# Patient Record
Sex: FEMALE | Race: WHITE | Employment: OTHER | URBAN - METROPOLITAN AREA
[De-identification: names, ages, dates, MRNs, and addresses within clinical notes are randomized per-mention and may not be internally consistent; named-entity substitution may affect disease eponyms.]

---

## 2017-01-10 ENCOUNTER — HOSPITAL ENCOUNTER (OUTPATIENT)
Dept: PHYSICAL THERAPY | Age: 72
Discharge: HOME | End: 2017-01-10
Payer: MEDICARE

## 2017-01-10 PROCEDURE — G8979 MOBILITY GOAL STATUS: HCPCS

## 2017-01-10 PROCEDURE — G8978 MOBILITY CURRENT STATUS: HCPCS

## 2017-01-10 PROCEDURE — 97140 MANUAL THERAPY 1/> REGIONS: CPT

## 2017-01-10 NOTE — PROGRESS NOTES
[facility] at [address], [address] [facility],[address] [place], [address]  Phone: [phone]   Fax: [phone]    Outpatient PHYSICAL THERAPY: Recertification  Fall Risk Score: 0 (? 5 = High Risk)  ICD-9: Treatment Diagnosis: Other specified disorders of joint - other disorders of cervical region, cervicalgia  ICD-10: Treatment Diagnosis: Cervicalgia (M54.2)     REFERRING PHYSICIAN: Aneta Turner MD MD Orders: Evaluate and Treat  Return Physician Appointment: TBD  MEDICAL/REFERRING DIAGNOSIS:  Cervicalgia [M54.2]  Other physical therapy [[id number]]  DATE OF ONSET: Chronic   PRIOR LEVEL OF FUNCTION: Independentd  PRECAUTIONS/ALLERGIES:   Allergies   Allergen Reactions    Alendronate Sodium Rash    Antihistamine [Triprolidine-Pseudoephedrine] Anxiety    Boniva [Ibandronate] Diarrhea     ASSESSMENT:  ?????? ? ? This section established at most recent assessment?????????? Patient presents with decreased range of motion, decreased strength, and pain in right shoulder and neck secondary to chronic pain. Patient has attended a total of 68 scheduled physical therapy visits including initial evaluation on 1/7/2014. Treatment has consisted of manual therapy and streching to improve pain and performance with activities of daily living. PROBLEM LIST (Impairments causing functional limitations):  1. Decreased independence with ADLs requiring any extensive UE activity (lifting/reaching/vacuuming). 2.  Decreased tolerance of prolonged sitting/standing activities (>30 minutes) due to exacerbation of neck pain. 3.  Unable to participate in social/recreational activities due to fear avoidance of onset of neck/shoulder pain. 4.  Outcome measure score of 10/50 on Neck Disability Index with minimal effect of pain on patient's ability to manage every day life activities.   GOALS: (Goals have been discussed and agreed upon with patient.)  SHORT-TERM FUNCTIONAL GOALS: Time Frame: 3 weeks  1. Patient will be independent with home exercise program without exacerbation of symptoms or cueing needed--goal met. 2. Patient will be independent with correct sleeping positions and awareness/avoidance of aggravating positions without cueing needed--goal met. DISCHARGE GOALS: Time Frame: 8 weeks  1. Patient will be independent with all ADLs with minimal onset of neck/shoulder pain and no deficits with daily tasks--goal ongoing. 2. Patient will report no fear avoidance with social or recreational activities due to neck/shoulder pain--goal ongoing. 3. Patient will score less than or equal to 5/50 on Neck Disability Index with minimal effect of pain on patient's ability to manage every day life activities--goal ongoing. REHABILITATION POTENTIAL FOR STATED GOALS: Fransico Barfield OF CARE:  INTERVENTIONS PLANNED: (Benefits and precautions of physical therapy have been discussed with the patient.)  1. Patient education including pathophysiology of neck/shoulder symptoms, education/training (sleeping positions, posture re-education and body mechanics), and instructions of home exercise program.  2. Therapeutic exercises including mobility through neck/shoulder region and postural stabilization. 3. Manual therapy including soft tissue mobilizations, functional joint mobilizations and neuromuscular re-education to neck and shoulder region. 4. Therapeutic modalities including moist heat, cold pack, electrical stimulation, and/or ultrasound as needed for relief of symptoms with treatment. TREATMENT PLAN EFFECTIVE DATES: 1/7/2017 TO 4/7/2017  FREQUENCY/DURATION: Follow patient 2 times a week for 8 weeks to address above goals. Regarding Paola Beach's therapy, I certify that the treatment plan above will be carried out by a therapist or under their direction.   Thank you for this referral,  Nanette Rodriguez PT     Referring Physician Signature: Rosendo Castaneda MD          Date SUBJECTIVE:  History of Present Injury/Illness (Reason for Referral): Patient is well known to therapist.  Patient returned to therapy today with continued complaints of neck and shoulder pain. Patient reports that her right shoulder/arm feel weak at times. She reports she hasn't ever dropped anything, but her arm will catch and she will have to be careful that she doesn't throw anything. She reports that her neck was hurting during the holidays. She reports that she did move out of her work office over the holidays. She reports she is still using cold packs at night and taking her pain medication as needed. She reports she has also been trying to get a few deep tissue massages to help with her pain. Present Symptoms: 1/10/2017: Patient reports she is hurting in her shoulder this morning. Pain Intensity 1: 4  Pain Location 1: Neck, Shoulder  Pain Orientation 1: Right, Left  Pain Intervention(s) 1: Rest, Medication (see MAR)  Pain Description 1: Aching, Constant  Pain Onset 1: Chronic  Pain Scale 1: Numeric (0 - 10)  Patient Stated Pain Goal: 0  · Patient Stated Pain Goal: 0   Dominant Side: right  Past Medical History: Patient reports no significant past medical history. Current Medications: Cymblata, pain medication (PRN)   Date Last Reviewed: 1/10/2017  Social History/Home Situation:   Home Environment: Private residence  Living Alone: No  Support Systems: Family member(s); Friends \ neighbors  Work/Activity History: Retired  OBJECTIVE:  Outcome Measure: Tool Used: Neck Disability Index (NDI)  Score:  Initial: 10/50  Most Recent: 8/50 (Date: 1/10/2017)   Interpretation of Score: The Neck Disability Index is a revised form of the Oswestry Low Back Pain Index and is designed to measure the activities of daily living in person's with neck pain. Each section is scored on a 0-5 scale, 5 representing the greatest disability. The scores of each section are added together for a total score of 50.    Score 0 1-10 11-20 21-30 31-40 41-49 50   Modifier CH CI CJ CK CL CM CN     ? Carrying, Moving, and Handling Objects:     - CURRENT STATUS: CI - 1%-19% impaired, limited or restricted    - GOAL STATUS: CI - 1%-19% impaired, limited or restricted    - D/C STATUS:  ---------------To be determined---------------    Observation/Orthostatic Postural Assessment:   Posture (WDL): Exceptions to WDL  Posture Assessment: Rounded shoulders; Forward head  Palpation:  Tenderness to palpation noted in cervical spine and shoulder region on right greater than left (upper trapezius, levator scapulae, rhomboids, sub-occipitals). No bruising or swelling noted. ROM:    Cervical Assessment (AROM):   · Flexion 100%  · Extension 90%  · Lateral flexion L 85%, R 100%    LUE Assessment (AROM):   · Within defined limits        Shoulder Assessment (AROM):  · Flexion B WFL  · L ext. · Rot. 90, R 80  · IR: L 70, R 65     RLE Assessment (AROM):   · Within defined limits      Strength:   LUE Strength, Tone, Sensation:   · Within defined limits  · Deep cervical flexion 20s against gravity    RUE Strength, Tone, Sensation:  · R Shoulder Flexion: 3+  · R Shoulder ABduction: 3+  · R Shoulder Internal Rotation: 3+  · R Shoulder External Rotation: 3+  · R Elbow Flexion: 3+  · R Elbow Extension: 3+        Special Tests: Negative  Neurological Screen:    Myotomes:        RUE Myotomes  · C1 (Cervical Rotation): Strong  · C2, C3, C4 (Shoulder Shrug): Strong  · C5 (Shoulder ABduction): Strong  · C6 (Biceps & Wrist Extension): Strong  · C7 (Triceps & Wrist Flexion): Strong  · C8 (Thumb Extension): Strong  · T1 (Finger ADduction): Strong          Dermatomes:     Within normal limits     Within normal limits          Reflexes:   · Left Bicep (C5,C6): 2+  · Left Bracheoradialis (C5,C6): 2+  · Left Tricep (C6,C7,C8): 2+    · Right Bicep (C5,C6): 2+  · Right Bracheoradialis (C5,C6): 2+  · Right Tricep (C6,C7,C8): 2+          Neural Tension Tests: Negative  Functional Mobility:  Gait Description (WDL): Within defined limits     Balance:   Sitting: Intact  Standing: Intact  TREATMENT:    (In addition to Assessment/Re-Assessment sessions the following treatments were rendered)  Therapeutic Exercise: ( 5 minutes):  Exercises per grid below to improve mobility and strength. Required minimal visual, verbal and manual cues to promote proper body alignment, promote proper body posture and promote proper body mechanics. Progressed resistance, range, repetitions and complexity of movement as indicated. Date:  1/10/2017    Activity/Exercise Parameters   Scapular rows HEP - reviewed   Lat pulls HEP - reviewed   Manual Therapy     · Joint Mobilization (10 Minutes): Grade II anterior-posterior joint mobilization to right glenohumeral joint with active and passive gross movement in all planes to improve range of motion and decrease pain with daily activities. Prone t-spine central posterior anterior grade IV T1-4. · Soft Tissue Mobilization (30 Minutes): Soft tissue mobilization to cervical spinal musculature to improve mobility and decrease tightness and pain with daily activities. Therapeutic Modalities: N/A today                                                                                             HEP: As above; handouts given to patient for all exercises. ______________________________________________________________________________________________________    Treatment Assessment:  Patient tolerated treatment well. Patient reported decreased pain after treatment. Improving joint and soft tissue mobility noted with treatment. Pain 1/10. Progression/Medical Necessity:   · Patient is expected to demonstrate progress in strength and range of motion to increase independence with daily activities. · Patient demonstrates good rehab potential due to higher previous functional level.   · Skilled intervention continues to be required due to decrease in functional mobility. Compliance with Program/Exercises: Compliant   Reason for Continuation of Services/Other Comments:  · Patient continues to demonstrate capacity to improve overall mobility which will increase safety. Recommendations/Intent for next treatment session: \"Treatment next visit will focus on advancements to more challenging activities\".     Total Treatment Duration:  PT Patient Time In/Time Out  Time In: 0815  Time Out: 0900    Viviane eBatty PT

## 2017-01-17 ENCOUNTER — HOSPITAL ENCOUNTER (OUTPATIENT)
Dept: LAB | Age: 72
Discharge: HOME | End: 2017-01-17

## 2017-01-17 PROCEDURE — 88305 TISSUE EXAM BY PATHOLOGIST: CPT | Performed by: INTERNAL MEDICINE

## 2017-01-24 ENCOUNTER — HOSPITAL ENCOUNTER (OUTPATIENT)
Dept: PHYSICAL THERAPY | Age: 72
Discharge: HOME | End: 2017-01-24
Payer: MEDICARE

## 2017-01-24 PROCEDURE — 97140 MANUAL THERAPY 1/> REGIONS: CPT

## 2017-01-24 NOTE — PROGRESS NOTES
[facility] at [address], [address] [facility],[address] [place], [address]  Phone: [phone]   Fax: [phone]    Outpatient PHYSICAL THERAPY: Daily Note  Fall Risk Score: 0 (? 5 = High Risk)  ICD-9: Treatment Diagnosis: Other specified disorders of joint - other disorders of cervical region, cervicalgia  ICD-10: Treatment Diagnosis: Cervicalgia (M54.2)     REFERRING PHYSICIAN: Ella Galvin MD MD Orders: Evaluate and Treat  Return Physician Appointment: TBD  MEDICAL/REFERRING DIAGNOSIS:  Cervicalgia [M54.2]  Other physical therapy [[id number]]  DATE OF ONSET: Chronic   PRIOR LEVEL OF FUNCTION: Independentd  PRECAUTIONS/ALLERGIES:   Allergies   Allergen Reactions    Alendronate Sodium Rash    Antihistamine [Triprolidine-Pseudoephedrine] Anxiety    Boniva [Ibandronate] Diarrhea     ASSESSMENT:  ?????? ? ? This section established at most recent assessment?????????? Patient presents with decreased range of motion, decreased strength, and pain in right shoulder and neck secondary to chronic pain. Patient has attended a total of 69 scheduled physical therapy visits including initial evaluation on 1/7/2014. Treatment has consisted of manual therapy and streching to improve pain and performance with activities of daily living. PROBLEM LIST (Impairments causing functional limitations):  1. Decreased independence with ADLs requiring any extensive UE activity (lifting/reaching/vacuuming). 2.  Decreased tolerance of prolonged sitting/standing activities (>30 minutes) due to exacerbation of neck pain. 3.  Unable to participate in social/recreational activities due to fear avoidance of onset of neck/shoulder pain. 4.  Outcome measure score of 10/50 on Neck Disability Index with minimal effect of pain on patient's ability to manage every day life activities.   GOALS: (Goals have been discussed and agreed upon with patient.)  SHORT-TERM FUNCTIONAL GOALS: Time Frame: 3 weeks  1. Patient will be independent with home exercise program without exacerbation of symptoms or cueing needed--goal met. 2. Patient will be independent with correct sleeping positions and awareness/avoidance of aggravating positions without cueing needed--goal met. DISCHARGE GOALS: Time Frame: 8 weeks  1. Patient will be independent with all ADLs with minimal onset of neck/shoulder pain and no deficits with daily tasks--goal ongoing. 2. Patient will report no fear avoidance with social or recreational activities due to neck/shoulder pain--goal ongoing. 3. Patient will score less than or equal to 5/50 on Neck Disability Index with minimal effect of pain on patient's ability to manage every day life activities--goal ongoing. REHABILITATION POTENTIAL FOR STATED GOALS: Mayuri Noriega OF CARE:  INTERVENTIONS PLANNED: (Benefits and precautions of physical therapy have been discussed with the patient.)  1. Patient education including pathophysiology of neck/shoulder symptoms, education/training (sleeping positions, posture re-education and body mechanics), and instructions of home exercise program.  2. Therapeutic exercises including mobility through neck/shoulder region and postural stabilization. 3. Manual therapy including soft tissue mobilizations, functional joint mobilizations and neuromuscular re-education to neck and shoulder region. 4. Therapeutic modalities including moist heat, cold pack, electrical stimulation, and/or ultrasound as needed for relief of symptoms with treatment. TREATMENT PLAN EFFECTIVE DATES: 1/7/2017 TO 4/7/2017  FREQUENCY/DURATION: Follow patient 2 times a week for 8 weeks to address above goals. SUBJECTIVE:  History of Present Injury/Illness (Reason for Referral): Patient is well known to therapist.  Patient returned to therapy today with continued complaints of neck and shoulder pain. Patient reports that her right shoulder/arm feel weak at times.  She reports she hasn't ever dropped anything, but her arm will catch and she will have to be careful that she doesn't throw anything. She reports that her neck was hurting during the holidays. She reports that she did move out of her work office over the holidays. She reports she is still using cold packs at night and taking her pain medication as needed. She reports she has also been trying to get a few deep tissue massages to help with her pain. Present Symptoms: 1/24/2017: Patient reports she has been painting and working on her house and so she is dizzy and sore today. Pain Intensity 1: 4  Pain Location 1: Neck, Shoulder  Pain Orientation 1: Right, Left  Pain Intervention(s) 1: Rest, Medication (see MAR)  Pain Description 1: Aching, Constant  Pain Onset 1: Chronic  Pain Scale 1: Numeric (0 - 10)  Patient Stated Pain Goal: 0  · Patient Stated Pain Goal: 0   Dominant Side: right  Past Medical History: Patient reports no significant past medical history. Current Medications: Cymblata, pain medication (PRN)   Date Last Reviewed: 1/24/2017  Social History/Home Situation:   Home Environment: Private residence  Living Alone: No  Support Systems: Family member(s); Friends \ neighbors  Work/Activity History: Retired  OBJECTIVE:  Outcome Measure: Tool Used: Neck Disability Index (NDI)  Score:  Initial: 10/50  Most Recent: 8/50 (Date: 1/10/2017)   Interpretation of Score: The Neck Disability Index is a revised form of the Oswestry Low Back Pain Index and is designed to measure the activities of daily living in person's with neck pain. Each section is scored on a 0-5 scale, 5 representing the greatest disability. The scores of each section are added together for a total score of 50. Score 0 1-10 11-20 21-30 31-40 41-49 50   Modifier CH CI CJ CK CL CM CN     ?  Carrying, Moving, and Handling Objects:     - CURRENT STATUS: CI - 1%-19% impaired, limited or restricted    - GOAL STATUS: CI - 1%-19% impaired, limited or [facility]    - D/C STATUS:  ---------------To be determined---------------    Observation/Orthostatic Postural Assessment:   Posture (WDL): Exceptions to WDL  Posture Assessment: Rounded shoulders; Forward head  Palpation:  Tenderness to palpation noted in cervical spine and shoulder region on right greater than left (upper trapezius, levator scapulae, rhomboids, sub-occipitals). No bruising or swelling noted. ROM:    Cervical Assessment (AROM):   · Flexion 100%  · Extension 90%  · Lateral flexion L 85%, R 100%    LUE Assessment (AROM):   · Within defined limits        Shoulder Assessment (AROM):  · Flexion B WFL  · L ext. · Rot. 90, R 80  · IR: L 70, R 65     RLE Assessment (AROM):   · Within defined limits      Strength:   LUE Strength, Tone, Sensation:   · Within defined limits  · Deep cervical flexion 20s against gravity    RUE Strength, Tone, Sensation:  · R Shoulder Flexion: 3+  · R Shoulder ABduction: 3+  · R Shoulder Internal Rotation: 3+  · R Shoulder External Rotation: 3+  · R Elbow Flexion: 3+  · R Elbow Extension: 3+        Special Tests: Negative  Neurological Screen:    Myotomes:        RUE Myotomes  · C1 (Cervical Rotation): Strong  · C2, C3, C4 (Shoulder Shrug): Strong  · C5 (Shoulder ABduction): Strong  · C6 (Biceps & Wrist Extension): Strong  · C7 (Triceps & Wrist Flexion): Strong  · C8 (Thumb Extension): Strong  · T1 (Finger ADduction): Strong          Dermatomes:     Within normal limits     Within normal limits          Reflexes:   · Left Bicep (C5,C6): 2+  · Left Bracheoradialis (C5,C6): 2+  · Left Tricep (C6,C7,C8): 2+    · Right Bicep (C5,C6): 2+  · Right Bracheoradialis (C5,C6): 2+  · Right Tricep (C6,C7,C8): 2+          Neural Tension Tests: Negative  Functional Mobility:  Gait Description (WDL): Within defined limits     Balance:   Sitting: Intact  Standing: Intact  TREATMENT:    (In addition to Assessment/Re-Assessment sessions the following treatments were rendered)  Therapeutic Exercise: ( 5 minutes):  Exercises per grid below to improve mobility and strength. Required minimal visual, verbal and manual cues to promote proper body alignment, promote proper body posture and promote proper body mechanics. Progressed resistance, range, repetitions and complexity of movement as indicated. Date:  1/24/2017    Activity/Exercise Parameters   Scapular rows HEP - reviewed   Lat pulls HEP - reviewed   Manual Therapy     · Joint Mobilization (10 Minutes): Grade II anterior-posterior joint mobilization to right glenohumeral joint with active and passive gross movement in all planes to improve range of motion and decrease pain with daily activities. Prone t-spine central posterior anterior grade IV T1-4. · Soft Tissue Mobilization (30 Minutes): Soft tissue mobilization to cervical spinal musculature to improve mobility and decrease tightness and pain with daily activities. Therapeutic Modalities: N/A today                                                                                             HEP: As above; handouts given to patient for all exercises. ______________________________________________________________________________________________________    Treatment Assessment:  Patient tolerated treatment well. Patient reported decreased pain after treatment. Improving joint and soft tissue mobility noted with treatment. Pain 2/10. Progression/Medical Necessity:   · Patient is expected to demonstrate progress in strength and range of motion to increase independence with daily activities. · Patient demonstrates good rehab potential due to higher previous functional level. · Skilled intervention continues to be required due to decrease in functional mobility. Compliance with Program/Exercises: Compliant   Reason for Continuation of Services/Other Comments:  · Patient continues to demonstrate capacity to improve overall mobility which will increase safety.   Recommendations/Intent for next treatment session: \"Treatment next visit will focus on advancements to more challenging activities\".     Total Treatment Duration:  PT Patient Time In/Time Out  Time In: 0900  Time Out: BOLA Mason

## 2017-02-01 ENCOUNTER — HOSPITAL ENCOUNTER (OUTPATIENT)
Dept: PHYSICAL THERAPY | Age: 72
Discharge: HOME | End: 2017-02-01
Payer: MEDICARE

## 2017-02-01 PROCEDURE — 97140 MANUAL THERAPY 1/> REGIONS: CPT

## 2017-02-01 NOTE — PROGRESS NOTES
[facility] at [address], [address] [facility],[address] [place], [address]  Phone: [phone]   Fax: [phone]    Outpatient PHYSICAL THERAPY: Daily Note  Fall Risk Score: 0 (? 5 = High Risk)  ICD-9: Treatment Diagnosis: Other specified disorders of joint - other disorders of cervical region, cervicalgia  ICD-10: Treatment Diagnosis: Cervicalgia (M54.2)     REFERRING PHYSICIAN: Gordon Lentz MD MD Orders: Evaluate and Treat  Return Physician Appointment: TBD  MEDICAL/REFERRING DIAGNOSIS:  Cervicalgia [M54.2]  Other physical therapy [[id number]]  DATE OF ONSET: Chronic   PRIOR LEVEL OF FUNCTION: Independentd  PRECAUTIONS/ALLERGIES:   Allergies   Allergen Reactions    Alendronate Sodium Rash    Antihistamine [Triprolidine-Pseudoephedrine] Anxiety    Boniva [Ibandronate] Diarrhea     ASSESSMENT:  ?????? ? ? This section established at most recent assessment?????????? Patient presents with decreased range of motion, decreased strength, and pain in right shoulder and neck secondary to chronic pain. Patient has attended a total of 70 scheduled physical therapy visits including initial evaluation on 1/7/2014. Treatment has consisted of manual therapy and streching to improve pain and performance with activities of daily living. PROBLEM LIST (Impairments causing functional limitations):  1. Decreased independence with ADLs requiring any extensive UE activity (lifting/reaching/vacuuming). 2.  Decreased tolerance of prolonged sitting/standing activities (>30 minutes) due to exacerbation of neck pain. 3.  Unable to participate in social/recreational activities due to fear avoidance of onset of neck/shoulder pain. 4.  Outcome measure score of 10/50 on Neck Disability Index with minimal effect of pain on patient's ability to manage every day life activities.   GOALS: (Goals have been discussed and agreed upon with patient.)  SHORT-TERM FUNCTIONAL GOALS: Time Frame: 3 weeks  1. Patient will be independent with home exercise program without exacerbation of symptoms or cueing needed--goal met. 2. Patient will be independent with correct sleeping positions and awareness/avoidance of aggravating positions without cueing needed--goal met. DISCHARGE GOALS: Time Frame: 8 weeks  1. Patient will be independent with all ADLs with minimal onset of neck/shoulder pain and no deficits with daily tasks--goal ongoing. 2. Patient will report no fear avoidance with social or recreational activities due to neck/shoulder pain--goal ongoing. 3. Patient will score less than or equal to 5/50 on Neck Disability Index with minimal effect of pain on patient's ability to manage every day life activities--goal ongoing. REHABILITATION POTENTIAL FOR STATED GOALS: [facility] OF CARE:  INTERVENTIONS PLANNED: (Benefits and precautions of physical therapy have been discussed with the patient.)  1. Patient education including pathophysiology of neck/shoulder symptoms, education/training (sleeping positions, posture re-education and body mechanics), and instructions of home exercise program.  2. Therapeutic exercises including mobility through neck/shoulder region and postural stabilization. 3. Manual therapy including soft tissue mobilizations, functional joint mobilizations and neuromuscular re-education to neck and shoulder region. 4. Therapeutic modalities including moist heat, cold pack, electrical stimulation, and/or ultrasound as needed for relief of symptoms with treatment. TREATMENT PLAN EFFECTIVE DATES: 1/7/2017 TO 4/7/2017  FREQUENCY/DURATION: Follow patient 2 times a week for 8 weeks to address above goals. SUBJECTIVE:  History of Present Injury/Illness (Reason for Referral): Patient is well known to therapist.  Patient returned to therapy today with continued complaints of neck and shoulder pain. Patient reports that her right shoulder/arm feel weak at times.  She reports she hasn't ever dropped anything, but her arm will catch and she will have to be careful that she doesn't throw anything. She reports that her neck was hurting during the holidays. She reports that she did move out of her work office over the holidays. She reports she is still using cold packs at night and taking her pain medication as needed. She reports she has also been trying to get a few deep tissue massages to help with her pain. Present Symptoms: 2/1/2017: Patient reports she is hurting in her neck and shoulders today, but she felt really good after last visit. Pain Intensity 1: 4  Pain Location 1: Neck, Shoulder  Pain Orientation 1: Right, Left  Pain Intervention(s) 1: Rest, Medication (see MAR)  Pain Description 1: Aching, Constant  Pain Onset 1: Chronic  Pain Scale 1: Numeric (0 - 10)  Patient Stated Pain Goal: 0  · Patient Stated Pain Goal: 0   Dominant Side: right  Past Medical History: Patient reports no significant past medical history. Current Medications: Cymblata, pain medication (PRN)   Date Last Reviewed: 2/1/2017  Social History/Home Situation:   Home Environment: Private residence  Living Alone: No  Support Systems: Family member(s); Friends \ neighbors  Work/Activity History: Retired  OBJECTIVE:  Outcome Measure: Tool Used: Neck Disability Index (NDI)  Score:  Initial: 10/50  Most Recent: 8/50 (Date: 1/10/2017)   Interpretation of Score: The Neck Disability Index is a revised form of the Oswestry Low Back Pain Index and is designed to measure the activities of daily living in person's with neck pain. Each section is scored on a 0-5 scale, 5 representing the greatest disability. The scores of each section are added together for a total score of 50. Score 0 1-10 11-20 21-30 31-40 41-49 50   Modifier CH CI CJ CK CL CM CN     ?  Carrying, Moving, and Handling Objects:     - CURRENT STATUS: CI - 1%-19% impaired, limited or restricted    - GOAL STATUS: CI - 1%-19% impaired, limited or restricted    - D/C STATUS:  ---------------To be determined---------------    Observation/Orthostatic Postural Assessment:   Posture (WDL): Exceptions to WDL  Posture Assessment: Rounded shoulders; Forward head  Palpation:  Tenderness to palpation noted in cervical spine and shoulder region on right greater than left (upper trapezius, levator scapulae, rhomboids, sub-occipitals). No bruising or swelling noted. ROM:    Cervical Assessment (AROM):   · Flexion 100%  · Extension 90%  · Lateral flexion L 85%, R 100%    LUE Assessment (AROM):   · Within defined limits        Shoulder Assessment (AROM):  · Flexion B WFL  · L ext. · Rot. 90, R 80  · IR: L 70, R 65     RLE Assessment (AROM):   · Within defined limits      Strength:   LUE Strength, Tone, Sensation:   · Within defined limits  · Deep cervical flexion 20s against gravity    RUE Strength, Tone, Sensation:  · R Shoulder Flexion: 3+  · R Shoulder ABduction: 3+  · R Shoulder Internal Rotation: 3+  · R Shoulder External Rotation: 3+  · R Elbow Flexion: 3+  · R Elbow Extension: 3+        Special Tests: Negative  Neurological Screen:    Myotomes:        RUE Myotomes  · C1 (Cervical Rotation): Strong  · C2, C3, C4 (Shoulder Shrug): Strong  · C5 (Shoulder ABduction): Strong  · C6 (Biceps & Wrist Extension): Strong  · C7 (Triceps & Wrist Flexion): Strong  · C8 (Thumb Extension): Strong  · T1 (Finger ADduction): Strong          Dermatomes:     Within normal limits     Within normal limits          Reflexes:   · Left Bicep (C5,C6): 2+  · Left Bracheoradialis (C5,C6): 2+  · Left Tricep (C6,C7,C8): 2+    · Right Bicep (C5,C6): 2+  · Right Bracheoradialis (C5,C6): 2+  · Right Tricep (C6,C7,C8): 2+          Neural Tension Tests: Negative  Functional Mobility:  Gait Description (WDL): Within defined limits     Balance:   Sitting: Intact  Standing: Intact  TREATMENT:    (In addition to Assessment/Re-Assessment sessions the following treatments were rendered)  Therapeutic Exercise: ( 5 minutes):  Exercises per grid below to improve mobility and strength. Required minimal visual, verbal and manual cues to promote proper body alignment, promote proper body posture and promote proper body mechanics. Progressed resistance, range, repetitions and complexity of movement as indicated. Date:  2/1/2017    Activity/Exercise Parameters   Scapular rows HEP - reviewed   Lat pulls HEP - reviewed   Manual Therapy     · Joint Mobilization (10 Minutes): Grade II anterior-posterior joint mobilization to right glenohumeral joint with active and passive gross movement in all planes to improve range of motion and decrease pain with daily activities. Prone t-spine central posterior anterior grade IV T1-4. · Soft Tissue Mobilization (30 Minutes): Soft tissue mobilization to cervical spinal musculature to improve mobility and decrease tightness and pain with daily activities. Therapeutic Modalities: N/A today                                                                                             HEP: As above; handouts given to patient for all exercises. ______________________________________________________________________________________________________    Treatment Assessment:  Patient tolerated treatment well. Patient reported decreased pain after treatment. Improving joint and soft tissue mobility noted with treatment. Pain 2/10. Progression/Medical Necessity:   · Patient is expected to demonstrate progress in strength and range of motion to increase independence with daily activities. · Patient demonstrates good rehab potential due to higher previous functional level. · Skilled intervention continues to be required due to decrease in functional mobility. Compliance with Program/Exercises: Compliant   Reason for Continuation of Services/Other Comments:  · Patient continues to demonstrate capacity to improve overall mobility which will increase safety.   Recommendations/Intent for next treatment session: \"Treatment next visit will focus on advancements to more challenging activities\".     Total Treatment Duration:  PT Patient Time In/Time Out  Time In: 0915  Time Out: [address],

## 2017-02-08 ENCOUNTER — APPOINTMENT (OUTPATIENT)
Dept: PHYSICAL THERAPY | Age: 72
End: 2017-02-08
Payer: MEDICARE

## 2017-02-15 ENCOUNTER — HOSPITAL ENCOUNTER (OUTPATIENT)
Dept: PHYSICAL THERAPY | Age: 72
Discharge: HOME | End: 2017-02-15
Payer: MEDICARE

## 2017-02-15 PROCEDURE — G8985 CARRY GOAL STATUS: HCPCS

## 2017-02-15 PROCEDURE — 97140 MANUAL THERAPY 1/> REGIONS: CPT

## 2017-02-15 PROCEDURE — G8984 CARRY CURRENT STATUS: HCPCS

## 2017-02-15 NOTE — PROGRESS NOTES
[facility] at [address], [address] [place] [address],[address] [place], [address]  Phone: [phone]   Fax: [phone]    Outpatient PHYSICAL THERAPY: Progress Report  Fall Risk Score: 0 (? 5 = High Risk)  ICD-9: Treatment Diagnosis: Other specified disorders of joint - other disorders of cervical region, cervicalgia  ICD-10: Treatment Diagnosis: Cervicalgia (M54.2)     REFERRING PHYSICIAN: Beverly Nava MD MD Orders: Evaluate and Treat  Return Physician Appointment: TBD  MEDICAL/REFERRING DIAGNOSIS:  Cervicalgia [M54.2]  Other physical therapy [[id number]]  DATE OF ONSET: Chronic   PRIOR LEVEL OF FUNCTION: Independentd  PRECAUTIONS/ALLERGIES:   Allergies   Allergen Reactions    Alendronate Sodium Rash    Antihistamine [Triprolidine-Pseudoephedrine] Anxiety    Boniva [Ibandronate] Diarrhea     ASSESSMENT:  ?????? ? ? This section established at most recent assessment?????????? Patient presents with decreased range of motion, decreased strength, and pain in right shoulder and neck secondary to chronic pain. Patient has attended a total of 71 scheduled physical therapy visits including initial evaluation on 1/7/2014. Treatment has consisted of manual therapy and streching to improve pain and performance with activities of daily living. PROBLEM LIST (Impairments causing functional limitations):  1. Decreased independence with ADLs requiring any extensive UE activity (lifting/reaching/vacuuming). 2.  Decreased tolerance of prolonged sitting/standing activities (>30 minutes) due to exacerbation of neck pain. 3.  Unable to participate in social/recreational activities due to fear avoidance of onset of neck/shoulder pain. 4.  Outcome measure score of 10/50 on Neck Disability Index with minimal effect of pain on patient's ability to manage every day life activities.   GOALS: (Goals have been discussed and agreed upon with patient.)  SHORT-TERM FUNCTIONAL GOALS: Time Frame: 3 weeks  1. Patient will be independent with home exercise program without exacerbation of symptoms or cueing needed--goal met. 2. Patient will be independent with correct sleeping positions and awareness/avoidance of aggravating positions without cueing needed--goal met. DISCHARGE GOALS: Time Frame: 8 weeks  1. Patient will be independent with all ADLs with minimal onset of neck/shoulder pain and no deficits with daily tasks--goal ongoing. 2. Patient will report no fear avoidance with social or recreational activities due to neck/shoulder pain--goal ongoing. 3. Patient will score less than or equal to 5/50 on Neck Disability Index with minimal effect of pain on patient's ability to manage every day life activities--goal ongoing. REHABILITATION POTENTIAL FOR STATED GOALS: [facility] OF CARE:  INTERVENTIONS PLANNED: (Benefits and precautions of physical therapy have been discussed with the patient.)  1. Patient education including pathophysiology of neck/shoulder symptoms, education/training (sleeping positions, posture re-education and body mechanics), and instructions of home exercise program.  2. Therapeutic exercises including mobility through neck/shoulder region and postural stabilization. 3. Manual therapy including soft tissue mobilizations, functional joint mobilizations and neuromuscular re-education to neck and shoulder region. 4. Therapeutic modalities including moist heat, cold pack, electrical stimulation, and/or ultrasound as needed for relief of symptoms with treatment. TREATMENT PLAN EFFECTIVE DATES: 1/7/2017 TO 4/7/2017  FREQUENCY/DURATION: Follow patient 2 times a week for 8 weeks to address above goals. SUBJECTIVE:  History of Present Injury/Illness (Reason for Referral): Patient is well known to therapist.  Patient returned to therapy today with continued complaints of neck and shoulder pain. Patient reports that her right shoulder/arm feel weak at times.  She reports she hasn't ever dropped anything, but her arm will catch and she will have to be careful that she doesn't throw anything. She reports that her neck was hurting during the holidays. She reports that she did move out of her work office over the holidays. She reports she is still using cold packs at night and taking her pain medication as needed. She reports she has also been trying to get a few deep tissue massages to help with her pain. Present Symptoms: 2/15/2017: Patient reports she felt really good after her last visit. Pain Intensity 1: 4  Pain Location 1: Neck, Shoulder  Pain Orientation 1: Right, Left  Pain Intervention(s) 1: Rest, Medication (see MAR)  Pain Description 1: Aching, Constant  Pain Onset 1: Chronic  Pain Scale 1: Numeric (0 - 10)  Patient Stated Pain Goal: 0  · Patient Stated Pain Goal: 0   Dominant Side: right  Past Medical History: Patient reports no significant past medical history. Current Medications: Cymblata, pain medication (PRN)   Date Last Reviewed: 2/15/2017  Social History/Home Situation:   Home Environment: Private residence  Living Alone: No  Support Systems: Family member(s); Friends \ neighbors  Work/Activity History: Retired  OBJECTIVE:  Outcome Measure: Tool Used: Neck Disability Index (NDI)  Score:  Initial: 10/50  Most Recent: 8/50 (Date: 2/15/2017)   Interpretation of Score: The Neck Disability Index is a revised form of the Oswestry Low Back Pain Index and is designed to measure the activities of daily living in person's with neck pain. Each section is scored on a 0-5 scale, 5 representing the greatest disability. The scores of each section are added together for a total score of 50. Score 0 1-10 11-20 21-30 31-40 41-49 50   Modifier CH CI CJ CK CL CM CN     ?  Carrying, Moving, and Handling Objects:     - CURRENT STATUS: CI - 1%-19% impaired, limited or restricted    - GOAL STATUS: CI - 1%-19% impaired, limited or restricted    - D/C STATUS:  ---------------To be determined---------------    Observation/Orthostatic Postural Assessment:   Posture (WDL): Exceptions to WDL  Posture Assessment: Rounded shoulders; Forward head  Palpation:  Tenderness to palpation noted in cervical spine and shoulder region on right greater than left (upper trapezius, levator scapulae, rhomboids, sub-occipitals). No bruising or swelling noted. ROM:    Cervical Assessment (AROM):   · Flexion 100%  · Extension 90%  · Lateral flexion L 85%, R 100%    LUE Assessment (AROM):   · Within defined limits        Shoulder Assessment (AROM):  · Flexion B WFL  · L ext. · Rot. 90, R 80  · IR: L 70, R 65     RLE Assessment (AROM):   · Within defined limits      Strength:   LUE Strength, Tone, Sensation:   · Within defined limits  · Deep cervical flexion 20s against gravity    RUE Strength, Tone, Sensation:  · R Shoulder Flexion: 3+  · R Shoulder ABduction: 3+  · R Shoulder Internal Rotation: 3+  · R Shoulder External Rotation: 3+  · R Elbow Flexion: 3+  · R Elbow Extension: 3+        Special Tests: Negative  Neurological Screen:    Myotomes:        RUE Myotomes  · C1 (Cervical Rotation): Strong  · C2, C3, C4 (Shoulder Shrug): Strong  · C5 (Shoulder ABduction): Strong  · C6 (Biceps & Wrist Extension): Strong  · C7 (Triceps & Wrist Flexion): Strong  · C8 (Thumb Extension): Strong  · T1 (Finger ADduction): Strong          Dermatomes:     Within normal limits     Within normal limits          Reflexes:   · Left Bicep (C5,C6): 2+  · Left Bracheoradialis (C5,C6): 2+  · Left Tricep (C6,C7,C8): 2+    · Right Bicep (C5,C6): 2+  · Right Bracheoradialis (C5,C6): 2+  · Right Tricep (C6,C7,C8): 2+          Neural Tension Tests: Negative  Functional Mobility:  Gait Description (WDL): Within defined limits     Balance:   Sitting: Intact  Standing: Intact  TREATMENT:    (In addition to Assessment/Re-Assessment sessions the following treatments were rendered)  Therapeutic Exercise: ( 5 minutes): Exercises per grid below to improve mobility and strength. Required minimal visual, verbal and manual cues to promote proper body alignment, promote proper body posture and promote proper body mechanics. Progressed resistance, range, repetitions and complexity of movement as indicated. Date:  2/15/2017    Activity/Exercise Parameters   Scapular rows HEP - reviewed   Lat pulls HEP - reviewed   Manual Therapy     · Joint Mobilization (10 Minutes): Grade II anterior-posterior joint mobilization to right glenohumeral joint with active and passive gross movement in all planes to improve range of motion and decrease pain with daily activities. Prone t-spine central posterior anterior grade IV T1-4. · Soft Tissue Mobilization (30 Minutes): Soft tissue mobilization to cervical spinal musculature to improve mobility and decrease tightness and pain with daily activities. Therapeutic Modalities: N/A today                                                                                             HEP: As above; handouts given to patient for all exercises. ______________________________________________________________________________________________________    Treatment Assessment:  Patient tolerated treatment well. Patient reported decreased pain after treatment. Improving joint and soft tissue mobility noted with treatment. Pain 2/10. Progression/Medical Necessity:   · Patient is expected to demonstrate progress in strength and range of motion to increase independence with daily activities. · Patient demonstrates good rehab potential due to higher previous functional level. · Skilled intervention continues to be required due to decrease in functional mobility. Compliance with Program/Exercises: Compliant   Reason for Continuation of Services/Other Comments:  · Patient continues to demonstrate capacity to improve overall mobility which will increase safety.   Recommendations/Intent for next treatment session: \"Treatment next visit will focus on advancements to more challenging activities\".     Total Treatment Duration:  PT Patient Time In/Time Out  Time In: 0915  Time Out: [facility],

## 2017-03-01 ENCOUNTER — HOSPITAL ENCOUNTER (OUTPATIENT)
Dept: PHYSICAL THERAPY | Age: 72
Discharge: HOME | End: 2017-03-01
Payer: MEDICARE

## 2017-03-01 PROCEDURE — 97140 MANUAL THERAPY 1/> REGIONS: CPT

## 2017-03-01 NOTE — PROGRESS NOTES
[facility]rapy Center at [address], [address] [facility],[address] [place], [address]  Phone: [phone]   Fax: [phone]    Outpatient PHYSICAL THERAPY: Daily Note  Fall Risk Score: 0 (? 5 = High Risk)  ICD-9: Treatment Diagnosis: Other specified disorders of joint - other disorders of cervical region, cervicalgia  ICD-10: Treatment Diagnosis: Cervicalgia (M54.2)     REFERRING PHYSICIAN: Aneta Turner MD MD Orders: Evaluate and Treat  Return Physician Appointment: TBD  MEDICAL/REFERRING DIAGNOSIS:  Cervicalgia [M54.2]  Other physical therapy [[id number]]  DATE OF ONSET: Chronic   PRIOR LEVEL OF FUNCTION: Independentd  PRECAUTIONS/ALLERGIES:   Allergies   Allergen Reactions    Alendronate Sodium Rash    Antihistamine [Triprolidine-Pseudoephedrine] Anxiety    Boniva [Ibandronate] Diarrhea     ASSESSMENT:  ?????? ? ? This section established at most recent assessment?????????? Patient presents with decreased range of motion, decreased strength, and pain in right shoulder and neck secondary to chronic pain. Patient has attended a total of 72 scheduled physical therapy visits including initial evaluation on 1/7/2014. Treatment has consisted of manual therapy and streching to improve pain and performance with activities of daily living. PROBLEM LIST (Impairments causing functional limitations):  1. Decreased independence with ADLs requiring any extensive UE activity (lifting/reaching/vacuuming). 2.  Decreased tolerance of prolonged sitting/standing activities (>30 minutes) due to exacerbation of neck pain. 3.  Unable to participate in social/recreational activities due to fear avoidance of onset of neck/shoulder pain. 4.  Outcome measure score of 10/50 on Neck Disability Index with minimal effect of pain on patient's ability to manage every day life activities.   GOALS: (Goals have been discussed and agreed upon with patient.)  SHORT-TERM FUNCTIONAL GOALS: Time Frame: 3 weeks  1. Patient will be independent with home exercise program without exacerbation of symptoms or cueing needed--goal met. 2. Patient will be independent with correct sleeping positions and awareness/avoidance of aggravating positions without cueing needed--goal met. DISCHARGE GOALS: Time Frame: 8 weeks  1. Patient will be independent with all ADLs with minimal onset of neck/shoulder pain and no deficits with daily tasks--goal ongoing. 2. Patient will report no fear avoidance with social or recreational activities due to neck/shoulder pain--goal ongoing. 3. Patient will score less than or equal to 5/50 on Neck Disability Index with minimal effect of pain on patient's ability to manage every day life activities--goal ongoing. REHABILITATION POTENTIAL FOR STATED GOALS: [facility] OF CARE:  INTERVENTIONS PLANNED: (Benefits and precautions of physical therapy have been discussed with the patient.)  1. Patient education including pathophysiology of neck/shoulder symptoms, education/training (sleeping positions, posture re-education and body mechanics), and instructions of home exercise program.  2. Therapeutic exercises including mobility through neck/shoulder region and postural stabilization. 3. Manual therapy including soft tissue mobilizations, functional joint mobilizations and neuromuscular re-education to neck and shoulder region. 4. Therapeutic modalities including moist heat, cold pack, electrical stimulation, and/or ultrasound as needed for relief of symptoms with treatment. TREATMENT PLAN EFFECTIVE DATES: 1/7/2017 TO 4/7/2017  FREQUENCY/DURATION: Follow patient 2 times a week for 8 weeks to address above goals. SUBJECTIVE:  History of Present Injury/Illness (Reason for Referral): Patient is well known to therapist.  Patient returned to therapy today with continued complaints of neck and shoulder pain. Patient reports that her right shoulder/arm feel weak at times.  She reports she hasn't ever dropped anything, but her arm will catch and she will have to be careful that she doesn't throw anything. She reports that her neck was hurting during the holidays. She reports that she did move out of her work office over the holidays. She reports she is still using cold packs at night and taking her pain medication as needed. She reports she has also been trying to get a few deep tissue massages to help with her pain. Present Symptoms: 3/1/2017: Patient reports her left side has been hurting more. Pain Intensity 1: 4  Pain Location 1: Neck, Shoulder  Pain Orientation 1: Right, Left  Pain Intervention(s) 1: Rest, Medication (see MAR)  Pain Description 1: Aching, Constant  Pain Onset 1: Chronic  Pain Scale 1: Numeric (0 - 10)  Patient Stated Pain Goal: 0  · Patient Stated Pain Goal: 0   Dominant Side: right  Past Medical History: Patient reports no significant past medical history. Current Medications: Cymblata, pain medication (PRN)   Date Last Reviewed: 3/1/2017  Social History/Home Situation:   Home Environment: Private residence  Living Alone: No  Support Systems: Family member(s); Friends \ neighbors  Work/Activity History: Retired  OBJECTIVE:  Outcome Measure: Tool Used: Neck Disability Index (NDI)  Score:  Initial: 10/50  Most Recent: 8/50 (Date: 2/15/2017)   Interpretation of Score: The Neck Disability Index is a revised form of the Oswestry Low Back Pain Index and is designed to measure the activities of daily living in person's with neck pain. Each section is scored on a 0-5 scale, 5 representing the greatest disability. The scores of each section are added together for a total score of 50. Score 0 1-10 11-20 21-30 31-40 41-49 50   Modifier CH CI CJ CK CL CM CN     ?  Carrying, Moving, and Handling Objects:     - CURRENT STATUS: CI - 1%-19% impaired, limited or restricted    - GOAL STATUS: CI - 1%-19% impaired, limited or restricted    - D/C STATUS:  ---------------To be determined---------------    Observation/Orthostatic Postural Assessment:   Posture (WDL): Exceptions to WDL  Posture Assessment: Rounded shoulders; Forward head  Palpation:  Tenderness to palpation noted in cervical spine and shoulder region on right greater than left (upper trapezius, levator scapulae, rhomboids, sub-occipitals). No bruising or swelling noted. ROM:    Cervical Assessment (AROM):   · Flexion 100%  · Extension 90%  · Lateral flexion L 85%, R 100%    LUE Assessment (AROM):   · Within defined limits        Shoulder Assessment (AROM):  · Flexion B WFL  · L ext. · Rot. 90, R 80  · IR: L 70, R 65     RLE Assessment (AROM):   · Within defined limits      Strength:   LUE Strength, Tone, Sensation:   · Within defined limits  · Deep cervical flexion 20s against gravity    RUE Strength, Tone, Sensation:  · R Shoulder Flexion: 3+  · R Shoulder ABduction: 3+  · R Shoulder Internal Rotation: 3+  · R Shoulder External Rotation: 3+  · R Elbow Flexion: 3+  · R Elbow Extension: 3+        Special Tests: Negative  Neurological Screen:    Myotomes:        RUE Myotomes  · C1 (Cervical Rotation): Strong  · C2, C3, C4 (Shoulder Shrug): Strong  · C5 (Shoulder ABduction): Strong  · C6 (Biceps & Wrist Extension): Strong  · C7 (Triceps & Wrist Flexion): Strong  · C8 (Thumb Extension): Strong  · T1 (Finger ADduction): Strong          Dermatomes:     Within normal limits     Within normal limits          Reflexes:   · Left Bicep (C5,C6): 2+  · Left Bracheoradialis (C5,C6): 2+  · Left Tricep (C6,C7,C8): 2+    · Right Bicep (C5,C6): 2+  · Right Bracheoradialis (C5,C6): 2+  · Right Tricep (C6,C7,C8): 2+          Neural Tension Tests: Negative  Functional Mobility:  Gait Description (WDL): Within defined limits     Balance:   Sitting: Intact  Standing: Intact  TREATMENT:    (In addition to Assessment/Re-Assessment sessions the following treatments were rendered)  Therapeutic Exercise: ( 5 minutes):  Exercises per grid below to improve mobility and strength. Required minimal visual, verbal and manual cues to promote proper body alignment, promote proper body posture and promote proper body mechanics. Progressed resistance, range, repetitions and complexity of movement as indicated. Date:  3/1/2017    Activity/Exercise Parameters   Scapular rows HEP - reviewed   Lat pulls HEP - reviewed   Manual Therapy     · Joint Mobilization (10 Minutes): Grade II anterior-posterior joint mobilization to right glenohumeral joint with active and passive gross movement in all planes to improve range of motion and decrease pain with daily activities. Prone t-spine central posterior anterior grade IV T1-4. · Soft Tissue Mobilization (30 Minutes): Soft tissue mobilization to cervical spinal musculature to improve mobility and decrease tightness and pain with daily activities. Therapeutic Modalities: N/A today                                                                                             HEP: As above; handouts given to patient for all exercises. ______________________________________________________________________________________________________    Treatment Assessment:  Patient tolerated treatment well. Patient reported decreased pain after treatment. Improving joint and soft tissue mobility noted with treatment. Pain 2/10. Progression/Medical Necessity:   · Patient is expected to demonstrate progress in strength and range of motion to increase independence with daily activities. · Patient demonstrates good rehab potential due to higher previous functional level. · Skilled intervention continues to be required due to decrease in functional mobility. Compliance with Program/Exercises: Compliant   Reason for Continuation of Services/Other Comments:  · Patient continues to demonstrate capacity to improve overall mobility which will increase safety.   Recommendations/Intent for next treatment session: \"Treatment next visit will focus on advancements to more challenging activities\".     Total Treatment Duration:  PT Patient Time In/Time Out  Time In: 0915  Time Out: [address], PT

## 2017-03-15 ENCOUNTER — HOSPITAL ENCOUNTER (OUTPATIENT)
Dept: PHYSICAL THERAPY | Age: 72
Discharge: HOME | End: 2017-03-15
Payer: MEDICARE

## 2017-03-15 PROCEDURE — G8984 CARRY CURRENT STATUS: HCPCS

## 2017-03-15 PROCEDURE — 97140 MANUAL THERAPY 1/> REGIONS: CPT

## 2017-03-15 PROCEDURE — G8985 CARRY GOAL STATUS: HCPCS

## 2017-03-15 NOTE — PROGRESS NOTES
Therapy Center at [address], [address] [facility],[address] [place], [address]  Phone: [phone]   Fax: [phone]    Outpatient PHYSICAL THERAPY: Progress Report  Fall Risk Score: 0 (? 5 = High Risk)  ICD-9: Treatment Diagnosis: Other specified disorders of joint - other disorders of cervical region, cervicalgia  ICD-10: Treatment Diagnosis: Cervicalgia (M54.2)     REFERRING PHYSICIAN: Henrique Hatch MD MD Orders: Evaluate and Treat  Return Physician Appointment: TBD  MEDICAL/REFERRING DIAGNOSIS:  Cervicalgia [M54.2]  Other physical therapy [[id number]]  DATE OF ONSET: Chronic   PRIOR LEVEL OF FUNCTION: Independentd  PRECAUTIONS/ALLERGIES:   Allergies   Allergen Reactions    Alendronate Sodium Rash    Antihistamine [Triprolidine-Pseudoephedrine] Anxiety    Boniva [Ibandronate] Diarrhea     ASSESSMENT:  ?????? ? ? This section established at most recent assessment?????????? Patient presents with decreased range of motion, decreased strength, and pain in right shoulder and neck secondary to chronic pain. Patient has attended a total of 73 scheduled physical therapy visits including initial evaluation on 1/7/2014. Treatment has consisted of manual therapy and streching to improve pain and performance with activities of daily living. PROBLEM LIST (Impairments causing functional limitations):  1. Decreased independence with ADLs requiring any extensive UE activity (lifting/reaching/vacuuming). 2.  Decreased tolerance of prolonged sitting/standing activities (>30 minutes) due to exacerbation of neck pain. 3.  Unable to participate in social/recreational activities due to fear avoidance of onset of neck/shoulder pain. 4.  Outcome measure score of 10/50 on Neck Disability Index with minimal effect of pain on patient's ability to manage every day life activities.   GOALS: (Goals have been discussed and agreed upon with patient.)  SHORT-TERM FUNCTIONAL GOALS: Time Frame: 3 weeks  1. Patient will be independent with home exercise program without exacerbation of symptoms or cueing needed--goal met. 2. Patient will be independent with correct sleeping positions and awareness/avoidance of aggravating positions without cueing needed--goal met. DISCHARGE GOALS: Time Frame: 8 weeks  1. Patient will be independent with all ADLs with minimal onset of neck/shoulder pain and no deficits with daily tasks--goal ongoing. 2. Patient will report no fear avoidance with social or recreational activities due to neck/shoulder pain--goal ongoing. 3. Patient will score less than or equal to 5/50 on Neck Disability Index with minimal effect of pain on patient's ability to manage every day life activities--goal ongoing. REHABILITATION POTENTIAL FOR STATED GOALS: [facility] OF CARE:  INTERVENTIONS PLANNED: (Benefits and precautions of physical therapy have been discussed with the patient.)  1. Patient education including pathophysiology of neck/shoulder symptoms, education/training (sleeping positions, posture re-education and body mechanics), and instructions of home exercise program.  2. Therapeutic exercises including mobility through neck/shoulder region and postural stabilization. 3. Manual therapy including soft tissue mobilizations, functional joint mobilizations and neuromuscular re-education to neck and shoulder region. 4. Therapeutic modalities including moist heat, cold pack, electrical stimulation, and/or ultrasound as needed for relief of symptoms with treatment. TREATMENT PLAN EFFECTIVE DATES: 1/7/2017 TO 4/7/2017  FREQUENCY/DURATION: Follow patient 2 times a week for 8 weeks to address above goals. SUBJECTIVE:  History of Present Injury/Illness (Reason for Referral): Patient is well known to therapist.  Patient returned to therapy today with continued complaints of neck and shoulder pain. Patient reports that her right shoulder/arm feel weak at times.  She reports she hasn't ever dropped anything, but her arm will catch and she will have to be careful that she doesn't throw anything. She reports that her neck was hurting during the holidays. She reports that she did move out of her work office over the holidays. She reports she is still using cold packs at night and taking her pain medication as needed. She reports she has also been trying to get a few deep tissue massages to help with her pain. Present Symptoms: 3/15/2017: Patient reports she has been starting to work more so she has been more sore in her shoulders. Pain Intensity 1: 4  Pain Location 1: Neck, Shoulder  Pain Orientation 1: Right, Left  Pain Intervention(s) 1: Rest, Medication (see MAR)  Pain Description 1: Aching, Constant  Pain Onset 1: Chronic  Pain Scale 1: Numeric (0 - 10)  Patient Stated Pain Goal: 0  · Patient Stated Pain Goal: 0   Dominant Side: right  Past Medical History: Patient reports no significant past medical history. Current Medications: Cymblata, pain medication (PRN)   Date Last Reviewed: 3/15/2017  Social History/Home Situation:   Home Environment: Private residence  Living Alone: No  Support Systems: Family member(s); Friends \ neighbors  Work/Activity History: Retired  OBJECTIVE:  Outcome Measure: Tool Used: Neck Disability Index (NDI)  Score:  Initial: 10/50  Most Recent: 8/50 (Date: 3/15/2017)    Interpretation of Score: The Neck Disability Index is a revised form of the Oswestry Low Back Pain Index and is designed to measure the activities of daily living in person's with neck pain. Each section is scored on a 0-5 scale, 5 representing the greatest disability. The scores of each section are added together for a total score of 50. Score 0 1-10 11-20 21-30 31-40 41-49 50   Modifier CH CI CJ CK CL CM CN     ?  Carrying, Moving, and Handling Objects:     - CURRENT STATUS: CI - 1%-19% impaired, limited or restricted    - GOAL STATUS: CI - 1%-19% impaired, limited or restricted    - D/C STATUS:  ---------------To be determined---------------    Observation/Orthostatic Postural Assessment:   Posture (WDL): Exceptions to WDL  Posture Assessment: Rounded shoulders; Forward head  Palpation:  Tenderness to palpation noted in cervical spine and shoulder region on right greater than left (upper trapezius, levator scapulae, rhomboids, sub-occipitals). No bruising or swelling noted. ROM:    Cervical Assessment (AROM):   · Flexion 100%  · Extension 90%  · Lateral flexion L 85%, R 100%    LUE Assessment (AROM):   · Within defined limits        Shoulder Assessment (AROM):  · Flexion B WFL  · L ext. · Rot. 90, R 80  · IR: L 70, R 65     RLE Assessment (AROM):   · Within defined limits      Strength:   LUE Strength, Tone, Sensation:   · Within defined limits  · Deep cervical flexion 20s against gravity    RUE Strength, Tone, Sensation:  · R Shoulder Flexion: 3+  · R Shoulder ABduction: 3+  · R Shoulder Internal Rotation: 3+  · R Shoulder External Rotation: 3+  · R Elbow Flexion: 3+  · R Elbow Extension: 3+        Special Tests: Negative  Neurological Screen:    Myotomes:        RUE Myotomes  · C1 (Cervical Rotation): Strong  · C2, C3, C4 (Shoulder Shrug): Strong  · C5 (Shoulder ABduction): Strong  · C6 (Biceps & Wrist Extension): Strong  · C7 (Triceps & Wrist Flexion): Strong  · C8 (Thumb Extension): Strong  · T1 (Finger ADduction): Strong          Dermatomes:     Within normal limits     Within normal limits          Reflexes:   · Left Bicep (C5,C6): 2+  · Left Bracheoradialis (C5,C6): 2+  · Left Tricep (C6,C7,C8): 2+    · Right Bicep (C5,C6): 2+  · Right Bracheoradialis (C5,C6): 2+  · Right Tricep (C6,C7,C8): 2+          Neural Tension Tests: Negative  Functional Mobility:  Gait Description (WDL): Within defined limits     Balance:   Sitting: Intact  Standing: Intact  TREATMENT:    (In addition to Assessment/Re-Assessment sessions the following treatments were rendered)  Therapeutic Exercise: ( 5 minutes):  Exercises per grid below to improve mobility and strength. Required minimal visual, verbal and manual cues to promote proper body alignment, promote proper body posture and promote proper body mechanics. Progressed resistance, range, repetitions and complexity of movement as indicated. Date:  3/15/2017    Activity/Exercise Parameters   Scapular rows HEP - reviewed   Lat pulls HEP - reviewed   Manual Therapy     · Joint Mobilization (10 Minutes): Grade II anterior-posterior joint mobilization to right glenohumeral joint with active and passive gross movement in all planes to improve range of motion and decrease pain with daily activities. Prone t-spine central posterior anterior grade IV T1-4. · Soft Tissue Mobilization (30 Minutes): Soft tissue mobilization to cervical spinal musculature to improve mobility and decrease tightness and pain with daily activities. Therapeutic Modalities: N/A today                                                                                             HEP: As above; handouts given to patient for all exercises. ______________________________________________________________________________________________________    Treatment Assessment:  Patient tolerated treatment well. Patient reported decreased pain after treatment. Improving joint and soft tissue mobility noted with treatment. Pain 2/10. Progression/Medical Necessity:   · Patient is expected to demonstrate progress in strength and range of motion to increase independence with daily activities. · Patient demonstrates good rehab potential due to higher previous functional level. · Skilled intervention continues to be required due to decrease in functional mobility. Compliance with Program/Exercises: Compliant   Reason for Continuation of Services/Other Comments:  · Patient continues to demonstrate capacity to improve overall mobility which will increase safety.   Recommendations/Intent for next treatment session: \"Treatment next visit will focus on advancements to more challenging activities\".     Total Treatment Duration:  PT Patient Time In/Time Out  Time In: 0915  Time Out: [facility],

## 2017-03-29 ENCOUNTER — HOSPITAL ENCOUNTER (OUTPATIENT)
Dept: PHYSICAL THERAPY | Age: 72
Discharge: HOME | End: 2017-03-29
Payer: MEDICARE

## 2017-03-29 PROCEDURE — 97140 MANUAL THERAPY 1/> REGIONS: CPT
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
## 2017-04-12 ENCOUNTER — HOSPITAL ENCOUNTER (OUTPATIENT)
Dept: PHYSICAL THERAPY | Age: 72
Discharge: HOME | End: 2017-04-12
Payer: MEDICARE

## 2017-04-12 NOTE — PROGRESS NOTES
[facility] [address], [address] [place] [address], [address]  Phone: [phone]   Fax: [phone]    OUTPATIENT DAILY NOTE    NAME/AGE/GENDER: Damien Gaytan is a 71 y.o. female. DATE: 4/12/2017    Patient cancelled appointment for today due to time constraints. Will plan to follow up on next scheduled visit.     Yvonne Dey, PT

## 2017-04-19 ENCOUNTER — HOSPITAL ENCOUNTER (OUTPATIENT)
Dept: PHYSICAL THERAPY | Age: 72
Discharge: HOME | End: 2017-04-19
Payer: MEDICARE

## 2017-04-19 PROCEDURE — 97140 MANUAL THERAPY 1/> REGIONS: CPT

## 2017-04-19 PROCEDURE — G8985 CARRY GOAL STATUS: HCPCS

## 2017-04-19 PROCEDURE — G8984 CARRY CURRENT STATUS: HCPCS

## 2017-04-19 NOTE — PROGRESS NOTES
[facility] [address], [address] [place] [address],[address] [place], [address]  Phone: [phone]   Fax: [phone]    Outpatient PHYSICAL THERAPY: Re-evaluation  Fall Risk Score: 0 (? 5 = High Risk)  ICD-9: Treatment Diagnosis: Other specified disorders of joint - other disorders of cervical region, cervicalgia  ICD-10: Treatment Diagnosis: Cervicalgia (M54.2)     REFERRING PHYSICIAN: Amy Mcfadden MD MD Orders: Evaluate and Treat  Return Physician Appointment: TBD  MEDICAL/REFERRING DIAGNOSIS:  Cervicalgia [M54.2]  Other physical therapy [[id number]]  DATE OF ONSET: Chronic   PRIOR LEVEL OF FUNCTION: Independentd  PRECAUTIONS/ALLERGIES:   Allergies   Allergen Reactions    Alendronate Sodium Rash    Antihistamine [Triprolidine-Pseudoephedrine] Anxiety    Boniva [Ibandronate] Diarrhea     ASSESSMENT:  ?????? ? ? This section established at most recent assessment?????????? Patient presents with decreased range of motion, decreased strength, and pain in right shoulder and neck secondary to chronic pain. Patient has attended a total of 75 scheduled physical therapy visits including initial evaluation on 1/7/2014. Treatment has consisted of manual therapy and streching to improve pain and performance with activities of daily living. After discussing with patient she agreed she would continue to benefit from physical therapy to improve overall mobility and pain. Please sign this re-certification if you concur. Thank you for the opportunity to serve this patient. PROBLEM LIST (Impairments causing functional limitations):  1. Decreased independence with ADLs requiring any extensive UE activity (lifting/reaching/vacuuming). 2.  Decreased tolerance of prolonged sitting/standing activities (>30 minutes) due to exacerbation of neck pain. 3.  Unable to participate in social/recreational activities due to fear avoidance of onset of neck/shoulder pain.   4.  Outcome measure score of 10/50 on Neck Disability Index with minimal effect of pain on patient's ability to manage every day life activities. GOALS: (Goals have been discussed and agreed upon with patient.)  SHORT-TERM FUNCTIONAL GOALS: Time Frame: 3 weeks  1. Patient will be independent with home exercise program without exacerbation of symptoms or cueing needed--goal met. 2. Patient will be independent with correct sleeping positions and awareness/avoidance of aggravating positions without cueing needed--goal met. DISCHARGE GOALS: Time Frame: 8 weeks  1. Patient will be independent with all ADLs with minimal onset of neck/shoulder pain and no deficits with daily tasks--goal ongoing. 2. Patient will report no fear avoidance with social or recreational activities due to neck/shoulder pain--goal ongoing. 3. Patient will score less than or equal to 5/50 on Neck Disability Index with minimal effect of pain on patient's ability to manage every day life activities--goal ongoing. REHABILITATION POTENTIAL FOR STATED GOALS: [place] OF CARE:  INTERVENTIONS PLANNED: (Benefits and precautions of physical therapy have been discussed with the patient.)  1. Patient education including pathophysiology of neck/shoulder symptoms, education/training (sleeping positions, posture re-education and body mechanics), and instructions of home exercise program.  2. Therapeutic exercises including mobility through neck/shoulder region and postural stabilization. 3. Manual therapy including soft tissue mobilizations, functional joint mobilizations and neuromuscular re-education to neck and shoulder region. 4. Therapeutic modalities including moist heat, cold pack, electrical stimulation, and/or ultrasound as needed for relief of symptoms with treatment. TREATMENT PLAN EFFECTIVE DATES: 4/7/2017 TO 7/7/2017  FREQUENCY/DURATION: Follow patient 1 time every 2 weeks for 12 weeks to address above goals.   Regarding Paola Zhao therapy, I certify that the treatment plan above will be carried out by a therapist or under their direction. Thank you for this referral,  Yvonne Dey PT     Referring Physician Signature: Ana Fletcher MD          Date          SUBJECTIVE:  History of Present Injury/Illness (Reason for Referral): Patient is well known to therapist.  Patient returned to therapy today with continued complaints of neck and shoulder pain. Patient reports that her right shoulder/arm feel weak at times. She reports she hasn't ever dropped anything, but her arm will catch and she will have to be careful that she doesn't throw anything. She reports that her neck was hurting during the holidays. She reports that she did move out of her work office over the holidays. She reports she is still using cold packs at night and taking her pain medication as needed. She reports she has also been trying to get a few deep tissue massages to help with her pain. Present Symptoms: 4/19/2017: Patient reports she is doing ok, just tight from work. Pain Intensity 1: 4  Pain Location 1: Neck, Shoulder  Pain Orientation 1: Right, Left  Pain Intervention(s) 1: Rest, Medication (see MAR)  Pain Description 1: Aching, Constant  Pain Onset 1: Chronic  Pain Scale 1: Numeric (0 - 10)  Patient Stated Pain Goal: 0  · Patient Stated Pain Goal: 0   Dominant Side: right  Past Medical History: Patient reports no significant past medical history. Current Medications: Cymblata, pain medication (PRN)   Date Last Reviewed: 4/19/2017  Social History/Home Situation:   Home Environment: Private residence  Living Alone: No  Support Systems: Family member(s); Friends \ neighbors  Work/Activity History: Retired  OBJECTIVE:  Outcome Measure: Tool Used: Neck Disability Index (NDI)  Score:  Initial: 10/50  Most Recent: 8/50 (Date: 4/19/2017)    Interpretation of Score:  The Neck Disability Index is a revised form of the Oswestry Low Back Pain Index and is designed to measure the activities of daily living in person's with neck pain. Each section is scored on a 0-5 scale, 5 representing the greatest disability. The scores of each section are added together for a total score of 50. Score 0 1-10 11-20 21-30 31-40 41-49 50   Modifier CH CI CJ CK CL CM CN     ? Carrying, Moving, and Handling Objects:     - CURRENT STATUS: CI - 1%-19% impaired, limited or restricted    - GOAL STATUS: CI - 1%-19% impaired, limited or restricted    - D/C STATUS:  ---------------To be determined---------------     Observation/Orthostatic Postural Assessment:   Posture (WDL): Exceptions to WDL  Posture Assessment: Rounded shoulders; Forward head  Palpation:  Tenderness to palpation noted in cervical spine and shoulder region on right greater than left (upper trapezius, levator scapulae, rhomboids, sub-occipitals). No bruising or swelling noted. ROM:    Cervical Assessment (AROM):   · Flexion 100%  · Extension 90%  · Lateral flexion L 85%, R 100%    LUE Assessment (AROM):   · Within defined limits        Shoulder Assessment (AROM):  · Flexion B WFL  · L ext. · Rot. 90, R 80  · IR: L 70, R 65     RLE Assessment (AROM):   · Within defined limits      Strength:   LUE Strength, Tone, Sensation:   · Within defined limits  · Deep cervical flexion 20s against gravity    RUE Strength, Tone, Sensation:  · R Shoulder Flexion: 3+  · R Shoulder ABduction: 3+  · R Shoulder Internal Rotation: 3+  · R Shoulder External Rotation: 3+  · R Elbow Flexion: 3+  · R Elbow Extension: 3+        Special Tests: Negative  Neurological Screen:    Myotomes:        RUE Myotomes  · C1 (Cervical Rotation): Strong  · C2, C3, C4 (Shoulder Shrug): Strong  · C5 (Shoulder ABduction): Strong  · C6 (Biceps & Wrist Extension): Strong  · C7 (Triceps & Wrist Flexion): Strong  · C8 (Thumb Extension): Strong  · T1 (Finger ADduction): Strong          Dermatomes:     Within normal limits     Within normal limits          Reflexes: · Left Bicep (C5,C6): 2+  · Left Bracheoradialis (C5,C6): 2+  · Left Tricep (C6,C7,C8): 2+    · Right Bicep (C5,C6): 2+  · Right Bracheoradialis (C5,C6): 2+  · Right Tricep (C6,C7,C8): 2+          Neural Tension Tests: Negative  Functional Mobility:  Gait Description (WDL): Within defined limits     Balance:   Sitting: Intact  Standing: Intact  TREATMENT:    (In addition to Assessment/Re-Assessment sessions the following treatments were rendered)  Therapeutic Exercise: ( 5 minutes):  Exercises per grid below to improve mobility and strength. Required minimal visual, verbal and manual cues to promote proper body alignment, promote proper body posture and promote proper body mechanics. Progressed resistance, range, repetitions and complexity of movement as indicated. Date:  4/19/2017    Activity/Exercise Parameters   Scapular rows HEP - reviewed   Lat pulls HEP - reviewed   Manual Therapy     · Joint Mobilization (10 Minutes): Grade II anterior-posterior joint mobilization to right glenohumeral joint with active and passive gross movement in all planes to improve range of motion and decrease pain with daily activities. Prone t-spine central posterior anterior grade IV T1-4. · Soft Tissue Mobilization (30 Minutes): Soft tissue mobilization to cervical spinal musculature to improve mobility and decrease tightness and pain with daily activities. Therapeutic Modalities: N/A today                                                                                             HEP: As above; handouts given to patient for all exercises. ______________________________________________________________________________________________________    Treatment Assessment:  Patient tolerated treatment well. Patient reported decreased pain after treatment. Improving joint and soft tissue mobility noted with treatment. Pain 2/10.     Progression/Medical Necessity:   · Patient is expected to demonstrate progress in strength and range of motion to increase independence with daily activities. · Patient demonstrates good rehab potential due to higher previous functional level. · Skilled intervention continues to be required due to decrease in functional mobility. Compliance with Program/Exercises: Compliant   Reason for Continuation of Services/Other Comments:  · Patient continues to demonstrate capacity to improve overall mobility which will increase safety. Recommendations/Intent for next treatment session: \"Treatment next visit will focus on advancements to more challenging activities\".     Total Treatment Duration:  PT Patient Time In/Time Out  Time In: 0900  Time Out: BOLA [place]

## 2017-05-03 ENCOUNTER — APPOINTMENT (OUTPATIENT)
Dept: PHYSICAL THERAPY | Age: 72
End: 2017-05-03
Payer: MEDICARE

## 2017-05-08 ENCOUNTER — HOSPITAL ENCOUNTER (OUTPATIENT)
Dept: PHYSICAL THERAPY | Age: 72
Discharge: HOME | End: 2017-05-08
Payer: MEDICARE

## 2017-05-08 PROCEDURE — 97140 MANUAL THERAPY 1/> REGIONS: CPT

## 2017-05-08 NOTE — PROGRESS NOTES
[facility] at [address], [address] [facility],[address] [place], [address]  Phone: [phone]   Fax: [phone]    Outpatient PHYSICAL THERAPY: Daily Note  Fall Risk Score: 0 (? 5 = High Risk)  ICD-9: Treatment Diagnosis: Other specified disorders of joint - other disorders of cervical region, cervicalgia  ICD-10: Treatment Diagnosis: Cervicalgia (M54.2)     REFERRING PHYSICIAN: Sean Royal MD MD Orders: Evaluate and Treat  Return Physician Appointment: TBD  MEDICAL/REFERRING DIAGNOSIS:  Cervicalgia [M54.2]  Other physical therapy [[id number]]  DATE OF ONSET: Chronic   PRIOR LEVEL OF FUNCTION: Independentd  PRECAUTIONS/ALLERGIES:   Allergies   Allergen Reactions    Alendronate Sodium Rash    Antihistamine [Triprolidine-Pseudoephedrine] Anxiety    Boniva [Ibandronate] Diarrhea     ASSESSMENT:  ?????? ? ? This section established at most recent assessment?????????? Patient presents with decreased range of motion, decreased strength, and pain in right shoulder and neck secondary to chronic pain. Patient has attended a total of 75 scheduled physical therapy visits including initial evaluation on 1/7/2014. Treatment has consisted of manual therapy and streching to improve pain and performance with activities of daily living. PROBLEM LIST (Impairments causing functional limitations):  1. Decreased independence with ADLs requiring any extensive UE activity (lifting/reaching/vacuuming). 2.  Decreased tolerance of prolonged sitting/standing activities (>30 minutes) due to exacerbation of neck pain. 3.  Unable to participate in social/recreational activities due to fear avoidance of onset of neck/shoulder pain. 4.  Outcome measure score of 10/50 on Neck Disability Index with minimal effect of pain on patient's ability to manage every day life activities.   GOALS: (Goals have been discussed and agreed upon with patient.)  SHORT-TERM FUNCTIONAL GOALS: Time Frame: 3 weeks  1. Patient will be independent with home exercise program without exacerbation of symptoms or cueing needed--goal met. 2. Patient will be independent with correct sleeping positions and awareness/avoidance of aggravating positions without cueing needed--goal met. DISCHARGE GOALS: Time Frame: 8 weeks  1. Patient will be independent with all ADLs with minimal onset of neck/shoulder pain and no deficits with daily tasks--goal ongoing. 2. Patient will report no fear avoidance with social or recreational activities due to neck/shoulder pain--goal ongoing. 3. Patient will score less than or equal to 5/50 on Neck Disability Index with minimal effect of pain on patient's ability to manage every day life activities--goal ongoing. REHABILITATION POTENTIAL FOR STATED GOALS: Edison David OF CARE:  INTERVENTIONS PLANNED: (Benefits and precautions of physical therapy have been discussed with the patient.)  1. Patient education including pathophysiology of neck/shoulder symptoms, education/training (sleeping positions, posture re-education and body mechanics), and instructions of home exercise program.  2. Therapeutic exercises including mobility through neck/shoulder region and postural stabilization. 3. Manual therapy including soft tissue mobilizations, functional joint mobilizations and neuromuscular re-education to neck and shoulder region. 4. Therapeutic modalities including moist heat, cold pack, electrical stimulation, and/or ultrasound as needed for relief of symptoms with treatment. TREATMENT PLAN EFFECTIVE DATES: 4/7/2017 TO 7/7/2017  FREQUENCY/DURATION: Follow patient 1 time every 2 weeks for 12 weeks to address above goals. SUBJECTIVE:  History of Present Injury/Illness (Reason for Referral): Patient is well known to therapist.  Patient returned to therapy today with continued complaints of neck and shoulder pain. Patient reports that her right shoulder/arm feel weak at times.  She reports she hasn't ever dropped anything, but her arm will catch and she will have to be careful that she doesn't throw anything. She reports that her neck was hurting during the holidays. She reports that she did move out of her work office over the holidays. She reports she is still using cold packs at night and taking her pain medication as needed. She reports she has also been trying to get a few deep tissue massages to help with her pain. Present Symptoms: 5/8/2017: Patient reports she is tight from work. Pain Intensity 1: 4  Pain Location 1: Neck, Shoulder  Pain Orientation 1: Right, Left  Pain Intervention(s) 1: Rest, Medication (see MAR)  Pain Description 1: Aching, Constant  Pain Onset 1: Chronic  Pain Scale 1: Numeric (0 - 10)  Patient Stated Pain Goal: 0  · Patient Stated Pain Goal: 0   Dominant Side: right  Past Medical History: Patient reports no significant past medical history. Current Medications: Cymblata, pain medication (PRN)   Date Last Reviewed: 5/8/2017  Social History/Home Situation:   Home Environment: Private residence  Living Alone: No  Support Systems: Family member(s); Friends \ neighbors  Work/Activity History: Retired  OBJECTIVE:  Outcome Measure: Tool Used: Neck Disability Index (NDI)  Score:  Initial: 10/50  Most Recent: 8/50 (Date: 4/19/2017)    Interpretation of Score: The Neck Disability Index is a revised form of the Oswestry Low Back Pain Index and is designed to measure the activities of daily living in person's with neck pain. Each section is scored on a 0-5 scale, 5 representing the greatest disability. The scores of each section are added together for a total score of 50. Score 0 1-10 11-20 21-30 31-40 41-49 50   Modifier CH CI CJ CK CL CM CN     ?  Carrying, Moving, and Handling Objects:     - CURRENT STATUS: CI - 1%-19% impaired, limited or restricted    - GOAL STATUS: CI - 1%-19% impaired, limited or restricted    - D/C STATUS:  ---------------To be determined---------------     Observation/Orthostatic Postural Assessment:   Posture (WDL): Exceptions to WDL  Posture Assessment: Rounded shoulders; Forward head  Palpation:  Tenderness to palpation noted in cervical spine and shoulder region on right greater than left (upper trapezius, levator scapulae, rhomboids, sub-occipitals). No bruising or swelling noted. ROM:    Cervical Assessment (AROM):   · Flexion 100%  · Extension 90%  · Lateral flexion L 85%, R 100%    LUE Assessment (AROM):   · Within defined limits        Shoulder Assessment (AROM):  · Flexion B WFL  · L ext. · Rot. 90, R 80  · IR: L 70, R 65     RLE Assessment (AROM):   · Within defined limits      Strength:   LUE Strength, Tone, Sensation:   · Within defined limits  · Deep cervical flexion 20s against gravity    RUE Strength, Tone, Sensation:  · R Shoulder Flexion: 3+  · R Shoulder ABduction: 3+  · R Shoulder Internal Rotation: 3+  · R Shoulder External Rotation: 3+  · R Elbow Flexion: 3+  · R Elbow Extension: 3+        Special Tests: Negative  Neurological Screen:    Myotomes:        RUE Myotomes  · C1 (Cervical Rotation): Strong  · C2, C3, C4 (Shoulder Shrug): Strong  · C5 (Shoulder ABduction): Strong  · C6 (Biceps & Wrist Extension): Strong  · C7 (Triceps & Wrist Flexion): Strong  · C8 (Thumb Extension): Strong  · T1 (Finger ADduction): Strong          Dermatomes:     Within normal limits     Within normal limits          Reflexes:   · Left Bicep (C5,C6): 2+  · Left Bracheoradialis (C5,C6): 2+  · Left Tricep (C6,C7,C8): 2+    · Right Bicep (C5,C6): 2+  · Right Bracheoradialis (C5,C6): 2+  · Right Tricep (C6,C7,C8): 2+          Neural Tension Tests: Negative  Functional Mobility:  Gait Description (WDL): Within defined limits     Balance:   Sitting: Intact  Standing: Intact  TREATMENT:    (In addition to Assessment/Re-Assessment sessions the following treatments were rendered)  Therapeutic Exercise: ( 5 minutes):  Exercises per grid below to improve mobility and strength. Required minimal visual, verbal and manual cues to promote proper body alignment, promote proper body posture and promote proper body mechanics. Progressed resistance, range, repetitions and complexity of movement as indicated. Date:  5/8/2017    Activity/Exercise Parameters   Scapular rows HEP - reviewed   Lat pulls HEP - reviewed   Manual Therapy     · Joint Mobilization (10 Minutes): Grade II anterior-posterior joint mobilization to right glenohumeral joint with active and passive gross movement in all planes to improve range of motion and decrease pain with daily activities. Prone t-spine central posterior anterior grade IV T1-4. · Soft Tissue Mobilization (30 Minutes): Soft tissue mobilization to cervical spinal musculature to improve mobility and decrease tightness and pain with daily activities. Therapeutic Modalities: N/A today                                                                                             HEP: As above; handouts given to patient for all exercises. ______________________________________________________________________________________________________    Treatment Assessment:  Patient tolerated treatment well. Patient reported decreased pain after treatment. Improving joint and soft tissue mobility noted with treatment. Pain 2/10. Progression/Medical Necessity:   · Patient is expected to demonstrate progress in strength and range of motion to increase independence with daily activities. · Patient demonstrates good rehab potential due to higher previous functional level. · Skilled intervention continues to be required due to decrease in functional mobility. Compliance with Program/Exercises: Compliant   Reason for Continuation of Services/Other Comments:  · Patient continues to demonstrate capacity to improve overall mobility which will increase safety.   Recommendations/Intent for next treatment session: \"Treatment next visit will focus on advancements to more challenging activities\".     Total Treatment Duration:  PT Patient Time In/Time Out  Time In: 0815  Time Out: 0900    Valdez Means PT

## 2017-06-07 ENCOUNTER — HOSPITAL ENCOUNTER (OUTPATIENT)
Dept: PHYSICAL THERAPY | Age: 72
Discharge: HOME | End: 2017-06-07
Payer: MEDICARE

## 2017-06-07 PROCEDURE — G8985 CARRY GOAL STATUS: HCPCS

## 2017-06-07 PROCEDURE — 97140 MANUAL THERAPY 1/> REGIONS: CPT

## 2017-06-07 PROCEDURE — G8984 CARRY CURRENT STATUS: HCPCS

## 2017-06-07 NOTE — PROGRESS NOTES
[facility]rapy [facility] at [address], [address] [facility],[address] [place], [address]  Phone: [phone]   Fax: [phone]    Outpatient PHYSICAL THERAPY: Progress Report  Fall Risk Score: 0 (? 5 = High Risk)  ICD-9: Treatment Diagnosis: Other specified disorders of joint - other disorders of cervical region, cervicalgia  ICD-10: Treatment Diagnosis: Cervicalgia (M54.2)     REFERRING PHYSICIAN: Amy Mcfadden MD MD Orders: Evaluate and Treat  Return Physician Appointment: TBD  MEDICAL/REFERRING DIAGNOSIS:  Cervicalgia [M54.2]  Other physical therapy [[id number]]  DATE OF ONSET: Chronic   PRIOR LEVEL OF FUNCTION: Independentd  PRECAUTIONS/ALLERGIES:   Allergies   Allergen Reactions    Alendronate Sodium Rash    Antihistamine [Triprolidine-Pseudoephedrine] Anxiety    Boniva [Ibandronate] Diarrhea     ASSESSMENT:  ?????? ? ? This section established at most recent assessment?????????? Patient presents with decreased range of motion, decreased strength, and pain in right shoulder and neck secondary to chronic pain. Patient has attended a total of 76 scheduled physical therapy visits including initial evaluation on 1/7/2014. Treatment has consisted of manual therapy and streching to improve pain and performance with activities of daily living. PROBLEM LIST (Impairments causing functional limitations):  1. Decreased independence with ADLs requiring any extensive UE activity (lifting/reaching/vacuuming). 2.  Decreased tolerance of prolonged sitting/standing activities (>30 minutes) due to exacerbation of neck pain. 3.  Unable to participate in social/recreational activities due to fear avoidance of onset of neck/shoulder pain. 4.  Outcome measure score of 10/50 on Neck Disability Index with minimal effect of pain on patient's ability to manage every day life activities.   GOALS: (Goals have been discussed and agreed upon with patient.)  SHORT-TERM FUNCTIONAL GOALS: Time Frame: 3 weeks  1. Patient will be independent with home exercise program without exacerbation of symptoms or cueing needed--goal met. 2. Patient will be independent with correct sleeping positions and awareness/avoidance of aggravating positions without cueing needed--goal met. DISCHARGE GOALS: Time Frame: 8 weeks  1. Patient will be independent with all ADLs with minimal onset of neck/shoulder pain and no deficits with daily tasks--goal ongoing. 2. Patient will report no fear avoidance with social or recreational activities due to neck/shoulder pain--goal ongoing. 3. Patient will score less than or equal to 5/50 on Neck Disability Index with minimal effect of pain on patient's ability to manage every day life activities--goal ongoing. REHABILITATION POTENTIAL FOR STATED GOALS: Saw Solorio OF CARE:  INTERVENTIONS PLANNED: (Benefits and precautions of physical therapy have been discussed with the patient.)  1. Patient education including pathophysiology of neck/shoulder symptoms, education/training (sleeping positions, posture re-education and body mechanics), and instructions of home exercise program.  2. Therapeutic exercises including mobility through neck/shoulder region and postural stabilization. 3. Manual therapy including soft tissue mobilizations, functional joint mobilizations and neuromuscular re-education to neck and shoulder region. 4. Therapeutic modalities including moist heat, cold pack, electrical stimulation, and/or ultrasound as needed for relief of symptoms with treatment. TREATMENT PLAN EFFECTIVE DATES: 4/7/2017 TO 7/7/2017  FREQUENCY/DURATION: Follow patient 1 time every 2 weeks for 12 weeks to address above goals. SUBJECTIVE:  History of Present Injury/Illness (Reason for Referral): Patient is well known to therapist.  Patient returned to therapy today with continued complaints of neck and shoulder pain. Patient reports that her right shoulder/arm feel weak at times.  She reports she hasn't ever dropped anything, but her arm will catch and she will have to be careful that she doesn't throw anything. She reports that her neck was hurting during the holidays. She reports that she did move out of her work office over the holidays. She reports she is still using cold packs at night and taking her pain medication as needed. She reports she has also been trying to get a few deep tissue massages to help with her pain. Present Symptoms: 6/7/2017: Patient reports she has been surviving, but is having tightness in her shoulders. Pain Intensity 1: 4  Pain Location 1: Neck, Shoulder  Pain Orientation 1: Right, Left  Pain Intervention(s) 1: Rest, Medication (see MAR)  Pain Description 1: Aching, Constant  Pain Onset 1: Chronic  Pain Scale 1: Numeric (0 - 10)  Patient Stated Pain Goal: 0  · Patient Stated Pain Goal: 0   Dominant Side: right  Past Medical History: Patient reports no significant past medical history. Current Medications: Cymblata, pain medication (PRN)   Date Last Reviewed: 6/7/2017  Social History/Home Situation:   Home Environment: Private residence  Living Alone: No  Support Systems: Family member(s); Friends \ neighbors  Work/Activity History: Retired  OBJECTIVE:  Outcome Measure: Tool Used: Neck Disability Index (NDI)  Score:  Initial: 10/50  Most Recent: 10/50 (Date: 6/7/2017)    Interpretation of Score: The Neck Disability Index is a revised form of the Oswestry Low Back Pain Index and is designed to measure the activities of daily living in person's with neck pain. Each section is scored on a 0-5 scale, 5 representing the greatest disability. The scores of each section are added together for a total score of 50. Score 0 1-10 11-20 21-30 31-40 41-49 50   Modifier CH CI CJ CK CL CM CN     ?  Carrying, Moving, and Handling Objects:     - CURRENT STATUS: CI - 1%-19% impaired, limited or restricted    - GOAL STATUS: CI - 1%-19% impaired, limited or restricted    - D/C STATUS:  ---------------To be determined---------------     Observation/Orthostatic Postural Assessment:   Posture (WDL): Exceptions to WDL  Posture Assessment: Rounded shoulders; Forward head  Palpation:  Tenderness to palpation noted in cervical spine and shoulder region on right greater than left (upper trapezius, levator scapulae, rhomboids, sub-occipitals). No bruising or swelling noted. ROM:    Cervical Assessment (AROM):   · Flexion 100%  · Extension 90%  · Lateral flexion L 85%, R 100%    LUE Assessment (AROM):   · Within defined limits        Shoulder Assessment (AROM):  · Flexion B WFL  · L ext. · Rot. 90, R 80  · IR: L 70, R 65     RLE Assessment (AROM):   · Within defined limits      Strength:   LUE Strength, Tone, Sensation:   · Within defined limits  · Deep cervical flexion 20s against gravity    RUE Strength, Tone, Sensation:  · R Shoulder Flexion: 3+  · R Shoulder ABduction: 3+  · R Shoulder Internal Rotation: 3+  · R Shoulder External Rotation: 3+  · R Elbow Flexion: 3+  · R Elbow Extension: 3+        Special Tests: Negative  Neurological Screen:    Myotomes:        RUE Myotomes  · C1 (Cervical Rotation): Strong  · C2, C3, C4 (Shoulder Shrug): Strong  · C5 (Shoulder ABduction): Strong  · C6 (Biceps & Wrist Extension): Strong  · C7 (Triceps & Wrist Flexion): Strong  · C8 (Thumb Extension): Strong  · T1 (Finger ADduction): Strong          Dermatomes:     Within normal limits     Within normal limits          Reflexes:   · Left Bicep (C5,C6): 2+  · Left Bracheoradialis (C5,C6): 2+  · Left Tricep (C6,C7,C8): 2+    · Right Bicep (C5,C6): 2+  · Right Bracheoradialis (C5,C6): 2+  · Right Tricep (C6,C7,C8): 2+          Neural Tension Tests: Negative  Functional Mobility:  Gait Description (WDL): Within defined limits     Balance:   Sitting: Intact  Standing: Intact  TREATMENT:    (In addition to Assessment/Re-Assessment sessions the following treatments were rendered)  Therapeutic Exercise: ( 5 minutes):  Exercises per grid below to improve mobility and strength. Required minimal visual, verbal and manual cues to promote proper body alignment, promote proper body posture and promote proper body mechanics. Progressed resistance, range, repetitions and complexity of movement as indicated. Date:  6/7/2017    Activity/Exercise Parameters   Scapular rows HEP - reviewed   Lat pulls HEP - reviewed   Manual Therapy     · Joint Mobilization (10 Minutes): Grade II anterior-posterior joint mobilization to right glenohumeral joint with active and passive gross movement in all planes to improve range of motion and decrease pain with daily activities. Prone t-spine central posterior anterior grade IV T1-4. · Soft Tissue Mobilization (30 Minutes): Soft tissue mobilization to cervical spinal musculature to improve mobility and decrease tightness and pain with daily activities. Therapeutic Modalities: N/A today                                                                                             HEP: As above; handouts given to patient for all exercises. ______________________________________________________________________________________________________    Treatment Assessment:  Patient tolerated treatment well. Patient reported decreased pain after treatment. Improving joint and soft tissue mobility noted with treatment. Pain 2/10. Progression/Medical Necessity:   · Patient is expected to demonstrate progress in strength and range of motion to increase independence with daily activities. · Patient demonstrates good rehab potential due to higher previous functional level. · Skilled intervention continues to be required due to decrease in functional mobility. Compliance with Program/Exercises: Compliant   Reason for Continuation of Services/Other Comments:  · Patient continues to demonstrate capacity to improve overall mobility which will increase safety.   Recommendations/Intent for next treatment session: \"Treatment next visit will focus on advancements to more challenging activities\".     Total Treatment Duration:  PT Patient Time In/Time Out  Time In: 0900  Time Out: BOLA Mason

## 2017-06-21 ENCOUNTER — HOSPITAL ENCOUNTER (OUTPATIENT)
Dept: PHYSICAL THERAPY | Age: 72
Discharge: HOME | End: 2017-06-21
Payer: MEDICARE

## 2017-06-21 PROCEDURE — 97140 MANUAL THERAPY 1/> REGIONS: CPT

## 2017-06-21 NOTE — PROGRESS NOTES
Therapy Center at [facility] [address] [facility], [address] [facility],[address] [place], [address]  Phone: [phone]   Fax: [phone]    Outpatient PHYSICAL THERAPY: Daily Note  Fall Risk Score: 0 (? 5 = High Risk)  ICD-9: Treatment Diagnosis: Other specified disorders of joint - other disorders of cervical region, cervicalgia  ICD-10: Treatment Diagnosis: Cervicalgia (M54.2)     REFERRING PHYSICIAN: Vincent Horne MD MD Orders: Evaluate and Treat  Return Physician Appointment: TBD  MEDICAL/REFERRING DIAGNOSIS:  Cervicalgia [M54.2]  Other physical therapy [[id number]]  DATE OF ONSET: Chronic   PRIOR LEVEL OF FUNCTION: Independentd  PRECAUTIONS/ALLERGIES:   Allergies   Allergen Reactions    Alendronate Sodium Rash    Antihistamine [Triprolidine-Pseudoephedrine] Anxiety    Boniva [Ibandronate] Diarrhea     ASSESSMENT:  ?????? ? ? This section established at most recent assessment?????????? Patient presents with decreased range of motion, decreased strength, and pain in right shoulder and neck secondary to chronic pain. Patient has attended a total of 77 scheduled physical therapy visits including initial evaluation on 1/7/2014. Treatment has consisted of manual therapy and streching to improve pain and performance with activities of daily living. PROBLEM LIST (Impairments causing functional limitations):  1. Decreased independence with ADLs requiring any extensive UE activity (lifting/reaching/vacuuming). 2.  Decreased tolerance of prolonged sitting/standing activities (>30 minutes) due to exacerbation of neck pain. 3.  Unable to participate in social/recreational activities due to fear avoidance of onset of neck/shoulder pain. 4.  Outcome measure score of 10/50 on Neck Disability Index with minimal effect of pain on patient's ability to manage every day life activities.   GOALS: (Goals have been discussed and agreed upon with patient.)  SHORT-TERM FUNCTIONAL GOALS: Time Frame: 3 weeks  1. Patient will be independent with home exercise program without exacerbation of symptoms or cueing needed--goal met. 2. Patient will be independent with correct sleeping positions and awareness/avoidance of aggravating positions without cueing needed--goal met. DISCHARGE GOALS: Time Frame: 8 weeks  1. Patient will be independent with all ADLs with minimal onset of neck/shoulder pain and no deficits with daily tasks--goal ongoing. 2. Patient will report no fear avoidance with social or recreational activities due to neck/shoulder pain--goal ongoing. 3. Patient will score less than or equal to 5/50 on Neck Disability Index with minimal effect of pain on patient's ability to manage every day life activities--goal ongoing. REHABILITATION POTENTIAL FOR STATED GOALS: Roseanne Albright OF CARE:  INTERVENTIONS PLANNED: (Benefits and precautions of physical therapy have been discussed with the patient.)  1. Patient education including pathophysiology of neck/shoulder symptoms, education/training (sleeping positions, posture re-education and body mechanics), and instructions of home exercise program.  2. Therapeutic exercises including mobility through neck/shoulder region and postural stabilization. 3. Manual therapy including soft tissue mobilizations, functional joint mobilizations and neuromuscular re-education to neck and shoulder region. 4. Therapeutic modalities including moist heat, cold pack, electrical stimulation, and/or ultrasound as needed for relief of symptoms with treatment. TREATMENT PLAN EFFECTIVE DATES: 4/7/2017 TO 7/7/2017  FREQUENCY/DURATION: Follow patient 1 time every 2 weeks for 12 weeks to address above goals. SUBJECTIVE:  History of Present Injury/Illness (Reason for Referral): Patient is well known to therapist.  Patient returned to therapy today with continued complaints of neck and shoulder pain. Patient reports that her right shoulder/arm feel weak at times.  She reports she hasn't ever dropped anything, but her arm will catch and she will have to be careful that she doesn't throw anything. She reports that her neck was hurting during the holidays. She reports that she did move out of her work office over the holidays. She reports she is still using cold packs at night and taking her pain medication as needed. She reports she has also been trying to get a few deep tissue massages to help with her pain. Present Symptoms: 6/21/2017: Patient reports her shoulder is feeling the most tightness and pain today. Pain Intensity 1: 4  Pain Location 1: Neck, Shoulder  Pain Orientation 1: Right, Left  Pain Intervention(s) 1: Rest, Medication (see MAR)  Pain Description 1: Aching, Constant  Pain Onset 1: Chronic  Pain Scale 1: Numeric (0 - 10)  Patient Stated Pain Goal: 0  · Patient Stated Pain Goal: 0   Dominant Side: right  Past Medical History: Patient reports no significant past medical history. Current Medications: Cymblata, pain medication (PRN)   Date Last Reviewed: 6/21/2017  Social History/Home Situation:   Home Environment: Private residence  Living Alone: No  Support Systems: Family member(s); Friends \ neighbors  Work/Activity History: Retired  OBJECTIVE:  Outcome Measure: Tool Used: Neck Disability Index (NDI)  Score:  Initial: 10/50  Most Recent: 10/50 (Date: 6/7/2017)    Interpretation of Score: The Neck Disability Index is a revised form of the Oswestry Low Back Pain Index and is designed to measure the activities of daily living in person's with neck pain. Each section is scored on a 0-5 scale, 5 representing the greatest disability. The scores of each section are added together for a total score of 50. Score 0 1-10 11-20 21-30 31-40 41-49 50   Modifier CH CI CJ CK CL CM CN     ?  Carrying, Moving, and Handling Objects:     - CURRENT STATUS: CI - 1%-19% impaired, limited or restricted    - GOAL STATUS: CI - 1%-19% impaired, limited or restricted    - D/C STATUS:  ---------------To be determined---------------     Observation/Orthostatic Postural Assessment:   Posture (WDL): Exceptions to WDL  Posture Assessment: Rounded shoulders; Forward head  Palpation:  Tenderness to palpation noted in cervical spine and shoulder region on right greater than left (upper trapezius, levator scapulae, rhomboids, sub-occipitals). No bruising or swelling noted. ROM:    Cervical Assessment (AROM):   · Flexion 100%  · Extension 90%  · Lateral flexion L 85%, R 100%    LUE Assessment (AROM):   · Within defined limits        Shoulder Assessment (AROM):  · Flexion B WFL  · L ext. · Rot. 90, R 80  · IR: L 70, R 65     RLE Assessment (AROM):   · Within defined limits      Strength:   LUE Strength, Tone, Sensation:   · Within defined limits  · Deep cervical flexion 20s against gravity    RUE Strength, Tone, Sensation:  · R Shoulder Flexion: 3+  · R Shoulder ABduction: 3+  · R Shoulder Internal Rotation: 3+  · R Shoulder External Rotation: 3+  · R Elbow Flexion: 3+  · R Elbow Extension: 3+        Special Tests: Negative  Neurological Screen:    Myotomes:        RUE Myotomes  · C1 (Cervical Rotation): Strong  · C2, C3, C4 (Shoulder Shrug): Strong  · C5 (Shoulder ABduction): Strong  · C6 (Biceps & Wrist Extension): Strong  · C7 (Triceps & Wrist Flexion): Strong  · C8 (Thumb Extension): Strong  · T1 (Finger ADduction): Strong          Dermatomes:     Within normal limits     Within normal limits          Reflexes:   · Left Bicep (C5,C6): 2+  · Left Bracheoradialis (C5,C6): 2+  · Left Tricep (C6,C7,C8): 2+    · Right Bicep (C5,C6): 2+  · Right Bracheoradialis (C5,C6): 2+  · Right Tricep (C6,C7,C8): 2+          Neural Tension Tests: Negative  Functional Mobility:  Gait Description (WDL): Within defined limits     Balance:   Sitting: Intact  Standing: Intact  TREATMENT:    (In addition to Assessment/Re-Assessment sessions the following treatments were rendered)  Therapeutic Exercise: ( 5 minutes): Exercises per grid below to improve mobility and strength. Required minimal visual, verbal and manual cues to promote proper body alignment, promote proper body posture and promote proper body mechanics. Progressed resistance, range, repetitions and complexity of movement as indicated. Date:  6/21/2017    Activity/Exercise Parameters   Scapular rows HEP - reviewed   Lat pulls HEP - reviewed   Manual Therapy     · Joint Mobilization (10 Minutes): Grade II anterior-posterior joint mobilization to right glenohumeral joint with active and passive gross movement in all planes to improve range of motion and decrease pain with daily activities. Prone t-spine central posterior anterior grade IV T1-4. · Soft Tissue Mobilization (30 Minutes): Soft tissue mobilization to cervical spinal musculature to improve mobility and decrease tightness and pain with daily activities. Therapeutic Modalities: N/A today                                                                                             HEP: As above; handouts given to patient for all exercises. ______________________________________________________________________________________________________    Treatment Assessment:  Patient tolerated treatment well. Patient reported decreased pain after treatment. Improving joint and soft tissue mobility noted with treatment. Pain 2/10. Progression/Medical Necessity:   · Patient is expected to demonstrate progress in strength and range of motion to increase independence with daily activities. · Patient demonstrates good rehab potential due to higher previous functional level. · Skilled intervention continues to be required due to decrease in functional mobility. Compliance with Program/Exercises: Compliant   Reason for Continuation of Services/Other Comments:  · Patient continues to demonstrate capacity to improve overall mobility which will increase safety.   Recommendations/Intent for next treatment session: \"Treatment next visit will focus on advancements to more challenging activities\".     Total Treatment Duration:  PT Patient Time In/Time Out  Time In: 0845  Time Out: [address], PT

## 2017-07-05 ENCOUNTER — HOSPITAL ENCOUNTER (OUTPATIENT)
Dept: PHYSICAL THERAPY | Age: 72
Discharge: HOME | End: 2017-07-05
Payer: MEDICARE

## 2017-07-05 PROCEDURE — G8985 CARRY GOAL STATUS: HCPCS

## 2017-07-05 PROCEDURE — G8984 CARRY CURRENT STATUS: HCPCS

## 2017-07-05 PROCEDURE — 97140 MANUAL THERAPY 1/> REGIONS: CPT

## 2017-07-05 PROCEDURE — 97164 PT RE-EVAL EST PLAN CARE: CPT

## 2017-07-05 NOTE — PROGRESS NOTES
Therapy Center at [address], [address] [place] [address],[address] [place], [address]  Phone: [phone]   Fax: [phone]    Outpatient PHYSICAL THERAPY: Re-evaluation  Fall Risk Score: 0 (? 5 = High Risk)  ICD-9: Treatment Diagnosis: Other specified disorders of joint - other disorders of cervical region, cervicalgia  ICD-10: Treatment Diagnosis: Cervicalgia (M54.2)     REFERRING PHYSICIAN: Barbara English MD MD Orders: Evaluate and Treat  Return Physician Appointment: TBD  MEDICAL/REFERRING DIAGNOSIS:  Cervicalgia [M54.2]  Other physical therapy [[id number]]  DATE OF ONSET: Chronic   PRIOR LEVEL OF FUNCTION: Independentd  PRECAUTIONS/ALLERGIES:   Allergies   Allergen Reactions    Alendronate Sodium Rash    Antihistamine [Triprolidine-Pseudoephedrine] Anxiety    Boniva [Ibandronate] Diarrhea     ASSESSMENT:  ?????? ? ? This section established at most recent assessment?????????? Patient presents with decreased range of motion, decreased strength, and pain in right shoulder and neck secondary to chronic pain. Patient has attended a total of 78 scheduled physical therapy visits including initial evaluation on 1/7/2014. Treatment has consisted of manual therapy and streching to improve pain and performance with activities of daily living. After discussing with patient she agreed she would continue to benefit from physical therapy to improve overall mobility and pain. Please sign this re-certification if you concur. Thank you for the opportunity to serve this patient. PROBLEM LIST (Impairments causing functional limitations):  1. Decreased independence with ADLs requiring any extensive UE activity (lifting/reaching/vacuuming). 2.  Decreased tolerance of prolonged sitting/standing activities (>30 minutes) due to exacerbation of neck pain. 3.  Unable to participate in social/recreational activities due to fear avoidance of onset of neck/shoulder pain.   4.  Outcome measure score of 10/50 on Neck Disability Index with minimal effect of pain on patient's ability to manage every day life activities. GOALS: (Goals have been discussed and agreed upon with patient.)  SHORT-TERM FUNCTIONAL GOALS: Time Frame: 3 weeks  1. Patient will be independent with home exercise program without exacerbation of symptoms or cueing needed--goal met. 2. Patient will be independent with correct sleeping positions and awareness/avoidance of aggravating positions without cueing needed--goal met. DISCHARGE GOALS: Time Frame: 8 weeks  1. Patient will be independent with all ADLs with minimal onset of neck/shoulder pain and no deficits with daily tasks--goal ongoing. 2. Patient will report no fear avoidance with social or recreational activities due to neck/shoulder pain--goal ongoing. 3. Patient will score less than or equal to 5/50 on Neck Disability Index with minimal effect of pain on patient's ability to manage every day life activities--goal ongoing. REHABILITATION POTENTIAL FOR STATED GOALS: Juan Baird OF CARE:  INTERVENTIONS PLANNED: (Benefits and precautions of physical therapy have been discussed with the patient.)  1. Patient education including pathophysiology of neck/shoulder symptoms, education/training (sleeping positions, posture re-education and body mechanics), and instructions of home exercise program.  2. Therapeutic exercises including mobility through neck/shoulder region and postural stabilization. 3. Manual therapy including soft tissue mobilizations, functional joint mobilizations and neuromuscular re-education to neck and shoulder region. 4. Therapeutic modalities including moist heat, cold pack, electrical stimulation, and/or ultrasound as needed for relief of symptoms with treatment. TREATMENT PLAN EFFECTIVE DATES: 7/7/2017 TO 9/7/2017  FREQUENCY/DURATION: Follow patient 1 time every 2 weeks for 12 weeks to address above goals.   Regarding Paola Zhao therapy, I certify that the treatment plan above will be carried out by a therapist or under their direction. Thank you for this referral,  Farida Sommer PT     Referring Physician Signature: Richard Wong MD          Date           SUBJECTIVE:  History of Present Injury/Illness (Reason for Referral): Patient is well known to therapist.  Patient returned to therapy today with continued complaints of neck and shoulder pain. Patient reports that her right shoulder/arm feel weak at times. She reports she hasn't ever dropped anything, but her arm will catch and she will have to be careful that she doesn't throw anything. She reports that her neck was hurting during the holidays. She reports that she did move out of her work office over the holidays. She reports she is still using cold packs at night and taking her pain medication as needed. She reports she has also been trying to get a few deep tissue massages to help with her pain. Present Symptoms: 7/5/2017: Patient reports her shoulder is really hurting today. Pain Intensity 1: 4  Pain Location 1: Neck, Shoulder  Pain Orientation 1: Right, Left  Pain Intervention(s) 1: Rest, Medication (see MAR)  Pain Description 1: Aching, Constant  Pain Onset 1: Chronic  Pain Scale 1: Numeric (0 - 10)  Patient Stated Pain Goal: 0  · Patient Stated Pain Goal: 0   Dominant Side: right  Past Medical History: Patient reports no significant past medical history. Current Medications: Cymblata, pain medication (PRN)   Date Last Reviewed: 7/5/2017  Social History/Home Situation:   Home Environment: Private residence  Living Alone: No  Support Systems: Family member(s); Friends \ neighbors  Work/Activity History: Retired  OBJECTIVE:  Outcome Measure: Tool Used: Neck Disability Index (NDI)  Score:  Initial: 10/50  Most Recent: 10/50 (Date: 7/5/2017)    Interpretation of Score:  The Neck Disability Index is a revised form of the Oswestry Low Back Pain Index and is designed to measure the activities of daily living in person's with neck pain. Each section is scored on a 0-5 scale, 5 representing the greatest disability. The scores of each section are added together for a total score of 50. Score 0 1-10 11-20 21-30 31-40 41-49 50   Modifier CH CI CJ CK CL CM CN     ? Carrying, Moving, and Handling Objects:     - CURRENT STATUS: CI - 1%-19% impaired, limited or restricted    - GOAL STATUS: CI - 1%-19% impaired, limited or restricted    - D/C STATUS:  ---------------To be determined---------------     Observation/Orthostatic Postural Assessment:   Posture (WDL): Exceptions to WDL  Posture Assessment: Rounded shoulders; Forward head  Palpation:  Tenderness to palpation noted in cervical spine and shoulder region on right greater than left (upper trapezius, levator scapulae, rhomboids, sub-occipitals). No bruising or swelling noted. ROM:    Cervical Assessment (AROM):   · Flexion 100%  · Extension 90%  · Lateral flexion L 85%, R 100%    LUE Assessment (AROM):   · Within defined limits        Shoulder Assessment (AROM):  · Flexion B WFL  · L ext. · Rot. 90, R 80  · IR: L 70, R 65     RLE Assessment (AROM):   · Within defined limits      Strength:   LUE Strength, Tone, Sensation:   · Within defined limits  · Deep cervical flexion 20s against gravity    RUE Strength, Tone, Sensation:  · R Shoulder Flexion: 3+  · R Shoulder ABduction: 3+  · R Shoulder Internal Rotation: 3+  · R Shoulder External Rotation: 3+  · R Elbow Flexion: 3+  · R Elbow Extension: 3+        Special Tests: Negative  Neurological Screen:    Myotomes:        RUE Myotomes  · C1 (Cervical Rotation): Strong  · C2, C3, C4 (Shoulder Shrug): Strong  · C5 (Shoulder ABduction): Strong  · C6 (Biceps & Wrist Extension): Strong  · C7 (Triceps & Wrist Flexion): Strong  · C8 (Thumb Extension): Strong  · T1 (Finger ADduction): Strong          Dermatomes:     Within normal limits     Within normal limits          Reflexes: · Left Bicep (C5,C6): 2+  · Left Bracheoradialis (C5,C6): 2+  · Left Tricep (C6,C7,C8): 2+    · Right Bicep (C5,C6): 2+  · Right Bracheoradialis (C5,C6): 2+  · Right Tricep (C6,C7,C8): 2+          Neural Tension Tests: Negative  Functional Mobility:  Gait Description (WDL): Within defined limits     Balance:   Sitting: Intact  Standing: Intact  TREATMENT:    (In addition to Assessment/Re-Assessment sessions the following treatments were rendered)  Therapeutic Exercise: ( 5 minutes):  Exercises per grid below to improve mobility and strength. Required minimal visual, verbal and manual cues to promote proper body alignment, promote proper body posture and promote proper body mechanics. Progressed resistance, range, repetitions and complexity of movement as indicated. Date:  7/5/2017    Activity/Exercise Parameters   Scapular rows HEP - reviewed   Lat pulls HEP - reviewed   Manual Therapy     · Joint Mobilization (10 Minutes): Grade II anterior-posterior joint mobilization to right glenohumeral joint with active and passive gross movement in all planes to improve range of motion and decrease pain with daily activities. Prone t-spine central posterior anterior grade IV T1-4. · Soft Tissue Mobilization (30 Minutes): Soft tissue mobilization to cervical spinal musculature to improve mobility and decrease tightness and pain with daily activities. Therapeutic Modalities: N/A today                                                                                             HEP: As above; handouts given to patient for all exercises. ______________________________________________________________________________________________________    Treatment Assessment:  Patient tolerated treatment well. Patient reported decreased pain after treatment. Improving joint and soft tissue mobility noted with treatment. Pain 2/10.     Progression/Medical Necessity:   · Patient is expected to demonstrate progress in strength and range of motion to increase independence with daily activities. · Patient demonstrates good rehab potential due to higher previous functional level. · Skilled intervention continues to be required due to decrease in functional mobility. Compliance with Program/Exercises: Compliant   Reason for Continuation of Services/Other Comments:  · Patient continues to demonstrate capacity to improve overall mobility which will increase safety. Recommendations/Intent for next treatment session: \"Treatment next visit will focus on advancements to more challenging activities\".     Total Treatment Duration:  PT Patient Time In/Time Out  Time In: 0845  Time Out: [address], PT

## 2017-07-19 ENCOUNTER — HOSPITAL ENCOUNTER (OUTPATIENT)
Dept: PHYSICAL THERAPY | Age: 72
Discharge: HOME | End: 2017-07-19
Payer: MEDICARE

## 2017-07-19 PROCEDURE — 97140 MANUAL THERAPY 1/> REGIONS: CPT

## 2017-07-19 NOTE — PROGRESS NOTES
[facility] at [address], [address] [facility],[address] [place], [address]  Phone: [phone]   Fax: [phone]    Outpatient PHYSICAL THERAPY: Daily Note  Fall Risk Score: 0 (? 5 = High Risk)  ICD-9: Treatment Diagnosis: Other specified disorders of joint - other disorders of cervical region, cervicalgia  ICD-10: Treatment Diagnosis: Cervicalgia (M54.2)     REFERRING PHYSICIAN: Stef Butler MD MD Orders: Evaluate and Treat  Return Physician Appointment: TBD  MEDICAL/REFERRING DIAGNOSIS:  Cervicalgia [M54.2]  Other physical therapy [[id number]]  DATE OF ONSET: Chronic   PRIOR LEVEL OF FUNCTION: Independentd  PRECAUTIONS/ALLERGIES:   Allergies   Allergen Reactions    Alendronate Sodium Rash    Antihistamine [Triprolidine-Pseudoephedrine] Anxiety    Boniva [Ibandronate] Diarrhea     ASSESSMENT:  ?????? ? ? This section established at most recent assessment?????????? Patient presents with decreased range of motion, decreased strength, and pain in right shoulder and neck secondary to chronic pain. Patient has attended a total of 79 scheduled physical therapy visits including initial evaluation on 1/7/2014. Treatment has consisted of manual therapy and streching to improve pain and performance with activities of daily living. PROBLEM LIST (Impairments causing functional limitations):  1. Decreased independence with ADLs requiring any extensive UE activity (lifting/reaching/vacuuming). 2.  Decreased tolerance of prolonged sitting/standing activities (>30 minutes) due to exacerbation of neck pain. 3.  Unable to participate in social/recreational activities due to fear avoidance of onset of neck/shoulder pain. 4.  Outcome measure score of 10/50 on Neck Disability Index with minimal effect of pain on patient's ability to manage every day life activities.   GOALS: (Goals have been discussed and agreed upon with patient.)  SHORT-TERM FUNCTIONAL GOALS: Time Frame: 3 weeks  1. Patient will be independent with home exercise program without exacerbation of symptoms or cueing needed--goal met. 2. Patient will be independent with correct sleeping positions and awareness/avoidance of aggravating positions without cueing needed--goal met. DISCHARGE GOALS: Time Frame: 8 weeks  1. Patient will be independent with all ADLs with minimal onset of neck/shoulder pain and no deficits with daily tasks--goal ongoing. 2. Patient will report no fear avoidance with social or recreational activities due to neck/shoulder pain--goal ongoing. 3. Patient will score less than or equal to 5/50 on Neck Disability Index with minimal effect of pain on patient's ability to manage every day life activities--goal ongoing. REHABILITATION POTENTIAL FOR STATED GOALS: Parul Barnett OF CARE:  INTERVENTIONS PLANNED: (Benefits and precautions of physical therapy have been discussed with the patient.)  1. Patient education including pathophysiology of neck/shoulder symptoms, education/training (sleeping positions, posture re-education and body mechanics), and instructions of home exercise program.  2. Therapeutic exercises including mobility through neck/shoulder region and postural stabilization. 3. Manual therapy including soft tissue mobilizations, functional joint mobilizations and neuromuscular re-education to neck and shoulder region. 4. Therapeutic modalities including moist heat, cold pack, electrical stimulation, and/or ultrasound as needed for relief of symptoms with treatment. TREATMENT PLAN EFFECTIVE DATES: 7/7/2017 TO 9/7/2017  FREQUENCY/DURATION: Follow patient 1 time every 2 weeks for 12 weeks to address above goals. SUBJECTIVE:  History of Present Injury/Illness (Reason for Referral): Patient is well known to therapist.  Patient returned to therapy today with continued complaints of neck and shoulder pain. Patient reports that her right shoulder/arm feel weak at times.  She reports she hasn't ever dropped anything, but her arm will catch and she will have to be careful that she doesn't throw anything. She reports that her neck was hurting during the holidays. She reports that she did move out of her work office over the holidays. She reports she is still using cold packs at night and taking her pain medication as needed. She reports she has also been trying to get a few deep tissue massages to help with her pain. Present Symptoms: 7/19/2017: Patient reports her shoulder is painful today. Pain Intensity 1: 4  Pain Location 1: Neck, Shoulder  Pain Orientation 1: Right, Left  Pain Intervention(s) 1: Rest, Medication (see MAR)  Pain Description 1: Aching, Constant  Pain Onset 1: Chronic  Pain Scale 1: Numeric (0 - 10)  Patient Stated Pain Goal: 0  · Patient Stated Pain Goal: 0   Dominant Side: right  Past Medical History: Patient reports no significant past medical history. Current Medications: Cymblata, pain medication (PRN)   Date Last Reviewed: 7/19/2017  Social History/Home Situation:   Home Environment: Private residence  Living Alone: No  Support Systems: Family member(s); Friends \ neighbors  Work/Activity History: Retired  OBJECTIVE:  Outcome Measure: Tool Used: Neck Disability Index (NDI)  Score:  Initial: 10/50  Most Recent: 10/50 (Date: 7/5/2017)    Interpretation of Score: The Neck Disability Index is a revised form of the Oswestry Low Back Pain Index and is designed to measure the activities of daily living in person's with neck pain. Each section is scored on a 0-5 scale, 5 representing the greatest disability. The scores of each section are added together for a total score of 50. Score 0 1-10 11-20 21-30 31-40 41-49 50   Modifier CH CI CJ CK CL CM CN     ?  Carrying, Moving, and Handling Objects:     - CURRENT STATUS: CI - 1%-19% impaired, limited or restricted    - GOAL STATUS: CI - 1%-19% impaired, limited or restricted    - D/C STATUS:  ---------------To be determined---------------     Observation/Orthostatic Postural Assessment:   Posture (WDL): Exceptions to WDL  Posture Assessment: Rounded shoulders; Forward head  Palpation:  Tenderness to palpation noted in cervical spine and shoulder region on right greater than left (upper trapezius, levator scapulae, rhomboids, sub-occipitals). No bruising or swelling noted. ROM:    Cervical Assessment (AROM):   · Flexion 100%  · Extension 90%  · Lateral flexion L 85%, R 100%    LUE Assessment (AROM):   · Within defined limits        Shoulder Assessment (AROM):  · Flexion B WFL  · L ext. · Rot. 90, R 80  · IR: L 70, R 65     RLE Assessment (AROM):   · Within defined limits      Strength:   LUE Strength, Tone, Sensation:   · Within defined limits  · Deep cervical flexion 20s against gravity    RUE Strength, Tone, Sensation:  · R Shoulder Flexion: 3+  · R Shoulder ABduction: 3+  · R Shoulder Internal Rotation: 3+  · R Shoulder External Rotation: 3+  · R Elbow Flexion: 3+  · R Elbow Extension: 3+        Special Tests: Negative  Neurological Screen:    Myotomes:        RUE Myotomes  · C1 (Cervical Rotation): Strong  · C2, C3, C4 (Shoulder Shrug): Strong  · C5 (Shoulder ABduction): Strong  · C6 (Biceps & Wrist Extension): Strong  · C7 (Triceps & Wrist Flexion): Strong  · C8 (Thumb Extension): Strong  · T1 (Finger ADduction): Strong          Dermatomes:     Within normal limits     Within normal limits          Reflexes:   · Left Bicep (C5,C6): 2+  · Left Bracheoradialis (C5,C6): 2+  · Left Tricep (C6,C7,C8): 2+    · Right Bicep (C5,C6): 2+  · Right Bracheoradialis (C5,C6): 2+  · Right Tricep (C6,C7,C8): 2+          Neural Tension Tests: Negative  Functional Mobility:  Gait Description (WDL): Within defined limits     Balance:   Sitting: Intact  Standing: Intact  TREATMENT:    (In addition to Assessment/Re-Assessment sessions the following treatments were rendered)  Therapeutic Exercise: ( 5 minutes):  Exercises per grid below to improve mobility and strength. Required minimal visual, verbal and manual cues to promote proper body alignment, promote proper body posture and promote proper body mechanics. Progressed resistance, range, repetitions and complexity of movement as indicated. Date:  7/19/2017    Activity/Exercise Parameters   Scapular rows HEP - reviewed   Lat pulls HEP - reviewed   Manual Therapy     · Joint Mobilization (10 Minutes): Grade II anterior-posterior joint mobilization to right glenohumeral joint with active and passive gross movement in all planes to improve range of motion and decrease pain with daily activities. Prone t-spine central posterior anterior grade IV T1-4. · Soft Tissue Mobilization (30 Minutes): Soft tissue mobilization to cervical spinal musculature to improve mobility and decrease tightness and pain with daily activities. Therapeutic Modalities: N/A today                                                                                             HEP: As above; handouts given to patient for all exercises. ______________________________________________________________________________________________________    Treatment Assessment:  Patient tolerated treatment well. Patient reported decreased pain after treatment. Improving joint and soft tissue mobility noted with treatment. Pain 2/10. Progression/Medical Necessity:   · Patient is expected to demonstrate progress in strength and range of motion to increase independence with daily activities. · Patient demonstrates good rehab potential due to higher previous functional level. · Skilled intervention continues to be required due to decrease in functional mobility. Compliance with Program/Exercises: Compliant   Reason for Continuation of Services/Other Comments:  · Patient continues to demonstrate capacity to improve overall mobility which will increase safety.   Recommendations/Intent for next treatment session: \"Treatment next visit will focus on advancements to more challenging activities\".     Total Treatment Duration:  PT Patient Time In/Time Out  Time In: 0900  Time Out: BOLA Mason

## 2017-08-02 ENCOUNTER — HOSPITAL ENCOUNTER (OUTPATIENT)
Dept: PHYSICAL THERAPY | Age: 72
Discharge: HOME | End: 2017-08-02
Payer: MEDICARE

## 2017-08-02 PROCEDURE — G8985 CARRY GOAL STATUS: HCPCS

## 2017-08-02 PROCEDURE — G8984 CARRY CURRENT STATUS: HCPCS

## 2017-08-02 PROCEDURE — 97140 MANUAL THERAPY 1/> REGIONS: CPT

## 2017-08-02 NOTE — PROGRESS NOTES
[facility] at [address], [address] [place] [address],[address] [place], [address]  Phone: [phone]   Fax: [phone]    Outpatient PHYSICAL THERAPY: Progress Report  Fall Risk Score: 0 (? 5 = High Risk)  ICD-9: Treatment Diagnosis: Other specified disorders of joint - other disorders of cervical region, cervicalgia  ICD-10: Treatment Diagnosis: Cervicalgia (M54.2)     REFERRING PHYSICIAN: Luciana Leach MD MD Orders: Evaluate and Treat  Return Physician Appointment: TBD  MEDICAL/REFERRING DIAGNOSIS:  Cervicalgia [M54.2]  Other physical therapy [[id number]]  DATE OF ONSET: Chronic   PRIOR LEVEL OF FUNCTION: Independentd  PRECAUTIONS/ALLERGIES:   Allergies   Allergen Reactions    Alendronate Sodium Rash    Antihistamine [Triprolidine-Pseudoephedrine] Anxiety    Boniva [Ibandronate] Diarrhea     ASSESSMENT:  ?????? ? ? This section established at most recent assessment?????????? Patient presents with decreased range of motion, decreased strength, and pain in right shoulder and neck secondary to chronic pain. Patient has attended a total of 80 scheduled physical therapy visits including initial evaluation on 1/7/2014. Treatment has consisted of manual therapy and streching to improve pain and performance with activities of daily living. PROBLEM LIST (Impairments causing functional limitations):  1. Decreased independence with ADLs requiring any extensive UE activity (lifting/reaching/vacuuming). 2.  Decreased tolerance of prolonged sitting/standing activities (>30 minutes) due to exacerbation of neck pain. 3.  Unable to participate in social/recreational activities due to fear avoidance of onset of neck/shoulder pain. 4.  Outcome measure score of 10/50 on Neck Disability Index with minimal effect of pain on patient's ability to manage every day life activities.   GOALS: (Goals have been discussed and agreed upon with patient.)  SHORT-TERM FUNCTIONAL GOALS: Time Frame: 3 weeks  1. Patient will be independent with home exercise program without exacerbation of symptoms or cueing needed--goal met. 2. Patient will be independent with correct sleeping positions and awareness/avoidance of aggravating positions without cueing needed--goal met. DISCHARGE GOALS: Time Frame: 8 weeks  1. Patient will be independent with all ADLs with minimal onset of neck/shoulder pain and no deficits with daily tasks--goal ongoing. 2. Patient will report no fear avoidance with social or recreational activities due to neck/shoulder pain--goal ongoing. 3. Patient will score less than or equal to 5/50 on Neck Disability Index with minimal effect of pain on patient's ability to manage every day life activities--goal ongoing. REHABILITATION POTENTIAL FOR STATED GOALS: Darshan Robles OF CARE:  INTERVENTIONS PLANNED: (Benefits and precautions of physical therapy have been discussed with the patient.)  1. Patient education including pathophysiology of neck/shoulder symptoms, education/training (sleeping positions, posture re-education and body mechanics), and instructions of home exercise program.  2. Therapeutic exercises including mobility through neck/shoulder region and postural stabilization. 3. Manual therapy including soft tissue mobilizations, functional joint mobilizations and neuromuscular re-education to neck and shoulder region. 4. Therapeutic modalities including moist heat, cold pack, electrical stimulation, and/or ultrasound as needed for relief of symptoms with treatment. TREATMENT PLAN EFFECTIVE DATES: 7/7/2017 TO 9/7/2017  FREQUENCY/DURATION: Follow patient 1 time every 2 weeks for 12 weeks to address above goals. SUBJECTIVE:  History of Present Injury/Illness (Reason for Referral): Patient is well known to therapist.  Patient returned to therapy today with continued complaints of neck and shoulder pain. Patient reports that her right shoulder/arm feel weak at times.  She reports she hasn't ever dropped anything, but her arm will catch and she will have to be careful that she doesn't throw anything. She reports that her neck was hurting during the holidays. She reports that she did move out of her work office over the holidays. She reports she is still using cold packs at night and taking her pain medication as needed. She reports she has also been trying to get a few deep tissue massages to help with her pain. Present Symptoms: 8/2/2017: Patient reports her neck is about the same, but her shoulder is tight today. Pain Intensity 1: 4  Pain Location 1: Neck, Shoulder  Pain Orientation 1: Right, Left  Pain Intervention(s) 1: Rest, Medication (see MAR)  Pain Description 1: Aching, Constant  Pain Onset 1: Chronic  Pain Scale 1: Numeric (0 - 10)  Patient Stated Pain Goal: 0  · Patient Stated Pain Goal: 0   Dominant Side: right  Past Medical History: Patient reports no significant past medical history. Current Medications: Cymblata, pain medication (PRN)   Date Last Reviewed: 8/2/2017  Social History/Home Situation:   Home Environment: Private residence  Living Alone: No  Support Systems: Family member(s); Friends \ neighbors  Work/Activity History: Retired  OBJECTIVE:  Outcome Measure: Tool Used: Neck Disability Index (NDI)  Score:  Initial: 10/50  Most Recent: 10/50 (Date: 8/2/2017)    Interpretation of Score: The Neck Disability Index is a revised form of the Oswestry Low Back Pain Index and is designed to measure the activities of daily living in person's with neck pain. Each section is scored on a 0-5 scale, 5 representing the greatest disability. The scores of each section are added together for a total score of 50. Score 0 1-10 11-20 21-30 31-40 41-49 50   Modifier CH CI CJ CK CL CM CN     ?  Carrying, Moving, and Handling Objects:     - CURRENT STATUS: CI - 1%-19% impaired, limited or restricted    - GOAL STATUS: CI - 1%-19% impaired, limited or restricted    - D/C STATUS:  ---------------To be determined---------------     Observation/Orthostatic Postural Assessment:   Posture (WDL): Exceptions to WDL  Posture Assessment: Rounded shoulders; Forward head  Palpation:  Tenderness to palpation noted in cervical spine and shoulder region on right greater than left (upper trapezius, levator scapulae, rhomboids, sub-occipitals). No bruising or swelling noted. ROM:    Cervical Assessment (AROM):   · Flexion 100%  · Extension 90%  · Lateral flexion L 85%, R 100%    LUE Assessment (AROM):   · Within defined limits        Shoulder Assessment (AROM):  · Flexion B WFL  · L ext. · Rot. 90, R 80  · IR: L 70, R 65     RLE Assessment (AROM):   · Within defined limits      Strength:   LUE Strength, Tone, Sensation:   · Within defined limits  · Deep cervical flexion 20s against gravity    RUE Strength, Tone, Sensation:  · R Shoulder Flexion: 3+  · R Shoulder ABduction: 3+  · R Shoulder Internal Rotation: 3+  · R Shoulder External Rotation: 3+  · R Elbow Flexion: 3+  · R Elbow Extension: 3+        Special Tests: Negative  Neurological Screen:    Myotomes:        RUE Myotomes  · C1 (Cervical Rotation): Strong  · C2, C3, C4 (Shoulder Shrug): Strong  · C5 (Shoulder ABduction): Strong  · C6 (Biceps & Wrist Extension): Strong  · C7 (Triceps & Wrist Flexion): Strong  · C8 (Thumb Extension): Strong  · T1 (Finger ADduction): Strong          Dermatomes:     Within normal limits     Within normal limits          Reflexes:   · Left Bicep (C5,C6): 2+  · Left Bracheoradialis (C5,C6): 2+  · Left Tricep (C6,C7,C8): 2+    · Right Bicep (C5,C6): 2+  · Right Bracheoradialis (C5,C6): 2+  · Right Tricep (C6,C7,C8): 2+          Neural Tension Tests: Negative  Functional Mobility:  Gait Description (WDL): Within defined limits     Balance:   Sitting: Intact  Standing: Intact  TREATMENT:    (In addition to Assessment/Re-Assessment sessions the following treatments were rendered)  Therapeutic Exercise: ( 5 minutes):  Exercises per grid below to improve mobility and strength. Required minimal visual, verbal and manual cues to promote proper body alignment, promote proper body posture and promote proper body mechanics. Progressed resistance, range, repetitions and complexity of movement as indicated. Date:  8/2/2017    Activity/Exercise Parameters   Scapular rows HEP - reviewed   Lat pulls HEP - reviewed   Manual Therapy     · Joint Mobilization (10 Minutes): Grade II anterior-posterior joint mobilization to right glenohumeral joint with active and passive gross movement in all planes to improve range of motion and decrease pain with daily activities. Prone t-spine central posterior anterior grade IV T1-4. · Soft Tissue Mobilization (30 Minutes): Soft tissue mobilization to cervical spinal musculature to improve mobility and decrease tightness and pain with daily activities. Therapeutic Modalities: N/A today                                                                                             HEP: As above; handouts given to patient for all exercises. ______________________________________________________________________________________________________    Treatment Assessment:  Patient tolerated treatment well. Patient reported decreased pain after treatment. Improving joint and soft tissue mobility noted with treatment. Pain 2/10. Progression/Medical Necessity:   · Patient is expected to demonstrate progress in strength and range of motion to increase independence with daily activities. · Patient demonstrates good rehab potential due to higher previous functional level. · Skilled intervention continues to be required due to decrease in functional mobility. Compliance with Program/Exercises: Compliant   Reason for Continuation of Services/Other Comments:  · Patient continues to demonstrate capacity to improve overall mobility which will increase safety.   Recommendations/Intent for next treatment session: \"Treatment next visit will focus on advancements to more challenging activities\".     Total Treatment Duration:  PT Patient Time In/Time Out  Time In: 0900  Time Out: BOLA Mason

## 2017-08-16 ENCOUNTER — HOSPITAL ENCOUNTER (OUTPATIENT)
Dept: PHYSICAL THERAPY | Age: 72
Discharge: HOME | End: 2017-08-16
Payer: MEDICARE

## 2017-08-16 PROCEDURE — 97140 MANUAL THERAPY 1/> REGIONS: CPT

## 2017-08-16 NOTE — PROGRESS NOTES
[facility] at [address], [address] [place] [address],[address] [place], [address]  Phone: [phone]   Fax: [phone]    Outpatient PHYSICAL THERAPY: Daily Note  Fall Risk Score: 0 (? 5 = High Risk)  ICD-9: Treatment Diagnosis: Other specified disorders of joint - other disorders of cervical region, cervicalgia  ICD-10: Treatment Diagnosis: Cervicalgia (M54.2)     REFERRING PHYSICIAN: Masoud Hopper MD MD Orders: Evaluate and Treat  Return Physician Appointment: TBD  MEDICAL/REFERRING DIAGNOSIS:  Cervicalgia [M54.2]  Other physical therapy [[id number]]  DATE OF ONSET: Chronic   PRIOR LEVEL OF FUNCTION: Independentd  PRECAUTIONS/ALLERGIES:   Allergies   Allergen Reactions    Alendronate Sodium Rash    Antihistamine [Triprolidine-Pseudoephedrine] Anxiety    Boniva [Ibandronate] Diarrhea     ASSESSMENT:  ?????? ? ? This section established at most recent assessment?????????? Patient presents with decreased range of motion, decreased strength, and pain in right shoulder and neck secondary to chronic pain. Patient has attended a total of 81 scheduled physical therapy visits including initial evaluation on 1/7/2014. Treatment has consisted of manual therapy and streching to improve pain and performance with activities of daily living. PROBLEM LIST (Impairments causing functional limitations):  1. Decreased independence with ADLs requiring any extensive UE activity (lifting/reaching/vacuuming). 2.  Decreased tolerance of prolonged sitting/standing activities (>30 minutes) due to exacerbation of neck pain. 3.  Unable to participate in social/recreational activities due to fear avoidance of onset of neck/shoulder pain. 4.  Outcome measure score of 10/50 on Neck Disability Index with minimal effect of pain on patient's ability to manage every day life activities.   GOALS: (Goals have been discussed and agreed upon with patient.)  SHORT-TERM FUNCTIONAL GOALS: Time Frame: 3 weeks  1. Patient will be independent with home exercise program without exacerbation of symptoms or cueing needed--goal met. 2. Patient will be independent with correct sleeping positions and awareness/avoidance of aggravating positions without cueing needed--goal met. DISCHARGE GOALS: Time Frame: 8 weeks  1. Patient will be independent with all ADLs with minimal onset of neck/shoulder pain and no deficits with daily tasks--goal ongoing. 2. Patient will report no fear avoidance with social or recreational activities due to neck/shoulder pain--goal ongoing. 3. Patient will score less than or equal to 5/50 on Neck Disability Index with minimal effect of pain on patient's ability to manage every day life activities--goal ongoing. REHABILITATION POTENTIAL FOR STATED GOALS: Loyda Lauren OF CARE:  INTERVENTIONS PLANNED: (Benefits and precautions of physical therapy have been discussed with the patient.)  1. Patient education including pathophysiology of neck/shoulder symptoms, education/training (sleeping positions, posture re-education and body mechanics), and instructions of home exercise program.  2. Therapeutic exercises including mobility through neck/shoulder region and postural stabilization. 3. Manual therapy including soft tissue mobilizations, functional joint mobilizations and neuromuscular re-education to neck and shoulder region. 4. Therapeutic modalities including moist heat, cold pack, electrical stimulation, and/or ultrasound as needed for relief of symptoms with treatment. TREATMENT PLAN EFFECTIVE DATES: 7/7/2017 TO 9/7/2017  FREQUENCY/DURATION: Follow patient 1 time every 2 weeks for 12 weeks to address above goals. SUBJECTIVE:  History of Present Injury/Illness (Reason for Referral): Patient is well known to therapist.  Patient returned to therapy today with continued complaints of neck and shoulder pain. Patient reports that her right shoulder/arm feel weak at times.  She reports she hasn't ever dropped anything, but her arm will catch and she will have to be careful that she doesn't throw anything. She reports that her neck was hurting during the holidays. She reports that she did move out of her work office over the holidays. She reports she is still using cold packs at night and taking her pain medication as needed. She reports she has also been trying to get a few deep tissue massages to help with her pain. Present Symptoms: 8/16/2017: Patient reports she is feeling good today. Pain Intensity 1: 4  Pain Location 1: Neck, Shoulder  Pain Orientation 1: Right, Left  Pain Intervention(s) 1: Rest, Medication (see MAR)  Pain Description 1: Aching, Constant  Pain Onset 1: Chronic  Pain Scale 1: Numeric (0 - 10)  Patient Stated Pain Goal: 0  · Patient Stated Pain Goal: 0   Dominant Side: right  Past Medical History: Patient reports no significant past medical history. Current Medications: Cymblata, pain medication (PRN)   Date Last Reviewed: 8/16/2017  Social History/Home Situation:   Home Environment: Private residence  Living Alone: No  Support Systems: Family member(s); Friends \ neighbors  Work/Activity History: Retired  OBJECTIVE:  Outcome Measure: Tool Used: Neck Disability Index (NDI)  Score:  Initial: 10/50  Most Recent: 10/50 (Date: 8/2/2017)    Interpretation of Score: The Neck Disability Index is a revised form of the Oswestry Low Back Pain Index and is designed to measure the activities of daily living in person's with neck pain. Each section is scored on a 0-5 scale, 5 representing the greatest disability. The scores of each section are added together for a total score of 50. Score 0 1-10 11-20 21-30 31-40 41-49 50   Modifier CH CI CJ CK CL CM CN     ?  Carrying, Moving, and Handling Objects:     - CURRENT STATUS: CI - 1%-19% impaired, limited or restricted    - GOAL STATUS: CI - 1%-19% impaired, limited or restricted    - D/C STATUS:  ---------------To be determined---------------     Observation/Orthostatic Postural Assessment:   Posture (WDL): Exceptions to WDL  Posture Assessment: Rounded shoulders; Forward head  Palpation:  Tenderness to palpation noted in cervical spine and shoulder region on right greater than left (upper trapezius, levator scapulae, rhomboids, sub-occipitals). No bruising or swelling noted. ROM:    Cervical Assessment (AROM):   · Flexion 100%  · Extension 90%  · Lateral flexion L 85%, R 100%    LUE Assessment (AROM):   · Within defined limits        Shoulder Assessment (AROM):  · Flexion B WFL  · L ext. · Rot. 90, R 80  · IR: L 70, R 65     RLE Assessment (AROM):   · Within defined limits      Strength:   LUE Strength, Tone, Sensation:   · Within defined limits  · Deep cervical flexion 20s against gravity    RUE Strength, Tone, Sensation:  · R Shoulder Flexion: 3+  · R Shoulder ABduction: 3+  · R Shoulder Internal Rotation: 3+  · R Shoulder External Rotation: 3+  · R Elbow Flexion: 3+  · R Elbow Extension: 3+        Special Tests: Negative  Neurological Screen:    Myotomes:        RUE Myotomes  · C1 (Cervical Rotation): Strong  · C2, C3, C4 (Shoulder Shrug): Strong  · C5 (Shoulder ABduction): Strong  · C6 (Biceps & Wrist Extension): Strong  · C7 (Triceps & Wrist Flexion): Strong  · C8 (Thumb Extension): Strong  · T1 (Finger ADduction): Strong          Dermatomes:     Within normal limits     Within normal limits          Reflexes:   · Left Bicep (C5,C6): 2+  · Left Bracheoradialis (C5,C6): 2+  · Left Tricep (C6,C7,C8): 2+    · Right Bicep (C5,C6): 2+  · Right Bracheoradialis (C5,C6): 2+  · Right Tricep (C6,C7,C8): 2+          Neural Tension Tests: Negative  Functional Mobility:  Gait Description (WDL): Within defined limits     Balance:   Sitting: Intact  Standing: Intact  TREATMENT:    (In addition to Assessment/Re-Assessment sessions the following treatments were rendered)  Therapeutic Exercise: ( 5 minutes):  Exercises per grid below to improve mobility and strength. Required minimal visual, verbal and manual cues to promote proper body alignment, promote proper body posture and promote proper body mechanics. Progressed resistance, range, repetitions and complexity of movement as indicated. Date:  8/16/2017    Activity/Exercise Parameters   Scapular rows HEP - reviewed   Lat pulls HEP - reviewed   Manual Therapy     · Joint Mobilization (10 Minutes): Grade II anterior-posterior joint mobilization to right glenohumeral joint with active and passive gross movement in all planes to improve range of motion and decrease pain with daily activities. Prone t-spine central posterior anterior grade IV T1-4. · Soft Tissue Mobilization (30 Minutes): Soft tissue mobilization to cervical spinal musculature to improve mobility and decrease tightness and pain with daily activities. Therapeutic Modalities: N/A today                                                                                             HEP: As above; handouts given to patient for all exercises. ______________________________________________________________________________________________________    Treatment Assessment:  Patient tolerated treatment well. Patient reported decreased pain after treatment. Improving joint and soft tissue mobility noted with treatment. Pain 2/10. Progression/Medical Necessity:   · Patient is expected to demonstrate progress in strength and range of motion to increase independence with daily activities. · Patient demonstrates good rehab potential due to higher previous functional level. · Skilled intervention continues to be required due to decrease in functional mobility. Compliance with Program/Exercises: Compliant   Reason for Continuation of Services/Other Comments:  · Patient continues to demonstrate capacity to improve overall mobility which will increase safety.   Recommendations/Intent for next treatment session: \"Treatment next visit will focus on advancements to more challenging activities\".     Total Treatment Duration:  PT Patient Time In/Time Out  Time In: 0945  Time Out: [address] [place], [place]

## 2017-08-30 ENCOUNTER — HOSPITAL ENCOUNTER (OUTPATIENT)
Dept: PHYSICAL THERAPY | Age: 72
Discharge: HOME | End: 2017-08-30
Payer: MEDICARE

## 2017-08-30 PROCEDURE — 97140 MANUAL THERAPY 1/> REGIONS: CPT

## 2017-08-30 NOTE — PROGRESS NOTES
[facility] [address], [address] [place] [address],[address] [place], [address]  Phone: [phone]   Fax: [phone]    Outpatient PHYSICAL THERAPY: Daily Note  Fall Risk Score: 0 (? 5 = High Risk)  ICD-9: Treatment Diagnosis: Other specified disorders of joint - other disorders of cervical region, cervicalgia  ICD-10: Treatment Diagnosis: Cervicalgia (M54.2)     REFERRING PHYSICIAN: Lisa Penn MD MD Orders: Evaluate and Treat  Return Physician Appointment: TBD  MEDICAL/REFERRING DIAGNOSIS:  Cervicalgia [M54.2]  Other physical therapy [[id number]]  DATE OF ONSET: Chronic   PRIOR LEVEL OF FUNCTION: Independentd  PRECAUTIONS/ALLERGIES:   Allergies   Allergen Reactions    Alendronate Sodium Rash    Antihistamine [Triprolidine-Pseudoephedrine] Anxiety    Boniva [Ibandronate] Diarrhea     ASSESSMENT:  ?????? ? ? This section established at most recent assessment?????????? Patient presents with decreased range of motion, decreased strength, and pain in right shoulder and neck secondary to chronic pain. Patient has attended a total of 82 scheduled physical therapy visits including initial evaluation on 1/7/2014. Treatment has consisted of manual therapy and streching to improve pain and performance with activities of daily living. PROBLEM LIST (Impairments causing functional limitations):  1. Decreased independence with ADLs requiring any extensive UE activity (lifting/reaching/vacuuming). 2.  Decreased tolerance of prolonged sitting/standing activities (>30 minutes) due to exacerbation of neck pain. 3.  Unable to participate in social/recreational activities due to fear avoidance of onset of neck/shoulder pain. 4.  Outcome measure score of 10/50 on Neck Disability Index with minimal effect of pain on patient's ability to manage every day life activities.   GOALS: (Goals have been discussed and agreed upon with patient.)  SHORT-TERM FUNCTIONAL GOALS: Time Frame: 3 weeks  1. Patient will be independent with home exercise program without exacerbation of symptoms or cueing needed--goal met. 2. Patient will be independent with correct sleeping positions and awareness/avoidance of aggravating positions without cueing needed--goal met. DISCHARGE GOALS: Time Frame: 8 weeks  1. Patient will be independent with all ADLs with minimal onset of neck/shoulder pain and no deficits with daily tasks--goal ongoing. 2. Patient will report no fear avoidance with social or recreational activities due to neck/shoulder pain--goal ongoing. 3. Patient will score less than or equal to 5/50 on Neck Disability Index with minimal effect of pain on patient's ability to manage every day life activities--goal ongoing. REHABILITATION POTENTIAL FOR STATED GOALS: Duane Simpers OF CARE:  INTERVENTIONS PLANNED: (Benefits and precautions of physical therapy have been discussed with the patient.)  1. Patient education including pathophysiology of neck/shoulder symptoms, education/training (sleeping positions, posture re-education and body mechanics), and instructions of home exercise program.  2. Therapeutic exercises including mobility through neck/shoulder region and postural stabilization. 3. Manual therapy including soft tissue mobilizations, functional joint mobilizations and neuromuscular re-education to neck and shoulder region. 4. Therapeutic modalities including moist heat, cold pack, electrical stimulation, and/or ultrasound as needed for relief of symptoms with treatment. TREATMENT PLAN EFFECTIVE DATES: 7/7/2017 TO 9/7/2017  FREQUENCY/DURATION: Follow patient 1 time every 2 weeks for 12 weeks to address above goals. SUBJECTIVE:  History of Present Injury/Illness (Reason for Referral): Patient is well known to therapist.  Patient returned to therapy today with continued complaints of neck and shoulder pain. Patient reports that her right shoulder/arm feel weak at times.  She reports she hasn't ever dropped anything, but her arm will catch and she will have to be careful that she doesn't throw anything. She reports that her neck was hurting during the holidays. She reports that she did move out of her work office over the holidays. She reports she is still using cold packs at night and taking her pain medication as needed. She reports she has also been trying to get a few deep tissue massages to help with her pain. Present Symptoms: 8/30/2017: Patient reports she is hurting in her shoulders today. Pain Intensity 1: 4  Pain Location 1: Neck, Shoulder  Pain Orientation 1: Right, Left  Pain Intervention(s) 1: Rest, Medication (see MAR)  Pain Description 1: Aching, Constant  Pain Onset 1: Chronic  Pain Scale 1: Numeric (0 - 10)  Patient Stated Pain Goal: 0  · Patient Stated Pain Goal: 0   Dominant Side: right  Past Medical History: Patient reports no significant past medical history. Current Medications: Cymblata, pain medication (PRN)   Date Last Reviewed: 8/30/2017  Social History/Home Situation:   Home Environment: Private residence  Living Alone: No  Support Systems: Family member(s); Friends \ neighbors  Work/Activity History: Retired  OBJECTIVE:  Outcome Measure: Tool Used: Neck Disability Index (NDI)  Score:  Initial: 10/50  Most Recent: 10/50 (Date: 8/2/2017)    Interpretation of Score: The Neck Disability Index is a revised form of the Oswestry Low Back Pain Index and is designed to measure the activities of daily living in person's with neck pain. Each section is scored on a 0-5 scale, 5 representing the greatest disability. The scores of each section are added together for a total score of 50. Score 0 1-10 11-20 21-30 31-40 41-49 50   Modifier CH CI CJ CK CL CM CN     ?  Carrying, Moving, and Handling Objects:     - CURRENT STATUS: CI - 1%-19% impaired, limited or restricted    - GOAL STATUS: CI - 1%-19% impaired, limited or restricted    - D/C STATUS:  ---------------To be determined---------------     Observation/Orthostatic Postural Assessment:   Posture (WDL): Exceptions to WDL  Posture Assessment: Rounded shoulders; Forward head  Palpation:  Tenderness to palpation noted in cervical spine and shoulder region on right greater than left (upper trapezius, levator scapulae, rhomboids, sub-occipitals). No bruising or swelling noted. ROM:    Cervical Assessment (AROM):   · Flexion 100%  · Extension 90%  · Lateral flexion L 85%, R 100%    LUE Assessment (AROM):   · Within defined limits        Shoulder Assessment (AROM):  · Flexion B WFL  · L ext. · Rot. 90, R 80  · IR: L 70, R 65     RLE Assessment (AROM):   · Within defined limits      Strength:   LUE Strength, Tone, Sensation:   · Within defined limits  · Deep cervical flexion 20s against gravity    RUE Strength, Tone, Sensation:  · R Shoulder Flexion: 3+  · R Shoulder ABduction: 3+  · R Shoulder Internal Rotation: 3+  · R Shoulder External Rotation: 3+  · R Elbow Flexion: 3+  · R Elbow Extension: 3+        Special Tests: Negative  Neurological Screen:    Myotomes:        RUE Myotomes  · C1 (Cervical Rotation): Strong  · C2, C3, C4 (Shoulder Shrug): Strong  · C5 (Shoulder ABduction): Strong  · C6 (Biceps & Wrist Extension): Strong  · C7 (Triceps & Wrist Flexion): Strong  · C8 (Thumb Extension): Strong  · T1 (Finger ADduction): Strong          Dermatomes:     Within normal limits     Within normal limits          Reflexes:   · Left Bicep (C5,C6): 2+  · Left Bracheoradialis (C5,C6): 2+  · Left Tricep (C6,C7,C8): 2+    · Right Bicep (C5,C6): 2+  · Right Bracheoradialis (C5,C6): 2+  · Right Tricep (C6,C7,C8): 2+          Neural Tension Tests: Negative  Functional Mobility:  Gait Description (WDL): Within defined limits     Balance:   Sitting: Intact  Standing: Intact  TREATMENT:    (In addition to Assessment/Re-Assessment sessions the following treatments were rendered)  Therapeutic Exercise: ( 5 minutes): Exercises per grid below to improve mobility and strength. Required minimal visual, verbal and manual cues to promote proper body alignment, promote proper body posture and promote proper body mechanics. Progressed resistance, range, repetitions and complexity of movement as indicated. Date:  8/30/2017    Activity/Exercise Parameters   Scapular rows HEP - reviewed   Lat pulls HEP - reviewed   Manual Therapy     · Joint Mobilization (10 Minutes): Grade II anterior-posterior joint mobilization to right glenohumeral joint with active and passive gross movement in all planes to improve range of motion and decrease pain with daily activities. Prone t-spine central posterior anterior grade IV T1-4. · Soft Tissue Mobilization (30 Minutes): Soft tissue mobilization to cervical spinal musculature to improve mobility and decrease tightness and pain with daily activities. Therapeutic Modalities: N/A today                                                                                             HEP: As above; handouts given to patient for all exercises. ______________________________________________________________________________________________________    Treatment Assessment:  Patient tolerated treatment well. Patient reported decreased pain after treatment. Improving joint and soft tissue mobility noted with treatment. Pain 2/10. Progression/Medical Necessity:   · Patient is expected to demonstrate progress in strength and range of motion to increase independence with daily activities. · Patient demonstrates good rehab potential due to higher previous functional level. · Skilled intervention continues to be required due to decrease in functional mobility. Compliance with Program/Exercises: Compliant   Reason for Continuation of Services/Other Comments:  · Patient continues to demonstrate capacity to improve overall mobility which will increase safety.   Recommendations/Intent for next treatment session: \"Treatment next visit will focus on advancements to more challenging activities\".     Total Treatment Duration:  PT Patient Time In/Time Out  Time In: 0900  Time Out: BOLA [place]

## 2017-09-06 ENCOUNTER — HOSPITAL ENCOUNTER (OUTPATIENT)
Dept: PHYSICAL THERAPY | Age: 72
Discharge: HOME | End: 2017-09-06
Payer: MEDICARE

## 2017-09-06 PROCEDURE — 97140 MANUAL THERAPY 1/> REGIONS: CPT

## 2017-09-06 PROCEDURE — G8985 CARRY GOAL STATUS: HCPCS

## 2017-09-06 PROCEDURE — G8984 CARRY CURRENT STATUS: HCPCS

## 2017-09-06 NOTE — PROGRESS NOTES
[facility] at [address], [address] [place] [address],[address] [place], [address]  Phone: [phone]   Fax: [phone]    Outpatient PHYSICAL THERAPY: Recertification  Fall Risk Score: 0 (? 5 = High Risk)  ICD-9: Treatment Diagnosis: Other specified disorders of joint - other disorders of cervical region, cervicalgia  ICD-10: Treatment Diagnosis: Cervicalgia (M54.2)     REFERRING PHYSICIAN: Kaitlynn Jain MD MD Orders: Evaluate and Treat  Return Physician Appointment: TBD  MEDICAL/REFERRING DIAGNOSIS:  Cervicalgia [M54.2]  Other physical therapy [[id number]]  DATE OF ONSET: Chronic   PRIOR LEVEL OF FUNCTION: Independentd  PRECAUTIONS/ALLERGIES:   Allergies   Allergen Reactions    Alendronate Sodium Rash    Antihistamine [Triprolidine-Pseudoephedrine] Anxiety    Boniva [Ibandronate] Diarrhea     ASSESSMENT:  ?????? ? ? This section established at most recent assessment?????????? Patient presents with decreased range of motion, decreased strength, and pain in right shoulder and neck secondary to chronic pain. Patient has attended a total of 83 scheduled physical therapy visits including initial evaluation on 1/7/2014. Treatment has consisted of manual therapy and streching to improve pain and performance with activities of daily living. After discussing with patient she agreed she would continue to benefit from physical therapy to improve overall mobility. Please sign this re-certification if you concur. Thank you for the opportunity to serve this patient. PROBLEM LIST (Impairments causing functional limitations):  1. Decreased independence with ADLs requiring any extensive UE activity (lifting/reaching/vacuuming). 2.  Decreased tolerance of prolonged sitting/standing activities (>30 minutes) due to exacerbation of neck pain. 3.  Unable to participate in social/recreational activities due to fear avoidance of onset of neck/shoulder pain.   4.  Outcome measure score of 10/50 on Neck Disability Index with minimal effect of pain on patient's ability to manage every day life activities. GOALS: (Goals have been discussed and agreed upon with patient.)  SHORT-TERM FUNCTIONAL GOALS: Time Frame: 3 weeks  1. Patient will be independent with home exercise program without exacerbation of symptoms or cueing needed--goal met. 2. Patient will be independent with correct sleeping positions and awareness/avoidance of aggravating positions without cueing needed--goal met. DISCHARGE GOALS: Time Frame: 8 weeks  1. Patient will be independent with all ADLs with minimal onset of neck/shoulder pain and no deficits with daily tasks--goal ongoing. 2. Patient will report no fear avoidance with social or recreational activities due to neck/shoulder pain--goal ongoing. 3. Patient will score less than or equal to 5/50 on Neck Disability Index with minimal effect of pain on patient's ability to manage every day life activities--goal ongoing. REHABILITATION POTENTIAL FOR STATED GOALS: [facility] [place] OF CARE:  INTERVENTIONS PLANNED: (Benefits and precautions of physical therapy have been discussed with the patient.)  1. Patient education including pathophysiology of neck/shoulder symptoms, education/training (sleeping positions, posture re-education and body mechanics), and instructions of home exercise program.  2. Therapeutic exercises including mobility through neck/shoulder region and postural stabilization. 3. Manual therapy including soft tissue mobilizations, functional joint mobilizations and neuromuscular re-education to neck and shoulder region. 4. Therapeutic modalities including moist heat, cold pack, electrical stimulation, and/or ultrasound as needed for relief of symptoms with treatment. TREATMENT PLAN EFFECTIVE DATES: 9/7/2017 TO 11/7/2017  FREQUENCY/DURATION: Follow patient 1 time every 2 weeks for 12 weeks to address above goals.   Regarding [place]'s therapy, I certify that the treatment plan above will be carried out by a therapist or under their direction. Thank you for this referral,  Akila Stark PT     Referring Physician Signature: Masoud Hopper MD          Date           SUBJECTIVE:  History of Present Injury/Illness (Reason for Referral): Patient is well known to therapist.  Patient returned to therapy today with continued complaints of neck and shoulder pain. Patient reports that her right shoulder/arm feel weak at times. She reports she hasn't ever dropped anything, but her arm will catch and she will have to be careful that she doesn't throw anything. She reports that her neck was hurting during the holidays. She reports that she did move out of her work office over the holidays. She reports she is still using cold packs at night and taking her pain medication as needed. She reports she has also been trying to get a few deep tissue massages to help with her pain. Present Symptoms: 9/6/2017: Patient reports she is hurting today. Pain Intensity 1: 4  Pain Location 1: Neck, Shoulder  Pain Orientation 1: Right, Left  Pain Intervention(s) 1: Rest, Medication (see MAR)  Pain Description 1: Aching, Constant  Pain Onset 1: Chronic  Pain Scale 1: Numeric (0 - 10)  Patient Stated Pain Goal: 0  · Patient Stated Pain Goal: 0   Dominant Side: right  Past Medical History: Patient reports no significant past medical history. Current Medications: Cymblata, pain medication (PRN)   Date Last Reviewed: 9/6/2017  Social History/Home Situation:   Home Environment: Private residence  Living Alone: No  Support Systems: Family member(s); Friends \ neighbors  Work/Activity History: Retired  OBJECTIVE:  Outcome Measure: Tool Used: Neck Disability Index (NDI)  Score:  Initial: 10/50  Most Recent: 10/50 (Date: 9/6/2017)    Interpretation of Score:  The Neck Disability Index is a revised form of the Oswestry Low Back Pain Index and is designed to measure the activities of daily living in person's with neck pain. Each section is scored on a 0-5 scale, 5 representing the greatest disability. The scores of each section are added together for a total score of 50. Score 0 1-10 11-20 21-30 31-40 41-49 50   Modifier CH CI CJ CK CL CM CN     ? Carrying, Moving, and Handling Objects:     - CURRENT STATUS: CI - 1%-19% impaired, limited or restricted    - GOAL STATUS: CI - 1%-19% impaired, limited or restricted    - D/C STATUS:  ---------------To be determined---------------     Observation/Orthostatic Postural Assessment:   Posture (WDL): Exceptions to WDL  Posture Assessment: Rounded shoulders; Forward head  Palpation:  Tenderness to palpation noted in cervical spine and shoulder region on right greater than left (upper trapezius, levator scapulae, rhomboids, sub-occipitals). No bruising or swelling noted. ROM:    Cervical Assessment (AROM):   · Flexion 100%  · Extension 90%  · Lateral flexion L 85%, R 100%    LUE Assessment (AROM):   · Within defined limits        Shoulder Assessment (AROM):  · Flexion B WFL  · L ext. · Rot. 90, R 80  · IR: L 70, R 65     RLE Assessment (AROM):   · Within defined limits      Strength:   LUE Strength, Tone, Sensation:   · Within defined limits  · Deep cervical flexion 20s against gravity    RUE Strength, Tone, Sensation:  · R Shoulder Flexion: 3+  · R Shoulder ABduction: 3+  · R Shoulder Internal Rotation: 3+  · R Shoulder External Rotation: 3+  · R Elbow Flexion: 3+  · R Elbow Extension: 3+        Special Tests: Negative  Neurological Screen:    Myotomes:        RUE Myotomes  · C1 (Cervical Rotation): Strong  · C2, C3, C4 (Shoulder Shrug): Strong  · C5 (Shoulder ABduction): Strong  · C6 (Biceps & Wrist Extension): Strong  · C7 (Triceps & Wrist Flexion): Strong  · C8 (Thumb Extension): Strong  · T1 (Finger ADduction): Strong          Dermatomes:     Within normal limits     Within normal limits          Reflexes:   · Left Bicep (C5,C6): 2+  · Left Bracheoradialis (C5,C6): 2+  · Left Tricep (C6,C7,C8): 2+    · Right Bicep (C5,C6): 2+  · Right Bracheoradialis (C5,C6): 2+  · Right Tricep (C6,C7,C8): 2+          Neural Tension Tests: Negative  Functional Mobility:  Gait Description (WDL): Within defined limits     Balance:   Sitting: Intact  Standing: Intact  TREATMENT:    (In addition to Assessment/Re-Assessment sessions the following treatments were rendered)  Therapeutic Exercise: ( 5 minutes):  Exercises per grid below to improve mobility and strength. Required minimal visual, verbal and manual cues to promote proper body alignment, promote proper body posture and promote proper body mechanics. Progressed resistance, range, repetitions and complexity of movement as indicated. Date:  9/6/2017    Activity/Exercise Parameters   Scapular rows HEP - reviewed   Lat pulls HEP - reviewed   Manual Therapy     · Joint Mobilization (10 Minutes): Grade II anterior-posterior joint mobilization to right glenohumeral joint with active and passive gross movement in all planes to improve range of motion and decrease pain with daily activities. Prone t-spine central posterior anterior grade IV T1-4. · Soft Tissue Mobilization (30 Minutes): Soft tissue mobilization to cervical spinal musculature to improve mobility and decrease tightness and pain with daily activities. Therapeutic Modalities: N/A today                                                                                             HEP: As above; handouts given to patient for all exercises. ______________________________________________________________________________________________________    Treatment Assessment:  Patient tolerated treatment well. Patient reported decreased pain after treatment. Improving joint and soft tissue mobility noted with treatment. Pain 2/10.       Progression/Medical Necessity:   · Patient is expected to demonstrate progress in strength and range of motion to increase independence with daily activities. · Patient demonstrates good rehab potential due to higher previous functional level. · Skilled intervention continues to be required due to decrease in functional mobility. Compliance with Program/Exercises: Compliant   Reason for Continuation of Services/Other Comments:  · Patient continues to demonstrate capacity to improve overall mobility which will increase safety. Recommendations/Intent for next treatment session: \"Treatment next visit will focus on advancements to more challenging activities\".     Total Treatment Duration:  PT Patient Time In/Time Out  Time In: 0945  Time Out: 200 [place], [place]

## 2017-09-27 ENCOUNTER — HOSPITAL ENCOUNTER (OUTPATIENT)
Dept: PHYSICAL THERAPY | Age: 72
Discharge: HOME | End: 2017-09-27
Payer: MEDICARE

## 2017-09-27 PROCEDURE — 97140 MANUAL THERAPY 1/> REGIONS: CPT

## 2017-09-27 NOTE — PROGRESS NOTES
[facility] at [address], [address] [facility],[address] [place], [address]  Phone: [phone]   Fax: [phone]    Outpatient PHYSICAL THERAPY: Daily Note  Fall Risk Score: 0 (? 5 = High Risk)  ICD-9: Treatment Diagnosis: Other specified disorders of joint - other disorders of cervical region, cervicalgia  ICD-10: Treatment Diagnosis: Cervicalgia (M54.2)     REFERRING PHYSICIAN: Radha Skinner MD MD Orders: Evaluate and Treat  Return Physician Appointment: TBD  MEDICAL/REFERRING DIAGNOSIS:  Cervicalgia [M54.2]  Other physical therapy [[id number]]  DATE OF ONSET: Chronic   PRIOR LEVEL OF FUNCTION: Independentd  PRECAUTIONS/ALLERGIES:   Allergies   Allergen Reactions    Alendronate Sodium Rash    Antihistamine [Triprolidine-Pseudoephedrine] Anxiety    Boniva [Ibandronate] Diarrhea     ASSESSMENT:  ?????? ? ? This section established at most recent assessment?????????? Patient presents with decreased range of motion, decreased strength, and pain in right shoulder and neck secondary to chronic pain. Patient has attended a total of 84 scheduled physical therapy visits including initial evaluation on 1/7/2014. Treatment has consisted of manual therapy and streching to improve pain and performance with activities of daily living. PROBLEM LIST (Impairments causing functional limitations):  1. Decreased independence with ADLs requiring any extensive UE activity (lifting/reaching/vacuuming). 2.  Decreased tolerance of prolonged sitting/standing activities (>30 minutes) due to exacerbation of neck pain. 3.  Unable to participate in social/recreational activities due to fear avoidance of onset of neck/shoulder pain. 4.  Outcome measure score of 10/50 on Neck Disability Index with minimal effect of pain on patient's ability to manage every day life activities.   GOALS: (Goals have been discussed and agreed upon with patient.)  SHORT-TERM FUNCTIONAL GOALS: Time Frame: 3 weeks  1. Patient will be independent with home exercise program without exacerbation of symptoms or cueing needed--goal met. 2. Patient will be independent with correct sleeping positions and awareness/avoidance of aggravating positions without cueing needed--goal met. DISCHARGE GOALS: Time Frame: 8 weeks  1. Patient will be independent with all ADLs with minimal onset of neck/shoulder pain and no deficits with daily tasks--goal ongoing. 2. Patient will report no fear avoidance with social or recreational activities due to neck/shoulder pain--goal ongoing. 3. Patient will score less than or equal to 5/50 on Neck Disability Index with minimal effect of pain on patient's ability to manage every day life activities--goal ongoing. REHABILITATION POTENTIAL FOR STATED GOALS: [facility] [place] OF CARE:  INTERVENTIONS PLANNED: (Benefits and precautions of physical therapy have been discussed with the patient.)  1. Patient education including pathophysiology of neck/shoulder symptoms, education/training (sleeping positions, posture re-education and body mechanics), and instructions of home exercise program.  2. Therapeutic exercises including mobility through neck/shoulder region and postural stabilization. 3. Manual therapy including soft tissue mobilizations, functional joint mobilizations and neuromuscular re-education to neck and shoulder region. 4. Therapeutic modalities including moist heat, cold pack, electrical stimulation, and/or ultrasound as needed for relief of symptoms with treatment. TREATMENT PLAN EFFECTIVE DATES: 9/7/2017 TO 11/7/2017  FREQUENCY/DURATION: Follow patient 1 time every 2 weeks for 12 weeks to address above goals. SUBJECTIVE:  History of Present Injury/Illness (Reason for Referral): Patient is well known to therapist.  Patient returned to therapy today with continued complaints of neck and shoulder pain. Patient reports that her right shoulder/arm feel weak at times.  She reports she hasn't ever dropped anything, but her arm will catch and she will have to be careful that she doesn't throw anything. She reports that her neck was hurting during the holidays. She reports that she did move out of her work office over the holidays. She reports she is still using cold packs at night and taking her pain medication as needed. She reports she has also been trying to get a few deep tissue massages to help with her pain. Present Symptoms: 9/27/2017: Patient reports she feels a good bit of tightness in her neck and shoulders. Pain Intensity 1: 4  Pain Location 1: Neck, Shoulder  Pain Orientation 1: Right, Left  Pain Intervention(s) 1: Rest, Medication (see MAR)  Pain Description 1: Aching, Constant  Pain Onset 1: Chronic  Pain Scale 1: Numeric (0 - 10)  Patient Stated Pain Goal: 0  · Patient Stated Pain Goal: 0   Dominant Side: right  Past Medical History: Patient reports no significant past medical history. Current Medications: Cymblata, pain medication (PRN)   Date Last Reviewed: 9/27/2017  Social History/Home Situation:   Home Environment: Private residence  Living Alone: No  Support Systems: Family member(s); Friends \ neighbors  Work/Activity History: Retired  OBJECTIVE:  Outcome Measure: Tool Used: Neck Disability Index (NDI)  Score:  Initial: 10/50  Most Recent: 10/50 (Date: 9/6/2017)    Interpretation of Score: The Neck Disability Index is a revised form of the Oswestry Low Back Pain Index and is designed to measure the activities of daily living in person's with neck pain. Each section is scored on a 0-5 scale, 5 representing the greatest disability. The scores of each section are added together for a total score of 50. Score 0 1-10 11-20 21-30 31-40 41-49 50   Modifier CH CI CJ CK CL CM CN     ?  Carrying, Moving, and Handling Objects:     - CURRENT STATUS: CI - 1%-19% impaired, limited or restricted    - GOAL STATUS: CI - 1%-19% impaired, limited or restricted    - D/C STATUS:  ---------------To be determined---------------     Observation/Orthostatic Postural Assessment:   Posture (WDL): Exceptions to WDL  Posture Assessment: Rounded shoulders; Forward head  Palpation:  Tenderness to palpation noted in cervical spine and shoulder region on right greater than left (upper trapezius, levator scapulae, rhomboids, sub-occipitals). No bruising or swelling noted. ROM:    Cervical Assessment (AROM):   · Flexion 100%  · Extension 90%  · Lateral flexion L 85%, R 100%    LUE Assessment (AROM):   · Within defined limits        Shoulder Assessment (AROM):  · Flexion B WFL  · L ext. · Rot. 90, R 80  · IR: L 70, R 65     RLE Assessment (AROM):   · Within defined limits      Strength:   LUE Strength, Tone, Sensation:   · Within defined limits  · Deep cervical flexion 20s against gravity    RUE Strength, Tone, Sensation:  · R Shoulder Flexion: 3+  · R Shoulder ABduction: 3+  · R Shoulder Internal Rotation: 3+  · R Shoulder External Rotation: 3+  · R Elbow Flexion: 3+  · R Elbow Extension: 3+        Special Tests: Negative  Neurological Screen:    Myotomes:        RUE Myotomes  · C1 (Cervical Rotation): Strong  · C2, C3, C4 (Shoulder Shrug): Strong  · C5 (Shoulder ABduction): Strong  · C6 (Biceps & Wrist Extension): Strong  · C7 (Triceps & Wrist Flexion): Strong  · C8 (Thumb Extension): Strong  · T1 (Finger ADduction): Strong          Dermatomes:     Within normal limits     Within normal limits          Reflexes:   · Left Bicep (C5,C6): 2+  · Left Bracheoradialis (C5,C6): 2+  · Left Tricep (C6,C7,C8): 2+    · Right Bicep (C5,C6): 2+  · Right Bracheoradialis (C5,C6): 2+  · Right Tricep (C6,C7,C8): 2+          Neural Tension Tests: Negative  Functional Mobility:  Gait Description (WDL): Within defined limits     Balance:   Sitting: Intact  Standing: Intact  TREATMENT:    (In addition to Assessment/Re-Assessment sessions the following treatments were rendered)  Therapeutic Exercise: ( 5 minutes):  Exercises per grid below to improve mobility and strength. Required minimal visual, verbal and manual cues to promote proper body alignment, promote proper body posture and promote proper body mechanics. Progressed resistance, range, repetitions and complexity of movement as indicated. Date:  9/27/2017    Activity/Exercise Parameters   Scapular rows HEP - reviewed   Lat pulls HEP - reviewed   Manual Therapy     · Joint Mobilization (10 Minutes): Grade II anterior-posterior joint mobilization to right glenohumeral joint with active and passive gross movement in all planes to improve range of motion and decrease pain with daily activities. Prone t-spine central posterior anterior grade IV T1-4. · Soft Tissue Mobilization (30 Minutes): Soft tissue mobilization to cervical spinal musculature to improve mobility and decrease tightness and pain with daily activities. Therapeutic Modalities: N/A today                                                                                             HEP: As above; handouts given to patient for all exercises. ______________________________________________________________________________________________________    Treatment Assessment:  Patient tolerated treatment well. Patient reported decreased pain after treatment. Improving joint and soft tissue mobility noted with treatment. Pain 2/10. Progression/Medical Necessity:   · Patient is expected to demonstrate progress in strength and range of motion to increase independence with daily activities. · Patient demonstrates good rehab potential due to higher previous functional level. · Skilled intervention continues to be required due to decrease in functional mobility. Compliance with Program/Exercises: Compliant   Reason for Continuation of Services/Other Comments:  · Patient continues to demonstrate capacity to improve overall mobility which will increase safety.   Recommendations/Intent for next treatment session: \"Treatment next visit will focus on advancements to more challenging activities\".     Total Treatment Duration:  PT Patient Time In/Time Out  Time In: 0945  Time Out: 200 [facility] [place]

## 2017-10-12 ENCOUNTER — HOSPITAL ENCOUNTER (OUTPATIENT)
Dept: PHYSICAL THERAPY | Age: 72
Discharge: HOME | End: 2017-10-12
Payer: MEDICARE

## 2017-10-12 PROCEDURE — 97140 MANUAL THERAPY 1/> REGIONS: CPT

## 2017-10-12 NOTE — PROGRESS NOTES
[facility] at [address], [address] [facility],[address] [place], [address]  Phone: [phone]   Fax: [phone]    Outpatient PHYSICAL THERAPY: Daily Note  Fall Risk Score: 0 (? 5 = High Risk)  ICD-9: Treatment Diagnosis: Other specified disorders of joint - other disorders of cervical region, cervicalgia  ICD-10: Treatment Diagnosis: Cervicalgia (M54.2)     REFERRING PHYSICIAN: Mark Mack MD MD Orders: Evaluate and Treat  Return Physician Appointment: TBD  MEDICAL/REFERRING DIAGNOSIS:  Cervicalgia [M54.2]  Other physical therapy [[id number]]  DATE OF ONSET: Chronic   PRIOR LEVEL OF FUNCTION: Independentd  PRECAUTIONS/ALLERGIES:   Allergies   Allergen Reactions    Alendronate Sodium Rash    Antihistamine [Triprolidine-Pseudoephedrine] Anxiety    Boniva [Ibandronate] Diarrhea     ASSESSMENT:  ?????? ? ? This section established at most recent assessment?????????? Patient presents with decreased range of motion, decreased strength, and pain in right shoulder and neck secondary to chronic pain. Patient has attended a total of 84 scheduled physical therapy visits including initial evaluation on 1/7/2014. Treatment has consisted of manual therapy and streching to improve pain and performance with activities of daily living. PROBLEM LIST (Impairments causing functional limitations):  1. Decreased independence with ADLs requiring any extensive UE activity (lifting/reaching/vacuuming). 2.  Decreased tolerance of prolonged sitting/standing activities (>30 minutes) due to exacerbation of neck pain. 3.  Unable to participate in social/recreational activities due to fear avoidance of onset of neck/shoulder pain. 4.  Outcome measure score of 10/50 on Neck Disability Index with minimal effect of pain on patient's ability to manage every day life activities.   GOALS: (Goals have been discussed and agreed upon with patient.)  SHORT-TERM FUNCTIONAL GOALS: Time Frame: 3 weeks  1. Patient will be independent with home exercise program without exacerbation of symptoms or cueing needed--goal met. 2. Patient will be independent with correct sleeping positions and awareness/avoidance of aggravating positions without cueing needed--goal met. DISCHARGE GOALS: Time Frame: 8 weeks  1. Patient will be independent with all ADLs with minimal onset of neck/shoulder pain and no deficits with daily tasks--goal ongoing. 2. Patient will report no fear avoidance with social or recreational activities due to neck/shoulder pain--goal ongoing. 3. Patient will score less than or equal to 5/50 on Neck Disability Index with minimal effect of pain on patient's ability to manage every day life activities--goal ongoing. REHABILITATION POTENTIAL FOR STATED GOALS: Chelsea Moncada OF CARE:  INTERVENTIONS PLANNED: (Benefits and precautions of physical therapy have been discussed with the patient.)  1. Patient education including pathophysiology of neck/shoulder symptoms, education/training (sleeping positions, posture re-education and body mechanics), and instructions of home exercise program.  2. Therapeutic exercises including mobility through neck/shoulder region and postural stabilization. 3. Manual therapy including soft tissue mobilizations, functional joint mobilizations and neuromuscular re-education to neck and shoulder region. 4. Therapeutic modalities including moist heat, cold pack, electrical stimulation, and/or ultrasound as needed for relief of symptoms with treatment. TREATMENT PLAN EFFECTIVE DATES: 9/7/2017 TO 11/7/2017  FREQUENCY/DURATION: Follow patient 1 time every 2 weeks for 12 weeks to address above goals. SUBJECTIVE:  History of Present Injury/Illness (Reason for Referral): Patient is well known to therapist.  Patient returned to therapy today with continued complaints of neck and shoulder pain. Patient reports that her right shoulder/arm feel weak at times.  She reports she hasn't ever dropped anything, but her arm will catch and she will have to be careful that she doesn't throw anything. She reports that her neck was hurting during the holidays. She reports that she did move out of her work office over the holidays. She reports she is still using cold packs at night and taking her pain medication as needed. She reports she has also been trying to get a few deep tissue massages to help with her pain. Present Symptoms: 10/12/2017: Patient reports she feels a good bit of tightness in her neck and shoulders. ·     Dominant Side: right  Past Medical History: Patient reports no significant past medical history. Current Medications: Cymblata, pain medication (PRN)   Date Last Reviewed: 10/12/2017  Social History/Home Situation:   Home Environment: Private residence  Living Alone: No  Support Systems: Family member(s); Friends \ neighbors  Work/Activity History: Retired  OBJECTIVE:  Outcome Measure: Tool Used: Neck Disability Index (NDI)  Score:  Initial: 10/50  Most Recent: 10/50 (Date: 9/6/2017)    Interpretation of Score: The Neck Disability Index is a revised form of the Oswestry Low Back Pain Index and is designed to measure the activities of daily living in person's with neck pain. Each section is scored on a 0-5 scale, 5 representing the greatest disability. The scores of each section are added together for a total score of 50. Score 0 1-10 11-20 21-30 31-40 41-49 50   Modifier CH CI CJ CK CL CM CN     ? Carrying, Moving, and Handling Objects:     - CURRENT STATUS: CI - 1%-19% impaired, limited or restricted    - GOAL STATUS: CI - 1%-19% impaired, limited or restricted    - D/C STATUS:  ---------------To be determined---------------     Observation/Orthostatic Postural Assessment:   Posture (WDL): Exceptions to WDL  Posture Assessment: Rounded shoulders; Forward head  Palpation:  Tenderness to palpation noted in cervical spine and shoulder region on right greater than left (upper trapezius, levator scapulae, rhomboids, sub-occipitals). No bruising or swelling noted. ROM:    Cervical Assessment (AROM):   · Flexion 100%  · Extension 90%  · Lateral flexion L 85%, R 100%    LUE Assessment (AROM):   · Within defined limits        Shoulder Assessment (AROM):  · Flexion B WFL  · L ext. · Rot. 90, R 80  · IR: L 70, R 65     RLE Assessment (AROM):   · Within defined limits      Strength:   LUE Strength, Tone, Sensation:   · Within defined limits  · Deep cervical flexion 20s against gravity    RUE Strength, Tone, Sensation:  · R Shoulder Flexion: 3+  · R Shoulder ABduction: 3+  · R Shoulder Internal Rotation: 3+  · R Shoulder External Rotation: 3+  · R Elbow Flexion: 3+  · R Elbow Extension: 3+        Special Tests: Negative  Neurological Screen:    Myotomes:        RUE Myotomes  · C1 (Cervical Rotation): Strong  · C2, C3, C4 (Shoulder Shrug): Strong  · C5 (Shoulder ABduction): Strong  · C6 (Biceps & Wrist Extension): Strong  · C7 (Triceps & Wrist Flexion): Strong  · C8 (Thumb Extension): Strong  · T1 (Finger ADduction): Strong          Dermatomes: Within normal limits     Within normal limits          Reflexes:   · Left Bicep (C5,C6): 2+  · Left Bracheoradialis (C5,C6): 2+  · Left Tricep (C6,C7,C8): 2+    · Right Bicep (C5,C6): 2+  · Right Bracheoradialis (C5,C6): 2+  · Right Tricep (C6,C7,C8): 2+          Neural Tension Tests: Negative  Functional Mobility:  Gait Description (WDL): Within defined limits     Balance:   Sitting: Intact  Standing: Intact  TREATMENT:    (In addition to Assessment/Re-Assessment sessions the following treatments were rendered)  Therapeutic Exercise: ( 5 minutes):  Exercises per grid below to improve mobility and strength. Required minimal visual, verbal and manual cues to promote proper body alignment, promote proper body posture and promote proper body mechanics.   Progressed resistance, range, repetitions and complexity of movement as indicated. Date:  10/12/2017    Activity/Exercise Parameters   Scapular rows HEP - reviewed   Lat pulls HEP - reviewed   Manual Therapy     · Joint Mobilization (10 Minutes): Grade II anterior-posterior joint mobilization to right glenohumeral joint with active and passive gross movement in all planes to improve range of motion and decrease pain with daily activities. Prone t-spine central posterior anterior grade IV T1-4. · Soft Tissue Mobilization (30 Minutes): Soft tissue mobilization to cervical spinal musculature to improve mobility and decrease tightness and pain with daily activities. Therapeutic Modalities: N/A today                                                                                             HEP: As above; handouts given to patient for all exercises. ______________________________________________________________________________________________________    Treatment Assessment:  Patient tolerated treatment well. Patient felt some relief of headache after treatment. Progression/Medical Necessity:   · Patient is expected to demonstrate progress in strength and range of motion to increase independence with daily activities. · Patient demonstrates good rehab potential due to higher previous functional level. · Skilled intervention continues to be required due to decrease in functional mobility. Compliance with Program/Exercises: Compliant   Reason for Continuation of Services/Other Comments:  · Patient continues to demonstrate capacity to improve overall mobility which will increase safety. Recommendations/Intent for next treatment session: \"Treatment next visit will focus on advancements to more challenging activities\".     Total Treatment Duration:  PT Patient Time In/Time Out  Time In: 0900  Time Out: 0945    Adelita Carmichael, PT

## 2017-10-25 ENCOUNTER — HOSPITAL ENCOUNTER (OUTPATIENT)
Dept: PHYSICAL THERAPY | Age: 72
Discharge: HOME | End: 2017-10-25
Payer: MEDICARE

## 2017-10-25 PROCEDURE — 97140 MANUAL THERAPY 1/> REGIONS: CPT

## 2017-10-25 PROCEDURE — G8985 CARRY GOAL STATUS: HCPCS

## 2017-10-25 PROCEDURE — G8984 CARRY CURRENT STATUS: HCPCS

## 2017-10-25 NOTE — PROGRESS NOTES
[facility] at [address], [address] [place] [address],[address] [place], [address]  Phone: [phone]   Fax: [phone]    Outpatient PHYSICAL THERAPY: Progress Report  Fall Risk Score: 0 (? 5 = High Risk)  ICD-9: Treatment Diagnosis: Other specified disorders of joint - other disorders of cervical region, cervicalgia  ICD-10: Treatment Diagnosis: Cervicalgia (M54.2)     REFERRING PHYSICIAN: Gordon Lentz MD MD Orders: Evaluate and Treat  Return Physician Appointment: TBD  MEDICAL/REFERRING DIAGNOSIS:  Cervicalgia [M54.2]  Other physical therapy [[id number]]  DATE OF ONSET: Chronic   PRIOR LEVEL OF FUNCTION: Independentd  PRECAUTIONS/ALLERGIES:   Allergies   Allergen Reactions    Alendronate Sodium Rash    Antihistamine [Triprolidine-Pseudoephedrine] Anxiety    Boniva [Ibandronate] Diarrhea     ASSESSMENT:  ?????? ? ? This section established at most recent assessment?????????? Patient presents with decreased range of motion, decreased strength, and pain in right shoulder and neck secondary to chronic pain. Patient has attended a total of 86 scheduled physical therapy visits including initial evaluation on 1/7/2014. Treatment has consisted of manual therapy and streching to improve pain and performance with activities of daily living. PROBLEM LIST (Impairments causing functional limitations):  1. Decreased independence with ADLs requiring any extensive UE activity (lifting/reaching/vacuuming). 2.  Decreased tolerance of prolonged sitting/standing activities (>30 minutes) due to exacerbation of neck pain. 3.  Unable to participate in social/recreational activities due to fear avoidance of onset of neck/shoulder pain. 4.  Outcome measure score of 10/50 on Neck Disability Index with minimal effect of pain on patient's ability to manage every day life activities.   GOALS: (Goals have been discussed and agreed upon with patient.)  SHORT-TERM FUNCTIONAL GOALS: Time Frame: 3 weeks  1. Patient will be independent with home exercise program without exacerbation of symptoms or cueing needed--goal met. 2. Patient will be independent with correct sleeping positions and awareness/avoidance of aggravating positions without cueing needed--goal met. DISCHARGE GOALS: Time Frame: 8 weeks  1. Patient will be independent with all ADLs with minimal onset of neck/shoulder pain and no deficits with daily tasks--goal ongoing. 2. Patient will report no fear avoidance with social or recreational activities due to neck/shoulder pain--goal ongoing. 3. Patient will score less than or equal to 5/50 on Neck Disability Index with minimal effect of pain on patient's ability to manage every day life activities--goal ongoing. REHABILITATION POTENTIAL FOR STATED GOALS: Osbaldo Jesusmel OF CARE:  INTERVENTIONS PLANNED: (Benefits and precautions of physical therapy have been discussed with the patient.)  1. Patient education including pathophysiology of neck/shoulder symptoms, education/training (sleeping positions, posture re-education and body mechanics), and instructions of home exercise program.  2. Therapeutic exercises including mobility through neck/shoulder region and postural stabilization. 3. Manual therapy including soft tissue mobilizations, functional joint mobilizations and neuromuscular re-education to neck and shoulder region. 4. Therapeutic modalities including moist heat, cold pack, electrical stimulation, and/or ultrasound as needed for relief of symptoms with treatment. TREATMENT PLAN EFFECTIVE DATES: 9/7/2017 TO 11/7/2017  FREQUENCY/DURATION: Follow patient 1 time every 2 weeks for 12 weeks to address above goals. SUBJECTIVE:  History of Present Injury/Illness (Reason for Referral): Patient is well known to therapist.  Patient returned to therapy today with continued complaints of neck and shoulder pain. Patient reports that her right shoulder/arm feel weak at times.  She reports she hasn't ever dropped anything, but her arm will catch and she will have to be careful that she doesn't throw anything. She reports that her neck was hurting during the holidays. She reports that she did move out of her work office over the holidays. She reports she is still using cold packs at night and taking her pain medication as needed. She reports she has also been trying to get a few deep tissue massages to help with her pain. Present Symptoms: 10/25/2017: Patient reports she is doing ok, just tight today in her neck and shoulders. Pain Intensity 1: 4  Pain Location 1: Neck, Shoulder  Pain Orientation 1: Right, Left  Pain Intervention(s) 1: Rest, Medication (see MAR)  Pain Description 1: Aching, Constant  Pain Onset 1: Chronic  Pain Scale 1: Numeric (0 - 10)  Patient Stated Pain Goal: 0  · Patient Stated Pain Goal: 0   Dominant Side: right  Past Medical History: Patient reports no significant past medical history. Current Medications: Cymblata, pain medication (PRN)   Date Last Reviewed: 10/25/2017  Social History/Home Situation:   Home Environment: Private residence  Living Alone: No  Support Systems: Family member(s); Friends \ neighbors  Work/Activity History: Retired  OBJECTIVE:  Outcome Measure: Tool Used: Neck Disability Index (NDI)  Score:  Initial: 10/50  Most Recent: 10/50 (Date: 10/25/2017)    Interpretation of Score: The Neck Disability Index is a revised form of the Oswestry Low Back Pain Index and is designed to measure the activities of daily living in person's with neck pain. Each section is scored on a 0-5 scale, 5 representing the greatest disability. The scores of each section are added together for a total score of 50. Score 0 1-10 11-20 21-30 31-40 41-49 50   Modifier CH CI CJ CK CL CM CN     ?  Carrying, Moving, and Handling Objects:     - CURRENT STATUS: CI - 1%-19% impaired, limited or restricted    - GOAL STATUS: CI - 1%-19% impaired, limited or restricted    - D/C STATUS:  ---------------To be determined---------------     Observation/Orthostatic Postural Assessment:   Posture (WDL): Exceptions to WDL  Posture Assessment: Rounded shoulders; Forward head  Palpation:  Tenderness to palpation noted in cervical spine and shoulder region on right greater than left (upper trapezius, levator scapulae, rhomboids, sub-occipitals). No bruising or swelling noted. ROM:    Cervical Assessment (AROM):   · Flexion 100%  · Extension 90%  · Lateral flexion L 85%, R 100%    LUE Assessment (AROM):   · Within defined limits        Shoulder Assessment (AROM):  · Flexion B WFL  · L ext. · Rot. 90, R 80  · IR: L 70, R 65     RLE Assessment (AROM):   · Within defined limits      Strength:   LUE Strength, Tone, Sensation:   · Within defined limits  · Deep cervical flexion 20s against gravity    RUE Strength, Tone, Sensation:  · R Shoulder Flexion: 3+  · R Shoulder ABduction: 3+  · R Shoulder Internal Rotation: 3+  · R Shoulder External Rotation: 3+  · R Elbow Flexion: 3+  · R Elbow Extension: 3+        Special Tests: Negative  Neurological Screen:    Myotomes:        RUE Myotomes  · C1 (Cervical Rotation): Strong  · C2, C3, C4 (Shoulder Shrug): Strong  · C5 (Shoulder ABduction): Strong  · C6 (Biceps & Wrist Extension): Strong  · C7 (Triceps & Wrist Flexion): Strong  · C8 (Thumb Extension): Strong  · T1 (Finger ADduction): Strong          Dermatomes:     Within normal limits     Within normal limits          Reflexes:   · Left Bicep (C5,C6): 2+  · Left Bracheoradialis (C5,C6): 2+  · Left Tricep (C6,C7,C8): 2+    · Right Bicep (C5,C6): 2+  · Right Bracheoradialis (C5,C6): 2+  · Right Tricep (C6,C7,C8): 2+          Neural Tension Tests: Negative  Functional Mobility:  Gait Description (WDL): Within defined limits     Balance:   Sitting: Intact  Standing: Intact  TREATMENT:    (In addition to Assessment/Re-Assessment sessions the following treatments were rendered)  Therapeutic Exercise: ( 5 minutes):  Exercises per grid below to improve mobility and strength. Required minimal visual, verbal and manual cues to promote proper body alignment, promote proper body posture and promote proper body mechanics. Progressed resistance, range, repetitions and complexity of movement as indicated. Date:  10/25/2017    Activity/Exercise Parameters   Scapular rows HEP - reviewed   Lat pulls HEP - reviewed   Manual Therapy     · Joint Mobilization (10 Minutes): Grade II anterior-posterior joint mobilization to right glenohumeral joint with active and passive gross movement in all planes to improve range of motion and decrease pain with daily activities. Prone t-spine central posterior anterior grade IV T1-4. · Soft Tissue Mobilization (30 Minutes): Soft tissue mobilization to cervical spinal musculature to improve mobility and decrease tightness and pain with daily activities. Therapeutic Modalities: N/A today                                                                                             HEP: As above; handouts given to patient for all exercises. ______________________________________________________________________________________________________    Treatment Assessment:  Patient tolerated treatment well. Patient felt some relief of headache after treatment. Progression/Medical Necessity:   · Patient is expected to demonstrate progress in strength and range of motion to increase independence with daily activities. · Patient demonstrates good rehab potential due to higher previous functional level. · Skilled intervention continues to be required due to decrease in functional mobility. Compliance with Program/Exercises: Compliant   Reason for Continuation of Services/Other Comments:  · Patient continues to demonstrate capacity to improve overall mobility which will increase safety.   Recommendations/Intent for next treatment session: \"Treatment next visit will focus on advancements to more challenging activities\".     Total Treatment Duration:  PT Patient Time In/Time Out  Time In: 0900  Time Out: BOLA Mason

## 2017-11-08 ENCOUNTER — APPOINTMENT (OUTPATIENT)
Dept: PHYSICAL THERAPY | Age: 72
End: 2017-11-08
Payer: MEDICARE

## 2017-11-15 ENCOUNTER — HOSPITAL ENCOUNTER (OUTPATIENT)
Dept: PHYSICAL THERAPY | Age: 72
Discharge: HOME | End: 2017-11-15
Payer: MEDICARE

## 2017-11-15 PROCEDURE — 97140 MANUAL THERAPY 1/> REGIONS: CPT

## 2017-11-15 PROCEDURE — G8984 CARRY CURRENT STATUS: HCPCS

## 2017-11-15 PROCEDURE — G8985 CARRY GOAL STATUS: HCPCS

## 2017-11-15 NOTE — PROGRESS NOTES
[facility] at [address], [address] [place] [address],[address] [place], [address]  Phone: [phone]   Fax: [phone]    Outpatient PHYSICAL THERAPY: Re-evaluation  Fall Risk Score: 0 (? 5 = High Risk)  ICD-9: Treatment Diagnosis: Other specified disorders of joint - other disorders of cervical region, cervicalgia  ICD-10: Treatment Diagnosis: Cervicalgia (M54.2)     REFERRING PHYSICIAN: Devora Washington MD MD Orders: Evaluate and Treat  Return Physician Appointment: TBD  MEDICAL/REFERRING DIAGNOSIS:  Cervicalgia [M54.2]  Other physical therapy [[id number]]  DATE OF ONSET: Chronic   PRIOR LEVEL OF FUNCTION: Independentd  PRECAUTIONS/ALLERGIES:   Allergies   Allergen Reactions    Alendronate Sodium Rash    Antihistamine [Triprolidine-Pseudoephedrine] Anxiety    Boniva [Ibandronate] Diarrhea     ASSESSMENT:  ?????? ? ? This section established at most recent assessment?????????? Patient presents with decreased range of motion, decreased strength, and pain in right shoulder and neck secondary to chronic pain. Patient has attended a total of 87 scheduled physical therapy visits including initial evaluation on 1/7/2014. Treatment has consisted of manual therapy and streching to improve pain and performance with activities of daily living. PROBLEM LIST (Impairments causing functional limitations):  1. Decreased independence with ADLs requiring any extensive UE activity (lifting/reaching/vacuuming). 2.  Decreased tolerance of prolonged sitting/standing activities (>30 minutes) due to exacerbation of neck pain. 3.  Unable to participate in social/recreational activities due to fear avoidance of onset of neck/shoulder pain. 4.  Outcome measure score of 10/50 on Neck Disability Index with minimal effect of pain on patient's ability to manage every day life activities.   GOALS: (Goals have been discussed and agreed upon with patient.)  SHORT-TERM FUNCTIONAL GOALS: Time Frame: 3 weeks  1. Patient will be independent with home exercise program without exacerbation of symptoms or cueing needed--goal met. 2. Patient will be independent with correct sleeping positions and awareness/avoidance of aggravating positions without cueing needed--goal met. DISCHARGE GOALS: Time Frame: 8 weeks  1. Patient will be independent with all ADLs with minimal onset of neck/shoulder pain and no deficits with daily tasks--goal ongoing. 2. Patient will report no fear avoidance with social or recreational activities due to neck/shoulder pain--goal ongoing. 3. Patient will score less than or equal to 5/50 on Neck Disability Index with minimal effect of pain on patient's ability to manage every day life activities--goal ongoing. REHABILITATION POTENTIAL FOR STATED GOALS: [facility] OF CARE:  INTERVENTIONS PLANNED: (Benefits and precautions of physical therapy have been discussed with the patient.)  1. Patient education including pathophysiology of neck/shoulder symptoms, education/training (sleeping positions, posture re-education and body mechanics), and instructions of home exercise program.  2. Therapeutic exercises including mobility through neck/shoulder region and postural stabilization. 3. Manual therapy including soft tissue mobilizations, functional joint mobilizations and neuromuscular re-education to neck and shoulder region. 4. Therapeutic modalities including moist heat, cold pack, electrical stimulation, and/or ultrasound as needed for relief of symptoms with treatment. TREATMENT PLAN EFFECTIVE DATES: 11/7/2017 TO 1/7/2018  FREQUENCY/DURATION: Follow patient 1 time every 2 weeks for 12 weeks to address above goals. Regarding Paola Beach's therapy, I certify that the treatment plan above will be carried out by a therapist or under their direction.   Thank you for this referral,  Penny Hughes PT     Referring Physician Signature: Luma Mitchell MD          Date SUBJECTIVE:  History of Present Injury/Illness (Reason for Referral): Patient is well known to therapist.  Patient returned to therapy today with continued complaints of neck and shoulder pain. Patient reports that her right shoulder/arm feel weak at times. She reports she hasn't ever dropped anything, but her arm will catch and she will have to be careful that she doesn't throw anything. She reports that her neck was hurting during the holidays. She reports that she did move out of her work office over the holidays. She reports she is still using cold packs at night and taking her pain medication as needed. She reports she has also been trying to get a few deep tissue massages to help with her pain. Present Symptoms: 11/15/2017: Patient reports she is doing ok, just tight today in her neck and shoulders. Pain Intensity 1: 4  Pain Location 1: Neck, Shoulder  Pain Orientation 1: Right, Left  Pain Intervention(s) 1: Rest, Medication (see MAR)  Pain Description 1: Aching, Constant  Pain Onset 1: Chronic  Pain Scale 1: Numeric (0 - 10)  Patient Stated Pain Goal: 0  · Patient Stated Pain Goal: 0   Dominant Side: right  Past Medical History: Patient reports no significant past medical history. Current Medications: Cymblata, pain medication (PRN)   Date Last Reviewed: 11/15/2017  Social History/Home Situation:   Home Environment: Private residence  Living Alone: No  Support Systems: Family member(s); Friends \ neighbors  Work/Activity History: Retired  OBJECTIVE:  Outcome Measure: Tool Used: Neck Disability Index (NDI)  Score:  Initial: 10/50  Most Recent: 10/50 (Date: 11/15/2017)    Interpretation of Score: The Neck Disability Index is a revised form of the Oswestry Low Back Pain Index and is designed to measure the activities of daily living in person's with neck pain. Each section is scored on a 0-5 scale, 5 representing the greatest disability.   The scores of each section are added together for a total score of 50.   Score 0 1-10 11-20 21-30 31-40 41-49 50   Modifier CH CI CJ CK CL CM CN     ? Carrying, Moving, and Handling Objects:     - CURRENT STATUS: CI - 1%-19% impaired, limited or restricted    - GOAL STATUS: CI - 1%-19% impaired, limited or restricted    - D/C STATUS:  ---------------To be determined---------------     Observation/Orthostatic Postural Assessment:   Posture (WDL): Exceptions to WDL  Posture Assessment: Rounded shoulders; Forward head  Palpation:  Tenderness to palpation noted in cervical spine and shoulder region on right greater than left (upper trapezius, levator scapulae, rhomboids, sub-occipitals). No bruising or swelling noted. ROM:    Cervical Assessment (AROM):   · Flexion 100%  · Extension 90%  · Lateral flexion L 85%, R 100%    LUE Assessment (AROM):   · Within defined limits        Shoulder Assessment (AROM):  · Flexion B WFL  · L ext. · Rot. 90, R 80  · IR: L 70, R 65     RLE Assessment (AROM):   · Within defined limits      Strength:   LUE Strength, Tone, Sensation:   · Within defined limits  · Deep cervical flexion 20s against gravity    RUE Strength, Tone, Sensation:  · R Shoulder Flexion: 3+  · R Shoulder ABduction: 3+  · R Shoulder Internal Rotation: 3+  · R Shoulder External Rotation: 3+  · R Elbow Flexion: 3+  · R Elbow Extension: 3+        Special Tests: Negative  Neurological Screen:    Myotomes:        RUE Myotomes  · C1 (Cervical Rotation): Strong  · C2, C3, C4 (Shoulder Shrug): Strong  · C5 (Shoulder ABduction): Strong  · C6 (Biceps & Wrist Extension): Strong  · C7 (Triceps & Wrist Flexion): Strong  · C8 (Thumb Extension): Strong  · T1 (Finger ADduction): Strong          Dermatomes:     Within normal limits     Within normal limits          Reflexes:   · Left Bicep (C5,C6): 2+  · Left Bracheoradialis (C5,C6): 2+  · Left Tricep (C6,C7,C8): 2+    · Right Bicep (C5,C6): 2+  · Right Bracheoradialis (C5,C6): 2+  · Right Tricep (C6,C7,C8): 2+          Neural Tension Tests: Negative  Functional Mobility:  Gait Description (WDL): Within defined limits     Balance:   Sitting: Intact  Standing: Intact  TREATMENT:    (In addition to Assessment/Re-Assessment sessions the following treatments were rendered)  Therapeutic Exercise: ( 5 minutes):  Exercises per grid below to improve mobility and strength. Required minimal visual, verbal and manual cues to promote proper body alignment, promote proper body posture and promote proper body mechanics. Progressed resistance, range, repetitions and complexity of movement as indicated. Date:  11/15/2017    Activity/Exercise Parameters   Scapular rows HEP - reviewed   Lat pulls HEP - reviewed   Manual Therapy     · Joint Mobilization (10 Minutes): Grade II anterior-posterior joint mobilization to right glenohumeral joint with active and passive gross movement in all planes to improve range of motion and decrease pain with daily activities. Prone t-spine central posterior anterior grade IV T1-4. · Soft Tissue Mobilization (30 Minutes): Soft tissue mobilization to cervical spinal musculature to improve mobility and decrease tightness and pain with daily activities. Therapeutic Modalities: N/A today                                                                                             HEP: As above; handouts given to patient for all exercises. ______________________________________________________________________________________________________    Treatment Assessment:  Patient tolerated treatment well. Patient reported decreased pain after treatment. Progression/Medical Necessity:   · Patient is expected to demonstrate progress in strength and range of motion to increase independence with daily activities. · Patient demonstrates good rehab potential due to higher previous functional level. · Skilled intervention continues to be required due to decrease in functional mobility.   Compliance with Program/Exercises: Compliant Reason for Continuation of Services/Other Comments:  · Patient continues to demonstrate capacity to improve overall mobility which will increase safety. Recommendations/Intent for next treatment session: \"Treatment next visit will focus on advancements to more challenging activities\".     Total Treatment Duration:  PT Patient Time In/Time Out  Time In: 0900  Time Out: BOLA Mason

## 2017-12-06 ENCOUNTER — HOSPITAL ENCOUNTER (OUTPATIENT)
Dept: PHYSICAL THERAPY | Age: 72
Discharge: HOME | End: 2017-12-06
Payer: MEDICARE

## 2017-12-06 PROCEDURE — 97140 MANUAL THERAPY 1/> REGIONS: CPT

## 2017-12-06 NOTE — PROGRESS NOTES
[facility] at [address], [address] [facility],[address] [place], [address]  Phone: [phone]   Fax: [phone]    Outpatient PHYSICAL THERAPY: Daily Note  Fall Risk Score: 0 (? 5 = High Risk)  ICD-9: Treatment Diagnosis: Other specified disorders of joint - other disorders of cervical region, cervicalgia  ICD-10: Treatment Diagnosis: Cervicalgia (M54.2)     REFERRING PHYSICIAN: Devora Washington MD MD Orders: Evaluate and Treat  Return Physician Appointment: TBD  MEDICAL/REFERRING DIAGNOSIS:  Cervicalgia [M54.2]  Other physical therapy [[id number]]  DATE OF ONSET: Chronic   PRIOR LEVEL OF FUNCTION: Independentd  PRECAUTIONS/ALLERGIES:   Allergies   Allergen Reactions    Alendronate Sodium Rash    Antihistamine [Triprolidine-Pseudoephedrine] Anxiety    Boniva [Ibandronate] Diarrhea     ASSESSMENT:  ?????? ? ? This section established at most recent assessment?????????? Patient presents with decreased range of motion, decreased strength, and pain in right shoulder and neck secondary to chronic pain. Patient has attended a total of 88 scheduled physical therapy visits including initial evaluation on 1/7/2014. Treatment has consisted of manual therapy and streching to improve pain and performance with activities of daily living. PROBLEM LIST (Impairments causing functional limitations):  1. Decreased independence with ADLs requiring any extensive UE activity (lifting/reaching/vacuuming). 2.  Decreased tolerance of prolonged sitting/standing activities (>30 minutes) due to exacerbation of neck pain. 3.  Unable to participate in social/recreational activities due to fear avoidance of onset of neck/shoulder pain. 4.  Outcome measure score of 10/50 on Neck Disability Index with minimal effect of pain on patient's ability to manage every day life activities.   GOALS: (Goals have been discussed and agreed upon with patient.)  SHORT-TERM FUNCTIONAL GOALS: Time Frame: 3 weeks  1. Patient will be independent with home exercise program without exacerbation of symptoms or cueing needed--goal met. 2. Patient will be independent with correct sleeping positions and awareness/avoidance of aggravating positions without cueing needed--goal met. DISCHARGE GOALS: Time Frame: 8 weeks  1. Patient will be independent with all ADLs with minimal onset of neck/shoulder pain and no deficits with daily tasks--goal ongoing. 2. Patient will report no fear avoidance with social or recreational activities due to neck/shoulder pain--goal ongoing. 3. Patient will score less than or equal to 5/50 on Neck Disability Index with minimal effect of pain on patient's ability to manage every day life activities--goal ongoing. REHABILITATION POTENTIAL FOR STATED GOALS: [facility] OF CARE:  INTERVENTIONS PLANNED: (Benefits and precautions of physical therapy have been discussed with the patient.)  1. Patient education including pathophysiology of neck/shoulder symptoms, education/training (sleeping positions, posture re-education and body mechanics), and instructions of home exercise program.  2. Therapeutic exercises including mobility through neck/shoulder region and postural stabilization. 3. Manual therapy including soft tissue mobilizations, functional joint mobilizations and neuromuscular re-education to neck and shoulder region. 4. Therapeutic modalities including moist heat, cold pack, electrical stimulation, and/or ultrasound as needed for relief of symptoms with treatment. TREATMENT PLAN EFFECTIVE DATES: 11/7/2017 TO 1/7/2018  FREQUENCY/DURATION: Follow patient 1 time every 2 weeks for 12 weeks to address above goals. SUBJECTIVE:  History of Present Injury/Illness (Reason for Referral): Patient is well known to therapist.  Patient returned to therapy today with continued complaints of neck and shoulder pain. Patient reports that her right shoulder/arm feel weak at times.  She reports she hasn't ever dropped anything, but her arm will catch and she will have to be careful that she doesn't throw anything. She reports that her neck was hurting during the holidays. She reports that she did move out of her work office over the holidays. She reports she is still using cold packs at night and taking her pain medication as needed. She reports she has also been trying to get a few deep tissue massages to help with her pain. Present Symptoms: 12/6/2017: Patient reports she is doing ok. Pain Intensity 1: 4  Pain Location 1: Neck, Shoulder  Pain Orientation 1: Right, Left  Pain Intervention(s) 1: Rest, Medication (see MAR)  Pain Description 1: Aching, Constant  Pain Onset 1: Chronic  Pain Scale 1: Numeric (0 - 10)  Patient Stated Pain Goal: 0  · Patient Stated Pain Goal: 0   Dominant Side: right  Past Medical History: Patient reports no significant past medical history. Current Medications: Cymblata, pain medication (PRN)   Date Last Reviewed: 12/6/2017  Social History/Home Situation:   Home Environment: Private residence  Living Alone: No  Support Systems: Family member(s); Friends \ neighbors  Work/Activity History: Retired  OBJECTIVE:  Outcome Measure: Tool Used: Neck Disability Index (NDI)  Score:  Initial: 10/50  Most Recent: 10/50 (Date: 11/15/2017)    Interpretation of Score: The Neck Disability Index is a revised form of the Oswestry Low Back Pain Index and is designed to measure the activities of daily living in person's with neck pain. Each section is scored on a 0-5 scale, 5 representing the greatest disability. The scores of each section are added together for a total score of 50. Score 0 1-10 11-20 21-30 31-40 41-49 50   Modifier CH CI CJ CK CL CM CN     ?  Carrying, Moving, and Handling Objects:     - CURRENT STATUS: CI - 1%-19% impaired, limited or restricted    - GOAL STATUS: CI - 1%-19% impaired, limited or restricted    - D/C STATUS:  ---------------To be determined---------------     Observation/Orthostatic Postural Assessment:   Posture (WDL): Exceptions to WDL  Posture Assessment: Rounded shoulders; Forward head  Palpation:  Tenderness to palpation noted in cervical spine and shoulder region on right greater than left (upper trapezius, levator scapulae, rhomboids, sub-occipitals). No bruising or swelling noted. ROM:    Cervical Assessment (AROM):   · Flexion 100%  · Extension 90%  · Lateral flexion L 85%, R 100%    LUE Assessment (AROM):   · Within defined limits        Shoulder Assessment (AROM):  · Flexion B WFL  · L ext. · Rot. 90, R 80  · IR: L 70, R 65     RLE Assessment (AROM):   · Within defined limits      Strength:   LUE Strength, Tone, Sensation:   · Within defined limits  · Deep cervical flexion 20s against gravity    RUE Strength, Tone, Sensation:  · R Shoulder Flexion: 3+  · R Shoulder ABduction: 3+  · R Shoulder Internal Rotation: 3+  · R Shoulder External Rotation: 3+  · R Elbow Flexion: 3+  · R Elbow Extension: 3+        Special Tests: Negative  Neurological Screen:    Myotomes:        RUE Myotomes  · C1 (Cervical Rotation): Strong  · C2, C3, C4 (Shoulder Shrug): Strong  · C5 (Shoulder ABduction): Strong  · C6 (Biceps & Wrist Extension): Strong  · C7 (Triceps & Wrist Flexion): Strong  · C8 (Thumb Extension): Strong  · T1 (Finger ADduction): Strong          Dermatomes:     Within normal limits     Within normal limits          Reflexes:   · Left Bicep (C5,C6): 2+  · Left Bracheoradialis (C5,C6): 2+  · Left Tricep (C6,C7,C8): 2+    · Right Bicep (C5,C6): 2+  · Right Bracheoradialis (C5,C6): 2+  · Right Tricep (C6,C7,C8): 2+          Neural Tension Tests: Negative  Functional Mobility:  Gait Description (WDL): Within defined limits     Balance:   Sitting: Intact  Standing: Intact  TREATMENT:    (In addition to Assessment/Re-Assessment sessions the following treatments were rendered)  Therapeutic Exercise: ( 5 minutes):  Exercises per grid below to improve mobility and strength. Required minimal visual, verbal and manual cues to promote proper body alignment, promote proper body posture and promote proper body mechanics. Progressed resistance, range, repetitions and complexity of movement as indicated. Date:  12/6/2017    Activity/Exercise Parameters   Scapular rows HEP - reviewed   Lat pulls HEP - reviewed   Manual Therapy     · Joint Mobilization (10 Minutes): Grade II anterior-posterior joint mobilization to right glenohumeral joint with active and passive gross movement in all planes to improve range of motion and decrease pain with daily activities. Prone t-spine central posterior anterior grade IV T1-4. · Soft Tissue Mobilization (30 Minutes): Soft tissue mobilization to cervical spinal musculature to improve mobility and decrease tightness and pain with daily activities. Therapeutic Modalities: N/A today                                                                                             HEP: As above; handouts given to patient for all exercises. ______________________________________________________________________________________________________    Treatment Assessment:  Patient tolerated treatment well. Patient reported decreased pain after treatment. Progression/Medical Necessity:   · Patient is expected to demonstrate progress in strength and range of motion to increase independence with daily activities. · Patient demonstrates good rehab potential due to higher previous functional level. · Skilled intervention continues to be required due to decrease in functional mobility. Compliance with Program/Exercises: Compliant   Reason for Continuation of Services/Other Comments:  · Patient continues to demonstrate capacity to improve overall mobility which will increase safety. Recommendations/Intent for next treatment session: \"Treatment next visit will focus on advancements to more challenging activities\".     Total Treatment Duration:  PT Patient Time In/Time Out  Time In: 0900  Time Out: BOLA Mason

## 2017-12-20 ENCOUNTER — APPOINTMENT (OUTPATIENT)
Dept: PHYSICAL THERAPY | Age: 72
End: 2017-12-20
Payer: MEDICARE

## 2018-01-03 ENCOUNTER — HOSPITAL ENCOUNTER (OUTPATIENT)
Dept: PHYSICAL THERAPY | Age: 73
Discharge: HOME | End: 2018-01-03
Payer: MEDICARE

## 2018-01-03 PROCEDURE — 97140 MANUAL THERAPY 1/> REGIONS: CPT

## 2018-01-03 PROCEDURE — G8985 CARRY GOAL STATUS: HCPCS

## 2018-01-03 PROCEDURE — G8984 CARRY CURRENT STATUS: HCPCS

## 2018-01-03 NOTE — THERAPY RECERTIFICATION
[facility] at [address], [address] [place] [address],[address] [place], [address]  Phone: [phone]   Fax: [phone]    Outpatient PHYSICAL THERAPY: Recertification  Fall Risk Score: 0 (? 5 = High Risk)  ICD-9: Treatment Diagnosis: Other specified disorders of joint - other disorders of cervical region, cervicalgia  ICD-10: Treatment Diagnosis: Cervicalgia (M54.2)     REFERRING PHYSICIAN: Keena Holland MD MD Orders: Evaluate and Treat  Return Physician Appointment: TBD  MEDICAL/REFERRING DIAGNOSIS:  Cervicalgia [M54.2]  Other physical therapy [[id number]]  DATE OF ONSET: Chronic   PRIOR LEVEL OF FUNCTION: Independentd  PRECAUTIONS/ALLERGIES:   Allergies   Allergen Reactions    Alendronate Sodium Rash    Antihistamine [Triprolidine-Pseudoephedrine] Anxiety    Boniva [Ibandronate] Diarrhea     ASSESSMENT:  ?????? ? ? This section established at most recent assessment?????????? Patient presents with improving range of motion, decreased strength, and pain in right shoulder and neck secondary to chronic pain. Patient has attended a total of 89 scheduled physical therapy visits including initial evaluation on 1/7/2014. Treatment has consisted of manual therapy and streching to improve pain and performance with activities of daily living. After discussing with patient she agreed she would continue to benefit from physical therapy to improve overall mobility and neck pain. Please sign this re-certification if you concur. Thank you for the opportunity to serve this patient. PROBLEM LIST (Impairments causing functional limitations):  1. Decreased independence with ADLs requiring any extensive UE activity (lifting/reaching/vacuuming). 2.  Decreased tolerance of prolonged sitting/standing activities (>30 minutes) due to exacerbation of neck pain.   3.  Unable to participate in social/recreational activities due to fear avoidance of onset of neck/shoulder pain.  4.  Outcome measure score of 10/50 on Neck Disability Index with minimal effect of pain on patient's ability to manage every day life activities. GOALS: (Goals have been discussed and agreed upon with patient.)  SHORT-TERM FUNCTIONAL GOALS: Time Frame: 3 weeks  1. Patient will be independent with home exercise program without exacerbation of symptoms or cueing needed--goal met. 2. Patient will be independent with correct sleeping positions and awareness/avoidance of aggravating positions without cueing needed--goal met. DISCHARGE GOALS: Time Frame: 8 weeks  1. Patient will be independent with all ADLs with minimal onset of neck/shoulder pain and no deficits with daily tasks--goal ongoing. 2. Patient will report no fear avoidance with social or recreational activities due to neck/shoulder pain--goal ongoing. 3. Patient will score less than or equal to 5/50 on Neck Disability Index with minimal effect of pain on patient's ability to manage every day life activities--goal ongoing. REHABILITATION POTENTIAL FOR STATED GOALS: [facility] OF CARE:  INTERVENTIONS PLANNED: (Benefits and precautions of physical therapy have been discussed with the patient.)  1. Patient education including pathophysiology of neck/shoulder symptoms, education/training (sleeping positions, posture re-education and body mechanics), and instructions of home exercise program.  2. Therapeutic exercises including mobility through neck/shoulder region and postural stabilization. 3. Manual therapy including soft tissue mobilizations, functional joint mobilizations and neuromuscular re-education to neck and shoulder region. 4. Therapeutic modalities including moist heat, cold pack, electrical stimulation, and/or ultrasound as needed for relief of symptoms with treatment. TREATMENT PLAN EFFECTIVE DATES: 1/7/2018 TO 4/7/2018  FREQUENCY/DURATION: Follow patient 1 time every 2 weeks for 12 weeks to address above goals.   Regarding Jack Marlon Baylee's therapy, I certify that the treatment plan above will be carried out by a therapist or under their direction. Thank you for this referral,  Robert Acharya PT     Referring Physician Signature: Wendy Mccurdy MD          Date           SUBJECTIVE:  History of Present Injury/Illness (Reason for Referral): Patient is well known to therapist.  Patient returned to therapy today with continued complaints of neck and shoulder pain. Patient reports that her right shoulder/arm feel weak at times. She reports she hasn't ever dropped anything, but her arm will catch and she will have to be careful that she doesn't throw anything. She reports that her neck was hurting during the holidays. She reports that she did move out of her work office over the holidays. She reports she is still using cold packs at night and taking her pain medication as needed. She reports she has also been trying to get a few deep tissue massages to help with her pain. Present Symptoms: 1/3/2018: Patient reports she is feeling better after having been sick during [place]. She reports her neck and shoulders are really tight. Pain Intensity 1: 4  Pain Location 1: Neck, Shoulder  Pain Orientation 1: Right, Left  Pain Intervention(s) 1: Rest, Medication (see MAR)  Pain Description 1: Aching, Constant  Pain Onset 1: Chronic  · Pain Scale 1: Numeric (0 - 10)   Dominant Side: right  Past Medical History: Patient reports no significant past medical history. Current Medications: Cymblata, pain medication (PRN)   Date Last Reviewed: 1/3/2018  Social History/Home Situation:   Home Environment: Private residence  Living Alone: No  Support Systems: Family member(s); Friends \ neighbors  Work/Activity History: Retired  OBJECTIVE:  Outcome Measure: Tool Used: Neck Disability Index (NDI)  Score:  Initial: 10/50  Most Recent: 9/50 (Date: 1/3/2018)    Interpretation of Score:  The Neck Disability Index is a revised form of the Oswestry Low Back Pain Index and is designed to measure the activities of daily living in person's with neck pain. Each section is scored on a 0-5 scale, 5 representing the greatest disability. The scores of each section are added together for a total score of 50. Score 0 1-10 11-20 21-30 31-40 41-49 50   Modifier CH CI CJ CK CL CM CN     ? Carrying, Moving, and Handling Objects:     - CURRENT STATUS: CI - 1%-19% impaired, limited or restricted    - GOAL STATUS: CI - 1%-19% impaired, limited or restricted    - D/C STATUS:  ---------------To be determined---------------     Observation/Orthostatic Postural Assessment:   Posture (WDL): Exceptions to WDL  Posture Assessment: Rounded shoulders; Forward head  Palpation:  Tenderness to palpation noted in cervical spine and shoulder region on right greater than left (upper trapezius, levator scapulae, rhomboids, sub-occipitals). No bruising or swelling noted. ROM:    Cervical Assessment (AROM):   · Flexion 100%  · Extension 90%  · Lateral flexion L 85%, R 100%    LUE Assessment (AROM):   · Within defined limits        Shoulder Assessment (AROM):  · Flexion B WFL  · L ext. · Rot. 90, R 80  · IR: L 70, R 65     RLE Assessment (AROM):   · Within defined limits      Strength:   LUE Strength, Tone, Sensation:   · Within defined limits  · Deep cervical flexion 20s against gravity    RUE Strength, Tone, Sensation:  · R Shoulder Flexion: 3+  · R Shoulder ABduction: 3+  · R Shoulder Internal Rotation: 3+  · R Shoulder External Rotation: 3+  · R Elbow Flexion: 3+  · R Elbow Extension: 3+        Special Tests: Negative  Neurological Screen:    Myotomes:        RUE Myotomes  · C1 (Cervical Rotation): Strong  · C2, C3, C4 (Shoulder Shrug): Strong  · C5 (Shoulder ABduction): Strong  · C6 (Biceps & Wrist Extension): Strong  · C7 (Triceps & Wrist Flexion): Strong  · C8 (Thumb Extension): Strong  · T1 (Finger ADduction): Strong          Dermatomes:     Within normal limits     Within normal limits          Reflexes:   · Left Bicep (C5,C6): 2+  · Left Bracheoradialis (C5,C6): 2+  · Left Tricep (C6,C7,C8): 2+    · Right Bicep (C5,C6): 2+  · Right Bracheoradialis (C5,C6): 2+  · Right Tricep (C6,C7,C8): 2+          Neural Tension Tests: Negative  Functional Mobility:  Gait Description (WDL): Within defined limits     Balance:   Sitting: Intact  Standing: Intact  TREATMENT:    (In addition to Assessment/Re-Assessment sessions the following treatments were rendered)  Therapeutic Exercise: ( 5 minutes):  Exercises per grid below to improve mobility and strength. Required minimal visual, verbal and manual cues to promote proper body alignment, promote proper body posture and promote proper body mechanics. Progressed resistance, range, repetitions and complexity of movement as indicated. Date:  1/3/2018    Activity/Exercise Parameters   Scapular rows HEP - reviewed   Lat pulls HEP - reviewed   Manual Therapy     · Joint Mobilization (10 Minutes): Grade II anterior-posterior joint mobilization to right glenohumeral joint with active and passive gross movement in all planes to improve range of motion and decrease pain with daily activities. Prone t-spine central posterior anterior grade IV T1-4. · Soft Tissue Mobilization (30 Minutes): Soft tissue mobilization to cervical spinal musculature to improve mobility and decrease tightness and pain with daily activities. Therapeutic Modalities: N/A today                                                                                             HEP: As above; handouts given to patient for all exercises. ______________________________________________________________________________________________________    Treatment Assessment:  Patient tolerated treatment well. Patient reported decreased pain after treatment.      Progression/Medical Necessity:   · Patient is expected to demonstrate progress in strength and range of motion to increase independence with daily activities. · Patient demonstrates good rehab potential due to higher previous functional level. · Skilled intervention continues to be required due to decrease in functional mobility. Compliance with Program/Exercises: Compliant   Reason for Continuation of Services/Other Comments:  · Patient continues to demonstrate capacity to improve overall mobility which will increase safety. Recommendations/Intent for next treatment session: \"Treatment next visit will focus on advancements to more challenging activities\".     Total Treatment Duration:  PT Patient Time In/Time Out  Time In: 0900  Time Out: BOLA Mason

## 2018-01-17 ENCOUNTER — HOSPITAL ENCOUNTER (OUTPATIENT)
Dept: PHYSICAL THERAPY | Age: 73
Discharge: HOME | End: 2018-01-17
Payer: MEDICARE

## 2018-01-17 NOTE — PROGRESS NOTES
[facility] at [address], [address] [place] [address], [address]  Phone: [phone]   Fax: [phone]    OUTPATIENT DAILY NOTE    NAME/AGE/GENDER: Patric Joy is a 71 y.o. female. DATE: 1/17/2018    Patient's appointment for today was cancelled due to weather. Will plan to follow up on next scheduled visit.     Uday Encarnacion, PT

## 2018-01-24 ENCOUNTER — HOSPITAL ENCOUNTER (OUTPATIENT)
Dept: MAMMOGRAPHY | Age: 73
Discharge: HOME | End: 2018-01-24
Attending: FAMILY MEDICINE
Payer: MEDICARE

## 2018-01-24 DIAGNOSIS — Z12.31 VISIT FOR SCREENING MAMMOGRAM: ICD-10-CM

## 2018-01-24 PROCEDURE — 77067 SCR MAMMO BI INCL CAD: CPT

## 2018-01-31 ENCOUNTER — APPOINTMENT (OUTPATIENT)
Dept: PHYSICAL THERAPY | Age: 73
End: 2018-01-31
Payer: MEDICARE

## 2018-01-31 ENCOUNTER — HOSPITAL ENCOUNTER (OUTPATIENT)
Dept: MAMMOGRAPHY | Age: 73
Discharge: HOME | End: 2018-01-31
Attending: FAMILY MEDICINE
Payer: MEDICARE

## 2018-01-31 DIAGNOSIS — R92.8 ABNORMAL FINDING ON MAMMOGRAPHY: ICD-10-CM

## 2018-01-31 PROCEDURE — 77065 DX MAMMO INCL CAD UNI: CPT

## 2018-01-31 PROCEDURE — 76642 ULTRASOUND BREAST LIMITED: CPT

## 2018-02-01 ENCOUNTER — HOSPITAL ENCOUNTER (OUTPATIENT)
Dept: PHYSICAL THERAPY | Age: 73
Discharge: HOME | End: 2018-02-01
Payer: MEDICARE

## 2018-02-01 PROCEDURE — 97140 MANUAL THERAPY 1/> REGIONS: CPT

## 2018-02-01 NOTE — PROGRESS NOTES
[facility] at [address], [address] [facility],[address] [place], [address]  Phone: [phone]   Fax: [phone]    Outpatient PHYSICAL THERAPY: Daily Note  Fall Risk Score: 0 (? 5 = High Risk)  ICD-9: Treatment Diagnosis: Other specified disorders of joint - other disorders of cervical region, cervicalgia  ICD-10: Treatment Diagnosis: Cervicalgia (M54.2)     REFERRING PHYSICIAN: Venkata Rasehed MD MD Orders: Evaluate and Treat  Return Physician Appointment: TBD  MEDICAL/REFERRING DIAGNOSIS:  Cervicalgia [M54.2]  Other physical therapy [[id number]]  DATE OF ONSET: Chronic   PRIOR LEVEL OF FUNCTION: Independentd  PRECAUTIONS/ALLERGIES:   Allergies   Allergen Reactions    Alendronate Sodium Rash    Antihistamine [Triprolidine-Pseudoephedrine] Anxiety    Boniva [Ibandronate] Diarrhea     ASSESSMENT:  ?????? ? ? This section established at most recent assessment?????????? Patient presents with improving range of motion, decreased strength, and pain in right shoulder and neck secondary to chronic pain. Patient has attended a total of 90 scheduled physical therapy visits including initial evaluation on 1/7/2014. Treatment has consisted of manual therapy and streching to improve pain and performance with activities of daily living. PROBLEM LIST (Impairments causing functional limitations):  1. Decreased independence with ADLs requiring any extensive UE activity (lifting/reaching/vacuuming). 2.  Decreased tolerance of prolonged sitting/standing activities (>30 minutes) due to exacerbation of neck pain. 3.  Unable to participate in social/recreational activities due to fear avoidance of onset of neck/shoulder pain. 4.  Outcome measure score of 10/50 on Neck Disability Index with minimal effect of pain on patient's ability to manage every day life activities.   GOALS: (Goals have been discussed and agreed upon with patient.)  SHORT-TERM FUNCTIONAL GOALS: Time Frame: 3 weeks  1. Patient will be independent with home exercise program without exacerbation of symptoms or cueing needed--goal met. 2. Patient will be independent with correct sleeping positions and awareness/avoidance of aggravating positions without cueing needed--goal met. DISCHARGE GOALS: Time Frame: 8 weeks  1. Patient will be independent with all ADLs with minimal onset of neck/shoulder pain and no deficits with daily tasks--goal ongoing. 2. Patient will report no fear avoidance with social or recreational activities due to neck/shoulder pain--goal ongoing. 3. Patient will score less than or equal to 5/50 on Neck Disability Index with minimal effect of pain on patient's ability to manage every day life activities--goal ongoing. REHABILITATION POTENTIAL FOR STATED GOALS: [facility] OF CARE:  INTERVENTIONS PLANNED: (Benefits and precautions of physical therapy have been discussed with the patient.)  1. Patient education including pathophysiology of neck/shoulder symptoms, education/training (sleeping positions, posture re-education and body mechanics), and instructions of home exercise program.  2. Therapeutic exercises including mobility through neck/shoulder region and postural stabilization. 3. Manual therapy including soft tissue mobilizations, functional joint mobilizations and neuromuscular re-education to neck and shoulder region. 4. Therapeutic modalities including moist heat, cold pack, electrical stimulation, and/or ultrasound as needed for relief of symptoms with treatment. TREATMENT PLAN EFFECTIVE DATES: 1/7/2018 TO 4/7/2018  FREQUENCY/DURATION: Follow patient 1 time every 2 weeks for 12 weeks to address above goals. SUBJECTIVE:  History of Present Injury/Illness (Reason for Referral): Patient is well known to therapist.  Patient returned to therapy today with continued complaints of neck and shoulder pain. Patient reports that her right shoulder/arm feel weak at times.  She reports she hasn't ever dropped anything, but her arm will catch and she will have to be careful that she doesn't throw anything. She reports that her neck was hurting during the holidays. She reports that she did move out of her work office over the holidays. She reports she is still using cold packs at night and taking her pain medication as needed. She reports she has also been trying to get a few deep tissue massages to help with her pain. Present Symptoms: 2/1/2018: Patient reports she is tight in her shoulders and has a small headache right now. Pain Intensity 1: 4  Pain Location 1: Neck, Shoulder  Pain Orientation 1: Right, Left  Pain Intervention(s) 1: Rest, Medication (see MAR)  Pain Description 1: Aching, Constant  Pain Onset 1: Chronic  · Pain Scale 1: Numeric (0 - 10)   Dominant Side: right  Past Medical History: Patient reports no significant past medical history. Current Medications: Cymblata, pain medication (PRN)   Date Last Reviewed: 2/1/2018  Social History/Home Situation:   Home Environment: Private residence  Living Alone: No  Support Systems: Family member(s); Friends \ neighbors  Work/Activity History: Retired  OBJECTIVE:  Outcome Measure: Tool Used: Neck Disability Index (NDI)  Score:  Initial: 10/50  Most Recent: 9/50 (Date: 1/3/2018)    Interpretation of Score: The Neck Disability Index is a revised form of the Oswestry Low Back Pain Index and is designed to measure the activities of daily living in person's with neck pain. Each section is scored on a 0-5 scale, 5 representing the greatest disability. The scores of each section are added together for a total score of 50. Score 0 1-10 11-20 21-30 31-40 41-49 50   Modifier CH CI CJ CK CL CM CN     ?  Carrying, Moving, and Handling Objects:     - CURRENT STATUS: CI - 1%-19% impaired, limited or restricted    - GOAL STATUS: CI - 1%-19% impaired, limited or restricted    - D/C STATUS:  ---------------To be determined--------------- Observation/Orthostatic Postural Assessment:   Posture (WDL): Exceptions to WDL  Posture Assessment: Rounded shoulders; Forward head  Palpation:  Tenderness to palpation noted in cervical spine and shoulder region on right greater than left (upper trapezius, levator scapulae, rhomboids, sub-occipitals). No bruising or swelling noted. ROM:    Cervical Assessment (AROM):   · Flexion 100%  · Extension 90%  · Lateral flexion L 85%, R 100%    LUE Assessment (AROM):   · Within defined limits        Shoulder Assessment (AROM):  · Flexion B WFL  · L ext. · Rot. 90, R 80  · IR: L 70, R 65     RLE Assessment (AROM):   · Within defined limits      Strength:   LUE Strength, Tone, Sensation:   · Within defined limits  · Deep cervical flexion 20s against gravity    RUE Strength, Tone, Sensation:  · R Shoulder Flexion: 3+  · R Shoulder ABduction: 3+  · R Shoulder Internal Rotation: 3+  · R Shoulder External Rotation: 3+  · R Elbow Flexion: 3+  · R Elbow Extension: 3+        Special Tests: Negative  Neurological Screen:    Myotomes:        RUE Myotomes  · C1 (Cervical Rotation): Strong  · C2, C3, C4 (Shoulder Shrug): Strong  · C5 (Shoulder ABduction): Strong  · C6 (Biceps & Wrist Extension): Strong  · C7 (Triceps & Wrist Flexion): Strong  · C8 (Thumb Extension): Strong  · T1 (Finger ADduction): Strong          Dermatomes:     Within normal limits     Within normal limits          Reflexes:   · Left Bicep (C5,C6): 2+  · Left Bracheoradialis (C5,C6): 2+  · Left Tricep (C6,C7,C8): 2+    · Right Bicep (C5,C6): 2+  · Right Bracheoradialis (C5,C6): 2+  · Right Tricep (C6,C7,C8): 2+          Neural Tension Tests: Negative  Functional Mobility:  Gait Description (WDL): Within defined limits     Balance:   Sitting: Intact  Standing: Intact  TREATMENT:    (In addition to Assessment/Re-Assessment sessions the following treatments were rendered)  Therapeutic Exercise: ( 5 minutes):  Exercises per grid below to improve mobility and strength. Required minimal visual, verbal and manual cues to promote proper body alignment, promote proper body posture and promote proper body mechanics. Progressed resistance, range, repetitions and complexity of movement as indicated. Date:  2/1/2018    Activity/Exercise Parameters   Scapular rows HEP - reviewed   Lat pulls HEP - reviewed   Manual Therapy     · Joint Mobilization (10 Minutes): Grade II anterior-posterior joint mobilization to right glenohumeral joint with active and passive gross movement in all planes to improve range of motion and decrease pain with daily activities. Prone t-spine central posterior anterior grade IV T1-4. · Soft Tissue Mobilization (30 Minutes): Soft tissue mobilization to cervical spinal musculature to improve mobility and decrease tightness and pain with daily activities. Therapeutic Modalities: N/A today                                                                                             HEP: As above; handouts given to patient for all exercises. ______________________________________________________________________________________________________    Treatment Assessment:  Patient tolerated treatment well. Patient reported decreased pain after treatment. Progression/Medical Necessity:   · Patient is expected to demonstrate progress in strength and range of motion to increase independence with daily activities. · Patient demonstrates good rehab potential due to higher previous functional level. · Skilled intervention continues to be required due to decrease in functional mobility. Compliance with Program/Exercises: Compliant   Reason for Continuation of Services/Other Comments:  · Patient continues to demonstrate capacity to improve overall mobility which will increase safety. Recommendations/Intent for next treatment session: \"Treatment next visit will focus on advancements to more challenging activities\".     Total Treatment Duration:  PT Patient Time In/Time Out  Time In: 1645  Time Out: 1678 [facility], PT

## 2018-02-14 ENCOUNTER — HOSPITAL ENCOUNTER (OUTPATIENT)
Dept: PHYSICAL THERAPY | Age: 73
Discharge: HOME | End: 2018-02-14
Payer: MEDICARE

## 2018-02-14 PROCEDURE — 97140 MANUAL THERAPY 1/> REGIONS: CPT

## 2018-02-14 NOTE — PROGRESS NOTES
[facility] [address], [address] [place] [address],[address] [place], [address]  Phone: [phone]   Fax: [phone]    Outpatient PHYSICAL THERAPY: Progress Report  Fall Risk Score: 0 (? 5 = High Risk)  ICD-9: Treatment Diagnosis: Other specified disorders of joint - other disorders of cervical region, cervicalgia  ICD-10: Treatment Diagnosis: Cervicalgia (M54.2)     REFERRING PHYSICIAN: Nevaeh Orozco MD MD Orders: Evaluate and Treat  Return Physician Appointment: TBD  MEDICAL/REFERRING DIAGNOSIS:  Cervicalgia [M54.2]  Other physical therapy [[id number]]  DATE OF ONSET: Chronic   PRIOR LEVEL OF FUNCTION: Independentd  PRECAUTIONS/ALLERGIES:   Allergies   Allergen Reactions    Alendronate Sodium Rash    Antihistamine [Triprolidine-Pseudoephedrine] Anxiety    Boniva [Ibandronate] Diarrhea     ASSESSMENT:  ?????? ? ? This section established at most recent assessment?????????? Patient presents with improving range of motion, decreased strength, and pain in right shoulder and neck secondary to chronic pain. Patient has attended a total of 91 scheduled physical therapy visits including initial evaluation on 1/7/2014. Treatment has consisted of manual therapy and streching to improve pain and performance with activities of daily living. PROBLEM LIST (Impairments causing functional limitations):  1. Decreased independence with ADLs requiring any extensive UE activity (lifting/reaching/vacuuming). 2.  Decreased tolerance of prolonged sitting/standing activities (>30 minutes) due to exacerbation of neck pain. 3.  Unable to participate in social/recreational activities due to fear avoidance of onset of neck/shoulder pain. 4.  Outcome measure score of 10/50 on Neck Disability Index with minimal effect of pain on patient's ability to manage every day life activities.   GOALS: (Goals have been discussed and agreed upon with patient.)  SHORT-TERM FUNCTIONAL GOALS: Time Frame: 3 weeks  1. Patient will be independent with home exercise program without exacerbation of symptoms or cueing needed--goal met. 2. Patient will be independent with correct sleeping positions and awareness/avoidance of aggravating positions without cueing needed--goal met. DISCHARGE GOALS: Time Frame: 8 weeks  1. Patient will be independent with all ADLs with minimal onset of neck/shoulder pain and no deficits with daily tasks--goal ongoing. 2. Patient will report no fear avoidance with social or recreational activities due to neck/shoulder pain--goal ongoing. 3. Patient will score less than or equal to 5/50 on Neck Disability Index with minimal effect of pain on patient's ability to manage every day life activities--goal ongoing. REHABILITATION POTENTIAL FOR STATED GOALS: Claudia Roldan OF CARE:  INTERVENTIONS PLANNED: (Benefits and precautions of physical therapy have been discussed with the patient.)  1. Patient education including pathophysiology of neck/shoulder symptoms, education/training (sleeping positions, posture re-education and body mechanics), and instructions of home exercise program.  2. Therapeutic exercises including mobility through neck/shoulder region and postural stabilization. 3. Manual therapy including soft tissue mobilizations, functional joint mobilizations and neuromuscular re-education to neck and shoulder region. 4. Therapeutic modalities including moist heat, cold pack, electrical stimulation, and/or ultrasound as needed for relief of symptoms with treatment. TREATMENT PLAN EFFECTIVE DATES: 1/7/2018 TO 4/7/2018  FREQUENCY/DURATION: Follow patient 1 time every 2 weeks for 12 weeks to address above goals. SUBJECTIVE:  History of Present Injury/Illness (Reason for Referral): Patient is well known to therapist.  Patient returned to therapy today with continued complaints of neck and shoulder pain. Patient reports that her right shoulder/arm feel weak at times.  She reports she hasn't ever dropped anything, but her arm will catch and she will have to be careful that she doesn't throw anything. She reports that her neck was hurting during the holidays. She reports that she did move out of her work office over the holidays. She reports she is still using cold packs at night and taking her pain medication as needed. She reports she has also been trying to get a few deep tissue massages to help with her pain. Present Symptoms: 2/14/2018: Patient reports her neck feels stiff today. Pain Intensity 1: 4  Pain Location 1: Neck, Shoulder  Pain Orientation 1: Right, Left  Pain Intervention(s) 1: Rest, Medication (see MAR)  Pain Description 1: Aching, Constant  Pain Onset 1: Chronic  Pain Scale 1: Numeric (0 - 10)  Patient Stated Pain Goal: 0  · Patient Stated Pain Goal: 0   Dominant Side: right  Past Medical History: Patient reports no significant past medical history. Current Medications: Cymblata, pain medication (PRN)   Date Last Reviewed: 2/14/2018  Social History/Home Situation:   Home Environment: Private residence  Living Alone: No  Support Systems: Family member(s); Friends \ neighbors  Work/Activity History: Retired  OBJECTIVE:  Outcome Measure: Tool Used: Neck Disability Index (NDI)  Score:  Initial: 10/50  Most Recent: 9/50 (Date: 2/14/2018)    Interpretation of Score: The Neck Disability Index is a revised form of the Oswestry Low Back Pain Index and is designed to measure the activities of daily living in person's with neck pain. Each section is scored on a 0-5 scale, 5 representing the greatest disability. The scores of each section are added together for a total score of 50. Score 0 1-10 11-20 21-30 31-40 41-49 50   Modifier CH CI CJ CK CL CM CN     ?  Carrying, Moving, and Handling Objects:     - CURRENT STATUS: CI - 1%-19% impaired, limited or restricted    - GOAL STATUS: CI - 1%-19% impaired, limited or restricted    - D/C STATUS:  ---------------To be determined---------------     Observation/Orthostatic Postural Assessment:   Posture (WDL): Exceptions to WDL  Posture Assessment: Rounded shoulders; Forward head  Palpation:  Tenderness to palpation noted in cervical spine and shoulder region on right greater than left (upper trapezius, levator scapulae, rhomboids, sub-occipitals). No bruising or swelling noted. ROM:    Cervical Assessment (AROM):   · Flexion 100%  · Extension 90%  · Lateral flexion L 85%, R 100%    LUE Assessment (AROM):   · Within defined limits        Shoulder Assessment (AROM):  · Flexion B WFL  · L ext. · Rot. 90, R 80  · IR: L 70, R 65     RLE Assessment (AROM):   · Within defined limits      Strength:   LUE Strength, Tone, Sensation:   · Within defined limits  · Deep cervical flexion 20s against gravity    RUE Strength, Tone, Sensation:  · R Shoulder Flexion: 3+  · R Shoulder ABduction: 3+  · R Shoulder Internal Rotation: 3+  · R Shoulder External Rotation: 3+  · R Elbow Flexion: 3+  · R Elbow Extension: 3+        Special Tests: Negative  Neurological Screen:    Myotomes:        RUE Myotomes  · C1 (Cervical Rotation): Strong  · C2, C3, C4 (Shoulder Shrug): Strong  · C5 (Shoulder ABduction): Strong  · C6 (Biceps & Wrist Extension): Strong  · C7 (Triceps & Wrist Flexion): Strong  · C8 (Thumb Extension): Strong  · T1 (Finger ADduction): Strong          Dermatomes:     Within normal limits     Within normal limits          Reflexes:   · Left Bicep (C5,C6): 2+  · Left Bracheoradialis (C5,C6): 2+  · Left Tricep (C6,C7,C8): 2+    · Right Bicep (C5,C6): 2+  · Right Bracheoradialis (C5,C6): 2+  · Right Tricep (C6,C7,C8): 2+          Neural Tension Tests: Negative  Functional Mobility:  Gait Description (WDL): Within defined limits     Balance:   Sitting: Intact  Standing: Intact  TREATMENT:    (In addition to Assessment/Re-Assessment sessions the following treatments were rendered)  Therapeutic Exercise: ( 5 minutes):  Exercises per grid below to improve mobility and strength. Required minimal visual, verbal and manual cues to promote proper body alignment, promote proper body posture and promote proper body mechanics. Progressed resistance, range, repetitions and complexity of movement as indicated. Date:  2/14/2018    Activity/Exercise Parameters   Scapular rows HEP - reviewed   Lat pulls HEP - reviewed   Manual Therapy     · Joint Mobilization (10 Minutes): Grade II anterior-posterior joint mobilization to right glenohumeral joint with active and passive gross movement in all planes to improve range of motion and decrease pain with daily activities. Prone t-spine central posterior anterior grade IV T1-4. · Soft Tissue Mobilization (30 Minutes): Soft tissue mobilization to cervical spinal musculature to improve mobility and decrease tightness and pain with daily activities. Therapeutic Modalities: N/A today                                                                                             HEP: As above; handouts given to patient for all exercises. ______________________________________________________________________________________________________    Treatment Assessment:  Patient tolerated treatment well. Patient reported decreased pain after treatment. Progression/Medical Necessity:   · Patient is expected to demonstrate progress in strength and range of motion to increase independence with daily activities. · Patient demonstrates good rehab potential due to higher previous functional level. · Skilled intervention continues to be required due to decrease in functional mobility. Compliance with Program/Exercises: Compliant   Reason for Continuation of Services/Other Comments:  · Patient continues to demonstrate capacity to improve overall mobility which will increase safety. Recommendations/Intent for next treatment session: \"Treatment next visit will focus on advancements to more challenging activities\".     Total Treatment Duration:  PT Patient Time In/Time Out  Time In: 0815  Time Out: 0900    Sybil Hutchinson PT

## 2018-02-28 ENCOUNTER — HOSPITAL ENCOUNTER (OUTPATIENT)
Dept: PHYSICAL THERAPY | Age: 73
Discharge: HOME | End: 2018-02-28
Payer: MEDICARE

## 2018-02-28 PROCEDURE — 97140 MANUAL THERAPY 1/> REGIONS: CPT

## 2018-02-28 NOTE — PROGRESS NOTES
[facility] at [address], [address] [facility],[address] [place], [address]  Phone: [phone]   Fax: [phone]    Outpatient PHYSICAL THERAPY: Daily Note  Fall Risk Score: 0 (? 5 = High Risk)  ICD-9: Treatment Diagnosis: Other specified disorders of joint - other disorders of cervical region, cervicalgia  ICD-10: Treatment Diagnosis: Cervicalgia (M54.2)     REFERRING PHYSICIAN: Malvin Mazariegos MD MD Orders: Evaluate and Treat  Return Physician Appointment: TBD  MEDICAL/REFERRING DIAGNOSIS:  Cervicalgia [M54.2]  Other physical therapy [[id number]]  DATE OF ONSET: Chronic   PRIOR LEVEL OF FUNCTION: Independentd  PRECAUTIONS/ALLERGIES:   Allergies   Allergen Reactions    Alendronate Sodium Rash    Antihistamine [Triprolidine-Pseudoephedrine] Anxiety    Boniva [Ibandronate] Diarrhea     ASSESSMENT:  ?????? ? ? This section established at most recent assessment?????????? Patient presents with improving range of motion, decreased strength, and pain in right shoulder and neck secondary to chronic pain. Patient has attended a total of 92 scheduled physical therapy visits including initial evaluation on 1/7/2014. Treatment has consisted of manual therapy and streching to improve pain and performance with activities of daily living. PROBLEM LIST (Impairments causing functional limitations):  1. Decreased independence with ADLs requiring any extensive UE activity (lifting/reaching/vacuuming). 2.  Decreased tolerance of prolonged sitting/standing activities (>30 minutes) due to exacerbation of neck pain. 3.  Unable to participate in social/recreational activities due to fear avoidance of onset of neck/shoulder pain. 4.  Outcome measure score of 10/50 on Neck Disability Index with minimal effect of pain on patient's ability to manage every day life activities.   GOALS: (Goals have been discussed and agreed upon with patient.)  SHORT-TERM FUNCTIONAL GOALS: Time Frame: 3 weeks  1. Patient will be independent with home exercise program without exacerbation of symptoms or cueing needed--goal met. 2. Patient will be independent with correct sleeping positions and awareness/avoidance of aggravating positions without cueing needed--goal met. DISCHARGE GOALS: Time Frame: 8 weeks  1. Patient will be independent with all ADLs with minimal onset of neck/shoulder pain and no deficits with daily tasks--goal ongoing. 2. Patient will report no fear avoidance with social or recreational activities due to neck/shoulder pain--goal ongoing. 3. Patient will score less than or equal to 5/50 on Neck Disability Index with minimal effect of pain on patient's ability to manage every day life activities--goal ongoing. REHABILITATION POTENTIAL FOR STATED GOALS: Burt Mckoy OF CARE:  INTERVENTIONS PLANNED: (Benefits and precautions of physical therapy have been discussed with the patient.)  1. Patient education including pathophysiology of neck/shoulder symptoms, education/training (sleeping positions, posture re-education and body mechanics), and instructions of home exercise program.  2. Therapeutic exercises including mobility through neck/shoulder region and postural stabilization. 3. Manual therapy including soft tissue mobilizations, functional joint mobilizations and neuromuscular re-education to neck and shoulder region. 4. Therapeutic modalities including moist heat, cold pack, electrical stimulation, and/or ultrasound as needed for relief of symptoms with treatment. TREATMENT PLAN EFFECTIVE DATES: 1/7/2018 TO 4/7/2018  FREQUENCY/DURATION: Follow patient 1 time every 2 weeks for 12 weeks to address above goals. SUBJECTIVE:  History of Present Injury/Illness (Reason for Referral): Patient is well known to therapist.  Patient returned to therapy today with continued complaints of neck and shoulder pain. Patient reports that her right shoulder/arm feel weak at times.  She reports she hasn't ever dropped anything, but her arm will catch and she will have to be careful that she doesn't throw anything. She reports that her neck was hurting during the holidays. She reports that she did move out of her work office over the holidays. She reports she is still using cold packs at night and taking her pain medication as needed. She reports she has also been trying to get a few deep tissue massages to help with her pain. Present Symptoms: 2/28/2018: Patient reports her right shoulder has really been tight lately. Pain Intensity 1: 4  Pain Location 1: Neck, Shoulder  Pain Orientation 1: Right, Left  Pain Intervention(s) 1: Rest, Medication (see MAR)  Pain Description 1: Aching, Constant  Pain Onset 1: Chronic  Pain Scale 1: Numeric (0 - 10)  Patient Stated Pain Goal: 0  · Patient Stated Pain Goal: 0   Dominant Side: right  Past Medical History: Patient reports no significant past medical history. Current Medications: Cymblata, pain medication (PRN)   Date Last Reviewed: 2/28/2018  Social History/Home Situation:   Home Environment: Private residence  Living Alone: No  Support Systems: Family member(s); Friends \ neighbors  Work/Activity History: Retired  OBJECTIVE:  Outcome Measure: Tool Used: Neck Disability Index (NDI)  Score:  Initial: 10/50  Most Recent: 9/50 (Date: 2/14/2018)    Interpretation of Score: The Neck Disability Index is a revised form of the Oswestry Low Back Pain Index and is designed to measure the activities of daily living in person's with neck pain. Each section is scored on a 0-5 scale, 5 representing the greatest disability. The scores of each section are added together for a total score of 50. Score 0 1-10 11-20 21-30 31-40 41-49 50   Modifier CH CI CJ CK CL CM CN     ?  Carrying, Moving, and Handling Objects:     - CURRENT STATUS: CI - 1%-19% impaired, limited or restricted    - GOAL STATUS: CI - 1%-19% impaired, limited or restricted    - D/C STATUS:  ---------------To be determined---------------     Observation/Orthostatic Postural Assessment:   Posture (WDL): Exceptions to WDL  Posture Assessment: Rounded shoulders; Forward head  Palpation:  Tenderness to palpation noted in cervical spine and shoulder region on right greater than left (upper trapezius, levator scapulae, rhomboids, sub-occipitals). No bruising or swelling noted. ROM:    Cervical Assessment (AROM):   · Flexion 100%  · Extension 90%  · Lateral flexion L 85%, R 100%    LUE Assessment (AROM):   · Within defined limits        Shoulder Assessment (AROM):  · Flexion B WFL  · L ext. · Rot. 90, R 80  · IR: L 70, R 65     RLE Assessment (AROM):   · Within defined limits      Strength:   LUE Strength, Tone, Sensation:   · Within defined limits  · Deep cervical flexion 20s against gravity    RUE Strength, Tone, Sensation:  · R Shoulder Flexion: 3+  · R Shoulder ABduction: 3+  · R Shoulder Internal Rotation: 3+  · R Shoulder External Rotation: 3+  · R Elbow Flexion: 3+  · R Elbow Extension: 3+        Special Tests: Negative  Neurological Screen:    Myotomes:        RUE Myotomes  · C1 (Cervical Rotation): Strong  · C2, C3, C4 (Shoulder Shrug): Strong  · C5 (Shoulder ABduction): Strong  · C6 (Biceps & Wrist Extension): Strong  · C7 (Triceps & Wrist Flexion): Strong  · C8 (Thumb Extension): Strong  · T1 (Finger ADduction): Strong          Dermatomes:     Within normal limits     Within normal limits          Reflexes:   · Left Bicep (C5,C6): 2+  · Left Bracheoradialis (C5,C6): 2+  · Left Tricep (C6,C7,C8): 2+    · Right Bicep (C5,C6): 2+  · Right Bracheoradialis (C5,C6): 2+  · Right Tricep (C6,C7,C8): 2+          Neural Tension Tests: Negative  Functional Mobility:  Gait Description (WDL): Within defined limits     Balance:   Sitting: Intact  Standing: Intact  TREATMENT:    (In addition to Assessment/Re-Assessment sessions the following treatments were rendered)  Therapeutic Exercise: ( 5 minutes): Exercises per grid below to improve mobility and strength. Required minimal visual, verbal and manual cues to promote proper body alignment, promote proper body posture and promote proper body mechanics. Progressed resistance, range, repetitions and complexity of movement as indicated. Date:  2/28/2018    Activity/Exercise Parameters   Scapular rows HEP - reviewed   Lat pulls HEP - reviewed   Manual Therapy     · Joint Mobilization (10 Minutes): Grade II anterior-posterior joint mobilization to right glenohumeral joint with active and passive gross movement in all planes to improve range of motion and decrease pain with daily activities. Prone t-spine central posterior anterior grade IV T1-4. · Soft Tissue Mobilization (30 Minutes): Soft tissue mobilization to cervical spinal musculature to improve mobility and decrease tightness and pain with daily activities. Therapeutic Modalities: N/A today                                                                                             HEP: As above; handouts given to patient for all exercises. ______________________________________________________________________________________________________    Treatment Assessment:  Patient tolerated treatment well. Patient reported decreased pain after treatment. Progression/Medical Necessity:   · Patient is expected to demonstrate progress in strength and range of motion to increase independence with daily activities. · Patient demonstrates good rehab potential due to higher previous functional level. · Skilled intervention continues to be required due to decrease in functional mobility. Compliance with Program/Exercises: Compliant   Reason for Continuation of Services/Other Comments:  · Patient continues to demonstrate capacity to improve overall mobility which will increase safety.   Recommendations/Intent for next treatment session: \"Treatment next visit will focus on advancements to more challenging activities\".     Total Treatment Duration:  PT Patient Time In/Time Out  Time In: 0815  Time Out: 0900    Cassia Kraus PT

## 2018-03-06 ENCOUNTER — HOSPITAL ENCOUNTER (OUTPATIENT)
Dept: PHYSICAL THERAPY | Age: 73
Discharge: HOME | End: 2018-03-06
Payer: MEDICARE

## 2018-03-06 PROCEDURE — 97140 MANUAL THERAPY 1/> REGIONS: CPT

## 2018-03-14 ENCOUNTER — HOSPITAL ENCOUNTER (OUTPATIENT)
Dept: PHYSICAL THERAPY | Age: 73
Discharge: HOME | End: 2018-03-14
Payer: MEDICARE

## 2018-03-14 PROCEDURE — 97140 MANUAL THERAPY 1/> REGIONS: CPT

## 2018-03-14 PROCEDURE — G8984 CARRY CURRENT STATUS: HCPCS

## 2018-03-14 PROCEDURE — G8985 CARRY GOAL STATUS: HCPCS

## 2018-03-14 NOTE — PROGRESS NOTES
[facility]rapy Center at [address], [address] [facility],[address] [place], [address]  Phone: [phone]   Fax: [phone]    Outpatient PHYSICAL THERAPY: Progress Report  Fall Risk Score: 0 (? 5 = High Risk)  ICD-9: Treatment Diagnosis: Other specified disorders of joint - other disorders of cervical region, cervicalgia  ICD-10: Treatment Diagnosis: Cervicalgia (M54.2)     REFERRING PHYSICIAN: Yennifer Deutsch MD MD Orders: Evaluate and Treat  Return Physician Appointment: TBD  MEDICAL/REFERRING DIAGNOSIS:  Cervicalgia [M54.2]  Other physical therapy [[id number]]  DATE OF ONSET: Chronic   PRIOR LEVEL OF FUNCTION: Independent  PRECAUTIONS/ALLERGIES:   Allergies   Allergen Reactions    Alendronate Sodium Rash    Antihistamine [Triprolidine-Pseudoephedrine] Anxiety    Boniva [Ibandronate] Diarrhea     ASSESSMENT:  ?????? ? ? This section established at most recent assessment?????????? Patient presents with improving range of motion, decreased strength, and pain in right shoulder and neck secondary to chronic pain. Patient has attended a total of 94 scheduled physical therapy visits including initial evaluation on 1/7/2014. Treatment has consisted of manual therapy and streching to improve pain and performance with activities of daily living. PROBLEM LIST (Impairments causing functional limitations):  1. Decreased independence with ADLs requiring any extensive UE activity (lifting/reaching/vacuuming). 2.  Decreased tolerance of prolonged sitting/standing activities (>30 minutes) due to exacerbation of neck pain. 3.  Unable to participate in social/recreational activities due to fear avoidance of onset of neck/shoulder pain. 4.  Outcome measure score of 10/50 on Neck Disability Index with minimal effect of pain on patient's ability to manage every day life activities.   GOALS: (Goals have been discussed and agreed upon with patient.)  SHORT-TERM FUNCTIONAL GOALS: Time Frame: 3 weeks  1. Patient will be independent with home exercise program without exacerbation of symptoms or cueing needed--goal met. 2. Patient will be independent with correct sleeping positions and awareness/avoidance of aggravating positions without cueing needed--goal met. DISCHARGE GOALS: Time Frame: 8 weeks  1. Patient will be independent with all ADLs with minimal onset of neck/shoulder pain and no deficits with daily tasks--goal ongoing. 2. Patient will report no fear avoidance with social or recreational activities due to neck/shoulder pain--goal ongoing. 3. Patient will score less than or equal to 5/50 on Neck Disability Index with minimal effect of pain on patient's ability to manage every day life activities--goal ongoing. REHABILITATION POTENTIAL FOR STATED GOALS: Melynda Merlin OF CARE:  INTERVENTIONS PLANNED: (Benefits and precautions of physical therapy have been discussed with the patient.)  1. Patient education including pathophysiology of neck/shoulder symptoms, education/training (sleeping positions, posture re-education and body mechanics), and instructions of home exercise program.  2. Therapeutic exercises including mobility through neck/shoulder region and postural stabilization. 3. Manual therapy including soft tissue mobilizations, functional joint mobilizations and neuromuscular re-education to neck and shoulder region. 4. Therapeutic modalities including moist heat, cold pack, electrical stimulation, and/or ultrasound as needed for relief of symptoms with treatment. TREATMENT PLAN EFFECTIVE DATES: 1/7/2018 TO 4/7/2018  FREQUENCY/DURATION: Follow patient 1 time every 2 weeks for 12 weeks to address above goals. SUBJECTIVE:  History of Present Injury/Illness (Reason for Referral): Patient is well known to therapist.  Patient returned to therapy today with continued complaints of neck and shoulder pain. Patient reports that her right shoulder/arm feel weak at times.  She reports she hasn't ever dropped anything, but her arm will catch and she will have to be careful that she doesn't throw anything. She reports that her neck was hurting during the holidays. She reports that she did move out of her work office over the holidays. She reports she is still using cold packs at night and taking her pain medication as needed. She reports she has also been trying to get a few deep tissue massages to help with her pain. Present Symptoms: 3/14/2018: Patient reports she felt better after her last session. She reports tightness in her neck and shoulders on the right side today. Pain Intensity 1: 4  Pain Location 1: Neck, Shoulder  Pain Orientation 1: Right, Left  Pain Intervention(s) 1: Rest, Medication (see MAR)  Pain Description 1: Aching, Constant  Pain Onset 1: Chronic  Pain Scale 1: Numeric (0 - 10)  Patient Stated Pain Goal: 0  · Patient Stated Pain Goal: 0   Dominant Side: right  Past Medical History: Patient reports no significant past medical history. Current Medications: Cymblata, pain medication (PRN)   Date Last Reviewed: 3/14/2018  Social History/Home Situation:   Home Environment: Private residence  Living Alone: No  Support Systems: Family member(s); Friends \ neighbors  Work/Activity History: Retired  OBJECTIVE:  Outcome Measure: Tool Used: Neck Disability Index (NDI)  Score:  Initial: 10/50  Most Recent: 9/50 (Date: 2/14/2018)    Interpretation of Score: The Neck Disability Index is a revised form of the Oswestry Low Back Pain Index and is designed to measure the activities of daily living in person's with neck pain. Each section is scored on a 0-5 scale, 5 representing the greatest disability. The scores of each section are added together for a total score of 50. Score 0 1-10 11-20 21-30 31-40 41-49 50   Modifier CH CI CJ CK CL CM CN     ?  Carrying, Moving, and Handling Objects:     - CURRENT STATUS: CI - 1%-19% impaired, limited or restricted    - GOAL STATUS: CI - 1%-19% impaired, limited or restricted    - D/C STATUS:  ---------------To be determined---------------     Observation/Orthostatic Postural Assessment:   Posture (WDL): Exceptions to WDL  Posture Assessment: Rounded shoulders; Forward head  Palpation:  Tenderness to palpation noted in cervical spine and shoulder region on right greater than left (upper trapezius, levator scapulae, rhomboids, sub-occipitals). No bruising or swelling noted. ROM:    Cervical Assessment (AROM):   · Flexion 100%  · Extension 90%  · Lateral flexion L 85%, R 100%    LUE Assessment (AROM):   · Within defined limits        Shoulder Assessment (AROM):  · Flexion B WFL  · L ext. · Rot. 90, R 80  · IR: L 70, R 65     RLE Assessment (AROM):   · Within defined limits      Strength:   LUE Strength, Tone, Sensation:   · Within defined limits  · Deep cervical flexion 20s against gravity    RUE Strength, Tone, Sensation:  · R Shoulder Flexion: 3+  · R Shoulder ABduction: 3+  · R Shoulder Internal Rotation: 3+  · R Shoulder External Rotation: 3+  · R Elbow Flexion: 3+  · R Elbow Extension: 3+        Special Tests: Negative  Neurological Screen:    Myotomes:        RUE Myotomes  · C1 (Cervical Rotation): Strong  · C2, C3, C4 (Shoulder Shrug): Strong  · C5 (Shoulder ABduction): Strong  · C6 (Biceps & Wrist Extension): Strong  · C7 (Triceps & Wrist Flexion): Strong  · C8 (Thumb Extension): Strong  · T1 (Finger ADduction): Strong          Dermatomes:     Within normal limits     Within normal limits          Reflexes:   · Left Bicep (C5,C6): 2+  · Left Bracheoradialis (C5,C6): 2+  · Left Tricep (C6,C7,C8): 2+    · Right Bicep (C5,C6): 2+  · Right Bracheoradialis (C5,C6): 2+  · Right Tricep (C6,C7,C8): 2+          Neural Tension Tests: Negative  Functional Mobility:  Gait Description (WDL): Within defined limits     Balance:   Sitting: Intact  Standing: Intact  TREATMENT:    (In addition to Assessment/Re-Assessment sessions the following treatments were rendered)  Therapeutic Exercise: ( 5 minutes):  Exercises per grid below to improve mobility and strength. Required minimal visual, verbal and manual cues to promote proper body alignment, promote proper body posture and promote proper body mechanics. Progressed resistance, range, repetitions and complexity of movement as indicated. Date:  3/14/2018    Activity/Exercise Parameters   Scapular rows HEP - reviewed   Lat pulls HEP - reviewed   Manual Therapy     · Joint Mobilization (10 Minutes): Grade II anterior-posterior joint mobilization to right glenohumeral joint with active and passive gross movement in all planes to improve range of motion and decrease pain with daily activities. Prone t-spine central posterior anterior grade IV T1-4. · Soft Tissue Mobilization (30 Minutes): Soft tissue mobilization to cervical spinal musculature to improve mobility and decrease tightness and pain with daily activities. Therapeutic Modalities: N/A today                                                                                             HEP: As above; handouts given to patient for all exercises. ______________________________________________________________________________________________________    Treatment Assessment:  Patient tolerated treatment well. Patient reported decreased pain after treatment. Progression/Medical Necessity:   · Patient is expected to demonstrate progress in strength and range of motion to increase independence with daily activities. · Patient demonstrates good rehab potential due to higher previous functional level. · Skilled intervention continues to be required due to decrease in functional mobility. Compliance with Program/Exercises: Compliant   Reason for Continuation of Services/Other Comments:  · Patient continues to demonstrate capacity to improve overall mobility which will increase safety.   Recommendations/Intent for next treatment session: \"Treatment next visit will focus on advancements to more challenging activities\".     Total Treatment Duration:  PT Patient Time In/Time Out  Time In: 0900  Time Out: BOLA Mason

## 2018-03-28 ENCOUNTER — HOSPITAL ENCOUNTER (OUTPATIENT)
Dept: PHYSICAL THERAPY | Age: 73
Discharge: HOME | End: 2018-03-28
Payer: MEDICARE

## 2018-03-28 PROCEDURE — 97140 MANUAL THERAPY 1/> REGIONS: CPT

## 2018-03-28 NOTE — PROGRESS NOTES
Therapy Center at [address], [address] [facility],[address] [place], [address]  Phone: [phone]   Fax: [phone]    Outpatient PHYSICAL THERAPY: Daily Note  Fall Risk Score: 0 (? 5 = High Risk)  ICD-9: Treatment Diagnosis: Other specified disorders of joint - other disorders of cervical region, cervicalgia  ICD-10: Treatment Diagnosis: Cervicalgia (M54.2)     REFERRING PHYSICIAN: Afua Elena MD MD Orders: Evaluate and Treat  Return Physician Appointment: TBD  MEDICAL/REFERRING DIAGNOSIS:  Cervicalgia [M54.2]  Other physical therapy [[id number]]  DATE OF ONSET: Chronic   PRIOR LEVEL OF FUNCTION: Independent  PRECAUTIONS/ALLERGIES:   Allergies   Allergen Reactions    Alendronate Sodium Rash    Antihistamine [Triprolidine-Pseudoephedrine] Anxiety    Boniva [Ibandronate] Diarrhea     ASSESSMENT:  ?????? ? ? This section established at most recent assessment?????????? Patient presents with improving range of motion, decreased strength, and pain in right shoulder and neck secondary to chronic pain. Patient has attended a total of 94 scheduled physical therapy visits including initial evaluation on 1/7/2014. Treatment has consisted of manual therapy and streching to improve pain and performance with activities of daily living. PROBLEM LIST (Impairments causing functional limitations):  1. Decreased independence with ADLs requiring any extensive UE activity (lifting/reaching/vacuuming). 2.  Decreased tolerance of prolonged sitting/standing activities (>30 minutes) due to exacerbation of neck pain. 3.  Unable to participate in social/recreational activities due to fear avoidance of onset of neck/shoulder pain. 4.  Outcome measure score of 10/50 on Neck Disability Index with minimal effect of pain on patient's ability to manage every day life activities.   GOALS: (Goals have been discussed and agreed upon with patient.)  SHORT-TERM FUNCTIONAL GOALS: Time Frame: 3 weeks  1. Patient will be independent with home exercise program without exacerbation of symptoms or cueing needed--goal met. 2. Patient will be independent with correct sleeping positions and awareness/avoidance of aggravating positions without cueing needed--goal met. DISCHARGE GOALS: Time Frame: 8 weeks  1. Patient will be independent with all ADLs with minimal onset of neck/shoulder pain and no deficits with daily tasks--goal ongoing. 2. Patient will report no fear avoidance with social or recreational activities due to neck/shoulder pain--goal ongoing. 3. Patient will score less than or equal to 5/50 on Neck Disability Index with minimal effect of pain on patient's ability to manage every day life activities--goal ongoing. REHABILITATION POTENTIAL FOR STATED GOALS: Jaguar Delacruz OF CARE:  INTERVENTIONS PLANNED: (Benefits and precautions of physical therapy have been discussed with the patient.)  1. Patient education including pathophysiology of neck/shoulder symptoms, education/training (sleeping positions, posture re-education and body mechanics), and instructions of home exercise program.  2. Therapeutic exercises including mobility through neck/shoulder region and postural stabilization. 3. Manual therapy including soft tissue mobilizations, functional joint mobilizations and neuromuscular re-education to neck and shoulder region. 4. Therapeutic modalities including moist heat, cold pack, electrical stimulation, and/or ultrasound as needed for relief of symptoms with treatment. TREATMENT PLAN EFFECTIVE DATES: 1/7/2018 TO 4/7/2018  FREQUENCY/DURATION: Follow patient 1 time every 2 weeks for 12 weeks to address above goals. SUBJECTIVE:  History of Present Injury/Illness (Reason for Referral): Patient is well known to therapist.  Patient returned to therapy today with continued complaints of neck and shoulder pain. Patient reports that her right shoulder/arm feel weak at times.  She reports she hasn't ever dropped anything, but her arm will catch and she will have to be careful that she doesn't throw anything. She reports that her neck was hurting during the holidays. She reports that she did move out of her work office over the holidays. She reports she is still using cold packs at night and taking her pain medication as needed. She reports she has also been trying to get a few deep tissue massages to help with her pain. Present Symptoms: 3/28/2018: Patient reports she has been doing a lot of paperwork for work and so she has been having tightness in her shoulder. Pain Intensity 1: 4  Pain Location 1: Neck, Shoulder  Pain Orientation 1: Right, Left  Pain Intervention(s) 1: Rest, Medication (see MAR)  Pain Description 1: Aching, Constant  Pain Onset 1: Chronic  Pain Scale 1: Numeric (0 - 10)  Patient Stated Pain Goal: 0  · Patient Stated Pain Goal: 0   Dominant Side: right  Past Medical History: Patient reports no significant past medical history. Current Medications: Cymblata, pain medication (PRN)   Date Last Reviewed: 3/28/2018  Social History/Home Situation:   Home Environment: Private residence  Living Alone: No  Support Systems: Family member(s); Friends \ neighbors  Work/Activity History: Retired  OBJECTIVE:  Outcome Measure: Tool Used: Neck Disability Index (NDI)  Score:  Initial: 10/50  Most Recent: 9/50 (Date: 2/14/2018)    Interpretation of Score: The Neck Disability Index is a revised form of the Oswestry Low Back Pain Index and is designed to measure the activities of daily living in person's with neck pain. Each section is scored on a 0-5 scale, 5 representing the greatest disability. The scores of each section are added together for a total score of 50. Score 0 1-10 11-20 21-30 31-40 41-49 50   Modifier CH CI CJ CK CL CM CN     ?  Carrying, Moving, and Handling Objects:     - CURRENT STATUS: CI - 1%-19% impaired, limited or restricted    - GOAL STATUS: CI - 1%-19% impaired, limited or restricted    - D/C STATUS:  ---------------To be determined---------------     Observation/Orthostatic Postural Assessment:   Posture (WDL): Exceptions to WDL  Posture Assessment: Rounded shoulders; Forward head  Palpation:  Tenderness to palpation noted in cervical spine and shoulder region on right greater than left (upper trapezius, levator scapulae, rhomboids, sub-occipitals). No bruising or swelling noted. ROM:    Cervical Assessment (AROM):   · Flexion 100%  · Extension 90%  · Lateral flexion L 85%, R 100%    LUE Assessment (AROM):   · Within defined limits        Shoulder Assessment (AROM):  · Flexion B WFL  · L ext. · Rot. 90, R 80  · IR: L 70, R 65     RLE Assessment (AROM):   · Within defined limits      Strength:   LUE Strength, Tone, Sensation:   · Within defined limits  · Deep cervical flexion 20s against gravity    RUE Strength, Tone, Sensation:  · R Shoulder Flexion: 3+  · R Shoulder ABduction: 3+  · R Shoulder Internal Rotation: 3+  · R Shoulder External Rotation: 3+  · R Elbow Flexion: 3+  · R Elbow Extension: 3+        Special Tests: Negative  Neurological Screen:    Myotomes:        RUE Myotomes  · C1 (Cervical Rotation): Strong  · C2, C3, C4 (Shoulder Shrug): Strong  · C5 (Shoulder ABduction): Strong  · C6 (Biceps & Wrist Extension): Strong  · C7 (Triceps & Wrist Flexion): Strong  · C8 (Thumb Extension): Strong  · T1 (Finger ADduction): Strong          Dermatomes:     Within normal limits     Within normal limits          Reflexes:   · Left Bicep (C5,C6): 2+  · Left Bracheoradialis (C5,C6): 2+  · Left Tricep (C6,C7,C8): 2+    · Right Bicep (C5,C6): 2+  · Right Bracheoradialis (C5,C6): 2+  · Right Tricep (C6,C7,C8): 2+          Neural Tension Tests: Negative  Functional Mobility:  Gait Description (WDL): Within defined limits     Balance:   Sitting: Intact  Standing: Intact  TREATMENT:    (In addition to Assessment/Re-Assessment sessions the following treatments were rendered)  Therapeutic Exercise: ( 5 minutes):  Exercises per grid below to improve mobility and strength. Required minimal visual, verbal and manual cues to promote proper body alignment, promote proper body posture and promote proper body mechanics. Progressed resistance, range, repetitions and complexity of movement as indicated. Date:  3/28/2018    Activity/Exercise Parameters   Scapular rows HEP - reviewed   Lat pulls HEP - reviewed   Manual Therapy     · Joint Mobilization (10 Minutes): Grade II anterior-posterior joint mobilization to right glenohumeral joint with active and passive gross movement in all planes to improve range of motion and decrease pain with daily activities. Prone t-spine central posterior anterior grade IV T1-4. · Soft Tissue Mobilization (30 Minutes): Soft tissue mobilization to cervical spinal musculature to improve mobility and decrease tightness and pain with daily activities. Therapeutic Modalities: N/A today                                                                                             HEP: As above; handouts given to patient for all exercises. ______________________________________________________________________________________________________    Treatment Assessment:  Patient tolerated treatment well. Patient reported decreased pain after treatment. Progression/Medical Necessity:   · Patient is expected to demonstrate progress in strength and range of motion to increase independence with daily activities. · Patient demonstrates good rehab potential due to higher previous functional level. · Skilled intervention continues to be required due to decrease in functional mobility. Compliance with Program/Exercises: Compliant   Reason for Continuation of Services/Other Comments:  · Patient continues to demonstrate capacity to improve overall mobility which will increase safety.   Recommendations/Intent for next treatment session: \"Treatment next visit will focus on advancements to more challenging activities\".     Total Treatment Duration:  PT Patient Time In/Time Out  Time In: 0730  Time Out: [id number], PT

## 2018-04-11 ENCOUNTER — HOSPITAL ENCOUNTER (OUTPATIENT)
Dept: PHYSICAL THERAPY | Age: 73
Discharge: HOME | End: 2018-04-11
Payer: MEDICARE

## 2018-04-11 PROCEDURE — G8984 CARRY CURRENT STATUS: HCPCS

## 2018-04-11 PROCEDURE — 97140 MANUAL THERAPY 1/> REGIONS: CPT

## 2018-04-11 PROCEDURE — G8985 CARRY GOAL STATUS: HCPCS

## 2018-04-11 NOTE — THERAPY RECERTIFICATION
[facility] [address], [address] [place] [address],[address] [place], [address]  Phone: [phone]   Fax: [phone]    Outpatient PHYSICAL THERAPY: Re-evaluation  Fall Risk Score: 0 (? 5 = High Risk)  ICD-9: Treatment Diagnosis: Other specified disorders of joint - other disorders of cervical region, cervicalgia  ICD-10: Treatment Diagnosis: Cervicalgia (M54.2)     REFERRING PHYSICIAN: Darrian Chen MD MD Orders: Evaluate and Treat  Return Physician Appointment: TBD  MEDICAL/REFERRING DIAGNOSIS:  Cervicalgia [M54.2]  Other physical therapy [[id number]]  DATE OF ONSET: Chronic   PRIOR LEVEL OF FUNCTION: Independent  PRECAUTIONS/ALLERGIES:   Allergies   Allergen Reactions    Alendronate Sodium Rash    Antihistamine [Triprolidine-Pseudoephedrine] Anxiety    Boniva [Ibandronate] Diarrhea     ASSESSMENT:  ?????? ? ? This section established at most recent assessment?????????? Patient presents with improving range of motion, decreased strength, and pain in right shoulder and neck secondary to chronic pain. Patient has attended a total of 95 scheduled physical therapy visits including initial evaluation on 1/7/2014. Treatment has consisted of manual therapy and streching to improve pain and performance with activities of daily living. After discussing with patient she agreed she would continue to benefit from physical therapy to improve overall mobility and pain. Please sign this re-certification if you concur. Thank you for the opportunity to serve this patient. PROBLEM LIST (Impairments causing functional limitations):  1. Decreased independence with ADLs requiring any extensive UE activity (lifting/reaching/vacuuming). 2.  Decreased tolerance of prolonged sitting/standing activities (>30 minutes) due to exacerbation of neck pain. 3.  Unable to participate in social/recreational activities due to fear avoidance of onset of neck/shoulder pain.   4.  Outcome measure score of 10/50 on Neck Disability Index with minimal effect of pain on patient's ability to manage every day life activities. GOALS: (Goals have been discussed and agreed upon with patient.)  SHORT-TERM FUNCTIONAL GOALS: Time Frame: 3 weeks  1. Patient will be independent with home exercise program without exacerbation of symptoms or cueing needed--goal met. 2. Patient will be independent with correct sleeping positions and awareness/avoidance of aggravating positions without cueing needed--goal met. DISCHARGE GOALS: Time Frame: 8 weeks  1. Patient will be independent with all ADLs with minimal onset of neck/shoulder pain and no deficits with daily tasks--goal met. 2. Patient will report no fear avoidance with social or recreational activities due to neck/shoulder pain--goal ongoing. 3. Patient will score less than or equal to 5/50 on Neck Disability Index with minimal effect of pain on patient's ability to manage every day life activities--goal ongoing. REHABILITATION POTENTIAL FOR STATED GOALS: Master Mejía OF CARE:  INTERVENTIONS PLANNED: (Benefits and precautions of physical therapy have been discussed with the patient.)  1. Patient education including pathophysiology of neck/shoulder symptoms, education/training (sleeping positions, posture re-education and body mechanics), and instructions of home exercise program.  2. Therapeutic exercises including mobility through neck/shoulder region and postural stabilization. 3. Manual therapy including soft tissue mobilizations, functional joint mobilizations and neuromuscular re-education to neck and shoulder region. 4. Therapeutic modalities including moist heat, cold pack, electrical stimulation, and/or ultrasound as needed for relief of symptoms with treatment. TREATMENT PLAN EFFECTIVE DATES: 4/7/2018 TO 7/7/2018  FREQUENCY/DURATION: Follow patient 1 time every 2 weeks for 12 weeks to address above goals.   Regarding Paola Beach's therapy, I certify that the treatment plan above will be carried out by a therapist or under their direction. Thank you for this referral,  Willie De Los Santos PT     Referring Physician Signature: Darrian Chen MD          Date           SUBJECTIVE:  History of Present Injury/Illness (Reason for Referral): Patient is well known to therapist.  Patient returned to therapy today with continued complaints of neck and shoulder pain. Patient reports that her right shoulder/arm feel weak at times. She reports she hasn't ever dropped anything, but her arm will catch and she will have to be careful that she doesn't throw anything. She reports that her neck was hurting during the holidays. She reports that she did move out of her work office over the holidays. She reports she is still using cold packs at night and taking her pain medication as needed. She reports she has also been trying to get a few deep tissue massages to help with her pain. Present Symptoms: 4/11/2018: Patient reports she has been able to do more at work and in her yard, but she is feeling sore today in her neck and shoulders. Pain Intensity 1: 4  Pain Location 1: Neck, Shoulder  Pain Orientation 1: Right, Left  Pain Intervention(s) 1: Rest, Medication (see MAR)  Pain Description 1: Aching, Constant  Pain Onset 1: Chronic  Pain Scale 1: Numeric (0 - 10)  Patient Stated Pain Goal: 0  · Patient Stated Pain Goal: 0   Dominant Side: right  Past Medical History: Patient reports no significant past medical history. Current Medications: Cymblata, pain medication (PRN)   Date Last Reviewed: 4/11/2018  Social History/Home Situation:   Home Environment: Private residence  Living Alone: No  Support Systems: Family member(s); Friends \ neighbors  Work/Activity History: Retired  OBJECTIVE:  Outcome Measure: Tool Used: Neck Disability Index (NDI)  Score:  Initial: 10/50  Most Recent: 9/50 (Date: 4/11/2018)    Interpretation of Score:  The Neck Disability Index is a revised form of the Oswestry Low Back Pain Index and is designed to measure the activities of daily living in person's with neck pain. Each section is scored on a 0-5 scale, 5 representing the greatest disability. The scores of each section are added together for a total score of 50. Score 0 1-10 11-20 21-30 31-40 41-49 50   Modifier CH CI CJ CK CL CM CN     ? Carrying, Moving, and Handling Objects:     - CURRENT STATUS: CI - 1%-19% impaired, limited or restricted    - GOAL STATUS: CI - 1%-19% impaired, limited or restricted    - D/C STATUS:  ---------------To be determined---------------     Observation/Orthostatic Postural Assessment:   Posture (WDL): Exceptions to WDL  Posture Assessment: Rounded shoulders; Forward head  Palpation:  Tenderness to palpation noted in cervical spine and shoulder region on right greater than left (upper trapezius, levator scapulae, rhomboids, sub-occipitals). No bruising or swelling noted. ROM:    Cervical Assessment (AROM):   · Flexion 100%  · Extension 90%  · Lateral flexion L 90%, R 100%    LUE Assessment (AROM):   · Within defined limits        Shoulder Assessment (AROM):  · Flexion B WFL  · Ext. B WFL  · Rot.  90, R 90  · IR: L 70, R 70     RLE Assessment (AROM):   · Within defined limits      Strength:   LUE Strength, Tone, Sensation:   · Within defined limits  · Deep cervical flexion 20s against gravity    RUE Strength, Tone, Sensation:  · R Shoulder Flexion: 4+  · R Shoulder ABduction: 4+  · R Shoulder Internal Rotation: 4+  · R Shoulder External Rotation: 4+  · R Elbow Flexion: 4+  · R Elbow Extension: 4+        Special Tests: Negative  Neurological Screen:    Myotomes:        RUE Myotomes  · C1 (Cervical Rotation): Strong  · C2, C3, C4 (Shoulder Shrug): Strong  · C5 (Shoulder ABduction): Strong  · C6 (Biceps & Wrist Extension): Strong  · C7 (Triceps & Wrist Flexion): Strong  · C8 (Thumb Extension): Strong  · T1 (Finger ADduction): Strong Dermatomes: Within normal limits     Within normal limits          Reflexes:   · Left Bicep (C5,C6): 2+  · Left Bracheoradialis (C5,C6): 2+  · Left Tricep (C6,C7,C8): 2+    · Right Bicep (C5,C6): 2+  · Right Bracheoradialis (C5,C6): 2+  · Right Tricep (C6,C7,C8): 2+          Neural Tension Tests: Negative  Functional Mobility:  Gait Description (WDL): Within defined limits     Balance:   Sitting: Intact  Standing: Intact  TREATMENT:    (In addition to Assessment/Re-Assessment sessions the following treatments were rendered)  Therapeutic Exercise: ( 5 minutes):  Exercises per grid below to improve mobility and strength. Required minimal visual, verbal and manual cues to promote proper body alignment, promote proper body posture and promote proper body mechanics. Progressed resistance, range, repetitions and complexity of movement as indicated. Date:  4/11/2018    Activity/Exercise Parameters   Scapular rows HEP - reviewed   Lat pulls HEP - reviewed   Manual Therapy     · Joint Mobilization (10 Minutes): Grade II anterior-posterior joint mobilization to right glenohumeral joint with active and passive gross movement in all planes to improve range of motion and decrease pain with daily activities. Prone t-spine central posterior anterior grade IV T1-4. · Soft Tissue Mobilization (30 Minutes): Soft tissue mobilization to cervical spinal musculature to improve mobility and decrease tightness and pain with daily activities. Therapeutic Modalities: N/A today                                                                                             HEP: As above; handouts given to patient for all exercises. ______________________________________________________________________________________________________    Treatment Assessment:  Patient tolerated treatment well. Patient reported decreased pain after treatment.        Progression/Medical Necessity:   · Patient is expected to demonstrate progress in strength and range of motion to increase independence with daily activities. · Patient demonstrates good rehab potential due to higher previous functional level. · Skilled intervention continues to be required due to decrease in functional mobility. Compliance with Program/Exercises: Compliant   Reason for Continuation of Services/Other Comments:  · Patient continues to demonstrate capacity to improve overall mobility which will increase safety. Recommendations/Intent for next treatment session: \"Treatment next visit will focus on advancements to more challenging activities\".     Total Treatment Duration:  PT Patient Time In/Time Out  Time In: 0900  Time Out: BOLA Mason

## 2018-04-25 ENCOUNTER — HOSPITAL ENCOUNTER (OUTPATIENT)
Dept: PHYSICAL THERAPY | Age: 73
Discharge: HOME | End: 2018-04-25
Payer: MEDICARE

## 2018-04-25 PROCEDURE — 97140 MANUAL THERAPY 1/> REGIONS: CPT

## 2018-04-25 NOTE — PROGRESS NOTES
[facility] at [address], [address] [facility],[address] [place], [address]  Phone: [phone]   Fax: [phone]    Outpatient PHYSICAL THERAPY: Daily Note  Fall Risk Score: 0 (? 5 = High Risk)  ICD-9: Treatment Diagnosis: Other specified disorders of joint - other disorders of cervical region, cervicalgia  ICD-10: Treatment Diagnosis: Cervicalgia (M54.2)     REFERRING PHYSICIAN: Ruby Concepcion MD MD Orders: Evaluate and Treat  Return Physician Appointment: TBD  MEDICAL/REFERRING DIAGNOSIS:  Cervicalgia [M54.2]  Other physical therapy [[id number]]  DATE OF ONSET: Chronic   PRIOR LEVEL OF FUNCTION: Independent  PRECAUTIONS/ALLERGIES:   Allergies   Allergen Reactions    Alendronate Sodium Rash    Antihistamine [Triprolidine-Pseudoephedrine] Anxiety    Boniva [Ibandronate] Diarrhea     ASSESSMENT:  ?????? ? ? This section established at most recent assessment?????????? Patient presents with improving range of motion, decreased strength, and pain in right shoulder and neck secondary to chronic pain. Patient has attended a total of 95 scheduled physical therapy visits including initial evaluation on 1/7/2014. Treatment has consisted of manual therapy and streching to improve pain and performance with activities of daily living. PROBLEM LIST (Impairments causing functional limitations):  1. Decreased independence with ADLs requiring any extensive UE activity (lifting/reaching/vacuuming). 2.  Decreased tolerance of prolonged sitting/standing activities (>30 minutes) due to exacerbation of neck pain. 3.  Unable to participate in social/recreational activities due to fear avoidance of onset of neck/shoulder pain. 4.  Outcome measure score of 10/50 on Neck Disability Index with minimal effect of pain on patient's ability to manage every day life activities.   GOALS: (Goals have been discussed and agreed upon with patient.)  SHORT-TERM FUNCTIONAL GOALS: Time Frame: 3 weeks  1. Patient will be independent with home exercise program without exacerbation of symptoms or cueing needed--goal met. 2. Patient will be independent with correct sleeping positions and awareness/avoidance of aggravating positions without cueing needed--goal met. DISCHARGE GOALS: Time Frame: 8 weeks  1. Patient will be independent with all ADLs with minimal onset of neck/shoulder pain and no deficits with daily tasks--goal met. 2. Patient will report no fear avoidance with social or recreational activities due to neck/shoulder pain--goal ongoing. 3. Patient will score less than or equal to 5/50 on Neck Disability Index with minimal effect of pain on patient's ability to manage every day life activities--goal ongoing. REHABILITATION POTENTIAL FOR STATED GOALS: Duane Simpers OF CARE:  INTERVENTIONS PLANNED: (Benefits and precautions of physical therapy have been discussed with the patient.)  1. Patient education including pathophysiology of neck/shoulder symptoms, education/training (sleeping positions, posture re-education and body mechanics), and instructions of home exercise program.  2. Therapeutic exercises including mobility through neck/shoulder region and postural stabilization. 3. Manual therapy including soft tissue mobilizations, functional joint mobilizations and neuromuscular re-education to neck and shoulder region. 4. Therapeutic modalities including moist heat, cold pack, electrical stimulation, and/or ultrasound as needed for relief of symptoms with treatment. TREATMENT PLAN EFFECTIVE DATES: 4/7/2018 TO 7/7/2018  FREQUENCY/DURATION: Follow patient 1 time every 2 weeks for 12 weeks to address above goals. SUBJECTIVE:  History of Present Injury/Illness (Reason for Referral): Patient is well known to therapist.  Patient returned to therapy today with continued complaints of neck and shoulder pain. Patient reports that her right shoulder/arm feel weak at times.  She reports she hasn't ever dropped anything, but her arm will catch and she will have to be careful that she doesn't throw anything. She reports that her neck was hurting during the holidays. She reports that she did move out of her work office over the holidays. She reports she is still using cold packs at night and taking her pain medication as needed. She reports she has also been trying to get a few deep tissue massages to help with her pain. Present Symptoms: 4/25/2018: Patient reports she is feeling tightness in her shoulders today. Pain Intensity 1: 4  Pain Location 1: Neck, Shoulder  Pain Orientation 1: Right, Left  Pain Intervention(s) 1: Rest, Medication (see MAR)  Pain Description 1: Aching, Constant  Pain Onset 1: Chronic  Pain Scale 1: Numeric (0 - 10)  Patient Stated Pain Goal: 0  · Patient Stated Pain Goal: 0   Dominant Side: right  Past Medical History: Patient reports no significant past medical history. Current Medications: Cymblata, pain medication (PRN)   Date Last Reviewed: 4/25/2018  Social History/Home Situation:   Home Environment: Private residence  Living Alone: No  Support Systems: Family member(s); Friends \ neighbors  Work/Activity History: Retired  OBJECTIVE:  Outcome Measure: Tool Used: Neck Disability Index (NDI)  Score:  Initial: 10/50  Most Recent: 9/50 (Date: 4/11/2018)    Interpretation of Score: The Neck Disability Index is a revised form of the Oswestry Low Back Pain Index and is designed to measure the activities of daily living in person's with neck pain. Each section is scored on a 0-5 scale, 5 representing the greatest disability. The scores of each section are added together for a total score of 50. Score 0 1-10 11-20 21-30 31-40 41-49 50   Modifier CH CI CJ CK CL CM CN     ?  Carrying, Moving, and Handling Objects:     - CURRENT STATUS: CI - 1%-19% impaired, limited or restricted    - GOAL STATUS: CI - 1%-19% impaired, limited or restricted    - D/C STATUS:  ---------------To be determined---------------     Observation/Orthostatic Postural Assessment:   Posture (WDL): Exceptions to WDL  Posture Assessment: Rounded shoulders; Forward head  Palpation:  Tenderness to palpation noted in cervical spine and shoulder region on right greater than left (upper trapezius, levator scapulae, rhomboids, sub-occipitals). No bruising or swelling noted. ROM:    Cervical Assessment (AROM):   · Flexion 100%  · Extension 90%  · Lateral flexion L 90%, R 100%    LUE Assessment (AROM):   · Within defined limits        Shoulder Assessment (AROM):  · Flexion B WFL  · Ext. B WFL  · Rot. 90, R 90  · IR: L 70, R 70     RLE Assessment (AROM):   · Within defined limits      Strength:   LUE Strength, Tone, Sensation:   · Within defined limits  · Deep cervical flexion 20s against gravity    RUE Strength, Tone, Sensation:  · R Shoulder Flexion: 4+  · R Shoulder ABduction: 4+  · R Shoulder Internal Rotation: 4+  · R Shoulder External Rotation: 4+  · R Elbow Flexion: 4+  · R Elbow Extension: 4+        Special Tests: Negative  Neurological Screen:    Myotomes:        RUE Myotomes  · C1 (Cervical Rotation): Strong  · C2, C3, C4 (Shoulder Shrug): Strong  · C5 (Shoulder ABduction): Strong  · C6 (Biceps & Wrist Extension): Strong  · C7 (Triceps & Wrist Flexion): Strong  · C8 (Thumb Extension): Strong  · T1 (Finger ADduction): Strong          Dermatomes:     Within normal limits     Within normal limits          Reflexes:   · Left Bicep (C5,C6): 2+  · Left Bracheoradialis (C5,C6): 2+  · Left Tricep (C6,C7,C8): 2+    · Right Bicep (C5,C6): 2+  · Right Bracheoradialis (C5,C6): 2+  · Right Tricep (C6,C7,C8): 2+          Neural Tension Tests: Negative  Functional Mobility:  Gait Description (WDL): Within defined limits     Balance:   Sitting: Intact  Standing: Intact  TREATMENT:    (In addition to Assessment/Re-Assessment sessions the following treatments were rendered)  Therapeutic Exercise: ( 5 minutes):  Exercises per grid below to improve mobility and strength. Required minimal visual, verbal and manual cues to promote proper body alignment, promote proper body posture and promote proper body mechanics. Progressed resistance, range, repetitions and complexity of movement as indicated. Date:  4/25/2018    Activity/Exercise Parameters   Scapular rows HEP - reviewed   Lat pulls HEP - reviewed   Manual Therapy     · Joint Mobilization (10 Minutes): Grade II anterior-posterior joint mobilization to right glenohumeral joint with active and passive gross movement in all planes to improve range of motion and decrease pain with daily activities. Prone t-spine central posterior anterior grade IV T1-4. · Soft Tissue Mobilization (30 Minutes): Soft tissue mobilization to cervical spinal musculature to improve mobility and decrease tightness and pain with daily activities. Therapeutic Modalities: N/A today                                                                                             HEP: As above; handouts given to patient for all exercises. ______________________________________________________________________________________________________    Treatment Assessment:  Patient tolerated treatment well. Patient reported decreased pain after treatment. Progression/Medical Necessity:   · Patient is expected to demonstrate progress in strength and range of motion to increase independence with daily activities. · Patient demonstrates good rehab potential due to higher previous functional level. · Skilled intervention continues to be required due to decrease in functional mobility. Compliance with Program/Exercises: Compliant   Reason for Continuation of Services/Other Comments:  · Patient continues to demonstrate capacity to improve overall mobility which will increase safety.   Recommendations/Intent for next treatment session: \"Treatment next visit will focus on advancements to more challenging activities\".     Total Treatment Duration:  PT Patient Time In/Time Out  Time In: 0900  Time Out: BOLA Masno

## 2018-05-09 ENCOUNTER — HOSPITAL ENCOUNTER (OUTPATIENT)
Dept: PHYSICAL THERAPY | Age: 73
Discharge: HOME | End: 2018-05-09
Payer: MEDICARE

## 2018-05-09 PROCEDURE — 97140 MANUAL THERAPY 1/> REGIONS: CPT

## 2018-05-09 NOTE — PROGRESS NOTES
[facility] at [address], [address] [place] [address],[address] [place], [address]  Phone: [phone]   Fax: [phone]    Outpatient PHYSICAL THERAPY: Daily Note  Fall Risk Score: 0 (? 5 = High Risk)  ICD-9: Treatment Diagnosis: Other specified disorders of joint - other disorders of cervical region, cervicalgia  ICD-10: Treatment Diagnosis: Cervicalgia (M54.2)     REFERRING PHYSICIAN: Komal Monk MD MD Orders: Evaluate and Treat  Return Physician Appointment: TBD  MEDICAL/REFERRING DIAGNOSIS:  Cervicalgia [M54.2]  Other physical therapy [[id number]]  DATE OF ONSET: Chronic   PRIOR LEVEL OF FUNCTION: Independent  PRECAUTIONS/ALLERGIES:   Allergies   Allergen Reactions    Alendronate Sodium Rash    Antihistamine [Triprolidine-Pseudoephedrine] Anxiety    Boniva [Ibandronate] Diarrhea     ASSESSMENT:  ?????? ? ? This section established at most recent assessment?????????? Patient presents with improving range of motion, decreased strength, and pain in right shoulder and neck secondary to chronic pain. Patient has attended a total of 96 scheduled physical therapy visits including initial evaluation on 1/7/2014. Treatment has consisted of manual therapy and streching to improve pain and performance with activities of daily living. PROBLEM LIST (Impairments causing functional limitations):  1. Decreased independence with ADLs requiring any extensive UE activity (lifting/reaching/vacuuming). 2.  Decreased tolerance of prolonged sitting/standing activities (>30 minutes) due to exacerbation of neck pain. 3.  Unable to participate in social/recreational activities due to fear avoidance of onset of neck/shoulder pain. 4.  Outcome measure score of 10/50 on Neck Disability Index with minimal effect of pain on patient's ability to manage every day life activities.   GOALS: (Goals have been discussed and agreed upon with patient.)  SHORT-TERM FUNCTIONAL GOALS: Time Frame: 3 weeks  1. Patient will be independent with home exercise program without exacerbation of symptoms or cueing needed--goal met. 2. Patient will be independent with correct sleeping positions and awareness/avoidance of aggravating positions without cueing needed--goal met. DISCHARGE GOALS: Time Frame: 8 weeks  1. Patient will be independent with all ADLs with minimal onset of neck/shoulder pain and no deficits with daily tasks--goal met. 2. Patient will report no fear avoidance with social or recreational activities due to neck/shoulder pain--goal ongoing. 3. Patient will score less than or equal to 5/50 on Neck Disability Index with minimal effect of pain on patient's ability to manage every day life activities--goal ongoing. REHABILITATION POTENTIAL FOR STATED GOALS: [facility]  CARE:  INTERVENTIONS PLANNED: (Benefits and precautions of physical therapy have been discussed with the patient.)  1. Patient education including pathophysiology of neck/shoulder symptoms, education/training (sleeping positions, posture re-education and body mechanics), and instructions of home exercise program.  2. Therapeutic exercises including mobility through neck/shoulder region and postural stabilization. 3. Manual therapy including soft tissue mobilizations, functional joint mobilizations and neuromuscular re-education to neck and shoulder region. 4. Therapeutic modalities including moist heat, cold pack, electrical stimulation, and/or ultrasound as needed for relief of symptoms with treatment. TREATMENT PLAN EFFECTIVE DATES: 4/7/2018 TO 7/7/2018  FREQUENCY/DURATION: Follow patient 1 time every 2 weeks for 12 weeks to address above goals. SUBJECTIVE:  History of Present Injury/Illness (Reason for Referral): Patient is well known to therapist.  Patient returned to therapy today with continued complaints of neck and shoulder pain. Patient reports that her right shoulder/arm feel weak at times.  She reports she hasn't ever dropped anything, but her arm will catch and she will have to be careful that she doesn't throw anything. She reports that her neck was hurting during the holidays. She reports that she did move out of her work office over the holidays. She reports she is still using cold packs at night and taking her pain medication as needed. She reports she has also been trying to get a few deep tissue massages to help with her pain. Present Symptoms: 5/9/2018: Patient reports she is having some nerve pain in her shoulder/base of her neck that is traveling down her arm at time. Pain Intensity 1: 4  Pain Location 1: Neck, Shoulder  Pain Orientation 1: Right, Left  Pain Intervention(s) 1: Rest, Medication (see MAR)  Pain Description 1: Aching, Constant  Pain Onset 1: Chronic  Pain Scale 1: Numeric (0 - 10)  Patient Stated Pain Goal: 0  · Patient Stated Pain Goal: 0   Dominant Side: right  Past Medical History: Patient reports no significant past medical history. Current Medications: Cymblata, pain medication (PRN)   Date Last Reviewed: 5/9/2018  Social History/Home Situation:   Home Environment: Private residence  Living Alone: No  Support Systems: Family member(s); Friends \ neighbors  Work/Activity History: Retired  OBJECTIVE:  Outcome Measure: Tool Used: Neck Disability Index (NDI)  Score:  Initial: 10/50  Most Recent: 9/50 (Date: 5/9/2018)    Interpretation of Score: The Neck Disability Index is a revised form of the Oswestry Low Back Pain Index and is designed to measure the activities of daily living in person's with neck pain. Each section is scored on a 0-5 scale, 5 representing the greatest disability. The scores of each section are added together for a total score of 50. Score 0 1-10 11-20 21-30 31-40 41-49 50   Modifier CH CI CJ CK CL CM CN     ?  Carrying, Moving, and Handling Objects:     - CURRENT STATUS: CI - 1%-19% impaired, limited or restricted    - GOAL STATUS: CI - 1%-19% impaired, limited or restricted    - D/C STATUS:  ---------------To be determined---------------     Observation/Orthostatic Postural Assessment:   Posture (WDL): Exceptions to WDL  Posture Assessment: Rounded shoulders; Forward head  Palpation:  Tenderness to palpation noted in cervical spine and shoulder region on right greater than left (upper trapezius, levator scapulae, rhomboids, sub-occipitals). No bruising or swelling noted. ROM:    Cervical Assessment (AROM):   · Flexion 100%  · Extension 90%  · Lateral flexion L 90%, R 100%    LUE Assessment (AROM):   · Within defined limits        Shoulder Assessment (AROM):  · Flexion B WFL  · Ext. B WFL  · Rot. 90, R 90  · IR: L 70, R 70     RLE Assessment (AROM):   · Within defined limits      Strength:   LUE Strength, Tone, Sensation:   · Within defined limits  · Deep cervical flexion 20s against gravity    RUE Strength, Tone, Sensation:  · R Shoulder Flexion: 4+  · R Shoulder ABduction: 4+  · R Shoulder Internal Rotation: 4+  · R Shoulder External Rotation: 4+  · R Elbow Flexion: 4+  · R Elbow Extension: 4+        Special Tests: Negative  Neurological Screen:    Myotomes:        RUE Myotomes  · C1 (Cervical Rotation): Strong  · C2, C3, C4 (Shoulder Shrug): Strong  · C5 (Shoulder ABduction): Strong  · C6 (Biceps & Wrist Extension): Strong  · C7 (Triceps & Wrist Flexion): Strong  · C8 (Thumb Extension): Strong  · T1 (Finger ADduction): Strong          Dermatomes:     Within normal limits     Within normal limits          Reflexes:   · Left Bicep (C5,C6): 2+  · Left Bracheoradialis (C5,C6): 2+  · Left Tricep (C6,C7,C8): 2+    · Right Bicep (C5,C6): 2+  · Right Bracheoradialis (C5,C6): 2+  · Right Tricep (C6,C7,C8): 2+          Neural Tension Tests: Negative  Functional Mobility:  Gait Description (WDL): Within defined limits     Balance:   Sitting: Intact  Standing: Intact  TREATMENT:    (In addition to Assessment/Re-Assessment sessions the following treatments were rendered)  Therapeutic Exercise: ( 5 minutes):  Exercises per grid below to improve mobility and strength. Required minimal visual, verbal and manual cues to promote proper body alignment, promote proper body posture and promote proper body mechanics. Progressed resistance, range, repetitions and complexity of movement as indicated. Date:  5/9/2018    Activity/Exercise Parameters   Scapular rows HEP - reviewed   Lat pulls HEP - reviewed   Manual Therapy     · Joint Mobilization (10 Minutes): Grade II anterior-posterior joint mobilization to right glenohumeral joint with active and passive gross movement in all planes to improve range of motion and decrease pain with daily activities. Prone t-spine central posterior anterior grade IV T1-4. · Soft Tissue Mobilization (30 Minutes): Soft tissue mobilization to cervical spinal musculature to improve mobility and decrease tightness and pain with daily activities. Therapeutic Modalities: N/A today                                                                                             HEP: As above; handouts given to patient for all exercises. ______________________________________________________________________________________________________    Treatment Assessment:  Patient tolerated treatment well. Patient reported decreased pain after treatment. Progression/Medical Necessity:   · Patient is expected to demonstrate progress in strength and range of motion to increase independence with daily activities. · Patient demonstrates good rehab potential due to higher previous functional level. · Skilled intervention continues to be required due to decrease in functional mobility. Compliance with Program/Exercises: Compliant   Reason for Continuation of Services/Other Comments:  · Patient continues to demonstrate capacity to improve overall mobility which will increase safety.   Recommendations/Intent for next treatment session: \"Treatment next visit will focus on advancements to more challenging activities\".     Total Treatment Duration:  PT Patient Time In/Time Out  Time In: 0900  Time Out: HOLLY Hoffmann 66, PT

## 2018-05-23 ENCOUNTER — HOSPITAL ENCOUNTER (OUTPATIENT)
Dept: PHYSICAL THERAPY | Age: 73
Discharge: HOME | End: 2018-05-23
Payer: MEDICARE

## 2018-05-23 PROCEDURE — 97140 MANUAL THERAPY 1/> REGIONS: CPT

## 2018-05-23 NOTE — PROGRESS NOTES
[facility] at [address], [address] [facility],[address] [place], [address]  Phone: [phone]   Fax: [phone]    Outpatient PHYSICAL THERAPY: Progress Report  Fall Risk Score: 0 (? 5 = High Risk)  ICD-9: Treatment Diagnosis: Other specified disorders of joint - other disorders of cervical region, cervicalgia  ICD-10: Treatment Diagnosis: Cervicalgia (M54.2)     REFERRING PHYSICIAN: Mary Cheng MD MD Orders: Evaluate and Treat  Return Physician Appointment: TBD  MEDICAL/REFERRING DIAGNOSIS:  Cervicalgia [M54.2]  Other physical therapy [[id number]]  DATE OF ONSET: Chronic   PRIOR LEVEL OF FUNCTION: Independent  PRECAUTIONS/ALLERGIES:   Allergies   Allergen Reactions    Alendronate Sodium Rash    Antihistamine [Triprolidine-Pseudoephedrine] Anxiety    Boniva [Ibandronate] Diarrhea     ASSESSMENT:  ?????? ? ? This section established at most recent assessment?????????? Patient presents with improving range of motion, decreased strength, and pain in right shoulder and neck secondary to chronic pain. Patient has attended a total of 97 scheduled physical therapy visits including initial evaluation on 1/7/2014. Treatment has consisted of manual therapy and streching to improve pain and performance with activities of daily living. PROBLEM LIST (Impairments causing functional limitations):  1. Decreased independence with ADLs requiring any extensive UE activity (lifting/reaching/vacuuming). 2.  Decreased tolerance of prolonged sitting/standing activities (>30 minutes) due to exacerbation of neck pain. 3.  Unable to participate in social/recreational activities due to fear avoidance of onset of neck/shoulder pain. 4.  Outcome measure score of 10/50 on Neck Disability Index with minimal effect of pain on patient's ability to manage every day life activities.   GOALS: (Goals have been discussed and agreed upon with patient.)  SHORT-TERM FUNCTIONAL GOALS: Time Frame: 3 weeks  1. Patient will be independent with home exercise program without exacerbation of symptoms or cueing needed--goal met. 2. Patient will be independent with correct sleeping positions and awareness/avoidance of aggravating positions without cueing needed--goal met. DISCHARGE GOALS: Time Frame: 8 weeks  1. Patient will be independent with all ADLs with minimal onset of neck/shoulder pain and no deficits with daily tasks--goal met. 2. Patient will report no fear avoidance with social or recreational activities due to neck/shoulder pain--goal ongoing. 3. Patient will score less than or equal to 5/50 on Neck Disability Index with minimal effect of pain on patient's ability to manage every day life activities--goal ongoing. REHABILITATION POTENTIAL FOR STATED GOALS: [facility]:  INTERVENTIONS PLANNED: (Benefits and precautions of physical therapy have been discussed with the patient.)  1. Patient education including pathophysiology of neck/shoulder symptoms, education/training (sleeping positions, posture re-education and body mechanics), and instructions of home exercise program.  2. Therapeutic exercises including mobility through neck/shoulder region and postural stabilization. 3. Manual therapy including soft tissue mobilizations, functional joint mobilizations and neuromuscular re-education to neck and shoulder region. 4. Therapeutic modalities including moist heat, cold pack, electrical stimulation, and/or ultrasound as needed for relief of symptoms with treatment. TREATMENT PLAN EFFECTIVE DATES: 4/7/2018 TO 7/7/2018  FREQUENCY/DURATION: Follow patient 1 time every 2 weeks for 12 weeks to address above goals. SUBJECTIVE:  History of Present Injury/Illness (Reason for Referral): Patient is well known to therapist.  Patient returned to therapy today with continued complaints of neck and shoulder pain. Patient reports that her right shoulder/arm feel weak at times.  She reports she hasn't ever dropped anything, but her arm will catch and she will have to be careful that she doesn't throw anything. She reports that her neck was hurting during the holidays. She reports that she did move out of her work office over the holidays. She reports she is still using cold packs at night and taking her pain medication as needed. She reports she has also been trying to get a few deep tissue massages to help with her pain. Present Symptoms: 5/23/2018: Patient reports she is having pain in her neck and shoulder today. Pain Intensity 1: 4  Pain Location 1: Neck, Shoulder  Pain Orientation 1: Right, Left  Pain Intervention(s) 1: Rest, Medication (see MAR)  Pain Description 1: Aching, Constant  Pain Onset 1: Chronic  Pain Scale 1: Numeric (0 - 10)  Patient Stated Pain Goal: 0  · Patient Stated Pain Goal: 0   Dominant Side: right  Past Medical History: Patient reports no significant past medical history. Current Medications: Cymblata, pain medication (PRN)   Date Last Reviewed: 5/23/2018  Social History/Home Situation:   Home Environment: Private residence  Living Alone: No  Support Systems: Family member(s); Friends \ neighbors  Work/Activity History: Retired  OBJECTIVE:  Outcome Measure: Tool Used: Neck Disability Index (NDI)  Score:  Initial: 10/50  Most Recent: 9/50 (Date: 5/9/2018)    Interpretation of Score: The Neck Disability Index is a revised form of the Oswestry Low Back Pain Index and is designed to measure the activities of daily living in person's with neck pain. Each section is scored on a 0-5 scale, 5 representing the greatest disability. The scores of each section are added together for a total score of 50. Score 0 1-10 11-20 21-30 31-40 41-49 50   Modifier CH CI CJ CK CL CM CN     ?  Carrying, Moving, and Handling Objects:     - CURRENT STATUS: CI - 1%-19% impaired, limited or restricted    - GOAL STATUS: CI - 1%-19% impaired, limited or restricted    - D/C STATUS:  ---------------To be determined---------------     Observation/Orthostatic Postural Assessment:   Posture (WDL): Exceptions to WDL  Posture Assessment: Rounded shoulders; Forward head  Palpation:  Tenderness to palpation noted in cervical spine and shoulder region on right greater than left (upper trapezius, levator scapulae, rhomboids, sub-occipitals). No bruising or swelling noted. ROM:    Cervical Assessment (AROM):   · Flexion 100%  · Extension 90%  · Lateral flexion L 90%, R 100%    LUE Assessment (AROM):   · Within defined limits        Shoulder Assessment (AROM):  · Flexion B WFL  · Ext. B WFL  · Rot. 90, R 90  · IR: L 70, R 70     RLE Assessment (AROM):   · Within defined limits      Strength:   LUE Strength, Tone, Sensation:   · Within defined limits  · Deep cervical flexion 20s against gravity    RUE Strength, Tone, Sensation:  · R Shoulder Flexion: 4+  · R Shoulder ABduction: 4+  · R Shoulder Internal Rotation: 4+  · R Shoulder External Rotation: 4+  · R Elbow Flexion: 4+  · R Elbow Extension: 4+        Special Tests: Negative  Neurological Screen:    Myotomes:        RUE Myotomes  · C1 (Cervical Rotation): Strong  · C2, C3, C4 (Shoulder Shrug): Strong  · C5 (Shoulder ABduction): Strong  · C6 (Biceps & Wrist Extension): Strong  · C7 (Triceps & Wrist Flexion): Strong  · C8 (Thumb Extension): Strong  · T1 (Finger ADduction): Strong          Dermatomes:     Within normal limits     Within normal limits          Reflexes:   · Left Bicep (C5,C6): 2+  · Left Bracheoradialis (C5,C6): 2+  · Left Tricep (C6,C7,C8): 2+    · Right Bicep (C5,C6): 2+  · Right Bracheoradialis (C5,C6): 2+  · Right Tricep (C6,C7,C8): 2+          Neural Tension Tests: Negative  Functional Mobility:  Gait Description (WDL): Within defined limits     Balance:   Sitting: Intact  Standing: Intact  TREATMENT:    (In addition to Assessment/Re-Assessment sessions the following treatments were rendered)  Therapeutic Exercise: ( 5 minutes):  Exercises per grid below to improve mobility and strength. Required minimal visual, verbal and manual cues to promote proper body alignment, promote proper body posture and promote proper body mechanics. Progressed resistance, range, repetitions and complexity of movement as indicated. Date:  5/23/2018    Activity/Exercise Parameters   Scapular rows HEP - reviewed   Lat pulls HEP - reviewed   Manual Therapy     · Joint Mobilization (10 Minutes): Grade II anterior-posterior joint mobilization to right glenohumeral joint with active and passive gross movement in all planes to improve range of motion and decrease pain with daily activities. Prone t-spine central posterior anterior grade IV T1-4. · Soft Tissue Mobilization (30 Minutes): Soft tissue mobilization to cervical spinal musculature to improve mobility and decrease tightness and pain with daily activities. Therapeutic Modalities: N/A today                                                                                             HEP: As above; handouts given to patient for all exercises. ______________________________________________________________________________________________________    Treatment Assessment:  Patient tolerated treatment well. Patient reported decreased pain after treatment. Progression/Medical Necessity:   · Patient is expected to demonstrate progress in strength and range of motion to increase independence with daily activities. · Patient demonstrates good rehab potential due to higher previous functional level. · Skilled intervention continues to be required due to decrease in functional mobility. Compliance with Program/Exercises: Compliant   Reason for Continuation of Services/Other Comments:  · Patient continues to demonstrate capacity to improve overall mobility which will increase safety.   Recommendations/Intent for next treatment session: \"Treatment next visit will focus on advancements to more challenging activities\".     Total Treatment Duration:  PT Patient Time In/Time Out  Time In: 0900  Time Out: BOLA Mason

## 2018-06-20 ENCOUNTER — HOSPITAL ENCOUNTER (OUTPATIENT)
Dept: PHYSICAL THERAPY | Age: 73
Discharge: HOME | End: 2018-06-20
Payer: MEDICARE

## 2018-06-20 PROCEDURE — G8985 CARRY GOAL STATUS: HCPCS

## 2018-06-20 PROCEDURE — 97140 MANUAL THERAPY 1/> REGIONS: CPT

## 2018-06-20 PROCEDURE — G8984 CARRY CURRENT STATUS: HCPCS

## 2018-06-20 NOTE — PROGRESS NOTES
[facility] [address], [address] [place] [address],[address] [place], [address]  Phone: [phone]   Fax: [phone]    Outpatient PHYSICAL THERAPY: Daily Note  Fall Risk Score: 0 (? 5 = High Risk)  ICD-9: Treatment Diagnosis: Other specified disorders of joint - other disorders of cervical region, cervicalgia  ICD-10: Treatment Diagnosis: Cervicalgia (M54.2)     REFERRING PHYSICIAN: Alem Quintero MD MD Orders: Evaluate and Treat  Return Physician Appointment: TBD  MEDICAL/REFERRING DIAGNOSIS:  Cervicalgia [M54.2]  Other physical therapy [[id number]]  DATE OF ONSET: Chronic   PRIOR LEVEL OF FUNCTION: Independent  PRECAUTIONS/ALLERGIES:   Allergies   Allergen Reactions    Alendronate Sodium Rash    Antihistamine [Triprolidine-Pseudoephedrine] Anxiety    Boniva [Ibandronate] Diarrhea     ASSESSMENT:  ?????? ? ? This section established at most recent assessment?????????? Patient presents with improving range of motion, decreased strength, and pain in right shoulder and neck secondary to chronic pain. Patient has attended a total of 100 scheduled physical therapy visits including initial evaluation on 1/7/2014. Treatment has consisted of manual therapy and streching to improve pain and performance with activities of daily living. PROBLEM LIST (Impairments causing functional limitations):  1. Decreased independence with ADLs requiring any extensive UE activity (lifting/reaching/vacuuming). 2.  Decreased tolerance of prolonged sitting/standing activities (>30 minutes) due to exacerbation of neck pain. 3.  Unable to participate in social/recreational activities due to fear avoidance of onset of neck/shoulder pain. 4.  Outcome measure score of 10/50 on Neck Disability Index with minimal effect of pain on patient's ability to manage every day life activities.   GOALS: (Goals have been discussed and agreed upon with patient.)  SHORT-TERM FUNCTIONAL GOALS: Time Frame: 3 weeks  1. Patient will be independent with home exercise program without exacerbation of symptoms or cueing needed--goal met. 2. Patient will be independent with correct sleeping positions and awareness/avoidance of aggravating positions without cueing needed--goal met. DISCHARGE GOALS: Time Frame: 8 weeks  1. Patient will be independent with all ADLs with minimal onset of neck/shoulder pain and no deficits with daily tasks--goal met. 2. Patient will report no fear avoidance with social or recreational activities due to neck/shoulder pain--goal ongoing. 3. Patient will score less than or equal to 5/50 on Neck Disability Index with minimal effect of pain on patient's ability to manage every day life activities--goal ongoing. REHABILITATION POTENTIAL FOR STATED GOALS: Ike Rummage OF CARE:  INTERVENTIONS PLANNED: (Benefits and precautions of physical therapy have been discussed with the patient.)  1. Patient education including pathophysiology of neck/shoulder symptoms, education/training (sleeping positions, posture re-education and body mechanics), and instructions of home exercise program.  2. Therapeutic exercises including mobility through neck/shoulder region and postural stabilization. 3. Manual therapy including soft tissue mobilizations, functional joint mobilizations and neuromuscular re-education to neck and shoulder region. 4. Therapeutic modalities including moist heat, cold pack, electrical stimulation, and/or ultrasound as needed for relief of symptoms with treatment. TREATMENT PLAN EFFECTIVE DATES: 4/7/2018 TO 7/7/2018  FREQUENCY/DURATION: Follow patient 1 time every 2 weeks for 12 weeks to address above goals. SUBJECTIVE:  History of Present Injury/Illness (Reason for Referral): Patient is well known to therapist.  Patient returned to therapy today with continued complaints of neck and shoulder pain. Patient reports that her right shoulder/arm feel weak at times.  She reports she hasn't ever dropped anything, but her arm will catch and she will have to be careful that she doesn't throw anything. She reports that her neck was hurting during the holidays. She reports that she did move out of her work office over the holidays. She reports she is still using cold packs at night and taking her pain medication as needed. She reports she has also been trying to get a few deep tissue massages to help with her pain. Present Symptoms: 6/20/2018: Patient reports she is having pain in her neck and shoulder today. She reports she fell a few weeks ago and injured her back, but is doing much better now. Pain Intensity 1: 4  Pain Location 1: Neck, Shoulder  Pain Orientation 1: Right, Left  Pain Intervention(s) 1: Rest, Medication (see MAR)  Pain Description 1: Aching, Constant  Pain Onset 1: Chronic  Pain Scale 1: Numeric (0 - 10)  Patient Stated Pain Goal: 0  · Patient Stated Pain Goal: 0   Dominant Side: right  Past Medical History: Patient reports no significant past medical history. Current Medications: Cymblata, pain medication (PRN)   Date Last Reviewed: 6/20/2018  Social History/Home Situation:   Home Environment: Private residence  Living Alone: No  Support Systems: Family member(s); Friends \ neighbors  Work/Activity History: Retired  OBJECTIVE:  Outcome Measure: Tool Used: Neck Disability Index (NDI)  Score:  Initial: 10/50  Most Recent: 9/50 (Date: 6/20/2018)    Interpretation of Score: The Neck Disability Index is a revised form of the Oswestry Low Back Pain Index and is designed to measure the activities of daily living in person's with neck pain. Each section is scored on a 0-5 scale, 5 representing the greatest disability. The scores of each section are added together for a total score of 50. Score 0 1-10 11-20 21-30 31-40 41-49 50   Modifier CH CI CJ CK CL CM CN     ?  Carrying, Moving, and Handling Objects:     - CURRENT STATUS: CI - 1%-19% impaired, limited or restricted    - GOAL STATUS: CI - 1%-19% impaired, limited or restricted    - D/C STATUS:  ---------------To be determined---------------     Observation/Orthostatic Postural Assessment:   Posture (WDL): Exceptions to WDL  Posture Assessment: Rounded shoulders; Forward head  Palpation:  Tenderness to palpation noted in cervical spine and shoulder region on right greater than left (upper trapezius, levator scapulae, rhomboids, sub-occipitals). No bruising or swelling noted. ROM:    Cervical Assessment (AROM):   · Flexion 100%  · Extension 90%  · Lateral flexion L 90%, R 100%    LUE Assessment (AROM):   · Within defined limits        Shoulder Assessment (AROM):  · Flexion B WFL  · Ext. B WFL  · Rot. 90, R 90  · IR: L 70, R 70     RLE Assessment (AROM):   · Within defined limits      Strength:   LUE Strength, Tone, Sensation:   · Within defined limits  · Deep cervical flexion 20s against gravity    RUE Strength, Tone, Sensation:  · R Shoulder Flexion: 4+  · R Shoulder ABduction: 4+  · R Shoulder Internal Rotation: 4+  · R Shoulder External Rotation: 4+  · R Elbow Flexion: 4+  · R Elbow Extension: 4+        Special Tests: Negative  Neurological Screen:    Myotomes:        RUE Myotomes  · C1 (Cervical Rotation): Strong  · C2, C3, C4 (Shoulder Shrug): Strong  · C5 (Shoulder ABduction): Strong  · C6 (Biceps & Wrist Extension): Strong  · C7 (Triceps & Wrist Flexion): Strong  · C8 (Thumb Extension): Strong  · T1 (Finger ADduction): Strong          Dermatomes:     Within normal limits     Within normal limits          Reflexes:   · Left Bicep (C5,C6): 2+  · Left Bracheoradialis (C5,C6): 2+  · Left Tricep (C6,C7,C8): 2+    · Right Bicep (C5,C6): 2+  · Right Bracheoradialis (C5,C6): 2+  · Right Tricep (C6,C7,C8): 2+          Neural Tension Tests: Negative  Functional Mobility:  Gait Description (WDL): Within defined limits     Balance:   Sitting: Intact  Standing: Intact  TREATMENT:    (In addition to Assessment/Re-Assessment sessions the following treatments were rendered)  Therapeutic Exercise: ( 5 minutes):  Exercises per grid below to improve mobility and strength. Required minimal visual, verbal and manual cues to promote proper body alignment, promote proper body posture and promote proper body mechanics. Progressed resistance, range, repetitions and complexity of movement as indicated. Date:  6/20/2018    Activity/Exercise Parameters   Scapular rows HEP - reviewed   Lat pulls HEP - reviewed   Manual Therapy     · Joint Mobilization (10 Minutes): Grade II anterior-posterior joint mobilization to right glenohumeral joint with active and passive gross movement in all planes to improve range of motion and decrease pain with daily activities. Prone t-spine central posterior anterior grade IV T1-4. · Soft Tissue Mobilization (30 Minutes): Soft tissue mobilization to cervical spinal musculature to improve mobility and decrease tightness and pain with daily activities. Therapeutic Modalities: N/A today                                                                                             HEP: As above; handouts given to patient for all exercises. ______________________________________________________________________________________________________    Treatment Assessment:  Patient tolerated treatment well. Patient reported decreased pain after treatment. Progression/Medical Necessity:   · Patient is expected to demonstrate progress in strength and range of motion to increase independence with daily activities. · Patient demonstrates good rehab potential due to higher previous functional level. · Skilled intervention continues to be required due to decrease in functional mobility.   Compliance with Program/Exercises: Compliant   Reason for Continuation of Services/Other Comments:  · Patient continues to demonstrate capacity to improve overall mobility which will increase safety. Recommendations/Intent for next treatment session: \"Treatment next visit will focus on advancements to more challenging activities\".     Total Treatment Duration:  PT Patient Time In/Time Out  Time In: 0900  Time Out: BOLA Mason

## 2018-06-27 ENCOUNTER — HOSPITAL ENCOUNTER (OUTPATIENT)
Dept: PHYSICAL THERAPY | Age: 73
Discharge: HOME | End: 2018-06-27
Payer: MEDICARE

## 2018-06-27 PROCEDURE — 97140 MANUAL THERAPY 1/> REGIONS: CPT

## 2018-06-27 NOTE — PROGRESS NOTES
[facility] [address], [address] [place] [address],[address] [place], [address]  Phone: [phone]   Fax: [phone]    Outpatient PHYSICAL THERAPY: Daily Note  Fall Risk Score: 0 (? 5 = High Risk)  ICD-9: Treatment Diagnosis: Other specified disorders of joint - other disorders of cervical region, cervicalgia  ICD-10: Treatment Diagnosis: Cervicalgia (M54.2)     REFERRING PHYSICIAN: Shyanne Oneal MD MD Orders: Evaluate and Treat  Return Physician Appointment: TBD  MEDICAL/REFERRING DIAGNOSIS:  Cervicalgia [M54.2]  Other physical therapy [[id number]]  DATE OF ONSET: Chronic   PRIOR LEVEL OF FUNCTION: Independent  PRECAUTIONS/ALLERGIES:   Allergies   Allergen Reactions    Alendronate Sodium Rash    Antihistamine [Triprolidine-Pseudoephedrine] Anxiety    Boniva [Ibandronate] Diarrhea     ASSESSMENT:  ?????? ? ? This section established at most recent assessment?????????? Patient presents with improving range of motion, decreased strength, and pain in right shoulder and neck secondary to chronic pain. Patient has attended a total of 101 scheduled physical therapy visits including initial evaluation on 1/7/2014. Treatment has consisted of manual therapy and streching to improve pain and performance with activities of daily living. PROBLEM LIST (Impairments causing functional limitations):  1. Decreased independence with ADLs requiring any extensive UE activity (lifting/reaching/vacuuming). 2.  Decreased tolerance of prolonged sitting/standing activities (>30 minutes) due to exacerbation of neck pain. 3.  Unable to participate in social/recreational activities due to fear avoidance of onset of neck/shoulder pain. 4.  Outcome measure score of 10/50 on Neck Disability Index with minimal effect of pain on patient's ability to manage every day life activities.   GOALS: (Goals have been discussed and agreed upon with patient.)  SHORT-TERM FUNCTIONAL GOALS: Time Frame: 3 weeks  1. Patient will be independent with home exercise program without exacerbation of symptoms or cueing needed--goal met. 2. Patient will be independent with correct sleeping positions and awareness/avoidance of aggravating positions without cueing needed--goal met. DISCHARGE GOALS: Time Frame: 8 weeks  1. Patient will be independent with all ADLs with minimal onset of neck/shoulder pain and no deficits with daily tasks--goal met. 2. Patient will report no fear avoidance with social or recreational activities due to neck/shoulder pain--goal ongoing. 3. Patient will score less than or equal to 5/50 on Neck Disability Index with minimal effect of pain on patient's ability to manage every day life activities--goal ongoing. REHABILITATION POTENTIAL FOR STATED GOALS: Denny Bame OF CARE:  INTERVENTIONS PLANNED: (Benefits and precautions of physical therapy have been discussed with the patient.)  1. Patient education including pathophysiology of neck/shoulder symptoms, education/training (sleeping positions, posture re-education and body mechanics), and instructions of home exercise program.  2. Therapeutic exercises including mobility through neck/shoulder region and postural stabilization. 3. Manual therapy including soft tissue mobilizations, functional joint mobilizations and neuromuscular re-education to neck and shoulder region. 4. Therapeutic modalities including moist heat, cold pack, electrical stimulation, and/or ultrasound as needed for relief of symptoms with treatment. TREATMENT PLAN EFFECTIVE DATES: 4/7/2018 TO 7/7/2018  FREQUENCY/DURATION: Follow patient 1 time every 2 weeks for 12 weeks to address above goals. SUBJECTIVE:  History of Present Injury/Illness (Reason for Referral): Patient is well known to therapist.  Patient returned to therapy today with continued complaints of neck and shoulder pain. Patient reports that her right shoulder/arm feel weak at times.  She reports she hasn't ever dropped anything, but her arm will catch and she will have to be careful that she doesn't throw anything. She reports that her neck was hurting during the holidays. She reports that she did move out of her work office over the holidays. She reports she is still using cold packs at night and taking her pain medication as needed. She reports she has also been trying to get a few deep tissue massages to help with her pain. Present Symptoms: 6/27/2018: Patient reports she had a small headache after yoga the other day because of the positions they were in. Pain Intensity 1: 4  Pain Location 1: Neck, Shoulder  Pain Orientation 1: Right, Left  Pain Intervention(s) 1: Rest, Medication (see MAR)  Pain Description 1: Aching, Constant  Pain Onset 1: Chronic  Pain Scale 1: Numeric (0 - 10)  Patient Stated Pain Goal: 0  · Patient Stated Pain Goal: 0   Dominant Side: right  Past Medical History: Patient reports no significant past medical history. Current Medications: Cymblata, pain medication (PRN)   Date Last Reviewed: 6/27/2018  Social History/Home Situation:   Home Environment: Private residence  Living Alone: No  Support Systems: Family member(s); Friends \ neighbors  Work/Activity History: Retired  OBJECTIVE:  Outcome Measure: Tool Used: Neck Disability Index (NDI)  Score:  Initial: 10/50  Most Recent: 9/50 (Date: 6/20/2018)    Interpretation of Score: The Neck Disability Index is a revised form of the Oswestry Low Back Pain Index and is designed to measure the activities of daily living in person's with neck pain. Each section is scored on a 0-5 scale, 5 representing the greatest disability. The scores of each section are added together for a total score of 50. Score 0 1-10 11-20 21-30 31-40 41-49 50   Modifier CH CI CJ CK CL CM CN     ?  Carrying, Moving, and Handling Objects:     - CURRENT STATUS: CI - 1%-19% impaired, limited or restricted    - GOAL STATUS: CI - 1%-19% impaired, limited or restricted    - D/C STATUS:  ---------------To be determined---------------     Observation/Orthostatic Postural Assessment:   Posture (WDL): Exceptions to WDL  Posture Assessment: Rounded shoulders; Forward head  Palpation:  Tenderness to palpation noted in cervical spine and shoulder region on right greater than left (upper trapezius, levator scapulae, rhomboids, sub-occipitals). No bruising or swelling noted. ROM:    Cervical Assessment (AROM):   · Flexion 100%  · Extension 90%  · Lateral flexion L 90%, R 100%    LUE Assessment (AROM):   · Within defined limits        Shoulder Assessment (AROM):  · Flexion B WFL  · Ext. B WFL  · Rot. 90, R 90  · IR: L 70, R 70     RLE Assessment (AROM):   · Within defined limits      Strength:   LUE Strength, Tone, Sensation:   · Within defined limits  · Deep cervical flexion 20s against gravity    RUE Strength, Tone, Sensation:  · R Shoulder Flexion: 4+  · R Shoulder ABduction: 4+  · R Shoulder Internal Rotation: 4+  · R Shoulder External Rotation: 4+  · R Elbow Flexion: 4+  · R Elbow Extension: 4+        Special Tests: Negative  Neurological Screen:    Myotomes:        RUE Myotomes  · C1 (Cervical Rotation): Strong  · C2, C3, C4 (Shoulder Shrug): Strong  · C5 (Shoulder ABduction): Strong  · C6 (Biceps & Wrist Extension): Strong  · C7 (Triceps & Wrist Flexion): Strong  · C8 (Thumb Extension): Strong  · T1 (Finger ADduction): Strong          Dermatomes:     Within normal limits     Within normal limits          Reflexes:   · Left Bicep (C5,C6): 2+  · Left Bracheoradialis (C5,C6): 2+  · Left Tricep (C6,C7,C8): 2+    · Right Bicep (C5,C6): 2+  · Right Bracheoradialis (C5,C6): 2+  · Right Tricep (C6,C7,C8): 2+          Neural Tension Tests: Negative  Functional Mobility:  Gait Description (WDL): Within defined limits     Balance:   Sitting: Intact  Standing: Intact  TREATMENT:    (In addition to Assessment/Re-Assessment sessions the following treatments were rendered)  Therapeutic Exercise: ( 5 minutes):  Exercises per grid below to improve mobility and strength. Required minimal visual, verbal and manual cues to promote proper body alignment, promote proper body posture and promote proper body mechanics. Progressed resistance, range, repetitions and complexity of movement as indicated. Date:  6/27/2018    Activity/Exercise Parameters   Scapular rows HEP - reviewed   Lat pulls HEP - reviewed   Manual Therapy     · Joint Mobilization (10 Minutes): Grade II anterior-posterior joint mobilization to right glenohumeral joint with active and passive gross movement in all planes to improve range of motion and decrease pain with daily activities. Prone t-spine central posterior anterior grade IV T1-4. · Soft Tissue Mobilization (30 Minutes): Soft tissue mobilization to cervical spinal musculature to improve mobility and decrease tightness and pain with daily activities. Therapeutic Modalities: N/A today                                                                                             HEP: As above; handouts given to patient for all exercises. ______________________________________________________________________________________________________    Treatment Assessment:  Patient tolerated treatment well. Patient reported decreased pain after treatment. Progression/Medical Necessity:   · Patient is expected to demonstrate progress in strength and range of motion to increase independence with daily activities. · Patient demonstrates good rehab potential due to higher previous functional level. · Skilled intervention continues to be required due to decrease in functional mobility. Compliance with Program/Exercises: Compliant   Reason for Continuation of Services/Other Comments:  · Patient continues to demonstrate capacity to improve overall mobility which will increase safety.   Recommendations/Intent for next treatment session: \"Treatment next visit will focus on advancements to more challenging activities\".     Total Treatment Duration:  PT Patient Time In/Time Out  Time In: 0845  Time Out: [address], PT

## 2018-07-18 ENCOUNTER — HOSPITAL ENCOUNTER (OUTPATIENT)
Dept: PHYSICAL THERAPY | Age: 73
Discharge: HOME | End: 2018-07-18
Payer: MEDICARE

## 2018-07-18 PROCEDURE — 97140 MANUAL THERAPY 1/> REGIONS: CPT

## 2018-07-18 PROCEDURE — G8984 CARRY CURRENT STATUS: HCPCS

## 2018-07-18 PROCEDURE — G8985 CARRY GOAL STATUS: HCPCS

## 2018-07-18 NOTE — THERAPY RECERTIFICATION
Therapy Center at [facility] Medical Office Building [address], [address] [facility],[address] [place], [address] Phone: [phone]   Fax: [phone] Outpatient PHYSICAL THERAPY: Recertification Fall Risk Score: 0 (? 5 = High Risk) ICD-9: Treatment Diagnosis: Other specified disorders of joint - other disorders of cervical region, cervicalgia ICD-10: Treatment Diagnosis: Cervicalgia (M54.2) REFERRING PHYSICIAN: Bentley Villar MD MD Orders: Evaluate and Treat Return Physician Appointment: TBD MEDICAL/REFERRING DIAGNOSIS: 
Cervicalgia [M54.2] Other physical therapy [[id number]] DATE OF ONSET: Chronic PRIOR LEVEL OF FUNCTION: Independent PRECAUTIONS/ALLERGIES:  
Allergies Allergen Reactions  Alendronate Sodium Rash  Antihistamine [Triprolidine-Pseudoephedrine] Anxiety  Boniva [Ibandronate] Diarrhea ASSESSMENT: 
?????? ? ? This section established at most recent assessment?????????? Patient presents with improving range of motion, decreased strength, and pain in right shoulder and neck secondary to chronic pain. Patient has attended a total of 102 scheduled physical therapy visits including initial evaluation on 1/7/2014. Treatment has consisted of manual therapy and streching to improve pain and performance with activities of daily living. PROBLEM LIST (Impairments causing functional limitations): 1. Decreased independence with ADLs requiring any extensive UE activity (lifting/reaching/vacuuming). 2.  Decreased tolerance of prolonged sitting/standing activities (>30 minutes) due to exacerbation of neck pain. 3.  Unable to participate in social/recreational activities due to fear avoidance of onset of neck/shoulder pain. 4.  Outcome measure score of 10/50 on Neck Disability Index with minimal effect of pain on patient's ability to manage every day life activities. GOALS: (Goals have been discussed and agreed upon with patient.) SHORT-TERM FUNCTIONAL GOALS: Time Frame: 3 weeks 1. Patient will be independent with home exercise program without exacerbation of symptoms or cueing needed--goal met. 2. Patient will be independent with correct sleeping positions and awareness/avoidance of aggravating positions without cueing needed--goal met. DISCHARGE GOALS: Time Frame: 8 weeks 1. Patient will be independent with all ADLs with minimal onset of neck/shoulder pain and no deficits with daily tasks--goal met. 2. Patient will report no fear avoidance with social or recreational activities due to neck/shoulder pain--goal ongoing. 3. Patient will score less than or equal to 5/50 on Neck Disability Index with minimal effect of pain on patient's ability to manage every day life activities--goal ongoing. REHABILITATION POTENTIAL FOR STATED GOALS: Merlin Smiling OF CARE: 
INTERVENTIONS PLANNED: (Benefits and precautions of physical therapy have been discussed with the patient.) 1. Patient education including pathophysiology of neck/shoulder symptoms, education/training (sleeping positions, posture re-education and body mechanics), and instructions of home exercise program. 
2. Therapeutic exercises including mobility through neck/shoulder region and postural stabilization. 3. Manual therapy including soft tissue mobilizations, functional joint mobilizations and neuromuscular re-education to neck and shoulder region. 4. Therapeutic modalities including moist heat, cold pack, electrical stimulation, and/or ultrasound as needed for relief of symptoms with treatment. TREATMENT PLAN EFFECTIVE DATES: 7/7/2018 TO 10/7/2018 FREQUENCY/DURATION: Follow patient 1 time every 2 weeks for 12 weeks to address above goals. Regarding Paola Beach's therapy, I certify that the treatment plan above will be carried out by a therapist or under their direction. Thank you for this referral, 
Viviane Beatty PT Referring Physician Signature: Sonia Marcum MD          Date SUBJECTIVE: 
History of Present Injury/Illness (Reason for Referral): Patient is well known to therapist.  Patient returned to therapy today with continued complaints of neck and shoulder pain. Patient reports that her right shoulder/arm feel weak at times. She reports she hasn't ever dropped anything, but her arm will catch and she will have to be careful that she doesn't throw anything. She reports that her neck was hurting during the holidays. She reports that she did move out of her work office over the holidays. She reports she is still using cold packs at night and taking her pain medication as needed. She reports she has also been trying to get a few deep tissue massages to help with her pain. Present Symptoms: 7/18/2018: Patient reports she has been having neck and back pain lately. Pain Intensity 1: 4 Pain Location 1: Neck, Shoulder Pain Orientation 1: Right, Left Pain Intervention(s) 1: Rest, Medication (see MAR) Pain Description 1: Aching, Constant Pain Onset 1: Chronic Pain Scale 1: Numeric (0 - 10) Patient Stated Pain Goal: 0 
· Patient Stated Pain Goal: 0 Dominant Side: right Past Medical History: Patient reports no significant past medical history. Current Medications: Cymblata, pain medication (PRN) Date Last Reviewed: 7/18/2018 Social History/Home Situation:  
Home Environment: Private residence Living Alone: No 
Support Systems: Family member(s); Friends \ neighbors Work/Activity History: Retired OBJECTIVE: 
Outcome Measure: Tool Used: Neck Disability Index (NDI) Score:  Initial: 10/50  Most Recent: 9/50 (Date:7/18/2018) Interpretation of Score: The Neck Disability Index is a revised form of the Oswestry Low Back Pain Index and is designed to measure the activities of daily living in person's with neck pain. Each section is scored on a 0-5 scale, 5 representing the greatest disability. The scores of each section are added together for a total score of 50.   
Score 0 1-10 11-20 21-30 31-40 41-49 50 Modifier CH CI CJ CK CL CM CN ? Carrying, Moving, and Handling Objects:  
  - CURRENT STATUS: CI - 1%-19% impaired, limited or restricted  - GOAL STATUS: CI - 1%-19% impaired, limited or restricted  - D/C STATUS:  ---------------To be determined--------------- Observation/Orthostatic Postural Assessment:  
Posture (WDL): Exceptions to [facility] Posture Assessment: Rounded shoulders; Forward head Palpation:  Tenderness to palpation noted in cervical spine and shoulder region on right greater than left (upper trapezius, levator scapulae, rhomboids, sub-occipitals). No bruising or swelling noted. ROM:  
 Cervical Assessment (AROM):  
· Flexion 100% · Extension 90% · Lateral flexion L 90%, R 100% LUE Assessment (AROM):  
· Within defined limits Shoulder Assessment (AROM): 
· Flexion B [facility] [place] · Ext. B [facility] · Rot. 90, R 90 
· IR: L 70, R 70 RLE Assessment (AROM):  
· Within defined limits Strength: LUE Strength, Tone, Sensation: · Within defined limits · Deep cervical flexion 20s against gravity RUE Strength, Tone, Sensation: · R Shoulder Flexion: 4+ · R Shoulder ABduction: 4+ · R Shoulder Internal Rotation: 4+ · R Shoulder External Rotation: 4+ · R Elbow Flexion: 4+ · R Elbow Extension: 4+ Special Tests: Negative Neurological Screen: Myotomes:  
  
  RUE Myotomes · C1 (Cervical Rotation): Strong · C2, C3, C4 (Shoulder Shrug): Strong · C5 (Shoulder ABduction): Strong · C6 (Biceps & Wrist Extension): Strong · C7 (Triceps & Wrist Flexion): Strong · C8 (Thumb Extension): Strong · T1 (Finger ADduction): Strong Dermatomes: Within normal limits     Within normal limits Reflexes: · Left Bicep (C5,C6): 2+ · Left Bracheoradialis (C5,C6): 2+ · Left Tricep (C6,C7,C8): 2+    · Right Bicep (C5,C6): 2+ · Right Bracheoradialis (C5,C6): 2+ · Right Tricep (C6,C7,C8): 2+  Neural Tension Tests: Negative Functional Mobility:  Gait Description (WDL): Within defined limits Balance:  
Sitting: Intact Standing: Intact TREATMENT:  
 (In addition to Assessment/Re-Assessment sessions the following treatments were rendered) Therapeutic Exercise: ( 5 minutes):  Exercises per grid below to improve mobility and strength. Required minimal visual, verbal and manual cues to promote proper body alignment, promote proper body posture and promote proper body mechanics. Progressed resistance, range, repetitions and complexity of movement as indicated. Date: 
7/18/2018 Activity/Exercise Parameters Scapular rows HEP - reviewed Lat pulls HEP - reviewed Manual Therapy · Joint Mobilization (10 Minutes): Grade II anterior-posterior joint mobilization to right glenohumeral joint with active and passive gross movement in all planes to improve range of motion and decrease pain with daily activities. Prone t-spine central posterior anterior grade IV T1-4. · Soft Tissue Mobilization (30 Minutes): Soft tissue mobilization to cervical spinal musculature to improve mobility and decrease tightness and pain with daily activities. Therapeutic Modalities: N/A today HEP: As above; handouts given to patient for all exercises. ______________________________________________________________________________________________________ Treatment Assessment:  Patient tolerated treatment well. Patient reported decreased pain after treatment. Progression/Medical Necessity:  
· Patient is expected to demonstrate progress in strength and range of motion to increase independence with daily activities. · Patient demonstrates good rehab potential due to higher previous functional level. · Skilled intervention continues to be required due to decrease in functional mobility. Compliance with Program/Exercises: Compliant Reason for Continuation of Services/Other Comments: 
· Patient continues to demonstrate capacity to improve overall mobility which will increase safety. Recommendations/Intent for next treatment session: \"Treatment next visit will focus on advancements to more challenging activities\". Total Treatment Duration: PT Patient Time In/Time Out Time In: 0900 Time Out: [id number] Robi Rojas PT

## 2018-08-01 ENCOUNTER — HOSPITAL ENCOUNTER (OUTPATIENT)
Dept: PHYSICAL THERAPY | Age: 73
Discharge: HOME | End: 2018-08-01
Payer: MEDICARE

## 2018-08-01 PROCEDURE — 97140 MANUAL THERAPY 1/> REGIONS: CPT

## 2018-08-01 NOTE — PROGRESS NOTES
Therapy Center at [facility] Medical Office Building [address], [address] [facility],[address] [place], [address] Phone: [phone]   Fax: [phone] Outpatient PHYSICAL THERAPY: Daily Note Fall Risk Score: 0 (? 5 = High Risk) ICD-9: Treatment Diagnosis: Other specified disorders of joint - other disorders of cervical region, cervicalgia ICD-10: Treatment Diagnosis: Cervicalgia (M54.2) REFERRING PHYSICIAN: Costa Benito MD MD Orders: Evaluate and Treat Return Physician Appointment: TBD MEDICAL/REFERRING DIAGNOSIS: 
Cervicalgia [M54.2] Other physical therapy [[id number]] DATE OF ONSET: Chronic PRIOR LEVEL OF FUNCTION: Independent PRECAUTIONS/ALLERGIES:  
Allergies Allergen Reactions  Alendronate Sodium Rash  Antihistamine [Triprolidine-Pseudoephedrine] Anxiety  Boniva [Ibandronate] Diarrhea ASSESSMENT: 
?????? ? ? This section established at most recent assessment?????????? Patient presents with improving range of motion, decreased strength, and pain in right shoulder and neck secondary to chronic pain. Patient has attended a total of 103 scheduled physical therapy visits including initial evaluation on 1/7/2014. Treatment has consisted of manual therapy and streching to improve pain and performance with activities of daily living. PROBLEM LIST (Impairments causing functional limitations): 1. Decreased independence with ADLs requiring any extensive UE activity (lifting/reaching/vacuuming). 2.  Decreased tolerance of prolonged sitting/standing activities (>30 minutes) due to exacerbation of neck pain. 3.  Unable to participate in social/recreational activities due to fear avoidance of onset of neck/shoulder pain. 4.  Outcome measure score of 10/50 on Neck Disability Index with minimal effect of pain on patient's ability to manage every day life activities. GOALS: (Goals have been discussed and agreed upon with patient.) SHORT-TERM FUNCTIONAL GOALS: Time Frame: 3 weeks 1. Patient will be independent with home exercise program without exacerbation of symptoms or cueing needed--goal met. 2. Patient will be independent with correct sleeping positions and awareness/avoidance of aggravating positions without cueing needed--goal met. DISCHARGE GOALS: Time Frame: 8 weeks 1. Patient will be independent with all ADLs with minimal onset of neck/shoulder pain and no deficits with daily tasks--goal met. 2. Patient will report no fear avoidance with social or recreational activities due to neck/shoulder pain--goal ongoing. 3. Patient will score less than or equal to 5/50 on Neck Disability Index with minimal effect of pain on patient's ability to manage every day life activities--goal ongoing. REHABILITATION POTENTIAL FOR STATED GOALS: Jovanny Andrew OF CARE: 
INTERVENTIONS PLANNED: (Benefits and precautions of physical therapy have been discussed with the patient.) 1. Patient education including pathophysiology of neck/shoulder symptoms, education/training (sleeping positions, posture re-education and body mechanics), and instructions of home exercise program. 
2. Therapeutic exercises including mobility through neck/shoulder region and postural stabilization. 3. Manual therapy including soft tissue mobilizations, functional joint mobilizations and neuromuscular re-education to neck and shoulder region. 4. Therapeutic modalities including moist heat, cold pack, electrical stimulation, and/or ultrasound as needed for relief of symptoms with treatment. TREATMENT PLAN EFFECTIVE DATES: 7/7/2018 TO 10/7/2018 FREQUENCY/DURATION: Follow patient 1 time every 2 weeks for 12 weeks to address above goals. SUBJECTIVE: 
History of Present Injury/Illness (Reason for Referral): Patient is well known to therapist.  Patient returned to therapy today with continued complaints of neck and shoulder pain. Patient reports that her right shoulder/arm feel weak at times.  She reports she hasn't ever dropped anything, but her arm will catch and she will have to be careful that she doesn't throw anything. She reports that her neck was hurting during the holidays. She reports that she did move out of her work office over the holidays. She reports she is still using cold packs at night and taking her pain medication as needed. She reports she has also been trying to get a few deep tissue massages to help with her pain. Present Symptoms: 8/1/2018: Patient reports she moved mattresses on Saturday and so her shoulders and neck are tight and sore. Pain Intensity 1: 4 Pain Location 1: Neck, Shoulder Pain Orientation 1: Right, Left Pain Intervention(s) 1: Rest, Medication (see MAR) Pain Description 1: Aching, Constant Pain Onset 1: Chronic Pain Scale 1: Numeric (0 - 10) Patient Stated Pain Goal: 0 
· Patient Stated Pain Goal: 0 Dominant Side: right Past Medical History: Patient reports no significant past medical history. Current Medications: Cymblata, pain medication (PRN) Date Last Reviewed: 8/1/2018 Social History/Home Situation:  
Home Environment: Private residence Living Alone: No 
Support Systems: Family member(s); Friends \ neighbors Work/Activity History: Retired OBJECTIVE: 
Outcome Measure: Tool Used: Neck Disability Index (NDI) Score:  Initial: 10/50  Most Recent: 9/50 (Date:7/18/2018) Interpretation of Score: The Neck Disability Index is a revised form of the Oswestry Low Back Pain Index and is designed to measure the activities of daily living in person's with neck pain. Each section is scored on a 0-5 scale, 5 representing the greatest disability. The scores of each section are added together for a total score of 50. Score 0 1-10 11-20 21-30 31-40 41-49 50 Modifier CH CI CJ CK CL CM CN ? Carrying, Moving, and Handling Objects:  
  - CURRENT STATUS: CI - 1%-19% impaired, limited or restricted   - GOAL STATUS: CI - 1%-19% impaired, limited or restricted  - D/C STATUS:  ---------------To be determined--------------- Observation/Orthostatic Postural Assessment:  
Posture (WDL): Exceptions to [facility] Posture Assessment: Rounded shoulders; Forward head Palpation:  Tenderness to palpation noted in cervical spine and shoulder region on right greater than left (upper trapezius, levator scapulae, rhomboids, sub-occipitals). No bruising or swelling noted. ROM:  
 Cervical Assessment (AROM):  
· Flexion 100% · Extension 90% · Lateral flexion L 90%, R 100% LUE Assessment (AROM):  
· Within defined limits Shoulder Assessment (AROM): 
· Flexion B [facility] [place] · Ext. B [facility] · Rot. 90, R 90 
· IR: L 70, R 70 RLE Assessment (AROM):  
· Within defined limits Strength: LUE Strength, Tone, Sensation: · Within defined limits · Deep cervical flexion 20s against gravity RUE Strength, Tone, Sensation: · R Shoulder Flexion: 4+ · R Shoulder ABduction: 4+ · R Shoulder Internal Rotation: 4+ · R Shoulder External Rotation: 4+ · R Elbow Flexion: 4+ · R Elbow Extension: 4+ Special Tests: Negative Neurological Screen: Myotomes:  
  
  RUE Myotomes · C1 (Cervical Rotation): Strong · C2, C3, C4 (Shoulder Shrug): Strong · C5 (Shoulder ABduction): Strong · C6 (Biceps & Wrist Extension): Strong · C7 (Triceps & Wrist Flexion): Strong · C8 (Thumb Extension): Strong · T1 (Finger ADduction): Strong Dermatomes: Within normal limits     Within normal limits Reflexes: · Left Bicep (C5,C6): 2+ · Left Bracheoradialis (C5,C6): 2+ · Left Tricep (C6,C7,C8): 2+    · Right Bicep (C5,C6): 2+ · Right Bracheoradialis (C5,C6): 2+ · Right Tricep (C6,C7,C8): 2+ Neural Tension Tests: Negative Functional Mobility:  Gait Description (WDL): Within defined limits Balance:  
Sitting: Intact Standing: Intact TREATMENT:  
 (In addition to Assessment/Re-Assessment sessions the following treatments were rendered) Therapeutic Exercise: ( 5 minutes):  Exercises per grid below to improve mobility and strength. Required minimal visual, verbal and manual cues to promote proper body alignment, promote proper body posture and promote proper body mechanics. Progressed resistance, range, repetitions and complexity of movement as indicated. Date: 
8/1/2018 Activity/Exercise Parameters Scapular rows HEP - reviewed Lat pulls HEP - reviewed Manual Therapy · Joint Mobilization (10 Minutes): Grade II anterior-posterior joint mobilization to right glenohumeral joint with active and passive gross movement in all planes to improve range of motion and decrease pain with daily activities. Prone t-spine central posterior anterior grade IV T1-4. · Soft Tissue Mobilization (30 Minutes): Soft tissue mobilization to cervical spinal musculature to improve mobility and decrease tightness and pain with daily activities. Therapeutic Modalities: N/A today HEP: As above; handouts given to patient for all exercises. ______________________________________________________________________________________________________ Treatment Assessment:  Patient tolerated treatment well. Patient reported decreased pain after treatment. Progression/Medical Necessity:  
· Patient is expected to demonstrate progress in strength and range of motion to increase independence with daily activities. · Patient demonstrates good rehab potential due to higher previous functional level. · Skilled intervention continues to be required due to decrease in functional mobility. Compliance with Program/Exercises: Compliant Reason for Continuation of Services/Other Comments: 
· Patient continues to demonstrate capacity to improve overall mobility which will increase safety.  
Recommendations/Intent for next treatment session: \"Treatment next visit will focus on advancements to more challenging activities\". Total Treatment Duration: PT Patient Time In/Time Out Time In: 0845 Time Out: 0930 Elida Estrada PT

## 2018-08-15 ENCOUNTER — HOSPITAL ENCOUNTER (OUTPATIENT)
Dept: PHYSICAL THERAPY | Age: 73
Discharge: HOME | End: 2018-08-15
Payer: MEDICARE

## 2018-08-15 PROCEDURE — 97140 MANUAL THERAPY 1/> REGIONS: CPT

## 2018-08-15 NOTE — PROGRESS NOTES
Therapy Center at [address], [address] [facility],[address] [place], [address]  Phone: [phone]   Fax: [phone]    Outpatient PHYSICAL THERAPY: Daily Note  Fall Risk Score: 0 (? 5 = High Risk)  ICD-9: Treatment Diagnosis: Other specified disorders of joint - other disorders of cervical region, cervicalgia  ICD-10: Treatment Diagnosis: Cervicalgia (M54.2)     REFERRING PHYSICIAN: Aditi Frias MD MD Orders: Evaluate and Treat  Return Physician Appointment: TBD  MEDICAL/REFERRING DIAGNOSIS:  Cervicalgia [M54.2]  Other physical therapy [[id number]]  DATE OF ONSET: Chronic   PRIOR LEVEL OF FUNCTION: Independent  PRECAUTIONS/ALLERGIES:   Allergies   Allergen Reactions    Alendronate Sodium Rash    Antihistamine [Triprolidine-Pseudoephedrine] Anxiety    Boniva [Ibandronate] Diarrhea     ASSESSMENT:  ?????? ? ? This section established at most recent assessment?????????? Patient presents with improving range of motion, decreased strength, and pain in right shoulder and neck secondary to chronic pain. Patient has attended a total of 104 scheduled physical therapy visits including initial evaluation on 1/7/2014. Treatment has consisted of manual therapy and streching to improve pain and performance with activities of daily living. PROBLEM LIST (Impairments causing functional limitations):  1. Decreased independence with ADLs requiring any extensive UE activity (lifting/reaching/vacuuming). 2.  Decreased tolerance of prolonged sitting/standing activities (>30 minutes) due to exacerbation of neck pain. 3.  Unable to participate in social/recreational activities due to fear avoidance of onset of neck/shoulder pain. 4.  Outcome measure score of 10/50 on Neck Disability Index with minimal effect of pain on patient's ability to manage every day life activities.   GOALS: (Goals have been discussed and agreed upon with patient.)  SHORT-TERM FUNCTIONAL GOALS: Time Frame: 3 weeks  1. Patient will be independent with home exercise program without exacerbation of symptoms or cueing needed--goal met. 2. Patient will be independent with correct sleeping positions and awareness/avoidance of aggravating positions without cueing needed--goal met. DISCHARGE GOALS: Time Frame: 8 weeks  1. Patient will be independent with all ADLs with minimal onset of neck/shoulder pain and no deficits with daily tasks--goal met. 2. Patient will report no fear avoidance with social or recreational activities due to neck/shoulder pain--goal ongoing. 3. Patient will score less than or equal to 5/50 on Neck Disability Index with minimal effect of pain on patient's ability to manage every day life activities--goal ongoing. REHABILITATION POTENTIAL FOR STATED GOALS: [facility]lingor OF CARE:  INTERVENTIONS PLANNED: (Benefits and precautions of physical therapy have been discussed with the patient.)  1. Patient education including pathophysiology of neck/shoulder symptoms, education/training (sleeping positions, posture re-education and body mechanics), and instructions of home exercise program.  2. Therapeutic exercises including mobility through neck/shoulder region and postural stabilization. 3. Manual therapy including soft tissue mobilizations, functional joint mobilizations and neuromuscular re-education to neck and shoulder region. 4. Therapeutic modalities including moist heat, cold pack, electrical stimulation, and/or ultrasound as needed for relief of symptoms with treatment. TREATMENT PLAN EFFECTIVE DATES: 7/7/2018 TO 10/7/2018  FREQUENCY/DURATION: Follow patient 1 time every 2 weeks for 12 weeks to address above goals. SUBJECTIVE:  History of Present Injury/Illness (Reason for Referral): Patient is well known to therapist.  Patient returned to therapy today with continued complaints of neck and shoulder pain. Patient reports that her right shoulder/arm feel weak at times.  She reports she hasn't ever dropped anything, but her arm will catch and she will have to be careful that she doesn't throw anything. She reports that her neck was hurting during the holidays. She reports that she did move out of her work office over the holidays. She reports she is still using cold packs at night and taking her pain medication as needed. She reports she has also been trying to get a few deep tissue massages to help with her pain. Present Symptoms: 8/15/2018: Patient reports she did a yard sale over the weekend and so her shoulders are tight and tired. Pain Intensity 1: 4  Pain Location 1: Neck, Shoulder  Pain Orientation 1: Right, Left  Pain Intervention(s) 1: Rest, Medication (see MAR)  Pain Description 1: Aching, Constant  Pain Onset 1: Chronic  Pain Scale 1: Numeric (0 - 10)  Patient Stated Pain Goal: 0  · Patient Stated Pain Goal: 0   Dominant Side: right  Past Medical History: Patient reports no significant past medical history. Current Medications: Cymblata, pain medication (PRN)   Date Last Reviewed: 8/15/2018  Social History/Home Situation:   Home Environment: Private residence  Living Alone: No  Support Systems: Family member(s); Friends \ neighbors  Work/Activity History: Retired  OBJECTIVE:  Outcome Measure: Tool Used: Neck Disability Index (NDI)  Score:  Initial: 10/50  Most Recent: 9/50 (Date:7/18/2018)    Interpretation of Score: The Neck Disability Index is a revised form of the Oswestry Low Back Pain Index and is designed to measure the activities of daily living in person's with neck pain. Each section is scored on a 0-5 scale, 5 representing the greatest disability. The scores of each section are added together for a total score of 50. Score 0 1-10 11-20 21-30 31-40 41-49 50   Modifier CH CI CJ CK CL CM CN     ?  Carrying, Moving, and Handling Objects:     - CURRENT STATUS: CI - 1%-19% impaired, limited or restricted    - GOAL STATUS: CI - 1%-19% impaired, limited or restricted    - D/C STATUS:  ---------------To be determined---------------     Observation/Orthostatic Postural Assessment:   Posture (WDL): Exceptions to WDL  Posture Assessment: Rounded shoulders; Forward head  Palpation:  Tenderness to palpation noted in cervical spine and shoulder region on right greater than left (upper trapezius, levator scapulae, rhomboids, sub-occipitals). No bruising or swelling noted. ROM:    Cervical Assessment (AROM):   · Flexion 100%  · Extension 90%  · Lateral flexion L 90%, R 100%    LUE Assessment (AROM):   · Within defined limits        Shoulder Assessment (AROM):  · Flexion B WFL  · Ext. B WFL  · Rot. 90, R 90  · IR: L 70, R 70     RLE Assessment (AROM):   · Within defined limits      Strength:   LUE Strength, Tone, Sensation:   · Within defined limits  · Deep cervical flexion 20s against gravity    RUE Strength, Tone, Sensation:  · R Shoulder Flexion: 4+  · R Shoulder ABduction: 4+  · R Shoulder Internal Rotation: 4+  · R Shoulder External Rotation: 4+  · R Elbow Flexion: 4+  · R Elbow Extension: 4+        Special Tests: Negative  Neurological Screen:    Myotomes:        RUE Myotomes  · C1 (Cervical Rotation): Strong  · C2, C3, C4 (Shoulder Shrug): Strong  · C5 (Shoulder ABduction): Strong  · C6 (Biceps & Wrist Extension): Strong  · C7 (Triceps & Wrist Flexion): Strong  · C8 (Thumb Extension): Strong  · T1 (Finger ADduction): Strong          Dermatomes:     Within normal limits     Within normal limits          Reflexes:   · Left Bicep (C5,C6): 2+  · Left Bracheoradialis (C5,C6): 2+  · Left Tricep (C6,C7,C8): 2+    · Right Bicep (C5,C6): 2+  · Right Bracheoradialis (C5,C6): 2+  · Right Tricep (C6,C7,C8): 2+          Neural Tension Tests: Negative  Functional Mobility:  Gait Description (WDL): Within defined limits     Balance:   Sitting: Intact  Standing: Intact  TREATMENT:    (In addition to Assessment/Re-Assessment sessions the following treatments were rendered)  Therapeutic Exercise: ( 5 minutes):  Exercises per grid below to improve mobility and strength. Required minimal visual, verbal and manual cues to promote proper body alignment, promote proper body posture and promote proper body mechanics. Progressed resistance, range, repetitions and complexity of movement as indicated. Date:  8/15/2018    Activity/Exercise Parameters   Scapular rows HEP - reviewed   Lat pulls HEP - reviewed   Manual Therapy     · Joint Mobilization (10 Minutes): Grade II anterior-posterior joint mobilization to right glenohumeral joint with active and passive gross movement in all planes to improve range of motion and decrease pain with daily activities. Prone t-spine central posterior anterior grade IV T1-4. · Soft Tissue Mobilization (30 Minutes): Soft tissue mobilization to cervical spinal musculature to improve mobility and decrease tightness and pain with daily activities. Therapeutic Modalities: N/A today                                                                                             HEP: As above; handouts given to patient for all exercises. ______________________________________________________________________________________________________    Treatment Assessment:  Patient tolerated treatment well. Patient reported decreased pain after treatment. Progression/Medical Necessity:   · Patient is expected to demonstrate progress in strength and range of motion to increase independence with daily activities. · Patient demonstrates good rehab potential due to higher previous functional level. · Skilled intervention continues to be required due to decrease in functional mobility. Compliance with Program/Exercises: Compliant   Reason for Continuation of Services/Other Comments:  · Patient continues to demonstrate capacity to improve overall mobility which will increase safety.   Recommendations/Intent for next treatment session: \"Treatment next visit will focus on advancements to more challenging activities\".     Total Treatment Duration:  PT Patient Time In/Time Out  Time In: 1030  Time Out: Jeremy 55, PT

## 2018-08-29 ENCOUNTER — HOSPITAL ENCOUNTER (OUTPATIENT)
Dept: PHYSICAL THERAPY | Age: 73
Discharge: HOME | End: 2018-08-29
Payer: MEDICARE

## 2018-08-29 PROCEDURE — 97140 MANUAL THERAPY 1/> REGIONS: CPT

## 2018-08-29 PROCEDURE — G8985 CARRY GOAL STATUS: HCPCS

## 2018-08-29 PROCEDURE — G8984 CARRY CURRENT STATUS: HCPCS

## 2018-08-29 NOTE — PROGRESS NOTES
Therapy Center at [facility] [address], [address] [place] [address],[address] [place], [address] Phone: [phone]   Fax: [phone] Outpatient PHYSICAL THERAPY: Progress Report Fall Risk Score: 0 (? 5 = High Risk) ICD-9: Treatment Diagnosis: Other specified disorders of joint - other disorders of cervical region, cervicalgia ICD-10: Treatment Diagnosis: Cervicalgia (M54.2) REFERRING PHYSICIAN: Radha Sexton MD MD Orders: Evaluate and Treat Return Physician Appointment: TBD MEDICAL/REFERRING DIAGNOSIS: 
Cervicalgia [M54.2] Other physical therapy [[id number]] DATE OF ONSET: Chronic PRIOR LEVEL OF FUNCTION: Independent PRECAUTIONS/ALLERGIES:  
Allergies Allergen Reactions  Alendronate Sodium Rash  Antihistamine [Triprolidine-Pseudoephedrine] Anxiety  Boniva [Ibandronate] Diarrhea ASSESSMENT: 
?????? ? ? This section established at most recent assessment?????????? Patient presents with improving range of motion, decreased strength, and pain in right shoulder and neck secondary to chronic pain. Patient has attended a total of 105 scheduled physical therapy visits including initial evaluation on 1/7/2014. Treatment has consisted of manual therapy and streching to improve pain and performance with activities of daily living. PROBLEM LIST (Impairments causing functional limitations): 1. Decreased independence with ADLs requiring any extensive UE activity (lifting/reaching/vacuuming). 2.  Decreased tolerance of prolonged sitting/standing activities (>30 minutes) due to exacerbation of neck pain. 3.  Unable to participate in social/recreational activities due to fear avoidance of onset of neck/shoulder pain. 4.  Outcome measure score of 10/50 on Neck Disability Index with minimal effect of pain on patient's ability to manage every day life activities. GOALS: (Goals have been discussed and agreed upon with patient.) SHORT-TERM FUNCTIONAL GOALS: Time Frame: 3 weeks 1. Patient will be independent with home exercise program without exacerbation of symptoms or cueing needed--goal met. 2. Patient will be independent with correct sleeping positions and awareness/avoidance of aggravating positions without cueing needed--goal met. DISCHARGE GOALS: Time Frame: 8 weeks 1. Patient will be independent with all ADLs with minimal onset of neck/shoulder pain and no deficits with daily tasks--goal met. 2. Patient will report no fear avoidance with social or recreational activities due to neck/shoulder pain--goal ongoing. 3. Patient will score less than or equal to 5/50 on Neck Disability Index with minimal effect of pain on patient's ability to manage every day life activities--goal ongoing. REHABILITATION POTENTIAL FOR STATED GOALS: Kostas Duarte [facility]: 
INTERVENTIONS PLANNED: (Benefits and precautions of physical therapy have been discussed with the patient.) 1. Patient education including pathophysiology of neck/shoulder symptoms, education/training (sleeping positions, posture re-education and body mechanics), and instructions of home exercise program. 
2. Therapeutic exercises including mobility through neck/shoulder region and postural stabilization. 3. Manual therapy including soft tissue mobilizations, functional joint mobilizations and neuromuscular re-education to neck and shoulder region. 4. Therapeutic modalities including moist heat, cold pack, electrical stimulation, and/or ultrasound as needed for relief of symptoms with treatment. TREATMENT PLAN EFFECTIVE DATES: 7/7/2018 TO 10/7/2018 FREQUENCY/DURATION: Follow patient 1 time every 2 weeks for 12 weeks to address above goals. SUBJECTIVE: 
History of Present Injury/Illness (Reason for Referral): Patient is well known to therapist.  Patient returned to therapy today with continued complaints of neck and shoulder pain.   Patient reports that her right shoulder/arm feel weak at times. She reports she hasn't ever dropped anything, but her arm will catch and she will have to be careful that she doesn't throw anything. She reports that her neck was hurting during the holidays. She reports that she did move out of her work office over the holidays. She reports she is still using cold packs at night and taking her pain medication as needed. She reports she has also been trying to get a few deep tissue massages to help with her pain. Present Symptoms: 8/29/2018: Patient reports she has not been feeling well, so she hasn't been as active, so her shoulders haven't been as painful. Pain Intensity 1: 4 Pain Location 1: Neck, Shoulder Pain Orientation 1: Right, Left Pain Intervention(s) 1: Rest, Medication (see MAR) Pain Description 1: Aching, Constant Pain Onset 1: Chronic Pain Scale 1: Numeric (0 - 10) Patient Stated Pain Goal: 0 
· Patient Stated Pain Goal: 0 Dominant Side: right Past Medical History: Patient reports no significant past medical history. Current Medications: Cymblata, pain medication (PRN) Date Last Reviewed: 8/29/2018 Social History/Home Situation:  
Home Environment: Private residence Living Alone: No 
Support Systems: Family member(s); Friends \ neighbors Work/Activity History: Retired OBJECTIVE: 
Outcome Measure: Tool Used: Neck Disability Index (NDI) Score:  Initial: 10/50  Most Recent: 9/50 (Date:8/29/2018) Interpretation of Score: The Neck Disability Index is a revised form of the Oswestry Low Back Pain Index and is designed to measure the activities of daily living in person's with neck pain. Each section is scored on a 0-5 scale, 5 representing the greatest disability. The scores of each section are added together for a total score of 50. Score 0 1-10 11-20 21-30 31-40 41-49 50 Modifier CH CI CJ CK CL CM CN ? Carrying, Moving, and Handling Objects:  - CURRENT STATUS: CI - 1%-19% impaired, limited or restricted  - GOAL STATUS: CI - 1%-19% impaired, limited or restricted  - D/C STATUS:  ---------------To be determined--------------- Observation/Orthostatic Postural Assessment:  
Posture (WDL): Exceptions to [facility] Posture Assessment: Rounded shoulders; Forward head Palpation:  Tenderness to palpation noted in cervical spine and shoulder region on right greater than left (upper trapezius, levator scapulae, rhomboids, sub-occipitals). No bruising or swelling noted. ROM:  
 Cervical Assessment (AROM):  
· Flexion 100% · Extension 90% · Lateral flexion L 90%, R 100% LUE Assessment (AROM):  
· Within defined limits Shoulder Assessment (AROM): 
· Flexion B [facility] [place] · Ext. [facility] · Rot. 90, R 90 
· IR: L 70, R 70 RLE Assessment (AROM):  
· Within defined limits Strength: LUE Strength, Tone, Sensation: · Within defined limits · Deep cervical flexion 20s against gravity RUE Strength, Tone, Sensation: · R Shoulder Flexion: 4+ · R Shoulder ABduction: 4+ · R Shoulder Internal Rotation: 4+ · R Shoulder External Rotation: 4+ · R Elbow Flexion: 4+ · R Elbow Extension: 4+ Special Tests: Negative Neurological Screen: Myotomes:  
  
  RUE Myotomes · C1 (Cervical Rotation): Strong · C2, C3, C4 (Shoulder Shrug): Strong · C5 (Shoulder ABduction): Strong · C6 (Biceps & Wrist Extension): Strong · C7 (Triceps & Wrist Flexion): Strong · C8 (Thumb Extension): Strong · T1 (Finger ADduction): Strong Dermatomes: Within normal limits     Within normal limits Reflexes: · Left Bicep (C5,C6): 2+ · Left Bracheoradialis (C5,C6): 2+ · Left Tricep (C6,C7,C8): 2+    · Right Bicep (C5,C6): 2+ · Right Bracheoradialis (C5,C6): 2+ · Right Tricep (C6,C7,C8): 2+ Neural Tension Tests: Negative Functional Mobility:  Gait Description (WDL): Within defined limits Balance:  
Sitting: Intact Standing: Intact TREATMENT:  
 (In addition to Assessment/Re-Assessment sessions the following treatments were rendered) Therapeutic Exercise: ( 5 minutes):  Exercises per grid below to improve mobility and strength. Required minimal visual, verbal and manual cues to promote proper body alignment, promote proper body posture and promote proper body mechanics. Progressed resistance, range, repetitions and complexity of movement as indicated. Date: 
8/29/2018 Activity/Exercise Parameters Scapular rows HEP - reviewed Lat pulls HEP - reviewed Manual Therapy · Joint Mobilization (10 Minutes): Grade II anterior-posterior joint mobilization to right glenohumeral joint with active and passive gross movement in all planes to improve range of motion and decrease pain with daily activities. Prone t-spine central posterior anterior grade IV T1-4. · Soft Tissue Mobilization (30 Minutes): Soft tissue mobilization to cervical spinal musculature to improve mobility and decrease tightness and pain with daily activities. Therapeutic Modalities: N/A today HEP: As above; handouts given to patient for all exercises. ______________________________________________________________________________________________________ Treatment Assessment:  Patient tolerated treatment well. Patient reported decreased pain after treatment. Progression/Medical Necessity:  
· Patient is expected to demonstrate progress in strength and range of motion to increase independence with daily activities. · Patient demonstrates good rehab potential due to higher previous functional level. · Skilled intervention continues to be required due to decrease in functional mobility. Compliance with Program/Exercises: Compliant Reason for Continuation of Services/Other Comments: 
· Patient continues to demonstrate capacity to improve overall mobility which will increase safety. Recommendations/Intent for next treatment session: \"Treatment next visit will focus on advancements to more challenging activities\". Total Treatment Duration: PT Patient Time In/Time Out Time In: 0900 Time Out: [id number] Robi Rojas, PT

## 2018-09-19 ENCOUNTER — HOSPITAL ENCOUNTER (OUTPATIENT)
Dept: PHYSICAL THERAPY | Age: 73
Discharge: HOME | End: 2018-09-19
Payer: MEDICARE

## 2018-09-19 PROCEDURE — 97140 MANUAL THERAPY 1/> REGIONS: CPT

## 2018-09-19 NOTE — PROGRESS NOTES
Cristhian Bradford : 1945 [place] Secondary: Adrián Christianson at [place] [id number] [facility], [address], [place] [address]. [phone]   [phone] Outpatient PHYSICAL THERAPY: Daily Note Fall Risk Score: 0 (? 5 = High Risk) ICD-9: Treatment Diagnosis: Other specified disorders of joint - other disorders of cervical region, cervicalgia ICD-10: Treatment Diagnosis: Cervicalgia (M54.2) REFERRING PHYSICIAN: Chiquita Colin MD MD Orders: Evaluate and Treat Return Physician Appointment: TBD MEDICAL/REFERRING DIAGNOSIS: 
Cervicalgia [M54.2] Other physical therapy [[id number]] DATE OF ONSET: Chronic PRIOR LEVEL OF FUNCTION: Independent PRECAUTIONS/ALLERGIES:  
Allergies Allergen Reactions  Alendronate Sodium Rash  Antihistamine [Triprolidine-Pseudoephedrine] Anxiety  Boniva [Ibandronate] Diarrhea ASSESSMENT: 
?????? ? ? This section established at most recent assessment?????????? Patient presents with improving range of motion, decreased strength, and pain in right shoulder and neck secondary to chronic pain. Patient has attended a total of 106 scheduled physical therapy visits including initial evaluation on 2014. Treatment has consisted of manual therapy and streching to improve pain and performance with activities of daily living. PROBLEM LIST (Impairments causing functional limitations): 1. Decreased independence with ADLs requiring any extensive UE activity (lifting/reaching/vacuuming). 2.  Decreased tolerance of prolonged sitting/standing activities (>30 minutes) due to exacerbation of neck pain. 3.  Unable to participate in social/recreational activities due to fear avoidance of onset of neck/shoulder pain. 4.  Outcome measure score of 10/50 on Neck Disability Index with minimal effect of pain on patient's ability to manage every day life activities. GOALS: (Goals have been discussed and agreed upon with patient.) SHORT-TERM FUNCTIONAL GOALS: Time Frame: 3 weeks 1. Patient will be independent with home exercise program without exacerbation of symptoms or cueing needed--goal met. 2. Patient will be independent with correct sleeping positions and awareness/avoidance of aggravating positions without cueing needed--goal met. DISCHARGE GOALS: Time Frame: 8 weeks 1. Patient will be independent with all ADLs with minimal onset of neck/shoulder pain and no deficits with daily tasks--goal met. 2. Patient will report no fear avoidance with social or recreational activities due to neck/shoulder pain--goal ongoing. 3. Patient will score less than or equal to 5/50 on Neck Disability Index with minimal effect of pain on patient's ability to manage every day life activities--goal ongoing. REHABILITATION POTENTIAL FOR STATED GOALS: Mayuri Noriega OF CARE: 
INTERVENTIONS PLANNED: (Benefits and precautions of physical therapy have been discussed with the patient.) 1. Patient education including pathophysiology of neck/shoulder symptoms, education/training (sleeping positions, posture re-education and body mechanics), and instructions of home exercise program. 
2. Therapeutic exercises including mobility through neck/shoulder region and postural stabilization. 3. Manual therapy including soft tissue mobilizations, functional joint mobilizations and neuromuscular re-education to neck and shoulder region. 4. Therapeutic modalities including moist heat, cold pack, electrical stimulation, and/or ultrasound as needed for relief of symptoms with treatment. TREATMENT PLAN EFFECTIVE DATES: 7/7/2018 TO 10/7/2018 FREQUENCY/DURATION: Follow patient 1 time every 2 weeks for 12 weeks to address above goals.  
SUBJECTIVE: 
History of Present Injury/Illness (Reason for Referral): Patient is well known to therapist.  Patient returned to therapy today with continued complaints of neck and shoulder pain. Patient reports that her right shoulder/arm feel weak at times. She reports she hasn't ever dropped anything, but her arm will catch and she will have to be careful that she doesn't throw anything. She reports that her neck was hurting during the holidays. She reports that she did move out of her work office over the holidays. She reports she is still using cold packs at night and taking her pain medication as needed. She reports she has also been trying to get a few deep tissue massages to help with her pain. Present Symptoms: 9/19/2018: Patient reports she is having some pain in her shoulders, but otherwise ok right now. Pain Intensity 1: 4 Pain Location 1: Neck, Shoulder Pain Orientation 1: Right, Left Pain Intervention(s) 1: Rest, Medication (see MAR) Pain Description 1: Aching, Constant Pain Onset 1: Chronic Pain Scale 1: Numeric (0 - 10) Patient Stated Pain Goal: 0 
· Patient Stated Pain Goal: 0 Dominant Side: right Past Medical History: Patient reports no significant past medical history. Current Medications: Cymblata, pain medication (PRN) Date Last Reviewed: 9/19/2018 Social History/Home Situation:  
Home Environment: Private residence Living Alone: No 
Support Systems: Family member(s); Friends \ neighbors Work/Activity History: Retired OBJECTIVE: 
Outcome Measure: Tool Used: Neck Disability Index (NDI) Score:  Initial: 10/50  Most Recent: 9/50 (Date:8/29/2018) Interpretation of Score: The Neck Disability Index is a revised form of the Oswestry Low Back Pain Index and is designed to measure the activities of daily living in person's with neck pain. Each section is scored on a 0-5 scale, 5 representing the greatest disability. The scores of each section are added together for a total score of 50. Score 0 1-10 11-20 21-30 31-40 41-49 50 Modifier CH CI CJ CK CL CM CN ? Carrying, Moving, and Handling Objects:  - CURRENT STATUS: CI - 1%-19% impaired, limited or restricted  - GOAL STATUS: CI - 1%-19% impaired, limited or restricted  - D/C STATUS:  ---------------To be determined--------------- Observation/Orthostatic Postural Assessment:  
Posture (WDL): Exceptions to [facility] Posture Assessment: Rounded shoulders; Forward head Palpation:  Tenderness to palpation noted in cervical spine and shoulder region on right greater than left (upper trapezius, levator scapulae, rhomboids, sub-occipitals). No bruising or swelling noted. ROM:  
 Cervical Assessment (AROM):  
· Flexion 100% · Extension 90% · Lateral flexion L 90%, R 100% LUE Assessment (AROM):  
· Within defined limits Shoulder Assessment (AROM): 
· Flexion B [facility] [place] · Ext. B [facility] · Rot. 90, R 90 
· IR: L 70, R 70 RLE Assessment (AROM):  
· Within defined limits Strength: LUE Strength, Tone, Sensation: · Within defined limits · Deep cervical flexion 20s against gravity RUE Strength, Tone, Sensation: · R Shoulder Flexion: 4+ · R Shoulder ABduction: 4+ · R Shoulder Internal Rotation: 4+ · R Shoulder External Rotation: 4+ · R Elbow Flexion: 4+ · R Elbow Extension: 4+ Special Tests: Negative Neurological Screen: Myotomes:  
  
  RUE Myotomes · C1 (Cervical Rotation): Strong · C2, C3, C4 (Shoulder Shrug): Strong · C5 (Shoulder ABduction): Strong · C6 (Biceps & Wrist Extension): Strong · C7 (Triceps & Wrist Flexion): Strong · C8 (Thumb Extension): Strong · T1 (Finger ADduction): Strong Dermatomes: Within normal limits     Within normal limits Reflexes: · Left Bicep (C5,C6): 2+ · Left Bracheoradialis (C5,C6): 2+ · Left Tricep (C6,C7,C8): 2+    · Right Bicep (C5,C6): 2+ · Right Bracheoradialis (C5,C6): 2+ · Right Tricep (C6,C7,C8): 2+ Neural Tension Tests: Negative Functional Mobility:  Gait Description (WDL): Within defined limits Balance:  
Sitting: Intact Standing: Intact TREATMENT:  
 (In addition to Assessment/Re-Assessment sessions the following treatments were rendered) Therapeutic Exercise: ( 5 minutes):  Exercises per grid below to improve mobility and strength. Required minimal visual, verbal and manual cues to promote proper body alignment, promote proper body posture and promote proper body mechanics. Progressed resistance, range, repetitions and complexity of movement as indicated. Date: 
9/19/2018 Activity/Exercise Parameters Scapular rows HEP - reviewed Lat pulls HEP - reviewed Manual Therapy · Joint Mobilization (10 Minutes): Grade II anterior-posterior joint mobilization to right glenohumeral joint with active and passive gross movement in all planes to improve range of motion and decrease pain with daily activities. Prone t-spine central posterior anterior grade IV T1-4. · Soft Tissue Mobilization (30 Minutes): Soft tissue mobilization to cervical spinal musculature to improve mobility and decrease tightness and pain with daily activities. Therapeutic Modalities: N/A today HEP: As above; handouts given to patient for all exercises. ______________________________________________________________________________________________________ Treatment Assessment:  Patient tolerated treatment well. Patient reported decreased pain after treatment. Progression/Medical Necessity:  
· Patient is expected to demonstrate progress in strength and range of motion to increase independence with daily activities. · Patient demonstrates good rehab potential due to higher previous functional level. · Skilled intervention continues to be required due to decrease in functional mobility. Compliance with Program/Exercises: Compliant Reason for Continuation of Services/Other Comments: 
· Patient continues to demonstrate capacity to improve overall mobility which will increase safety. Recommendations/Intent for next treatment session: \"Treatment next visit will focus on advancements to more challenging activities\". Total Treatment Duration: PT Patient Time In/Time Out Time In: 0730 Time Out: 0014 Josiane Godoy PT

## 2018-10-10 ENCOUNTER — HOSPITAL ENCOUNTER (OUTPATIENT)
Dept: PHYSICAL THERAPY | Age: 73
Discharge: HOME | End: 2018-10-10
Payer: MEDICARE

## 2018-10-10 PROCEDURE — G8984 CARRY CURRENT STATUS: HCPCS

## 2018-10-10 PROCEDURE — G8985 CARRY GOAL STATUS: HCPCS

## 2018-10-10 PROCEDURE — 97140 MANUAL THERAPY 1/> REGIONS: CPT

## 2018-10-10 NOTE — THERAPY RECERTIFICATION
[facility] : 1945 [place] Secondary: Adrián Christianson at [place] [id number] [facility], [address], [place] [address]. [phone]   [phone] Outpatient PHYSICAL THERAPY: Recertification Fall Risk Score: 0 (? 5 = High Risk) ICD-9: Treatment Diagnosis: Other specified disorders of joint - other disorders of cervical region, cervicalgia ICD-10: Treatment Diagnosis: Cervicalgia (M54.2) REFERRING PHYSICIAN: Khai Benz MD MD Orders: Evaluate and Treat Return Physician Appointment: TBD MEDICAL/REFERRING DIAGNOSIS: 
Cervicalgia [M54.2] Other physical therapy [[id number]] DATE OF ONSET: Chronic PRIOR LEVEL OF FUNCTION: Independent PRECAUTIONS/ALLERGIES:  
Allergies Allergen Reactions  Alendronate Sodium Rash  Antihistamine [Triprolidine-Pseudoephedrine] Anxiety  Boniva [Ibandronate] Diarrhea ASSESSMENT: 
?????? ? ? This section established at most recent assessment?????????? Patient presents with improving range of motion, decreased strength, and pain in right shoulder and neck secondary to chronic pain. Patient has attended a total of 107 scheduled physical therapy visits including initial evaluation on 2014. Treatment has consisted of manual therapy and streching to improve pain and performance with activities of daily living. PROBLEM LIST (Impairments causing functional limitations): 1. Decreased independence with ADLs requiring any extensive UE activity (lifting/reaching/vacuuming). 2.  Decreased tolerance of prolonged sitting/standing activities (>30 minutes) due to exacerbation of neck pain. 3.  Unable to participate in social/recreational activities due to fear avoidance of onset of neck/shoulder pain. 4.  Outcome measure score of 10/50 on Neck Disability Index with minimal effect of pain on patient's ability to manage every day life activities. GOALS: (Goals have been discussed and agreed upon with patient.) SHORT-TERM FUNCTIONAL GOALS: Time Frame: 3 weeks 1. Patient will be independent with home exercise program without exacerbation of symptoms or cueing needed--goal met. 2. Patient will be independent with correct sleeping positions and awareness/avoidance of aggravating positions without cueing needed--goal met. DISCHARGE GOALS: Time Frame: 8 weeks 1. Patient will be independent with all ADLs with minimal onset of neck/shoulder pain and no deficits with daily tasks--goal met. 2. Patient will report no fear avoidance with social or recreational activities due to neck/shoulder pain--goal ongoing. 3. Patient will score less than or equal to 5/50 on Neck Disability Index with minimal effect of pain on patient's ability to manage every day life activities--goal ongoing. REHABILITATION POTENTIAL FOR STATED GOALS: Warden Duarte OF CARE: 
INTERVENTIONS PLANNED: (Benefits and precautions of physical therapy have been discussed with the patient.) 1. Patient education including pathophysiology of neck/shoulder symptoms, education/training (sleeping positions, posture re-education and body mechanics), and instructions of home exercise program. 
2. Therapeutic exercises including mobility through neck/shoulder region and postural stabilization. 3. Manual therapy including soft tissue mobilizations, functional joint mobilizations and neuromuscular re-education to neck and shoulder region. 4. Therapeutic modalities including moist heat, cold pack, electrical stimulation, and/or ultrasound as needed for relief of symptoms with treatment. TREATMENT PLAN EFFECTIVE DATES: 10/7/2018 TO 12/7/2018 FREQUENCY/DURATION: Follow patient 1 time every 2 weeks for 12 weeks to address above goals. Regarding Paola Beach's therapy, I certify that the treatment plan above will be carried out by a therapist or under their direction.  
Thank you for this referral, 
 Christine Worthy PT Referring Physician Signature: Alphonse Day MD         Date SUBJECTIVE: 
History of Present Injury/Illness (Reason for Referral): Patient is well known to therapist.  Patient returned to therapy today with continued complaints of neck and shoulder pain. Patient reports that her right shoulder/arm feel weak at times. She reports she hasn't ever dropped anything, but her arm will catch and she will have to be careful that she doesn't throw anything. She reports that her neck was hurting during the holidays. She reports that she did move out of her work office over the holidays. She reports she is still using cold packs at night and taking her pain medication as needed. She reports she has also been trying to get a few deep tissue massages to help with her pain. Present Symptoms: 10/10/2018: Patient reports she is hurting in her right shoulder today. Pain Intensity 1: 4 Pain Location 1: Neck, Shoulder Pain Orientation 1: Right, Left Pain Intervention(s) 1: Rest, Medication (see MAR) Pain Description 1: Aching, Constant Pain Onset 1: Chronic Pain Scale 1: Numeric (0 - 10) Patient Stated Pain Goal: 0 
· Patient Stated Pain Goal: 0 Dominant Side: right Past Medical History: Patient reports no significant past medical history. Current Medications: Cymblata, pain medication (PRN) Date Last Reviewed: 10/10/2018 Social History/Home Situation:  
Home Environment: Private residence Living Alone: No 
Support Systems: Family member(s); Friends \ neighbors Work/Activity History: Retired OBJECTIVE: 
Outcome Measure: Tool Used: Neck Disability Index (NDI) Score:  Initial: 10/50  Most Recent: 950 (Date:10/10/2018) Interpretation of Score: The Neck Disability Index is a revised form of the Oswestry Low Back Pain Index and is designed to measure the activities of daily living in person's with neck pain.   Each section is scored on a 0-5 scale, 5 representing the greatest disability. The scores of each section are added together for a total score of 50. Score 0 1-10 11-20 21-30 31-40 41-49 50 Modifier CH CI CJ CK CL CM CN ? Carrying, Moving, and Handling Objects:  
  - CURRENT STATUS: CI - 1%-19% impaired, limited or restricted  - GOAL STATUS: CI - 1%-19% impaired, limited or restricted  - D/C STATUS:  ---------------To be determined--------------- Observation/Orthostatic Postural Assessment:  
Posture (WDL): Exceptions to [facility] Posture Assessment: Rounded shoulders; Forward head Palpation:  Tenderness to palpation noted in cervical spine and shoulder region on right greater than left (upper trapezius, levator scapulae, rhomboids, sub-occipitals). No bruising or swelling noted. ROM:  
 Cervical Assessment (AROM):  
· Flexion 100% · Extension 90% · Lateral flexion L 90%, R 100% LUE Assessment (AROM):  
· Within defined limits Shoulder Assessment (AROM): 
· Flexion B [facility] [place] · Ext. B [facility] · Rot. 90, R 90 
· IR: L 70, R 70 RLE Assessment (AROM):  
· Within defined limits Strength: LUE Strength, Tone, Sensation: · Within defined limits · Deep cervical flexion 20s against gravity RUE Strength, Tone, Sensation: · R Shoulder Flexion: 4+ · R Shoulder ABduction: 4+ · R Shoulder Internal Rotation: 4+ · R Shoulder External Rotation: 4+ · R Elbow Flexion: 4+ · R Elbow Extension: 4+ Special Tests: Negative Neurological Screen: Myotomes:  
  
  RUE Myotomes · C1 (Cervical Rotation): Strong · C2, C3, C4 (Shoulder Shrug): Strong · C5 (Shoulder ABduction): Strong · C6 (Biceps & Wrist Extension): Strong · C7 (Triceps & Wrist Flexion): Strong · C8 (Thumb Extension): Strong · T1 (Finger ADduction): Strong Dermatomes: Within normal limits     Within normal limits Reflexes: · Left Bicep (C5,C6): 2+ · Left Bracheoradialis (C5,C6): 2+ 
 · Left Tricep (C6,C7,C8): 2+    · Right Bicep (C5,C6): 2+ · Right Bracheoradialis (C5,C6): 2+ · Right Tricep (C6,C7,C8): 2+ Neural Tension Tests: Negative Functional Mobility:  Gait Description (WDL): Within defined limits Balance:  
Sitting: Intact Standing: Intact TREATMENT:  
 (In addition to Assessment/Re-Assessment sessions the following treatments were rendered) Therapeutic Exercise: ( 5 minutes):  Exercises per grid below to improve mobility and strength. Required minimal visual, verbal and manual cues to promote proper body alignment, promote proper body posture and promote proper body mechanics. Progressed resistance, range, repetitions and complexity of movement as indicated. Date: 
10/10/2018 Activity/Exercise Parameters Scapular rows HEP - reviewed Lat pulls HEP - reviewed Manual Therapy · Joint Mobilization (10 Minutes): Grade II anterior-posterior joint mobilization to right glenohumeral joint with active and passive gross movement in all planes to improve range of motion and decrease pain with daily activities. Prone t-spine central posterior anterior grade IV T1-4. · Soft Tissue Mobilization (30 Minutes): Soft tissue mobilization to cervical spinal musculature to improve mobility and decrease tightness and pain with daily activities. Therapeutic Modalities: N/A today HEP: As above; handouts given to patient for all exercises. ______________________________________________________________________________________________________ Treatment Assessment:  Patient tolerated treatment well. Patient reported decreased pain after treatment. Progression/Medical Necessity:  
· Patient is expected to demonstrate progress in strength and range of motion to increase independence with daily activities.  
· Patient demonstrates good rehab potential due to higher previous functional level. · Skilled intervention continues to be required due to decrease in functional mobility. Compliance with Program/Exercises: Compliant Reason for Continuation of Services/Other Comments: 
· Patient continues to demonstrate capacity to improve overall mobility which will increase safety. Recommendations/Intent for next treatment session: \"Treatment next visit will focus on advancements to more challenging activities\". Total Treatment Duration: PT Patient Time In/Time Out Time In: 0730 Time Out: 7090 Kade Staples, PT

## 2018-10-31 ENCOUNTER — HOSPITAL ENCOUNTER (OUTPATIENT)
Dept: PHYSICAL THERAPY | Age: 73
Discharge: HOME | End: 2018-10-31
Payer: MEDICARE

## 2018-10-31 PROCEDURE — 97140 MANUAL THERAPY 1/> REGIONS: CPT

## 2018-10-31 NOTE — PROGRESS NOTES
[facility] : 1945 [place] Secondary: Adrián Christianson at [place] [id number] [facility], [address], [place] [address]. [phone]   [phone] Outpatient PHYSICAL THERAPY: Daily Note Fall Risk Score: 0 (? 5 = High Risk) ICD-9: Treatment Diagnosis: Other specified disorders of joint - other disorders of cervical region, cervicalgia ICD-10: Treatment Diagnosis: Cervicalgia (M54.2) REFERRING PHYSICIAN: Khai Benz MD MD Orders: Evaluate and Treat Return Physician Appointment: TBD MEDICAL/REFERRING DIAGNOSIS: 
Cervicalgia [M54.2] Other physical therapy [[id number]] DATE OF ONSET: Chronic PRIOR LEVEL OF FUNCTION: Independent PRECAUTIONS/ALLERGIES:  
Allergies Allergen Reactions  Alendronate Sodium Rash  Antihistamine [Triprolidine-Pseudoephedrine] Anxiety  Boniva [Ibandronate] Diarrhea ASSESSMENT: 
?????? ? ? This section established at most recent assessment?????????? Patient presents with improving range of motion, decreased strength, and pain in right shoulder and neck secondary to chronic pain. Patient has attended a total of 108 scheduled physical therapy visits including initial evaluation on 2014. Treatment has consisted of manual therapy and streching to improve pain and performance with activities of daily living. PROBLEM LIST (Impairments causing functional limitations): 1. Decreased independence with ADLs requiring any extensive UE activity (lifting/reaching/vacuuming). 2.  Decreased tolerance of prolonged sitting/standing activities (>30 minutes) due to exacerbation of neck pain. 3.  Unable to participate in social/recreational activities due to fear avoidance of onset of neck/shoulder pain. 4.  Outcome measure score of 10/50 on Neck Disability Index with minimal effect of pain on patient's ability to manage every day life activities. GOALS: (Goals have been discussed and agreed upon with patient.) SHORT-TERM FUNCTIONAL GOALS: Time Frame: 3 weeks 1. Patient will be independent with home exercise program without exacerbation of symptoms or cueing needed--goal met. 2. Patient will be independent with correct sleeping positions and awareness/avoidance of aggravating positions without cueing needed--goal met. DISCHARGE GOALS: Time Frame: 8 weeks 1. Patient will be independent with all ADLs with minimal onset of neck/shoulder pain and no deficits with daily tasks--goal met. 2. Patient will report no fear avoidance with social or recreational activities due to neck/shoulder pain--goal ongoing. 3. Patient will score less than or equal to 5/50 on Neck Disability Index with minimal effect of pain on patient's ability to manage every day life activities--goal ongoing. REHABILITATION POTENTIAL FOR STATED GOALS: [facility] OF CARE: 
INTERVENTIONS PLANNED: (Benefits and precautions of physical therapy have been discussed with the patient.) 1. Patient education including pathophysiology of neck/shoulder symptoms, education/training (sleeping positions, posture re-education and body mechanics), and instructions of home exercise program. 
2. Therapeutic exercises including mobility through neck/shoulder region and postural stabilization. 3. Manual therapy including soft tissue mobilizations, functional joint mobilizations and neuromuscular re-education to neck and shoulder region. 4. Therapeutic modalities including moist heat, cold pack, electrical stimulation, and/or ultrasound as needed for relief of symptoms with treatment. TREATMENT PLAN EFFECTIVE DATES: 10/7/2018 TO 12/7/2018 FREQUENCY/DURATION: Follow patient 1 time every 2 weeks for 12 weeks to address above goals.  
SUBJECTIVE: 
History of Present Injury/Illness (Reason for Referral): Patient is well known to therapist.  Patient returned to therapy today with continued complaints of neck and shoulder pain. Patient reports that her right shoulder/arm feel weak at times. She reports she hasn't ever dropped anything, but her arm will catch and she will have to be careful that she doesn't throw anything. She reports that her neck was hurting during the holidays. She reports that she did move out of her work office over the holidays. She reports she is still using cold packs at night and taking her pain medication as needed. She reports she has also been trying to get a few deep tissue massages to help with her pain. Present Symptoms: 10/31/2018: Patient reports she is doing better. She reports her shoulder is a little tight right now. Pain Intensity 1: 4 Pain Location 1: Neck, Shoulder Pain Orientation 1: Right, Left Pain Intervention(s) 1: Rest, Medication (see MAR) Pain Description 1: Aching, Constant Pain Onset 1: Chronic Pain Scale 1: Numeric (0 - 10) Patient Stated Pain Goal: 0 
· Patient Stated Pain Goal: 0 Dominant Side: right Past Medical History: Patient reports no significant past medical history. Current Medications: Cymblata, pain medication (PRN) Date Last Reviewed: 10/31/2018 Social History/Home Situation:  
Home Environment: Private residence Living Alone: No 
Support Systems: Family member(s); Friends \ neighbors Work/Activity History: Retired OBJECTIVE: 
Outcome Measure: Tool Used: Neck Disability Index (NDI) Score:  Initial: 10/50  Most Recent: 950 (Date:10/10/2018) Interpretation of Score: The Neck Disability Index is a revised form of the Oswestry Low Back Pain Index and is designed to measure the activities of daily living in person's with neck pain. Each section is scored on a 0-5 scale, 5 representing the greatest disability. The scores of each section are added together for a total score of 50. Score 0 1-10 11-20 21-30 31-40 41-49 50 Modifier CH CI CJ CK CL CM CN ? Carrying, Moving, and Handling Objects:  - CURRENT STATUS: CI - 1%-19% impaired, limited or restricted  - GOAL STATUS: CI - 1%-19% impaired, limited or restricted  - D/C STATUS:  ---------------To be determined--------------- Observation/Orthostatic Postural Assessment:  
Posture (WDL): Exceptions to [facility] Posture Assessment: Rounded shoulders; Forward head Palpation:  Tenderness to palpation noted in cervical spine and shoulder region on right greater than left (upper trapezius, levator scapulae, rhomboids, sub-occipitals). No bruising or swelling noted. ROM:  
 Cervical Assessment (AROM):  
· Flexion 100% · Extension 90% · Lateral flexion L 90%, R 100% LUE Assessment (AROM):  
· Within defined limits Shoulder Assessment (AROM): 
· Flexion B [facility] [place] · Ext. B [facility] · Rot. 90, R 90 
· IR: L 70, R 70 RLE Assessment (AROM):  
· Within defined limits Strength: LUE Strength, Tone, Sensation: · Within defined limits · Deep cervical flexion 20s against gravity RUE Strength, Tone, Sensation: · R Shoulder Flexion: 4+ · R Shoulder ABduction: 4+ · R Shoulder Internal Rotation: 4+ · R Shoulder External Rotation: 4+ · R Elbow Flexion: 4+ · R Elbow Extension: 4+ Special Tests: Negative Neurological Screen: Myotomes:  
  
  RUE Myotomes · C1 (Cervical Rotation): Strong · C2, C3, C4 (Shoulder Shrug): Strong · C5 (Shoulder ABduction): Strong · C6 (Biceps & Wrist Extension): Strong · C7 (Triceps & Wrist Flexion): Strong · C8 (Thumb Extension): Strong · T1 (Finger ADduction): Strong Dermatomes: Within normal limits     Within normal limits Reflexes: · Left Bicep (C5,C6): 2+ · Left Bracheoradialis (C5,C6): 2+ · Left Tricep (C6,C7,C8): 2+    · Right Bicep (C5,C6): 2+ · Right Bracheoradialis (C5,C6): 2+ · Right Tricep (C6,C7,C8): 2+ Neural Tension Tests: Negative Functional Mobility:  Gait Description (WDL): Within defined limits Balance:  
Sitting: Intact Standing: Intact TREATMENT:  
 (In addition to Assessment/Re-Assessment sessions the following treatments were rendered) Therapeutic Exercise: ( 5 minutes):  Exercises per grid below to improve mobility and strength. Required minimal visual, verbal and manual cues to promote proper body alignment, promote proper body posture and promote proper body mechanics. Progressed resistance, range, repetitions and complexity of movement as indicated. Date: 
10/31/2018 Activity/Exercise Parameters Scapular rows HEP - reviewed Lat pulls HEP - reviewed Manual Therapy · Joint Mobilization (10 Minutes): Grade II anterior-posterior joint mobilization to right glenohumeral joint with active and passive gross movement in all planes to improve range of motion and decrease pain with daily activities. Prone t-spine central posterior anterior grade IV T1-4. · Soft Tissue Mobilization (30 Minutes): Soft tissue mobilization to cervical spinal musculature to improve mobility and decrease tightness and pain with daily activities. Therapeutic Modalities: N/A today HEP: As above; handouts given to patient for all exercises. ______________________________________________________________________________________________________ Treatment Assessment:  Patient tolerated treatment well. Patient reported decreased pain after treatment. Progression/Medical Necessity:  
· Patient is expected to demonstrate progress in strength and range of motion to increase independence with daily activities. · Patient demonstrates good rehab potential due to higher previous functional level. · Skilled intervention continues to be required due to decrease in functional mobility. Compliance with Program/Exercises: Compliant Reason for Continuation of Services/Other Comments: · Patient continues to demonstrate capacity to improve overall mobility which will increase safety. Recommendations/Intent for next treatment session: \"Treatment next visit will focus on advancements to more challenging activities\". Total Treatment Duration: PT Patient Time In/Time Out Time In: 0900 Time Out: 4209 Paige Round, PT

## 2018-11-07 ENCOUNTER — HOSPITAL ENCOUNTER (OUTPATIENT)
Dept: PHYSICAL THERAPY | Age: 73
Discharge: HOME | End: 2018-11-07
Payer: MEDICARE

## 2018-11-07 PROCEDURE — G8985 CARRY GOAL STATUS: HCPCS

## 2018-11-07 PROCEDURE — 97140 MANUAL THERAPY 1/> REGIONS: CPT

## 2018-11-07 PROCEDURE — G8984 CARRY CURRENT STATUS: HCPCS

## 2018-11-07 NOTE — PROGRESS NOTES
Shanika Frank : 1945 [place] Secondary: Adrián Christianson at [place] [id number] [facility], [address], [place], [place]. [phone]   [phone] Outpatient PHYSICAL THERAPY: Progress Report Fall Risk Score: 0 (? 5 = High Risk) ICD-9: Treatment Diagnosis: Other specified disorders of joint - other disorders of cervical region, cervicalgia ICD-10: Treatment Diagnosis: Cervicalgia (M54.2) REFERRING PHYSICIAN: Nelda Obando MD MD Orders: Evaluate and Treat Return Physician Appointment: TBD MEDICAL/REFERRING DIAGNOSIS: 
No admission diagnoses are documented for this encounter. DATE OF ONSET: Chronic PRIOR LEVEL OF FUNCTION: Independent PRECAUTIONS/ALLERGIES:  
Allergies Allergen Reactions  Alendronate Sodium Rash  Antihistamine [Triprolidine-Pseudoephedrine] Anxiety  Boniva [Ibandronate] Diarrhea ASSESSMENT: 
?????? ? ? This section established at most recent assessment?????????? Patient presents with improving range of motion, decreased strength, and pain in right shoulder and neck secondary to chronic pain. Patient has attended a total of 109 scheduled physical therapy visits including initial evaluation on 2014. Treatment has consisted of manual therapy and streching to improve pain and performance with activities of daily living. PROBLEM LIST (Impairments causing functional limitations): 1. Decreased independence with ADLs requiring any extensive UE activity (lifting/reaching/vacuuming). 2.  Decreased tolerance of prolonged sitting/standing activities (>30 minutes) due to exacerbation of neck pain. 3.  Unable to participate in social/recreational activities due to fear avoidance of onset of neck/shoulder pain. 4.  Outcome measure score of 10/50 on Neck Disability Index with minimal effect of pain on patient's ability to manage every day life activities. GOALS: (Goals have been discussed and agreed upon with patient.) SHORT-TERM FUNCTIONAL GOALS: Time Frame: 3 weeks 1. Patient will be independent with home exercise program without exacerbation of symptoms or cueing needed--goal met. 2. Patient will be independent with correct sleeping positions and awareness/avoidance of aggravating positions without cueing needed--goal met. DISCHARGE GOALS: Time Frame: 8 weeks 1. Patient will be independent with all ADLs with minimal onset of neck/shoulder pain and no deficits with daily tasks--goal met. 2. Patient will report no fear avoidance with social or recreational activities due to neck/shoulder pain--goal ongoing. 3. Patient will score less than or equal to 5/50 on Neck Disability Index with minimal effect of pain on patient's ability to manage every day life activities--goal ongoing. REHABILITATION POTENTIAL FOR STATED GOALS: [facility] OF CARE: 
INTERVENTIONS PLANNED: (Benefits and precautions of physical therapy have been discussed with the patient.) 1. Patient education including pathophysiology of neck/shoulder symptoms, education/training (sleeping positions, posture re-education and body mechanics), and instructions of home exercise program. 
2. Therapeutic exercises including mobility through neck/shoulder region and postural stabilization. 3. Manual therapy including soft tissue mobilizations, functional joint mobilizations and neuromuscular re-education to neck and shoulder region. 4. Therapeutic modalities including moist heat, cold pack, electrical stimulation, and/or ultrasound as needed for relief of symptoms with treatment. TREATMENT PLAN EFFECTIVE DATES: 10/7/2018 TO 12/7/2018 FREQUENCY/DURATION: Follow patient 1 time every 2 weeks for 12 weeks to address above goals.  
SUBJECTIVE: 
History of Present Injury/Illness (Reason for Referral): Patient is well known to therapist.  Patient returned to therapy today with continued complaints of neck and shoulder pain. Patient reports that her right shoulder/arm feel weak at times. She reports she hasn't ever dropped anything, but her arm will catch and she will have to be careful that she doesn't throw anything. She reports that her neck was hurting during the holidays. She reports that she did move out of her work office over the holidays. She reports she is still using cold packs at night and taking her pain medication as needed. She reports she has also been trying to get a few deep tissue massages to help with her pain. Present Symptoms: 11/7/2018: Patient reports her right shoulder is really tight today. Pain Intensity 1: 4 Pain Location 1: Neck, Shoulder Pain Orientation 1: Right, Left Pain Intervention(s) 1: Rest, Medication (see MAR) Pain Description 1: Aching, Constant Pain Onset 1: Chronic Pain Scale 1: Numeric (0 - 10) Patient Stated Pain Goal: 0 
· Patient Stated Pain Goal: 0 Dominant Side: right Past Medical History: Patient reports no significant past medical history. Current Medications: Cymblata, pain medication (PRN) Date Last Reviewed: 11/7/2018 Social History/Home Situation:  
Home Environment: Private residence Living Alone: No 
Support Systems: Family member(s); Friends \ neighbors Work/Activity History: Retired OBJECTIVE: 
Outcome Measure: Tool Used: Neck Disability Index (NDI) Score:  Initial: 10/50  Most Recent: 9/50 (Date:11/7/2018) Interpretation of Score: The Neck Disability Index is a revised form of the Oswestry Low Back Pain Index and is designed to measure the activities of daily living in person's with neck pain. Each section is scored on a 0-5 scale, 5 representing the greatest disability. The scores of each section are added together for a total score of 50. Score 0 1-10 11-20 21-30 31-40 41-49 50 Modifier CH CI CJ CK CL CM CN ? Carrying, Moving, and Handling Objects:  - CURRENT STATUS: CI - 1%-19% impaired, limited or restricted  - GOAL STATUS: CI - 1%-19% impaired, limited or restricted  - D/C STATUS:  ---------------To be determined--------------- Observation/Orthostatic Postural Assessment:  
Posture (WDL): Exceptions to [facility] Posture Assessment: Rounded shoulders; Forward head Palpation:  Tenderness to palpation noted in cervical spine and shoulder region on right greater than left (upper trapezius, levator scapulae, rhomboids, sub-occipitals). No bruising or swelling noted. ROM:  
 Cervical Assessment (AROM):  
· Flexion 100% · Extension 90% · Lateral flexion L 90%, R 100% LUE Assessment (AROM):  
· Within defined limits Shoulder Assessment (AROM): 
· Flexion B [facility] [place] · Ext. B [facility] · Rot. 90, R 90 
· IR: L 70, R 70 RLE Assessment (AROM):  
· Within defined limits Strength: LUE Strength, Tone, Sensation: · Within defined limits · Deep cervical flexion 20s against gravity RUE Strength, Tone, Sensation: · R Shoulder Flexion: 4+ · R Shoulder ABduction: 4+ · R Shoulder Internal Rotation: 4+ · R Shoulder External Rotation: 4+ · R Elbow Flexion: 4+ · R Elbow Extension: 4+ Special Tests: Negative Neurological Screen: Myotomes:  
  
  RUE Myotomes · C1 (Cervical Rotation): Strong · C2, C3, C4 (Shoulder Shrug): Strong · C5 (Shoulder ABduction): Strong · C6 (Biceps & Wrist Extension): Strong · C7 (Triceps & Wrist Flexion): Strong · C8 (Thumb Extension): Strong · T1 (Finger ADduction): Strong Dermatomes: Within normal limits     Within normal limits Reflexes: · Left Bicep (C5,C6): 2+ · Left Bracheoradialis (C5,C6): 2+ · Left Tricep (C6,C7,C8): 2+    · Right Bicep (C5,C6): 2+ · Right Bracheoradialis (C5,C6): 2+ · Right Tricep (C6,C7,C8): 2+ Neural Tension Tests: Negative Functional Mobility:  Gait Description (WDL): Within defined limits Balance:  
Sitting: Intact Standing: Intact TREATMENT:  
 (In addition to Assessment/Re-Assessment sessions the following treatments were rendered) Therapeutic Exercise: ( 5 minutes):  Exercises per grid below to improve mobility and strength. Required minimal visual, verbal and manual cues to promote proper body alignment, promote proper body posture and promote proper body mechanics. Progressed resistance, range, repetitions and complexity of movement as indicated. Date: 
11/7/2018 Activity/Exercise Parameters Scapular rows HEP - reviewed Lat pulls HEP - reviewed Manual Therapy · Joint Mobilization (10 Minutes): Grade II anterior-posterior joint mobilization to right glenohumeral joint with active and passive gross movement in all planes to improve range of motion and decrease pain with daily activities. Prone t-spine central posterior anterior grade IV T1-4. · Soft Tissue Mobilization (30 Minutes): Soft tissue mobilization to cervical spinal musculature to improve mobility and decrease tightness and pain with daily activities. Therapeutic Modalities: N/A today HEP: As above; handouts given to patient for all exercises. ______________________________________________________________________________________________________ Treatment Assessment:  Patient tolerated treatment well. Patient reported decreased pain after treatment. Progression/Medical Necessity:  
· Patient is expected to demonstrate progress in strength and range of motion to increase independence with daily activities. · Patient demonstrates good rehab potential due to higher previous functional level. · Skilled intervention continues to be required due to decrease in functional mobility. Compliance with Program/Exercises: Compliant Reason for Continuation of Services/Other Comments: 
· Patient continues to demonstrate capacity to improve overall mobility which will increase safety. Recommendations/Intent for next treatment session: \"Treatment next visit will focus on advancements to more challenging activities\". Total Treatment Duration: PT Patient Time In/Time Out Time In: 0900 Time Out: 1641 Thao Yates PT

## 2018-11-21 ENCOUNTER — HOSPITAL ENCOUNTER (OUTPATIENT)
Dept: PHYSICAL THERAPY | Age: 73
Discharge: HOME | End: 2018-11-21
Payer: MEDICARE

## 2018-11-21 PROCEDURE — 97140 MANUAL THERAPY 1/> REGIONS: CPT

## 2018-11-21 NOTE — PROGRESS NOTES
Monica Jones : 1945 [place] Secondary: Adrián Christianson at [place] [id number] [facility], [address], [facility] [address] [phone]   [phone] Outpatient PHYSICAL THERAPY: Daily Note Fall Risk Score: 0 (? 5 = High Risk) ICD-9: Treatment Diagnosis: Other specified disorders of joint - other disorders of cervical region, cervicalgia ICD-10: Treatment Diagnosis: Cervicalgia (M54.2) REFERRING PHYSICIAN: Soumya Killian MD MD Orders: Evaluate and Treat Return Physician Appointment: TBD MEDICAL/REFERRING DIAGNOSIS: 
Cervicalgia [M54.2] DATE OF ONSET: Chronic PRIOR LEVEL OF FUNCTION: Independent PRECAUTIONS/ALLERGIES:  
Allergies Allergen Reactions  Alendronate Sodium Rash  Antihistamine [Triprolidine-Pseudoephedrine] Anxiety  Boniva [Ibandronate] Diarrhea ASSESSMENT: 
?????? ? ? This section established at most recent assessment?????????? Patient presents with improving range of motion, decreased strength, and pain in right shoulder and neck secondary to chronic pain. Patient has attended a total of 110 scheduled physical therapy visits including initial evaluation on 2014. Treatment has consisted of manual therapy and streching to improve pain and performance with activities of daily living. PROBLEM LIST (Impairments causing functional limitations): 1. Decreased independence with ADLs requiring any extensive UE activity (lifting/reaching/vacuuming). 2.  Decreased tolerance of prolonged sitting/standing activities (>30 minutes) due to exacerbation of neck pain. 3.  Unable to participate in social/recreational activities due to fear avoidance of onset of neck/shoulder pain. 4.  Outcome measure score of 10/50 on Neck Disability Index with minimal effect of pain on patient's ability to manage every day life activities. GOALS: (Goals have been discussed and agreed upon with patient.) SHORT-TERM FUNCTIONAL GOALS: Time Frame: 3 weeks 1. Patient will be independent with home exercise program without exacerbation of symptoms or cueing needed--goal met. 2. Patient will be independent with correct sleeping positions and awareness/avoidance of aggravating positions without cueing needed--goal met. DISCHARGE GOALS: Time Frame: 8 weeks 1. Patient will be independent with all ADLs with minimal onset of neck/shoulder pain and no deficits with daily tasks--goal met. 2. Patient will report no fear avoidance with social or recreational activities due to neck/shoulder pain--goal ongoing. 3. Patient will score less than or equal to 5/50 on Neck Disability Index with minimal effect of pain on patient's ability to manage every day life activities--goal ongoing. REHABILITATION POTENTIAL FOR STATED GOALS: [facility] OF CARE: 
INTERVENTIONS PLANNED: (Benefits and precautions of physical therapy have been discussed with the patient.) 1. Patient education including pathophysiology of neck/shoulder symptoms, education/training (sleeping positions, posture re-education and body mechanics), and instructions of home exercise program. 
2. Therapeutic exercises including mobility through neck/shoulder region and postural stabilization. 3. Manual therapy including soft tissue mobilizations, functional joint mobilizations and neuromuscular re-education to neck and shoulder region. 4. Therapeutic modalities including moist heat, cold pack, electrical stimulation, and/or ultrasound as needed for relief of symptoms with treatment. TREATMENT PLAN EFFECTIVE DATES: 10/7/2018 TO 12/7/2018 FREQUENCY/DURATION: Follow patient 1 time every 2 weeks for 12 weeks to address above goals.  
SUBJECTIVE: 
History of Present Injury/Illness (Reason for Referral): Patient is well known to therapist.  Patient returned to therapy today with continued complaints of neck and shoulder pain. Patient reports that her right shoulder/arm feel weak at times. She reports she hasn't ever dropped anything, but her arm will catch and she will have to be careful that she doesn't throw anything. She reports that her neck was hurting during the holidays. She reports that she did move out of her work office over the holidays. She reports she is still using cold packs at night and taking her pain medication as needed. She reports she has also been trying to get a few deep tissue massages to help with her pain. Present Symptoms: 11/21/2018: Patient reports she is having pain in her right shoulder today that has been keeping her up at night. Pain Intensity 1: 4 Pain Location 1: Neck, Shoulder Pain Orientation 1: Right, Left Pain Intervention(s) 1: Rest, Medication (see MAR) Pain Description 1: Aching, Constant Pain Onset 1: Chronic Pain Scale 1: Numeric (0 - 10) Patient Stated Pain Goal: 0 
· Patient Stated Pain Goal: 0 Dominant Side: right Past Medical History: Patient reports no significant past medical history. Current Medications: Cymblata, pain medication (PRN) Date Last Reviewed: 11/21/2018 Social History/Home Situation:  
Home Environment: Private residence Living Alone: No 
Support Systems: Family member(s); Friends \ neighbors Work/Activity History: Retired OBJECTIVE: 
Outcome Measure: Tool Used: Neck Disability Index (NDI) Score:  Initial: 10/50  Most Recent: 9/50 (Date:11/7/2018) Interpretation of Score: The Neck Disability Index is a revised form of the Oswestry Low Back Pain Index and is designed to measure the activities of daily living in person's with neck pain. Each section is scored on a 0-5 scale, 5 representing the greatest disability. The scores of each section are added together for a total score of 50. Score 0 1-10 11-20 21-30 31-40 41-49 50 Modifier CH CI CJ CK CL CM CN ? Carrying, Moving, and Handling Objects:  - CURRENT STATUS: CI - 1%-19% impaired, limited or restricted  - GOAL STATUS: CI - 1%-19% impaired, limited or restricted  - D/C STATUS:  ---------------To be determined--------------- Observation/Orthostatic Postural Assessment:  
Posture (WDL): Exceptions to [facility] Posture Assessment: Rounded shoulders; Forward head Palpation:  Tenderness to palpation noted in cervical spine and shoulder region on right greater than left (upper trapezius, levator scapulae, rhomboids, sub-occipitals). No bruising or swelling noted. ROM:  
 Cervical Assessment (AROM):  
· Flexion 100% · Extension 90% · Lateral flexion L 90%, R 100% LUE Assessment (AROM):  
· Within defined limits Shoulder Assessment (AROM): 
· Flexion B [facility] [place] [facility] · Rot. 90, R 90 
· IR: L 70, R 70 RLE Assessment (AROM):  
· Within defined limits Strength: LUE Strength, Tone, Sensation: · Within defined limits · Deep cervical flexion 20s against gravity RUE Strength, Tone, Sensation: · R Shoulder Flexion: 4+ · R Shoulder ABduction: 4+ · R Shoulder Internal Rotation: 4+ · R Shoulder External Rotation: 4+ · R Elbow Flexion: 4+ · R Elbow Extension: 4+ Special Tests: Negative Neurological Screen: Myotomes:  
  
  RUE Myotomes · C1 (Cervical Rotation): Strong · C2, C3, C4 (Shoulder Shrug): Strong · C5 (Shoulder ABduction): Strong · C6 (Biceps & Wrist Extension): Strong · C7 (Triceps & Wrist Flexion): Strong · C8 (Thumb Extension): Strong · T1 (Finger ADduction): Strong Dermatomes: Within normal limits     Within normal limits Reflexes: · Left Bicep (C5,C6): 2+ · Left Bracheoradialis (C5,C6): 2+ · Left Tricep (C6,C7,C8): 2+    · Right Bicep (C5,C6): 2+ · Right Bracheoradialis (C5,C6): 2+ · Right Tricep (C6,C7,C8): 2+ Neural Tension Tests: Negative Functional Mobility:  Gait Description (WDL): Within defined limits Balance:  
Sitting: Intact Standing: Intact TREATMENT:  
 (In addition to Assessment/Re-Assessment sessions the following treatments were rendered) Therapeutic Exercise: ( 5 minutes):  Exercises per grid below to improve mobility and strength. Required minimal visual, verbal and manual cues to promote proper body alignment, promote proper body posture and promote proper body mechanics. Progressed resistance, range, repetitions and complexity of movement as indicated. Date: 
11/21/2018 Activity/Exercise Parameters Scapular rows HEP - reviewed Lat pulls HEP - reviewed Manual Therapy · Joint Mobilization (10 Minutes): Grade II anterior-posterior joint mobilization to right glenohumeral joint with active and passive gross movement in all planes to improve range of motion and decrease pain with daily activities. Prone t-spine central posterior anterior grade IV T1-4. · Soft Tissue Mobilization (30 Minutes): Soft tissue mobilization to cervical spinal musculature to improve mobility and decrease tightness and pain with daily activities. Therapeutic Modalities: N/A today HEP: As above; handouts given to patient for all exercises. ______________________________________________________________________________________________________ Treatment Assessment:  Patient tolerated treatment well. Patient reported decreased pain after treatment. Progression/Medical Necessity:  
· Patient is expected to demonstrate progress in strength and range of motion to increase independence with daily activities. · Patient demonstrates good rehab potential due to higher previous functional level. · Skilled intervention continues to be required due to decrease in functional mobility. Compliance with Program/Exercises: Compliant Reason for Continuation of Services/Other Comments: · Patient continues to demonstrate capacity to improve overall mobility which will increase safety. Recommendations/Intent for next treatment session: \"Treatment next visit will focus on advancements to more challenging activities\". Total Treatment Duration: PT Patient Time In/Time Out Time In: 5784 Time Out: 0900 Paige Round, PT

## 2018-12-04 NOTE — THERAPY RECERTIFICATION
Kendra Eugene : 1945 [place] Secondary: Adrián Christianson at [place] [id number] [facility], [address], [address] [facility] [phone]   [phone] Outpatient PHYSICAL THERAPY: Recertification Fall Risk Score: 0 (? 5 = High Risk) ICD-9: Treatment Diagnosis: Other specified disorders of joint - other disorders of cervical region, cervicalgia ICD-10: Treatment Diagnosis: Cervicalgia (M54.2) REFERRING PHYSICIAN: ABNER Tripathi MD Orders: Evaluate and Treat Return Physician Appointment: TBD MEDICAL/REFERRING DIAGNOSIS: 
Cervicalgia [M54.2] DATE OF ONSET: Chronic PRIOR LEVEL OF FUNCTION: Independent PRECAUTIONS/ALLERGIES:  
Allergies Allergen Reactions  Alendronate Sodium Rash  Antihistamine [Triprolidine-Pseudoephedrine] Anxiety  Boniva [Ibandronate] Diarrhea ASSESSMENT: 
?????? ? ? This section established at most recent assessment?????????? Patient presents with improving range of motion, decreased strength, and pain in right shoulder and neck secondary to chronic pain. Patient has attended a total of 111 scheduled physical therapy visits including initial evaluation on 2014. Treatment has consisted of manual therapy and streching to improve pain and performance with activities of daily living. PROBLEM LIST (Impairments causing functional limitations): 1. Decreased independence with ADLs requiring any extensive UE activity (lifting/reaching/vacuuming). 2.  Decreased tolerance of prolonged sitting/standing activities (>30 minutes) due to exacerbation of neck pain. 3.  Unable to participate in social/recreational activities due to fear avoidance of onset of neck/shoulder pain. 4.  Outcome measure score of 10/50 on Neck Disability Index with minimal effect of pain on patient's ability to manage every day life activities. GOALS: (Goals have been discussed and agreed upon with patient.) SHORT-TERM FUNCTIONAL GOALS: Time Frame: 3 weeks 1. Patient will be independent with home exercise program without exacerbation of symptoms or cueing needed--goal met. 2. Patient will be independent with correct sleeping positions and awareness/avoidance of aggravating positions without cueing needed--goal met. DISCHARGE GOALS: Time Frame: 8 weeks 1. Patient will be independent with all ADLs with minimal onset of neck/shoulder pain and no deficits with daily tasks--goal met. 2. Patient will report no fear avoidance with social or recreational activities due to neck/shoulder pain--goal ongoing. 3. Patient will score less than or equal to 5/50 on Neck Disability Index with minimal effect of pain on patient's ability to manage every day life activities--goal ongoing. REHABILITATION POTENTIAL FOR STATED GOALS: [facility]uss OF CARE: 
INTERVENTIONS PLANNED: (Benefits and precautions of physical therapy have been discussed with the patient.) 1. Patient education including pathophysiology of neck/shoulder symptoms, education/training (sleeping positions, posture re-education and body mechanics), and instructions of home exercise program. 
2. Therapeutic exercises including mobility through neck/shoulder region and postural stabilization. 3. Manual therapy including soft tissue mobilizations, functional joint mobilizations and neuromuscular re-education to neck and shoulder region. 4. Therapeutic modalities including moist heat, cold pack, electrical stimulation, and/or ultrasound as needed for relief of symptoms with treatment. TREATMENT PLAN EFFECTIVE DATES: 12/7/2018 TO 3/7/2019 FREQUENCY/DURATION: Follow patient 1 time every 2 weeks for 12 weeks to address above goals. Regarding Paola Beach's therapy, I certify that the treatment plan above will be carried out by a therapist or under their direction.  
Thank you for this referral, 
 Cassandra Oconnell PT Referring Physician Signature: Theresa Zambrano TETEP         Date SUBJECTIVE: 
History of Present Injury/Illness (Reason for Referral): Patient is well known to therapist.  Patient returned to therapy today with continued complaints of neck and shoulder pain. Patient reports that her right shoulder/arm feel weak at times. She reports she hasn't ever dropped anything, but her arm will catch and she will have to be careful that she doesn't throw anything. She reports that her neck was hurting during the holidays. She reports that she did move out of her work office over the holidays. She reports she is still using cold packs at night and taking her pain medication as needed. She reports she has also been trying to get a few deep tissue massages to help with her pain. Present Symptoms: 12/4/2018: Patient reports she is feeling better than her last visit. ·    
Dominant Side: right Past Medical History: Patient reports no significant past medical history. Current Medications: Cymblata, pain medication (PRN) Date Last Reviewed: 12/4/2018 Social History/Home Situation:  
Home Environment: Private residence Living Alone: No 
Support Systems: Family member(s); Friends \ neighbors Work/Activity History: Retired OBJECTIVE: 
Outcome Measure: Tool Used: Neck Disability Index (NDI) Score:  Initial: 10/50  Most Recent: 9/50 (Date:11/7/2018) Interpretation of Score: The Neck Disability Index is a revised form of the Oswestry Low Back Pain Index and is designed to measure the activities of daily living in person's with neck pain. Each section is scored on a 0-5 scale, 5 representing the greatest disability. The scores of each section are added together for a total score of 50. Score 0 1-10 11-20 21-30 31-40 41-49 50 Modifier CH CI CJ CK CL CM CN ? Carrying, Moving, and Handling Objects:  
  - CURRENT STATUS: CI - 1%-19% impaired, limited or restricted  - GOAL STATUS: CI - 1%-19% impaired, limited or restricted  - D/C STATUS:  ---------------To be determined--------------- Observation/Orthostatic Postural Assessment:  
Posture (WDL): Exceptions to [facility] Posture Assessment: Rounded shoulders; Forward head Palpation:  Tenderness to palpation noted in cervical spine and shoulder region on right greater than left (upper trapezius, levator scapulae, rhomboids, sub-occipitals). No bruising or swelling noted. ROM:  
 Cervical Assessment (AROM):  
· Flexion 100% · Extension 90% · Lateral flexion L 90%, R 100% LUE Assessment (AROM):  
· Within defined limits Shoulder Assessment (AROM): 
· Flexion B [facility] [place] · Ext. B [facility] · Rot. 90, R 90 
· IR: L 70, R 70 RLE Assessment (AROM):  
· Within defined limits Strength: LUE Strength, Tone, Sensation: · Within defined limits · Deep cervical flexion 20s against gravity RUE Strength, Tone, Sensation: · R Shoulder Flexion: 4+ · R Shoulder ABduction: 4+ · R Shoulder Internal Rotation: 4+ · R Shoulder External Rotation: 4+ · R Elbow Flexion: 4+ · R Elbow Extension: 4+ Special Tests: Negative Neurological Screen: Myotomes:  
  
  RUE Myotomes · C1 (Cervical Rotation): Strong · C2, C3, C4 (Shoulder Shrug): Strong · C5 (Shoulder ABduction): Strong · C6 (Biceps & Wrist Extension): Strong · C7 (Triceps & Wrist Flexion): Strong · C8 (Thumb Extension): Strong · T1 (Finger ADduction): Strong Dermatomes: Within normal limits     Within normal limits Reflexes: · Left Bicep (C5,C6): 2+ · Left Bracheoradialis (C5,C6): 2+ · Left Tricep (C6,C7,C8): 2+    · Right Bicep (C5,C6): 2+ · Right Bracheoradialis (C5,C6): 2+ · Right Tricep (C6,C7,C8): 2+ Neural Tension Tests: Negative Functional Mobility:  Gait Description (WDL): Within defined limits Balance:  
Sitting: Intact Standing: Intact TREATMENT:  
 (In addition to Assessment/Re-Assessment sessions the following treatments were rendered) Therapeutic Exercise: ( 5 minutes):  Exercises per grid below to improve mobility and strength. Required minimal visual, verbal and manual cues to promote proper body alignment, promote proper body posture and promote proper body mechanics. Progressed resistance, range, repetitions and complexity of movement as indicated. Date: 
12/4/2018 Activity/Exercise Parameters Scapular rows HEP - reviewed Lat pulls HEP - reviewed Manual Therapy · Joint Mobilization (10 Minutes): Grade II anterior-posterior joint mobilization to right glenohumeral joint with active and passive gross movement in all planes to improve range of motion and decrease pain with daily activities. Prone t-spine central posterior anterior grade IV T1-4. · Soft Tissue Mobilization (30 Minutes): Soft tissue mobilization to cervical spinal musculature to improve mobility and decrease tightness and pain with daily activities. Therapeutic Modalities: N/A today HEP: As above; handouts given to patient for all exercises. ______________________________________________________________________________________________________ Treatment Assessment:  Patient tolerated treatment well. Patient reported decreased pain after treatment. Progression/Medical Necessity:  
· Patient is expected to demonstrate progress in strength and range of motion to increase independence with daily activities. · Patient demonstrates good rehab potential due to higher previous functional level. · Skilled intervention continues to be required due to decrease in functional mobility. Compliance with Program/Exercises: Compliant Reason for Continuation of Services/Other Comments: 
· Patient continues to demonstrate capacity to improve overall mobility which will increase safety. Recommendations/Intent for next treatment session: \"Treatment next visit will focus on advancements to more challenging activities\".    
Total Treatment Duration: 
  
 
Shukri Chen PT

## 2018-12-05 ENCOUNTER — HOSPITAL ENCOUNTER (OUTPATIENT)
Dept: PHYSICAL THERAPY | Age: 73
Discharge: HOME | End: 2018-12-05
Payer: MEDICARE

## 2018-12-05 PROCEDURE — G8985 CARRY GOAL STATUS: HCPCS

## 2018-12-05 PROCEDURE — G8984 CARRY CURRENT STATUS: HCPCS

## 2018-12-05 PROCEDURE — 97140 MANUAL THERAPY 1/> REGIONS: CPT

## 2018-12-05 NOTE — THERAPY RECERTIFICATION
Willis Read  : 1945  [place]  Secondary: Adrián Sanchez 76 at [place] [address] [facility], [address] [place] [address],[address], [facility].  [phone]   [phone]        Outpatient PHYSICAL THERAPY: Recertification  Fall Risk Score: 0 (? 5 = High Risk)  ICD-9: Treatment Diagnosis: Other specified disorders of joint - other disorders of cervical region, cervicalgia  ICD-10: Treatment Diagnosis: Cervicalgia (M54.2)     REFERRING PHYSICIAN: Josie Brittle, FNP MD Orders: Evaluate and Treat  Return Physician Appointment: TBD  MEDICAL/REFERRING DIAGNOSIS:  Cervicalgia [M54.2]  DATE OF ONSET: Chronic   PRIOR LEVEL OF FUNCTION: Independent  PRECAUTIONS/ALLERGIES:   Allergies   Allergen Reactions    Alendronate Sodium Rash    Antihistamine [Triprolidine-Pseudoephedrine] Anxiety    Boniva [Ibandronate] Diarrhea     ASSESSMENT:  ?????? ? ? This section established at most recent assessment?????????? Patient presents with improving range of motion, decreased strength, and pain in right shoulder and neck secondary to chronic pain. Patient has attended a total of 111 scheduled physical therapy visits including initial evaluation on 2014. Treatment has consisted of manual therapy and streching to improve pain and performance with activities of daily living. PROBLEM LIST (Impairments causing functional limitations):  1. Decreased independence with ADLs requiring any extensive UE activity (lifting/reaching/vacuuming). 2.  Decreased tolerance of prolonged sitting/standing activities (>30 minutes) due to exacerbation of neck pain. 3.  Unable to participate in social/recreational activities due to fear avoidance of onset of neck/shoulder pain. 4.  Outcome measure score of 10/50 on Neck Disability Index with minimal effect of pain on patient's ability to manage every day life activities.   GOALS: (Goals have been discussed and agreed upon with patient.)  SHORT-TERM FUNCTIONAL GOALS: Time Frame: 3 weeks  1. Patient will be independent with home exercise program without exacerbation of symptoms or cueing needed--goal met. 2. Patient will be independent with correct sleeping positions and awareness/avoidance of aggravating positions without cueing needed--goal met. DISCHARGE GOALS: Time Frame: 8 weeks  1. Patient will be independent with all ADLs with minimal onset of neck/shoulder pain and no deficits with daily tasks--goal met. 2. Patient will report no fear avoidance with social or recreational activities due to neck/shoulder pain--goal ongoing. 3. Patient will score less than or equal to 5/50 on Neck Disability Index with minimal effect of pain on patient's ability to manage every day life activities--goal ongoing. REHABILITATION POTENTIAL FOR STATED GOALS: Mauricio Tolliver OF CARE:  INTERVENTIONS PLANNED: (Benefits and precautions of physical therapy have been discussed with the patient.)  1. Patient education including pathophysiology of neck/shoulder symptoms, education/training (sleeping positions, posture re-education and body mechanics), and instructions of home exercise program.  2. Therapeutic exercises including mobility through neck/shoulder region and postural stabilization. 3. Manual therapy including soft tissue mobilizations, functional joint mobilizations and neuromuscular re-education to neck and shoulder region. 4. Therapeutic modalities including moist heat, cold pack, electrical stimulation, and/or ultrasound as needed for relief of symptoms with treatment. TREATMENT PLAN EFFECTIVE DATES: 12/7/2018 TO 3/7/2019  FREQUENCY/DURATION: Follow patient 1 time every 2 weeks for 12 weeks to address above goals. Regarding Paola Beach's therapy, I certify that the treatment plan above will be carried out by a therapist or under their direction.   Thank you for this referral,  Paolo Gomez PT     Referring Physician Signature: Martha Najera, [facility]          Date           SUBJECTIVE:  History of Present Injury/Illness (Reason for Referral): Patient is well known to therapist.  Patient returned to therapy today with continued complaints of neck and shoulder pain. Patient reports that her right shoulder/arm feel weak at times. She reports she hasn't ever dropped anything, but her arm will catch and she will have to be careful that she doesn't throw anything. She reports that her neck was hurting during the holidays. She reports that she did move out of her work office over the holidays. She reports she is still using cold packs at night and taking her pain medication as needed. She reports she has also been trying to get a few deep tissue massages to help with her pain. Present Symptoms: 12/5/2018: Patient reports she is feeling better than her last visit. Pain Intensity 1: 4  Pain Location 1: Neck, Shoulder  Pain Orientation 1: Right, Left  Pain Intervention(s) 1: Rest, Medication (see MAR)  Pain Description 1: Aching, Constant  Pain Onset 1: Chronic  Pain Scale 1: Numeric (0 - 10)  Patient Stated Pain Goal: 0  · Patient Stated Pain Goal: 0   Dominant Side: right  Past Medical History: Patient reports no significant past medical history. Current Medications: Cymblata, pain medication (PRN)   Date Last Reviewed: 12/5/2018  Social History/Home Situation:   Home Environment: Private residence  Living Alone: No  Support Systems: Family member(s); Friends \ neighbors  Work/Activity History: Retired  OBJECTIVE:  Outcome Measure: Tool Used: Neck Disability Index (NDI)  Score:  Initial: 10/50  Most Recent: 9/50 (Date:12/5/2018)     Interpretation of Score: The Neck Disability Index is a revised form of the Oswestry Low Back Pain Index and is designed to measure the activities of daily living in person's with neck pain. Each section is scored on a 0-5 scale, 5 representing the greatest disability.   The scores of each section are added together for a total score of 50. Score 0 1-10 11-20 21-30 31-40 41-49 50   Modifier CH CI CJ CK CL CM CN     ? Carrying, Moving, and Handling Objects:     - CURRENT STATUS: CI - 1%-19% impaired, limited or restricted    - GOAL STATUS: CI - 1%-19% impaired, limited or restricted    - D/C STATUS:  ---------------To be determined---------------     Observation/Orthostatic Postural Assessment:   Posture (WDL): Exceptions to WDL  Posture Assessment: Rounded shoulders; Forward head  Palpation:  Tenderness to palpation noted in cervical spine and shoulder region on right greater than left (upper trapezius, levator scapulae, rhomboids, sub-occipitals). No bruising or swelling noted. ROM:    Cervical Assessment (AROM):   · Flexion 100%  · Extension 90%  · Lateral flexion L 90%, R 100%    LUE Assessment (AROM):   · Within defined limits        Shoulder Assessment (AROM):  · Flexion B WFL  · Ext. B WFL  · Rot. 90, R 90  · IR: L 70, R 70     RLE Assessment (AROM):   · Within defined limits      Strength:   LUE Strength, Tone, Sensation:   · Within defined limits  · Deep cervical flexion 20s against gravity    RUE Strength, Tone, Sensation:  · R Shoulder Flexion: 4+  · R Shoulder ABduction: 4+  · R Shoulder Internal Rotation: 4+  · R Shoulder External Rotation: 4+  · R Elbow Flexion: 4+  · R Elbow Extension: 4+        Special Tests: Negative  Neurological Screen:    Myotomes:        RUE Myotomes  · C1 (Cervical Rotation): Strong  · C2, C3, C4 (Shoulder Shrug): Strong  · C5 (Shoulder ABduction): Strong  · C6 (Biceps & Wrist Extension): Strong  · C7 (Triceps & Wrist Flexion): Strong  · C8 (Thumb Extension): Strong  · T1 (Finger ADduction): Strong          Dermatomes:     Within normal limits     Within normal limits          Reflexes:   · Left Bicep (C5,C6): 2+  · Left Bracheoradialis (C5,C6): 2+  · Left Tricep (C6,C7,C8): 2+    · Right Bicep (C5,C6): 2+  · Right Bracheoradialis (C5,C6): 2+  · Right Tricep (C6,C7,C8): 2+ Neural Tension Tests: Negative  Functional Mobility:  Gait Description (WDL): Within defined limits     Balance:   Sitting: Intact  Standing: Intact  TREATMENT:    (In addition to Assessment/Re-Assessment sessions the following treatments were rendered)  Therapeutic Exercise: ( 5 minutes):  Exercises per grid below to improve mobility and strength. Required minimal visual, verbal and manual cues to promote proper body alignment, promote proper body posture and promote proper body mechanics. Progressed resistance, range, repetitions and complexity of movement as indicated. Date:  12/5/2018    Activity/Exercise Parameters   Scapular rows HEP - reviewed   Lat pulls HEP - reviewed   Manual Therapy     · Joint Mobilization (10 Minutes): Grade II anterior-posterior joint mobilization to right glenohumeral joint with active and passive gross movement in all planes to improve range of motion and decrease pain with daily activities. Prone t-spine central posterior anterior grade IV T1-4. · Soft Tissue Mobilization (30 Minutes): Soft tissue mobilization to cervical spinal musculature to improve mobility and decrease tightness and pain with daily activities. Therapeutic Modalities: N/A today                                                                                             HEP: As above; handouts given to patient for all exercises. ______________________________________________________________________________________________________    Treatment Assessment:  Patient tolerated treatment well. Patient reported decreased pain after treatment. Progression/Medical Necessity:   · Patient is expected to demonstrate progress in strength and range of motion to increase independence with daily activities. · Patient demonstrates good rehab potential due to higher previous functional level. · Skilled intervention continues to be required due to decrease in functional mobility.   Compliance with Program/Exercises: Compliant   Reason for Continuation of Services/Other Comments:  · Patient continues to demonstrate capacity to improve overall mobility which will increase safety. Recommendations/Intent for next treatment session: \"Treatment next visit will focus on advancements to more challenging activities\".     Total Treatment Duration:  PT Patient Time In/Time Out  Time In: 0900  Time Out: [address], PT

## 2018-12-19 ENCOUNTER — HOSPITAL ENCOUNTER (OUTPATIENT)
Dept: PHYSICAL THERAPY | Age: 73
Discharge: HOME | End: 2018-12-19
Payer: MEDICARE

## 2018-12-19 PROCEDURE — 97140 MANUAL THERAPY 1/> REGIONS: CPT

## 2018-12-19 NOTE — PROGRESS NOTES
Stiven Michel  : 1945  [place]  Secondary: Adrián Christianson at [place] [id number] [facility], [address] [place] [address],[address], [facility] [place] [facility].  [phone]   [phone]        Outpatient PHYSICAL THERAPY: Daily Note  Fall Risk Score: 0 (? 5 = High Risk)  ICD-9: Treatment Diagnosis: Other specified disorders of joint - other disorders of cervical region, cervicalgia  ICD-10: Treatment Diagnosis: Cervicalgia (M54.2)     REFERRING PHYSICIAN: ABNER Mccrary MD Orders: Evaluate and Treat  Return Physician Appointment: TBD  MEDICAL/REFERRING DIAGNOSIS:  Cervicalgia [M54.2]  DATE OF ONSET: Chronic   PRIOR LEVEL OF FUNCTION: Independent  PRECAUTIONS/ALLERGIES:   Allergies   Allergen Reactions    Alendronate Sodium Rash    Antihistamine [Triprolidine-Pseudoephedrine] Anxiety    Boniva [Ibandronate] Diarrhea     ASSESSMENT:  ?????? ? ? This section established at most recent assessment?????????? Patient presents with improving range of motion, decreased strength, and pain in right shoulder and neck secondary to chronic pain. Patient has attended a total of 112 scheduled physical therapy visits including initial evaluation on 2014. Treatment has consisted of manual therapy and streching to improve pain and performance with activities of daily living. PROBLEM LIST (Impairments causing functional limitations):  1. Decreased independence with ADLs requiring any extensive UE activity (lifting/reaching/vacuuming). 2.  Decreased tolerance of prolonged sitting/standing activities (>30 minutes) due to exacerbation of neck pain. 3.  Unable to participate in social/recreational activities due to fear avoidance of onset of neck/shoulder pain. 4.  Outcome measure score of 10/50 on Neck Disability Index with minimal effect of pain on patient's ability to manage every day life activities.   GOALS: (Goals have been discussed and agreed upon with patient.)  SHORT-TERM FUNCTIONAL GOALS: Time Frame: 3 weeks  1. Patient will be independent with home exercise program without exacerbation of symptoms or cueing needed--goal met. 2. Patient will be independent with correct sleeping positions and awareness/avoidance of aggravating positions without cueing needed--goal met. DISCHARGE GOALS: Time Frame: 8 weeks  1. Patient will be independent with all ADLs with minimal onset of neck/shoulder pain and no deficits with daily tasks--goal met. 2. Patient will report no fear avoidance with social or recreational activities due to neck/shoulder pain--goal ongoing. 3. Patient will score less than or equal to 5/50 on Neck Disability Index with minimal effect of pain on patient's ability to manage every day life activities--goal ongoing. REHABILITATION POTENTIAL FOR STATED GOALS: Carroll Cruzles OF CARE:  INTERVENTIONS PLANNED: (Benefits and precautions of physical therapy have been discussed with the patient.)  1. Patient education including pathophysiology of neck/shoulder symptoms, education/training (sleeping positions, posture re-education and body mechanics), and instructions of home exercise program.  2. Therapeutic exercises including mobility through neck/shoulder region and postural stabilization. 3. Manual therapy including soft tissue mobilizations, functional joint mobilizations and neuromuscular re-education to neck and shoulder region. 4. Therapeutic modalities including moist heat, cold pack, electrical stimulation, and/or ultrasound as needed for relief of symptoms with treatment. TREATMENT PLAN EFFECTIVE DATES: 12/7/2018 TO 3/7/2019  FREQUENCY/DURATION: Follow patient 1 time every 2 weeks for 12 weeks to address above goals. SUBJECTIVE:  History of Present Injury/Illness (Reason for Referral): Patient is well known to therapist.  Patient returned to therapy today with continued complaints of neck and shoulder pain.   Patient reports that her right shoulder/arm feel weak at times. She reports she hasn't ever dropped anything, but her arm will catch and she will have to be careful that she doesn't throw anything. She reports that her neck was hurting during the holidays. She reports that she did move out of her work office over the holidays. She reports she is still using cold packs at night and taking her pain medication as needed. She reports she has also been trying to get a few deep tissue massages to help with her pain. Present Symptoms: 12/19/2018: Patient reports she dug up some trees on Monday and so she is sore right now. Pain Intensity 1: 4  Pain Location 1: Neck, Shoulder  Pain Orientation 1: Right, Left  Pain Intervention(s) 1: Rest, Medication (see MAR)  Pain Description 1: Aching, Constant  Pain Onset 1: Chronic  Pain Scale 1: Numeric (0 - 10)  Patient Stated Pain Goal: 0  · Patient Stated Pain Goal: 0   Dominant Side: right  Past Medical History: Patient reports no significant past medical history. Current Medications: Cymblata, pain medication (PRN)   Date Last Reviewed: 12/19/2018  Social History/Home Situation:   Home Environment: Private residence  Living Alone: No  Support Systems: Family member(s); Friends \ neighbors  Work/Activity History: Retired  OBJECTIVE:  Outcome Measure: Tool Used: Neck Disability Index (NDI)  Score:  Initial: 10/50  Most Recent: 9/50 (Date:12/5/2018)     Interpretation of Score: The Neck Disability Index is a revised form of the Oswestry Low Back Pain Index and is designed to measure the activities of daily living in person's with neck pain. Each section is scored on a 0-5 scale, 5 representing the greatest disability. The scores of each section are added together for a total score of 50. Score 0 1-10 11-20 21-30 31-40 41-49 50   Modifier CH CI CJ CK CL CM CN     ?  Carrying, Moving, and Handling Objects:     - CURRENT STATUS: CI - 1%-19% impaired, limited or restricted    - GOAL STATUS: CI - 1%-19% impaired, limited or restricted    - D/C STATUS:  ---------------To be determined---------------     Observation/Orthostatic Postural Assessment:   Posture (WDL): Exceptions to WDL  Posture Assessment: Rounded shoulders; Forward head  Palpation:  Tenderness to palpation noted in cervical spine and shoulder region on right greater than left (upper trapezius, levator scapulae, rhomboids, sub-occipitals). No bruising or swelling noted. ROM:    Cervical Assessment (AROM):   · Flexion 100%  · Extension 90%  · Lateral flexion L 90%, R 100%    LUE Assessment (AROM):   · Within defined limits        Shoulder Assessment (AROM):  · Flexion B WFL  · Ext. B WFL  · Rot. 90, R 90  · IR: L 70, R 70     RLE Assessment (AROM):   · Within defined limits      Strength:   LUE Strength, Tone, Sensation:   · Within defined limits  · Deep cervical flexion 20s against gravity    RUE Strength, Tone, Sensation:  · R Shoulder Flexion: 4+  · R Shoulder ABduction: 4+  · R Shoulder Internal Rotation: 4+  · R Shoulder External Rotation: 4+  · R Elbow Flexion: 4+  · R Elbow Extension: 4+        Special Tests: Negative  Neurological Screen:    Myotomes:        RUE Myotomes  · C1 (Cervical Rotation): Strong  · C2, C3, C4 (Shoulder Shrug): Strong  · C5 (Shoulder ABduction): Strong  · C6 (Biceps & Wrist Extension): Strong  · C7 (Triceps & Wrist Flexion): Strong  · C8 (Thumb Extension): Strong  · T1 (Finger ADduction): Strong          Dermatomes:     Within normal limits     Within normal limits          Reflexes:   · Left Bicep (C5,C6): 2+  · Left Bracheoradialis (C5,C6): 2+  · Left Tricep (C6,C7,C8): 2+    · Right Bicep (C5,C6): 2+  · Right Bracheoradialis (C5,C6): 2+  · Right Tricep (C6,C7,C8): 2+          Neural Tension Tests: Negative  Functional Mobility:  Gait Description (WDL): Within defined limits     Balance:   Sitting: Intact  Standing: Intact  TREATMENT:    (In addition to Assessment/Re-Assessment sessions the following treatments were rendered)  Therapeutic Exercise: ( 5 minutes):  Exercises per grid below to improve mobility and strength. Required minimal visual, verbal and manual cues to promote proper body alignment, promote proper body posture and promote proper body mechanics. Progressed resistance, range, repetitions and complexity of movement as indicated. Date:  12/19/2018    Activity/Exercise Parameters   Scapular rows HEP - reviewed   Lat pulls HEP - reviewed   Manual Therapy     · Joint Mobilization (10 Minutes): Grade II anterior-posterior joint mobilization to right glenohumeral joint with active and passive gross movement in all planes to improve range of motion and decrease pain with daily activities. Prone t-spine central posterior anterior grade IV T1-4. · Soft Tissue Mobilization (30 Minutes): Soft tissue mobilization to cervical spinal musculature to improve mobility and decrease tightness and pain with daily activities. Therapeutic Modalities: N/A today                                                                                             HEP: As above; handouts given to patient for all exercises. ______________________________________________________________________________________________________    Treatment Assessment:  Patient tolerated treatment well. Patient reported decreased pain after treatment. Progression/Medical Necessity:   · Patient is expected to demonstrate progress in strength and range of motion to increase independence with daily activities. · Patient demonstrates good rehab potential due to higher previous functional level. · Skilled intervention continues to be required due to decrease in functional mobility. Compliance with Program/Exercises: Compliant   Reason for Continuation of Services/Other Comments:  · Patient continues to demonstrate capacity to improve overall mobility which will increase safety.   Recommendations/Intent for next treatment session: \"Treatment next visit will focus on advancements to more challenging activities\".     Total Treatment Duration:  PT Patient Time In/Time Out  Time In: 0900  Time Out: BOLA Mason

## 2019-01-02 ENCOUNTER — HOSPITAL ENCOUNTER (OUTPATIENT)
Dept: PHYSICAL THERAPY | Age: 74
Discharge: HOME | End: 2019-01-02
Payer: MEDICARE

## 2019-01-02 PROCEDURE — G8985 CARRY GOAL STATUS: HCPCS

## 2019-01-02 PROCEDURE — 97140 MANUAL THERAPY 1/> REGIONS: CPT

## 2019-01-02 PROCEDURE — G8984 CARRY CURRENT STATUS: HCPCS

## 2019-01-02 NOTE — PROGRESS NOTES
Britton Thomson : 1945 [place] Secondary: Adrián Christianson at [place] [address] [facility], [address], [place] [address]. [phone]   [phone] Outpatient PHYSICAL THERAPY: Progress Report Fall Risk Score: 0 (? 5 = High Risk) ICD-9: Treatment Diagnosis: Other specified disorders of joint - other disorders of cervical region, cervicalgia ICD-10: Treatment Diagnosis: Cervicalgia (M54.2) REFERRING PHYSICIAN: ABNER Dumont MD Orders: Evaluate and Treat Return Physician Appointment: TBD MEDICAL/REFERRING DIAGNOSIS: 
Cervicalgia [M54.2] DATE OF ONSET: Chronic PRIOR LEVEL OF FUNCTION: Independent PRECAUTIONS/ALLERGIES:  
Allergies Allergen Reactions  Alendronate Sodium Rash  Antihistamine [Triprolidine-Pseudoephedrine] Anxiety  Boniva [Ibandronate] Diarrhea ASSESSMENT: 
?????? ? ? This section established at most recent assessment?????????? Patient presents with improving range of motion, decreased strength, and pain in right shoulder and neck secondary to chronic pain. Patient has attended a total of 113 scheduled physical therapy visits including initial evaluation on 2014. Treatment has consisted of manual therapy and streching to improve pain and performance with activities of daily living. PROBLEM LIST (Impairments causing functional limitations): 1. Decreased independence with ADLs requiring any extensive UE activity (lifting/reaching/vacuuming). 2.  Decreased tolerance of prolonged sitting/standing activities (>30 minutes) due to exacerbation of neck pain. 3.  Unable to participate in social/recreational activities due to fear avoidance of onset of neck/shoulder pain. 4.  Outcome measure score of 10/50 on Neck Disability Index with minimal effect of pain on patient's ability to manage every day life activities. GOALS: (Goals have been discussed and agreed upon with patient.) SHORT-TERM FUNCTIONAL GOALS: Time Frame: 3 weeks 1. Patient will be independent with home exercise program without exacerbation of symptoms or cueing needed--goal met. 2. Patient will be independent with correct sleeping positions and awareness/avoidance of aggravating positions without cueing needed--goal met. DISCHARGE GOALS: Time Frame: 8 weeks 1. Patient will be independent with all ADLs with minimal onset of neck/shoulder pain and no deficits with daily tasks--goal met. 2. Patient will report no fear avoidance with social or recreational activities due to neck/shoulder pain--goal ongoing. 3. Patient will score less than or equal to 5/50 on Neck Disability Index with minimal effect of pain on patient's ability to manage every day life activities--goal ongoing. REHABILITATION POTENTIAL FOR STATED GOALS: [facility] Baird OF CARE: 
INTERVENTIONS PLANNED: (Benefits and precautions of physical therapy have been discussed with the patient.) 1. Patient education including pathophysiology of neck/shoulder symptoms, education/training (sleeping positions, posture re-education and body mechanics), and instructions of home exercise program. 
2. Therapeutic exercises including mobility through neck/shoulder region and postural stabilization. 3. Manual therapy including soft tissue mobilizations, functional joint mobilizations and neuromuscular re-education to neck and shoulder region. 4. Therapeutic modalities including moist heat, cold pack, electrical stimulation, and/or ultrasound as needed for relief of symptoms with treatment. TREATMENT PLAN EFFECTIVE DATES: 12/7/2018 TO 3/7/2019 FREQUENCY/DURATION: Follow patient 1 time every 2 weeks for 12 weeks to address above goals.  
SUBJECTIVE: 
History of Present Injury/Illness (Reason for Referral): Patient is well known to therapist.  Patient returned to therapy today with continued complaints of neck and shoulder pain. Patient reports that her right shoulder/arm feel weak at times. She reports she hasn't ever dropped anything, but her arm will catch and she will have to be careful that she doesn't throw anything. She reports that her neck was hurting during the holidays. She reports that she did move out of her work office over the holidays. She reports she is still using cold packs at night and taking her pain medication as needed. She reports she has also been trying to get a few deep tissue massages to help with her pain. Present Symptoms: 1/2/2019: Patient reports she moved some bushes in her yard and so her shoulders are tight and tired. Pain Intensity 1: 4 Pain Location 1: Neck, Shoulder Pain Orientation 1: Right, Left Pain Intervention(s) 1: Rest, Medication (see MAR) Pain Description 1: Aching, Constant Pain Onset 1: Chronic Pain Scale 1: Numeric (0 - 10) Patient Stated Pain Goal: 0 
· Patient Stated Pain Goal: 0 Dominant Side: right Past Medical History: Patient reports no significant past medical history. Current Medications: Cymblata, pain medication (PRN) Date Last Reviewed: 1/2/2019 Social History/Home Situation:  
Home Environment: Private residence Living Alone: No 
Support Systems: Family member(s); Friends \ neighbors Work/Activity History: Retired OBJECTIVE: 
Outcome Measure: Tool Used: Neck Disability Index (NDI) Score:  Initial: 10/50  Most Recent: 9/50 (Date:1/2/2019) Interpretation of Score: The Neck Disability Index is a revised form of the Oswestry Low Back Pain Index and is designed to measure the activities of daily living in person's with neck pain. Each section is scored on a 0-5 scale, 5 representing the greatest disability. The scores of each section are added together for a total score of 50. Score 0 1-10 11-20 21-30 31-40 41-49 50 Modifier CH CI CJ CK CL CM CN ? Carrying, Moving, and Handling Objects:  - CURRENT STATUS: CI - 1%-19% impaired, limited or restricted  - GOAL STATUS: CI - 1%-19% impaired, limited or restricted  - D/C STATUS:  ---------------To be determined--------------- Observation/Orthostatic Postural Assessment:  
Posture (WDL): Exceptions to [facility] Posture Assessment: Rounded shoulders; Forward head Palpation:  Tenderness to palpation noted in cervical spine and shoulder region on right greater than left (upper trapezius, levator scapulae, rhomboids, sub-occipitals). No bruising or swelling noted. ROM:  
 Cervical Assessment (AROM):  
· Flexion 100% · Extension 90% · Lateral flexion L 90%, R 100% LUE Assessment (AROM):  
· Within defined limits Shoulder Assessment (AROM): 
· Flexion B [facility] · Ext. B [facility] · Rot. 90, R 90 
· IR: L 70, R 70 RLE Assessment (AROM):  
· Within defined limits Strength: LUE Strength, Tone, Sensation: · Within defined limits · Deep cervical flexion 20s against gravity RUE Strength, Tone, Sensation: · R Shoulder Flexion: 4+ · R Shoulder ABduction: 4+ · R Shoulder Internal Rotation: 4+ · R Shoulder External Rotation: 4+ · R Elbow Flexion: 4+ · R Elbow Extension: 4+ Special Tests: Negative Neurological Screen: Myotomes:  
  
  RUE Myotomes · C1 (Cervical Rotation): Strong · C2, C3, C4 (Shoulder Shrug): Strong · C5 (Shoulder ABduction): Strong · C6 (Biceps & Wrist Extension): Strong · C7 (Triceps & Wrist Flexion): Strong · C8 (Thumb Extension): Strong · T1 (Finger ADduction): Strong Dermatomes: Within normal limits     Within normal limits Reflexes: · Left Bicep (C5,C6): 2+ · Left Bracheoradialis (C5,C6): 2+ · Left Tricep (C6,C7,C8): 2+    · Right Bicep (C5,C6): 2+ · Right Bracheoradialis (C5,C6): 2+ · Right Tricep (C6,C7,C8): 2+ Neural Tension Tests: Negative Functional Mobility:  Gait Description (WDL): Within defined limits Balance:  
Sitting: Intact Standing: Intact TREATMENT:  
 (In addition to Assessment/Re-Assessment sessions the following treatments were rendered) Therapeutic Exercise: ( 5 minutes):  Exercises per grid below to improve mobility and strength. Required minimal visual, verbal and manual cues to promote proper body alignment, promote proper body posture and promote proper body mechanics. Progressed resistance, range, repetitions and complexity of movement as indicated. Date: 
1/2/2019 Activity/Exercise Parameters Scapular rows HEP - reviewed Lat pulls HEP - reviewed Manual Therapy · Joint Mobilization (10 Minutes): Grade II anterior-posterior joint mobilization to right glenohumeral joint with active and passive gross movement in all planes to improve range of motion and decrease pain with daily activities. Prone t-spine central posterior anterior grade IV T1-4. · Soft Tissue Mobilization (30 Minutes): Soft tissue mobilization to cervical spinal musculature to improve mobility and decrease tightness and pain with daily activities. Therapeutic Modalities: N/A today HEP: As above; handouts given to patient for all exercises. ______________________________________________________________________________________________________ Treatment Assessment:  Patient tolerated treatment well. Patient reported decreased pain after treatment. Progression/Medical Necessity:  
· Patient is expected to demonstrate progress in strength and range of motion to increase independence with daily activities. · Patient demonstrates good rehab potential due to higher previous functional level. · Skilled intervention continues to be required due to decrease in functional mobility. Compliance with Program/Exercises: Compliant Reason for Continuation of Services/Other Comments: 
· Patient continues to demonstrate capacity to improve overall mobility which will increase safety. Recommendations/Intent for next treatment session: \"Treatment next visit will focus on advancements to more challenging activities\". Total Treatment Duration: PT Patient Time In/Time Out Time In: 0800 Time Out: [id number] Valerie Knight, PT

## 2019-01-07 ENCOUNTER — HOSPITAL ENCOUNTER (OUTPATIENT)
Dept: ULTRASOUND IMAGING | Age: 74
Discharge: HOME | End: 2019-01-07
Payer: MEDICARE

## 2019-01-07 DIAGNOSIS — R79.89 SERUM THYROID HORMONE DECREASED: ICD-10-CM

## 2019-01-07 PROCEDURE — 76536 US EXAM OF HEAD AND NECK: CPT

## 2019-01-16 ENCOUNTER — HOSPITAL ENCOUNTER (OUTPATIENT)
Dept: PHYSICAL THERAPY | Age: 74
Discharge: HOME | End: 2019-01-16
Payer: MEDICARE

## 2019-01-16 PROCEDURE — 97140 MANUAL THERAPY 1/> REGIONS: CPT

## 2019-01-16 NOTE — PROGRESS NOTES
Steven Hodgson : 1945 [place] Secondary: Adrián Christianson at [place] [id number] [facility], [address], [place] [address]. [phone]   [phone] Outpatient PHYSICAL THERAPY: Daily Note Fall Risk Score: 0 (? 5 = High Risk) ICD-9: Treatment Diagnosis: Other specified disorders of joint - other disorders of cervical region, cervicalgia ICD-10: Treatment Diagnosis: Cervicalgia (M54.2) REFERRING PHYSICIAN: ABNER Rivera MD Orders: Evaluate and Treat Return Physician Appointment: TBD MEDICAL/REFERRING DIAGNOSIS: 
Cervicalgia [M54.2] DATE OF ONSET: Chronic PRIOR LEVEL OF FUNCTION: Independent PRECAUTIONS/ALLERGIES:  
Allergies Allergen Reactions  Alendronate Sodium Rash  Antihistamine [Triprolidine-Pseudoephedrine] Anxiety  Boniva [Ibandronate] Diarrhea ASSESSMENT: 
?????? ? ? This section established at most recent assessment?????????? Patient presents with improving range of motion, decreased strength, and pain in right shoulder and neck secondary to chronic pain. Patient has attended a total of 114 scheduled physical therapy visits including initial evaluation on 2014. Treatment has consisted of manual therapy and streching to improve pain and performance with activities of daily living. PROBLEM LIST (Impairments causing functional limitations): 1. Decreased independence with ADLs requiring any extensive UE activity (lifting/reaching/vacuuming). 2.  Decreased tolerance of prolonged sitting/standing activities (>30 minutes) due to exacerbation of neck pain. 3.  Unable to participate in social/recreational activities due to fear avoidance of onset of neck/shoulder pain. 4.  Outcome measure score of 10/50 on Neck Disability Index with minimal effect of pain on patient's ability to manage every day life activities. GOALS: (Goals have been discussed and agreed upon with patient.) SHORT-TERM FUNCTIONAL GOALS: Time Frame: 3 weeks 1. Patient will be independent with home exercise program without exacerbation of symptoms or cueing needed--goal met. 2. Patient will be independent with correct sleeping positions and awareness/avoidance of aggravating positions without cueing needed--goal met. DISCHARGE GOALS: Time Frame: 8 weeks 1. Patient will be independent with all ADLs with minimal onset of neck/shoulder pain and no deficits with daily tasks--goal met. 2. Patient will report no fear avoidance with social or recreational activities due to neck/shoulder pain--goal ongoing. 3. Patient will score less than or equal to 5/50 on Neck Disability Index with minimal effect of pain on patient's ability to manage every day life activities--goal ongoing. REHABILITATION POTENTIAL FOR STATED GOALS: [employer] OF CARE: 
INTERVENTIONS PLANNED: (Benefits and precautions of physical therapy have been discussed with the patient.) 1. Patient education including pathophysiology of neck/shoulder symptoms, education/training (sleeping positions, posture re-education and body mechanics), and instructions of home exercise program. 
2. Therapeutic exercises including mobility through neck/shoulder region and postural stabilization. 3. Manual therapy including soft tissue mobilizations, functional joint mobilizations and neuromuscular re-education to neck and shoulder region. 4. Therapeutic modalities including moist heat, cold pack, electrical stimulation, and/or ultrasound as needed for relief of symptoms with treatment. TREATMENT PLAN EFFECTIVE DATES: 12/7/2018 TO 3/7/2019 FREQUENCY/DURATION: Follow patient 1 time every 2 weeks for 12 weeks to address above goals.  
SUBJECTIVE: 
History of Present Injury/Illness (Reason for Referral): Patient is well known to therapist.  Patient returned to therapy today with continued complaints of neck and shoulder pain. Patient reports that her right shoulder/arm feel weak at times. She reports she hasn't ever dropped anything, but her arm will catch and she will have to be careful that she doesn't throw anything. She reports that her neck was hurting during the holidays. She reports that she did move out of her work office over the holidays. She reports she is still using cold packs at night and taking her pain medication as needed. She reports she has also been trying to get a few deep tissue massages to help with her pain. Present Symptoms: 1/16/2019: Patient reports things are really painful today. Pain Intensity 1: 4 Pain Location 1: Neck, Shoulder Pain Orientation 1: Right, Left Pain Intervention(s) 1: Rest, Medication (see MAR) Pain Description 1: Aching, Constant Pain Onset 1: Chronic Pain Scale 1: Numeric (0 - 10) Patient Stated Pain Goal: 0 
· Patient Stated Pain Goal: 0 Dominant Side: right Past Medical History: Patient reports no significant past medical history. Current Medications: Cymblata, pain medication (PRN) Date Last Reviewed: 1/16/2019 Social History/Home Situation:  
Home Environment: Private residence Living Alone: No 
Support Systems: Family member(s); Friends \ neighbors Work/Activity History: Retired OBJECTIVE: 
Outcome Measure: Tool Used: Neck Disability Index (NDI) Score:  Initial: 10/50  Most Recent: 9/50 (Date:1/2/2019) Interpretation of Score: The Neck Disability Index is a revised form of the Oswestry Low Back Pain Index and is designed to measure the activities of daily living in person's with neck pain. Each section is scored on a 0-5 scale, 5 representing the greatest disability. The scores of each section are added together for a total score of 50. Score 0 1-10 11-20 21-30 31-40 41-49 50 Modifier CH CI CJ CK CL CM CN  
 
Observation/Orthostatic Postural Assessment:  
Posture (WDL): Exceptions to [facility] Posture Assessment: Rounded shoulders; Forward head Palpation:  Tenderness to palpation noted in cervical spine and shoulder region on right greater than left (upper trapezius, levator scapulae, rhomboids, sub-occipitals). No bruising or swelling noted. ROM:  
 Cervical Assessment (AROM):  
· Flexion 100% · Extension 90% · Lateral flexion L 90%, R 100% LUE Assessment (AROM):  
· Within defined limits Shoulder Assessment (AROM): 
· [facility] · Ext. B [facility] · Rot. 90, R 90 
· IR: L 70, R 70 RLE Assessment (AROM):  
· Within defined limits Strength: LUE Strength, Tone, Sensation: · Within defined limits · Deep cervical flexion 20s against gravity RUE Strength, Tone, Sensation: · R Shoulder Flexion: 4+ · R Shoulder ABduction: 4+ · R Shoulder Internal Rotation: 4+ · R Shoulder External Rotation: 4+ · R Elbow Flexion: 4+ · R Elbow Extension: 4+ Special Tests: Negative Neurological Screen: Myotomes:  
  
  RUE Myotomes · C1 (Cervical Rotation): Strong · C2, C3, C4 (Shoulder Shrug): Strong · C5 (Shoulder ABduction): Strong · C6 (Biceps & Wrist Extension): Strong · C7 (Triceps & Wrist Flexion): Strong · C8 (Thumb Extension): Strong · T1 (Finger ADduction): Strong Dermatomes: Within normal limits     Within normal limits Reflexes: · Left Bicep (C5,C6): 2+ · Left Bracheoradialis (C5,C6): 2+ · Left Tricep (C6,C7,C8): 2+    · Right Bicep (C5,C6): 2+ · Right Bracheoradialis (C5,C6): 2+ · Right Tricep (C6,C7,C8): 2+ Neural Tension Tests: Negative Functional Mobility:  Gait Description (WDL): Within defined limits Balance:  
Sitting: Intact Standing: Intact TREATMENT:  
 (In addition to Assessment/Re-Assessment sessions the following treatments were rendered) Therapeutic Exercise: ( 5 minutes):  Exercises per grid below to improve mobility and strength.   Required minimal visual, verbal and manual cues to promote proper body alignment, promote proper body posture and promote proper body mechanics. Progressed resistance, range, repetitions and complexity of movement as indicated. Date: 
1/16/2019 Activity/Exercise Parameters Scapular rows HEP - reviewed Lat pulls HEP - reviewed Manual Therapy · Joint Mobilization (10 Minutes): Grade II anterior-posterior joint mobilization to right glenohumeral joint with active and passive gross movement in all planes to improve range of motion and decrease pain with daily activities. Prone t-spine central posterior anterior grade IV T1-4. · Soft Tissue Mobilization (30 Minutes): Soft tissue mobilization to cervical spinal musculature to improve mobility and decrease tightness and pain with daily activities. Therapeutic Modalities: N/A today HEP: As above; handouts given to patient for all exercises. ______________________________________________________________________________________________________ Treatment Assessment:  Patient tolerated treatment well. Patient reported decreased pain after treatment. Discussed discharge plans with patient today for end of January. Progression/Medical Necessity:  
· Patient is expected to demonstrate progress in strength and range of motion to increase independence with daily activities. · Patient demonstrates good rehab potential due to higher previous functional level. · Skilled intervention continues to be required due to decrease in functional mobility. Compliance with Program/Exercises: Compliant Reason for Continuation of Services/Other Comments: 
· Patient continues to demonstrate capacity to improve overall mobility which will increase safety. Recommendations/Intent for next treatment session: \"Treatment next visit will focus on advancements to more challenging activities\". Total Treatment Duration: PT Patient Time In/Time Out Time In: 0945 Time Out: 1030 Shayy Bender, PT

## 2019-01-25 PROBLEM — E83.52 HYPERCALCEMIA: Status: ACTIVE | Noted: 2019-01-25

## 2019-01-25 PROBLEM — M85.80 OSTEOPENIA: Status: ACTIVE | Noted: 2019-01-25

## 2019-01-25 PROBLEM — E04.2 MULTINODULAR GOITER: Status: ACTIVE | Noted: 2019-01-25

## 2019-01-30 ENCOUNTER — HOSPITAL ENCOUNTER (OUTPATIENT)
Dept: PHYSICAL THERAPY | Age: 74
Discharge: HOME | End: 2019-01-30
Payer: MEDICARE

## 2019-01-30 PROCEDURE — 97140 MANUAL THERAPY 1/> REGIONS: CPT

## 2019-01-30 NOTE — THERAPY DISCHARGE
Joann Elizabeth  : 1945  [place]  Secondary: [facility] Sanchez 76 [facility] [address] [address],[address], [address] [facility]  [phone]   [phone]        Outpatient PHYSICAL THERAPY: Discharge Summary   Fall Risk Score: 0 (? 5 = High Risk)  ICD-9: Treatment Diagnosis: Other specified disorders of joint - other disorders of cervical region, cervicalgia  ICD-10: Treatment Diagnosis: Cervicalgia (M54.2)     REFERRING PHYSICIAN: ABNER Waite MD Orders: Evaluate and Treat  Return Physician Appointment: TBD  MEDICAL/REFERRING DIAGNOSIS:  Cervicalgia [M54.2]  DATE OF ONSET: Chronic   PRIOR LEVEL OF FUNCTION: Independent  PRECAUTIONS/ALLERGIES:   Allergies   Allergen Reactions    Adhesive Hives     bandaids cause skin irritations    Alendronate Sodium Rash    Antihistamine [Triprolidine-Pseudoephedrine] Anxiety    Boniva [Ibandronate] Diarrhea     ASSESSMENT:  ?????? ? ? This section established at most recent assessment?????????? Patient presents with improving range of motion, decreased strength, and pain in right shoulder and neck secondary to chronic pain. Patient has attended a total of 115 scheduled physical therapy visits including initial evaluation on 2014. Treatment has consisted of manual therapy and streching to improve pain and performance with activities of daily living. Patient will not schedule any additional physical therapy visits secondary to discharge today. We will be happy to re-assess her with a change in her status and a new order from her doctor. Thank you for the opportunity to serve this patient. GOALS: (Goals have been discussed and agreed upon with patient.)  SHORT-TERM FUNCTIONAL GOALS: Time Frame: 3 weeks  1. Patient will be independent with home exercise program without exacerbation of symptoms or cueing needed--goal met.   2. Patient will be independent with correct sleeping positions and awareness/avoidance of aggravating positions without cueing needed--goal met. DISCHARGE GOALS: Time Frame: 8 weeks  1. Patient will be independent with all ADLs with minimal onset of neck/shoulder pain and no deficits with daily tasks--goal met. 2. Patient will report no fear avoidance with social or recreational activities due to neck/shoulder pain--goal met. 3. Patient will score less than or equal to 5/50 on Neck Disability Index with minimal effect of pain on patient's ability to manage every day life activities--goal unmet. OBJECTIVE:  Outcome Measure: Tool Used: Neck Disability Index (NDI)  Score:  Initial: 10/50  Most Recent: 8/50 (Date:1/30/2019)     Interpretation of Score: The Neck Disability Index is a revised form of the Oswestry Low Back Pain Index and is designed to measure the activities of daily living in person's with neck pain. Each section is scored on a 0-5 scale, 5 representing the greatest disability. The scores of each section are added together for a total score of 50. Score 0 1-10 11-20 21-30 31-40 41-49 50   Modifier CH CI CJ CK CL CM CN     Observation/Orthostatic Postural Assessment:   Posture (WDL): Exceptions to WDL  Posture Assessment: Rounded shoulders; Forward head  Palpation:  Tenderness to palpation noted in cervical spine and shoulder region on right greater than left (upper trapezius, levator scapulae, rhomboids, sub-occipitals). No bruising or swelling noted. ROM:    Cervical Assessment (AROM):   · Flexion 100%  · Extension 90%  · Lateral flexion L 90%, R 100%    LUE Assessment (AROM):   · Within defined limits        Shoulder Assessment (AROM):  · Flexion B WFL  · Ext. B WFL  · Rot.  90, R 90  · IR: L 70, R 70     RLE Assessment (AROM):   · Within defined limits      Strength:   LUE Strength, Tone, Sensation:   · Within defined limits  · Deep cervical flexion 20s against gravity    RUE Strength, Tone, Sensation:  · R Shoulder Flexion: 4+  · R Shoulder ABduction: 4+  · R Shoulder Internal Rotation: 4+  · R Shoulder External Rotation: 4+  · R Elbow Flexion: 4+  · R Elbow Extension: 4+        Special Tests: Negative  Neurological Screen:    Myotomes:        RUE Myotomes  · C1 (Cervical Rotation): Strong  · C2, C3, C4 (Shoulder Shrug): Strong  · C5 (Shoulder ABduction): Strong  · C6 (Biceps & Wrist Extension): Strong  · C7 (Triceps & Wrist Flexion): Strong  · C8 (Thumb Extension): Strong  · T1 (Finger ADduction): Strong          Dermatomes: Within normal limits     Within normal limits          Reflexes:   · Left Bicep (C5,C6): 2+  · Left Bracheoradialis (C5,C6): 2+  · Left Tricep (C6,C7,C8): 2+    · Right Bicep (C5,C6): 2+  · Right Bracheoradialis (C5,C6): 2+  · Right Tricep (C6,C7,C8): 2+          Neural Tension Tests: Negative  Functional Mobility:  Gait Description (WDL): Within defined limits     Balance:   Sitting: Intact  Standing: Intact  TREATMENT:    (In addition to Assessment/Re-Assessment sessions the following treatments were rendered)  Therapeutic Exercise: ( 5 minutes):  Exercises per grid below to improve mobility and strength. Required minimal visual, verbal and manual cues to promote proper body alignment, promote proper body posture and promote proper body mechanics. Progressed resistance, range, repetitions and complexity of movement as indicated. Date:  1/30/2019    Activity/Exercise Parameters   Scapular rows HEP - reviewed   Lat pulls HEP - reviewed   Manual Therapy     · Joint Mobilization (10 Minutes): Grade II anterior-posterior joint mobilization to right glenohumeral joint with active and passive gross movement in all planes to improve range of motion and decrease pain with daily activities. Prone t-spine central posterior anterior grade IV T1-4. · Soft Tissue Mobilization (30 Minutes): Soft tissue mobilization to cervical spinal musculature to improve mobility and decrease tightness and pain with daily activities.   Therapeutic Modalities: N/A today                                                                                             HEP: As above; handouts given to patient for all exercises. ______________________________________________________________________________________________________    Treatment Assessment:  Patient tolerated treatment well. Patient reported decreased pain after treatment. Discharged today.   Compliance with Program/Exercises: Compliant   Total Treatment Duration:  PT Patient Time In/Time Out  Time In: 0900  Time Out: BOLA Mason

## 2019-02-13 ENCOUNTER — HOSPITAL ENCOUNTER (OUTPATIENT)
Dept: NUCLEAR MEDICINE | Age: 74
Discharge: HOME | End: 2019-02-13
Attending: INTERNAL MEDICINE
Payer: MEDICARE

## 2019-02-13 DIAGNOSIS — E04.2 MULTINODULAR GOITER: ICD-10-CM

## 2019-02-13 DIAGNOSIS — E05.90 HYPERTHYROIDISM: ICD-10-CM

## 2019-02-13 PROCEDURE — 78014 THYROID IMAGING W/BLOOD FLOW: CPT

## 2019-02-14 ENCOUNTER — HOSPITAL ENCOUNTER (OUTPATIENT)
Dept: NUCLEAR MEDICINE | Age: 74
Discharge: HOME | End: 2019-02-14
Attending: INTERNAL MEDICINE
Payer: MEDICARE

## 2019-02-15 NOTE — PROGRESS NOTES
Her thyroid scan was fairly normal.  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 This means that some of the thyroid nodules are producing slightly too much thyroid hormone. I would still recommend that she have her primary care provider repeat her bone density study. As long as her bone density remains stable, I do not think any treatment is required for her thyroid.

## 2019-03-06 ENCOUNTER — HOSPITAL ENCOUNTER (OUTPATIENT)
Dept: PHYSICAL THERAPY | Age: 74
Discharge: HOME | End: 2019-03-06
Payer: MEDICARE

## 2019-03-06 PROCEDURE — 97162 PT EVAL MOD COMPLEX 30 MIN: CPT

## 2019-03-06 PROCEDURE — 97140 MANUAL THERAPY 1/> REGIONS: CPT

## 2019-03-06 NOTE — THERAPY EVALUATION
Deneen Chung  : 1945  [place]  Secondary: Adrián Christianson at [place] [id number] [facility], [address] [place] [address],[address], [facility] [address].  [phone]   [phone]          OUTPATIENT PHYSICAL THERAPY:Initial Assessment 3/6/2019   ICD-10: Treatment Diagnosis: Cervicalgia (M54.2)  Precautions/Allergies:   Adhesive; Alendronate sodium; Antihistamine [triprolidine-pseudoephedrine]; and Boniva [ibandronate]   MD Orders: Evaluate and Treat MEDICAL/REFERRING DIAGNOSIS:  Cervicalgia [M54.2]   DATE OF ONSET: Chronic  REFERRING PHYSICIAN:  Radha Hayes NP   RETURN PHYSICIAN APPOINTMENT: TBD     INITIAL ASSESSMENT:  Ms. Torsten Michel presents with decreased mobility and pain in cervical region secondary to degenerative changes. After discussing with patient, she agreed she would benefit from physical therapy to improve above deficits. Please sign this plan of treatment if you concur. Thank you for the opportunity to serve this patient. PROBLEM LIST (Impacting functional limitations):  1. Decreased Strength  2. Decreased ADL/Functional Activities  3. Increased Pain  4. Decreased Activity Tolerance INTERVENTIONS PLANNED: (Treatment may consist of any combination of the following)  1. Cold  2. Heat  3. Home Exercise Program (HEP)  4. Manual Therapy  5. Neuromuscular Re-education/Strengthening  6. Range of Motion (ROM)  7. Therapeutic Activites  8. Therapeutic Exercise/Strengthening   TREATMENT PLAN:  Effective Dates: 3/6/2019 TO 6/3/2019 (90 days). Frequency/Duration: 2 times a week for 90 Day(s)  GOALS: (Goals have been discussed and agreed upon with patient.)  Short-Term Functional Goals: Time Frame: 30 days  1. Patient will be independent with home exercise program without exacerbation of symptoms or cueing needed.    2. Patient will be independent with correct sleeping positions and awareness/avoidance of aggravating positions without cueing needed. Discharge Goals: Time Frame: 90 days  1. Patient will be independent with all ADLs with minimal onset of neck pain and no deficits with daily tasks. 2. Patient will report no fear avoidance with social or recreational activities due to neck pain. 3. Patient will score less than or equal to 7/50 on Neck Disability Index with minimal effect of neck pain on patient's ability to manage every day life activities. Rehabilitation Potential For Stated Goals: Good  Regarding Paola Beach's therapy, I certify that the treatment plan above will be carried out by a therapist or under their direction. Thank you for this referral,  Carolynn Gordon PT     Referring Physician Signature: Irene Miller MD              Date                    The information in this section was collected on 3/6/2019 (except where otherwise noted). HISTORY:   History of Present Injury/Illness (Reason for Referral):  Patient is well known to PT from previous treatment session. Patient reports she still has pain and stiffness in her neck and shoulder (right greater than left). She reports she is unable to sleep on her right side secondary to the pain in her neck and shoulder. She reports that when she is at work, she has neck pain and stiffness that are very painful at the end of the day. Past Medical History/Comorbidities:   Ms. Carmen Ochoa  has a past medical history of Abnormal Pap smear, Anxiety, COPD (chronic obstructive pulmonary disease) ([address] Depression, Hyperthyroidism, Insomnia, Irritable bowel syndrome, and Osteopenia. She also has no past medical history of Difficult intubation, Malignant hyperthermia due to anesthesia, Nausea & vomiting, Pseudocholinesterase deficiency, or Unspecified adverse effect of anesthesia. Ms. Carmen Ochoa  has a past surgical history that includes hx tonsillectomy; hx orthopaedic; hx salpingo-oophorectomy (Right, 01/22/2014); and hx colonoscopy.   Social History/Living Environment: Home Environment: Private residence  Living Alone: Yes  Support Systems: Family member(s), Friends \ neighbors  Prior Level of Function/Work/Activity:  Independent  Dominant Side:         RIGHT  Personal Factors:          Sex:  female        Age:  69 y.o. Ambulatory/Rehab Services H2 Model Falls Risk Assessment    Risk Factors:       No Risk Factors Identified Ability to Rise from Chair:       (0)  Ability to rise in a single movement    Falls Prevention Plan:       No modifications necessary   Total: (5 or greater = High Risk): 0    ©2010 [facility] of [facility]. All Rights Reserved. [facility] [place] Patent #[id number]. Federal Law prohibits the replication, distribution or use without written permission from [facility] of [address] [place] [address]     Current Medications:       Current Outpatient Medications:     cholecalciferol (VITAMIN D3) 400 unit tab tablet, Take 400 Units by mouth., Disp: , Rfl:     alendronate (FOSAMAX) 70 mg tablet, Take 70 mg by mouth every seven (7) days. , Disp: , Rfl:     b complex vitamins tablet, Take 1 Tab by mouth every morning. Holding for surgery, Disp: , Rfl:     temazepam (RESTORIL) 15 mg capsule, Take  by mouth nightly as needed for Sleep., Disp: , Rfl:     omega-3 fatty acids-vitamin e (FISH OIL) 1,000 mg cap, Take 1 Cap by mouth every morning. Holding for surgery, Disp: , Rfl:     DULoxetine (CYMBALTA) 30 mg capsule, Take 30 mg by mouth every morning., Disp: , Rfl:     ascorbic acid (VITAMIN C) 500 mg tablet, Take 500 mg by mouth every morning. Holding for surgery, Disp: , Rfl:     multivitamin (ONE A DAY) tablet, Take 1 Tab by mouth every morning.  Holding for surgery, Disp: , Rfl:    Date Last Reviewed:  3/6/2019    Number of Personal Factors/Comorbidities that affect the Plan of Care: 1-2: MODERATE COMPLEXITY   EXAMINATION:   Observation/Orthostatic Postural Assessment:          Posture Assessment: Rounded shoulders, Forward head   Palpation:          Tightness and tenderness to palpation noted throughout upper and lower cervical musculature. No bruising or swelling noted. ROM:          Cervical Assessment (AROM):  · Flexion: 75% of full AROM  · Extension: 75% of full AROM  · Right side bendin% of full AROM  · Left side bending[de-identified] 75% of full AROM  · Right rotation: 75% of full AROM  · Left rotation: 75% of full AROM  Strength:          Cervical Assessment (Strength):  · Grossly 2+/5 with manual muscle testing  Special Tests:          Negative Hautant's test. Negative Cranial-Navarro lift test. Negative Navarro-Axis Sheer test. Negative Alar Ligament test. Negative Tectorial Membrane test.   Neurological Screen:        Dermatomes: Within normal limits        Reflexes:  2+  Functional Mobility:         Gait/Mobility:    ·   Independent         Transfers:     · Sit to Stand: Independent  · Stand to Sit: Independent  · Stand Pivot Transfers: Independent  · Bed to Chair: Independent  · Lateral Transfers: Independent         Bed Mobility:     · Rolling: Independent  · Supine to Sit: Independent  · Sit to Supine: Independent  · Scooting: Independent        Body Structures Involved:  1. Nerves  2. Joints  3. Muscles Body Functions Affected:  1. Neuromusculoskeletal  2. Movement Related Activities and Participation Affected:  1. Mobility  2. Self Care   Number of elements (examined above) that affect the Plan of Care: 4+: HIGH COMPLEXITY   CLINICAL PRESENTATION:   Presentation: Evolving clinical presentation with changing clinical characteristics: MODERATE COMPLEXITY   CLINICAL DECISION MAKING:   Outcome Measure: Tool Used: Neck Disability Index (NDI)  Score:  Initial:   Most Recent:  (Date: -- )   Interpretation of Score: The Neck Disability Index is a revised form of the Oswestry Low Back Pain Index and is designed to measure the activities of daily living in person's with neck pain. Each section is scored on a 0-5 scale, 5 representing the greatest disability.   The scores of each section are added together for a total score of 50. Medical Necessity:   · Patient is expected to demonstrate progress in strength and range of motion to improve safety during daily activities. · Patient demonstrates good rehab potential due to higher previous functional level. · Skilled intervention continues to be required due to decreased mobility. Reason for Services/Other Comments:  · Patient continues to demonstrate capacity to improve overall mobility which will increase independence and increase safety. Use of outcome tool(s) and clinical judgement create a POC that gives a: Questionable prediction of patient's progress: MODERATE COMPLEXITY        TREATMENT:   (In addition to Assessment/Re-Assessment sessions the following treatments were rendered)  Pre-treatment Symptoms/Complaints:  3/6/2019: Patient reports she is not able to sleep on her right side secondary to the pain. Pain: Initial:   Pain Intensity 1: 4  Pain Location 1: Neck, Shoulder  Pain Orientation 1: Right, Left  Pain Intervention(s) 1: Rest, Medication (see MAR)  Post Session:  3/10     MANUAL THERAPY: (30 minutes): Joint mobilization and Soft tissue mobilization was utilized and necessary because of the patient's restricted joint motion, painful spasm, loss of articular motion and restricted motion of soft tissue. (Cervical spine with patient in supine position with B LEs supported; prone position with B LEs supported into knee flexion with pillow)    Treatment/Session Assessment:    · Response to Treatment:  Patient tolerated assessment without complaints of increased neck pain. Patient verbalized and demonstrated understanding of HEP. · Compliance with Program/Exercises: Will assess as treatment progresses. · Recommendations/Intent for next treatment session: \"Next visit will focus on advancements to more challenging activities\".   Total Treatment Duration:  PT Patient Time In/Time Out  Time In: 0900  Time Out: BOLA Mason    Future Appointments   Date Time Provider Radha Bautista   3/20/2019  9:00 AM Chavo Bermudez, [facility] SFE   3/29/2019  6:15 PM SFE [facility] ROOM 3 [facility] SFE   5/22/2019  1:20 PM Casper Louis MD END BS ENDO

## 2019-03-20 ENCOUNTER — HOSPITAL ENCOUNTER (OUTPATIENT)
Dept: PHYSICAL THERAPY | Age: 74
Discharge: HOME | End: 2019-03-20
Payer: MEDICARE

## 2019-03-20 PROCEDURE — 97140 MANUAL THERAPY 1/> REGIONS: CPT

## 2019-03-20 NOTE — PROGRESS NOTES
Lois Marking : 1945 [place] Secondary: Adrián Christianson at [facility] [address], [address], [address] [facility] [phone]   [phone] OUTPATIENT PHYSICAL THERAPY: Daily Treatment Note 3/20/2019 Pre-treatment Symptoms/Complaints:  3/20/2019: Patient reports she is feeling tightness in her shoulders and right neck today. Pain: Initial: Pain Intensity 1: 4 Pain Location 1: Neck, Shoulder Pain Orientation 1: Right, Left Pain Intervention(s) 1: Rest, Medication (see MAR)  Post Session:  3/10 Medications Last Reviewed:  3/20/2019 Updated Objective Findings:  None Today TREATMENT:  
MANUAL THERAPY: (40 minutes): Joint mobilization and Soft tissue mobilization was utilized and necessary because of the patient's restricted joint motion, painful spasm, loss of articular motion and restricted motion of soft tissue. Treatment/Session Summary: · Response to Treatment:  Patient tolerated treatment without complaints of increased neck and shoulder pain. · Communication/Consultation:  None today · Equipment provided today:  None today · Recommendations/Intent for next treatment session: Next visit will focus on improving overall mobility with decreased pain. Treatment Plan of Care Effective Dates:  3/6/2019 TO 6/3/2019 (90 days). Total Treatment Billable Duration:  40 minutes PT Patient Time In/Time Out Time In: 0900 Time Out: 1261 Jono Elder PT Future Appointments Date Time Provider Radha Bautista 3/20/2019  9:00 AM Paulla Fleischer, PT [facility] SFE  
3/25/2019  3:06 AM SFE CT 16 SLICE UNIT 1 SFERCT SFE  
3/29/2019  6:15 PM SFE PHI BI ROOM 3 SFERMAM SFE  
4/3/2019  9:45 AM Paulla Fleischer, PT SFEORPT SFE  
2019  1:20 PM Terri Mariee MD END BS ENDO

## 2019-03-29 ENCOUNTER — HOSPITAL ENCOUNTER (OUTPATIENT)
Dept: MAMMOGRAPHY | Age: 74
Discharge: HOME | End: 2019-03-29
Payer: MEDICARE

## 2019-03-29 DIAGNOSIS — Z12.31 VISIT FOR SCREENING MAMMOGRAM: ICD-10-CM

## 2019-03-29 PROCEDURE — 77063 BREAST TOMOSYNTHESIS BI: CPT

## 2019-04-03 ENCOUNTER — APPOINTMENT (OUTPATIENT)
Dept: PHYSICAL THERAPY | Age: 74
End: 2019-04-03
Payer: MEDICARE

## 2019-04-03 ENCOUNTER — HOSPITAL ENCOUNTER (OUTPATIENT)
Dept: PHYSICAL THERAPY | Age: 74
Discharge: HOME | End: 2019-04-03
Payer: MEDICARE

## 2019-04-03 PROCEDURE — 97140 MANUAL THERAPY 1/> REGIONS: CPT

## 2019-04-03 NOTE — PROGRESS NOTES
Abigail Vallejo : 1945 [place] Secondary: Adrián Christianson at [address], [address], [place] [address]. [phone]   [phone] OUTPATIENT PHYSICAL THERAPY: Daily Treatment Note 4/3/2019 Pre-treatment Symptoms/Complaints:  4/3/2019: Patient reports she moved some furniture this weekend and so her shoulders are tight today. Pain: Initial: Pain Intensity 1: 4 Pain Location 1: Neck, Shoulder Pain Orientation 1: Right, Left Pain Intervention(s) 1: Rest, Medication (see MAR)  Post Session:  3/10 Medications Last Reviewed:  4/3/2019 Updated Objective Findings:  See Progress Note from today TREATMENT:  
MANUAL THERAPY: (40 minutes): Joint mobilization and Soft tissue mobilization was utilized and necessary because of the patient's restricted joint motion, painful spasm, loss of articular motion and restricted motion of soft tissue. Treatment/Session Summary: · Response to Treatment:  Patient tolerated treatment without complaints of increased neck and shoulder pain. · Communication/Consultation:  None today · Equipment provided today:  None today · Recommendations/Intent for next treatment session: Next visit will focus on improving overall mobility with decreased pain. Treatment Plan of Care Effective Dates:  3/6/2019 TO 6/3/2019 (90 days). Total Treatment Billable Duration:  40 minutes PT Patient Time In/Time Out Time In: 0945 Time Out: 1030 Florin Zamorano PT Future Appointments Date Time Provider Radha Bautista 4/3/2019  9:45 AM Yovany Hurt PT SFE[facility]PT SFE  
2019  0:95 PM SFE CT 16 SLICE UNIT 1 SFERCT SFE  
4/10/2019  9:45 AM Yovany Hurt PT SFEORPT SFE  
2019  9:00 AM Caryle Richardson, MD SSA CSA CSA MAIN  
2019  1:20 PM Lia Edwards MD END BS ENDO

## 2019-04-03 NOTE — PROGRESS NOTES
Bryant Cheng : 1945 [place] Secondary: Adri[facility] , [address], [address] [facility] [phone]   [phone] OUTPATIENT PHYSICAL THERAPY:Progress Report 4/3/2019 ICD-10: Treatment Diagnosis: Cervicalgia (M54.2) Precautions/Allergies:  
Adhesive; Alendronate sodium; Antihistamine [triprolidine-pseudoephedrine]; and Boniva [ibandronate] MD Orders: Evaluate and Treat MEDICAL/REFERRING DIAGNOSIS: 
Cervicalgia [M54.2] DATE OF ONSET: Chronic REFERRING PHYSICIAN:  Irish Knowles NP  
RETURN PHYSICIAN APPOINTMENT: TBD ASSESSMENT:  Ms. Gill Cotto presents with decreased mobility and pain in cervical region secondary to degenerative changes. Patient has attended a total of 3 scheduled physical therapy visits including initial evaluation on 3/6/2019. Treatment has consisted of stretching, strengthening, and manual therapy to improve overall pain and performance with activities of daily living. PROBLEM LIST (Impacting functional limitations): 1. Decreased Strength 2. Decreased ADL/Functional Activities 3. Increased Pain 4. Decreased Activity Tolerance INTERVENTIONS PLANNED: (Treatment may consist of any combination of the following) 1. Cold 2. Heat 3. Home Exercise Program (HEP) 4. Manual Therapy 5. Neuromuscular Re-education/Strengthening 6. Range of Motion (ROM) 7. Therapeutic Activites 8. Therapeutic Exercise/Strengthening TREATMENT PLAN: 
Effective Dates: 3/6/2019 TO 6/3/2019 (90 days). Frequency/Duration: 2 times a week for 90 Day(s) GOALS: (Goals have been discussed and agreed upon with patient.) Short-Term Functional Goals: Time Frame: 30 days 1. Patient will be independent with home exercise program without exacerbation of symptoms or cueing needed--goal met.   
2. Patient will be independent with correct sleeping positions and awareness/avoidance of aggravating positions without cueing needed--goal met. Discharge Goals: Time Frame: 90 days 1. Patient will be independent with all ADLs with minimal onset of neck pain and no deficits with daily tasks--goal ongoing. 2. Patient will report no fear avoidance with social or recreational activities due to neck pain --goal ongoing. 3. Patient will score less than or equal to 7/50 on Neck Disability Index with minimal effect of neck pain on patient's ability to manage every day life activities--goal ongoing. Rehabilitation Potential For Stated Goals: Good The information in this section was collected on 4/3/2019 (except where otherwise noted). HISTORY:  
History of Present Injury/Illness (Reason for Referral): 
Patient is well known to PT from previous treatment session. Patient reports she still has pain and stiffness in her neck and shoulder (right greater than left). She reports she is unable to sleep on her right side secondary to the pain in her neck and shoulder. She reports that when she is at work, she has neck pain and stiffness that are very painful at the end of the day. 4/3/2019 (Progress Note): Patient reports she is able to do more with less pain in her neck and shoulders. She reports she is still having tightness in her shoulders with sleeping and some of her work duties. Past Medical History/Comorbidities: Ms. Miri Ibarra  has a past medical history of Abnormal Pap smear, Anxiety, COPD (chronic obstructive pulmonary disease) ([facility] [address] Depression, Hyperthyroidism, Insomnia, Irritable bowel syndrome, and Osteopenia. She also has no past medical history of Difficult intubation, Malignant hyperthermia due to anesthesia, Nausea & vomiting, Pseudocholinesterase deficiency, or Unspecified adverse effect of anesthesia.   Ms. Miri Ibarra  has a past surgical history that includes hx tonsillectomy; hx orthopaedic; hx salpingo-oophorectomy (Right, 01/22/2014); and hx colonoscopy. Social History/Living Environment:  
Home Environment: Private residence Living Alone: Yes Support Systems: Family member(s), Friends \ neighbors Prior Level of Function/Work/Activity: 
Independent Dominant Side:  
      RIGHT Personal Factors:   
      Sex:  female Age:  69 y.o. Ambulatory/Rehab Services H2 Model Falls Risk Assessment Risk Factors: 
     No Risk Factors Identified Ability to Rise from Chair: 
     (0)  Ability to rise in a single movement Falls Prevention Plan: No modifications necessary Total: (5 or greater = High Risk): 0  
 ©2010 [facility] Patent #[id number]. Federal Law prohibits the replication, distribution or use without written permission from [facility] [employer] Current Medications:   
  
Current Outpatient Medications:  
  cholecalciferol (VITAMIN D3) 400 unit tab tablet, Take 400 Units by mouth., Disp: , Rfl:  
  alendronate (FOSAMAX) 70 mg tablet, Take 70 mg by mouth every seven (7) days. , Disp: , Rfl:  
  b complex vitamins tablet, Take 1 Tab by mouth every morning. Holding for surgery, Disp: , Rfl:  
  temazepam (RESTORIL) 15 mg capsule, Take  by mouth nightly as needed for Sleep., Disp: , Rfl:  
  omega-3 fatty acids-vitamin e (FISH OIL) 1,000 mg cap, Take 1 Cap by mouth every morning. Holding for surgery, Disp: , Rfl:  
  DULoxetine (CYMBALTA) 30 mg capsule, Take 30 mg by mouth every morning., Disp: , Rfl:  
  ascorbic acid (VITAMIN C) 500 mg tablet, Take 500 mg by mouth every morning. Holding for surgery, Disp: , Rfl:  
  multivitamin (ONE A DAY) tablet, Take 1 Tab by mouth every morning. Holding for surgery, Disp: , Rfl:   
Date Last Reviewed:  4/3/2019 EXAMINATION:  
Observation/Orthostatic Postural Assessment:   
      Posture Assessment: Rounded shoulders, Forward head Palpation: Tightness and tenderness to palpation noted throughout upper and lower cervical musculature. No bruising or swelling noted. ROM:   
      Cervical Assessment (AROM): 
· Flexion: 75% of full AROM · Extension: 75% of full AROM · Right side bendin% of full AROM · Left side bending[de-identified] 75% of full AROM · Right rotation: 75% of full AROM · Left rotation: 75% of full AROM Strength:   
      Cervical Assessment (Strength): · Grossly 2+/5 with manual muscle testing Special Tests:   
      Negative Hautant's test. Negative Cranial-Equality lift test. Negative Equality-Axis Sheer test. Negative Alar Ligament test. Negative Tectorial Membrane test.  
Neurological Screen: 
      Dermatomes: Within normal limits Reflexes:  2+ Functional Mobility:  
      Gait/Mobility:  
 ·   Independent Transfers: · Sit to Stand: Independent · Stand to Sit: Independent · Stand Pivot Transfers: Independent · Bed to Chair: Independent · Lateral Transfers: Independent Bed Mobility: · Rolling: Independent · Supine to Sit: Independent · Sit to Supine: Independent · Scooting: Independent CLINICAL DECISION MAKING:  
Outcome Measure: Tool Used: Neck Disability Index (NDI) Score:  Initial:   Most Recent: 15/50 (Date: 4/3/2019 ) Interpretation of Score: The Neck Disability Index is a revised form of the Oswestry Low Back Pain Index and is designed to measure the activities of daily living in person's with neck pain. Each section is scored on a 0-5 scale, 5 representing the greatest disability. The scores of each section are added together for a total score of 50. Medical Necessity:  
· Patient is expected to demonstrate progress in strength and range of motion to improve safety during daily activities. · Patient demonstrates good rehab potential due to higher previous functional level. · Skilled intervention continues to be required due to decreased mobility. Reason for Services/Other Comments: 
· Patient continues to demonstrate capacity to improve overall mobility which will increase independence and increase safety.

## 2019-04-08 ENCOUNTER — HOSPITAL ENCOUNTER (OUTPATIENT)
Dept: CT IMAGING | Age: 74
Discharge: HOME | End: 2019-04-08
Payer: MEDICARE

## 2019-04-08 VITALS — WEIGHT: 113 LBS | BODY MASS INDEX: 21.34 KG/M2 | HEIGHT: 61 IN

## 2019-04-08 DIAGNOSIS — Z87.891 PERSONAL HISTORY OF TOBACCO USE: ICD-10-CM

## 2019-04-08 PROCEDURE — G0297 LDCT FOR LUNG CA SCREEN: HCPCS

## 2019-04-10 ENCOUNTER — HOSPITAL ENCOUNTER (OUTPATIENT)
Dept: PHYSICAL THERAPY | Age: 74
Discharge: HOME | End: 2019-04-10
Payer: MEDICARE

## 2019-04-10 PROCEDURE — 97140 MANUAL THERAPY 1/> REGIONS: CPT

## 2019-04-10 NOTE — PROGRESS NOTES
Amadeo Goldberg : 1945 [place] Secondary: Adrián Christianson at [place] [id number] [facility], [address], [place] [address]. [phone]   [phone] OUTPATIENT PHYSICAL THERAPY: Daily Treatment Note 4/10/2019 Pre-treatment Symptoms/Complaints:  4/10/2019: Patient reports she has pain in her shoulder more today than her neck. Pain: Initial: Pain Intensity 1: 4 Pain Location 1: Neck, Shoulder Pain Orientation 1: Right, Left Pain Intervention(s) 1: Rest, Medication (see MAR)  Post Session:  3/10 Medications Last Reviewed:  4/10/2019 Updated Objective Findings:  See Progress Note from today TREATMENT:  
MANUAL THERAPY: (40 minutes): Joint mobilization and Soft tissue mobilization was utilized and necessary because of the patient's restricted joint motion, painful spasm, loss of articular motion and restricted motion of soft tissue. Treatment/Session Summary: · Response to Treatment:  Patient tolerated treatment without complaints of increased neck and shoulder pain. · Communication/Consultation:  None today · Equipment provided today:  None today · Recommendations/Intent for next treatment session: Next visit will focus on improving overall mobility with decreased pain. Treatment Plan of Care Effective Dates:  3/6/2019 TO 6/3/2019 (90 days). Total Treatment Billable Duration:  40 minutes PT Patient Time In/Time Out Time In: 0945 Time Out: 1030 Quyen Hernandez PT Future Appointments Date Time Provider Radha Tianisti 4/10/2019  9:45 AM BOLA Marquez  
2019  9:00 AM Kavon Gonzalez MD [facility] MAIN  
2019  9:00 AM BOLA MarquezORPCHRIS SFE  
2019  1:20 PM Mariah Dominguez MD END [facility] ENDO

## 2019-05-08 ENCOUNTER — HOSPITAL ENCOUNTER (OUTPATIENT)
Dept: PHYSICAL THERAPY | Age: 74
Discharge: HOME | End: 2019-05-08
Payer: MEDICARE

## 2019-05-08 PROCEDURE — 97140 MANUAL THERAPY 1/> REGIONS: CPT

## 2019-05-08 NOTE — PROGRESS NOTES
Uli Rivera : 1945 [place] Secondary: Adrián Christianson at [place] [address] [facility], [address], [facility]. [phone]   [phone] OUTPATIENT PHYSICAL THERAPY: Daily Treatment Note 2019 Pre-treatment Symptoms/Complaints:  2019: Patient reports she is hurting in her shoulders more today. Pain: Initial: Pain Intensity 1: 4 Pain Location 1: Neck, Shoulder Pain Orientation 1: Right, Left Pain Intervention(s) 1: Rest, Medication (see MAR)  Post Session:  3/10 Medications Last Reviewed:  2019 Updated Objective Findings:  See Progress Note from today TREATMENT:  
MANUAL THERAPY: (40 minutes): Joint mobilization and Soft tissue mobilization was utilized and necessary because of the patient's restricted joint motion, painful spasm, loss of articular motion and restricted motion of soft tissue. Treatment/Session Summary: · Response to Treatment:  Patient tolerated treatment without complaints of increased neck and shoulder pain. · Communication/Consultation:  None today · Equipment provided today:  None today · Recommendations/Intent for next treatment session: Next visit will focus on improving overall mobility with decreased pain. Treatment Plan of Care Effective Dates:  3/6/2019 TO 6/3/2019 (90 days). Total Treatment Billable Duration:  40 minutes PT Patient Time In/Time Out Time In: 0900 Time Out: 5313 Cy Valle PT Future Appointments Date Time Provider Radha Bautista 2019  9:00 AM BOLA PizarroEKERRY SFE  
2019  1:20 PM Kristie Rivera MD END BS ENDO  
2019  9:00 AM Alessio Telles MD [facility] MAIN

## 2019-05-08 NOTE — PROGRESS NOTES
Uli Rivera : 1945 [place] Secondary: Adrián Christianson at [address], [address], [place] [address]. [phone]   [phone] OUTPATIENT PHYSICAL THERAPY:Progress Report 2019 ICD-10: Treatment Diagnosis: Cervicalgia (M54.2) Precautions/Allergies:  
Adhesive; Alendronate sodium; Antihistamine [triprolidine-pseudoephedrine]; and Boniva [ibandronate] MD Orders: Evaluate and Treat MEDICAL/REFERRING DIAGNOSIS: 
Cervicalgia [M54.2] DATE OF ONSET: Chronic REFERRING PHYSICIAN:  Jojo Salmon NP  
RETURN PHYSICIAN APPOINTMENT: TBD ASSESSMENT:  Ms. Damari Bentley presents with decreased mobility and pain in cervical region secondary to degenerative changes. Patient has attended a total of 5 scheduled physical therapy visits including initial evaluation on 3/6/2019. Treatment has consisted of stretching, strengthening, and manual therapy to improve overall pain and performance with activities of daily living. PROBLEM LIST (Impacting functional limitations): 1. Decreased Strength 2. Decreased ADL/Functional Activities 3. Increased Pain 4. Decreased Activity Tolerance INTERVENTIONS PLANNED: (Treatment may consist of any combination of the following) 1. Cold 2. Heat 3. Home Exercise Program (HEP) 4. Manual Therapy 5. Neuromuscular Re-education/Strengthening 6. Range of Motion (ROM) 7. Therapeutic Activites 8. Therapeutic Exercise/Strengthening TREATMENT PLAN: 
Effective Dates: 3/6/2019 TO 6/3/2019 (90 days). Frequency/Duration: 2 times a week for 90 Day(s) GOALS: (Goals have been discussed and agreed upon with patient.) Short-Term Functional Goals: Time Frame: 30 days 1. Patient will be independent with home exercise program without exacerbation of symptoms or cueing needed--goal met.   
2. Patient will be independent with correct sleeping positions and awareness/avoidance of aggravating positions without cueing needed--goal met. Discharge Goals: Time Frame: 90 days 1. Patient will be independent with all ADLs with minimal onset of neck pain and no deficits with daily tasks--goal ongoing. 2. Patient will report no fear avoidance with social or recreational activities due to neck pain --goal ongoing. 3. Patient will score less than or equal to 7/50 on Neck Disability Index with minimal effect of neck pain on patient's ability to manage every day life activities--goal ongoing. Rehabilitation Potential For Stated Goals: Good The information in this section was collected on 5/8/2019 (except where otherwise noted). HISTORY:  
History of Present Injury/Illness (Reason for Referral): 
Patient is well known to PT from previous treatment session. Patient reports she still has pain and stiffness in her neck and shoulder (right greater than left). She reports she is unable to sleep on her right side secondary to the pain in her neck and shoulder. She reports that when she is at work, she has neck pain and stiffness that are very painful at the end of the day. 4/3/2019 (Progress Note): Patient reports she is able to do more with less pain in her neck and shoulders. She reports she is still having tightness in her shoulders with sleeping and some of her work duties. 5/8/2019 (Progress Note): Patient reports she is doing better overall, but still having some pain in her neck and shoulders if she does too much. Past Medical History/Comorbidities: Ms. Vianey Hills  has a past medical history of Abnormal Pap smear, Anxiety, COPD (chronic obstructive pulmonary disease) ([facility] [address] Depression, Hyperthyroidism, Insomnia, Irritable bowel syndrome, and Osteopenia.  She also has no past medical history of Difficult intubation, Malignant hyperthermia due to anesthesia, Nausea & vomiting, Pseudocholinesterase deficiency, or Unspecified adverse effect of anesthesia. Ms. Soo Norris  has a past surgical history that includes hx tonsillectomy; hx orthopaedic; hx salpingo-oophorectomy (Right, 01/22/2014); and hx colonoscopy. Social History/Living Environment:  
Home Environment: Private residence Living Alone: Yes Support Systems: Family member(s), Friends \ neighbors Prior Level of Function/Work/Activity: 
Independent Dominant Side:  
      RIGHT Personal Factors:   
      Sex:  female Age:  69 y.o. Ambulatory/Rehab Services H2 Model Falls Risk Assessment Risk Factors: 
     No Risk Factors Identified Ability to Rise from Chair: 
     (0)  Ability to rise in a single movement Falls Prevention Plan: No modifications necessary Total: (5 or greater = High Risk): 0  
 ©2010 [facility] [address] [place] [address] Patent #[id number]. Federal Law prohibits the replication, distribution or use without written permission from [facility] Current Medications:   
  
Current Outpatient Medications:  
  cholecalciferol (VITAMIN D3) 400 unit tab tablet, Take 400 Units by mouth., Disp: , Rfl:  
  alendronate (FOSAMAX) 70 mg tablet, Take 70 mg by mouth every seven (7) days. , Disp: , Rfl:  
  b complex vitamins tablet, Take 1 Tab by mouth every morning. Holding for surgery, Disp: , Rfl:  
  temazepam (RESTORIL) 15 mg capsule, Take  by mouth nightly as needed for Sleep., Disp: , Rfl:  
  omega-3 fatty acids-vitamin e (FISH OIL) 1,000 mg cap, Take 1 Cap by mouth every morning. Holding for surgery, Disp: , Rfl:  
  DULoxetine (CYMBALTA) 30 mg capsule, Take 30 mg by mouth every morning., Disp: , Rfl:  
  ascorbic acid (VITAMIN C) 500 mg tablet, Take 500 mg by mouth every morning. Holding for surgery, Disp: , Rfl:  
  multivitamin (ONE A DAY) tablet, Take 1 Tab by mouth every morning. Holding for surgery, Disp: , Rfl:   
Date Last Reviewed:  5/8/2019 EXAMINATION:  
 Observation/Orthostatic Postural Assessment:   
      Posture Assessment: Rounded shoulders, Forward head Palpation:   
      Tightness and tenderness to palpation noted throughout upper and lower cervical musculature. No bruising or swelling noted. ROM:   
      Cervical Assessment (AROM): 
· Flexion: 75% of full AROM · Extension: 75% of full AROM · Right side bendin% of full AROM · Left side bending[de-identified] 75% of full AROM · Right rotation: 75% of full AROM · Left rotation: 75% of full AROM Strength:   
      Cervical Assessment (Strength): · Grossly 2+/5 with manual muscle testing Special Tests:   
      Negative Hautant's test. Negative Cranial-[place] lift test. Negative [place]-Axis Sheer test. Negative Alar Ligament test. Negative Tectorial Membrane test.  
Neurological Screen: 
      Dermatomes: Within normal limits Reflexes:  2+ Functional Mobility:  
      Gait/Mobility:  
 ·   Independent Transfers: · Sit to Stand: Independent · Stand to Sit: Independent · Stand Pivot Transfers: Independent · Bed to Chair: Independent · Lateral Transfers: Independent Bed Mobility: · Rolling: Independent · Supine to Sit: Independent · Sit to Supine: Independent · Scooting: Independent CLINICAL DECISION MAKING:  
Outcome Measure: Tool Used: Neck Disability Index (NDI) Score:  Initial:   Most Recent: 15/50 (Date: 2019 ) Interpretation of Score: The Neck Disability Index is a revised form of the Oswestry Low Back Pain Index and is designed to measure the activities of daily living in person's with neck pain. Each section is scored on a 0-5 scale, 5 representing the greatest disability. The scores of each section are added together for a total score of 50. Medical Necessity:  
· Patient is expected to demonstrate progress in strength and range of motion to improve safety during daily activities. · Patient demonstrates good rehab potential due to higher previous functional level. · Skilled intervention continues to be required due to decreased mobility. Reason for Services/Other Comments: 
· Patient continues to demonstrate capacity to improve overall mobility which will increase independence and increase safety.

## 2019-05-22 ENCOUNTER — HOSPITAL ENCOUNTER (OUTPATIENT)
Dept: PHYSICAL THERAPY | Age: 74
Discharge: HOME | End: 2019-05-22
Payer: MEDICARE

## 2019-05-22 PROCEDURE — 97140 MANUAL THERAPY 1/> REGIONS: CPT

## 2019-06-05 ENCOUNTER — HOSPITAL ENCOUNTER (OUTPATIENT)
Dept: PHYSICAL THERAPY | Age: 74
Discharge: HOME | End: 2019-06-05
Payer: MEDICARE

## 2019-06-05 PROCEDURE — 97140 MANUAL THERAPY 1/> REGIONS: CPT

## 2019-06-05 NOTE — THERAPY RECERTIFICATION
Raisa Gomes  : 1945  [place]  Secondary: Adrián Christianson at [place] [id number] [facility], [address] [place] [address],[address], [facility] [address].  [phone]   [phone]          OUTPATIENT PHYSICAL THERAPY:Re-evaluation 2019   ICD-10: Treatment Diagnosis: Cervicalgia (M54.2)  Precautions/Allergies:   Adhesive; Alendronate sodium; Antihistamine [triprolidine-pseudoephedrine]; and Boniva [ibandronate]   MD Orders: Evaluate and Treat MEDICAL/REFERRING DIAGNOSIS:  Cervicalgia [M54.2]   DATE OF ONSET: Chronic  REFERRING PHYSICIAN:  Emerald Solorio NP   RETURN PHYSICIAN APPOINTMENT: TBD     ASSESSMENT:  Ms. Davis Daniel presents with improving mobility and pain in cervical region secondary to degenerative changes. Patient has attended a total of 7 scheduled physical therapy visits including initial evaluation on 3/6/2019. Treatment has consisted of stretching, strengthening, and manual therapy to improve overall pain and performance with activities of daily living. After discussing with patient she agreed she would continue to benefit from physical therapy to improve overall mobility. Please sign this re-certification if you concur. Thank you for the opportunity to serve this patient. PROBLEM LIST (Impacting functional limitations):  1. Decreased Strength  2. Decreased ADL/Functional Activities  3. Increased Pain  4. Decreased Activity Tolerance INTERVENTIONS PLANNED: (Treatment may consist of any combination of the following)  1. Cold  2. Heat  3. Home Exercise Program (HEP)  4. Manual Therapy  5. Neuromuscular Re-education/Strengthening  6. Range of Motion (ROM)  7. Therapeutic Activites  8. Therapeutic Exercise/Strengthening   TREATMENT PLAN:  Effective Dates: 6/3/2019 TO 9/3/2019 (90 days).   Frequency/Duration: 2 times a week for 90 Day(s)  GOALS: (Goals have been discussed and agreed upon with patient.)  Short-Term Functional Goals: Time Frame: 30 days  1. Patient will be independent with home exercise program without exacerbation of symptoms or cueing needed--goal met. 2. Patient will be independent with correct sleeping positions and awareness/avoidance of aggravating positions without cueing needed--goal met. Discharge Goals: Time Frame: 90 days  1. Patient will be independent with all ADLs with minimal onset of neck pain and no deficits with daily tasks--goal ongoing. 2. Patient will report no fear avoidance with social or recreational activities due to neck pain --goal ongoing. 3. Patient will score less than or equal to 7/50 on Neck Disability Index with minimal effect of neck pain on patient's ability to manage every day life activities--goal ongoing. Rehabilitation Potential For Stated Goals: Good   Regarding Paola Beach's therapy, I certify that the treatment plan above will be carried out by a therapist or under their direction. Thank you for this referral,  Valdemar Dumont PT     Referring Physician Signature: Gmea Dominguez NP          Date                     The information in this section was collected on 5/8/2019 (except where otherwise noted). HISTORY:   History of Present Injury/Illness (Reason for Referral):  Patient is well known to PT from previous treatment session. Patient reports she still has pain and stiffness in her neck and shoulder (right greater than left). She reports she is unable to sleep on her right side secondary to the pain in her neck and shoulder. She reports that when she is at work, she has neck pain and stiffness that are very painful at the end of the day. 4/3/2019 (Progress Note): Patient reports she is able to do more with less pain in her neck and shoulders. She reports she is still having tightness in her shoulders with sleeping and some of her work duties.     5/8/2019 (Progress Note): Patient reports she is doing better overall, but still having some pain in her neck and shoulders if she does too much. 1/2/5943 (Re-certification): Patient reports she has been having problems with her colon and so she hasn't been thinking about her shoulders and neck much. She reports she does feel some improvement, but is still having trouble. Past Medical History/Comorbidities:   Ms. Dari Tenorio  has a past medical history of Abnormal Pap smear, Anxiety, COPD (chronic obstructive pulmonary disease) ([address] Depression, Hyperthyroidism, Insomnia, Irritable bowel syndrome, and Osteopenia. She also has no past medical history of Difficult intubation, Malignant hyperthermia due to anesthesia, Nausea & vomiting, Pseudocholinesterase deficiency, or Unspecified adverse effect of anesthesia. Ms. Dari Tenorio  has a past surgical history that includes hx tonsillectomy; hx orthopaedic; hx salpingo-oophorectomy (Right, 01/22/2014); and hx colonoscopy. Social History/Living Environment:   Home Environment: Private residence  Living Alone: Yes  Support Systems: Family member(s), Friends \ neighbors  Prior Level of Function/Work/Activity:  Independent  Dominant Side:         RIGHT  Personal Factors:          Sex:  female        Age:  69 y.o. Ambulatory/Rehab Services H2 Model Falls Risk Assessment    Risk Factors:       No Risk Factors Identified Ability to Rise from Chair:       (0)  Ability to rise in a single movement    Falls Prevention Plan:       No modifications necessary   Total: (5 or greater = High Risk): 0    ©2010 [facility] [employer]. All Rights Reserved. [facility] Patent #[id number]. Federal Law prohibits the replication, distribution or use without written permission from [facility] [employer]     Current Medications:       Current Outpatient Medications:     alendronate (FOSAMAX) 70 mg tablet, Take 70 mg by mouth every seven (7) days. , Disp: , Rfl:     b complex vitamins tablet, Take 1 Tab by mouth every morning.  Holding for surgery, Disp: , Rfl:     temazepam (RESTORIL) 15 mg capsule, Take  by mouth nightly as needed for Sleep., Disp: , Rfl:     omega-3 fatty acids-vitamin e (FISH OIL) 1,000 mg cap, Take 1 Cap by mouth every morning. Holding for surgery, Disp: , Rfl:     DULoxetine (CYMBALTA) 30 mg capsule, Take 30 mg by mouth every morning., Disp: , Rfl:     ascorbic acid (VITAMIN C) 500 mg tablet, Take 500 mg by mouth every morning. Holding for surgery, Disp: , Rfl:     multivitamin (ONE A DAY) tablet, Take 1 Tab by mouth every morning. Holding for surgery, Disp: , Rfl:    Date Last Reviewed:  2019    EXAMINATION:   Observation/Orthostatic Postural Assessment:          Posture Assessment: Rounded shoulders, Forward head   Palpation:          Tightness and tenderness to palpation noted throughout upper and lower cervical musculature. No bruising or swelling noted. ROM:          Cervical Assessment (AROM):  · Flexion: 75% of full AROM  · Extension: 75% of full AROM  · Right side bendin% of full AROM  · Left side bending[de-identified] 75% of full AROM  · Right rotation: 75% of full AROM  · Left rotation: 75% of full AROM  Strength:          Cervical Assessment (Strength):  · Grossly 2+/5 with manual muscle testing  Special Tests:          Negative Hautant's test. Negative Cranial-[place] lift test. Negative [place]-Axis Sheer test. Negative Alar Ligament test. Negative Tectorial Membrane test.   Neurological Screen:        Dermatomes: Within normal limits        Reflexes:  2+  Functional Mobility:         Gait/Mobility:    ·   Independent         Transfers:     · Sit to Stand: Independent  · Stand to Sit: Independent  · Stand Pivot Transfers: Independent  · Bed to Chair: Independent  · Lateral Transfers: Independent         Bed Mobility:     · Rolling: Independent  · Supine to Sit: Independent  · Sit to Supine: Independent  · Scooting: Independent        CLINICAL DECISION MAKING:   Outcome Measure:    Tool Used: Neck Disability Index (NDI)  Score:  Initial:   Most Recent: 15/50 (Date: 5/8/2019 )   Interpretation of Score: The Neck Disability Index is a revised form of the Oswestry Low Back Pain Index and is designed to measure the activities of daily living in person's with neck pain. Each section is scored on a 0-5 scale, 5 representing the greatest disability. The scores of each section are added together for a total score of 50. Medical Necessity:   · Patient is expected to demonstrate progress in strength and range of motion to improve safety during daily activities. · Patient demonstrates good rehab potential due to higher previous functional level. · Skilled intervention continues to be required due to decreased mobility. Reason for Services/Other Comments:  · Patient continues to demonstrate capacity to improve overall mobility which will increase independence and increase safety.

## 2019-06-05 NOTE — PROGRESS NOTES
Roland Larose  : 1945  Primary:   Secondary:  [address] at [place] [address] [facility], [address] [place] [address],[address], [facility] [address]  [phone]   [phone]         OUTPATIENT PHYSICAL THERAPY: Daily Treatment Note 2019  ICD-10: Treatment Diagnosis: Cervicalgia (M54.2)  Pre-treatment Symptoms/Complaints:  2019: Patient reports she has had some tightness in her right shoulder. Pain: Initial: Pain Intensity 1: 4  Pain Location 1: Neck, Shoulder  Pain Orientation 1: Right, Left  Pain Intervention(s) 1: Rest, Medication (see MAR)  Post Session:  3/10   Medications Last Reviewed:  2019   Updated Objective Findings:  See Progress Note from today. TREATMENT:   MANUAL THERAPY: (40 minutes): Joint mobilization and Soft tissue mobilization was utilized and necessary because of the patient's restricted joint motion, painful spasm, loss of articular motion and restricted motion of soft tissue. Treatment/Session Summary:    · Response to Treatment:  Patient tolerated treatment without complaints of increased neck and shoulder pain. · Communication/Consultation:  None today  · Equipment provided today:  None today  · Recommendations/Intent for next treatment session: Next visit will focus on improving overall mobility with decreased pain. Treatment Plan of Care Effective Dates:  6/3/2019 TO 9/3/2019 (90 days).   Total Treatment Billable Duration:  40 minutes  PT Patient Time In/Time Out  Time In: 0900  Time Out: 75 Dyan Brito PT    Future Appointments   Date Time Provider Radha Bautista   2019  9:00 AM BOLA Marsh   2019  9:00 AM Anna Willis MD SSA CSA CSA MAIN   7/3/2019  8:45 AM BOLA Marsh   2019  1:40 PM MD NELLY Perez BS ENDO

## 2019-06-19 ENCOUNTER — HOSPITAL ENCOUNTER (OUTPATIENT)
Dept: PHYSICAL THERAPY | Age: 74
Discharge: HOME | End: 2019-06-19
Payer: MEDICARE

## 2019-06-19 PROCEDURE — 97140 MANUAL THERAPY 1/> REGIONS: CPT

## 2019-06-19 NOTE — PROGRESS NOTES
Rosa Cody  : 1945  Primary:   Secondary:  [address] at [place] [address] [facility], [address] [place] [address],[address], [place] [address].  [phone]   [phone]         OUTPATIENT PHYSICAL THERAPY: Daily Treatment Note 2019  ICD-10: Treatment Diagnosis: Cervicalgia (M54.2)  Pre-treatment Symptoms/Complaints:  2019: Patient reports her shoulders are really tight today, but her neck feels good. Pain: Initial: Pain Intensity 1: 4  Pain Location 1: Neck, Shoulder  Pain Orientation 1: Right, Left  Pain Intervention(s) 1: Rest, Medication (see MAR)  Post Session:  3/10   Medications Last Reviewed:  2019   Updated Objective Findings:  None Today   TREATMENT:   MANUAL THERAPY: (40 minutes): Joint mobilization and Soft tissue mobilization was utilized and necessary because of the patient's restricted joint motion, painful spasm, loss of articular motion and restricted motion of soft tissue. Treatment/Session Summary:    · Response to Treatment:  Patient tolerated treatment with improved mobility noted after treatment. · Communication/Consultation:  None today  · Equipment provided today:  None today  · Recommendations/Intent for next treatment session: Next visit will focus on improving overall mobility with decreased pain. Treatment Plan of Care Effective Dates:  6/3/2019 TO 9/3/2019 (90 days).   Total Treatment Billable Duration:  40 minutes  PT Patient Time In/Time Out  Time In: 0900  Time Out: 1221 [facility]    Future Appointments   Date Time Provider Radha Bautista   2019  9:00 AM Randy Shafer PT SFEORPT SFE   2019  9:00 AM Shirley Leon MD SSA CSA [facility] MAIN   7/3/2019  8:45 AM Randy Shafer, PT SFEORPT SFE   2019  1:40 PM MD NELLY Hemphill BS ENDO

## 2019-07-03 ENCOUNTER — HOSPITAL ENCOUNTER (OUTPATIENT)
Dept: PHYSICAL THERAPY | Age: 74
Discharge: HOME | End: 2019-07-03
Payer: MEDICARE

## 2019-07-03 PROCEDURE — 97140 MANUAL THERAPY 1/> REGIONS: CPT

## 2019-07-03 NOTE — PROGRESS NOTES
Andrade Edgardo  : 1945  Primary:   Secondary:  [address] at [facility]  [address] [facility], [address] [place] [address],[address], [address] [facility]  [phone]   [phone]         OUTPATIENT PHYSICAL THERAPY: Daily Treatment Note 7/3/2019  ICD-10: Treatment Diagnosis: Cervicalgia (M54.2)  Pre-treatment Symptoms/Complaints:  7/3/2019: Patient reports her shoulder hurts today. Pain: Initial: Pain Intensity 1: 4  Pain Location 1: Neck, Shoulder  Pain Orientation 1: Right, Left  Pain Intervention(s) 1: Rest, Medication (see MAR)  Post Session:  3/10   Medications Last Reviewed:  7/3/2019   Updated Objective Findings:  None Today   TREATMENT:   MANUAL THERAPY: (40 minutes): Joint mobilization and Soft tissue mobilization was utilized and necessary because of the patient's restricted joint motion, painful spasm, loss of articular motion and restricted motion of soft tissue. Treatment/Session Summary:    · Response to Treatment:  Patient tolerated treatment with improved mobility noted after treatment. · Communication/Consultation:  None today  · Equipment provided today:  None today  · Recommendations/Intent for next treatment session: Next visit will focus on improving overall mobility with decreased pain. Treatment Plan of Care Effective Dates:  6/3/2019 TO 9/3/2019 (90 days).   Total Treatment Billable Duration:  40 minutes  PT Patient Time In/Time Out  Time In: 0845  Time Out: 6[address] [facility], PT    Future Appointments   Date Time Provider Radha Bautista   7/3/2019  8:45 AM Farhad Hilton, PT [facility] SFE   2019  8:45 AM Farhad Hilton, PT [facility] SFE   2019  1:40 PM Giovani Morgan MD END BS ENDO

## 2019-07-17 ENCOUNTER — HOSPITAL ENCOUNTER (OUTPATIENT)
Dept: PHYSICAL THERAPY | Age: 74
Discharge: HOME | End: 2019-07-17
Payer: MEDICARE

## 2019-07-17 PROCEDURE — 97140 MANUAL THERAPY 1/> REGIONS: CPT

## 2019-07-17 NOTE — PROGRESS NOTES
[facility]  : 1945  [place]  Secondary: Adrián Christianson at [address], [address] [place] [address],[address], [facility] [address]  [phone]   [phone]          OUTPATIENT PHYSICAL THERAPY:Progress Report 2019   ICD-10: Treatment Diagnosis: Cervicalgia (M54.2)  Precautions/Allergies:   Adhesive; Alendronate sodium; Antihistamine [triprolidine-pseudoephedrine]; and Boniva [ibandronate]   MD Orders: Evaluate and Treat MEDICAL/REFERRING DIAGNOSIS:  Cervicalgia [M54.2]   DATE OF ONSET: Chronic  REFERRING PHYSICIAN:  Elda Best NP   RETURN PHYSICIAN APPOINTMENT: TBD     ASSESSMENT:  Ms. Spencer Richards presents with improving mobility and pain in cervical region secondary to degenerative changes. Patient has attended a total of 10 scheduled physical therapy visits including initial evaluation on 3/6/2019. Treatment has consisted of stretching, strengthening, and manual therapy to improve overall pain and performance with activities of daily living. PROBLEM LIST (Impacting functional limitations):  1. Decreased Strength  2. Decreased ADL/Functional Activities  3. Increased Pain  4. Decreased Activity Tolerance INTERVENTIONS PLANNED: (Treatment may consist of any combination of the following)  1. Cold  2. Heat  3. Home Exercise Program (HEP)  4. Manual Therapy  5. Neuromuscular Re-education/Strengthening  6. Range of Motion (ROM)  7. Therapeutic Activites  8. Therapeutic Exercise/Strengthening   TREATMENT PLAN:  Effective Dates: 6/3/2019 TO 9/3/2019 (90 days). Frequency/Duration: 2 times a week for 90 Day(s)  GOALS: (Goals have been discussed and agreed upon with patient.)  Short-Term Functional Goals: Time Frame: 30 days  1. Patient will be independent with home exercise program without exacerbation of symptoms or cueing needed--goal met.    2. Patient will be independent with correct sleeping positions and awareness/avoidance of aggravating positions without cueing needed--goal met. Discharge Goals: Time Frame: 90 days  1. Patient will be independent with all ADLs with minimal onset of neck pain and no deficits with daily tasks--goal ongoing. 2. Patient will report no fear avoidance with social or recreational activities due to neck pain --goal ongoing. 3. Patient will score less than or equal to 7/50 on Neck Disability Index with minimal effect of neck pain on patient's ability to manage every day life activities--goal ongoing. Rehabilitation Potential For Stated Goals: Good             The information in this section was collected on 7/17/2019 (except where otherwise noted). HISTORY:   History of Present Injury/Illness (Reason for Referral):  Patient is well known to PT from previous treatment session. Patient reports she still has pain and stiffness in her neck and shoulder (right greater than left). She reports she is unable to sleep on her right side secondary to the pain in her neck and shoulder. She reports that when she is at work, she has neck pain and stiffness that are very painful at the end of the day. 4/3/2019 (Progress Note): Patient reports she is able to do more with less pain in her neck and shoulders. She reports she is still having tightness in her shoulders with sleeping and some of her work duties. 5/8/2019 (Progress Note): Patient reports she is doing better overall, but still having some pain in her neck and shoulders if she does too much. 3/2/5423 (Re-certification): Patient reports she has been having problems with her colon and so she hasn't been thinking about her shoulders and neck much. She reports she does feel some improvement, but is still having trouble. 7/17/2019 (Progress Note): Patient reports she is feeling better overall with minimal stiffness in her neck, but pain still in her shoulder.   Past Medical History/Comorbidities:   Ms. Devan Bender  has a past medical history of Abnormal Pap smear, Anxiety, COPD (chronic obstructive pulmonary disease) ([address] Depression, Hyperthyroidism, Insomnia, Irritable bowel syndrome, and Osteopenia. She also has no past medical history of Difficult intubation, Malignant hyperthermia due to anesthesia, Nausea & vomiting, Pseudocholinesterase deficiency, or Unspecified adverse effect of anesthesia. Ms. Eric Ceron  has a past surgical history that includes hx tonsillectomy; hx orthopaedic; hx salpingo-oophorectomy (Right, 01/22/2014); and hx colonoscopy. Social History/Living Environment:   Home Environment: Private residence  Living Alone: Yes  Support Systems: Family member(s), Friends \ neighbors  Prior Level of Function/Work/Activity:  Independent  Dominant Side:         RIGHT  Personal Factors:          Sex:  female        Age:  69 y.o. Ambulatory/Rehab Services H2 Model Falls Risk Assessment    Risk Factors:       No Risk Factors Identified Ability to Rise from Chair:       (0)  Ability to rise in a single movement    Falls Prevention Plan:       No modifications necessary   Total: (5 or greater = High Risk): 0    ©2010 [facility] [place]. All Rights Reserved. [facility] Patent #[id number]. Federal Law prohibits the replication, distribution or use without written permission from [facility] [employer]     Current Medications:       Current Outpatient Medications:     alendronate (FOSAMAX) 70 mg tablet, Take 70 mg by mouth every seven (7) days. , Disp: , Rfl:     b complex vitamins tablet, Take 1 Tab by mouth every morning. Holding for surgery, Disp: , Rfl:     temazepam (RESTORIL) 15 mg capsule, Take  by mouth nightly as needed for Sleep., Disp: , Rfl:     omega-3 fatty acids-vitamin e (FISH OIL) 1,000 mg cap, Take 1 Cap by mouth every morning.  Holding for surgery, Disp: , Rfl:     DULoxetine (CYMBALTA) 30 mg capsule, Take 30 mg by mouth every morning., Disp: , Rfl:     ascorbic acid (VITAMIN C) 500 mg tablet, Take 500 mg by mouth every morning. Holding for surgery, Disp: , Rfl:     multivitamin (ONE A DAY) tablet, Take 1 Tab by mouth every morning. Holding for surgery, Disp: , Rfl:    Date Last Reviewed:  2019    EXAMINATION:   Observation/Orthostatic Postural Assessment:          Posture Assessment: Rounded shoulders, Forward head   Palpation:          Tightness and tenderness to palpation noted throughout upper and lower cervical musculature. No bruising or swelling noted. ROM:          Cervical Assessment (AROM):  · Flexion: 75% of full AROM  · Extension: 75% of full AROM  · Right side bendin% of full AROM  · Left side bending[de-identified] 75% of full AROM  · Right rotation: 75% of full AROM  · Left rotation: 75% of full AROM  Strength:          Cervical Assessment (Strength):  · Grossly 2+/5 with manual muscle testing  Special Tests:          Negative Hautant's test. Negative Cranial-[place] lift test. Negative [place]-Axis Sheer test. Negative Alar Ligament test. Negative Tectorial Membrane test.   Neurological Screen:        Dermatomes: Within normal limits        Reflexes:  2+  Functional Mobility:         Gait/Mobility:    ·   Independent         Transfers:     · Sit to Stand: Independent  · Stand to Sit: Independent  · Stand Pivot Transfers: Independent  · Bed to Chair: Independent  · Lateral Transfers: Independent         Bed Mobility:     · Rolling: Independent  · Supine to Sit: Independent  · Sit to Supine: Independent  · Scooting: Independent        CLINICAL DECISION MAKING:   Outcome Measure: Tool Used: Neck Disability Index (NDI)  Score:  Initial:   Most Recent:  (Date: 2019 )   Interpretation of Score: The Neck Disability Index is a revised form of the Oswestry Low Back Pain Index and is designed to measure the activities of daily living in person's with neck pain. Each section is scored on a 0-5 scale, 5 representing the greatest disability.   The scores of each section are added together for a total score of 50. Medical Necessity:   · Patient is expected to demonstrate progress in strength and range of motion to improve safety during daily activities. · Patient demonstrates good rehab potential due to higher previous functional level. · Skilled intervention continues to be required due to decreased mobility. Reason for Services/Other Comments:  · Patient continues to demonstrate capacity to improve overall mobility which will increase independence and increase safety.

## 2019-07-17 NOTE — PROGRESS NOTES
Jaci Lay  : 1945  Primary:   Secondary:  [address] at [place] [id number] [facility], [address] [place] [address],[address], [place] [address].  [phone]   [phone]         OUTPATIENT PHYSICAL THERAPY: Daily Treatment Note 2019  ICD-10: Treatment Diagnosis: Cervicalgia (M54.2)  Pre-treatment Symptoms/Complaints:  2019: Patient reports she is feeling stiff today. Pain: Initial: Pain Intensity 1: 4  Pain Location 1: Neck, Shoulder  Pain Orientation 1: Right, Left  Pain Intervention(s) 1: Rest, Medication (see MAR)  Post Session:  3/10   Medications Last Reviewed:  2019   Updated Objective Findings:  See Progress Note from today   TREATMENT:   MANUAL THERAPY: (40 minutes): Joint mobilization and Soft tissue mobilization was utilized and necessary because of the patient's restricted joint motion, painful spasm, loss of articular motion and restricted motion of soft tissue. Treatment/Session Summary:    · Response to Treatment:  Patient completed all activities with improving mobility noted overall after treatment. · Communication/Consultation:  None today  · Equipment provided today:  None today  · Recommendations/Intent for next treatment session: Next visit will focus on improving overall mobility with decreased pain. Treatment Plan of Care Effective Dates:  6/3/2019 TO 9/3/2019 (90 days).   Total Treatment Billable Duration:  40 minutes  PT Patient Time In/Time Out  Time In: 0845  Time Out: 6200 [facility], PT    Future Appointments   Date Time Provider Radha Bautista   2019  8:45 AM Gregoria Armendariz PT JOYCE MCKEON   2019  7:30 AM Gregoria Armendariz PT SFEORPT SFE   2019  1:40 PM Nohemy Bustamante MD END BS ENDO

## 2019-07-31 ENCOUNTER — HOSPITAL ENCOUNTER (OUTPATIENT)
Dept: PHYSICAL THERAPY | Age: 74
Discharge: HOME | End: 2019-07-31
Payer: MEDICARE

## 2019-07-31 PROCEDURE — 97140 MANUAL THERAPY 1/> REGIONS: CPT

## 2019-07-31 NOTE — PROGRESS NOTES
Santo Kimmy  : 1945  Primary:   Secondary:  [address]  at [place] [address],[address], [place] [address].  [phone]   [phone]         OUTPATIENT PHYSICAL THERAPY: Daily Treatment Note 2019  ICD-10: Treatment Diagnosis: Cervicalgia (M54.2)  Pre-treatment Symptoms/Complaints:  2019: Patient reports she is very tight in her shoulders. Pain: Initial: Pain Intensity 1: 4  Pain Location 1: Neck, Shoulder  Pain Orientation 1: Right, Left  Pain Intervention(s) 1: Rest, Medication (see MAR)  Post Session:  3/10   Medications Last Reviewed:  2019   Updated Objective Findings:  None Today   TREATMENT:   MANUAL THERAPY: (40 minutes): Joint mobilization and Soft tissue mobilization was utilized and necessary because of the patient's restricted joint motion, painful spasm, loss of articular motion and restricted motion of soft tissue. Treatment/Session Summary:    · Response to Treatment:  Patient completed all activities with improving mobility noted overall after treatment. · Communication/Consultation:  None today  · Equipment provided today:  None today  · Recommendations/Intent for next treatment session: Next visit will focus on improving overall mobility with decreased pain. Treatment Plan of Care Effective Dates:  6/3/2019 TO 9/3/2019 (90 days).   Total Treatment Billable Duration:  40 minutes  PT Patient Time In/Time Out  Time In: 0730  Time Out: 0800  Francisco Meckel, PT    Future Appointments   Date Time Provider Radha Bautista   2019  7:30 AM Tate Perera PT [facility] SFE   2019  8:45 AM BOLA SanchezORPT SFE   2019  1:40 PM Eleuterio Hughes MD END BS ENDO

## 2019-08-14 ENCOUNTER — HOSPITAL ENCOUNTER (OUTPATIENT)
Dept: PHYSICAL THERAPY | Age: 74
Discharge: HOME | End: 2019-08-14
Payer: MEDICARE

## 2019-08-14 PROCEDURE — 97140 MANUAL THERAPY 1/> REGIONS: CPT

## 2019-08-14 NOTE — PROGRESS NOTES
Kendrick Velez  : 1945  Primary:   Secondary:  [address]  [facility] [address] [place] [address],[address], [address] [facility]  [phone]   [phone]         OUTPATIENT PHYSICAL THERAPY: Daily Treatment Note 2019  ICD-10: Treatment Diagnosis: Cervicalgia (M54.2)  Pre-treatment Symptoms/Complaints:  2019: Patient reports her shoulders just do not feel like they are in place. Pain: Initial: Pain Intensity 1: 4  Pain Location 1: Neck, Shoulder  Pain Orientation 1: Right, Left  Pain Intervention(s) 1: Rest, Medication (see MAR)  Post Session:  3/10   Medications Last Reviewed:  2019   Updated Objective Findings:  None Today   TREATMENT:   MANUAL THERAPY: (40 minutes): Joint mobilization and Soft tissue mobilization was utilized and necessary because of the patient's restricted joint motion, painful spasm, loss of articular motion and restricted motion of soft tissue. Treatment/Session Summary:    · Response to Treatment:  Patient completed all activities with improving mobility noted overall after treatment. · Communication/Consultation:  None today  · Equipment provided today:  None today  · Recommendations/Intent for next treatment session: Next visit will focus on improving overall mobility with decreased pain. Treatment Plan of Care Effective Dates:  6/3/2019 TO 9/3/2019 (90 days).   Total Treatment Billable Duration:  40 minutes  PT Patient Time In/Time Out  Time In: 0845  Time Out: 6200 [facility], PT    Future Appointments   Date Time Provider Radha Bautista   2019  8:45 AM Merril Light, PT [facility] SFE   2019  8:45 AM Merril Light, PT SFEORP SFE   2019  1:40 PM Mendel Collie, MD END BS ENDO

## 2019-08-28 ENCOUNTER — HOSPITAL ENCOUNTER (OUTPATIENT)
Dept: PHYSICAL THERAPY | Age: 74
Discharge: HOME | End: 2019-08-28
Payer: MEDICARE

## 2019-08-28 NOTE — PROGRESS NOTES
Therapy Center at [address], [facility] [address]  Phone: [phone]   Fax: [phone]    OUTPATIENT DAILY NOTE    NAME/AGE/GENDER: Rowena Valdovinos is a 69 y.o. female. DATE: 8/28/2019    Patient cancelled (less than 24 hours ago) her appointment for today due to illness. Will plan to follow up on next scheduled visit.     Simi Boyer, PT

## 2019-09-11 ENCOUNTER — HOSPITAL ENCOUNTER (OUTPATIENT)
Dept: PHYSICAL THERAPY | Age: 74
Discharge: HOME | End: 2019-09-11
Payer: MEDICARE

## 2019-09-11 PROCEDURE — 97140 MANUAL THERAPY 1/> REGIONS: CPT

## 2019-09-11 NOTE — PROGRESS NOTES
Matthew Cuello  : 1945  Primary:   Secondary:  [address] at [facility]  [address] [facility],[address], [place] [address].  [phone]   [phone]         OUTPATIENT PHYSICAL THERAPY: Daily Treatment Note 2019  ICD-10: Treatment Diagnosis: Cervicalgia (M54.2)  Pre-treatment Symptoms/Complaints:  2019: Patient reports her shoulders are very tight and stiff. 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 Pain: Initial: Pain Intensity 1: 4  Pain Location 1: Neck, Shoulder  Pain Orientation 1: Right, Left  Pain Intervention(s) 1: Rest, Medication (see MAR)  Post Session:  3/10   Medications Last Reviewed:  2019   Updated Objective Findings:  None Today   TREATMENT:   MANUAL THERAPY: (40 minutes): Joint mobilization and Soft tissue mobilization was utilized and necessary because of the patient's restricted joint motion, painful spasm, loss of articular motion and restricted motion of soft tissue. Treatment/Session Summary:    · Response to Treatment:  Patient completed all activities with improving mobility noted overall after treatment. · Communication/Consultation:  None today  · Equipment provided today:  None today  · Recommendations/Intent for next treatment session: Next visit will focus on improving overall mobility with decreased pain. Treatment Plan of Care Effective Dates:  9/3/2019 TO 12/10/2019 (90 days).   Total Treatment Billable Duration:  40 minutes  PT Patient Time In/Time Out  Time In: 0845  Time Out: 6200 [facility],     Future Appointments   Date Time Provider Radha Bautista   2019  8:45 AM Divine Hanson PT DEVYNEKERRY MCKEON   2019  8:45 AM Divine Hanson PT SFEORPT SFE   2019  1:40 PM MD NELLY Bejarano BS ENDO

## 2019-09-11 NOTE — THERAPY RECERTIFICATION
Kevin Chang  : 1945  [place]  Secondary: Adrián Christianson at [place] [id number] [facility], [address] [place] [address],[address], [facility] [address]  [phone]   [phone]          OUTPATIENT PHYSICAL [address]    ICD-10: Treatment Diagnosis: Cervicalgia (M54.2)  Precautions/Allergies:   Adhesive; Alendronate sodium; Antihistamine [triprolidine-pseudoephedrine]; and Boniva [ibandronate]   MD Orders: Evaluate and Treat MEDICAL/REFERRING DIAGNOSIS:  Cervicalgia [M54.2]   DATE OF ONSET: Chronic  REFERRING PHYSICIAN:  Baldo Cano NP   RETURN PHYSICIAN APPOINTMENT: TBKASSY     ASSESSMENT:  Ms. Jet Rasheed presents with improving mobility and pain in cervical region secondary to degenerative changes. Patient has attended a total of 13 scheduled physical therapy visits including initial evaluation on 3/6/2019. Treatment has consisted of stretching, strengthening, and manual therapy to improve overall pain and performance with activities of daily living. PROBLEM LIST (Impacting functional limitations):  1. Decreased Strength  2. Decreased ADL/Functional Activities  3. Increased Pain  4. Decreased Activity Tolerance INTERVENTIONS PLANNED: (Treatment may consist of any combination of the following)  1. Cold  2. Heat  3. Home Exercise Program (HEP)  4. Manual Therapy  5. Neuromuscular Re-education/Strengthening  6. Range of Motion (ROM)  7. Therapeutic Activites  8. Therapeutic Exercise/Strengthening   TREATMENT PLAN:  Effective Dates: 9/3/2019 TO 12/10/2019 (90 days). Frequency/Duration: 2 times a week for 90 Day(s)  GOALS: (Goals have been discussed and agreed upon with patient.)  Short-Term Functional Goals: Time Frame: 30 days  1. Patient will be independent with home exercise program without exacerbation of symptoms or cueing needed--goal met.    2. Patient will be independent with correct sleeping positions and awareness/avoidance of aggravating positions without cueing needed--goal met. Discharge Goals: Time Frame: 90 days  1. Patient will be independent with all ADLs with minimal onset of neck pain and no deficits with daily tasks--goal ongoing. 2. Patient will report no fear avoidance with social or recreational activities due to neck pain --goal ongoing. 3. Patient will score less than or equal to 7/50 on Neck Disability Index with minimal effect of neck pain on patient's ability to manage every day life activities--goal ongoing. Rehabilitation Potential For Stated Goals: Good   Regarding Paola Beach's therapy, I certify that the treatment plan above will be carried out by a therapist or under their direction. Thank you for this referral,  Simi Boyer, PT     Referring Physician Signature: Mike Jauregui NP _______________________________ Date _____________               HISTORY:   History of Present Injury/Illness (Reason for Referral):  Patient is well known to PT from previous treatment session. Patient reports she still has pain and stiffness in her neck and shoulder (right greater than left). She reports she is unable to sleep on her right side secondary to the pain in her neck and shoulder. She reports that when she is at work, she has neck pain and stiffness that are very painful at the end of the day. 4/3/2019 (Progress Note): Patient reports she is able to do more with less pain in her neck and shoulders. She reports she is still having tightness in her shoulders with sleeping and some of her work duties. 5/8/2019 (Progress Note): Patient reports she is doing better overall, but still having some pain in her neck and shoulders if she does too much. 7/4/7395 (Re-certification): Patient reports she has been having problems with her colon and so she hasn't been thinking about her shoulders and neck much.   She reports she does feel some improvement, but is still having trouble. 7/17/2019 (Progress Note): Patient reports she is feeling better overall with minimal stiffness in her neck, but pain still in her shoulder. 3/39/5865 (Recertification): Patient reports she is still having pain in her shoulders. 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 Past Medical History/Comorbidities:   Ms. Betzaida Smith  has a past medical history of Abnormal Pap smear, Anxiety, COPD (chronic obstructive pulmonary disease) ([address] Depression, Hyperthyroidism, Insomnia, Irritable bowel syndrome, and Osteopenia. She also has no past medical history of Difficult intubation, Malignant hyperthermia due to anesthesia, Nausea & vomiting, Pseudocholinesterase deficiency, or Unspecified adverse effect of anesthesia. Ms. Betzaida Smith  has a past surgical history that includes hx tonsillectomy; hx orthopaedic; hx salpingo-oophorectomy (Right, 01/22/2014); and hx colonoscopy. Social History/Living Environment:   Home Environment: Private residence  Living Alone: Yes  Support Systems: Family member(s), Friends \ neighbors  Prior Level of Function/Work/Activity:  Independent  Dominant Side:         RIGHT  Personal Factors:          Sex:  female        Age:  69 y.o. Current Medications:       Current Outpatient Medications:     alendronate (FOSAMAX) 70 mg tablet, Take 70 mg by mouth every seven (7) days. , Disp: , Rfl:     b complex vitamins tablet, Take 1 Tab by mouth every morning. Holding for surgery, Disp: , Rfl:     temazepam (RESTORIL) 15 mg capsule, Take  by mouth nightly as needed for Sleep., Disp: , Rfl:     omega-3 fatty acids-vitamin e (FISH OIL) 1,000 mg cap, Take 1 Cap by mouth every morning. Holding for surgery, Disp: , Rfl:     DULoxetine (CYMBALTA) 30 mg capsule, Take 30 mg by mouth every morning., Disp: , Rfl:     ascorbic acid (VITAMIN C) 500 mg tablet, Take 500 mg by mouth every morning.  Holding for surgery, Disp: , Rfl:     multivitamin (ONE A DAY) tablet, Take 1 Tab by mouth every morning. Holding for surgery, Disp: , Rfl:    Date Last Reviewed:  2019    EXAMINATION:   Observation/Orthostatic Postural Assessment:          Posture Assessment: Rounded shoulders, Forward head   Palpation:          Tightness and tenderness to palpation noted throughout upper and lower cervical musculature. No bruising or swelling noted. ROM:          Cervical Assessment (AROM):  · Flexion: 75% of full AROM  · Extension: 75% of full AROM  · Right side bendin% of full AROM  · Left side bending[de-identified] 75% of full AROM  · Right rotation: 75% of full AROM  · Left rotation: 75% of full AROM  Strength:          Cervical Assessment (Strength):  · Grossly 2+/5 with manual muscle testing  Special Tests:          Negative Hautant's test. Negative Cranial-[place] lift test. Negative [place]-Axis Sheer test. Negative Alar Ligament test. Negative Tectorial Membrane test.   Neurological Screen:        Dermatomes: Within normal limits        Reflexes:  2+  Functional Mobility:         Gait/Mobility:    ·   Independent         Transfers:     · Sit to Stand: Independent  · Stand to Sit: Independent  · Stand Pivot Transfers: Independent  · Bed to Chair: Independent  · Lateral Transfers: Independent         Bed Mobility:     · Rolling: Independent  · Supine to Sit: Independent  · Sit to Supine: Independent  · Scooting: Independent        CLINICAL DECISION MAKING:   Outcome Measure: Tool Used: Neck Disability Index (NDI)  Score:  Initial:   Most Recent:  (Date: 2019 )   Interpretation of Score: The Neck Disability Index is a revised form of the Oswestry Low Back Pain Index and is designed to measure the activities of daily living in person's with neck pain. Each section is scored on a 0-5 scale, 5 representing the greatest disability. The scores of each section are added together for a total score of 50.      Medical Necessity:   · Patient is expected to demonstrate progress in strength and range of motion to improve safety during daily activities. · Patient demonstrates good rehab potential due to higher previous functional level. · Skilled intervention continues to be required due to decreased mobility. Reason for Services/Other Comments:  · Patient continues to demonstrate capacity to improve overall mobility which will increase independence and increase safety.

## 2019-09-25 ENCOUNTER — HOSPITAL ENCOUNTER (OUTPATIENT)
Dept: PHYSICAL THERAPY | Age: 74
Discharge: HOME | End: 2019-09-25
Payer: MEDICARE

## 2019-09-25 PROCEDURE — 97140 MANUAL THERAPY 1/> REGIONS: CPT

## 2019-09-25 NOTE — PROGRESS NOTES
Baldemar Rosenberg  : 1945  Primary:   Secondary:  [address] at [place] [id number] [facility], [address] [place] [address] [address], [address] [facility]  [phone]   [phone]         OUTPATIENT PHYSICAL THERAPY: Daily Treatment Note 2019  ICD-10: Treatment Diagnosis: Cervicalgia (M54.2)  Pre-treatment Symptoms/Complaints:  2019: Patient reports she is hurting in her right shoulder/arm after doing work in her house last week. Pain: Initial: Pain Intensity 1: 4  Pain Location 1: Neck, Shoulder  Pain Orientation 1: Right, Left  Pain Intervention(s) 1: Rest, Medication (see MAR)  Post Session:  3/10   Medications Last Reviewed:  2019   Updated Objective Findings:  None Today   TREATMENT:   MANUAL THERAPY: (40 minutes): Joint mobilization and Soft tissue mobilization was utilized and necessary because of the patient's restricted joint motion, painful spasm, loss of articular motion and restricted motion of soft tissue. Treatment/Session Summary:    · Response to Treatment:  Patient completed all activities with improving mobility noted after treatment. · Communication/Consultation:  None today  · Equipment provided today:  None today  · Recommendations/Intent for next treatment session: Next visit will focus on improving overall mobility with decreased pain. Treatment Plan of Care Effective Dates:  9/3/2019 TO 12/10/2019 (90 days).   Total Treatment Billable Duration:  40 minutes  PT Patient Time In/Time Out  Time In: 0845  Time Out: [address], PT    Future Appointments   Date Time Provider Radha Bautista   2019  8:45 AM Tyrone Burr PT [facility] SFE   2019  1:40 PM MD NELLY [place] BS ENDO

## 2019-10-09 ENCOUNTER — HOSPITAL ENCOUNTER (OUTPATIENT)
Dept: PHYSICAL THERAPY | Age: 74
Discharge: HOME | End: 2019-10-09
Payer: MEDICARE

## 2019-10-09 PROCEDURE — 97140 MANUAL THERAPY 1/> REGIONS: CPT

## 2019-10-09 NOTE — PROGRESS NOTES
Bree Leyva  : 1945  [place]  Secondary: Adrián Christianson at [address] [place] [address], [address], [place] [address].  [phone]   [phone]          OUTPATIENT PHYSICAL THERAPY:Progress Report 10/9/2019   ICD-10: Treatment Diagnosis: Cervicalgia (M54.2)  Precautions/Allergies:   Adhesive; Alendronate sodium; Antihistamine [triprolidine-pseudoephedrine]; and Boniva [ibandronate]   MD Orders: Evaluate and Treat MEDICAL/REFERRING DIAGNOSIS:  Cervicalgia [M54.2]   DATE OF ONSET: Chronic  REFERRING PHYSICIAN:  Shannon Bond NP   RETURN PHYSICIAN APPOINTMENT: TBD     ASSESSMENT:  Ms. Wilfredo Varela presents with improving mobility and pain in cervical region secondary to degenerative changes. Patient has attended a total of 15 scheduled physical therapy visits including initial evaluation on 3/6/2019. Treatment has consisted of stretching, strengthening, and manual therapy to improve overall pain and performance with activities of daily living. PROBLEM LIST (Impacting functional limitations):  1. Decreased Strength  2. Decreased ADL/Functional Activities  3. Increased Pain  4. Decreased Activity Tolerance INTERVENTIONS PLANNED: (Treatment may consist of any combination of the following)  1. Cold  2. Heat  3. Home Exercise Program (HEP)  4. Manual Therapy  5. Neuromuscular Re-education/Strengthening  6. Range of Motion (ROM)  7. Therapeutic Activites  8. Therapeutic Exercise/Strengthening   TREATMENT PLAN:  Effective Dates: 9/3/2019 TO 12/10/2019 (90 days). Frequency/Duration: 2 times a week for 90 Day(s)  GOALS: (Goals have been discussed and agreed upon with patient.)  Short-Term Functional Goals: Time Frame: 30 days  1. Patient will be independent with home exercise program without exacerbation of symptoms or cueing needed--goal met.    2. Patient will be independent with correct sleeping positions and awareness/avoidance of aggravating positions without cueing needed--goal met. Discharge Goals: Time Frame: 90 days  1. Patient will be independent with all ADLs with minimal onset of neck pain and no deficits with daily tasks--goal ongoing. 2. Patient will report no fear avoidance with social or recreational activities due to neck pain --goal ongoing. 3. Patient will score less than or equal to 7/50 on Neck Disability Index with minimal effect of neck pain on patient's ability to manage every day life activities--goal ongoing. Rehabilitation Potential For Stated Goals: Good               HISTORY:   History of Present Injury/Illness (Reason for Referral):  Patient is well known to PT from previous treatment session. Patient reports she still has pain and stiffness in her neck and shoulder (right greater than left). She reports she is unable to sleep on her right side secondary to the pain in her neck and shoulder. She reports that when she is at work, she has neck pain and stiffness that are very painful at the end of the day. 4/3/2019 (Progress Note): Patient reports she is able to do more with less pain in her neck and shoulders. She reports she is still having tightness in her shoulders with sleeping and some of her work duties. 5/8/2019 (Progress Note): Patient reports she is doing better overall, but still having some pain in her neck and shoulders if she does too much. 1/5/2894 (Re-certification): Patient reports she has been having problems with her colon and so she hasn't been thinking about her shoulders and neck much. She reports she does feel some improvement, but is still having trouble. 7/17/2019 (Progress Note): Patient reports she is feeling better overall with minimal stiffness in her neck, but pain still in her shoulder. 6/31/8969 (Recertification): Patient reports she is still having pain in her shoulders.   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    10/9/2019 (Progress Note): Patient reports she is feeling better overall and feels therapy is still beneficial.  Past Medical History/Comorbidities:   Ms. Mic Stephens  has a past medical history of Abnormal Pap smear, Anxiety, COPD (chronic obstructive pulmonary disease) ([address] Depression, Hyperthyroidism, Insomnia, Irritable bowel syndrome, and Osteopenia. She also has no past medical history of Difficult intubation, Malignant hyperthermia due to anesthesia, Nausea & vomiting, Pseudocholinesterase deficiency, or Unspecified adverse effect of anesthesia. Ms. Mic Stephens  has a past surgical history that includes hx tonsillectomy; hx orthopaedic; hx salpingo-oophorectomy (Right, 01/22/2014); and hx colonoscopy. Social History/Living Environment:   Home Environment: Private residence  Living Alone: Yes  Support Systems: Family member(s), Friends \ neighbors  Prior Level of Function/Work/Activity:  Independent  Dominant Side:         RIGHT  Personal Factors:          Sex:  female        Age:  69 y.o. Current Medications:       Current Outpatient Medications:     alendronate (FOSAMAX) 70 mg tablet, Take 70 mg by mouth every seven (7) days. , Disp: , Rfl:     b complex vitamins tablet, Take 1 Tab by mouth every morning. Holding for surgery, Disp: , Rfl:     temazepam (RESTORIL) 15 mg capsule, Take  by mouth nightly as needed for Sleep., Disp: , Rfl:     omega-3 fatty acids-vitamin e (FISH OIL) 1,000 mg cap, Take 1 Cap by mouth every morning. Holding for surgery, Disp: , Rfl:     DULoxetine (CYMBALTA) 30 mg capsule, Take 30 mg by mouth every morning., Disp: , Rfl:     ascorbic acid (VITAMIN C) 500 mg tablet, Take 500 mg by mouth every morning. Holding for surgery, Disp: , Rfl:     multivitamin (ONE A DAY) tablet, Take 1 Tab by mouth every morning.  Holding for surgery, Disp: , Rfl:    Date Last Reviewed:  10/9/2019    EXAMINATION:   Observation/Orthostatic Postural Assessment:          Posture Assessment: Rounded shoulders, Forward head   Palpation:          Tightness and tenderness to palpation noted throughout upper and lower cervical musculature. No bruising or swelling noted. ROM:          Cervical Assessment (AROM):  · Flexion: 75% of full AROM  · Extension: 75% of full AROM  · Right side bendin% of full AROM  · Left side bending[de-identified] 75% of full AROM  · Right rotation: 75% of full AROM  · Left rotation: 75% of full AROM  Strength:          Cervical Assessment (Strength):  · Grossly 2+/5 with manual muscle testing  Special Tests:          Negative Hautant's test. Negative Cranial-[place] lift test. Negative [place]-Axis Sheer test. Negative Alar Ligament test. Negative Tectorial Membrane test.   Neurological Screen:        Dermatomes: Within normal limits        Reflexes:  2+  Functional Mobility:         Gait/Mobility:    ·   Independent         Transfers:     · Sit to Stand: Independent  · Stand to Sit: Independent  · Stand Pivot Transfers: Independent  · Bed to Chair: Independent  · Lateral Transfers: Independent         Bed Mobility:     · Rolling: Independent  · Supine to Sit: Independent  · Sit to Supine: Independent  · Scooting: Independent        CLINICAL DECISION MAKING:   Outcome Measure: Tool Used: Neck Disability Index (NDI)  Score:  Initial:   Most Recent:  (Date: 10/9/2019 )   Interpretation of Score: The Neck Disability Index is a revised form of the Oswestry Low Back Pain Index and is designed to measure the activities of daily living in person's with neck pain. Each section is scored on a 0-5 scale, 5 representing the greatest disability. The scores of each section are added together for a total score of 50. Medical Necessity:   · Patient is expected to demonstrate progress in strength and range of motion to improve safety during daily activities. · Patient demonstrates good rehab potential due to higher previous functional level.   · Skilled intervention continues to be required due to decreased mobility. Reason for Services/Other Comments:  · Patient continues to demonstrate capacity to improve overall mobility which will increase independence and increase safety.

## 2019-10-09 NOTE — PROGRESS NOTES
Luana Chapman  : 1945  Primary:   Secondary:  Lilo Pike at [place] [id number] [facility], [address] [place] [address],[address], [place] [address].  [phone]   [phone]         OUTPATIENT PHYSICAL THERAPY: Daily Treatment Note 10/9/2019  ICD-10: Treatment Diagnosis: Cervicalgia (M54.2)  Pre-treatment Symptoms/Complaints:  10/9/2019: Patient reports her arm is feeling better than last time. Pain: Initial: Pain Intensity 1: 4  Pain Location 1: Neck, Shoulder  Pain Orientation 1: Right, Left  Pain Intervention(s) 1: Rest, Medication (see MAR)  Post Session:  3/10   Medications Last Reviewed:  10/9/2019   Updated Objective Findings:  None Today   TREATMENT:   MANUAL THERAPY: (40 minutes): Joint mobilization and Soft tissue mobilization was utilized and necessary because of the patient's restricted joint motion, painful spasm, loss of articular motion and restricted motion of soft tissue. Treatment/Session Summary:    · Response to Treatment:  Patient completed all activities with improving overall mobility noted after treatment. · Communication/Consultation:  None today  · Equipment provided today:  None today  · Recommendations/Intent for next treatment session: Next visit will focus on improving overall mobility with decreased pain. Treatment Plan of Care Effective Dates:  9/3/2019 TO 12/10/2019 (90 days).   Total Treatment Billable Duration:  40 minutes  PT Patient Time In/Time Out  Time In: 0845  Time Out: 6[facility], PT    Future Appointments   Date Time Provider Radha Bautista   10/9/2019  8:45 AM [place] Gil PT [facility] SFE   10/23/2019  8:45 AM [place] [facility]   2019  1:40 PM Jet Snider MD END BS ENDO

## 2019-10-23 ENCOUNTER — HOSPITAL ENCOUNTER (OUTPATIENT)
Dept: PHYSICAL THERAPY | Age: 74
Discharge: HOME | End: 2019-10-23
Payer: MEDICARE

## 2019-10-23 PROCEDURE — 97140 MANUAL THERAPY 1/> REGIONS: CPT

## 2019-10-23 NOTE — PROGRESS NOTES
Virgen Garnett  : 1945  Primary:   Secondary:  [address] at [facility]  [address] [facility], [address] [place] [address],[address], [facility].  [phone]   [phone]         OUTPATIENT PHYSICAL THERAPY: Daily Treatment Note 10/23/2019  ICD-10: Treatment Diagnosis: Cervicalgia (M54.2)  Pre-treatment Symptoms/Complaints:  10/23/2019: Patient reports her shoulder has been more painful lately. Pain: Initial: Pain Intensity 1: 4  Pain Location 1: Neck, Shoulder  Pain Orientation 1: Right, Left  Pain Intervention(s) 1: Rest, Medication (see MAR)  Post Session:  3/10   Medications Last Reviewed:  10/23/2019   Updated Objective Findings:  None Today   TREATMENT:   MANUAL THERAPY: (40 minutes): Joint mobilization and Soft tissue mobilization was utilized and necessary because of the patient's restricted joint motion, painful spasm, loss of articular motion and restricted motion of soft tissue. Treatment/Session Summary:    · Response to Treatment:  Patient completed all activities with improving overall mobility noted after treatment. · Communication/Consultation:  None today  · Equipment provided today:  None today  · Recommendations/Intent for next treatment session: Next visit will focus on improving overall mobility with decreased pain. Treatment Plan of Care Effective Dates:  9/3/2019 TO 12/10/2019 (90 days).   Total Treatment Billable Duration:  40 minutes  PT Patient Time In/Time Out  Time In: 0845  Time Out: 6[address] [facility] [place], PT    Future Appointments   Date Time Provider Radha Bautista   10/23/2019  8:45 AM Amanda Do PT [facility] SFE   2019  8:45 AM [facility]   2019  1:40 PM Vasu Aguilar MD END BS ENDO

## 2019-11-06 ENCOUNTER — HOSPITAL ENCOUNTER (OUTPATIENT)
Dept: PHYSICAL THERAPY | Age: 74
Discharge: HOME | End: 2019-11-06
Payer: MEDICARE

## 2019-11-06 PROCEDURE — 97140 MANUAL THERAPY 1/> REGIONS: CPT

## 2019-11-06 NOTE — PROGRESS NOTES
Krystin Deshpande  : 1945  [place]  Secondary: Adrián Christianson at [place] [id number] [facility], [address] [place] [address],[address], [facility] [address].  [phone]   [phone]          OUTPATIENT PHYSICAL THERAPY:Progress Report 2019   ICD-10: Treatment Diagnosis: Cervicalgia (M54.2)  Precautions/Allergies:   Adhesive; Alendronate sodium; Antihistamine [triprolidine-pseudoephedrine]; and Boniva [ibandronate]   MD Orders: Evaluate and Treat MEDICAL/REFERRING DIAGNOSIS:  Cervicalgia [M54.2]   DATE OF ONSET: Chronic  REFERRING PHYSICIAN:  Last Patterson NP   RETURN PHYSICIAN APPOINTMENT: TBD     ASSESSMENT:  Ms. Shayne Mayes presents with improving mobility and pain in cervical region secondary to degenerative changes. Patient has attended a total of 17 scheduled physical therapy visits including initial evaluation on 3/6/2019. Treatment has consisted of stretching, strengthening, and manual therapy to improve overall pain and performance with activities of daily living. PROBLEM LIST (Impacting functional limitations):  1. Decreased Strength  2. Decreased ADL/Functional Activities  3. Increased Pain  4. Decreased Activity Tolerance INTERVENTIONS PLANNED: (Treatment may consist of any combination of the following)  1. Cold  2. Heat  3. Home Exercise Program (HEP)  4. Manual Therapy  5. Neuromuscular Re-education/Strengthening  6. Range of Motion (ROM)  7. Therapeutic Activites  8. Therapeutic Exercise/Strengthening   TREATMENT PLAN:  Effective Dates: 9/3/2019 TO 12/10/2019 (90 days). Frequency/Duration: 2 times a week for 90 Day(s)  GOALS: (Goals have been discussed and agreed upon with patient.)  Short-Term Functional Goals: Time Frame: 30 days  1. Patient will be independent with home exercise program without exacerbation of symptoms or cueing needed--goal met.    2. Patient will be independent with correct sleeping positions and awareness/avoidance of aggravating positions without cueing needed--goal met. Discharge Goals: Time Frame: 90 days  1. Patient will be independent with all ADLs with minimal onset of neck pain and no deficits with daily tasks--goal ongoing. 2. Patient will report no fear avoidance with social or recreational activities due to neck pain --goal ongoing. 3. Patient will score less than or equal to 7/50 on Neck Disability Index with minimal effect of neck pain on patient's ability to manage every day life activities--goal ongoing. Rehabilitation Potential For Stated Goals: Good               HISTORY:   History of Present Injury/Illness (Reason for Referral):  Patient is well known to PT from previous treatment session. Patient reports she still has pain and stiffness in her neck and shoulder (right greater than left). She reports she is unable to sleep on her right side secondary to the pain in her neck and shoulder. She reports that when she is at work, she has neck pain and stiffness that are very painful at the end of the day. 4/3/2019 (Progress Note): Patient reports she is able to do more with less pain in her neck and shoulders. She reports she is still having tightness in her shoulders with sleeping and some of her work duties. 5/8/2019 (Progress Note): Patient reports she is doing better overall, but still having some pain in her neck and shoulders if she does too much. 2/2/6649 (Re-certification): Patient reports she has been having problems with her colon and so she hasn't been thinking about her shoulders and neck much. She reports she does feel some improvement, but is still having trouble. 7/17/2019 (Progress Note): Patient reports she is feeling better overall with minimal stiffness in her neck, but pain still in her shoulder. 4/13/6715 (Recertification): Patient reports she is still having pain in her shoulders.   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    10/9/2019 (Progress Note): Patient reports she is feeling better overall and feels therapy is still beneficial.    11/6/2019 (Progress Note): Patient reports she is feeling better overall, but the change in weather has really caused her neck and shoulder to be painful and tender. Past Medical History/Comorbidities:   Ms. Mario Macias  has a past medical history of Abnormal Pap smear, Anxiety, COPD (chronic obstructive pulmonary disease) ([address] Depression, Hyperthyroidism, Insomnia, Irritable bowel syndrome, and Osteopenia. She also has no past medical history of Difficult intubation, Malignant hyperthermia due to anesthesia, Nausea & vomiting, Pseudocholinesterase deficiency, or Unspecified adverse effect of anesthesia. Ms. Mario Macias  has a past surgical history that includes hx tonsillectomy; hx orthopaedic; hx salpingo-oophorectomy (Right, 01/22/2014); and hx colonoscopy. Social History/Living Environment:   Home Environment: Private residence  Living Alone: Yes  Support Systems: Family member(s), Friends \ neighbors  Prior Level of Function/Work/Activity:  Independent  Dominant Side:         RIGHT  Personal Factors:          Sex:  female        Age:  69 y.o. Current Medications:       Current Outpatient Medications:     alendronate (FOSAMAX) 70 mg tablet, Take 70 mg by mouth every seven (7) days. , Disp: , Rfl:     b complex vitamins tablet, Take 1 Tab by mouth every morning. Holding for surgery, Disp: , Rfl:     temazepam (RESTORIL) 15 mg capsule, Take  by mouth nightly as needed for Sleep., Disp: , Rfl:     omega-3 fatty acids-vitamin e (FISH OIL) 1,000 mg cap, Take 1 Cap by mouth every morning. Holding for surgery, Disp: , Rfl:     DULoxetine (CYMBALTA) 30 mg capsule, Take 30 mg by mouth every morning., Disp: , Rfl:     ascorbic acid (VITAMIN C) 500 mg tablet, Take 500 mg by mouth every morning.  Holding for surgery, Disp: , Rfl:     multivitamin (ONE A DAY) tablet, Take 1 Tab by mouth every morning. Holding for surgery, Disp: , Rfl:    Date Last Reviewed:  2019    EXAMINATION:   Observation/Orthostatic Postural Assessment:          Posture Assessment: Rounded shoulders, Forward head   Palpation:          Tightness and tenderness to palpation noted throughout upper and lower cervical musculature. No bruising or swelling noted. ROM:          Cervical Assessment (AROM):  · Flexion: 75% of full AROM  · Extension: 75% of full AROM  · Right side bendin% of full AROM  · Left side bending[de-identified] 75% of full AROM  · Right rotation: 75% of full AROM  · Left rotation: 75% of full AROM  Strength:          Cervical Assessment (Strength):  · Grossly 2+/5 with manual muscle testing  Special Tests:          Negative Hautant's test. Negative Cranial-[place] lift test. Negative [place]-Axis Sheer test. Negative Alar Ligament test. Negative Tectorial Membrane test.   Neurological Screen:        Dermatomes: Within normal limits        Reflexes:  2+  Functional Mobility:         Gait/Mobility:    ·   Independent         Transfers:     · Sit to Stand: Independent  · Stand to Sit: Independent  · Stand Pivot Transfers: Independent  · Bed to Chair: Independent  · Lateral Transfers: Independent         Bed Mobility:     · Rolling: Independent  · Supine to Sit: Independent  · Sit to Supine: Independent  · Scooting: Independent        CLINICAL DECISION MAKING:   Outcome Measure: Tool Used: Neck Disability Index (NDI)  Score:  Initial:   Most Recent:  (Date: 2019 )   Interpretation of Score: The Neck Disability Index is a revised form of the Oswestry Low Back Pain Index and is designed to measure the activities of daily living in person's with neck pain. Each section is scored on a 0-5 scale, 5 representing the greatest disability. The scores of each section are added together for a total score of 50.      Medical Necessity:   · Patient is expected to demonstrate progress in strength and range of motion to improve safety during daily activities. · Patient demonstrates good rehab potential due to higher previous functional level. · Skilled intervention continues to be required due to decreased mobility. Reason for Services/Other Comments:  · Patient continues to demonstrate capacity to improve overall mobility which will increase independence and increase safety.

## 2019-11-06 NOTE — PROGRESS NOTES
Tari Res  : 1945  Primary:   Secondary:  [address]  at [place] [address],[address] [facility], [place] [address].  [phone]   [phone]         OUTPATIENT PHYSICAL THERAPY: Daily Treatment Note 2019  ICD-10: Treatment Diagnosis: Cervicalgia (M54.2)  Pre-treatment Symptoms/Complaints:  2019: Patient reports her right shoulder and neck are very tight and painful today. Pain: Initial: Pain Intensity 1: 4  Pain Location 1: Neck, Shoulder  Pain Orientation 1: Right, Left  Pain Intervention(s) 1: Rest, Medication (see MAR)  Post Session:  3/10   Medications Last Reviewed:  2019   Updated Objective Findings:  None Today   TREATMENT:   MANUAL THERAPY: (40 minutes): Joint mobilization and Soft tissue mobilization was utilized and necessary because of the patient's restricted joint motion, painful spasm, loss of articular motion and restricted motion of soft tissue. Treatment/Session Summary:    · Response to Treatment:  Patient completed all activities with improving overall mobility noted after treatment. · Communication/Consultation:  None today  · Equipment provided today:  None today  · Recommendations/Intent for next treatment session: Next visit will focus on improving overall mobility with decreased pain. Treatment Plan of Care Effective Dates:  9/3/2019 TO 12/10/2019 (90 days).   Total Treatment Billable Duration:  40 minutes  PT Patient Time In/Time Out  Time In: 0845  Time Out: 6[address] [facility], PT    Future Appointments   Date Time Provider Radha Bautista   2019  8:45 AM [facility]   2019  8:45 AM Jazz Mei PT [facility] E   2019  1:40 PM Bozena Talley MD END BS ENDO

## 2019-11-13 ENCOUNTER — HOSPITAL ENCOUNTER (OUTPATIENT)
Dept: PHYSICAL THERAPY | Age: 74
Discharge: HOME | End: 2019-11-13
Payer: MEDICARE

## 2019-11-13 PROCEDURE — 97140 MANUAL THERAPY 1/> REGIONS: CPT

## 2019-11-13 NOTE — PROGRESS NOTES
Colleen Bhatti  : 1945  Primary:   Secondary:  [address] at [facility]  27[address] [facility], [address] [place] [address],[address], [facility] [address].  [phone]   [phone]         OUTPATIENT PHYSICAL THERAPY: Daily Treatment Note 2019  ICD-10: Treatment Diagnosis: Cervicalgia (M54.2)  Pre-treatment Symptoms/Complaints:  2019: Patient reports she felt really good after her last session. Pain: Initial: Pain Intensity 1: 4  Pain Location 1: Neck, Shoulder  Pain Orientation 1: Right, Left  Pain Intervention(s) 1: Rest, Medication (see MAR)  Post Session:  3/10   Medications Last Reviewed:  2019   Updated Objective Findings:  None Today   TREATMENT:   MANUAL THERAPY: (40 minutes): Joint mobilization and Soft tissue mobilization was utilized and necessary because of the patient's restricted joint motion, painful spasm, loss of articular motion and restricted motion of soft tissue. Treatment/Session Summary:    · Response to Treatment:  Patient completed all activities with improving overall mobility noted after treatment. · Communication/Consultation:  None today  · Equipment provided today:  None today  · Recommendations/Intent for next treatment session: Next visit will focus on improving overall mobility with decreased pain. Treatment Plan of Care Effective Dates:  9/3/2019 TO 12/10/2019 (90 days).   Total Treatment Billable Duration:  40 minutes  PT Patient Time In/Time Out  Time In: 0845  Time Out: 6200 [facility], PT    Future Appointments   Date Time Provider Radha Bautista   2019  8:45 AM Kang Torre, PT [facility] SFE   2019  8:45 AM Kang Torre, PT [facility] SFE   2019  1:40 PM Harpreet Ramos MD END BS ENDO   2019  8:45 AM Mary Coe, PT [facility] SFE

## 2019-11-20 ENCOUNTER — APPOINTMENT (OUTPATIENT)
Dept: PHYSICAL THERAPY | Age: 74
End: 2019-11-20
Payer: MEDICARE

## 2019-11-25 PROBLEM — E83.52 HYPERCALCEMIA: Status: RESOLVED | Noted: 2019-01-25 | Resolved: 2019-11-25

## 2019-12-04 ENCOUNTER — HOSPITAL ENCOUNTER (OUTPATIENT)
Dept: PHYSICAL THERAPY | Age: 74
Discharge: HOME | End: 2019-12-04
Payer: MEDICARE

## 2019-12-04 PROCEDURE — 97140 MANUAL THERAPY 1/> REGIONS: CPT

## 2019-12-04 NOTE — PROGRESS NOTES
Digna Sparks  : 1945  Primary:   Secondary:  [address] at [address] [facility]  [address] [facility], [address] [place] [address],[address], [place] [address].  [phone]   [phone]         OUTPATIENT PHYSICAL THERAPY: Daily Treatment Note 2019  ICD-10: Treatment Diagnosis: Cervicalgia (M54.2)  Pre-treatment Symptoms/Complaints:  2019: Patient reports she is feeling a good bit of tightness in her shoulders. Pain: Initial: Pain Intensity 1: 4  Pain Location 1: Neck, Shoulder  Pain Orientation 1: Right, Left  Pain Intervention(s) 1: Rest, Medication (see MAR)  Post Session:  3/10   Medications Last Reviewed:  2019   Updated Objective Findings:  None Today   TREATMENT:   MANUAL THERAPY: (40 minutes): Joint mobilization and Soft tissue mobilization was utilized and necessary because of the patient's restricted joint motion, painful spasm, loss of articular motion and restricted motion of soft tissue. Treatment/Session Summary:    · Response to Treatment:  Patient completed all activities with improving overall mobility noted after treatment. · Communication/Consultation:  None today  · Equipment provided today:  None today  · Recommendations/Intent for next treatment session: Next visit will focus on improving overall mobility with decreased pain. Treatment Plan of Care Effective Dates:  9/3/2019 TO 12/10/2019 (90 days).   Total Treatment Billable Duration:  40 minutes  PT Patient Time In/Time Out  Time In: 0845  Time Out: [address] [facility], PT    Future Appointments   Date Time Provider Radha Bautista   2019  8:45 AM Marky Griffith PT [facility]   2019  8:45 AM [facility]   2020  2:00 PM Rosanne Fry MD END BS ENDO

## 2019-12-11 ENCOUNTER — HOSPITAL ENCOUNTER (OUTPATIENT)
Dept: PHYSICAL THERAPY | Age: 74
Discharge: HOME | End: 2019-12-11
Payer: MEDICARE

## 2019-12-11 PROCEDURE — 97140 MANUAL THERAPY 1/> REGIONS: CPT

## 2019-12-11 NOTE — THERAPY RECERTIFICATION
Pati Lundborg : 1945 [place] Secondary: Adrián Christianson at [place] [id number] [facility], [address], [place] [address]. [phone]   [phone] OUTPATIENT PHYSICAL THERAPY:Recertification  ICD-10: Treatment Diagnosis: Cervicalgia (M54.2) Precautions/Allergies:  
Adhesive; Alendronate sodium; Antihistamine [triprolidine-pseudoephedrine]; and Boniva [ibandronate] MD Orders: Evaluate and Treat MEDICAL/REFERRING DIAGNOSIS: 
Cervicalgia [M54.2] DATE OF ONSET: Chronic REFERRING PHYSICIAN:  Neela Maharaj NP  
RETURN PHYSICIAN APPOINTMENT: TBD ASSESSMENT:  Ms. Alma Benedict presents with improving mobility and pain in cervical region secondary to degenerative changes. Patient has attended a total of 20 scheduled physical therapy visits including initial evaluation on 3/6/2019. Treatment has consisted of stretching, strengthening, and manual therapy to improve overall pain and performance with activities of daily living. PROBLEM LIST (Impacting functional limitations): 1. Decreased Strength 2. Decreased ADL/Functional Activities 3. Increased Pain 4. Decreased Activity Tolerance INTERVENTIONS PLANNED: (Treatment may consist of any combination of the following) 1. Cold 2. Heat 3. Home Exercise Program (HEP) 4. Manual Therapy 5. Neuromuscular Re-education/Strengthening 6. Range of Motion (ROM) 7. Therapeutic Activites 8. Therapeutic Exercise/Strengthening TREATMENT PLAN: 
Effective Dates: 12/10/2019 TO 3/10/2020 (90 days). Frequency/Duration: 2 times a week for 90 Day(s) GOALS: (Goals have been discussed and agreed upon with patient.) Short-Term Functional Goals: Time Frame: 30 days 1. Patient will be independent with home exercise program without exacerbation of symptoms or cueing needed--goal met.   
2. Patient will be independent with correct sleeping positions and awareness/avoidance of aggravating positions without cueing needed--goal met. Discharge Goals: Time Frame: 90 days 1. Patient will be independent with all ADLs with minimal onset of neck pain and no deficits with daily tasks--goal ongoing. 2. Patient will report no fear avoidance with social or recreational activities due to neck pain --goal ongoing. 3. Patient will score less than or equal to 7/50 on Neck Disability Index with minimal effect of neck pain on patient's ability to manage every day life activities--goal ongoing. Rehabilitation Potential For Stated Goals: Good Regarding Pa[place]'s therapy, I certify that the treatment plan above will be carried out by a therapist or under their direction. Thank you for this referral, 
Raul Dickey PT Referring Physician Signature: Ankit Harp NP _______________________________ Date _____________ HISTORY:  
History of Present Injury/Illness (Reason for Referral): 
4/8/3258 (Re-certification): Patient reports she has been having problems with her colon and so she hasn't been thinking about her shoulders and neck much. She reports she does feel some improvement, but is still having trouble. 7/17/2019 (Progress Note): Patient reports she is feeling better overall with minimal stiffness in her neck, but pain still in her shoulder. 6/72/5431 (Recertification): Patient reports she is still having pain in her shoulders. 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 10/9/2019 (Progress Note): Patient reports she is feeling better overall and feels therapy is still beneficial. 
 
11/6/2019 (Progress Note): Patient reports she is feeling better overall, but the change in weather has really caused her neck and shoulder to be painful and tender. 54/86/0329 (Recertification): Patient reports her neck is feeling good lately, but her shoulder has been more painful lately. Past Medical History/Comorbidities: Ms. Penny Gil  has a past medical history of Abnormal Pap smear, Anxiety, COPD (chronic obstructive pulmonary disease) ([address] Depression, Hyperthyroidism, Insomnia, Irritable bowel syndrome, and Osteopenia. She also has no past medical history of Difficult intubation, Malignant hyperthermia due to anesthesia, Nausea & vomiting, Pseudocholinesterase deficiency, or Unspecified adverse effect of anesthesia. Ms. Penny Gil  has a past surgical history that includes hx tonsillectomy; hx orthopaedic; hx salpingo-oophorectomy (Right, 01/22/2014); and hx colonoscopy. Social History/Living Environment:  
Home Environment: Private residence Living Alone: Yes Support Systems: Family member(s), Friends \ neighbors Prior Level of Function/Work/Activity: 
Independent Dominant Side:  
      RIGHT Personal Factors:   
      Sex:  female Age:  69 y.o. Current Medications:   
  
Current Outpatient Medications:  
  albuterol (PROVENTIL HFA, VENTOLIN HFA, PROAIR HFA) 90 mcg/actuation inhaler, Take 2 Puffs by inhalation. , Disp: , Rfl:  
  mometasone (NASONEX) 50 mcg/actuation nasal spray, 2 Sprays by Nasal route., Disp: , Rfl:  
  alendronate (FOSAMAX) 70 mg tablet, Take 70 mg by mouth every seven (7) days. , Disp: , Rfl:  
  b complex vitamins tablet, Take 1 Tab by mouth every morning. Holding for surgery, Disp: , Rfl:  
  temazepam (RESTORIL) 15 mg capsule, Take  by mouth nightly as needed for Sleep., Disp: , Rfl:  
  DULoxetine (CYMBALTA) 30 mg capsule, Take 30 mg by mouth every morning., Disp: , Rfl:  
  ascorbic acid (VITAMIN C) 500 mg tablet, Take 500 mg by mouth every morning. Holding for surgery, Disp: , Rfl:  
  multivitamin (ONE A DAY) tablet, Take 1 Tab by mouth every morning. Holding for surgery, Disp: , Rfl:   
Date Last Reviewed:  12/11/2019 EXAMINATION:  
Observation/Orthostatic Postural Assessment:   
      Posture Assessment: Rounded shoulders, Forward head Palpation:   
      Tightness and tenderness to palpation noted throughout upper and lower cervical musculature. No bruising or swelling noted. ROM:   
      Cervical Assessment (AROM): 
· Flexion: 75% of full AROM · Extension: 75% of full AROM · Right side bendin% of full AROM · Left side bending[de-identified] 75% of full AROM · Right rotation: 75% of full AROM · Left rotation: 75% of full AROM Strength:   
      Cervical Assessment (Strength): · Grossly 2+/5 with manual muscle testing Special Tests:   
      Negative Hautant's test. Negative Cranial-[place] lift test. Negative [place]-Axis Sheer test. Negative Alar Ligament test. Negative Tectorial Membrane test.  
Neurological Screen: 
      Dermatomes: Within normal limits Reflexes:  2+ Functional Mobility:  
      Gait/Mobility:  
 ·   Independent Transfers: · Sit to Stand: Independent · Stand to Sit: Independent · Stand Pivot Transfers: Independent · Bed to Chair: Independent · Lateral Transfers: Independent Bed Mobility: · Rolling: Independent · Supine to Sit: Independent · Sit to Supine: Independent · Scooting: Independent CLINICAL DECISION MAKING:  
Outcome Measure: Tool Used: Neck Disability Index (NDI) Score:  Initial:   Most Recent:  (Date: 2019 ) Interpretation of Score: The Neck Disability Index is a revised form of the Oswestry Low Back Pain Index and is designed to measure the activities of daily living in person's with neck pain. Each section is scored on a 0-5 scale, 5 representing the greatest disability. The scores of each section are added together for a total score of 50. Medical Necessity:  
· Patient is expected to demonstrate progress in strength and range of motion to improve safety during daily activities. · Patient demonstrates good rehab potential due to higher previous functional level. · Skilled intervention continues to be required due to decreased mobility. Reason for Services/Other Comments: 
· Patient continues to demonstrate capacity to improve overall mobility which will increase independence and increase safety.

## 2019-12-11 NOTE — PROGRESS NOTES
Martha Crane  : 1945  Primary:   Secondary:  [address] at [place] [id number] [facility], [address] [place] [address],[address], [facility] [address].  [phone]   [phone]         OUTPATIENT PHYSICAL THERAPY: Daily Treatment Note 2019  ICD-10: Treatment Diagnosis: Cervicalgia (M54.2)  Pre-treatment Symptoms/Complaints:  2019: Patient reports her right shoulder is hurting today. Pain: Initial: Pain Intensity 1: 4  Pain Location 1: Neck, Shoulder  Pain Orientation 1: Right, Left  Pain Intervention(s) 1: Rest, Medication (see MAR)  Post Session:  3/10   Medications Last Reviewed:  2019   Updated Objective Findings:  None Today   TREATMENT:   MANUAL THERAPY: (40 minutes): Joint mobilization and Soft tissue mobilization was utilized and necessary because of the patient's restricted joint motion, painful spasm, loss of articular motion and restricted motion of soft tissue. Treatment/Session Summary:    · Response to Treatment:  Patient completed all activities with improving overall mobility noted after treatment. · Communication/Consultation:  None today  · Equipment provided today:  None today  · Recommendations/Intent for next treatment session: Next visit will focus on improving overall mobility with decreased pain. Treatment Plan of Care Effective Dates:  12/10/2019 TO 3/10/2020 (90 days).   Total Treatment Billable Duration:  40 minutes  PT Patient Time In/Time Out  Time In: 0845  Time Out: 6[address] [facility], PT    Future Appointments   Date Time Provider Radha Bautista   2019  8:45 AM Cindy Burgess PT [facility]   2019  8:00 AM Cindy Burgess PT [facility]   2020  2:00 PM Stef Grigsby MD END BS ENDO

## 2019-12-23 ENCOUNTER — HOSPITAL ENCOUNTER (OUTPATIENT)
Dept: PHYSICAL THERAPY | Age: 74
Discharge: HOME | End: 2019-12-23
Payer: MEDICARE

## 2019-12-23 PROCEDURE — 97140 MANUAL THERAPY 1/> REGIONS: CPT

## 2019-12-23 NOTE — PROGRESS NOTES
Krystin Deshpande  : 1945  Primary:   Secondary:  [address] at [place] [id number] [facility], [address] [place] [address],[address], [facility].  [phone]   [phone]         OUTPATIENT PHYSICAL THERAPY: Daily Treatment Note 2019  ICD-10: Treatment Diagnosis: Cervicalgia (M54.2)  Pre-treatment Symptoms/Complaints:  2019: Patient reports she is hurting in her right shoulder today. Pain: Initial: Pain Intensity 1: 4  Pain Location 1: Neck, Shoulder  Pain Orientation 1: Right, Left  Pain Intervention(s) 1: Rest, Medication (see MAR)  Post Session:  3/10   Medications Last Reviewed:  2019   Updated Objective Findings:  None Today   TREATMENT:   MANUAL THERAPY: (40 minutes): Joint mobilization and Soft tissue mobilization was utilized and necessary because of the patient's restricted joint motion, painful spasm, loss of articular motion and restricted motion of soft tissue. Treatment/Session Summary:    · Response to Treatment:  Patient completed all activities with improving overall mobility noted after treatment. · Communication/Consultation:  None today  · Equipment provided today:  None today  · Recommendations/Intent for next treatment session: Next visit will focus on improving overall mobility with decreased pain. Treatment Plan of Care Effective Dates:  12/10/2019 TO 3/10/2020 (90 days).   Total Treatment Billable Duration:  40 minutes  PT Patient Time In/Time Out  Time In: 0800  Time Out: 0845  Jose Harris PT    Future Appointments   Date Time Provider Radha Bautista   2019  8:00 AM Jacob Cary PT [facility] SFE   2020  8:45 AM Jacob Cary PT [facility] SFE   2020  2:00 PM Christi Gu MD END BS ENDO

## 2020-01-08 ENCOUNTER — HOSPITAL ENCOUNTER (OUTPATIENT)
Dept: PHYSICAL THERAPY | Age: 75
Discharge: HOME | End: 2020-01-08
Payer: MEDICARE

## 2020-01-08 PROCEDURE — 97140 MANUAL THERAPY 1/> REGIONS: CPT

## 2020-01-08 NOTE — PROGRESS NOTES
Megan Enriquez  : 1945  Primary:   Secondary:  [address] at [facility]  27[address] [facility], [address] [place] [address],[address], [place] [address].  [phone]   [phone]         OUTPATIENT PHYSICAL THERAPY: Daily Treatment Note 2020  ICD-10: Treatment Diagnosis: Cervicalgia (M54.2)  Pre-treatment Symptoms/Complaints:  2020: Patient reports her shoulder has been more painful lately. Pain: Initial: Pain Intensity 1: 4  Pain Location 1: Neck, Shoulder  Pain Orientation 1: Right, Left  Pain Intervention(s) 1: Rest, Medication (see MAR)  Post Session:  3/10   Medications Last Reviewed:  2020   Updated Objective Findings:  None Today   TREATMENT:   MANUAL THERAPY: (40 minutes): Joint mobilization and Soft tissue mobilization was utilized and necessary because of the patient's restricted joint motion, painful spasm, loss of articular motion and restricted motion of soft tissue. Treatment/Session Summary:    · Response to Treatment:  Patient completed all activities with improving overall mobility noted after treatment. · Communication/Consultation:  None today  · Equipment provided today:  None today  · Recommendations/Intent for next treatment session: Next visit will focus on improving overall mobility with decreased pain. Treatment Plan of Care Effective Dates:  12/10/2019 TO 3/10/2020 (90 days).   Total Treatment Billable Duration:  40 minutes  PT Patient Time In/Time Out  Time In: 0845  Time Out: 6[address] [facility], PT    Future Appointments   Date Time Provider Radha Bautista   2020  8:45 AM Brigida Davidson PT [facility] SFE   2020  8:45 AM Brigida Davidson PT [facility]   2020  2:00 PM Sherrell Carroll MD END BS ENDO

## 2020-01-08 NOTE — PROGRESS NOTES
Robert Negron  : 1945  [place]  Secondary: Adrián Christianson at [address], [address] [place] [address],[address], [address] [facility]  [phone]   [phone]          OUTPATIENT PHYSICAL THERAPY:Progress Report 2020   ICD-10: Treatment Diagnosis: Cervicalgia (M54.2)  Precautions/Allergies:   Adhesive; Alendronate sodium; Antihistamine [triprolidine-pseudoephedrine]; and Boniva [ibandronate]   MD Orders: Evaluate and Treat MEDICAL/REFERRING DIAGNOSIS:  Cervicalgia [M54.2]   DATE OF ONSET: Chronic  REFERRING PHYSICIAN:  Erlinda Mera NP   RETURN PHYSICIAN APPOINTMENT: TBD     ASSESSMENT:  Ms. Erlinda Queen presents with improving mobility and pain in cervical region secondary to degenerative changes. Patient has attended a total of 22 scheduled physical therapy visits including initial evaluation on 3/6/2019. Treatment has consisted of stretching, strengthening, and manual therapy to improve overall pain and performance with activities of daily living. PROBLEM LIST (Impacting functional limitations):  1. Decreased Strength  2. Decreased ADL/Functional Activities  3. Increased Pain  4. Decreased Activity Tolerance INTERVENTIONS PLANNED: (Treatment may consist of any combination of the following)  1. Cold  2. Heat  3. Home Exercise Program (HEP)  4. Manual Therapy  5. Neuromuscular Re-education/Strengthening  6. Range of Motion (ROM)  7. Therapeutic Activites  8. Therapeutic Exercise/Strengthening   TREATMENT PLAN:  Effective Dates: 12/10/2019 TO 3/10/2020 (90 days). Frequency/Duration: 2 times a week for 90 Day(s)  GOALS: (Goals have been discussed and agreed upon with patient.)  Short-Term Functional Goals: Time Frame: 30 days  1. Patient will be independent with home exercise program without exacerbation of symptoms or cueing needed--goal met.    2. Patient will be independent with correct sleeping positions and awareness/avoidance of aggravating positions without cueing needed--goal met. Discharge Goals: Time Frame: 90 days  1. Patient will be independent with all ADLs with minimal onset of neck pain and no deficits with daily tasks--goal ongoing. 2. Patient will report no fear avoidance with social or recreational activities due to neck pain --goal ongoing. 3. Patient will score less than or equal to 7/50 on Neck Disability Index with minimal effect of neck pain on patient's ability to manage every day life activities--goal ongoing. Rehabilitation Potential For Stated Goals: Good               HISTORY:   History of Present Injury/Illness (Reason for Referral):  0/9/0948 (Re-certification): Patient reports she has been having problems with her colon and so she hasn't been thinking about her shoulders and neck much. She reports she does feel some improvement, but is still having trouble. 7/17/2019 (Progress Note): Patient reports she is feeling better overall with minimal stiffness in her neck, but pain still in her shoulder. 8/11/4913 (Recertification): Patient reports she is still having pain in her shoulders. 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 10/9/2019 (Progress Note): Patient reports she is feeling better overall and feels therapy is still beneficial.    11/6/2019 (Progress Note): Patient reports she is feeling better overall, but the change in weather has really caused her neck and shoulder to be painful and tender. 64/88/0313 (Recertification): Patient reports her neck is feeling good lately, but her shoulder has been more painful lately. 1/8/2020 (Progress Note): Patient reports her shoulder and back have been more painful lately.     Past Medical History/Comorbidities:   Ms. Alison Rees  has a past medical history of Abnormal Pap smear, Anxiety, COPD (chronic obstructive pulmonary disease) ([facility] [address] Depression, Hyperthyroidism, Insomnia, Irritable bowel syndrome, and Osteopenia. She also has no past medical history of Difficult intubation, Malignant hyperthermia due to anesthesia, Nausea & vomiting, Pseudocholinesterase deficiency, or Unspecified adverse effect of anesthesia. Ms. Lizz Urias  has a past surgical history that includes hx tonsillectomy; hx orthopaedic; hx salpingo-oophorectomy (Right, 01/22/2014); and hx colonoscopy. Social History/Living Environment:   Home Environment: Private residence  Living Alone: Yes  Support Systems: Family member(s), Friends \ neighbors  Prior Level of Function/Work/Activity:  Independent  Dominant Side:         RIGHT  Personal Factors:          Sex:  female        Age:  69 y.o. Current Medications:       Current Outpatient Medications:     albuterol (PROVENTIL HFA, VENTOLIN HFA, PROAIR HFA) 90 mcg/actuation inhaler, Take 2 Puffs by inhalation. , Disp: , Rfl:     mometasone (NASONEX) 50 mcg/actuation nasal spray, 2 Sprays by Nasal route., Disp: , Rfl:     alendronate (FOSAMAX) 70 mg tablet, Take 70 mg by mouth every seven (7) days. , Disp: , Rfl:     b complex vitamins tablet, Take 1 Tab by mouth every morning. Holding for surgery, Disp: , Rfl:     temazepam (RESTORIL) 15 mg capsule, Take  by mouth nightly as needed for Sleep., Disp: , Rfl:     DULoxetine (CYMBALTA) 30 mg capsule, Take 30 mg by mouth every morning., Disp: , Rfl:     ascorbic acid (VITAMIN C) 500 mg tablet, Take 500 mg by mouth every morning. Holding for surgery, Disp: , Rfl:     multivitamin (ONE A DAY) tablet, Take 1 Tab by mouth every morning. Holding for surgery, Disp: , Rfl:    Date Last Reviewed:  1/8/2020    EXAMINATION:   Observation/Orthostatic Postural Assessment:          Posture Assessment: Rounded shoulders, Forward head   Palpation:          Tightness and tenderness to palpation noted throughout upper and lower cervical musculature. No bruising or swelling noted.   ROM:          Cervical Assessment (AROM):  · Flexion: 75% of full AROM  · Extension: 75% of full AROM  · Right side bendin% of full AROM  · Left side bending[de-identified] 75% of full AROM  · Right rotation: 75% of full AROM  · Left rotation: 75% of full AROM  Strength:          Cervical Assessment (Strength):  · Grossly 2+/5 with manual muscle testing  Special Tests:          Negative Hautant's test. Negative Cranial-[place] lift test. Negative [place]-Axis Sheer test. Negative Alar Ligament test. Negative Tectorial Membrane test.   Neurological Screen:        Dermatomes: Within normal limits        Reflexes:  2+  Functional Mobility:         Gait/Mobility:    ·   Independent         Transfers:     · Sit to Stand: Independent  · Stand to Sit: Independent  · Stand Pivot Transfers: Independent  · Bed to Chair: Independent  · Lateral Transfers: Independent         Bed Mobility:     · Rolling: Independent  · Supine to Sit: Independent  · Sit to Supine: Independent  · Scooting: Independent        CLINICAL DECISION MAKING:   Outcome Measure: Tool Used: Neck Disability Index (NDI)  Score:  Initial:   Most Recent:  (Date: 2020 )   Interpretation of Score: The Neck Disability Index is a revised form of the Oswestry Low Back Pain Index and is designed to measure the activities of daily living in person's with neck pain. Each section is scored on a 0-5 scale, 5 representing the greatest disability. The scores of each section are added together for a total score of 50. Medical Necessity:   · Patient is expected to demonstrate progress in strength and range of motion to improve safety during daily activities. · Patient demonstrates good rehab potential due to higher previous functional level. · Skilled intervention continues to be required due to decreased mobility. Reason for Services/Other Comments:  · Patient continues to demonstrate capacity to improve overall mobility which will increase independence and increase safety.

## 2020-01-22 ENCOUNTER — HOSPITAL ENCOUNTER (OUTPATIENT)
Dept: PHYSICAL THERAPY | Age: 75
Discharge: HOME | End: 2020-01-22
Payer: MEDICARE

## 2020-01-22 PROCEDURE — 97140 MANUAL THERAPY 1/> REGIONS: CPT

## 2020-02-05 ENCOUNTER — HOSPITAL ENCOUNTER (OUTPATIENT)
Dept: PHYSICAL THERAPY | Age: 75
Discharge: HOME | End: 2020-02-05
Payer: MEDICARE

## 2020-02-05 PROCEDURE — 97140 MANUAL THERAPY 1/> REGIONS: CPT

## 2020-02-05 NOTE — PROGRESS NOTES
Karina Jhonny  : 1945  Primary:   Secondary:  [address]  at [address], [address] [place] [address],[address] [facility], [place] [address].  [phone]   [phone]         OUTPATIENT PHYSICAL THERAPY: Daily Treatment Note 2020  ICD-10: Treatment Diagnosis: Cervicalgia (M54.2)  Pre-treatment Symptoms/Complaints:  2020: Patient reports her shoulder is still her limiting factor right now. Pain: Initial: Pain Intensity 1: 4  Pain Location 1: Neck, Shoulder  Pain Orientation 1: Right, Left  Pain Intervention(s) 1: Rest, Medication (see MAR)  Post Session:  3/10   Medications Last Reviewed:  2020   Updated Objective Findings:  None Today   TREATMENT:   MANUAL THERAPY: (40 minutes): Joint mobilization and Soft tissue mobilization was utilized and necessary because of the patient's restricted joint motion, painful spasm, loss of articular motion and restricted motion of soft tissue. Treatment/Session Summary:    · Response to Treatment:  Patient completed all activities with improving overall mobility noted after treatment. · Communication/Consultation:  None today  · Equipment provided today:  None today  · Recommendations/Intent for next treatment session: Next visit will focus on improving overall mobility with decreased pain. Treatment Plan of Care Effective Dates:  12/10/2019 TO 3/10/2020 (90 days).   Total Treatment Billable Duration:  40 minutes  PT Patient Time In/Time Out  Time In: 0845  Time Out: [address] [facility], PT    Future Appointments   Date Time Provider Radha Bautista   2020  8:45 AM Miracle Tenaha, PT [facility]   2020  8:45 AM Miracle Tenaha, PT [facility]   2020  2:00 PM MD NELLY Quesada BS ENDO

## 2020-02-05 NOTE — PROGRESS NOTES
Arthurine Cord  : 1945  [place]  Secondary: Adrián Christianson at [address], [address] [place] [address],[address], [place] [address].  [phone]   [phone]          OUTPATIENT PHYSICAL THERAPY:Progress Report 2020   ICD-10: Treatment Diagnosis: Cervicalgia (M54.2)  Precautions/Allergies:   Adhesive; Alendronate sodium; Antihistamine [triprolidine-pseudoephedrine]; and Boniva [ibandronate]   MD Orders: Evaluate and Treat MEDICAL/REFERRING DIAGNOSIS:  Cervicalgia [M54.2]   DATE OF ONSET: Chronic  REFERRING PHYSICIAN:  Anna Catherine NP   RETURN PHYSICIAN APPOINTMENT: TBD     ASSESSMENT:  Ms. Daly Chua presents with improving mobility and pain in cervical region secondary to degenerative changes. Patient has attended a total of 24 scheduled physical therapy visits including initial evaluation on 3/6/2019. Treatment has consisted of stretching, strengthening, and manual therapy to improve overall pain and performance with activities of daily living. PROBLEM LIST (Impacting functional limitations):  1. Decreased Strength  2. Decreased ADL/Functional Activities  3. Increased Pain  4. Decreased Activity Tolerance INTERVENTIONS PLANNED: (Treatment may consist of any combination of the following)  1. Cold  2. Heat  3. Home Exercise Program (HEP)  4. Manual Therapy  5. Neuromuscular Re-education/Strengthening  6. Range of Motion (ROM)  7. Therapeutic Activites  8. Therapeutic Exercise/Strengthening   TREATMENT PLAN:  Effective Dates: 12/10/2019 TO 3/10/2020 (90 days). Frequency/Duration: 2 times a week for 90 Day(s)  GOALS: (Goals have been discussed and agreed upon with patient.)  Short-Term Functional Goals: Time Frame: 30 days  1. Patient will be independent with home exercise program without exacerbation of symptoms or cueing needed--goal met.    2. Patient will be independent with correct sleeping positions and awareness/avoidance of aggravating positions without cueing needed--goal met. Discharge Goals: Time Frame: 90 days  1. Patient will be independent with all ADLs with minimal onset of neck pain and no deficits with daily tasks--goal ongoing. 2. Patient will report no fear avoidance with social or recreational activities due to neck pain --goal ongoing. 3. Patient will score less than or equal to 7/50 on Neck Disability Index with minimal effect of neck pain on patient's ability to manage every day life activities--goal ongoing. Rehabilitation Potential For Stated Goals: Good               HISTORY:   History of Present Injury/Illness (Reason for Referral):  3/1/1604 (Re-certification): Patient reports she has been having problems with her colon and so she hasn't been thinking about her shoulders and neck much. She reports she does feel some improvement, but is still having trouble. 7/17/2019 (Progress Note): Patient reports she is feeling better overall with minimal stiffness in her neck, but pain still in her shoulder. 2/80/2886 (Recertification): Patient reports she is still having pain in her shoulders. 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 10/9/2019 (Progress Note): Patient reports she is feeling better overall and feels therapy is still beneficial.    11/6/2019 (Progress Note): Patient reports she is feeling better overall, but the change in weather has really caused her neck and shoulder to be painful and tender. 45/53/3367 (Recertification): Patient reports her neck is feeling good lately, but her shoulder has been more painful lately. 1/8/2020 (Progress Note): Patient reports her shoulder and back have been more painful lately. 2/5/2020 (Progress Note): Patient reports she is feeling about the same with minimal changes in her shoulder and neck.   Past Medical History/Comorbidities:   Ms. José Miguel Ayala  has a past medical history of Abnormal Pap smear, Anxiety, COPD (chronic obstructive pulmonary disease) ([facility] [address] Depression, Hyperthyroidism, Insomnia, Irritable bowel syndrome, and Osteopenia. She also has no past medical history of Difficult intubation, Malignant hyperthermia due to anesthesia, Nausea & vomiting, Pseudocholinesterase deficiency, or Unspecified adverse effect of anesthesia. Ms. Davis Daniel  has a past surgical history that includes hx tonsillectomy; hx orthopaedic; hx salpingo-oophorectomy (Right, 01/22/2014); and hx colonoscopy. Social History/Living Environment:   Home Environment: Private residence  Living Alone: Yes  Support Systems: Family member(s), Friends \ neighbors  Prior Level of Function/Work/Activity:  Independent  Dominant Side:         RIGHT  Personal Factors:          Sex:  female        Age:  69 y.o. Current Medications:       Current Outpatient Medications:     albuterol (PROVENTIL HFA, VENTOLIN HFA, PROAIR HFA) 90 mcg/actuation inhaler, Take 2 Puffs by inhalation. , Disp: , Rfl:     mometasone (NASONEX) 50 mcg/actuation nasal spray, 2 Sprays by Nasal route., Disp: , Rfl:     alendronate (FOSAMAX) 70 mg tablet, Take 70 mg by mouth every seven (7) days. , Disp: , Rfl:     b complex vitamins tablet, Take 1 Tab by mouth every morning. Holding for surgery, Disp: , Rfl:     temazepam (RESTORIL) 15 mg capsule, Take  by mouth nightly as needed for Sleep., Disp: , Rfl:     DULoxetine (CYMBALTA) 30 mg capsule, Take 30 mg by mouth every morning., Disp: , Rfl:     ascorbic acid (VITAMIN C) 500 mg tablet, Take 500 mg by mouth every morning. Holding for surgery, Disp: , Rfl:     multivitamin (ONE A DAY) tablet, Take 1 Tab by mouth every morning. Holding for surgery, Disp: , Rfl:    Date Last Reviewed:  2/5/2020    EXAMINATION:   Observation/Orthostatic Postural Assessment:          Posture Assessment: Rounded shoulders, Forward head   Palpation:          Tightness and tenderness to palpation noted throughout upper and lower cervical musculature. No bruising or swelling noted. ROM:          Cervical Assessment (AROM):  · Flexion: 75% of full AROM  · Extension: 75% of full AROM  · Right side bendin% of full AROM  · Left side bending[de-identified] 75% of full AROM  · Right rotation: 75% of full AROM  · Left rotation: 75% of full AROM  Strength:          Cervical Assessment (Strength):  · Grossly 2+/5 with manual muscle testing  Special Tests:          Negative Hautant's test. Negative Cranial-[place] lift test. Negative [place]-Axis Sheer test. Negative Alar Ligament test. Negative Tectorial Membrane test.   Neurological Screen:        Dermatomes: Within normal limits        Reflexes:  2+  Functional Mobility:         Gait/Mobility:    ·   Independent         Transfers:     · Sit to Stand: Independent  · Stand to Sit: Independent  · Stand Pivot Transfers: Independent  · Bed to Chair: Independent  · Lateral Transfers: Independent         Bed Mobility:     · Rolling: Independent  · Supine to Sit: Independent  · Sit to Supine: Independent  · Scooting: Independent        CLINICAL DECISION MAKING:   Outcome Measure: Tool Used: Neck Disability Index (NDI)  Score:  Initial:   Most Recent:  (Date: 2020 )   Interpretation of Score: The Neck Disability Index is a revised form of the Oswestry Low Back Pain Index and is designed to measure the activities of daily living in person's with neck pain. Each section is scored on a 0-5 scale, 5 representing the greatest disability. The scores of each section are added together for a total score of 50. Medical Necessity:   · Patient is expected to demonstrate progress in strength and range of motion to improve safety during daily activities. · Patient demonstrates good rehab potential due to higher previous functional level. · Skilled intervention continues to be required due to decreased mobility.   Reason for Services/Other Comments:  · Patient continues to demonstrate capacity to improve overall mobility which will increase independence and increase safety.

## 2020-02-19 ENCOUNTER — HOSPITAL ENCOUNTER (OUTPATIENT)
Dept: PHYSICAL THERAPY | Age: 75
Discharge: HOME | End: 2020-02-19
Payer: MEDICARE

## 2020-02-19 PROCEDURE — 97140 MANUAL THERAPY 1/> REGIONS: CPT

## 2020-02-19 NOTE — PROGRESS NOTES
Steven Hyman  : 1945  Primary:   Secondary:  [address] at [address] [facility]  [address] [facility] [address], [address], [address] [facility]  [phone]   [phone]         OUTPATIENT PHYSICAL THERAPY: Daily Treatment Note 2020  ICD-10: Treatment Diagnosis: Cervicalgia (M54.2)  Pre-treatment Symptoms/Complaints:  2020: Patient reports she is having more pain in her shoulder today. Pain: Initial: Pain Intensity 1: 4  Pain Location 1: Neck, Shoulder  Pain Orientation 1: Right, Left  Pain Intervention(s) 1: Rest, Medication (see MAR)  Post Session:  3/10   Medications Last Reviewed:  2020   Updated Objective Findings:  None Today   TREATMENT:   MANUAL THERAPY: (40 minutes): Joint mobilization and Soft tissue mobilization was utilized and necessary because of the patient's restricted joint motion, painful spasm, loss of articular motion and restricted motion of soft tissue. Treatment/Session Summary:    · Response to Treatment:  Patient completed all activities with improving overall mobility. · Communication/Consultation:  None today  · Equipment provided today:  None today  · Recommendations/Intent for next treatment session: Next visit will focus on improving overall mobility with decreased pain. Treatment Plan of Care Effective Dates:  12/10/2019 TO 3/10/2020 (90 days).   Total Treatment Billable Duration:  40 minutes  PT Patient Time In/Time Out  Time In: 0845  Time Out: [address] [facility], PT    Future Appointments   Date Time Provider Radha Bautista   2020  8:45 AM Rodney Marks PT [facility]   3/4/2020  8:45 AM BOLA Deluca SFE   2020  2:00 PM MD NELLY Mensah BS ENDO

## 2020-03-04 ENCOUNTER — HOSPITAL ENCOUNTER (OUTPATIENT)
Dept: PHYSICAL THERAPY | Age: 75
Discharge: HOME | End: 2020-03-04
Payer: MEDICARE

## 2020-03-04 PROCEDURE — 97140 MANUAL THERAPY 1/> REGIONS: CPT

## 2020-03-04 NOTE — THERAPY RECERTIFICATION
Abigail Vallejo  : 1945  [place]  Secondary: Adrián Christianson at [facility]  [address] [facility], [address] [place] [address],[address], [facility] [address]  [phone]   [phone]          OUTPATIENT PHYSICAL [address] 3/3/2994   ICD-10: Treatment Diagnosis: Cervicalgia (M54.2)  Precautions/Allergies:   Adhesive; Alendronate sodium; Antihistamine [triprolidine-pseudoephedrine]; and Boniva [ibandronate]   MD Orders: Evaluate and Treat MEDICAL/REFERRING DIAGNOSIS:  Cervicalgia [M54.2]   DATE OF ONSET: Chronic  REFERRING PHYSICIAN:  Saul Faulkner NP   RETURN PHYSICIAN APPOINTMENT: TBD     ASSESSMENT:  Ms. Miri Ibarra presents with improving mobility and pain in cervical region secondary to degenerative changes. Patient has attended a total of 26 scheduled physical therapy visits including initial evaluation on 3/6/2019. Treatment has consisted of stretching, strengthening, and manual therapy to improve overall pain and performance with activities of daily living. After discussing with patient she agreed she would continue to benefit from physical therapy to improve overall mobility. Please sign this re-certification if you concur. Thank you for the opportunity to serve this patient. PROBLEM LIST (Impacting functional limitations):  1. Decreased Strength  2. Decreased ADL/Functional Activities  3. Increased Pain  4. Decreased Activity Tolerance INTERVENTIONS PLANNED: (Treatment may consist of any combination of the following)  1. Cold  2. Heat  3. Home Exercise Program (HEP)  4. Manual Therapy  5. Neuromuscular Re-education/Strengthening  6. Range of Motion (ROM)  7. Therapeutic Activites  8. Therapeutic Exercise/Strengthening   TREATMENT PLAN:  Effective Dates: 3/10/2020 TO 2020 (90 days).   Frequency/Duration: 1 time every other week for 90 Day(s)  GOALS: (Goals have been discussed and agreed upon with patient.)  Short-Term Functional Goals: Time Frame: 30 days  1. Patient will be independent with home exercise program without exacerbation of symptoms or cueing needed--goal met. 2. Patient will be independent with correct sleeping positions and awareness/avoidance of aggravating positions without cueing needed--goal met. Discharge Goals: Time Frame: 90 days  1. Patient will be independent with all ADLs with minimal onset of neck pain and no deficits with daily tasks--goal ongoing. 2. Patient will report no fear avoidance with social or recreational activities due to neck pain --goal ongoing. 3. Patient will score less than or equal to 7/50 on Neck Disability Index with minimal effect of neck pain on patient's ability to manage every day life activities--goal ongoing. Rehabilitation Potential For Stated Goals: Good   Regarding Paola Beach's therapy, I certify that the treatment plan above will be carried out by a therapist or under their direction. Thank you for this referral,  Kaitlynn Saldana PT     Referring Physician Signature: Corey Chanel, JAQUELINE  _______________________________ Date _____________               HISTORY:   History of Present Injury/Illness (Reason for Referral):  4/8/4422 (Re-certification): Patient reports she has been having problems with her colon and so she hasn't been thinking about her shoulders and neck much. She reports she does feel some improvement, but is still having trouble. 7/17/2019 (Progress Note): Patient reports she is feeling better overall with minimal stiffness in her neck, but pain still in her shoulder. 0/38/6223 (Recertification): Patient reports she is still having pain in her shoulders. 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     10/9/2019 (Progress Note): Patient reports she is feeling better overall and feels therapy is still beneficial.    11/6/2019 (Progress Note): Patient reports she is feeling better overall, but the change in weather has really caused her neck and shoulder to be painful and tender. 63/81/3780 (Recertification): Patient reports her neck is feeling good lately, but her shoulder has been more painful lately. 1/8/2020 (Progress Note): Patient reports her shoulder and back have been more painful lately. 2/5/2020 (Progress Note): Patient reports she is feeling about the same with minimal changes in her shoulder and neck. 4/8/3272 (Recertification): Patient reports her shoulder is feeling the worst right now. Past Medical History/Comorbidities:   Ms. Janae Luke  has a past medical history of Abnormal Pap smear, Anxiety, COPD (chronic obstructive pulmonary disease) ([facility] [address] Depression, Hyperthyroidism, Insomnia, Irritable bowel syndrome, and Osteopenia. She also has no past medical history of Difficult intubation, Malignant hyperthermia due to anesthesia, Nausea & vomiting, Pseudocholinesterase deficiency, or Unspecified adverse effect of anesthesia. Ms. Janae Luke  has a past surgical history that includes hx tonsillectomy; hx orthopaedic; hx salpingo-oophorectomy (Right, 01/22/2014); and hx colonoscopy. Social History/Living Environment:   Home Environment: Private residence  Living Alone: Yes  Support Systems: Family member(s), Friends \ neighbors  Prior Level of Function/Work/Activity:  Independent  Dominant Side:         RIGHT  Personal Factors:          Sex:  female        Age:  77 y.o. Current Medications:       Current Outpatient Medications:     albuterol (PROVENTIL HFA, VENTOLIN HFA, PROAIR HFA) 90 mcg/actuation inhaler, Take 2 Puffs by inhalation. , Disp: , Rfl:     mometasone (NASONEX) 50 mcg/actuation nasal spray, 2 Sprays by Nasal route., Disp: , Rfl:     alendronate (FOSAMAX) 70 mg tablet, Take 70 mg by mouth every seven (7) days. , Disp: , Rfl:     b complex vitamins tablet, Take 1 Tab by mouth every morning.  Holding for surgery, Disp: , Rfl:     temazepam (RESTORIL) 15 mg capsule, Take by mouth nightly as needed for Sleep., Disp: , Rfl:     DULoxetine (CYMBALTA) 30 mg capsule, Take 30 mg by mouth every morning., Disp: , Rfl:     ascorbic acid (VITAMIN C) 500 mg tablet, Take 500 mg by mouth every morning. Holding for surgery, Disp: , Rfl:     multivitamin (ONE A DAY) tablet, Take 1 Tab by mouth every morning. Holding for surgery, Disp: , Rfl:    Date Last Reviewed:  3/4/2020    EXAMINATION:   Observation/Orthostatic Postural Assessment:          Posture Assessment: Rounded shoulders, Forward head   Palpation:          Tightness and tenderness to palpation noted throughout upper and lower cervical musculature. No bruising or swelling noted. ROM:          Cervical Assessment (AROM):  · Flexion: 75% of full AROM  · Extension: 75% of full AROM  · Right side bendin% of full AROM  · Left side bending[de-identified] 75% of full AROM  · Right rotation: 75% of full AROM  · Left rotation: 75% of full AROM  Strength:          Cervical Assessment (Strength):  · Grossly 2+/5 with manual muscle testing  Special Tests:          Negative Hautant's test. Negative Cranial-[place] lift test. Negative [place]-Axis Sheer test. Negative Alar Ligament test. Negative Tectorial Membrane test.   Neurological Screen:        Dermatomes: Within normal limits        Reflexes:  2+  Functional Mobility:         Gait/Mobility:    ·   Independent         Transfers:     · Sit to Stand: Independent  · Stand to Sit: Independent  · Stand Pivot Transfers: Independent  · Bed to Chair: Independent  · Lateral Transfers: Independent         Bed Mobility:     · Rolling: Independent  · Supine to Sit: Independent  · Sit to Supine: Independent  · Scooting: Independent        CLINICAL DECISION MAKING:   Outcome Measure: Tool Used: Neck Disability Index (NDI)  Score:  Initial:   Most Recent:  (Date: 3/4/2020 )   Interpretation of Score:  The Neck Disability Index is a revised form of the Oswestry Low Back Pain Index and is designed to measure the activities of daily living in person's with neck pain. Each section is scored on a 0-5 scale, 5 representing the greatest disability. The scores of each section are added together for a total score of 50. Medical Necessity:   · Patient is expected to demonstrate progress in strength and range of motion to improve safety during daily activities. · Patient demonstrates good rehab potential due to higher previous functional level. · Skilled intervention continues to be required due to decreased mobility. Reason for Services/Other Comments:  · Patient continues to demonstrate capacity to improve overall mobility which will increase independence and increase safety.

## 2020-03-04 NOTE — PROGRESS NOTES
Lucila Deal  : 1945  Primary:   Secondary:  [address] at [facility]  [address] [facility], [address] [place] [address],[address], [place] [address].  [phone]   [phone]         OUTPATIENT PHYSICAL THERAPY: Daily Treatment Note 3/4/2020  ICD-10: Treatment Diagnosis: Cervicalgia (M54.2)  Pre-treatment Symptoms/Complaints:  3/4/2020: Patient reports her shoulder is still the most painful. Pain: Initial: Pain Intensity 1: 4  Pain Location 1: Neck, Shoulder  Pain Orientation 1: Right, Left  Pain Intervention(s) 1: Rest, Medication (see MAR)  Post Session:  3/10   Medications Last Reviewed:  3/4/2020   Updated Objective Findings:  None Today   TREATMENT:   MANUAL THERAPY: (40 minutes): Joint mobilization and Soft tissue mobilization was utilized and necessary because of the patient's restricted joint motion, painful spasm, loss of articular motion and restricted motion of soft tissue. Treatment/Session Summary:    · Response to Treatment:  Patient completed all activities with improving overall mobility. · Communication/Consultation:  None today  · Equipment provided today:  None today  · Recommendations/Intent for next treatment session: Next visit will focus on improving overall mobility with decreased pain. Treatment Plan of Care Effective Dates:  3/10/2020 TO 2020 (90 days).   Total Treatment Billable Duration:  40 minutes  PT Patient Time In/Time Out  Time In: 0845  Time Out: [address] [facility], PT    Future Appointments   Date Time Provider Radha Bautista   3/4/2020  8:45 AM Saritha Avila, PT [facility] SFE   3/18/2020  8:45 AM Saritha Avila, PT [facility]   2020  2:00 PM Shelton Pino MD END BS ENDO

## 2020-03-18 ENCOUNTER — HOSPITAL ENCOUNTER (OUTPATIENT)
Dept: PHYSICAL THERAPY | Age: 75
Discharge: HOME | End: 2020-03-18
Payer: MEDICARE

## 2020-03-18 PROCEDURE — 97112 NEUROMUSCULAR REEDUCATION: CPT

## 2020-03-18 NOTE — PROGRESS NOTES
Giovany Schaffer  : 1945  Primary:   Secondary:  [address] at [facility]  [address], [address] [facility],[address], [facility].  [phone]   [phone]         OUTPATIENT PHYSICAL THERAPY: Daily Treatment Note 3/18/2020  ICD-10: Treatment Diagnosis: Cervicalgia (M54.2)  Pre-treatment Symptoms/Complaints:  3/18/2020: Patient reports her shoulder and neck are hurting today because she did some yard work over the weekend. Pain: Initial: Pain Intensity 1: 4  Pain Location 1: Neck, Shoulder  Pain Orientation 1: Right, Left  Pain Intervention(s) 1: Rest, Medication (see MAR)  Post Session:  3/10   Medications Last Reviewed:  3/18/2020   Updated Objective Findings:  None Today   TREATMENT:   MANUAL THERAPY: (40 minutes): Joint mobilization and Soft tissue mobilization was utilized and necessary because of the patient's restricted joint motion, painful spasm, loss of articular motion and restricted motion of soft tissue. Treatment/Session Summary:    · Response to Treatment:  Patient completed all activities with improving overall mobility. · Communication/Consultation:  None today  · Equipment provided today:  None today  · Recommendations/Intent for next treatment session: Next visit will focus on improving overall mobility with decreased pain. Treatment Plan of Care Effective Dates:  3/10/2020 TO 2020 (90 days).   Total Treatment Billable Duration:  40 minutes  PT Patient Time In/Time Out  Time In: 0845  Time Out: [facility], PT    Future Appointments   Date Time Provider Radha Bautista   3/18/2020  8:45 AM Tiffanie Cano, PT [facility] SFE   2020  8:45 AM [facility] Ovalle[facility]

## 2020-05-13 ENCOUNTER — HOSPITAL ENCOUNTER (OUTPATIENT)
Dept: PHYSICAL THERAPY | Age: 75
Discharge: HOME | End: 2020-05-13
Payer: MEDICARE

## 2020-05-13 PROCEDURE — 97140 MANUAL THERAPY 1/> REGIONS: CPT
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
## 2020-05-20 ENCOUNTER — HOSPITAL ENCOUNTER (OUTPATIENT)
Dept: PHYSICAL THERAPY | Age: 75
Discharge: HOME | End: 2020-05-20
Payer: MEDICARE

## 2020-05-20 PROCEDURE — 97140 MANUAL THERAPY 1/> REGIONS: CPT

## 2020-05-20 NOTE — PROGRESS NOTES
Willem Herrera  : 1945  Primary:   Secondary:  [address]  at [place] [address],[address], [place] [address].  [phone]   [phone]         OUTPATIENT PHYSICAL THERAPY: Daily Treatment Note 2020  ICD-10: Treatment Diagnosis: Cervicalgia (M54.2)  Pre-treatment Symptoms/Complaints:  2020: Patient reports her neck and shoulders are very tight today. Pain: Initial: Pain Intensity 1: 4  Pain Location 1: Neck, Shoulder  Pain Orientation 1: Right, Left  Pain Intervention(s) 1: Rest, Medication (see MAR)  Post Session:  3/10   Medications Last Reviewed:  2020   Updated Objective Findings:  None Today   TREATMENT:   MANUAL THERAPY: (40 minutes): Joint mobilization and Soft tissue mobilization was utilized and necessary because of the patient's restricted joint motion, painful spasm, loss of articular motion and restricted motion of soft tissue. Treatment/Session Summary:    · Response to Treatment:  Patient completed all activities with improving overall mobility. · Communication/Consultation:  None today  · Equipment provided today:  None today  · Recommendations/Intent for next treatment session: Next visit will focus on improving overall mobility with decreased pain. Treatment Plan of Care Effective Dates:  3/10/2020 TO 2020 (90 days).   Total Treatment Billable Duration:  40 minutes  PT Patient Time In/Time Out  Time In: 0800  Time Out: 0845  Ines Lira PT    Future Appointments   Date Time Provider Radha Bautista   2020  8:00 AM BOLA VelazquezORPT SFE   2020  9:00 AM SFE PHI BI ROOM 3 SFERMAM SFE   2020  3:20 PM Jaclyn Sweeney MD END BS ENDO

## 2020-05-27 ENCOUNTER — HOSPITAL ENCOUNTER (OUTPATIENT)
Dept: PHYSICAL THERAPY | Age: 75
Discharge: HOME | End: 2020-05-27
Payer: MEDICARE

## 2020-05-27 PROCEDURE — 97140 MANUAL THERAPY 1/> REGIONS: CPT

## 2020-05-27 NOTE — PROGRESS NOTES
Stacia Bran  : 1945  Primary:   Secondary:  [address] at [address] [place] [id number] [place] [address],[address], [place] [address].  [phone]   [phone]         OUTPATIENT PHYSICAL THERAPY: Daily Treatment Note 2020  ICD-10: Treatment Diagnosis: Cervicalgia (M54.2)  Pre-treatment Symptoms/Complaints:  2020: Patient reports she hurting more in her back than her shoulders today. Pain: Initial: Pain Intensity 1: 4  Pain Location 1: Neck, Shoulder  Pain Orientation 1: Right, Left  Pain Intervention(s) 1: Rest, Medication (see MAR)  Post Session:  3/10   Medications Last Reviewed:  2020   Updated Objective Findings:  None Today   TREATMENT:   MANUAL THERAPY: (40 minutes): Joint mobilization and Soft tissue mobilization was utilized and necessary because of the patient's restricted joint motion, painful spasm, loss of articular motion and restricted motion of soft tissue. Treatment/Session Summary:    · Response to Treatment:  Patient completed all activities with improving overall mobility. · Communication/Consultation:  None today  · Equipment provided today:  None today  · Recommendations/Intent for next treatment session: Next visit will focus on improving overall mobility with decreased pain. Treatment Plan of Care Effective Dates:  3/10/2020 TO 2020 (90 days).   Total Treatment Billable Duration:  40 minutes  PT Patient Time In/Time Out  Time In: 0800  Time Out: 0845  Sandhya Mendenhall PT    Future Appointments   Date Time Provider Radha Bautista   2020  8:00 AM BOLA Daly   2020  9:00 AM LINDA YIP BI ROOM 3 SFERMAM SFE   2020  3:20 PM Stef Chu MD END BS ENDO

## 2020-06-03 ENCOUNTER — HOSPITAL ENCOUNTER (OUTPATIENT)
Dept: PHYSICAL THERAPY | Age: 75
Discharge: HOME | End: 2020-06-03
Payer: MEDICARE

## 2020-06-03 NOTE — PROGRESS NOTES
[facility] at [place] [address] [address], [address]  Phone: [phone]   Fax: [phone]    OUTPATIENT DAILY NOTE    NAME/AGE/GENDER: Irene Alvarado is a 77 y.o. female. DATE: 6/3/2020    Patient cancelled (less than 24 hours ago) her appointment for today due to reasons not given. Will plan to follow up on next scheduled visit.     Clau Calvert, PT    Future Appointments   Date Time Provider Radha Bautista   6/3/2020  7:00 PM Ag Oliver [facility] SFE   6/10/2020  8:45 AM Ag Oliver, PT SFEORPT SFE   8/31/2020  9:00 AM SFE [facility] ROOM 3 SFERMAM SFE   9/28/2020  3:20 PM Olga Mauro MD END BS ENDO

## 2020-06-10 ENCOUNTER — HOSPITAL ENCOUNTER (OUTPATIENT)
Dept: PHYSICAL THERAPY | Age: 75
Discharge: HOME | End: 2020-06-10
Payer: MEDICARE

## 2020-06-10 PROCEDURE — 97140 MANUAL THERAPY 1/> REGIONS: CPT
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
## 2020-06-10 NOTE — PROGRESS NOTES
Willem Herrera  : 1945  Primary:   Secondary:  [address] at [address] [facility]  [address] [facility], [address] [place] [address],[address], [place] [address].  [phone]   [phone]         OUTPATIENT PHYSICAL THERAPY: Daily Treatment Note 6/10/2020  ICD-10: Treatment Diagnosis: Cervicalgia (M54.2)  Pre-treatment Symptoms/Complaints:  6/10/2020: Patient reports her back and shoulder are still painful and its hard to work right now. Pain: Initial: Pain Intensity 1: 4  Pain Location 1: Neck, Shoulder  Pain Orientation 1: Right, Left  Pain Intervention(s) 1: Rest, Medication (see MAR)  Post Session:  3/10   Medications Last Reviewed:  6/10/2020   Updated Objective Findings:  None Today   TREATMENT:   MANUAL THERAPY: (40 minutes): Joint mobilization and Soft tissue mobilization was utilized and necessary because of the patient's restricted joint motion, painful spasm, loss of articular motion and restricted motion of soft tissue. Treatment/Session Summary:    · Response to Treatment:  Patient completed all activities with improving overall mobility. · Communication/Consultation:  None today  · Equipment provided today:  None today  · Recommendations/Intent for next treatment session: Next visit will focus on improving overall mobility with decreased pain. Treatment Plan of Care Effective Dates:  2020 TO 2020 (90 days).   Total Treatment Billable Duration:  40 minutes  PT Patient Time In/Time Out  Time In: 0845  Time Out: [address] [facility], PT    Future Appointments   Date Time Provider Radha Bautista   6/10/2020  8:45 AM Blue Eye Zheng, PT SFEORPT SFE   2020  9:00 AM SFE PHI BI ROOM 3 SFERMAM SFE   2020  3:20 PM Jaclyn Sweeney MD END BS ENDO

## 2020-06-17 ENCOUNTER — HOSPITAL ENCOUNTER (OUTPATIENT)
Dept: PHYSICAL THERAPY | Age: 75
Discharge: HOME | End: 2020-06-17
Payer: MEDICARE

## 2020-06-17 PROCEDURE — 97140 MANUAL THERAPY 1/> REGIONS: CPT

## 2020-06-17 NOTE — PROGRESS NOTES
Robby Ying  : 1945  Primary:   Secondary:  [address] [place]  at [place]  2700 [facility], [address] [place] [address],[address], [place] [address].  [phone]   [phone]         OUTPATIENT PHYSICAL THERAPY: Daily Treatment Note 2020  ICD-10: Treatment Diagnosis: Cervicalgia (M54.2)  Pre-treatment Symptoms/Complaints:  2020: Patient reports her shoulders are really tight today. Pain: Initial: Pain Intensity 1: 4  Pain Location 1: Neck, Shoulder  Pain Orientation 1: Right, Left  Pain Intervention(s) 1: Rest, Medication (see MAR)  Post Session:  3/10   Medications Last Reviewed:  2020   Updated Objective Findings:  None Today   TREATMENT:   MANUAL THERAPY: (40 minutes): Joint mobilization and Soft tissue mobilization was utilized and necessary because of the patient's restricted joint motion, painful spasm, loss of articular motion and restricted motion of soft tissue. Treatment/Session Summary:    · Response to Treatment:  Patient completed all activities with improving overall mobility. · Communication/Consultation:  None today  · Equipment provided today:  None today  · Recommendations/Intent for next treatment session: Next visit will focus on improving overall mobility with decreased pain. Treatment Plan of Care Effective Dates:  2020 TO 2020 (90 days).   Total Treatment Billable Duration:  40 minutes  PT Patient Time In/Time Out  Time In: 0845  Time Out: 6200 [facility], PT    Future Appointments   Date Time Provider Radha Bautista   2020  8:45 AM Zandra Mueller PT SFEORPT SFE   2020  9:00 AM SFE [facility] ROOM 3 SFERMAM SFE   2020  3:20 PM Getachew Briggs MD END BS ENDO

## 2020-07-01 ENCOUNTER — HOSPITAL ENCOUNTER (OUTPATIENT)
Dept: PHYSICAL THERAPY | Age: 75
Discharge: HOME | End: 2020-07-01
Payer: MEDICARE

## 2020-07-01 PROCEDURE — 97140 MANUAL THERAPY 1/> REGIONS: CPT

## 2020-07-01 NOTE — PROGRESS NOTES
Karma Sugey  : 1945  Primary:   Secondary:  [address] at [address] [place] [address] [facility], [address] [place] [address],[address] Floor [facility], [place] [address].  [phone]   [phone]         OUTPATIENT PHYSICAL THERAPY: Daily Treatment Note 2020  ICD-10: Treatment Diagnosis: Cervicalgia (M54.2)  Pre-treatment Symptoms/Complaints:  2020: Patient reports her shoulders are very tight today. Pain: Initial: Pain Intensity 1: 4  Pain Location 1: Neck, Shoulder  Pain Orientation 1: Right, Left  Pain Intervention(s) 1: Rest, Medication (see MAR)  Post Session:  3/10   Medications Last Reviewed:  2020   Updated Objective Findings:  None Today   TREATMENT:   MANUAL THERAPY: (40 minutes): Joint mobilization and Soft tissue mobilization was utilized and necessary because of the patient's restricted joint motion, painful spasm, loss of articular motion and restricted motion of soft tissue. Treatment/Session Summary:    · Response to Treatment:  Patient completed all activities with improving overall mobility. · Communication/Consultation:  None today  · Equipment provided today:  None today  · Recommendations/Intent for next treatment session: Next visit will focus on improving overall mobility with decreased pain. Treatment Plan of Care Effective Dates:  2020 TO 2020 (90 days).   Total Treatment Billable Duration:  40 minutes  PT Patient Time In/Time Out  Time In: 0845  Time Out: 6200 [facility], PT    Future Appointments   Date Time Provider Radha Bautista   2020  8:45 AM Pedro Hackett PT SFDEBORAHORPT SFE   2020  9:00 AM SFE PHI BI ROOM 3 SFERMAM SFE   2020  3:20 PM Julio Rojas MD END BS ENDO

## 2020-07-15 ENCOUNTER — HOSPITAL ENCOUNTER (OUTPATIENT)
Dept: PHYSICAL THERAPY | Age: 75
Discharge: HOME | End: 2020-07-15
Payer: MEDICARE

## 2020-07-15 PROCEDURE — 97140 MANUAL THERAPY 1/> REGIONS: CPT

## 2020-07-15 NOTE — PROGRESS NOTES
Argenis Albert  : 1945  Primary:   Secondary:  [address] at [place] [address] [facility], [address] [place] [address],[address], [place] [address].  [phone]   [phone]         OUTPATIENT PHYSICAL THERAPY: Daily Treatment Note 7/15/2020  ICD-10: Treatment Diagnosis: Cervicalgia (M54.2)  Pre-treatment Symptoms/Complaints:  7/15/2020: Patient reports she is doing well today with improved mobility in her shoulder and neck. Pain: Initial: Pain Intensity 1: 3  Pain Location 1: Neck, Shoulder  Pain Orientation 1: Right, Left  Pain Intervention(s) 1: Rest, Medication (see MAR)  Post Session:  3/10   Medications Last Reviewed:  7/15/2020   Updated Objective Findings:  None Today   TREATMENT:   MANUAL THERAPY: (40 minutes): Joint mobilization and Soft tissue mobilization was utilized and necessary because of the patient's restricted joint motion, painful spasm, loss of articular motion and restricted motion of soft tissue. Treatment/Session Summary:    · Response to Treatment:  Patient completed all activities with improving overall mobility. · Communication/Consultation:  None today  · Equipment provided today:  None today  · Recommendations/Intent for next treatment session: Next visit will focus on improving overall mobility with decreased pain. Treatment Plan of Care Effective Dates:  2020 TO 2020 (90 days).   Total Treatment Billable Duration:  40 minutes  PT Patient Time In/Time Out  Time In: 0845  Time Out: 6200 [facility], PT    Future Appointments   Date Time Provider Radha Bautista   7/15/2020  8:45 AM BOLA WinklerEORPT SFE   2020  9:00 AM SFE PHI BI ROOM 3 SFERMAM SFE   2020  3:20 PM Daniel Quintero MD END BS ENDO
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
## 2020-07-29 ENCOUNTER — HOSPITAL ENCOUNTER (OUTPATIENT)
Dept: PHYSICAL THERAPY | Age: 75
Discharge: HOME | End: 2020-07-29
Payer: MEDICARE

## 2020-07-29 PROCEDURE — 97140 MANUAL THERAPY 1/> REGIONS: CPT

## 2020-07-29 NOTE — PROGRESS NOTES
Arvid Civil  : 1945  Primary:   Secondary:  [address] [place]  at [place]  2700 [facility], [address] [place] [address],[address], [address] [facility]  [phone]   [phone]         OUTPATIENT PHYSICAL THERAPY: Daily Treatment Note 2020  ICD-10: Treatment Diagnosis: Cervicalgia (M54.2)  Pre-treatment Symptoms/Complaints:  2020: Patient reports she is feeling pretty good overall because she hasn't been doing as much work around the house with her back hurting. Pain: Initial: Pain Intensity 1: 3  Pain Location 1: Neck, Shoulder  Pain Orientation 1: Right, Left  Pain Intervention(s) 1: Rest, Medication (see MAR)  Post Session:  3/10   Medications Last Reviewed:  2020   Updated Objective Findings:  None Today   TREATMENT:   MANUAL THERAPY: (40 minutes): Joint mobilization and Soft tissue mobilization was utilized and necessary because of the patient's restricted joint motion, painful spasm, loss of articular motion and restricted motion of soft tissue. Treatment/Session Summary:    · Response to Treatment:  Patient completed all activities with improving overall mobility. · Communication/Consultation:  None today  · Equipment provided today:  None today  · Recommendations/Intent for next treatment session: Next visit will focus on improving overall mobility with decreased pain. Treatment Plan of Care Effective Dates:  2020 TO 2020 (90 days).   Total Treatment Billable Duration:  40 minutes  PT Patient Time In/Time Out  Time In: 0845  Time Out: 6200 [facility], PT    Future Appointments   Date Time Provider Radha Bautista   2020  8:45 AM Daxa Taylor, PT SFEORPT E   2020  9:00 AM SFE PHI BI ROOM 3 SFERMAM SFE   2020  3:20 PM Lenore Coulter MD END BS ENDO

## 2020-08-12 ENCOUNTER — HOSPITAL ENCOUNTER (OUTPATIENT)
Dept: PHYSICAL THERAPY | Age: 75
Discharge: HOME | End: 2020-08-12
Payer: MEDICARE

## 2020-08-12 PROCEDURE — 97140 MANUAL THERAPY 1/> REGIONS: CPT

## 2020-08-12 NOTE — PROGRESS NOTES
Yris Gann  : 1945  Primary:   Secondary:  [address] at [facility]  2700 [facility], [address] [place] [address],[address], [address] [facility]  [phone]   [phone]         OUTPATIENT PHYSICAL THERAPY: Daily Treatment Note 2020  ICD-10: Treatment Diagnosis: Cervicalgia (M54.2)  Pre-treatment Symptoms/Complaints:  2020: Patient reports her shoulders are hurting today and she has had some tingling into her hand. Pain: Initial: Pain Intensity 1: 3  Pain Location 1: Neck, Shoulder  Pain Orientation 1: Right, Left  Pain Intervention(s) 1: Rest, Medication (see MAR)  Post Session:  3/10   Medications Last Reviewed:  2020   Updated Objective Findings:  None Today   TREATMENT:   MANUAL THERAPY: (40 minutes): Joint mobilization and Soft tissue mobilization was utilized and necessary because of the patient's restricted joint motion, painful spasm, loss of articular motion and restricted motion of soft tissue. Treatment/Session Summary:    · Response to Treatment:  Patient completed all activities with improving overall mobility. · Communication/Consultation:  None today  · Equipment provided today:  None today  · Recommendations/Intent for next treatment session: Next visit will focus on improving overall mobility with decreased pain. Treatment Plan of Care Effective Dates:  2020 TO 2020 (90 days).   Total Treatment Billable Duration:  40 minutes  PT Patient Time In/Time Out  Time In: 0845  Time Out: 6200 [facility], PT    Future Appointments   Date Time Provider Radha Bautista   2020  8:45 AM Sofia Music, PT SFEORPT SFE   2020  9:00 AM SFE PHI BI ROOM 3 SFERMAM SFE   2020  3:20 PM Carmencita Acosta MD END BS ENDO

## 2020-08-26 ENCOUNTER — HOSPITAL ENCOUNTER (OUTPATIENT)
Dept: PHYSICAL THERAPY | Age: 75
Discharge: HOME | End: 2020-08-26
Payer: MEDICARE

## 2020-08-26 PROCEDURE — 97140 MANUAL THERAPY 1/> REGIONS: CPT

## 2020-08-26 NOTE — PROGRESS NOTES
Olimpiakraig Dye  : 1945  Primary:   Secondary:  [address] [place]  at [place]  2700 [facility], [address] [place] [address],[address], [address] [facility]  [phone]   [phone]         OUTPATIENT PHYSICAL THERAPY: Daily Treatment Note 2020  ICD-10: Treatment Diagnosis: Cervicalgia (M54.2)  Pre-treatment Symptoms/Complaints:  2020: Patient reports her right shoulder is hurting more today. Pain: Initial: Pain Intensity 1: 3  Pain Location 1: Neck, Shoulder  Pain Orientation 1: Right, Left  Pain Intervention(s) 1: Rest, Medication (see MAR)  Post Session:  3/10   Medications Last Reviewed:  2020   Updated Objective Findings:  None Today   TREATMENT:   MANUAL THERAPY: (40 minutes): Joint mobilization and Soft tissue mobilization was utilized and necessary because of the patient's restricted joint motion, painful spasm, loss of articular motion and restricted motion of soft tissue. Treatment/Session Summary:    · Response to Treatment:  Patient completed all activities with improving overall mobility. · Communication/Consultation:  None today  · Equipment provided today:  None today  · Recommendations/Intent for next treatment session: Next visit will focus on improving overall mobility with decreased pain. Treatment Plan of Care Effective Dates:  2020 TO 2020 (90 days).   Total Treatment Billable Duration:  40 minutes  PT Patient Time In/Time Out  Time In: 1100  Time Out: 503 [facility],     Future Appointments   Date Time Provider Radha Bautista   2020  9:00 AM SFE PHI  ROOM 3 SFERMAM SFE   2020 10:15 AM Lazarus Szymanski, PT SFEORPT SFE   2020  8:45 AM Lazarus Netwilliam, PT SFEORPT SFE   2020  3:20 PM Chuck Vargas MD END BS ENDO
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
## 2020-08-31 ENCOUNTER — HOSPITAL ENCOUNTER (OUTPATIENT)
Dept: MAMMOGRAPHY | Age: 75
Discharge: HOME | End: 2020-08-31
Payer: MEDICARE

## 2020-08-31 DIAGNOSIS — Z12.31 OTHER SCREENING MAMMOGRAM: ICD-10-CM

## 2020-08-31 PROCEDURE — 77063 BREAST TOMOSYNTHESIS BI: CPT

## 2020-09-09 ENCOUNTER — HOSPITAL ENCOUNTER (OUTPATIENT)
Dept: PHYSICAL THERAPY | Age: 75
Discharge: HOME | End: 2020-09-09
Payer: MEDICARE

## 2020-09-09 NOTE — PROGRESS NOTES
Jose Juan Smith  : 1945  [place]  Secondary: Adrián Sanchez 75 at [facility], [address] [place] [address],[address], [facility] [address]  [phone]   [phone]          OUTPATIENT DAILY NOTE    NAME/AGE/GENDER: Jose Juan Smith is a 77 y.o. female. DATE: 2020    Patient cancelled (less than 24 hours ago) her appointment for today due to illness. Will plan to follow up on next scheduled visit.     Matthew Faith, BOLA    Future Appointments   Date Time Provider Radha Bautista   2020 10:15 AM Otto Baig PT [facility] SFDEBORAH   2020  8:45 AM Otto Baig PT [facility] SFE   2020  3:20 PM Nery Banerjee MD END BS ENDO   10/7/2020  8:45 AM Otto Baig, PT JOYCE E

## 2020-09-23 ENCOUNTER — HOSPITAL ENCOUNTER (OUTPATIENT)
Dept: PHYSICAL THERAPY | Age: 75
Discharge: HOME | End: 2020-09-23
Payer: MEDICARE

## 2020-09-23 PROCEDURE — 97140 MANUAL THERAPY 1/> REGIONS: CPT
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
## 2020-09-23 NOTE — PROGRESS NOTES
Karma Sugey  : 1945  Primary:   Secondary:  [address] at [address] [place] [id number] [address], [address] [facility]  [phone]   [phone]         OUTPATIENT PHYSICAL THERAPY: Daily Treatment Note 2020  ICD-10: Treatment Diagnosis: Cervicalgia (M54.2)  Pre-treatment Symptoms/Complaints:  2020: Patient reports her right shoulder is hurting more today. Pain: Initial: Pain Intensity 1: 3  Pain Location 1: Neck, Shoulder  Pain Orientation 1: Right, Left  Pain Intervention(s) 1: Rest, Medication (see MAR)  Post Session:  3/10   Medications Last Reviewed:  2020   Updated Objective Findings:  None Today   TREATMENT:   MANUAL THERAPY: (40 minutes): Joint mobilization and Soft tissue mobilization was utilized and necessary because of the patient's restricted joint motion, painful spasm, loss of articular motion and restricted motion of soft tissue. Treatment/Session Summary:    · Response to Treatment:  Patient completed all activities with improving overall mobility. · Communication/Consultation:  None today  · Equipment provided today:  None today  · Recommendations/Intent for next treatment session: Next visit will focus on improving overall mobility with decreased pain. Treatment Plan of Care Effective Dates:  2020 TO 2020 (90 days).   Total Treatment Billable Duration:  40 minutes  PT Patient Time In/Time Out  Time In: 0845  Time Out: BOLA Bailey    Future Appointments   Date Time Provider Radha Bautista   2020  8:45 AM Pedro Hackett PT [facility] SFE   2020  3:20 PM Julio Rojas MD END BS ENDO   10/7/2020  8:45 AM Tyrone Steiner, BOLA SFEORPT SFE

## 2020-10-07 ENCOUNTER — HOSPITAL ENCOUNTER (OUTPATIENT)
Dept: PHYSICAL THERAPY | Age: 75
Discharge: HOME | End: 2020-10-07
Payer: MEDICARE

## 2020-10-07 PROCEDURE — 97140 MANUAL THERAPY 1/> REGIONS: CPT

## 2020-10-07 NOTE — PROGRESS NOTES
Lazaro Wagoner  : 1945  Primary:   Secondary:  [address] at [facility]  [address] [facility], [address] [place] [address] [address], [address] [facility]  [phone]   [phone]         OUTPATIENT PHYSICAL THERAPY: Daily Treatment Note 10/7/2020  ICD-10: Treatment Diagnosis: Cervicalgia (M54.2)  Pre-treatment Symptoms/Complaints:  10/7/2020: Patient reports she has some pain in her right shoulder today. Pain: Initial: Pain Intensity 1: 3  Pain Location 1: Neck, Shoulder  Pain Orientation 1: Right, Left  Pain Intervention(s) 1: Rest, Medication (see MAR)  Post Session:  3/10   Medications Last Reviewed:  10/7/2020   Updated Objective Findings:  None Today   TREATMENT:   MANUAL THERAPY: (40 minutes): Joint mobilization and Soft tissue mobilization was utilized and necessary because of the patient's restricted joint motion, painful spasm, loss of articular motion and restricted motion of soft tissue. Treatment/Session Summary:    · Response to Treatment:  Patient completed all activities with improving overall mobility. · Communication/Consultation:  None today  · Equipment provided today:  None today  · Recommendations/Intent for next treatment session: Next visit will focus on improving overall mobility with decreased pain. Treatment Plan of Care Effective Dates:  2020 TO 2020  (90 days).    Total Treatment Billable Duration:  40 minutes  PT Patient Time In/Time Out  Time In: 0845  Time Out: [address]    Future Appointments   Date Time Provider Radha Bautista   10/7/2020  8:45 AM John Bejarano PT [facility] SFE   10/21/2020  8:45 AM John Bejarano, PT SFEORPCHRIS E   2021  1:40 PM Malia Noguera MD END BS ENDO

## 2020-10-21 ENCOUNTER — HOSPITAL ENCOUNTER (OUTPATIENT)
Dept: PHYSICAL THERAPY | Age: 75
Discharge: HOME | End: 2020-10-21
Payer: MEDICARE

## 2020-10-21 PROCEDURE — 97140 MANUAL THERAPY 1/> REGIONS: CPT
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
## 2020-10-21 NOTE — PROGRESS NOTES
Arian Hummel  : 1945  Primary:   Secondary:  [address] at [place] [id number] [facility], [address] [place] [address],[address], [facility].  [phone]   [phone]         OUTPATIENT PHYSICAL THERAPY: Daily Treatment Note 10/21/2020  ICD-10: Treatment Diagnosis: Cervicalgia (M54.2)  Pre-treatment Symptoms/Complaints:  10/21/2020: Patient reports her shoulders are hurting today. She reports her neck has also been painful. Pain: Initial: Pain Intensity 1: 3  Pain Location 1: Neck, Shoulder  Pain Orientation 1: Right, Left  Pain Intervention(s) 1: Rest, Medication (see MAR)  Post Session:  3/10   Medications Last Reviewed:  10/21/2020   Updated Objective Findings:  None Today   TREATMENT:   MANUAL THERAPY: (40 minutes): Joint mobilization and Soft tissue mobilization was utilized and necessary because of the patient's restricted joint motion, painful spasm, loss of articular motion and restricted motion of soft tissue. Treatment/Session Summary:    · Response to Treatment:  Patient completed all activities with improving overall mobility. · Communication/Consultation:  None today  · Equipment provided today:  None today  · Recommendations/Intent for next treatment session: Next visit will focus on improving overall mobility with decreased pain. Treatment Plan of Care Effective Dates:  2020 TO 2020  (90 days).    Total Treatment Billable Duration:  40 minutes  PT Patient Time In/Time Out  Time In: 0800  Time Out: 0845  Saw Thao PT    Future Appointments   Date Time Provider Radha Bautista   10/21/2020  8:45 AM Malaika Carlos PT [facility] SFE   2020  8:45 AM Malaika Carlos PT [facility] SFE   2021  1:40 PM Wood Kern MD END BS ENDO

## 2020-11-04 ENCOUNTER — HOSPITAL ENCOUNTER (OUTPATIENT)
Dept: PHYSICAL THERAPY | Age: 75
Discharge: HOME | End: 2020-11-04
Payer: MEDICARE

## 2020-11-04 PROCEDURE — 97140 MANUAL THERAPY 1/> REGIONS: CPT

## 2020-11-04 NOTE — PROGRESS NOTES
Margaret Vigil  : 1945  Primary:   Secondary:  [address] at [facility]  [address], [address], [facility] [address]  [phone]   [phone]         OUTPATIENT PHYSICAL THERAPY: Daily Treatment Note 2020  ICD-10: Treatment Diagnosis: Cervicalgia (M54.2)  Pre-treatment Symptoms/Complaints:  2020: Patient reports she is hurting in both shoulders today. Pain: Initial: Pain Intensity 1: 3  Pain Location 1: Neck, Shoulder  Pain Orientation 1: Right, Left  Pain Intervention(s) 1: Rest, Medication (see MAR)  Post Session:  3/10   Medications Last Reviewed:  2020   Updated Objective Findings:  None Today   TREATMENT:   MANUAL THERAPY: (40 minutes): Joint mobilization and Soft tissue mobilization was utilized and necessary because of the patient's restricted joint motion, painful spasm, loss of articular motion and restricted motion of soft tissue. Treatment/Session Summary:    · Response to Treatment:  Patient completed all activities with improving overall mobility. · Communication/Consultation:  None today  · Equipment provided today:  None today  · Recommendations/Intent for next treatment session: Next visit will focus on improving overall mobility with decreased pain. Treatment Plan of Care Effective Dates:  2020 TO 2020  (90 days).    Total Treatment Billable Duration:  40 minutes  PT Patient Time In/Time Out  Time In: 0845  Time Out: 6[address] [facility], PT    Future Appointments   Date Time Provider Radha Bautista   2020  8:45 AM Julio Cesar Tracy PT [facility] SFE   2020  8:45 AM Julio Cesar Tracy PT [facility] SFE   2021  1:40 PM Alex Gibson MD END BS ENDO

## 2020-11-18 ENCOUNTER — HOSPITAL ENCOUNTER (OUTPATIENT)
Dept: PHYSICAL THERAPY | Age: 75
Discharge: HOME | End: 2020-11-18
Payer: MEDICARE

## 2020-11-18 PROCEDURE — 97140 MANUAL THERAPY 1/> REGIONS: CPT

## 2020-11-18 NOTE — PROGRESS NOTES
Corinne Keller  : 1945  Primary:   Secondary:  [address] at [facility]  [address] [facility], [address] [place] [address],[address], [address] [facility]  [phone]   [phone]         OUTPATIENT PHYSICAL THERAPY: Daily Treatment Note 2020  ICD-10: Treatment Diagnosis: Cervicalgia (M54.2)  Pre-treatment Symptoms/Complaints:  2020: Patient reports her shoulders are feeling very tight right now. Pain: Initial: Pain Intensity 1: 3  Pain Location 1: Neck, Shoulder  Pain Orientation 1: Right, Left  Pain Intervention(s) 1: Rest, Medication (see MAR)  Post Session:  3/10   Medications Last Reviewed:  2020   Updated Objective Findings:  None Today   TREATMENT:   MANUAL THERAPY: (40 minutes): Joint mobilization and Soft tissue mobilization was utilized and necessary because of the patient's restricted joint motion, painful spasm, loss of articular motion and restricted motion of soft tissue. Treatment/Session Summary:    · Response to Treatment:  Patient completed all activities with improving overall mobility. · Communication/Consultation:  None today  · Equipment provided today:  None today  · Recommendations/Intent for next treatment session: Next visit will focus on improving overall mobility with decreased pain. Treatment Plan of Care Effective Dates:  2020 TO 2020  (90 days).    Total Treatment Billable Duration:  40 minutes  PT Patient Time In/Time Out  Time In: 0845  Time Out: 6[address] [facility], PT    Future Appointments   Date Time Provider Radha Bautista   2020  8:45 AM Deangelo Marquez, PT [facility] SFE   2020  8:45 AM Deangelo Marquez, PT [facility] SFE   2021  1:40 PM Alexander Gutierrez MD END BS ENDO

## 2020-12-02 ENCOUNTER — HOSPITAL ENCOUNTER (OUTPATIENT)
Dept: PHYSICAL THERAPY | Age: 75
Discharge: HOME | End: 2020-12-02
Payer: MEDICARE

## 2020-12-02 PROCEDURE — 97140 MANUAL THERAPY 1/> REGIONS: CPT

## 2020-12-02 NOTE — PROGRESS NOTES
Jannie Diane  : 1945  Primary:   Secondary:  [address] at [facility]  [address], [address], [address] [facility]  [phone]   [phone]         OUTPATIENT PHYSICAL THERAPY: Daily Treatment Note 2020  ICD-10: Treatment Diagnosis: Cervicalgia (M54.2)  Pre-treatment Symptoms/Complaints:  2020: Patient reports her shoulders are really painful today. Pain: Initial: Pain Intensity 1: 3  Pain Location 1: Neck, Shoulder  Pain Orientation 1: Right, Left  Pain Intervention(s) 1: Rest, Medication (see MAR)  Post Session:  3/10   Medications Last Reviewed:  2020   Updated Objective Findings:  None Today   TREATMENT:   MANUAL THERAPY: (40 minutes): Joint mobilization and Soft tissue mobilization was utilized and necessary because of the patient's restricted joint motion, painful spasm, loss of articular motion and restricted motion of soft tissue. Treatment/Session Summary:    · Response to Treatment:  Patient completed all activities with improving overall mobility. · Communication/Consultation:  None today  · Equipment provided today:  None today  · Recommendations/Intent for next treatment session: Next visit will focus on improving overall mobility with decreased pain. Treatment Plan of Care Effective Dates:  2020 TO 2020  (90 days).    Total Treatment Billable Duration:  40 minutes  PT Patient Time In/Time Out  Time In: 0845  Time Out: 6200 [facility], PT    Future Appointments   Date Time Provider Radha Bautista   2020  8:45 AM Venus Montoya PT [facility] SFE   2020  8:45 AM [facility]   2021  1:40 PM Lajuanda Hamman, MD END BS ENDO

## 2020-12-16 ENCOUNTER — HOSPITAL ENCOUNTER (OUTPATIENT)
Dept: PHYSICAL THERAPY | Age: 75
Discharge: HOME | End: 2020-12-16
Payer: MEDICARE

## 2020-12-16 PROCEDURE — 97140 MANUAL THERAPY 1/> REGIONS: CPT

## 2020-12-16 NOTE — THERAPY RECERTIFICATION
Rocio Burkett  : 1945  [place]  Secondary: Adrián Sanchez [address] at [facility]  [address] [facility], [address] [place] [address],[address], [place] [address]  [phone]   [phone]          OUTPATIENT PHYSICAL [address]    ICD-10: Treatment Diagnosis: Cervicalgia (M54.2)  Precautions/Allergies:   Adhesive, Alendronate sodium, Antihistamine [triprolidine-pseudoephedrine], and Boniva [ibandronate]   MD Orders: Evaluate and Treat MEDICAL/REFERRING DIAGNOSIS:  Cervicalgia [M54.2]   DATE OF ONSET: Chronic  REFERRING PHYSICIAN:  Concetta Plummer NP   RETURN PHYSICIAN APPOINTMENT: [id number]     ASSESSMENT:  Ms. Mayito Santillan presents with improving mobility and pain in cervical region secondary to degenerative changes. Patient has attended a total of 44 scheduled physical therapy visits. Treatment has consisted of stretching, strengthening, and manual therapy to improve overall pain and performance with activities of daily living. After discussing with patient she agreed she would continue to benefit from physical therapy to improve overall mobility and pain. Please sign this re-certification if you concur. Thank you for the opportunity to serve this patient. PROBLEM LIST (Impacting functional limitations):  1. Decreased Strength  2. Decreased ADL/Functional Activities  3. Increased Pain  4. Decreased Activity Tolerance INTERVENTIONS PLANNED: (Treatment may consist of any combination of the following)  1. Cold  2. Heat  3. Home Exercise Program (HEP)  4. Manual Therapy  5. Neuromuscular Re-education/Strengthening  6. Range of Motion (ROM)  7. Therapeutic Activites  8. Therapeutic Exercise/Strengthening   TREATMENT PLAN:  Effective Dates: 2020 TO 3/16/2021 (90 days).   Frequency/Duration: 1 time every other week for 90 Day(s)  GOALS: (Goals have been discussed and agreed upon with patient.)  Short-Term Functional Goals: Time Frame: 30 days  1. Patient will be independent with home exercise program without exacerbation of symptoms or cueing needed--goal met. 2. Patient will be independent with correct sleeping positions and awareness/avoidance of aggravating positions without cueing needed--goal met. Discharge Goals: Time Frame: 90 days  1. Patient will be independent with all ADLs with minimal onset of neck pain and no deficits with daily tasks--goal ongoing. 2. Patient will report no fear avoidance with social or recreational activities due to neck pain --goal ongoing. 3. Patient will score less than or equal to 7/50 on Neck Disability Index with minimal effect of neck pain on patient's ability to manage every day life activities--goal ongoing. Rehabilitation Potential For Stated Goals: Good   Regarding Jeanine Beach's therapy, I certify that the treatment plan above will be carried out by a therapist or under their direction. Thank you for this referral,  Mario Knight PT     Referring Physician Signature: Zulema Jimenez, JAQUELINE  _______________________________ Date _____________               HISTORY:   History of Present Injury/Illness (Reason for Referral):  8/26/2020 (Progress Note): Patient reports she is doing well, but has been having more pain in her right shoulder lately. 3/12/5706 (Recertification): Patient reports she is having tightness in her shoulder and neck. 10/21/2020 (Progress Note): Patient reports her neck and shoulders are tight today. 85/01/9018 (Recertification): Patient reports her neck has been doing well lately, but her shoulders are more painful lately. Past Medical History/Comorbidities:   Ms. Levi Antoine  has a past medical history of Abnormal Pap smear, Anxiety, COPD (chronic obstructive pulmonary disease) ([address] Depression, Hyperthyroidism, Insomnia, Irritable bowel syndrome, and Osteopenia.  She also has no past medical history of Difficult intubation, Malignant hyperthermia due to anesthesia, Nausea & vomiting, Pseudocholinesterase deficiency, or Unspecified adverse effect of anesthesia. Ms. Sadiq Pulido  has a past surgical history that includes hx tonsillectomy; hx orthopaedic; hx salpingo-oophorectomy (Right, 2014); and hx colonoscopy. Social History/Living Environment:   Home Environment: Private residence  Living Alone: Yes  Support Systems: Family member(s), Friends \ neighbors  Prior Level of Function/Work/Activity:  Independent  Dominant Side:         RIGHT  Personal Factors:          Sex:  female        Age:  77 y.o. Current Medications:       Current Outpatient Medications:     albuterol (PROVENTIL HFA, VENTOLIN HFA, PROAIR HFA) 90 mcg/actuation inhaler, Take 2 Puffs by inhalation. , Disp: , Rfl:     b complex vitamins tablet, Take 1 Tab by mouth every morning. Holding for surgery, Disp: , Rfl:     temazepam (RESTORIL) 15 mg capsule, Take  by mouth nightly as needed for Sleep., Disp: , Rfl:     DULoxetine (CYMBALTA) 30 mg capsule, Take 30 mg by mouth every morning., Disp: , Rfl:     ascorbic acid (VITAMIN C) 500 mg tablet, Take 500 mg by mouth every morning. Holding for surgery, Disp: , Rfl:     multivitamin (ONE A DAY) tablet, Take 1 Tab by mouth every morning. Holding for surgery, Disp: , Rfl:    Date Last Reviewed:  2020    EXAMINATION:   Observation/Orthostatic Postural Assessment:          Posture Assessment: Rounded shoulders, Forward head   Palpation:          Tightness and tenderness to palpation noted throughout upper and lower cervical musculature. No bruising or swelling noted.   ROM:          Cervical Assessment (AROM):  · Flexion: 75% of full AROM  · Extension: 75% of full AROM  · Right side bendin% of full AROM  · Left side bending[de-identified] 75% of full AROM  · Right rotation: 75% of full AROM  · Left rotation: 75% of full AROM  Strength:          Cervical Assessment (Strength):  · Grossly 2+/5 with manual muscle testing  Special Tests:          Negative Hautant's test. Negative Cranial-Sturdivant lift test. Negative Sturdivant-Axis Sheer test. Negative Alar Ligament test. Negative Tectorial Membrane test.   Neurological Screen:        Dermatomes: Within normal limits        Reflexes:  2+  Functional Mobility:         Gait/Mobility:    ·   Independent         Transfers:     · Sit to Stand: Independent  · Stand to Sit: Independent  · Stand Pivot Transfers: Independent  · Bed to Chair: Independent  · Lateral Transfers: Independent         Bed Mobility:     · Rolling: Independent  · Supine to Sit: Independent  · Sit to Supine: Independent  · Scooting: Independent        CLINICAL DECISION MAKING:   Outcome Measure: Tool Used: Neck Disability Index (NDI)  Score:  Initial: 17/50  Most Recent: 14/50 (Date: 12/16/2020 )   Interpretation of Score: The Neck Disability Index is a revised form of the Oswestry Low Back Pain Index and is designed to measure the activities of daily living in person's with neck pain. Each section is scored on a 0-5 scale, 5 representing the greatest disability. The scores of each section are added together for a total score of 50. Medical Necessity:   · Patient is expected to demonstrate progress in strength and range of motion to improve safety during daily activities. · Patient demonstrates good rehab potential due to higher previous functional level. · Skilled intervention continues to be required due to decreased mobility. Reason for Services/Other Comments:  · Patient continues to demonstrate capacity to improve overall mobility which will increase independence and increase safety.

## 2020-12-16 NOTE — PROGRESS NOTES
Rocio Burkett  : 1945  Primary:   Secondary:  [address] at [address] [place] [id number] [facility], [address] [place] [address],[address], [place] [address].  [phone]   [phone]         OUTPATIENT PHYSICAL THERAPY: Daily Treatment Note 2020  ICD-10: Treatment Diagnosis: Cervicalgia (M54.2)  Pre-treatment Symptoms/Complaints:  2020: Patient reports her shoulders are really hurting today. Pain: Initial: Pain Intensity 1: 3  Pain Location 1: Neck, Shoulder  Pain Orientation 1: Right, Left  Pain Intervention(s) 1: Rest, Medication (see MAR)  Post Session:  3/10   Medications Last Reviewed:  2020   Updated Objective Findings:  None Today   TREATMENT:   MANUAL THERAPY: (40 minutes): Joint mobilization and Soft tissue mobilization was utilized and necessary because of the patient's restricted joint motion, painful spasm, loss of articular motion and restricted motion of soft tissue. Treatment/Session Summary:    · Response to Treatment:  Patient completed all activities with improving overall mobility. · Communication/Consultation:  None today  · Equipment provided today:  None today  · Recommendations/Intent for next treatment session: Next visit will focus on improving overall mobility with decreased pain. Treatment Plan of Care Effective Dates:  2020 TO 3/16/2021 (90 days).    Total Treatment Billable Duration:  40 minutes  PT Patient Time In/Time Out  Time In: 0845  Time Out: 6200 [facility], PT    Future Appointments   Date Time Provider Radha Bautista   2020  8:45 AM Ples Mole, PT [facility] SFE   2020  8:45 AM Ples Mole, PT [facility]PT SFE   2020  8:45 AM Ples Mole, PT SFEORPT SFE   2021  1:40 PM Alphonso Adorno MD END BS ENDO

## 2020-12-18 ENCOUNTER — HOSPITAL ENCOUNTER (OUTPATIENT)
Dept: PHYSICAL THERAPY | Age: 75
Discharge: HOME | End: 2020-12-18
Payer: COMMERCIAL

## 2020-12-18 PROCEDURE — 97140 MANUAL THERAPY 1/> REGIONS: CPT

## 2020-12-18 PROCEDURE — 97162 PT EVAL MOD COMPLEX 30 MIN: CPT

## 2020-12-18 NOTE — PROGRESS NOTES
Vania Soto  : 1945  Primary: [facility]  Secondary:  [address] at [address], [address] [place] [address],[address] [facility], [place] [address].  [phone]   [phone]     OUTPATIENT PHYSICAL THERAPY: Daily Treatment Note 2020  Visit Count:  1    ICD-10: Treatment Diagnosis: Low back pain (M54.5)  Precautions/Allergies:   Adhesive, Alendronate sodium, Antihistamine [triprolidine-pseudoephedrine], and Boniva [ibandronate]   TREATMENT PLAN:  Effective Dates: 2020 TO 3/18/2021 (90 days). Frequency/Duration: 2 times a week for 90 Day(s)    Pre-treatment Symptoms/Complaints:  Increased lower back pain, decreased strength, increased muscular tightness  Pain: Initial: Pain Intensity 1: 8  Pain Location 1: Back, Leg  Pain Orientation 1: Lower, Left  Post Session:  8/10   Medications Last Reviewed:  2020  Updated Objective Findings:  See evaluation note from today  TREATMENT:     MANUAL THERAPY: (10 minutes): Soft tissue mobilization was utilized and necessary because of the patient's painful spasm. Patient received trigger point release along bilateral SI region/piriformis to  pain. THERAPEUTIC EXERCISE: (5 minutes):  Exercises per grid below to improve strength. Required minimal verbal cues to promote proper body alignment. Progressed repetitions as indicated. Date:  20 Date:   Date:     Activity/Exercise Parameters Parameters Parameters   Pelvic tilts 10 reps     Bridging  5 reps     Double knee to chest stretch HEP     Supine piriformis stretch HEP                           MedBridge Portal  Treatment/Session Summary:    Response to Treatment:  tolerated well. Communication/Consultation:  None today  Equipment provided today:  Home exercise program  Recommendations/Intent for next treatment session: Next visit will focus on core strengthening, stretching exercises, and manual therapy.     Total Treatment Billable Duration:  15 minutes+ evaluation  PT Patient Time In/Time Out  Time In: 0845  Time Out: [address] PT    Future Appointments   Date Time Provider Radha Bautista   12/23/2020  8:45 AM Shannon Pepe, PT [facility] LINDA   12/30/2020  8:45 AM Shannon Pepe PT JOYCE E   9/27/2021  1:40 PM Sean Potter MD END BS ENDO

## 2020-12-18 NOTE — THERAPY EVALUATION
Aly Cook  : 1945  Primary: [facility]  Secondary:  [address] at [address], [address] [place] [address],[address], [address] [facility]  [phone]   [phone]        [address] Assessment 2020   ICD-10: Treatment Diagnosis: Low back pain (M54.5)  Precautions/Allergies:   Adhesive, Alendronate sodium, Antihistamine [triprolidine-pseudoephedrine], and Boniva [ibandronate]   TREATMENT PLAN:  Effective Dates: 2020 TO 3/18/2021 (90 days). Frequency/Duration: 2 times a week for 90 Day(s) MEDICAL/REFERRING DIAGNOSIS:  lbp   DATE OF ONSET: May 1, 2020  REFERRING PHYSICIAN: Zofia Felix*  MD Orders: Evaluate and treat  Return MD Appointment: Unknown      INITIAL ASSESSMENT:  Ms. Joseluis Vazquez presents with increased pain, decreased strength, and increased muscular tightness. Patient would benefit from skilled physical therapy to address problems and goals. Thank you for this referral.     PROBLEM LIST (Impacting functional limitations):  1. Decreased Strength  2. Increased Pain  3. Decreased [place] with Home Exercise Program INTERVENTIONS PLANNED: (Treatment may consist of any combination of the following)  1. Cold  2. Heat  3. Home Exercise Program (HEP)  4. Manual Therapy  5. Therapeutic Exercise/Strengthening     GOALS: (Goals have been discussed and agreed upon with patient.)  Short-Term Functional Goals: Time Frame: 30 days   1. Patient will be independent with home exercise program to decrease pain. 2. Patient will decrease score on Modified Oswestry Low Back Pain Questionnaire to less than or equal to 18 demonstrating improvement. Discharge Goals: Time Frame: 90 days   1. Patient will decrease score on Modified Oswestry Low Back Pain Questionnaire to less than or equal to 10 demonstrating improvement. 2. Patient will report being able to shower and fix meals with less complaints of back pain.   3. Patient will increase core strength to greater than or equal to 4/5 to improve ease with mobility. OUTCOME MEASURE:   Tool Used: Modified Oswestry Low Back Pain Questionnaire  Score:  Initial: 21/50  Most Recent: X/50 (Date: -- )   Interpretation of Score: Each section is scored on a 0-5 scale, 5 representing the greatest disability. The scores of each section are added together for a total score of 50. MEDICAL NECESSITY:   · Patient is expected to demonstrate progress in strength and pain to improve ease with activities of daily living. REASON FOR SERVICES/OTHER COMMENTS:  · Patient continues to require skilled intervention due to increased back pain interfering with daily activities. Total Duration:  PT Patient Time In/Time Out  Time In: 0845  Time Out: 0930    Rehabilitation Potential For Stated Goals: Excellent  Regarding [place]'s therapy, I certify that the treatment plan above will be carried out by a therapist or under their direction. Thank you for this referral,  Clara Guzman, PT     Referring Physician Signature: Hannah Gann _______________________________ Date _____________     PAIN/SUBJECTIVE:   Initial: Pain Intensity 1: 8  Pain Location 1: Back, Leg  Pain Orientation 1: Lower, Left  Post Session:  8/10   HISTORY:   History of Injury/Illness (Reason for Referral):  Patient reports injuring her back on May 1, 2020. Patient was working at [email or web address] for [address] when a patient at the center jumped on her. Patient complains of an achy pain along lower back with radiating pain down left leg. Patient rates pain level with standing as 8/10. Pain fluctuates depending on activities. Aggravating factors are standing for prolonged periods of time. Patient reports having increased pain with showering, fixing meals, and grocery shopping.     Past Medical History/Comorbidities:   Ms. Franklin Kathleen  has a past medical history of Abnormal Pap smear, Anxiety, COPD (chronic obstructive pulmonary disease) ([facility] [address] Depression, Hyperthyroidism, Insomnia, Irritable bowel syndrome, and Osteopenia. She also has no past medical history of Difficult intubation, Malignant hyperthermia due to anesthesia, Nausea & vomiting, Pseudocholinesterase deficiency, or Unspecified adverse effect of anesthesia. Ms. Shawn Spencer  has a past surgical history that includes hx tonsillectomy; hx orthopaedic; hx salpingo-oophorectomy (Right, 01/22/2014); and hx colonoscopy. Social History/Living Environment:     Lives alone in two story home. Prior Level of Function/Work/Activity:  Independent. Patient works part time at [employer] for [email or web address]. Patient was taken out of work two weeks ago by MD per patient. Dominant Side:         RIGHT  Personal Factors:          Sex:  female        Age:  77 y.o. Ambulatory/Rehab Services H2 Model Falls Risk Assessment   Risk Factors:       No Risk Factors Identified Ability to Rise from Chair:       (1)  Pushes up, successful in one attempt   Falls Prevention Plan:       No modifications necessary   Total: (5 or greater = High Risk): 1   ©2010 [facility] of [employer]. All Rights Reserved. [facility] Patent #[id number]. Federal Law prohibits the replication, distribution or use without written permission from [facility] [employer]   Current Medications:       Current Outpatient Medications:     albuterol (PROVENTIL HFA, VENTOLIN HFA, PROAIR HFA) 90 mcg/actuation inhaler, Take 2 Puffs by inhalation. , Disp: , Rfl:     b complex vitamins tablet, Take 1 Tab by mouth every morning. Holding for surgery, Disp: , Rfl:     temazepam (RESTORIL) 15 mg capsule, Take  by mouth nightly as needed for Sleep., Disp: , Rfl:     DULoxetine (CYMBALTA) 30 mg capsule, Take 30 mg by mouth every morning., Disp: , Rfl:     ascorbic acid (VITAMIN C) 500 mg tablet, Take 500 mg by mouth every morning.  Holding for surgery, Disp: , Rfl:     multivitamin (ONE A DAY) tablet, Take 1 Tab by mouth every morning. Holding for surgery, Disp: , Rfl:    Date Last Reviewed:  12/18/2020   Number of Personal Factors/Comorbidities that affect the Plan of Care: 1-2: MODERATE COMPLEXITY   EXAMINATION:   Observation/Orthostatic Postural Assessment: Forward head/rounded shoulders posture. Palpation:          Increased tenderness to palpation along bilateral SI region, left worst than right. ROM:          Lumbar range of motion is as follows: flexion within normal limits, extension 25%, right rotation 75%, left rotation within normal limits, right lateral flexion 75%, and left lateral flexion within normal limits. Patient complains of pulling with right rotation and right lateral flexion and increased pain in lower back with extension. Strength:          Hip flexion right 5/5 and left 4+/5, hip abduction right 5/5 and left 4/5, hip adduction right 5/5 and left 4+/5, quadriceps 5/5, hamstrings right 5/5 and left 4/5, ankle dorsiflexion 5/5, ankle plantarflexion 5/5, and core strength 3-/5. Special Tests:          Straight leg raise negative bilateral.  Piriformis test negative bilateral.    Neurological Screen:        Sensation: Within normal limits. Functional Mobility:         Gait/Ambulation:  Ambulates with normal gait. Body Structures Involved:  1. Nerves  2. Muscles Body Functions Affected:  1. Neuromusculoskeletal Activities and Participation Affected:  1. General Tasks and Demands  2. Interpersonal Interactions and Relationships  3.  Community, Social and [place]   Number of elements (examined above) that affect the Plan of Care: 4+: HIGH COMPLEXITY   CLINICAL PRESENTATION:   Presentation: Evolving clinical presentation with changing clinical characteristics: MODERATE COMPLEXITY   CLINICAL DECISION MAKING:   Use of outcome tool(s) and clinical judgement create a POC that gives a: Questionable prediction of patient's progress: MODERATE COMPLEXITY

## 2020-12-23 ENCOUNTER — HOSPITAL ENCOUNTER (OUTPATIENT)
Dept: PHYSICAL THERAPY | Age: 75
Discharge: HOME | End: 2020-12-23
Payer: MEDICARE

## 2020-12-23 PROCEDURE — 97140 MANUAL THERAPY 1/> REGIONS: CPT

## 2020-12-23 NOTE — PROGRESS NOTES
Ana Foot  : 1945  Primary:   Secondary:  [address] at [facility]  [address], [address], [place] [address].  [phone]   [phone]         OUTPATIENT PHYSICAL THERAPY: Daily Treatment Note 2020  ICD-10: Treatment Diagnosis: Cervicalgia (M54.2)  Pre-treatment Symptoms/Complaints:  2020: Patient reports her right shoulder and neck are painful today. Pain: Initial: Pain Intensity 1: 3  Pain Location 1: Neck, Shoulder  Pain Orientation 1: Right, Left  Pain Intervention(s) 1: Rest, Medication (see MAR)  Post Session:  3/10   Medications Last Reviewed:  2020   Updated Objective Findings:  None Today   TREATMENT:   MANUAL THERAPY: (40 minutes): Joint mobilization and Soft tissue mobilization was utilized and necessary because of the patient's restricted joint motion, painful spasm, loss of articular motion and restricted motion of soft tissue. Treatment/Session Summary:    · Response to Treatment:  Patient completed all activities with improving overall mobility. · Communication/Consultation:  None today  · Equipment provided today:  None today  · Recommendations/Intent for next treatment session: Next visit will focus on improving overall mobility with decreased pain. Treatment Plan of Care Effective Dates:  2020 TO 3/16/2021 (90 days).    Total Treatment Billable Duration:  40 minutes  PT Patient Time In/Time Out  Time In: 0845  Time Out: 6[address] [facility], PT    Future Appointments   Date Time Provider Radha Bautista   2020  8:45 AM Gabriela Ludwig PT [facility] SFE   2020  8:45 AM Gabriela Ludwig, PT JOY[facility] SFE   2021  1:40 PM Claude Berliner, MD END BS ENDO

## 2020-12-30 ENCOUNTER — HOSPITAL ENCOUNTER (OUTPATIENT)
Dept: PHYSICAL THERAPY | Age: 75
Discharge: HOME | End: 2020-12-30
Payer: MEDICARE

## 2020-12-30 PROCEDURE — 97140 MANUAL THERAPY 1/> REGIONS: CPT

## 2020-12-30 NOTE — PROGRESS NOTES
Hay Herring  : 1945  Primary:   Secondary:  [address]  at [facility]  [address], [address] [place] [address],[address], [place] [address].  [phone]   [phone]         OUTPATIENT PHYSICAL THERAPY: Daily Treatment Note 2020  ICD-10: Treatment Diagnosis: Cervicalgia (M54.2)  Pre-treatment Symptoms/Complaints:  2020: Patient reports her neck is tight on the left side today. Pain: Initial: Pain Intensity 1: 3  Pain Location 1: Neck, Shoulder  Pain Orientation 1: Right, Left  Pain Intervention(s) 1: Rest, Medication (see MAR)  Post Session:  3/10   Medications Last Reviewed:  2020   Updated Objective Findings:  None Today   TREATMENT:   MANUAL THERAPY: (40 minutes): Joint mobilization and Soft tissue mobilization was utilized and necessary because of the patient's restricted joint motion, painful spasm, loss of articular motion and restricted motion of soft tissue. Treatment/Session Summary:    · Response to Treatment:  Patient completed all activities with improving overall mobility. · Communication/Consultation:  None today  · Equipment provided today:  None today  · Recommendations/Intent for next treatment session: Next visit will focus on improving overall mobility with decreased pain. Treatment Plan of Care Effective Dates:  2020 TO 3/16/2021 (90 days).    Total Treatment Billable Duration:  40 minutes  PT Patient Time In/Time Out  Time In: 0845  Time Out: 6200 [facility], PT    Future Appointments   Date Time Provider Radha Bautista   2020  8:45 AM Herb Moon PT [facility] SFE   2021  8:45 AM Roland Ashford PT SFEORPT SFE   2021  8:00 AM Jorge Mehta PTA SFEORPT SFE   2021  8:45 AM Roland Ashford PT SFEORPT SFE   2021  8:45 AM Herb Moon PT SFEORPT SFE   2021  9:30 AM Jorge Mehta PTA SFEORPT SFE   2021  8:45 AM Roland Ashford PT SFEORPT SFE   2021  8:45 AM Olivia Galindo, PT SFEORPT E   9/27/2021  1:40 PM Nakia Dowd MD END BS ENDO

## 2021-01-04 ENCOUNTER — HOSPITAL ENCOUNTER (OUTPATIENT)
Dept: PHYSICAL THERAPY | Age: 76
Discharge: HOME | End: 2021-01-04
Payer: COMMERCIAL

## 2021-01-04 PROCEDURE — 97140 MANUAL THERAPY 1/> REGIONS: CPT

## 2021-01-04 PROCEDURE — 97110 THERAPEUTIC EXERCISES: CPT

## 2021-01-04 NOTE — PROGRESS NOTES
Shyam Lewis  : 1945  Primary: [facility]  Secondary:  [address] at [place] [address] [facility], [address] [place] [address],[address], [place] [address].  [phone]   [phone]     OUTPATIENT PHYSICAL THERAPY: Daily Treatment Note 2021  Visit Count:  2    ICD-10: Treatment Diagnosis: Low back pain (M54.5)  Precautions/Allergies:   Adhesive, Alendronate sodium, Antihistamine [triprolidine-pseudoephedrine], and Boniva [ibandronate]   TREATMENT PLAN:  Effective Dates: 2020 TO 3/18/2021 (90 days). Frequency/Duration: 2 times a week for 90 Day(s)    Pre-treatment Symptoms/Complaints:  \"I am a little sore today\". Pain: Initial: Pain Intensity 1: 4  Pain Location 1: Back, Leg  Pain Orientation 1: Lower, Left  Post Session:  5/10   Medications Last Reviewed:  2021  Updated Objective Findings:  None Today  TREATMENT:     MANUAL THERAPY: (10 minutes): Soft tissue mobilization was utilized and necessary because of the patient's painful spasm. Patient received trigger point release along bilateral SI region/piriformis to  pain. THERAPEUTIC EXERCISE: (30 minutes):  Exercises per grid below to improve strength. Required minimal verbal cues to promote proper body alignment. Progressed repetitions as indicated.    Date:  20 Date:  21 Date:     Activity/Exercise Parameters Parameters Parameters   Pelvic tilts 10 reps 10 reps    Bridging  5 reps 10 reps    Double knee to chest stretch HEP X    Supine piriformis stretch HEP X    Nustep  Level 3 x 5 minutes    Supine hamstring stretch with ankle pumps  3 reps with 10 ankle pumps bilateral     Supine straight leg raise  2x10 reps bilateral    Supine isometric hip flexion  10 reps with 5 sec hold bilateral    Double knee lifts  2x10 reps    Quadriped with leg lifts   2x10 reps bilateral     Wall squats with red ball  2x10 reps                                      MedBridge Portal  Treatment/Session Summary: · Response to Treatment:  Patient reports slight increase in pain after manual therapy. Patient tolerated exercises without complaints. · Communication/Consultation:  None today  · Equipment provided today:  None today  · Recommendations/Intent for next treatment session: Next visit will focus on core strengthening, stretching exercises, and manual therapy.     Total Treatment Billable Duration:  40 minutes  PT Patient Time In/Time Out  Time In: 4402  Time Out: [address] PT    Future Appointments   Date Time Provider Radha Bautista   1/6/2021  9:30 AM Edelmira Fairchild PTA [facility] SFE   1/11/2021  8:45 AM Macrina Ashford, PT SFEORPT SFE   1/13/2021  8:45 AM Rosalia Hart, PT SFEORPT SFE   1/13/2021  9:30 AM Edelmira Fairchild PTA SFEORPT SFE   1/18/2021  8:45 AM Macrina Ashford, PT SFEORPT SFE   1/27/2021  8:45 AM Rosalia Hart, PT SFEORPT SFE   9/27/2021  1:40 PM Susie Hayes MD END BS ENDO

## 2021-01-06 ENCOUNTER — HOSPITAL ENCOUNTER (OUTPATIENT)
Dept: PHYSICAL THERAPY | Age: 76
Discharge: HOME | End: 2021-01-06
Payer: COMMERCIAL

## 2021-01-06 PROCEDURE — 97140 MANUAL THERAPY 1/> REGIONS: CPT

## 2021-01-06 PROCEDURE — 97110 THERAPEUTIC EXERCISES: CPT

## 2021-01-06 NOTE — PROGRESS NOTES
Terri Robles  : 1945  Primary: [facility]  Secondary:  [address] at [place] [address] [facility], [address] [place] [address],[address], [address] [facility]  [phone]   [phone]     OUTPATIENT PHYSICAL THERAPY: Daily Treatment Note 2021  Visit Count:  3    ICD-10: Treatment Diagnosis: Low back pain (M54.5)  Precautions/Allergies:   Adhesive, Alendronate sodium, Antihistamine [triprolidine-pseudoephedrine], and Boniva [ibandronate]   TREATMENT PLAN:  Effective Dates: 2020 TO 3/18/2021 (90 days). Frequency/Duration: 2 times a week for 90 Day(s)    Pre-treatment Symptoms/Complaints:  \"I am sore, it is about a 3/10, but that can change\". \"I can't stand for a long period of time because pain is too much\". Pain: Initial:   3/10 Post Session:  5/10   Medications Last Reviewed:  2021  Updated Objective Findings:  None Today  TREATMENT:     MANUAL THERAPY: (10 minutes): Soft tissue mobilization was utilized and necessary because of the patient's painful spasm. Patient received trigger point release along bilateral SI region/piriformis to  pain. THERAPEUTIC EXERCISE: (30 minutes):  Exercises per grid below to improve strength. Required minimal verbal cues to promote proper body alignment. Progressed repetitions as indicated.    Date:  21 Date:     Activity/Exercise Parameters Parameters   Pelvic tilts 20 reps    Bridging  15 reps    Double knee to chest stretch X    Supine piriformis stretch X    Nustep Level 3 x 6 minutes    Supine hamstring stretch with ankle pumps 3 reps with 10 ankle pumps bilateral     Supine straight leg raise 2x10 reps bilateral    Supine isometric hip flexion 5 x 10 sec holds Bilaterally    Double knee lifts 2x10 reps    Quadriped with leg lifts  2x10 reps bilateral     Wall squats with red ball 2x10 reps    Piriformis Stretch 5 x 10 sec holds Bilaterally                            MedBridge Portal  Treatment/Session Summary: · Response to Treatment:  Patient reports slight increase in pain after manual therapy. Patient tolerated exercises without complaints. · Communication/Consultation:  None today  · Equipment provided today:  None today  · Recommendations/Intent for next treatment session: Next visit will focus on core strengthening, stretching exercises, and manual therapy.     Total Treatment Billable Duration:  40 minutes  PT Patient Time In/Time Out  Time In: 0930  Time Out: [address], PTA    Future Appointments   Date Time Provider Radha Bautista   1/11/2021  8:45 AM Eliud Ashford, PT SFEORPT SFE   1/13/2021  8:45 AM Edel Yanez, PT SFEORPT SFE   1/13/2021  9:30 AM Sammy Roth PTA SFEORPT SFE   1/18/2021  8:45 AM Eliud Ashford, PT SFEORPT SFE   1/27/2021  8:45 AM Edel Yanez, PT SFEORPT SFE   9/27/2021  1:40 PM Olive Tilley MD END BS ENDO

## 2021-01-11 ENCOUNTER — HOSPITAL ENCOUNTER (OUTPATIENT)
Dept: PHYSICAL THERAPY | Age: 76
Discharge: HOME | End: 2021-01-11
Payer: COMMERCIAL

## 2021-01-11 PROCEDURE — 97140 MANUAL THERAPY 1/> REGIONS: CPT

## 2021-01-11 PROCEDURE — 97110 THERAPEUTIC EXERCISES: CPT

## 2021-01-11 NOTE — PROGRESS NOTES
Paola Beach  : 1945  [place] [facility] [employer] [facility]  Secondary:  Therapy Center at [address], [address], [place] [address]  [phone]   [phone]     OUTPATIENT PHYSICAL THERAPY: Daily Treatment Note 2021  Visit Count:  4    ICD-10: Treatment Diagnosis: Low back pain (M54.5)  Precautions/Allergies:   Adhesive, Alendronate sodium, Antihistamine [triprolidine-pseudoephedrine], and Boniva [ibandronate]   TREATMENT PLAN:  Effective Dates: 2020 TO 3/18/2021 (90 days).  Frequency/Duration: 2 times a week for 90 Day(s)    Pre-treatment Symptoms/Complaints:  \"I had a fantastic morning yesterday with no pain.  It started hurting more in the afternoon because I was standing doing household chores\".  Pain: Initial: Pain Intensity 1: 2  Pain Location 1: Back  Pain Orientation 1: Lower  Post Session:  3/10   Medications Last Reviewed:  2021  Updated Objective Findings:  None Today  TREATMENT:     MANUAL THERAPY: (15 minutes): Soft tissue mobilization was utilized and necessary because of the patient's painful spasm.    Patient received trigger point release along bilateral SI region/piriformis to  pain.      THERAPEUTIC EXERCISE: (30 minutes):  Exercises per grid below to improve strength.  Required minimal verbal cues to promote proper body alignment.  Progressed repetitions as indicated.   Date:  21 Date:  21   Activity/Exercise Parameters Parameters   Pelvic tilts 20 reps 20 reps   Bridging  15 reps 20 reps   Double knee to chest stretch X X   Supine piriformis stretch X 5 x 10 sec holds Bilaterally   Nustep Level 3 x 6 minutes Level 3 x 6 minutes   Supine hamstring stretch with ankle pumps 3 reps with 10 ankle pumps bilateral  3 reps with 10 ankle pumps bilateral    Supine straight leg raise 2x10 reps bilateral 2x10 reps bilateral 1#   Supine isometric hip flexion 5 x 10 sec holds Bilaterally 10 x 10 sec holds Bilaterally   Double  knee lifts 2x10 reps 2x10 reps   Quadriped with leg lifts  2x10 reps bilateral  2x10 reps bilateral    Wall squats with red ball 2x10 reps 15 reps bilateal   Prone hip extension  2x10 reps                           MedBridge Portal  Treatment/Session Summary:    · Response to Treatment:  Patient tolerated treatment well. · Communication/Consultation:  None today  · Equipment provided today:  None today  · Recommendations/Intent for next treatment session: Next visit will focus on core strengthening, stretching exercises, and manual therapy.     Total Treatment Billable Duration:  45 minutes  PT Patient Time In/Time Out  Time In: 0845  Time Out: [address] PT    Future Appointments   Date Time Provider Radha Bautista   1/13/2021  8:45 AM Ferdinand Leisure, PT [facility]   1/14/2021  8:45 AM Mark Ashford Never, PT [facility]   1/18/2021  8:45 AM Mark Ashford Never, PT [facility]   1/27/2021  8:45 AM Ferdinand Leisure, PT [facility]   9/27/2021  1:40 PM Yen Berg MD END BS ENDO

## 2021-01-13 ENCOUNTER — HOSPITAL ENCOUNTER (OUTPATIENT)
Dept: PHYSICAL THERAPY | Age: 76
Discharge: HOME | End: 2021-01-13
Payer: MEDICARE

## 2021-01-13 PROCEDURE — 97140 MANUAL THERAPY 1/> REGIONS: CPT

## 2021-01-13 NOTE — PROGRESS NOTES
Malgorzata Nunez  : 1945  Primary:   Secondary:  [address] at [address] [place] [address] [facility], [address] [place] [address],[address], [address] [facility]  [phone]   [phone]         OUTPATIENT PHYSICAL THERAPY: Daily Treatment Note 2021  ICD-10: Treatment Diagnosis: Cervicalgia (M54.2)  Pre-treatment Symptoms/Complaints:  2021: Patient reports she is having tightness along the right side of her neck as well as some dizziness today. Pain: Initial: Pain Intensity 1: 3  Pain Location 1: Neck, Shoulder  Pain Orientation 1: Right, Left  Pain Intervention(s) 1: Rest, Medication (see MAR)  Post Session:  3/10   Medications Last Reviewed:  2021   Updated Objective Findings:  None Today   TREATMENT:   MANUAL THERAPY: (40 minutes): Joint mobilization and Soft tissue mobilization was utilized and necessary because of the patient's restricted joint motion, painful spasm, loss of articular motion and restricted motion of soft tissue. Treatment/Session Summary:    · Response to Treatment:  Patient completed all activities with improving overall mobility. · Communication/Consultation:  None today  · Equipment provided today:  None today  · Recommendations/Intent for next treatment session: Next visit will focus on improving overall mobility with decreased pain. Treatment Plan of Care Effective Dates:  2020 TO 3/16/2021 (90 days).    Total Treatment Billable Duration:  40 minutes  PT Patient Time In/Time Out  Time In: 0845  Time Out: [address] [facility], PT    Future Appointments   Date Time Provider Radha Bautista   2021  8:45 AM Yola Amelia, PT [facility]   2021  8:45 AM VisMadelyn rendon Bowels, PT [facility]   2021  8:45 AM VissageMadelyn Bowels, PT [facility]   2021  8:45 AM Yoal Amelia, PT [facility]   2021  1:40 PM MD NELLY Oliver ENDO

## 2021-01-13 NOTE — PROGRESS NOTES
Pavan Stanford  : 1945  [place]  Secondary: [address] at [place] [address] [facility], [address] [place] [address],[address], [facility] [address]  [phone]   [phone]          OUTPATIENT PHYSICAL THERAPY:Progress Report 2021   ICD-10: Treatment Diagnosis: Cervicalgia (M54.2)  Precautions/Allergies:   Adhesive, Alendronate sodium, Antihistamine [triprolidine-pseudoephedrine], and Boniva [ibandronate]   MD Orders: Evaluate and Treat MEDICAL/REFERRING DIAGNOSIS:  Cervicalgia [M54.2]   DATE OF ONSET: Chronic  REFERRING PHYSICIAN:  Justine Juarez NP   RETURN PHYSICIAN APPOINTMENT: TBD     ASSESSMENT:  Ms. Shama Catherine presents with improving mobility and pain in cervical region secondary to degenerative changes. Patient has attended a total of 47 scheduled physical therapy visits. Treatment has consisted of stretching, strengthening, and manual therapy to improve overall pain and performance with activities of daily living. PROBLEM LIST (Impacting functional limitations):  1. Decreased Strength  2. Decreased ADL/Functional Activities  3. Increased Pain  4. Decreased Activity Tolerance INTERVENTIONS PLANNED: (Treatment may consist of any combination of the following)  1. Cold  2. Heat  3. Home Exercise Program (HEP)  4. Manual Therapy  5. Neuromuscular Re-education/Strengthening  6. Range of Motion (ROM)  7. Therapeutic Activites  8. Therapeutic Exercise/Strengthening   TREATMENT PLAN:  Effective Dates: 2020 TO 3/16/2021 (90 days). Frequency/Duration: 1 time every other week for 90 Day(s)  GOALS: (Goals have been discussed and agreed upon with patient.)  Short-Term Functional Goals: Time Frame: 30 days  1. Patient will be independent with home exercise program without exacerbation of symptoms or cueing needed--goal met.    2. Patient will be independent with correct sleeping positions and awareness/avoidance of aggravating positions without cueing needed--goal met. Discharge Goals: Time Frame: 90 days  1. Patient will be independent with all ADLs with minimal onset of neck pain and no deficits with daily tasks--goal ongoing. 2. Patient will report no fear avoidance with social or recreational activities due to neck pain --goal ongoing. 3. Patient will score less than or equal to 7/50 on Neck Disability Index with minimal effect of neck pain on patient's ability to manage every day life activities--goal ongoing. Rehabilitation Potential For Stated Goals: Good               HISTORY:   History of Present Injury/Illness (Reason for Referral):  8/26/2020 (Progress Note): Patient reports she is doing well, but has been having more pain in her right shoulder lately. 3/75/0316 (Recertification): Patient reports she is having tightness in her shoulder and neck. 10/21/2020 (Progress Note): Patient reports her neck and shoulders are tight today. 51/94/5271 (Recertification): Patient reports her neck has been doing well lately, but her shoulders are more painful lately. 1/13/2021 (Progress Note): Patient reports she is having tightness in her right neck more than her left right now. She reports she is dizzy as well. Past Medical History/Comorbidities:   Ms. Brian Washington  has a past medical history of Abnormal Pap smear, Anxiety, COPD (chronic obstructive pulmonary disease) ([facility] [address] Depression, Hyperthyroidism, Insomnia, Irritable bowel syndrome, and Osteopenia. She also has no past medical history of Difficult intubation, Malignant hyperthermia due to anesthesia, Nausea & vomiting, Pseudocholinesterase deficiency, or Unspecified adverse effect of anesthesia. Ms. Brian Washington  has a past surgical history that includes hx tonsillectomy; hx orthopaedic; hx salpingo-oophorectomy (Right, 01/22/2014); and hx colonoscopy.   Social History/Living Environment:   Home Environment: Private residence  Living Alone: Yes  Support Systems: Family member(s), Friends \ neighbors  Prior Level of Function/Work/Activity:  Independent  Dominant Side:         RIGHT  Personal Factors:          Sex:  female        Age:  77 y.o. Current Medications:       Current Outpatient Medications:     albuterol (PROVENTIL HFA, VENTOLIN HFA, PROAIR HFA) 90 mcg/actuation inhaler, Take 2 Puffs by inhalation. , Disp: , Rfl:     b complex vitamins tablet, Take 1 Tab by mouth every morning. Holding for surgery, Disp: , Rfl:     temazepam (RESTORIL) 15 mg capsule, Take  by mouth nightly as needed for Sleep., Disp: , Rfl:     DULoxetine (CYMBALTA) 30 mg capsule, Take 30 mg by mouth every morning., Disp: , Rfl:     ascorbic acid (VITAMIN C) 500 mg tablet, Take 500 mg by mouth every morning. Holding for surgery, Disp: , Rfl:     multivitamin (ONE A DAY) tablet, Take 1 Tab by mouth every morning. Holding for surgery, Disp: , Rfl:    Date Last Reviewed:  2021    EXAMINATION:   Observation/Orthostatic Postural Assessment:          Posture Assessment: Rounded shoulders, Forward head   Palpation:          Tightness and tenderness to palpation noted throughout upper and lower cervical musculature. No bruising or swelling noted. ROM:          Cervical Assessment (AROM):  · Flexion: 75% of full AROM  · Extension: 75% of full AROM  · Right side bendin% of full AROM  · Left side bending[de-identified] 75% of full AROM  · Right rotation: 75% of full AROM  · Left rotation: 75% of full AROM  Strength:          Cervical Assessment (Strength):  · Grossly 2+/5 with manual muscle testing  Special Tests:          Negative Hautant's test. Negative Cranial-Highlands lift test. Negative Highlands-Axis Sheer test. Negative Alar Ligament test. Negative Tectorial Membrane test.   Neurological Screen:        Dermatomes:   Within normal limits        Reflexes:  2+  Functional Mobility:         Gait/Mobility:    ·   Independent         Transfers:     · Sit to Stand: Independent  · Stand to Sit: Independent  · Stand Pivot Transfers: Independent  · Bed to Chair: Independent  · Lateral Transfers: Independent         Bed Mobility:     · Rolling: Independent  · Supine to Sit: Independent  · Sit to Supine: Independent  · Scooting: Independent        CLINICAL DECISION MAKING:   Outcome Measure: Tool Used: Neck Disability Index (NDI)  Score:  Initial: 17/50  Most Recent: 14/50 (Date: 12/16/2020 )   Interpretation of Score: The Neck Disability Index is a revised form of the Oswestry Low Back Pain Index and is designed to measure the activities of daily living in person's with neck pain. Each section is scored on a 0-5 scale, 5 representing the greatest disability. The scores of each section are added together for a total score of 50. Medical Necessity:   · Patient is expected to demonstrate progress in strength and range of motion to improve safety during daily activities. · Patient demonstrates good rehab potential due to higher previous functional level. · Skilled intervention continues to be required due to decreased mobility. Reason for Services/Other Comments:  · Patient continues to demonstrate capacity to improve overall mobility which will increase independence and increase safety.

## 2021-01-14 ENCOUNTER — HOSPITAL ENCOUNTER (OUTPATIENT)
Dept: PHYSICAL THERAPY | Age: 76
Discharge: HOME | End: 2021-01-14
Payer: COMMERCIAL

## 2021-01-14 NOTE — PROGRESS NOTES
Lisa Baldwin  : 1945  [place] [facility]  Secondary:  [address] at [place] [address] [place] [address],[address], [facility] [address].  [phone]   [phone]     OUTPATIENT DAILY NOTE    NAME/AGE/GENDER: Lisa Baldwin is a 77 y.o. female. DATE: 2021    Ms. Beach CANCELED for today's appointment due to not being able to stand up because of dizziness.     Glenn Cortes, PT

## 2021-01-18 ENCOUNTER — HOSPITAL ENCOUNTER (OUTPATIENT)
Dept: PHYSICAL THERAPY | Age: 76
Discharge: HOME | End: 2021-01-18
Payer: COMMERCIAL

## 2021-01-18 PROCEDURE — 97140 MANUAL THERAPY 1/> REGIONS: CPT

## 2021-01-18 PROCEDURE — 97110 THERAPEUTIC EXERCISES: CPT

## 2021-01-18 NOTE — THERAPY EVALUATION
Jaida Wilson : 1945 [place]: [facility] [employer] [facility] Secondary:  Therapy Center at [address], [address], [place] [address]. [phone]   [phone] OUTPATIENT PHYSICAL THERAPY:Progress Report 2021 ICD-10: Treatment Diagnosis: Low back pain (M54.5) Precautions/Allergies:  
Adhesive, Alendronate sodium, Antihistamine [triprolidine-pseudoephedrine], and Boniva [ibandronate] TREATMENT PLAN: 
Effective Dates: 2020 TO 3/18/2021 (90 days). Frequency/Duration: 2 times a week for 90 Day(s) MEDICAL/REFERRING DIAGNOSIS: 
lbp DATE OF ONSET: May 1, 2020 REFERRING PHYSICIAN: Florencia Schlatter* MD Orders: Evaluate and treat Return MD Appointment: Unknown INITIAL ASSESSMENT:  Ms. Silvana Calvo presents with increased pain, decreased strength, and increased muscular tightness. Patient would benefit from skilled physical therapy to address problems and goals. Thank you for this referral.   
Progress note:  Patient attended five scheduled physical therapy appointments from 2020 to 2021. Patient continues to have good days and bad days with her back pain. Patient feels like she has gotten stronger. Pain has dropped from 8/10 reported on initial evaluation to a 2/10 on 2021. Patient saw her MD on Friday and she reports he would like her to continue with therapy for another month. Patient would benefit from continuing skilled physical therapy to address problems and goals. Thank you for this referral.    
PROBLEM LIST (Impacting functional limitations): 1. Decreased Strength 2. Increased Pain 3. Decreased Moody with Home Exercise Program INTERVENTIONS PLANNED: (Treatment may consist of any combination of the following) 1. Cold 2. Heat 3. Home Exercise Program (HEP) 4. Manual Therapy 5. Therapeutic Exercise/Strengthening GOALS: (Goals have been discussed and agreed upon with patient.) Short-Term Functional Goals: Time Frame: 30 days 1. Patient will be independent with home exercise program to decrease pain. Goal met. 2. Patient will decrease score on Modified Oswestry Low Back Pain Questionnaire to less than or equal to 18 demonstrating improvement. Goal ongoing. Discharge Goals: Time Frame: 90 days 1. Patient will decrease score on Modified Oswestry Low Back Pain Questionnaire to less than or equal to 10 demonstrating improvement. Goal ongoing. 2. Patient will report being able to shower and fix meals with less complaints of back pain. Goal ongoing. 3. Patient will increase core strength to greater than or equal to 4/5 to improve ease with mobility. Goal ongoing. OUTCOME MEASURE:  
Tool Used: Modified Oswestry Low Back Pain Questionnaire Score:  Initial: 21/50  Most Recent: 24 (Date: 1-) Interpretation of Score: Each section is scored on a 0-5 scale, 5 representing the greatest disability. The scores of each section are added together for a total score of 50. MEDICAL NECESSITY:  
· Patient is expected to demonstrate progress in strength and pain to improve ease with activities of daily living. REASON FOR SERVICES/OTHER COMMENTS: 
· Patient continues to require skilled intervention due to increased back pain interfering with daily activities. PAIN/SUBJECTIVE:  
Initial: Pain Intensity 1: 2 Pain Location 1: Back Pain Orientation 1: Lower  Post Session:  2/10 HISTORY:  
History of Injury/Illness (Reason for Referral): 
Patient reports injuring her back on May 1, 2020. Patient was working at [employer] for [facility] when a patient at the center jumped on her. Patient complains of an achy pain along lower back with radiating pain down left leg. Patient rates pain level with standing as 8/10. Pain fluctuates depending on activities. Aggravating factors are standing for prolonged periods of time.   Patient reports having increased pain with showering, fixing meals, and grocery shopping. Past Medical History/Comorbidities: Ms. Claudean Bair  has a past medical history of Abnormal Pap smear, Anxiety, COPD (chronic obstructive pulmonary disease) ([address] Depression, Hyperthyroidism, Insomnia, Irritable bowel syndrome, and Osteopenia. She also has no past medical history of Difficult intubation, Malignant hyperthermia due to anesthesia, Nausea & vomiting, Pseudocholinesterase deficiency, or Unspecified adverse effect of anesthesia. Ms. Claudean Bair  has a past surgical history that includes hx tonsillectomy; hx orthopaedic; hx salpingo-oophorectomy (Right, 01/22/2014); and hx colonoscopy. Social History/Living Environment:  
  Lives alone in two story home. Prior Level of Function/Work/Activity: 
Independent. Patient works part time at [employer] for [employer]. Patient was taken out of work two weeks ago by MD per patient. Dominant Side:  
      RIGHT Personal Factors:   
      Sex:  female Age:  77 y.o. Ambulatory/Rehab Services H2 Model Falls Risk Assessment Risk Factors: 
     No Risk Factors Identified Ability to Rise from Chair: 
     (1)  Pushes up, successful in one attempt Falls Prevention Plan: No modifications necessary Total: (5 or greater = High Risk): 1 ©2010 [facility] [address] [place] [address] States Patent #[id number]. Federal Law prohibits the replication, distribution or use without written permission from [facility] [employer] Current Medications:   
  
Current Outpatient Medications:  
  albuterol (PROVENTIL HFA, VENTOLIN HFA, PROAIR HFA) 90 mcg/actuation inhaler, Take 2 Puffs by inhalation. , Disp: , Rfl:  
  b complex vitamins tablet, Take 1 Tab by mouth every morning.  Holding for surgery, Disp: , Rfl:  
  temazepam (RESTORIL) 15 mg capsule, Take  by mouth nightly as needed for Sleep., Disp: , Rfl:  
  DULoxetine (CYMBALTA) 30 mg capsule, Take 30 mg by mouth every morning., Disp: , Rfl:  
  ascorbic acid (VITAMIN C) 500 mg tablet, Take 500 mg by mouth every morning. Holding for surgery, Disp: , Rfl:  
  multivitamin (ONE A DAY) tablet, Take 1 Tab by mouth every morning. Holding for surgery, Disp: , Rfl:   
Date Last Reviewed:  1/18/2021 Number of Personal Factors/Comorbidities that affect the Plan of Care: 1-2: MODERATE COMPLEXITY EXAMINATION:  
Observation/Orthostatic Postural Assessment: Forward head/rounded shoulders posture. Palpation:   
      Increased tenderness to palpation along bilateral SI region, left worst than right. ROM:   
      Lumbar range of motion is as follows: flexion within normal limits, extension 25%, right rotation 75%, left rotation within normal limits, right lateral flexion 75%, and left lateral flexion within normal limits. Patient complains of pulling with right rotation and right lateral flexion and increased pain in lower back with extension. Strength:   
      Hip flexion right 5/5 and left 4+/5, hip abduction right 5/5 and left 4/5, hip adduction right 5/5 and left 4+/5, quadriceps 5/5, hamstrings right 5/5 and left 4/5, ankle dorsiflexion 5/5, ankle plantarflexion 5/5, and core strength 3-/5. Special Tests:   
      Straight leg raise negative bilateral.  Piriformis test negative bilateral.   
Neurological Screen: 
      Sensation: Within normal limits. Functional Mobility:  
      Gait/Ambulation:  Ambulates with normal gait. Body Structures Involved: 1. Nerves 2. Muscles Body Functions Affected: 1. Neuromusculoskeletal Activities and Participation Affected: 1. General Tasks and Demands 2. Interpersonal Interactions and Relationships 3. Community, Social and [place] Number of elements (examined above) that affect the Plan of Care: 4+: HIGH COMPLEXITY CLINICAL PRESENTATION:  
Presentation: Evolving clinical presentation with changing clinical characteristics: MODERATE COMPLEXITY CLINICAL DECISION MAKING: Use of outcome tool(s) and clinical judgement create a POC that gives a: Questionable prediction of patient's progress: MODERATE COMPLEXITY

## 2021-01-18 NOTE — PROGRESS NOTES
Jefferson Blackburn  : 1945  [place] [facility] [employer] [facility]  Secondary:  [address] at [place] [id number] [facility], [address] [place] [address],[address], [facility] [address]  [phone]   [phone]     OUTPATIENT PHYSICAL THERAPY: Daily Treatment Note 2021  Visit Count:  5    ICD-10: Treatment Diagnosis: Low back pain (M54.5)  Precautions/Allergies:   Adhesive, Alendronate sodium, Antihistamine [triprolidine-pseudoephedrine], and Boniva [ibandronate]   TREATMENT PLAN:  Effective Dates: 2020 TO 3/18/2021 (90 days). Frequency/Duration: 2 times a week for 90 Day(s)    Pre-treatment Symptoms/Complaints:  \"I am feeling better from my dizziness. My pain just fluctuates. I feel stronger, but it is still hard to sleep. I have a back brace. I used the brace walking at [employer] and it didn't feel any different. I saw Dr. Jeimy Robledo on Friday and he wants me out of work four more weeks and to continue therapy\". Pain: Initial: Pain Intensity 1: 2  Pain Location 1: Back  Pain Orientation 1: Lower  Post Session:  2/10   Medications Last Reviewed:  2021  Updated Objective Findings:  See progress note  TREATMENT:     MANUAL THERAPY: (15 minutes): Soft tissue mobilization was utilized and necessary because of the patient's painful spasm. Patient received trigger point release along bilateral SI region/piriformis to  pain. THERAPEUTIC EXERCISE: (30 minutes):  Exercises per grid below to improve strength. Required minimal verbal cues to promote proper body alignment. Progressed repetitions as indicated.    Date:  21 Date:  21   Activity/Exercise Parameters Parameters   Pelvic tilts 20 reps 20 reps   Bridging  20 reps 20 reps   Double knee to chest stretch X X   Supine piriformis stretch 5 x 10 sec holds Bilaterally 5 x 10 sec holds Bilaterally   Nustep Level 3 x 6 minutes Level 3 x 6 minutes   Supine hamstring stretch with ankle pumps 3 reps with 10 ankle pumps bilateral  3 reps with 10 ankle pumps bilateral    Supine straight leg raise 2x10 reps bilateral 1# 2x10 reps bilateral 1#   Supine isometric hip flexion 5 x 10 sec holds Bilaterally 10 x 10 sec holds Bilaterally   Double knee lifts 2x10 reps 2x10 reps   Quadriped with leg lifts  2x10 reps bilateral  2x10 reps bilateral    Wall squats with red ball 2x10 reps 15 reps bilateal   Prone hip extension 2x10 reps 2x10 reps                           MedBridge Portal  Treatment/Session Summary:    · Response to Treatment:  Patient tolerated without complaints. · Communication/Consultation:  None today  · Equipment provided today:  None today  · Recommendations/Intent for next treatment session: Next visit will focus on core strengthening, stretching exercises, and manual therapy.     Total Treatment Billable Duration:  45 minutes  PT Patient Time In/Time Out  Time In: 0845  Time Out: [address] PT    Future Appointments   Date Time Provider Radha Bautista   1/21/2021  9:30 AM Alonso Mcmillan, PT [facility]   1/27/2021  8:45 AM Meredith Baron, PT [facility]   2/10/2021  8:45 AM Meredith Baron, PT [facility]   2/24/2021  8:45 AM Meredith Baron, PT [facility]   9/27/2021  1:40 PM Dandre Aquino MD END BS ENDO

## 2021-01-21 ENCOUNTER — HOSPITAL ENCOUNTER (OUTPATIENT)
Dept: PHYSICAL THERAPY | Age: 76
Discharge: HOME | End: 2021-01-21
Payer: COMMERCIAL

## 2021-01-21 PROCEDURE — 97110 THERAPEUTIC EXERCISES: CPT

## 2021-01-21 PROCEDURE — 97140 MANUAL THERAPY 1/> REGIONS: CPT

## 2021-01-21 NOTE — PROGRESS NOTES
Alban Baumann  : 1945  Primary: [facility]  Secondary:  [address] at [place] [id number] [facility], [address] [place] [address] 83,[address], [address] [facility]  [phone]   [phone]     OUTPATIENT PHYSICAL THERAPY: Daily Treatment Note 2021  Visit Count:  6    ICD-10: Treatment Diagnosis: Low back pain (M54.5)  Precautions/Allergies:   Adhesive, Alendronate sodium, Antihistamine [triprolidine-pseudoephedrine], and Boniva [ibandronate]   TREATMENT PLAN:  Effective Dates: 2020 TO 3/18/2021 (90 days). Frequency/Duration: 2 times a week for 90 Day(s)    Pre-treatment Symptoms/Complaints:  \"I was really sore after last time. I am approved for an injection, but I have to go get it in [place]. I don't feel comfortable driving by myself. I have some friends that live in [place]. I may see if I can stay there. I walked two miles yesterday\". Pain: Initial: Pain Intensity 1: 5  Pain Location 1: Back  Pain Orientation 1: Lower  Post Session:  5/10   Medications Last Reviewed:  2021  Updated Objective Findings:  None Today  TREATMENT:     MANUAL THERAPY: (15 minutes): Soft tissue mobilization was utilized and necessary because of the patient's painful spasm. Patient received trigger point release along bilateral SI region/piriformis to  pain. THERAPEUTIC EXERCISE: (30 minutes):  Exercises per grid below to improve strength. Required minimal verbal cues to promote proper body alignment. Progressed repetitions as indicated.    Date:  21 Date:  21   Activity/Exercise Parameters Parameters   Pelvic tilts 20 reps X   Bridging  20 reps X   Double knee to chest stretch X 5 x 10 sec holds Bilaterally   Supine piriformis stretch 5 x 10 sec holds Bilaterally 5 x 10 sec holds Bilaterally   Nustep Level 3 x 6 minutes Level 3 x 6 minutes    Supine hamstring stretch with ankle pumps 3 reps with 10 ankle pumps bilateral  3 reps with 10 ankle pumps bilateral Supine straight leg raise 2x10 reps bilateral 1# 2x10 reps bilateral 1#   Supine isometric hip flexion 5 x 10 sec holds Bilaterally 10 x 10 sec holds Bilaterally   Double knee lifts 2x10 reps X   Quadriped with leg lifts  2x10 reps bilateral  2x10 reps bilateral    Wall squats with red ball 2x10 reps 15 reps bilateal   Prone hip extension 2x10 reps 2x10 reps   Supine lower trunk rotation  10 reps bilateral                       Med[employer] Portal  Treatment/Session Summary:    · Response to Treatment:  Patient did not perform pelvic tilts, bridging, and double knee lifts due to increased back pain. Patient strongly encouraged to find transportation to be able to recieve back injection for pain. · Communication/Consultation:  None today  · Equipment provided today:  None today  · Recommendations/Intent for next treatment session: Next visit will focus on core strengthening, stretching exercises, and manual therapy.     Total Treatment Billable Duration:  45 minutes  PT Patient Time In/Time Out  Time In: 0930  Time Out: Eulogio Evans, PT    Future Appointments   Date Time Provider Radha Bautista   1/27/2021  8:45 AM Curly Basque, PT [facility]FE   2/10/2021  8:45 AM Curly Basque, PT [facility]   2/24/2021  8:45 AM Warren Basque, PT [facility]   9/27/2021  1:40 PM Jaclyn Sweeney MD END BS ENDO

## 2021-01-25 ENCOUNTER — HOSPITAL ENCOUNTER (OUTPATIENT)
Dept: PHYSICAL THERAPY | Age: 76
Discharge: HOME | End: 2021-01-25
Payer: COMMERCIAL

## 2021-01-25 NOTE — PROGRESS NOTES
Reema Kurtz  : 1945  Primary: [facility]  Secondary:  [address] at [place] [id number] [facility], [address] [place] [address],[address], 7[address] [facility]  [phone]   [phone]     OUTPATIENT DAILY NOTE    NAME/AGE/GENDER: Reema Kurtz is a 77 y.o. female. DATE: 2021    Ms. Beach CANCELED for today's appointment due to hurting her knee over the weekend.     Deejay Lei, PT

## 2021-01-27 ENCOUNTER — HOSPITAL ENCOUNTER (OUTPATIENT)
Dept: PHYSICAL THERAPY | Age: 76
Discharge: HOME | End: 2021-01-27
Payer: MEDICARE

## 2021-01-27 PROCEDURE — 97140 MANUAL THERAPY 1/> REGIONS: CPT

## 2021-01-27 NOTE — PROGRESS NOTES
Malgorzata Nunez  : 1945  Primary:   Secondary:  [address] at [place] [address] [facility], [address] [place] [address],[address], [place] [address].  [phone]   [phone]         OUTPATIENT PHYSICAL THERAPY: Daily Treatment Note 2021  ICD-10: Treatment Diagnosis: Cervicalgia (M54.2)  Pre-treatment Symptoms/Complaints:  2021: Patient reports her right shoulder is very painful today. Pain: Initial: Pain Intensity 1: 5  Pain Location 1: Neck, Shoulder  Pain Orientation 1: Right, Left  Pain Intervention(s) 1: Rest, Medication (see MAR)  Post Session:  3/10   Medications Last Reviewed:  2021   Updated Objective Findings:  None Today   TREATMENT:   MANUAL THERAPY: (40 minutes): Joint mobilization and Soft tissue mobilization was utilized and necessary because of the patient's restricted joint motion, painful spasm, loss of articular motion and restricted motion of soft tissue. Treatment/Session Summary:    · Response to Treatment:  Patient completed all activities with improving overall mobility. · Communication/Consultation:  None today  · Equipment provided today:  None today  · Recommendations/Intent for next treatment session: Next visit will focus on improving overall mobility with decreased pain. Treatment Plan of Care Effective Dates:  2020 TO 3/16/2021 (90 days).    Total Treatment Billable Duration:  40 minutes  PT Patient Time In/Time Out  Time In: 0845  Time Out: [address] [facility], PT    Future Appointments   Date Time Provider Radha Bautista   2021  8:00 AM Rochester Grade, PT [facility]FE   2021  9:30 AM Vissage, Raford Bowels, PT [facility]   2/10/2021  8:45 AM Yola Amelia, PT [facility]   2021  9:30 AM Vissage, Raford Bowels, PT [facility]   2021  8:45 AM Vissage, Raford Bowels, PT [facility]   2021  8:45 AM Yola Conecuh, PT [facility]   2021  8:45 AM Vissage, Raford Bowels, PT [facility]   2021  1:40 PM Eliza Shell MD END BS ENDO

## 2021-01-28 ENCOUNTER — HOSPITAL ENCOUNTER (OUTPATIENT)
Dept: PHYSICAL THERAPY | Age: 76
Discharge: HOME | End: 2021-01-28
Payer: COMMERCIAL

## 2021-01-28 PROCEDURE — 97140 MANUAL THERAPY 1/> REGIONS: CPT

## 2021-01-28 PROCEDURE — 97110 THERAPEUTIC EXERCISES: CPT

## 2021-01-28 NOTE — PROGRESS NOTES
Terri Robles  : 1945  Primary:   Secondary:  [address] at [address], [address] [place] [address],[address], [place] [address].  [phone]   [phone]     OUTPATIENT PHYSICAL THERAPY: Daily Treatment Note 2021  Visit Count:  52    ICD-10: Treatment Diagnosis: Low back pain (M54.5)  Precautions/Allergies:   Adhesive, Alendronate sodium, Antihistamine [triprolidine-pseudoephedrine], and Boniva [ibandronate]   TREATMENT PLAN:  Effective Dates: 2020 TO 3/18/2021 (90 days). Frequency/Duration: 2 times a week for 90 Day(s)    Pre-treatment Symptoms/Complaints:  \"I bent down to tape up the floor and hurt my knee. My back is hurting more today. I found a friend that can take me for the injection in my back\". Pain: Initial: Pain Intensity 1: 7  Pain Location 1: Back  Pain Orientation 1: Lower  Post Session:  7/10   Medications Last Reviewed:  2021  Updated Objective Findings:  None Today  TREATMENT:     MANUAL THERAPY: (20 minutes): Soft tissue mobilization was utilized and necessary because of the patient's painful spasm. Patient received trigger point release along bilateral SI region/piriformis to  pain. THERAPEUTIC EXERCISE: (25 minutes):  Exercises per grid below to improve strength. Required minimal verbal cues to promote proper body alignment. Progressed repetitions as indicated.    Date:  21 Date:  21   Activity/Exercise Parameters Parameters   Pelvic tilts X X   Bridging  X X   Double knee to chest stretch 5 x 10 sec holds Bilaterally 5 x 10 sec holds Bilaterally   Supine piriformis stretch 5 x 10 sec holds Bilaterally 5 x 10 sec holds Bilaterally   Nustep Level 3 x 6 minutes Level 3 x 6 minutes    Supine hamstring stretch with ankle pumps 3 reps with 10 ankle pumps bilateral  3 reps with 10 ankle pumps bilateral    Supine straight leg raise 2x10 reps bilateral 1# 2x10 reps bilateral 1#   Supine isometric hip flexion 5 x 10 sec holds Bilaterally 10 x 10 sec holds Bilaterally   Double knee lifts 2x10 reps X   Quadriped with leg lifts  x10 reps bilateral  2x10 reps bilateral    Wall squats with red ball X 15 reps bilateal   Prone hip extension X 2x10 reps   Supine lower trunk rotation 10 reps bilateral  10 reps bilateral                       MedBridge Portal  Treatment/Session Summary:    · Response to Treatment:  Patient reports more pain with exercises today.  Did not perform some exercises due to increased back pain.  · Communication/Consultation:  None today  · Equipment provided today:  None today  · Recommendations/Intent for next treatment session: Next visit will focus on core strengthening, stretching exercises, and manual therapy.    Total Treatment Billable Duration:  45 minutes  PT Patient Time In/Time Out  Time In: 0800  Time Out: 0845  Karol Ashford PT    Future Appointments   Date Time Provider Department Center   2/4/2021  9:30 AM Karol Ashford, PT [facility]   2/10/2021  8:45 AM Hilaria Peng, PT [facility]   2/11/2021  9:30 AM Karol Ashford, PT [facility]   2/18/2021  8:45 AM Karol Ashford, PT [facility]   2/24/2021  8:45 AM Hilaria Peng, PT [facility]   2/25/2021  8:45 AM Karol Ashford, PT [facility]   9/27/2021  1:40 PM Billy Dowd MD END BS ENDO

## 2021-02-04 ENCOUNTER — HOSPITAL ENCOUNTER (OUTPATIENT)
Dept: PHYSICAL THERAPY | Age: 76
Discharge: HOME | End: 2021-02-04
Payer: OTHER MISCELLANEOUS

## 2021-02-04 PROCEDURE — 97110 THERAPEUTIC EXERCISES: CPT

## 2021-02-04 PROCEDURE — 97140 MANUAL THERAPY 1/> REGIONS: CPT

## 2021-02-04 NOTE — PROGRESS NOTES
Terri Arteaga  : 1945  Primary:   Secondary:  [facility] [place] [id number] [facility], [address] [place] [address],[address], [place] [address].  [phone]   [phone]     OUTPATIENT PHYSICAL THERAPY: Daily Treatment Note 2021  Visit Count:  8    ICD-10: Treatment Diagnosis: Low back pain (M54.5)  Precautions/Allergies:   Adhesive, Alendronate sodium, Antihistamine [triprolidine-pseudoephedrine], and Boniva [ibandronate]   TREATMENT PLAN:  Effective Dates: 2020 TO 3/18/2021 (90 days). Frequency/Duration: 2 times a week for 90 Day(s)    Pre-treatment Symptoms/Complaints:  \"I am going for an injection in my back on \". Pain: Initial: Pain Intensity 1: 7  Pain Location 1: Back  Pain Orientation 1: Lower  Post Session:  6/10   Medications Last Reviewed:  2021  Updated Objective Findings:  None Today  TREATMENT:     MANUAL THERAPY: (20 minutes): Soft tissue mobilization was utilized and necessary because of the patient's painful spasm. Patient received trigger point release along bilateral SI region/piriformis to  pain. THERAPEUTIC EXERCISE: (25 minutes):  Exercises per grid below to improve strength. Required minimal verbal cues to promote proper body alignment. Progressed repetitions as indicated.    Date:  21 Date:  21   Activity/Exercise Parameters Parameters   Pelvic tilts X X   Bridging  X X   Double knee to chest stretch 5 x 10 sec holds Bilaterally 5 x 10 sec holds Bilaterally   Supine piriformis stretch 5 x 10 sec holds Bilaterally 5 x 10 sec holds Bilaterally   Nustep Level 3 x 6 minutes Level 4 x 6 minutes    Supine hamstring stretch with ankle pumps 3 reps with 10 ankle pumps bilateral  3 reps with 10 ankle pumps bilateral    Supine straight leg raise 2x10 reps bilateral 1# 2x10 reps bilateral 1#   Supine isometric hip flexion 5 x 10 sec holds Bilaterally 10 x 10 sec holds Bilaterally   Double knee lifts 2x10 reps X   Quadriped with leg lifts  x10 reps bilateral  2x10 reps bilateral    Wall squats with red ball X X   Prone hip extension X X   Supine lower trunk rotation 10 reps bilateral  10 reps bilateral                       MedBridge Portal  Treatment/Session Summary:    · Response to Treatment:  Patient tolerated treatment well. · Communication/Consultation:  None today  · Equipment provided today:  None today  · Recommendations/Intent for next treatment session: Next visit will focus on core strengthening, stretching exercises, and manual therapy.     Total Treatment Billable Duration:  45 minutes  PT Patient Time In/Time Out  Time In: 0930  Time Out: Eulogio Evans, PT    Future Appointments   Date Time Provider Radha Bautista   2/10/2021  8:45 AM Cassia Caal, PT [facility]FE   2/11/2021  9:30 AM Janice Ashford, PT [facility]   2/19/2021  8:45 AM Janice Ashford, PT [facility]   2/24/2021  8:45 AM Cassia Caal, PT [facility]   2/25/2021  8:45 AM Janice Ashford, PT [facility]   9/27/2021  1:40 PM Selin Cartwright MD END BS ENDO

## 2021-02-10 ENCOUNTER — HOSPITAL ENCOUNTER (OUTPATIENT)
Dept: PHYSICAL THERAPY | Age: 76
Discharge: HOME | End: 2021-02-10
Payer: MEDICARE

## 2021-02-10 PROCEDURE — 97140 MANUAL THERAPY 1/> REGIONS: CPT

## 2021-02-10 NOTE — PROGRESS NOTES
Malgorzata Nunez  : 1945  Primary:   Secondary:  [address] at [address] cher[address] [facility]  [address] [facility], [address] [place] [address] 83[address], [address] [facility]  [phone]   [phone]         OUTPATIENT PHYSICAL THERAPY: Daily Treatment Note 2/10/2021  ICD-10: Treatment Diagnosis: Cervicalgia (M54.2)  Pre-treatment Symptoms/Complaints:  2/10/2021: Patient reports she is doing ok right now. Pain: Initial: Pain Intensity 1: 6  Pain Location 1: Neck, Shoulder  Pain Orientation 1: Right, Left  Pain Intervention(s) 1: Rest, Medication (see MAR)  Post Session:  3/10   Medications Last Reviewed:  2/10/2021   Updated Objective Findings:  None Today   TREATMENT:   MANUAL THERAPY: (40 minutes): Joint mobilization and Soft tissue mobilization was utilized and necessary because of the patient's restricted joint motion, painful spasm, loss of articular motion and restricted motion of soft tissue. Treatment/Session Summary:    · Response to Treatment:  Patient completed all activities with improving overall mobility. · Communication/Consultation:  None today  · Equipment provided today:  None today  · Recommendations/Intent for next treatment session: Next visit will focus on improving overall mobility with decreased pain. Treatment Plan of Care Effective Dates:  2020 TO 3/16/2021 (90 days).    Total Treatment Billable Duration:  40 minutes  PT Patient Time In/Time Out  Time In: 0845  Time Out: [address] [facility], PT    Future Appointments   Date Time Provider Radha Bautista   2021  9:30 AM London Grade, PT [facility] SFE   2021  8:45 AM Vissage, Raford Bowels, PT [facility]   2021  8:45 AM Yola Amelia, PT [facility]   2021  8:45 AM Vissage, Raford Bowels, PT [facility]   3/10/2021  8:45 AM Yola Conway, PT [facility]   3/24/2021  8:45 AM Yola Conway, PT [facility]   2021  1:40 PM Eliza Shell MD END BS ENDO

## 2021-02-11 ENCOUNTER — HOSPITAL ENCOUNTER (OUTPATIENT)
Dept: PHYSICAL THERAPY | Age: 76
Discharge: HOME | End: 2021-02-11
Payer: OTHER MISCELLANEOUS

## 2021-02-11 PROCEDURE — 97110 THERAPEUTIC EXERCISES: CPT

## 2021-02-11 PROCEDURE — 97140 MANUAL THERAPY 1/> REGIONS: CPT

## 2021-02-11 NOTE — PROGRESS NOTES
Jaida Wilson  : 1945  Primary:   Secondary:  [address]  at [place]  [address], [address], 4[address] [facility]  [phone]   [phone]     OUTPATIENT PHYSICAL THERAPY: Daily Treatment Note 2021  Visit Count:  9    ICD-10: Treatment Diagnosis: Low back pain (M54.5)  Precautions/Allergies:   Adhesive, Alendronate sodium, Antihistamine [triprolidine-pseudoephedrine], and Boniva [ibandronate]   TREATMENT PLAN:  Effective Dates: 2020 TO 3/18/2021 (90 days). Frequency/Duration: 2 times a week for 90 Day(s)    Pre-treatment Symptoms/Complaints:  \"Sore today, even my ribs hurt. I forget to take Advil today\". Pain: Initial: Pain Intensity 1: 7  Pain Location 1: Back  Pain Orientation 1: Lower  Post Session:  6/10   Medications Last Reviewed:  2021  Updated Objective Findings:  None Today  TREATMENT:     MANUAL THERAPY: (20 minutes): Soft tissue mobilization was utilized and necessary because of the patient's painful spasm. Patient received trigger point release along bilateral SI region/piriformis to  pain. THERAPEUTIC EXERCISE: (25 minutes):  Exercises per grid below to improve strength. Required minimal verbal cues to promote proper body alignment. Progressed repetitions as indicated.    Date:  21 Date:  21   Activity/Exercise Parameters Parameters   Pelvic tilts X X   Bridging  X X   Double knee to chest stretch 5 x 10 sec holds Bilaterally 5 x 10 sec holds Bilaterally   Supine piriformis stretch 5 x 10 sec holds Bilaterally 5 x 10 sec holds Bilaterally   Nustep Level 4 x 6 minutes Level 4 x 6 minutes    Supine hamstring stretch with ankle pumps 3 reps with 10 ankle pumps bilateral  3 reps with 10 ankle pumps bilateral    Supine straight leg raise 2x10 reps bilateral 2# 2x10 reps bilateral 1#   Supine isometric hip flexion 5 x 10 sec holds Bilaterally 10 x 10 sec holds Bilaterally   Double knee lifts 2x10 reps  X Quadriped with leg lifts  2x10 reps bilateral  2x10 reps bilateral    Wall squats with red ball X X   Prone hip extension X X   Supine lower trunk rotation 15 reps bilateral  10 reps bilateral                       MedBridge Portal  Treatment/Session Summary:    · Response to Treatment:  Patient continues to complain of increased back pain. · Communication/Consultation:  None today  · Equipment provided today:  None today  · Recommendations/Intent for next treatment session: Next visit will focus on core strengthening, stretching exercises, and manual therapy.     Total Treatment Billable Duration:  45 minutes  PT Patient Time In/Time Out  Time In: 0930  Time Out: Karen [address], PT    Future Appointments   Date Time Provider Radha Bautista   2/19/2021  8:45 AM Tony Segal, PT [facility] SFE   2/24/2021  8:45 AM Paul Rangel, PT [facility]   2/25/2021  8:45 AM Benitez Ashford, PT [facility]   3/10/2021  8:45 AM Paul Rangel, PT [facility]   3/24/2021  8:45 AM Paul Rangel, PT [facility]   9/27/2021  1:40 PM MD NELLY Leone ENDO

## 2021-02-18 ENCOUNTER — APPOINTMENT (OUTPATIENT)
Dept: PHYSICAL THERAPY | Age: 76
End: 2021-02-18
Payer: OTHER MISCELLANEOUS

## 2021-02-19 ENCOUNTER — HOSPITAL ENCOUNTER (OUTPATIENT)
Dept: PHYSICAL THERAPY | Age: 76
Discharge: HOME | End: 2021-02-19
Payer: OTHER MISCELLANEOUS

## 2021-02-19 PROCEDURE — 97112 NEUROMUSCULAR REEDUCATION: CPT

## 2021-02-19 PROCEDURE — 97110 THERAPEUTIC EXERCISES: CPT

## 2021-02-19 NOTE — PROGRESS NOTES
Shira Negron  : 1945  Primary:   Secondary:  [address] at [place] [id number] [facility], [address] [place] [address],[address], [place] [address].  [phone]   [phone]     OUTPATIENT PHYSICAL THERAPY: Daily Treatment Note 2021  Visit Count:  10    ICD-10: Treatment Diagnosis: Low back pain (M54.5)  Precautions/Allergies:   Adhesive, Alendronate sodium, Antihistamine [triprolidine-pseudoephedrine], and Boniva [ibandronate]   TREATMENT PLAN:  Effective Dates: 2020 TO 3/18/2021 (90 days). Frequency/Duration: 2 times a week for 90 Day(s)    Pre-treatment Symptoms/Complaints:  \"I had to cancel my injection yesterday because of the weather. My appointment is now in March. I had a bad day yesterday, but today is better\". Pain: Initial: Pain Intensity 1: 3  Pain Location 1: Back  Pain Orientation 1: Lower  Post Session:  3/10   Medications Last Reviewed:  2021  Updated Objective Findings:  None Today  TREATMENT:     MANUAL THERAPY: (20 minutes): Soft tissue mobilization was utilized and necessary because of the patient's painful spasm. Patient received trigger point release along bilateral SI region/piriformis to  pain. THERAPEUTIC EXERCISE: (25 minutes):  Exercises per grid below to improve strength. Required minimal verbal cues to promote proper body alignment. Progressed repetitions as indicated.    Date:  21 Date:  21   Activity/Exercise Parameters Parameters   Pelvic tilts X X   Bridging  X X   Double knee to chest stretch 5 x 10 sec holds Bilaterally 5 x 10 sec holds Bilaterally   Supine piriformis stretch 5 x 10 sec holds Bilaterally 5 x 10 sec holds Bilaterally   Nustep Level 4 x 6 minutes Level 4 x 6 minutes    Supine hamstring stretch with ankle pumps 3 reps with 10 ankle pumps bilateral  3 reps with 10 ankle pumps bilateral    Supine straight leg raise 2x10 reps bilateral 2# 2x10 reps bilateral 1#   Supine isometric hip flexion 5 x 10 sec holds Bilaterally 10 x 10 sec holds Bilaterally   Double knee lifts 2x10 reps  2x10 reps   Quadriped with leg lifts  2x10 reps bilateral  2x10 reps bilateral    Wall squats with red ball X X   Prone hip extension X X   Supine lower trunk rotation 15 reps bilateral  10 reps bilateral                       MedBridge Portal  Treatment/Session Summary:    · Response to Treatment:  Patient tolerated treatment without complains. · Communication/Consultation:  None today  · Equipment provided today:  None today  · Recommendations/Intent for next treatment session: Next visit will focus on core strengthening, stretching exercises, and manual therapy.     Total Treatment Billable Duration:  45 minutes  PT Patient Time In/Time Out  Time In: 0845  Time Out: [address] PT    Future Appointments   Date Time Provider Radha Bautista   2/24/2021  8:45 AM Frutoso Barrier, PT [facility] SFE   2/25/2021  8:45 AM Lamine Ashford, PT [facility]   3/10/2021  8:45 AM Frutoso Barrier, PT [facility]   3/24/2021  8:45 AM Frutoso Barrier, PT [facility]FE   9/27/2021  1:40 PM Shane Sullivan MD END BS ENDO

## 2021-02-24 ENCOUNTER — HOSPITAL ENCOUNTER (OUTPATIENT)
Dept: PHYSICAL THERAPY | Age: 76
Discharge: HOME | End: 2021-02-24
Payer: MEDICARE

## 2021-02-24 PROCEDURE — 97140 MANUAL THERAPY 1/> REGIONS: CPT

## 2021-02-24 NOTE — PROGRESS NOTES
Tessie Larkin  : 1945  [place]  Secondary: Adrián Christianson at [place] [id number] [facility], [address] [place] [address],[address], [address] [facility]  [phone]   [phone]          OUTPATIENT PHYSICAL THERAPY:Progress Report 2021   ICD-10: Treatment Diagnosis: Cervicalgia (M54.2)  Precautions/Allergies:   Adhesive, Alendronate sodium, Antihistamine [triprolidine-pseudoephedrine], and Boniva [ibandronate]   MD Orders: Evaluate and Treat MEDICAL/REFERRING DIAGNOSIS:  Cervicalgia [M54.2]   DATE OF ONSET: Chronic  REFERRING PHYSICIAN:  José Luis Uribe NP   RETURN PHYSICIAN APPOINTMENT: TBD     ASSESSMENT:  Ms. Angel Bee presents with improving mobility and pain in cervical region secondary to degenerative changes. Patient has attended a total of 50 scheduled physical therapy visits. Treatment has consisted of stretching, strengthening, and manual therapy to improve overall pain and performance with activities of daily living. PROBLEM LIST (Impacting functional limitations):  1. Decreased Strength  2. Decreased ADL/Functional Activities  3. Increased Pain  4. Decreased Activity Tolerance INTERVENTIONS PLANNED: (Treatment may consist of any combination of the following)  1. Cold  2. Heat  3. Home Exercise Program (HEP)  4. Manual Therapy  5. Neuromuscular Re-education/Strengthening  6. Range of Motion (ROM)  7. Therapeutic Activites  8. Therapeutic Exercise/Strengthening   TREATMENT PLAN:  Effective Dates: 2020 TO 3/16/2021 (90 days). Frequency/Duration: 1 time every other week for 90 Day(s)  GOALS: (Goals have been discussed and agreed upon with patient.)  Short-Term Functional Goals: Time Frame: 30 days  1. Patient will be independent with home exercise program without exacerbation of symptoms or cueing needed--goal met.    2. Patient will be independent with correct sleeping positions and awareness/avoidance of aggravating positions without cueing needed--goal met. Discharge Goals: Time Frame: 90 days  1. Patient will be independent with all ADLs with minimal onset of neck pain and no deficits with daily tasks--goal ongoing. 2. Patient will report no fear avoidance with social or recreational activities due to neck pain --goal ongoing. 3. Patient will score less than or equal to 7/50 on Neck Disability Index with minimal effect of neck pain on patient's ability to manage every day life activities--goal ongoing. Rehabilitation Potential For Stated Goals: Good               HISTORY:   History of Present Injury/Illness (Reason for Referral):  8/26/2020 (Progress Note): Patient reports she is doing well, but has been having more pain in her right shoulder lately. 7/19/9644 (Recertification): Patient reports she is having tightness in her shoulder and neck. 10/21/2020 (Progress Note): Patient reports her neck and shoulders are tight today. 12/80/5631 (Recertification): Patient reports her neck has been doing well lately, but her shoulders are more painful lately. 1/13/2021 (Progress Note): Patient reports she is having tightness in her right neck more than her left right now. She reports she is dizzy as well. 2/24/2021 (Progress Note): Patient reports she is feeling overall better, but has been having more pain in her left shoulder. Past Medical History/Comorbidities:   Ms. Nat Tilley  has a past medical history of Abnormal Pap smear, Anxiety, COPD (chronic obstructive pulmonary disease) ([facility] [address] Depression, Hyperthyroidism, Insomnia, Irritable bowel syndrome, and Osteopenia. She also has no past medical history of Difficult intubation, Malignant hyperthermia due to anesthesia, Nausea & vomiting, Pseudocholinesterase deficiency, or Unspecified adverse effect of anesthesia.   Ms. Nat Tilley  has a past surgical history that includes hx tonsillectomy; hx orthopaedic; hx salpingo-oophorectomy (Right, 01/22/2014); and hx colonoscopy. Social History/Living Environment:   Home Environment: Private residence  Living Alone: Yes  Support Systems: Family member(s), Friends \ neighbors  Prior Level of Function/Work/Activity:  Independent  Dominant Side:         RIGHT  Personal Factors:          Sex:  female        Age:  77 y.o. Current Medications:       Current Outpatient Medications:     albuterol (PROVENTIL HFA, VENTOLIN HFA, PROAIR HFA) 90 mcg/actuation inhaler, Take 2 Puffs by inhalation. , Disp: , Rfl:     b complex vitamins tablet, Take 1 Tab by mouth every morning. Holding for surgery, Disp: , Rfl:     temazepam (RESTORIL) 15 mg capsule, Take  by mouth nightly as needed for Sleep., Disp: , Rfl:     DULoxetine (CYMBALTA) 30 mg capsule, Take 30 mg by mouth every morning., Disp: , Rfl:     ascorbic acid (VITAMIN C) 500 mg tablet, Take 500 mg by mouth every morning. Holding for surgery, Disp: , Rfl:     multivitamin (ONE A DAY) tablet, Take 1 Tab by mouth every morning. Holding for surgery, Disp: , Rfl:    Date Last Reviewed:  2021    EXAMINATION:   Observation/Orthostatic Postural Assessment:          Posture Assessment: Rounded shoulders, Forward head   Palpation:          Tightness and tenderness to palpation noted throughout upper and lower cervical musculature. No bruising or swelling noted. ROM:          Cervical Assessment (AROM):  · Flexion: 75% of full AROM  · Extension: 75% of full AROM  · Right side bendin% of full AROM  · Left side bending[de-identified] 75% of full AROM  · Right rotation: 75% of full AROM  · Left rotation: 75% of full AROM  Strength:          Cervical Assessment (Strength):  · Grossly 2+/5 with manual muscle testing  Special Tests:          Negative Hautant's test. Negative Cranial-[place]ark lift test. Negative [place]-Axis Sheer test. Negative Alar Ligament test. Negative Tectorial Membrane test.   Neurological Screen:        Dermatomes:   Within normal limits        Reflexes:  2+  Functional Mobility: Gait/Mobility:    ·   Independent         Transfers:     · Sit to Stand: Independent  · Stand to Sit: Independent  · Stand Pivot Transfers: Independent  · Bed to Chair: Independent  · Lateral Transfers: Independent         Bed Mobility:     · Rolling: Independent  · Supine to Sit: Independent  · Sit to Supine: Independent  · Scooting: Independent        CLINICAL DECISION MAKING:   Outcome Measure: Tool Used: Neck Disability Index (NDI)  Score:  Initial: 17/50  Most Recent: 14/50 (Date: 2/24/2021 )   Interpretation of Score: The Neck Disability Index is a revised form of the Oswestry Low Back Pain Index and is designed to measure the activities of daily living in person's with neck pain. Each section is scored on a 0-5 scale, 5 representing the greatest disability. The scores of each section are added together for a total score of 50. Medical Necessity:   · Patient is expected to demonstrate progress in strength and range of motion to improve safety during daily activities. · Patient demonstrates good rehab potential due to higher previous functional level. · Skilled intervention continues to be required due to decreased mobility. Reason for Services/Other Comments:  · Patient continues to demonstrate capacity to improve overall mobility which will increase independence and increase safety.

## 2021-02-24 NOTE — PROGRESS NOTES
Jaida Wilson  : 1945  Primary:   Secondary:  [address] at [place] [address] [facility], [address] [place] [address],[address], [facility] [address].  [phone]   [phone]         OUTPATIENT PHYSICAL THERAPY: Daily Treatment Note 2021  ICD-10: Treatment Diagnosis: Cervicalgia (M54.2)  Pre-treatment Symptoms/Complaints:  2021: Patient reports she is hurting more in her left shoulder today than her right. Pain: Initial: Pain Intensity 1: 4  Pain Location 1: Neck, Shoulder  Pain Orientation 1: Right, Left  Pain Intervention(s) 1: Rest, Medication (see MAR)  Post Session:  3/10   Medications Last Reviewed:  2021   Updated Objective Findings:  None Today   TREATMENT:   MANUAL THERAPY: (40 minutes): Joint mobilization and Soft tissue mobilization was utilized and necessary because of the patient's restricted joint motion, painful spasm, loss of articular motion and restricted motion of soft tissue. Treatment/Session Summary:    · Response to Treatment:  Patient completed all activities with improving overall mobility. · Communication/Consultation:  None today  · Equipment provided today:  None today  · Recommendations/Intent for next treatment session: Next visit will focus on improving overall mobility with decreased pain. Treatment Plan of Care Effective Dates:  2020 TO 3/16/2021 (90 days).    Total Treatment Billable Duration:  40 minutes  PT Patient Time In/Time Out  Time In: 0845  Time Out: [address] [facility], PT    Future Appointments   Date Time Provider Radha Bautista   2021  8:45 AM Cassia Caal, PT [facility] SFE   2021  8:45 AM Janice Ashford, PT [facility] SFE   3/10/2021  8:45 AM Cassia Caal, PT [facility] SFE   3/24/2021  8:45 AM Cassia Caal, PT [facility] SFE   2021  1:40 PM Selin Cartwright MD END BS ENDO

## 2021-02-25 ENCOUNTER — HOSPITAL ENCOUNTER (OUTPATIENT)
Dept: PHYSICAL THERAPY | Age: 76
Discharge: HOME | End: 2021-02-25
Payer: OTHER MISCELLANEOUS

## 2021-02-25 NOTE — PROGRESS NOTES
Sylvester Davis  : 1945  Primary: Eugenia Hendricks Generic Workers Compens*  Secondary:  Therapy Center at [facility], [address] [place] [address],[address], [address] [facility]  [phone]   [phone]     OUTPATIENT DAILY NOTE    NAME/AGE/GENDER: Sylvester Davis is a 77 y.o. female. DATE: 2021    Ms. Beach CANCELED for today's appointment due to back is hurting to bad.     Attila Jauregui, PT

## 2021-03-05 ENCOUNTER — HOSPITAL ENCOUNTER (OUTPATIENT)
Dept: PHYSICAL THERAPY | Age: 76
Discharge: HOME | End: 2021-03-05
Payer: COMMERCIAL

## 2021-03-05 PROCEDURE — 97112 NEUROMUSCULAR REEDUCATION: CPT

## 2021-03-05 PROCEDURE — 97110 THERAPEUTIC EXERCISES: CPT

## 2021-03-05 NOTE — PROGRESS NOTES
Min Hoang  : 1945  Primary:   Secondary:  [address] at [address] [place] [address],[address] [facility], [place] [address].  [phone]   [phone]     OUTPATIENT PHYSICAL THERAPY: Daily Treatment Note 3/5/2021  Visit Count:  11    ICD-10: Treatment Diagnosis: Low back pain (M54.5)  Precautions/Allergies:   Adhesive, Alendronate sodium, Antihistamine [triprolidine-pseudoephedrine], and Boniva [ibandronate]   TREATMENT PLAN:  Effective Dates: 2020 TO 3/18/2021 (90 days). Frequency/Duration: 2 times a week for 90 Day(s)    Pre-treatment Symptoms/Complaints: \"It has been rough. Last week, I had pain and numbness going down my right leg. Today I have pain radiating down left leg. I am seeing the orthopedic doctor today \". Pain: Initial: Pain Intensity 1: 3  Pain Location 1: Back  Pain Orientation 1: Lower, Left  Post Session:  2/10   Medications Last Reviewed:  3/5/2021  Updated Objective Findings:  None Today  TREATMENT:     MANUAL THERAPY: (20 minutes): Soft tissue mobilization was utilized and necessary because of the patient's painful spasm. Patient received trigger point release along bilateral SI region/piriformis to  pain. THERAPEUTIC EXERCISE: (25 minutes):  Exercises per grid below to improve strength. Required minimal verbal cues to promote proper body alignment. Progressed repetitions as indicated.    Date:  3/5/21 Date:  21   Activity/Exercise Parameters Parameters   Pelvic tilts X X   Bridging  X X   Double knee to chest stretch 5 x 10 sec holds Bilaterally 5 x 10 sec holds Bilaterally   Supine piriformis stretch 5 x 10 sec holds Bilaterally 5 x 10 sec holds Bilaterally   Nustep Level 4 x 6 minutes Level 4 x 6 minutes    Supine hamstring stretch with ankle pumps 3 reps with 10 ankle pumps bilateral  3 reps with 10 ankle pumps bilateral    Supine straight leg raise 2x10 reps bilateral 2# 2x10 reps bilateral 1#   Supine isometric hip flexion 5 x 10 sec holds Bilaterally 10 x 10 sec holds Bilaterally   Double knee lifts 2x10 reps  2x10 reps   Quadriped with leg lifts  2x10 reps bilateral  2x10 reps bilateral    Wall squats with red ball X X   Prone hip extension X X   Supine lower trunk rotation 15 reps bilateral  10 reps bilateral                       Med[employer] Portal  Treatment/Session Summary:    · Response to Treatment:  Patient reports decreased muscular tightness after manual therapy. · Communication/Consultation:  None today  · Equipment provided today:  None today  · Recommendations/Intent for next treatment session: Next visit will focus on core strengthening, stretching exercises, and manual therapy.     Total Treatment Billable Duration:  45 minutes  PT Patient Time In/Time Out  Time In: 0930  Time Out: [facility] , PT    Future Appointments   Date Time Provider Radha Bautista   3/10/2021  8:45 AM Ethan Harman, PT [facility] SFE   3/11/2021  8:00 AM Mario Ashfordosia Mom, PT [facility] SFE   3/19/2021  9:30 AM Kirsten Ashford Mom, PT [facility] SFE   3/24/2021  8:45 AM Ethan Harman PT [facility] SFE   3/26/2021  9:30 AM Mario Ashfordosia Mom, PT [facility] SFE   9/27/2021  1:40 PM Rolando Callaway MD END BS ENDO

## 2021-03-10 ENCOUNTER — HOSPITAL ENCOUNTER (OUTPATIENT)
Dept: PHYSICAL THERAPY | Age: 76
Discharge: HOME | End: 2021-03-10
Payer: MEDICARE

## 2021-03-10 PROCEDURE — 97140 MANUAL THERAPY 1/> REGIONS: CPT

## 2021-03-10 NOTE — PROGRESS NOTES
Velasquez Mittal  : 1945  Primary:   Secondary:  [address] at [address] [place] [address] [facility], [address] [place] [address],[address], [place] [address].  [phone]   [phone]         OUTPATIENT PHYSICAL THERAPY: Daily Treatment Note 3/10/2021  ICD-10: Treatment Diagnosis: Cervicalgia (M54.2)  Pre-treatment Symptoms/Complaints:  3/10/2021: Patient reports she is hurting in both shoulders today. Pain: Initial: Pain Intensity 1: 4  Pain Location 1: Neck, Shoulder  Pain Orientation 1: Right, Left  Pain Intervention(s) 1: Rest, Medication (see MAR)  Post Session:  3/10   Medications Last Reviewed:  3/10/2021   Updated Objective Findings:  None Today   TREATMENT:   MANUAL THERAPY: (40 minutes): Joint mobilization and Soft tissue mobilization was utilized and necessary because of the patient's restricted joint motion, painful spasm, loss of articular motion and restricted motion of soft tissue. Treatment/Session Summary:    · Response to Treatment:  Patient completed all activities with improving overall mobility. · Communication/Consultation:  None today  · Equipment provided today:  None today  · Recommendations/Intent for next treatment session: Next visit will focus on improving overall mobility with decreased pain. Treatment Plan of Care Effective Dates:  2020 TO 3/16/2021 (90 days).    Total Treatment Billable Duration:  40 minutes  PT Patient Time In/Time Out  Time In: 0845  Time Out: [address] [facility], PT    Future Appointments   Date Time Provider Radha Bautista   3/10/2021  8:45 AM Lucilaaugusta Messina, PT [facility] SFE   3/11/2021  8:00 AM José Miguel Ashfordt, PT [facility] SFE   3/19/2021  9:30 AM VisJosé Miguel rendont, PT [facility] SFE   3/24/2021  8:45 AM Lucila Limblena, PT [facility] SFE   3/26/2021  9:30 AM José Miguel Ashfordt, PT [facility] SFE   2021  1:40 PM Roberta Evans MD END BS ENDO

## 2021-03-11 ENCOUNTER — HOSPITAL ENCOUNTER (OUTPATIENT)
Dept: PHYSICAL THERAPY | Age: 76
Discharge: HOME | End: 2021-03-11
Payer: COMMERCIAL

## 2021-03-11 PROCEDURE — 97110 THERAPEUTIC EXERCISES: CPT

## 2021-03-11 PROCEDURE — 97140 MANUAL THERAPY 1/> REGIONS: CPT

## 2021-03-19 ENCOUNTER — HOSPITAL ENCOUNTER (OUTPATIENT)
Dept: PHYSICAL THERAPY | Age: 76
Discharge: HOME | End: 2021-03-19
Payer: COMMERCIAL

## 2021-03-19 PROCEDURE — 97140 MANUAL THERAPY 1/> REGIONS: CPT

## 2021-03-19 PROCEDURE — 97110 THERAPEUTIC EXERCISES: CPT

## 2021-03-19 NOTE — THERAPY RECERTIFICATION
Sylvester Davis  : 1945  Primary: Eugenia Oh Generic Workers Compens*  Secondary:  Therapy Center at [address] [place] [address], [address] [place] [address],[address], [address] [facility]  [phone]   [phone]        OUTPATIENT PHYSICAL THERAPY:Recertification    ICD-10: Treatment Diagnosis: Low back pain (M54.5)  Precautions/Allergies:   Adhesive, Alendronate sodium, Antihistamine [triprolidine-pseudoephedrine], and Boniva [ibandronate]   TREATMENT PLAN:  Effective Dates: 3/19/2021 TO 2021 (90 days). Frequency/Duration: 2 times a week for 90 Days   MEDICAL/REFERRING DIAGNOSIS:  lbp   DATE OF ONSET: May 1, 2020  REFERRING PHYSICIAN: Myah Escoto*  MD Orders: Evaluate and treat  Return MD Appointment: Unknown      INITIAL ASSESSMENT:  Ms. Bull Deleon presents with increased pain, decreased strength, and increased muscular tightness. Patient would benefit from skilled physical therapy to address problems and goals. Thank you for this referral.    Recertification note:  Patient attended thirteen scheduled physical therapy appointments from 2020 to 3/18/2021. Patient has three more appointments approved through worker's compensation. Patient had a cortisone injection last Friday. Patient reported pain relief for two days after the injection. However, pain returned after those two days. Patient rates current pain level as 4/10. Patient reports increased back pain after grocery shopping. Patient would benefit from continuing skilled physical therapy to address problems and goals. Thank you for this referral.     PROBLEM LIST (Impacting functional limitations):  1. Decreased Strength  2. Increased Pain  3. Decreased [place] with Home Exercise Program INTERVENTIONS PLANNED: (Treatment may consist of any combination of the following)  1. Cold  2. Heat  3. Home Exercise Program (HEP)  4. Manual Therapy  5.  Therapeutic Exercise/Strengthening     GOALS: (Goals have been discussed and agreed upon with patient.)  Short-Term Functional Goals: Time Frame: 30 days   1. Patient will be independent with home exercise program to decrease pain. Goal met. 2. Patient will decrease score on Modified Oswestry Low Back Pain Questionnaire to less than or equal to 18 demonstrating improvement. Goal ongoing. Discharge Goals: Time Frame: 90 days   1. Patient will decrease score on Modified Oswestry Low Back Pain Questionnaire to less than or equal to 10 demonstrating improvement. Goal ongoing. 2. Patient will report being able to shower and fix meals with less complaints of back pain. Goal ongoing. 3. Patient will increase core strength to greater than or equal to 4/5 to improve ease with mobility. Goal ongoing. OUTCOME MEASURE:   Tool Used: Modified Oswestry Low Back Pain Questionnaire  Score:  Initial: 21/50  Most Recent: 22 (Date: 3-)   Interpretation of Score: Each section is scored on a 0-5 scale, 5 representing the greatest disability. The scores of each section are added together for a total score of 50. MEDICAL NECESSITY:   · Patient is expected to demonstrate progress in strength and pain to improve ease with activities of daily living. REASON FOR SERVICES/OTHER COMMENTS:  · Patient continues to require skilled intervention due to increased back pain interfering with daily activities. Regarding Dawit Hilllena Beach's therapy, I certify that the treatment plan above will be carried out by a therapist or under their direction. Thank you for this referral,  Bonnie Toledo PT     Referring Physician Signature: Alexander Parent _______________________________ Date _____________         PAIN/SUBJECTIVE:   Initial: Pain Intensity 1: 4  Pain Location 1: Back  Pain Orientation 1: Lower, Left  Post Session:  4/10   HISTORY:   History of Injury/Illness (Reason for Referral):  Patient reports injuring her back on May 1, 2020.   Patient was working at [employer] when a patient at the [place] jumped on her. Patient complains of an achy pain along lower back with radiating pain down left leg. Patient rates pain level with standing as 8/10. Pain fluctuates depending on activities. Aggravating factors are standing for prolonged periods of time. Patient reports having increased pain with showering, fixing meals, and grocery shopping. Past Medical History/Comorbidities:   Ms. Ember Ji  has a past medical history of Abnormal Pap smear, Anxiety, COPD (chronic obstructive pulmonary disease) ([address] Depression, Hyperthyroidism, Insomnia, Irritable bowel syndrome, and Osteopenia. She also has no past medical history of Difficult intubation, Malignant hyperthermia due to anesthesia, Nausea & vomiting, Pseudocholinesterase deficiency, or Unspecified adverse effect of anesthesia. Ms. Ember Ji  has a past surgical history that includes hx tonsillectomy; hx orthopaedic; hx salpingo-oophorectomy (Right, 01/22/2014); and hx colonoscopy. Social History/Living Environment:     Lives alone in two story home. Prior Level of Function/Work/Activity:  Independent. Patient works part time at [employer]. Patient was taken out of work two weeks ago by MD per patient. Dominant Side:         RIGHT  Personal Factors:          Sex:  female        Age:  77 y.o. Ambulatory/Rehab Services H2 Model Falls Risk Assessment   Risk Factors:       No Risk Factors Identified Ability to Rise from Chair:       (1)  Pushes up, successful in one attempt   Falls Prevention Plan:       No modifications necessary   Total: (5 or greater = High Risk): 1   ©2010 [facility] [employer]. All Rights Reserved. [facility] Patent #[id number].  Federal Law prohibits the replication, distribution or use without written permission from [facility] [employer]   Current Medications:       Current Outpatient Medications:     albuterol (PROVENTIL HFA, VENTOLIN HFA, PROAIR HFA) 90 mcg/actuation inhaler, Take 2 Puffs by inhalation. , Disp: , Rfl:     b complex vitamins tablet, Take 1 Tab by mouth every morning. Holding for surgery, Disp: , Rfl:     temazepam (RESTORIL) 15 mg capsule, Take  by mouth nightly as needed for Sleep., Disp: , Rfl:     DULoxetine (CYMBALTA) 30 mg capsule, Take 30 mg by mouth every morning., Disp: , Rfl:     ascorbic acid (VITAMIN C) 500 mg tablet, Take 500 mg by mouth every morning. Holding for surgery, Disp: , Rfl:     multivitamin (ONE A DAY) tablet, Take 1 Tab by mouth every morning. Holding for surgery, Disp: , Rfl:    Date Last Reviewed:  3/18/2021   Number of Personal Factors/Comorbidities that affect the Plan of Care: 1-2: MODERATE COMPLEXITY   EXAMINATION:   Observation/Orthostatic Postural Assessment: Forward head/rounded shoulders posture. Palpation:          Increased tenderness to palpation along bilateral SI region, left worst than right. ROM:          Lumbar range of motion is as follows: flexion within normal limits, extension 25%, right rotation 75%, left rotation within normal limits, right lateral flexion 75%, and left lateral flexion within normal limits. Patient complains of pulling with right rotation and right lateral flexion and increased pain in lower back with extension. Strength:          Hip flexion right 5/5 and left 4+/5, hip abduction right 5/5 and left 4/5, hip adduction right 5/5 and left 4+/5, quadriceps 5/5, hamstrings right 5/5 and left 4/5, ankle dorsiflexion 5/5, ankle plantarflexion 5/5, and core strength 3-/5. Special Tests:          Straight leg raise negative bilateral.  Piriformis test negative bilateral.    Neurological Screen:        Sensation: Within normal limits. Functional Mobility:         Gait/Ambulation:  Ambulates with normal gait. Body Structures Involved:  1. Nerves  2. Muscles Body Functions Affected:  1.  Neuromusculoskeletal Activities and Participation Affected:  1. General Tasks and Demands  2. Interpersonal Interactions and Relationships  3.  Community, Social and [place]   Number of elements (examined above) that affect the Plan of Care: 4+: HIGH COMPLEXITY   CLINICAL PRESENTATION:   Presentation: Evolving clinical presentation with changing clinical characteristics: MODERATE COMPLEXITY   CLINICAL DECISION MAKING:   Use of outcome tool(s) and clinical judgement create a POC that gives a: Questionable prediction of patient's progress: MODERATE COMPLEXITY

## 2021-03-19 NOTE — PROGRESS NOTES
Ru Chaparro  : 1945  Primary:   Secondary:  [facility] at [place] [address],[address] [facility], [place] [address].  [phone]   [phone]     OUTPATIENT PHYSICAL THERAPY: Daily Treatment Note 3/19/2021  Visit Count:  13    ICD-10: Treatment Diagnosis: Low back pain (M54.5)  Precautions/Allergies:   Adhesive, Alendronate sodium, Antihistamine [triprolidine-pseudoephedrine], and Boniva [ibandronate]   TREATMENT PLAN:  Effective Dates: 3/19/2021 TO 2021 (90 days). Frequency/Duration: 2 times a week for 90 Days    Pre-treatment Symptoms/Complaints: \"The shot went fairly well. He was able to find the spot on the right side of my back. The first two days I was able to walk upright to the shower. Now it is back to where it was. I see Dr. Diann Masterson in early April\". Pain: Initial: Pain Intensity 1: 4  Pain Location 1: Back  Pain Orientation 1: Lower, Left  Post Session:  4/10   Medications Last Reviewed:  3/19/2021  Updated Objective Findings:  None Today  TREATMENT:     MANUAL THERAPY: (15 minutes): Soft tissue mobilization was utilized and necessary because of the patient's painful spasm. Patient received trigger point release along bilateral SI region/piriformis to  pain. THERAPEUTIC EXERCISE: (30 minutes):  Exercises per grid below to improve strength. Required minimal verbal cues to promote proper body alignment. Progressed repetitions as indicated.    Date:  3/19/21 Date:  3/11/21   Activity/Exercise Parameters Parameters   Pelvic tilts X X   Bridging  X X   Double knee to chest stretch 5 x 10 sec holds Bilaterally 5 x 10 sec holds Bilaterally   Supine piriformis stretch 5 x 10 sec holds Bilaterally 5 x 10 sec holds Bilaterally   Nustep Level 4 x 6 minutes X   Supine hamstring stretch with ankle pumps 3 reps with 10 ankle pumps bilateral  3 reps with 10 ankle pumps bilateral    Supine straight leg raise 2x10 reps bilateral 2# 2x10 reps bilateral 2#   Supine isometric hip flexion 5 x 10 sec holds Bilaterally 15 x 10 sec holds Bilaterally   Double knee lifts 2x10 reps  2x10 reps   Quadriped with leg lifts  2x10 reps bilateral  2x10 reps bilateral    Wall squats with red ball X X   Prone hip extension X X   Supine lower trunk rotation 15 reps bilateral  15 reps bilateral                       MedBridge Portal  Treatment/Session Summary:    · Response to Treatment:  Patient tolerated exercises without complaints of increased back pain. Patient has persistent back pain with daily activities. · Communication/Consultation:  None today  · Equipment provided today:  None today  · Recommendations/Intent for next treatment session: Next visit will focus on core strengthening, stretching exercises, and manual therapy.     Total Treatment Billable Duration:  45 minutes  PT Patient Time In/Time Out  Time In: 0930  Time Out: Eulogio Evans, PT    Future Appointments   Date Time Provider Radha Bautista   3/24/2021  8:45 AM Avril Barker, PT [facility]   3/26/2021  9:30 AM Maisha Ashford, PT [facility]   4/5/2021  9:30 AM Maisha Ashford, PT [facility]   4/7/2021  8:45 AM Avril Braker, PT [facility]   4/21/2021  8:45 AM Avril Barker, PT [facility]   9/27/2021  1:40 PM Erick Lind MD END BS ENDO

## 2021-03-24 ENCOUNTER — HOSPITAL ENCOUNTER (OUTPATIENT)
Dept: PHYSICAL THERAPY | Age: 76
Discharge: HOME | End: 2021-03-24
Payer: MEDICARE

## 2021-03-24 PROCEDURE — 97140 MANUAL THERAPY 1/> REGIONS: CPT

## 2021-03-24 NOTE — THERAPY RECERTIFICATION
Min Hoang : 1945 [place] Secondary: Adrián Christianson at [place] [address] [facility], [address], [facility] [address]. [phone]   [phone] OUTPATIENT PHYSICAL THERAPY:Recertification  ICD-10: Treatment Diagnosis: Cervicalgia (M54.2) Precautions/Allergies:  
Adhesive, Alendronate sodium, Antihistamine [triprolidine-pseudoephedrine], and Boniva [ibandronate] MD Orders: Evaluate and Treat MEDICAL/REFERRING DIAGNOSIS: 
Cervicalgia [M54.2] DATE OF ONSET: Chronic REFERRING PHYSICIAN:  Magda Roberto, [facility], NP  
RETURN PHYSICIAN APPOINTMENT: TBD ASSESSMENT:  Ms. Miri Lezama presents with improving mobility and pain in cervical region secondary to degenerative changes. Patient has attended a total of 52 scheduled physical therapy visits. Treatment has consisted of stretching, strengthening, and manual therapy to improve overall pain and performance with activities of daily living. After discussing with patient she agreed she would continue to benefit from physical therapy to improve overall mobility. Please sign this re-certification if you concur. Thank you for the opportunity to serve this patient. PROBLEM LIST (Impacting functional limitations): 1. Decreased Strength 2. Decreased ADL/Functional Activities 3. Increased Pain 4. Decreased Activity Tolerance INTERVENTIONS PLANNED: (Treatment may consist of any combination of the following) 1. Cold 2. Heat 3. Home Exercise Program (HEP) 4. Manual Therapy 5. Neuromuscular Re-education/Strengthening 6. Range of Motion (ROM) 7. Therapeutic Activites 8. Therapeutic Exercise/Strengthening TREATMENT PLAN: 
Effective Dates: 3/16/2021 To 2021 (90 days). Frequency/Duration: 1 time every other week for 90 Day(s) GOALS: (Goals have been discussed and agreed upon with patient.) Short-Term Functional Goals: Time Frame: 30 days 
1. Patient will be independent with home exercise program without exacerbation of symptoms or cueing needed--goal met. 2. Patient will be independent with correct sleeping positions and awareness/avoidance of aggravating positions without cueing needed--goal met. Discharge Goals: Time Frame: 90 days 1. Patient will be independent with all ADLs with minimal onset of neck pain and no deficits with daily tasks--goal ongoing. 2. Patient will report no fear avoidance with social or recreational activities due to neck pain --goal ongoing. 3. Patient will score less than or equal to 7/50 on Neck Disability Index with minimal effect of neck pain on patient's ability to manage every day life activities--goal ongoing. Rehabilitation Potential For Stated Goals: Good Regarding Yadira Darline Beach's therapy, I certify that the treatment plan above will be carried out by a therapist or under their direction. Thank you for this referral, 
Catia Blackburn PT Referring Physician Signature: Mikey Mcghee, JAQUELINE  _______________________________ Date _____________ HISTORY:  
History of Present Injury/Illness (Reason for Referral): 
8/26/2020 (Progress Note): Patient reports she is doing well, but has been having more pain in her right shoulder lately. 5/98/8093 (Recertification): Patient reports she is having tightness in her shoulder and neck. 10/21/2020 (Progress Note): Patient reports her neck and shoulders are tight today. 08/27/7435 (Recertification): Patient reports her neck has been doing well lately, but her shoulders are more painful lately. 1/13/2021 (Progress Note): Patient reports she is having tightness in her right neck more than her left right now. She reports she is dizzy as well. 2/24/2021 (Progress Note): Patient reports she is feeling overall better, but has been having more pain in her left shoulder.  
 
5/98/5894 (Recertification): Patient reports therapy has been helpful, but she is still having pain. Past Medical History/Comorbidities: Ms. Caesar Severance  has a past medical history of Abnormal Pap smear, Anxiety, COPD (chronic obstructive pulmonary disease) ([address] Depression, Hyperthyroidism, Insomnia, Irritable bowel syndrome, and Osteopenia. She also has no past medical history of Difficult intubation, Malignant hyperthermia due to anesthesia, Nausea & vomiting, Pseudocholinesterase deficiency, or Unspecified adverse effect of anesthesia. Ms. Caesar Severance  has a past surgical history that includes hx tonsillectomy; hx orthopaedic; hx salpingo-oophorectomy (Right, 01/22/2014); and hx colonoscopy. Social History/Living Environment:  
Home Environment: Private residence Living Alone: Yes Support Systems: Family member(s), Friends \ neighbors Prior Level of Function/Work/Activity: 
Independent Dominant Side:  
      RIGHT Personal Factors:   
      Sex:  female Age:  77 y.o. Current Medications:   
  
Current Outpatient Medications:  
  albuterol (PROVENTIL HFA, VENTOLIN HFA, PROAIR HFA) 90 mcg/actuation inhaler, Take 2 Puffs by inhalation. , Disp: , Rfl:  
  b complex vitamins tablet, Take 1 Tab by mouth every morning. Holding for surgery, Disp: , Rfl:  
  temazepam (RESTORIL) 15 mg capsule, Take  by mouth nightly as needed for Sleep., Disp: , Rfl:  
  DULoxetine (CYMBALTA) 30 mg capsule, Take 30 mg by mouth every morning., Disp: , Rfl:  
  ascorbic acid (VITAMIN C) 500 mg tablet, Take 500 mg by mouth every morning. Holding for surgery, Disp: , Rfl:  
  multivitamin (ONE A DAY) tablet, Take 1 Tab by mouth every morning. Holding for surgery, Disp: , Rfl:   
Date Last Reviewed:  3/24/2021 EXAMINATION:  
Observation/Orthostatic Postural Assessment:   
      Posture Assessment: Rounded shoulders, Forward head Palpation:   
      Tightness and tenderness to palpation noted throughout upper and lower cervical musculature. No bruising or swelling noted.  
ROM: Cervical Assessment (AROM): 
· Flexion: 75% of full AROM · Extension: 75% of full AROM · Right side bendin% of full AROM · Left side bending[de-identified] 75% of full AROM · Right rotation: 75% of full AROM · Left rotation: 75% of full AROM Strength:   
      Cervical Assessment (Strength): · Grossly 2+/5 with manual muscle testing Special Tests:   
      Negative Hautant's test. Negative Cranial-Alto lift test. Negative Alto-Axis Sheer test. Negative Alar Ligament test. Negative Tectorial Membrane test.  
Neurological Screen: 
      Dermatomes: Within normal limits Reflexes:  2+ Functional Mobility:  
      Gait/Mobility:  
 ·   Independent Transfers: · Sit to Stand: Independent · Stand to Sit: Independent · Stand Pivot Transfers: Independent · Bed to Chair: Independent · Lateral Transfers: Independent Bed Mobility: · Rolling: Independent · Supine to Sit: Independent · Sit to Supine: Independent · Scooting: Independent CLINICAL DECISION MAKING:  
Outcome Measure: Tool Used: Neck Disability Index (NDI) Score:  Initial:   Most Recent:  (Date: 3/24/2021 ) Interpretation of Score: The Neck Disability Index is a revised form of the Oswestry Low Back Pain Index and is designed to measure the activities of daily living in person's with neck pain. Each section is scored on a 0-5 scale, 5 representing the greatest disability. The scores of each section are added together for a total score of 50. Medical Necessity:  
· Patient is expected to demonstrate progress in strength and range of motion to improve safety during daily activities. · Patient demonstrates good rehab potential due to higher previous functional level. · Skilled intervention continues to be required due to decreased mobility.  
Reason for Services/Other Comments: 
· Patient continues to demonstrate capacity to improve overall mobility which will increase independence and increase safety.

## 2021-03-24 NOTE — PROGRESS NOTES
Liliana Saenz  : 1945  Primary:   Secondary:  [address] at Health system  [address] [facility], [address] [place] [address],[address], [address] [facility]  [phone]   [phone]         OUTPATIENT PHYSICAL THERAPY: Daily Treatment Note 3/24/2021  ICD-10: Treatment Diagnosis: Cervicalgia (M54.2)  Pre-treatment Symptoms/Complaints:  3/24/2021: Patient reports her right shoulder and neck hurt more today. Pain: Initial: Pain Intensity 1: 4  Pain Location 1: Neck, Shoulder  Pain Orientation 1: Right, Left  Pain Intervention(s) 1: Rest, Medication (see MAR)  Post Session:  3/10   Medications Last Reviewed:  3/24/2021   Updated Objective Findings:  None Today   TREATMENT:   MANUAL THERAPY: (40 minutes): Joint mobilization and Soft tissue mobilization was utilized and necessary because of the patient's restricted joint motion, painful spasm, loss of articular motion and restricted motion of soft tissue. Treatment/Session Summary:    · Response to Treatment:  Patient completed all activities with improving overall mobility. · Communication/Consultation:  None today  · Equipment provided today:  None today  · Recommendations/Intent for next treatment session: Next visit will focus on improving overall mobility with decreased pain. Treatment Plan of Care Effective Dates:  3/16/2021 TO 2021  (90 days).    Total Treatment Billable Duration:  40 minutes  PT Patient Time In/Time Out  Time In: 0845  Time Out: [address], PT    Future Appointments   Date Time Provider Radha Bautista   3/24/2021  8:45 AM Miranda Gann, PT [facility] SFE   3/26/2021  9:30 AM Carline Ashford, PT [facility]   2021  7:00 PM Carline Ashford, PT [facility]   2021  9:30 AM Carline Ashford, PT [facility]   2021  8:45 AM Miranda Gann, PT [facility]   2021  8:45 AM Miranda Gann, PT [facility]   2021  1:40 PM Miquel Amin MD END BS ENDO

## 2021-03-26 ENCOUNTER — HOSPITAL ENCOUNTER (OUTPATIENT)
Dept: PHYSICAL THERAPY | Age: 76
Discharge: HOME | End: 2021-03-26
Payer: COMMERCIAL

## 2021-03-26 PROCEDURE — 97110 THERAPEUTIC EXERCISES: CPT

## 2021-03-26 PROCEDURE — 97140 MANUAL THERAPY 1/> REGIONS: CPT

## 2021-03-26 NOTE — PROGRESS NOTES
Jeffrey Quintero  : 1945  Primary:   Secondary:  [address]  at [place] [address],[address], [place] [address].  [phone]   [phone]     OUTPATIENT PHYSICAL THERAPY: Daily Treatment Note 3/26/2021  Visit Count:  14    ICD-10: Treatment Diagnosis: Low back pain (M54.5)  Precautions/Allergies:   Adhesive, Alendronate sodium, Antihistamine [triprolidine-pseudoephedrine], and Boniva [ibandronate]   TREATMENT PLAN:  Effective Dates: 3/19/2021 TO 2021 (90 days). Frequency/Duration: 2 times a week for 90 Days    Pre-treatment Symptoms/Complaints: \"It has been a bad week. I will see if I can do this today\". Pain: Initial: Pain Intensity 1: 5  Pain Location 1: Back  Pain Orientation 1: Lower, Left  Post Session:  5/10   Medications Last Reviewed:  3/26/2021  Updated Objective Findings:  None Today  TREATMENT:     MANUAL THERAPY: (15 minutes): Soft tissue mobilization was utilized and necessary because of the patient's painful spasm. Patient received trigger point release along bilateral SI region/piriformis to  pain. THERAPEUTIC EXERCISE: (30 minutes):  Exercises per grid below to improve strength. Required minimal verbal cues to promote proper body alignment. Progressed repetitions as indicated.    Date:  3/19/21 Date:  3/26/21   Activity/Exercise Parameters Parameters   Pelvic tilts X X   Bridging  X X   Double knee to chest stretch 5 x 10 sec holds Bilaterally 5 x 10 sec holds Bilaterally   Supine piriformis stretch 5 x 10 sec holds Bilaterally 5 x 10 sec holds Bilaterally   Nustep Level 4 x 6 minutes Level 4 x 6 minutes   Supine hamstring stretch with ankle pumps 3 reps with 10 ankle pumps bilateral  3 reps with 10 ankle pumps bilateral    Supine straight leg raise 2x10 reps bilateral 2# 2x10 reps bilateral 2#   Supine isometric hip flexion 5 x 10 sec holds Bilaterally 15 x 10 sec holds Bilaterally   Double knee lifts 2x10 reps  2x10 reps Quadriped with leg lifts  2x10 reps bilateral  2x10 reps bilateral    Wall squats with red ball X X   Prone hip extension X X   Supine lower trunk rotation 15 reps bilateral  15 reps bilateral                       MedBridge Portal  Treatment/Session Summary:    · Response to Treatment:  Patient reports soreness/pain after manual therapy. · Communication/Consultation:  None today  · Equipment provided today:  None today  · Recommendations/Intent for next treatment session: Next visit will focus on core strengthening, stretching exercises, and manual therapy.     Total Treatment Billable Duration:  45 minutes  PT Patient Time In/Time Out  Time In: 0930  Time Out: Eulogio Evans, PT    Future Appointments   Date Time Provider Radha Bautista   4/5/2021  7:00 PM Chuck Craven, PT [facility] SFE   4/6/2021  9:30 AM Gudelia Ashford, PT [facility]   4/7/2021  8:45 AM Asif Nunez, PT [facility]   4/21/2021  8:45 AM Asif Nunez, PT [facility]   9/27/2021  1:40 PM Paula Kumar MD END BS ENDO

## 2021-04-05 ENCOUNTER — APPOINTMENT (OUTPATIENT)
Dept: PHYSICAL THERAPY | Age: 76
End: 2021-04-05
Payer: COMMERCIAL

## 2021-04-06 ENCOUNTER — HOSPITAL ENCOUNTER (OUTPATIENT)
Dept: PHYSICAL THERAPY | Age: 76
Discharge: HOME | End: 2021-04-06
Payer: COMMERCIAL

## 2021-04-06 NOTE — PROGRESS NOTES
Rozanne Angelucci  : 1945  Primary: Elin Larose [employer]*  Secondary:  [facility] at [place] [address], [address]  [phone]   [phone]     OUTPATIENT DAILY NOTE    NAME/AGE/GENDER: Rozanne Angelucci is a 77 y.o. female. DATE: 2021    Ms. Beach CANCELED for today's appointment due to illness.     Anna Adamson, PT

## 2021-04-07 ENCOUNTER — HOSPITAL ENCOUNTER (OUTPATIENT)
Dept: PHYSICAL THERAPY | Age: 76
Discharge: HOME | End: 2021-04-07
Payer: MEDICARE

## 2021-04-07 PROCEDURE — 97140 MANUAL THERAPY 1/> REGIONS: CPT

## 2021-04-07 NOTE — PROGRESS NOTES
Robert Roles  : 1945  Primary:   Secondary:  [address]  at [place]  [address] [facility] [address], [address] [facility] [address], [address] [facility]  [phone]   [phone]         OUTPATIENT PHYSICAL THERAPY: Daily Treatment Note 2021  ICD-10: Treatment Diagnosis: Cervicalgia (M54.2)  Pre-treatment Symptoms/Complaints:  2021: Patient reports her shoulder and neck are tight today. Pain: Initial: Pain Intensity 1: 4  Pain Location 1: Neck, Shoulder  Pain Orientation 1: Right, Left  Pain Intervention(s) 1: Rest, Medication (see MAR)  Post Session:  3/10   Medications Last Reviewed:  2021   Updated Objective Findings:  None Today   TREATMENT:   MANUAL THERAPY: (40 minutes): Joint mobilization and Soft tissue mobilization was utilized and necessary because of the patient's restricted joint motion, painful spasm, loss of articular motion and restricted motion of soft tissue. Treatment/Session Summary:    · Response to Treatment:  Patient completed all activities with improving overall mobility. · Communication/Consultation:  None today  · Equipment provided today:  None today  · Recommendations/Intent for next treatment session: Next visit will focus on improving overall mobility with decreased pain. Treatment Plan of Care Effective Dates:  3/16/2021 TO 2021  (90 days).    Total Treatment Billable Duration:  40 minutes  PT Patient Time In/Time Out  Time In: 0845  Time Out: [facility], PT    Future Appointments   Date Time Provider Radha Bautista   2021  8:45 AM Damien Canela, PT [facility]   2021  8:00 AM Mabel Ashford PT [facility]   2021  8:45 AM Damien Canela, PT [facility]   2021  1:40 PM Girma Chaparro MD END BS ENDO

## 2021-04-12 ENCOUNTER — HOSPITAL ENCOUNTER (OUTPATIENT)
Dept: PHYSICAL THERAPY | Age: 76
Discharge: HOME | End: 2021-04-12
Payer: COMMERCIAL

## 2021-04-12 PROCEDURE — 97110 THERAPEUTIC EXERCISES: CPT

## 2021-04-12 PROCEDURE — 97140 MANUAL THERAPY 1/> REGIONS: CPT

## 2021-04-12 NOTE — PROGRESS NOTES
Rozanne Angelucci  : 1945  Primary:   Secondary:  [address] at [facility]  [address] [facility], [address] [place] [address],[address], [address] [facility]  [phone]   [phone]     OUTPATIENT PHYSICAL THERAPY: Daily Treatment Note 2021  Visit Count:  15    ICD-10: Treatment Diagnosis: Low back pain (M54.5)  Precautions/Allergies:   Adhesive, Alendronate sodium, Antihistamine [triprolidine-pseudoephedrine], and Boniva [ibandronate]   TREATMENT PLAN:  Effective Dates: 3/19/2021 TO 2021 (90 days). Frequency/Duration: 2 times a week for 90 Days    Pre-treatment Symptoms/Complaints:  \"Saturday wasn't that bad. I decided to pull weeds yesterday and I am hurting today\". Pain: Initial: Pain Intensity 1: 6  Pain Location 1: Back  Pain Orientation 1: Lower, Left  Post Session:  3/10   Medications Last Reviewed:  2021  Updated Objective Findings:  None Today  TREATMENT:     MANUAL THERAPY: (15 minutes): Soft tissue mobilization was utilized and necessary because of the patient's painful spasm. Patient received trigger point release along bilateral SI region/piriformis to  pain. THERAPEUTIC EXERCISE: (26 minutes):  Exercises per grid below to improve strength. Required minimal verbal cues to promote proper body alignment. Progressed repetitions as indicated.    Date:  21 Date:  3/26/21   Activity/Exercise Parameters Parameters   Pelvic tilts X X   Bridging  X X   Double knee to chest stretch 5 x 10 sec holds Bilaterally 5 x 10 sec holds Bilaterally   Supine piriformis stretch 5 x 10 sec holds Bilaterally 5 x 10 sec holds Bilaterally   Nustep Level 1 x 6 minutes Level 4 x 6 minutes   Supine hamstring stretch with ankle pumps 3 reps with 10 ankle pumps bilateral  3 reps with 10 ankle pumps bilateral    Supine straight leg raise 2x10 reps bilateral 2# 2x10 reps bilateral 2#   Supine isometric hip flexion 5 x 10 sec holds Bilaterally 15 x 10 sec holds Bilaterally   Double knee lifts 2x10 reps  2x10 reps   Quadriped with leg lifts  2x10 reps bilateral  2x10 reps bilateral    Wall squats with red ball X X   Prone hip extension X X   Supine lower trunk rotation 15 reps bilateral  15 reps bilateral                       MedBridge Portal  Treatment/Session Summary:    · Response to Treatment:  Patient more sore along left side of lower back/hip with manual therapy. Had to decrease resistance on Nustep due to increased discomfort along left hip/lower back. Patient reports decrease in pain after manual therapy. · Communication/Consultation:  None today  · Equipment provided today:  None today  · Recommendations/Intent for next treatment session: Next visit will focus on core strengthening, stretching exercises, and manual therapy.     Total Treatment Billable Duration:  41 minutes  PT Patient Time In/Time Out  Time In: 0804  Time Out: Hall[address], PT    Future Appointments   Date Time Provider Radha Bautista   4/19/2021  1:00 PM Akua Higuera, PT [facility] SFE   4/21/2021  8:45 AM Lavella Deep, PT [facility]   5/5/2021  8:45 AM Lavella Deep, PT [facility]   5/19/2021  8:45 AM Lavella Deep, PT [facility]   9/27/2021  1:40 PM Micaela Gandhi MD END BS ENDO

## 2021-04-19 ENCOUNTER — HOSPITAL ENCOUNTER (OUTPATIENT)
Dept: PHYSICAL THERAPY | Age: 76
Discharge: HOME | End: 2021-04-19
Payer: MEDICARE

## 2021-04-19 PROCEDURE — 97140 MANUAL THERAPY 1/> REGIONS: CPT

## 2021-04-19 PROCEDURE — 97110 THERAPEUTIC EXERCISES: CPT

## 2021-04-19 NOTE — PROGRESS NOTES
Lucio Lala  : 1945  Primary:   Secondary:  [address] at [place] [address] [facility], [address] [place] [address],[address] [facility], [place] [address].  [phone]   [phone]     OUTPATIENT PHYSICAL THERAPY: Daily Treatment Note 2021  Visit Count:  16    ICD-10: Treatment Diagnosis: Low back pain (M54.5)  Precautions/Allergies:   Adhesive, Alendronate sodium, Antihistamine [triprolidine-pseudoephedrine], and Boniva [ibandronate]   TREATMENT PLAN:  Effective Dates: 3/19/2021 TO 2021 (90 days). Frequency/Duration: 2 times a week for 90 Days    Pre-treatment Symptoms/Complaints:  \"I am waiting to hear back from the schedulers to schedule my MRI. I went to the grocery store and it irritated my back\". Patient reports standing to fix her meals still irritates her back. Pain: Initial: Pain Intensity 1: 7  Pain Location 1: Back  Pain Orientation 1: Lower, Left  Post Session:  6/10   Medications Last Reviewed:  2021  Updated Objective Findings:  Core strength 4-/5 and Modified Oswestry Low Back Pain Questionnaire: 21  TREATMENT:     MANUAL THERAPY: (15 minutes): Soft tissue mobilization was utilized and necessary because of the patient's painful spasm. Patient received trigger point release along bilateral SI region/piriformis to  pain. THERAPEUTIC EXERCISE: (25 minutes):  Exercises per grid below to improve strength. Required minimal verbal cues to promote proper body alignment. Progressed repetitions as indicated.    Date:  21 Date:  21   Activity/Exercise Parameters Parameters   Pelvic tilts X X   Bridging  X X   Double knee to chest stretch 5 x 10 sec holds Bilaterally 5 x 10 sec holds Bilaterally   Supine piriformis stretch 5 x 10 sec holds Bilaterally 5 x 10 sec holds Bilaterally   Nustep Level 1 x 6 minutes X    Supine hamstring stretch with ankle pumps 3 reps with 10 ankle pumps bilateral  3 reps with 10 ankle pumps bilateral    Supine straight leg raise 2x10 reps bilateral 2# 2x10 reps bilateral 2#   Supine isometric hip flexion 5 x 10 sec holds Bilaterally 15 x 10 sec holds Bilaterally   Double knee lifts 2x10 reps  2x10 reps   Quadriped with leg lifts  2x10 reps bilateral  Unable on left side   Wall squats with red ball X X   Prone hip extension X X   Supine lower trunk rotation 15 reps bilateral  15 reps bilateral    Curl ups  10 reps                 [facility] Portal  Treatment/Session Summary:    · Response to Treatment:  Patient unable to perform Nustep today due to increased pain in her back. Patient had increased pain in left side of her back when performing quadriped with left leg lift. Patient reports back feeling less tight after manual therapy. · Communication/Consultation:  None today  · Equipment provided today:  None today  · Recommendations/Intent for next treatment session: Next visit will focus on core strengthening, stretching exercises, and manual therapy.     Total Treatment Billable Duration:  40 minutes  PT Patient Time In/Time Out  Time In: 6817  Time Out: 8563 [facility],     Future Appointments   Date Time Provider Radha Bautista   4/21/2021  8:45 AM Juan Barillas PT [facility] SFE   5/5/2021  8:45 AM BOLA GarciaORPCHRIS SFE   5/19/2021  8:45 AM BOLA GarciaEORPT SFE   9/27/2021  1:40 PM Jennifer Sung MD END BS ENDO

## 2021-04-21 ENCOUNTER — HOSPITAL ENCOUNTER (OUTPATIENT)
Dept: PHYSICAL THERAPY | Age: 76
Discharge: HOME | End: 2021-04-21
Payer: MEDICARE

## 2021-04-21 PROCEDURE — 97140 MANUAL THERAPY 1/> REGIONS: CPT

## 2021-04-21 NOTE — PROGRESS NOTES
Sam Juanito  : 1945  Primary:   Secondary:  [address] at [address] [place] [address] [facility], [address] [place] [address],[address], [place] [address].  [phone]   [phone]         OUTPATIENT PHYSICAL THERAPY: Daily Treatment Note 2021  ICD-10: Treatment Diagnosis: Cervicalgia (M54.2)  Pre-treatment Symptoms/Complaints:  2021: Patient reports her shoulder has been hurting for the past few days. Pain: Initial: Pain Intensity 1: 4  Pain Location 1: Neck, Shoulder  Pain Orientation 1: Right, Left  Pain Intervention(s) 1: Rest, Medication (see MAR)  Post Session:  3/10   Medications Last Reviewed:  2021   Updated Objective Findings:  None Today   TREATMENT:   MANUAL THERAPY: (40 minutes): Joint mobilization and Soft tissue mobilization was utilized and necessary because of the patient's restricted joint motion, painful spasm, loss of articular motion and restricted motion of soft tissue. Treatment/Session Summary:    · Response to Treatment:  Patient completed all activities with improving overall mobility. · Communication/Consultation:  None today  · Equipment provided today:  None today  · Recommendations/Intent for next treatment session: Next visit will focus on improving overall mobility with decreased pain. Treatment Plan of Care Effective Dates:  3/16/2021 TO 2021  (90 days).    Total Treatment Billable Duration:  40 minutes  PT Patient Time In/Time Out  Time In: 0845  Time Out: 6200 [facility], PT    Future Appointments   Date Time Provider Radha Bautista   2021  8:45 AM Samy Barron PT [facility] SFE   2021  8:45 AM Samy Barron PT [facility] SFE   2021  8:45 AM Samy Barron PT [facility] SFE   2021  1:40 PM MD NELLY Shine ENDO

## 2021-05-05 ENCOUNTER — HOSPITAL ENCOUNTER (OUTPATIENT)
Dept: PHYSICAL THERAPY | Age: 76
Discharge: HOME | End: 2021-05-05
Payer: MEDICARE

## 2021-05-05 PROCEDURE — 97140 MANUAL THERAPY 1/> REGIONS: CPT

## 2021-05-05 NOTE — PROGRESS NOTES
Leonarda Perea  : 1945  Primary:   Secondary:  [address] at [facility]  2700 [facility], [address] [place] [address],[address], [place] [address].  [phone]   [phone]         OUTPATIENT PHYSICAL THERAPY: Daily Treatment Note 2021  ICD-10: Treatment Diagnosis: Cervicalgia (M54.2)  Pre-treatment Symptoms/Complaints:  2021: Patient reports her shoulders are still hurting as well as her neck today. Pain: Initial: Pain Intensity 1: 4  Pain Location 1: Neck, Shoulder  Pain Orientation 1: Right, Left  Pain Intervention(s) 1: Rest, Medication (see MAR)  Post Session:  3/10   Medications Last Reviewed:  2021   Updated Objective Findings:  None Today   TREATMENT:   MANUAL THERAPY: (40 minutes): Joint mobilization and Soft tissue mobilization was utilized and necessary because of the patient's restricted joint motion, painful spasm, loss of articular motion and restricted motion of soft tissue. Treatment/Session Summary:    · Response to Treatment:  Patient tolerated treatment well with improving overall mobility after treatment. · Communication/Consultation:  None today  · Equipment provided today:  None today  · Recommendations/Intent for next treatment session: Next visit will focus on improving overall mobility with decreased pain. Treatment Plan of Care Effective Dates:  3/16/2021 TO 2021  (90 days).    Total Treatment Billable Duration:  40 minutes  PT Patient Time In/Time Out  Time In: 0845  Time Out: 6200 [facility], PT    Future Appointments   Date Time Provider Radha Bautista   2021  8:45 AM Kindra Lantigua PT [facility] SFE   2021  8:45 AM Kindra Lantigua PT [facility] SFE   2021  1:40 PM MD NELLY Reyes BS ENDO

## 2021-05-19 ENCOUNTER — HOSPITAL ENCOUNTER (OUTPATIENT)
Dept: PHYSICAL THERAPY | Age: 76
Discharge: HOME | End: 2021-05-19
Payer: MEDICARE

## 2021-05-19 PROCEDURE — 97140 MANUAL THERAPY 1/> REGIONS: CPT

## 2021-05-19 NOTE — PROGRESS NOTES
Fiorella Cortes  : 1945  Primary:   Secondary:  [address] at [place] [address] [facility], [address] [place] [address],[address], [place] [address].  [phone]   [phone]         OUTPATIENT PHYSICAL THERAPY: Daily Treatment Note 2021  ICD-10: Treatment Diagnosis: Cervicalgia (M54.2)  Pre-treatment Symptoms/Complaints:  2021: Patient reports her shoulder is hurting some and so is her neck. She reports she is also having some dizziness. Pain: Initial: Pain Intensity 1: 4  Pain Location 1: Neck, Shoulder  Pain Orientation 1: Right, Left  Pain Intervention(s) 1: Rest, Medication (see MAR)  Post Session:  3/10   Medications Last Reviewed:  2021   Updated Objective Findings:  None Today   TREATMENT:   MANUAL THERAPY: (40 minutes): Joint mobilization and Soft tissue mobilization was utilized and necessary because of the patient's restricted joint motion, painful spasm, loss of articular motion and restricted motion of soft tissue. Treatment/Session Summary:    · Response to Treatment:  Patient tolerated treatment well with improving overall mobility after treatment. · Communication/Consultation:  None today  · Equipment provided today:  None today  · Recommendations/Intent for next treatment session: Next visit will focus on improving overall mobility with decreased pain. Treatment Plan of Care Effective Dates:  3/16/2021 TO 2021  (90 days).    Total Treatment Billable Duration:  40 minutes  PT Patient Time In/Time Out  Time In: 0845  Time Out: 6200 [facility],     Future Appointments   Date Time Provider Radha Bautista   2021  8:45 AM Imtiaz Kavya, PT [facility] SFE   2021  8:45 AM Imtiaz [place], PT [facility] SFE   2021  8:45 AM [place] Kavya, PT [facility] SFE   2021  8:45 AM Imtiaz [place], PT [facility] SFE   2021  1:40 PM Dary Khan MD END BS ENDO

## 2021-05-19 NOTE — PROGRESS NOTES
Yuliet Uriostegui  : 1945  [place]  Secondary: Adrián Christianson at [place] [address] [facility], [address] [place] [address],[address], [facility] [address]  [phone]   [phone]          OUTPATIENT PHYSICAL THERAPY:Progress Report 2021   ICD-10: Treatment Diagnosis: Cervicalgia (M54.2)  Precautions/Allergies:   Adhesive, Alendronate sodium, Antihistamine [triprolidine-pseudoephedrine], and Boniva [ibandronate]   MD Orders: Evaluate and Treat MEDICAL/REFERRING DIAGNOSIS:  Cervicalgia [M54.2]   DATE OF ONSET: Chronic  REFERRING PHYSICIAN:  Adonis Mnaley NP   RETURN PHYSICIAN APPOINTMENT: TBD     ASSESSMENT:  Ms. Denise Ramirez presents with improving mobility and pain in cervical region secondary to degenerative changes. Patient has attended a total of 56 scheduled physical therapy visits. Treatment has consisted of stretching, strengthening, and manual therapy to improve overall pain and performance with activities of daily living. PROBLEM LIST (Impacting functional limitations):  1. Decreased Strength  2. Decreased ADL/Functional Activities  3. Increased Pain  4. Decreased Activity Tolerance INTERVENTIONS PLANNED: (Treatment may consist of any combination of the following)  1. Cold  2. Heat  3. Home Exercise Program (HEP)  4. Manual Therapy  5. Neuromuscular Re-education/Strengthening  6. Range of Motion (ROM)  7. Therapeutic Activites  8. Therapeutic Exercise/Strengthening   TREATMENT PLAN:  Effective Dates: 3/16/2021 To 2021 (90 days). Frequency/Duration: 1 time every other week for 90 Day(s)  GOALS: (Goals have been discussed and agreed upon with patient.)  Short-Term Functional Goals: Time Frame: 30 days  1. Patient will be independent with home exercise program without exacerbation of symptoms or cueing needed--goal met.    2. Patient will be independent with correct sleeping positions and awareness/avoidance of aggravating positions without cueing needed--goal met. Discharge Goals: Time Frame: 90 days  1. Patient will be independent with all ADLs with minimal onset of neck pain and no deficits with daily tasks--goal ongoing. 2. Patient will report no fear avoidance with social or recreational activities due to neck pain --goal ongoing. 3. Patient will score less than or equal to 7/50 on Neck Disability Index with minimal effect of neck pain on patient's ability to manage every day life activities--goal ongoing. Rehabilitation Potential For Stated Goals: Good               HISTORY:   History of Present Injury/Illness (Reason for Referral):  1/13/2021 (Progress Note): Patient reports she is having tightness in her right neck more than her left right now. She reports she is dizzy as well. 2/24/2021 (Progress Note): Patient reports she is feeling overall better, but has been having more pain in her left shoulder. 3/17/5324 (Recertification): Patient reports therapy has been helpful, but she is still having pain. 5/19/2021 (Progres Note): Patient reports therapy has been helping and her shoulder is doing better, but still having some pain. She reports she has also had some dizziness. Past Medical History/Comorbidities:   Ms. Mariam Harley  has a past medical history of Abnormal Pap smear, Anxiety, COPD (chronic obstructive pulmonary disease) ([address] Depression, Hyperthyroidism, Insomnia, Irritable bowel syndrome, and Osteopenia. She also has no past medical history of Difficult intubation, Malignant hyperthermia due to anesthesia, Nausea & vomiting, Pseudocholinesterase deficiency, or Unspecified adverse effect of anesthesia. Ms. Mariam Harley  has a past surgical history that includes hx tonsillectomy; hx orthopaedic; hx salpingo-oophorectomy (Right, 01/22/2014); and hx colonoscopy.   Social History/Living Environment:   Home Environment: Private residence  Living Alone: Yes  Support Systems: Family member(s), Friends \ neighbors  Prior Level of Function/Work/Activity:  Independent  Dominant Side:         RIGHT  Personal Factors:          Sex:  female        Age:  77 y.o. Current Medications:       Current Outpatient Medications:     albuterol (PROVENTIL HFA, VENTOLIN HFA, PROAIR HFA) 90 mcg/actuation inhaler, Take 2 Puffs by inhalation. , Disp: , Rfl:     b complex vitamins tablet, Take 1 Tab by mouth every morning. Holding for surgery, Disp: , Rfl:     temazepam (RESTORIL) 15 mg capsule, Take  by mouth nightly as needed for Sleep., Disp: , Rfl:     DULoxetine (CYMBALTA) 30 mg capsule, Take 30 mg by mouth every morning., Disp: , Rfl:     ascorbic acid (VITAMIN C) 500 mg tablet, Take 500 mg by mouth every morning. Holding for surgery, Disp: , Rfl:     multivitamin (ONE A DAY) tablet, Take 1 Tab by mouth every morning. Holding for surgery, Disp: , Rfl:    Date Last Reviewed:  2021    EXAMINATION:   Observation/Orthostatic Postural Assessment:          Posture Assessment: Rounded shoulders, Forward head   Palpation:          Tightness and tenderness to palpation noted throughout upper and lower cervical musculature. No bruising or swelling noted. ROM:          Cervical Assessment (AROM):  · Flexion: 75% of full AROM  · Extension: 75% of full AROM  · Right side bendin% of full AROM  · Left side bending[de-identified] 75% of full AROM  · Right rotation: 75% of full AROM  · Left rotation: 75% of full AROM  Strength:          Cervical Assessment (Strength):  · Grossly 2+/5 with manual muscle testing  Special Tests:          Negative Hautant's test. Negative Cranial-Ephrata lift test. Negative Ephrata-Axis Sheer test. Negative Alar Ligament test. Negative Tectorial Membrane test.   Neurological Screen:        Dermatomes:   Within normal limits        Reflexes:  2+  Functional Mobility:         Gait/Mobility:    ·   Independent         Transfers:     · Sit to Stand: Independent  · Stand to Sit: Independent  · Stand Pivot Transfers: Independent  · Bed to Chair: Independent  · Lateral Transfers: Independent         Bed Mobility:     · Rolling: Independent  · Supine to Sit: Independent  · Sit to Supine: Independent  · Scooting: Independent        CLINICAL DECISION MAKING:   Outcome Measure: Tool Used: Neck Disability Index (NDI)  Score:  Initial: 17/50  Most Recent: 14/50 (Date: 5/19/2021 )   Interpretation of Score: The Neck Disability Index is a revised form of the Oswestry Low Back Pain Index and is designed to measure the activities of daily living in person's with neck pain. Each section is scored on a 0-5 scale, 5 representing the greatest disability. The scores of each section are added together for a total score of 50. Medical Necessity:   · Patient is expected to demonstrate progress in strength and range of motion to improve safety during daily activities. · Patient demonstrates good rehab potential due to higher previous functional level. · Skilled intervention continues to be required due to decreased mobility. Reason for Services/Other Comments:  · Patient continues to demonstrate capacity to improve overall mobility which will increase independence and increase safety.

## 2021-06-02 ENCOUNTER — HOSPITAL ENCOUNTER (OUTPATIENT)
Dept: PHYSICAL THERAPY | Age: 76
Discharge: HOME | End: 2021-06-02
Payer: MEDICARE

## 2021-06-02 PROCEDURE — 97140 MANUAL THERAPY 1/> REGIONS: CPT

## 2021-06-02 NOTE — PROGRESS NOTES
Fiorella Cortes  : 1945  Primary:   Secondary:  [address] at [facility]  2700 [facility], [address] [place] [address],[address], 0702 [facility] [place]  [phone]   [phone]         OUTPATIENT PHYSICAL THERAPY: Daily Treatment Note 2021  ICD-10: Treatment Diagnosis: Cervicalgia (M54.2)  Pre-treatment Symptoms/Complaints:  2021: Patient reports she is having pain in both shoulders today. Pain: Initial: Pain Intensity 1: 4  Pain Location 1: Neck, Shoulder  Pain Orientation 1: Right, Left  Pain Intervention(s) 1: Rest, Medication (see MAR)  Post Session:  3/10   Medications Last Reviewed:  2021   Updated Objective Findings:  None Today   TREATMENT:   MANUAL THERAPY: (40 minutes): Joint mobilization and Soft tissue mobilization was utilized and necessary because of the patient's restricted joint motion, painful spasm, loss of articular motion and restricted motion of soft tissue. Treatment/Session Summary:    · Response to Treatment:  Patient tolerated treatment well with improving overall mobility after treatment. · Communication/Consultation:  None today  · Equipment provided today:  None today  · Recommendations/Intent for next treatment session: Next visit will focus on improving overall mobility with decreased pain. Treatment Plan of Care Effective Dates:  3/16/2021 TO 2021  (90 days).    Total Treatment Billable Duration:  40 minutes  PT Patient Time In/Time Out  Time In: 0845  Time Out: 6200 [facility], PT    Future Appointments   Date Time Provider Radha Bautista   2021  8:45 AM Anahola Kavya, PT [facility] SFE   2021  8:45 AM Imtiaz Kavya, PT [facility] SFE   2021  8:45 AM Imtiaz Kavya, PT [facility] SFE   2021  1:40 PM Dary Khan MD END BS ENDO

## 2021-06-16 ENCOUNTER — HOSPITAL ENCOUNTER (OUTPATIENT)
Dept: PHYSICAL THERAPY | Age: 76
Discharge: HOME | End: 2021-06-16
Payer: MEDICARE

## 2021-06-16 PROCEDURE — 97140 MANUAL THERAPY 1/> REGIONS: CPT

## 2021-06-16 NOTE — THERAPY RECERTIFICATION
Criss Vera : 1945 [place] Secondary: Adrián Christianson at [place] [id number] [facility], [address], [facility] [address]. [phone]   [phone] OUTPATIENT PHYSICAL THERAPY:Recertification 77 ICD-10: Treatment Diagnosis: Cervicalgia (M54.2) Precautions/Allergies:  
Adhesive, Alendronate sodium, Antihistamine [triprolidine-pseudoephedrine], and Boniva [ibandronate] MD Orders: Evaluate and Treat MEDICAL/REFERRING DIAGNOSIS: 
Cervicalgia [M54.2] DATE OF ONSET: Chronic REFERRING PHYSICIAN:  Luciano Tena NP  
RETURN PHYSICIAN APPOINTMENT: TBD ASSESSMENT:  Ms. Zoey Kern presents with improving mobility and pain in cervical region secondary to degenerative changes. Patient has attended a total of 58 scheduled physical therapy visits. Treatment has consisted of stretching, strengthening, and manual therapy to improve overall pain and performance with activities of daily living. After discussing with patient she agreed she would continue to benefit from physical therapy to improve overall mobility. Please sign this re-certification if you concur. Thank you for the opportunity to serve this patient. PROBLEM LIST (Impacting functional limitations): 1. Decreased Strength 2. Decreased ADL/Functional Activities 3. Increased Pain 4. Decreased Activity Tolerance INTERVENTIONS PLANNED: (Treatment may consist of any combination of the following) 1. Cold 2. Heat 3. Home Exercise Program (HEP) 4. Manual Therapy 5. Neuromuscular Re-education/Strengthening 6. Range of Motion (ROM) 7. Therapeutic Activites 8. Therapeutic Exercise/Strengthening TREATMENT PLAN: 
Effective Dates:  2021 TO 2021 (90 days). Frequency/Duration: 1 time every other week for 90 Day(s) GOALS: (Goals have been discussed and agreed upon with patient.) Short-Term Functional Goals: Time Frame: 30 days 
1. Patient will be independent with home exercise program without exacerbation of symptoms or cueing needed--goal met. 2. Patient will be independent with correct sleeping positions and awareness/avoidance of aggravating positions without cueing needed--goal met. Discharge Goals: Time Frame: 90 days 1. Patient will be independent with all ADLs with minimal onset of neck pain and no deficits with daily tasks--goal ongoing. 2. Patient will report no fear avoidance with social or recreational activities due to neck pain --goal ongoing. 3. Patient will score less than or equal to 7/50 on Neck Disability Index with minimal effect of neck pain on patient's ability to manage every day life activities--goal ongoing. Rehabilitation Potential For Stated Goals: Good Regarding [place]'s therapy, I certify that the treatment plan above will be carried out by a therapist or under their direction. Thank you for this referral, 
Jessika Chavez, PT Referring Physician Signature: Lianne Muller, NP _______________________________ Date _____________ HISTORY:  
History of Present Injury/Illness (Reason for Referral): 
1/13/2021 (Progress Note): Patient reports she is having tightness in her right neck more than her left right now. She reports she is dizzy as well. 2/24/2021 (Progress Note): Patient reports she is feeling overall better, but has been having more pain in her left shoulder. 3/03/0328 (Recertification): Patient reports therapy has been helpful, but she is still having pain. 5/19/2021 (Progres Note): Patient reports therapy has been helping and her shoulder is doing better, but still having some pain. She reports she has also had some dizziness. 0/23/4396 (Recertification): Patient reports she is still having pain in her neck and shoulders routinely. She reports some things make it worse, but she is doing well overall.   She reports she feels like she is progressing overall. Past Medical History/Comorbidities: Ms. Lisette Sutherland  has a past medical history of Abnormal Pap smear, Anxiety, COPD (chronic obstructive pulmonary disease) ([address] Depression, Hyperthyroidism, Insomnia, Irritable bowel syndrome, and Osteopenia. She also has no past medical history of Difficult intubation, Malignant hyperthermia due to anesthesia, Nausea & vomiting, Pseudocholinesterase deficiency, or Unspecified adverse effect of anesthesia. Ms. Lisette Sutherland  has a past surgical history that includes hx tonsillectomy; hx orthopaedic; hx salpingo-oophorectomy (Right, 01/22/2014); and hx colonoscopy. Social History/Living Environment:  
Home Environment: Private residence Living Alone: Yes Support Systems: Family member(s), Friends \ neighbors Prior Level of Function/Work/Activity: 
Independent Dominant Side:  
      RIGHT Personal Factors:   
      Sex:  female Age:  77 y.o. Current Medications:   
  
Current Outpatient Medications:  
  albuterol (PROVENTIL HFA, VENTOLIN HFA, PROAIR HFA) 90 mcg/actuation inhaler, Take 2 Puffs by inhalation. , Disp: , Rfl:  
  b complex vitamins tablet, Take 1 Tab by mouth every morning. Holding for surgery, Disp: , Rfl:  
  temazepam (RESTORIL) 15 mg capsule, Take  by mouth nightly as needed for Sleep., Disp: , Rfl:  
  DULoxetine (CYMBALTA) 30 mg capsule, Take 30 mg by mouth every morning., Disp: , Rfl:  
  ascorbic acid (VITAMIN C) 500 mg tablet, Take 500 mg by mouth every morning. Holding for surgery, Disp: , Rfl:  
  multivitamin (ONE A DAY) tablet, Take 1 Tab by mouth every morning. Holding for surgery, Disp: , Rfl:   
Date Last Reviewed:  6/16/2021 EXAMINATION:  
Observation/Orthostatic Postural Assessment:   
      Posture Assessment: Rounded shoulders, Forward head Palpation:   
      Tightness and tenderness to palpation noted throughout upper and lower cervical musculature. No bruising or swelling noted.  
ROM:   
      Cervical Assessment (AROM): 
· Flexion: 75% of full AROM · Extension: 75% of full AROM · Right side bendin% of full AROM · Left side bending[de-identified] 75% of full AROM · Right rotation: 75% of full AROM · Left rotation: 75% of full AROM Strength:   
      Cervical Assessment (Strength): · Grossly 2+/5 with manual muscle testing Special Tests:   
      Negative Hautant's test. Negative Cranial-[place] lift test. Negative [place]-Axis Sheer test. Negative Alar Ligament test. Negative Tectorial Membrane test.  
Neurological Screen: 
      Dermatomes: Within normal limits Reflexes:  2+ Functional Mobility:  
      Gait/Mobility:  
 ·   Independent Transfers: · Sit to Stand: Independent · Stand to Sit: Independent · Stand Pivot Transfers: Independent · Bed to Chair: Independent · Lateral Transfers: Independent Bed Mobility: · Rolling: Independent · Supine to Sit: Independent · Sit to Supine: Independent · Scooting: Independent CLINICAL DECISION MAKING:  
Outcome Measure: Tool Used: Neck Disability Index (NDI) Score:  Initial:   Most Recent:  (Date: 2021 ) Interpretation of Score: The Neck Disability Index is a revised form of the Oswestry Low Back Pain Index and is designed to measure the activities of daily living in person's with neck pain. Each section is scored on a 0-5 scale, 5 representing the greatest disability. The scores of each section are added together for a total score of 50. Medical Necessity:  
· Patient is expected to demonstrate progress in strength and range of motion to improve safety during daily activities. · Patient demonstrates good rehab potential due to higher previous functional level. · Skilled intervention continues to be required due to decreased mobility. Reason for Services/Other Comments: 
· Patient continues to demonstrate capacity to improve overall mobility which will increase independence and increase safety.

## 2021-06-16 NOTE — PROGRESS NOTES
Natalie Sample  : 1945  Primary:   Secondary:  [address] at [facility]  [address] [facility], [address] [place] [address],[address], [facility] [address].  [phone]   [phone]         OUTPATIENT PHYSICAL THERAPY: Daily Treatment Note 2021  ICD-10: Treatment Diagnosis: Cervicalgia (M54.2)  Pre-treatment Symptoms/Complaints:  2021: Patient reports she is doing well overall, but she has been painting her bathroom and so her shoulders are hurting today. Pain: Initial: Pain Intensity 1: 4  Pain Location 1: Neck, Shoulder  Pain Orientation 1: Right, Left  Pain Intervention(s) 1: Rest, Medication (see MAR)  Post Session:  3/10   Medications Last Reviewed:  2021   Updated Objective Findings:  None Today   TREATMENT:   MANUAL THERAPY: (40 minutes): Joint mobilization and Soft tissue mobilization was utilized and necessary because of the patient's restricted joint motion, painful spasm, loss of articular motion and restricted motion of soft tissue. Treatment/Session Summary:    · Response to Treatment:  Patient tolerated treatment well with improving overall mobility after treatment. · Communication/Consultation:  None today  · Equipment provided today:  None today  · Recommendations/Intent for next treatment session: Next visit will focus on improving overall mobility with decreased pain. Treatment Plan of Care Effective Dates:  2021 TO 2021 (90 days).    Total Treatment Billable Duration:  40 minutes  PT Patient Time In/Time Out  Time In: 0845  Time Out: [address], PT    Future Appointments   Date Time Provider Radha Bautista   2021  8:45 AM Kortney Cobb, PT [facility]   2021  8:45 AM Kortney Cobb, PT [facility]PT [facility]   2021  1:40 PM Rufus Madison MD END BS ENDO

## 2021-06-18 NOTE — THERAPY DISCHARGE
Ag Madera : 1945 [place] Secondary:  [facility] at [place] [id number] [facility], [address], [place] [address]. [phone]   [phone] OUTPATIENT PHYSICAL THERAPY:Discharge Summary ICD-10: Treatment Diagnosis: Low back pain (M54.5) Precautions/Allergies:  
Adhesive, Alendronate sodium, Antihistamine [triprolidine-pseudoephedrine], and Boniva [ibandronate] Lesle Gulling Frequency/Duration: Patient discharged from physical therapy. MEDICAL/REFERRING DIAGNOSIS: 
lbp DATE OF ONSET: May 1, 2020 REFERRING PHYSICIAN: Frankie Tipton* MD Orders: Evaluate and treat Return MD Appointment: Unknown INITIAL ASSESSMENT:  Ms. Wilfredo Varela presents with increased pain, decreased strength, and increased muscular tightness. Patient would benefit from skilled physical therapy to address problems and goals. Thank you for this referral.   
 
Discharge note:  Patient attended twelve scheduled physical therapy appointments from 2020 to 2021. Worker's compensation did not approved further appointments. Patient discharged from physical therapy. Thank you for this referral.    
PROBLEM LIST (Impacting functional limitations): 1. Decreased Strength 2. Increased Pain 3. Decreased [place] with Home Exercise Program INTERVENTIONS PLANNED: (Treatment may consist of any combination of the following) 1. Cold 2. Heat 3. Home Exercise Program (HEP) 4. Manual Therapy 5. Therapeutic Exercise/Strengthening GOALS: (Goals have been discussed and agreed upon with patient.) Short-Term Functional Goals: Time Frame: 30 days 1. Patient will be independent with home exercise program to decrease pain. Goal met. 2. Patient will decrease score on Modified Oswestry Low Back Pain Questionnaire to less than or equal to 18 demonstrating improvement. Goal not met. Discharge Goals: Time Frame: 90 days 1.  Patient will decrease score on Modified Oswestry Low Back Pain Questionnaire to less than or equal to 10 demonstrating improvement. Goal not met. 2. Patient will report being able to shower and fix meals with less complaints of back pain. Goal not met 3. Patient will increase core strength to greater than or equal to 4/5 to improve ease with mobility. Goal unable to be reassessed. OUTCOME MEASURE:  
Tool Used: Modified Oswestry Low Back Pain Questionnaire Score:  Initial: 21/50  Most Recent: 21 (Date: 4-) Interpretation of Score: Each section is scored on a 0-5 scale, 5 representing the greatest disability. The scores of each section are added together for a total score of 50. HISTORY:  
History of Injury/Illness (Reason for Referral): 
Patient reports injuring her back on May 1, 2020. Patient was working at [employer] for [employer] when a patient at the [employer] jumped on her. Patient complains of an achy pain along lower back with radiating pain down left leg. Patient rates pain level with standing as 8/10. Pain fluctuates depending on activities. Aggravating factors are standing for prolonged periods of time. Patient reports having increased pain with showering, fixing meals, and grocery shopping. Past Medical History/Comorbidities: Ms. Dimitris Schwartz  has a past medical history of Abnormal Pap smear, Anxiety, COPD (chronic obstructive pulmonary disease) ([address] Depression, Hyperthyroidism, Insomnia, Irritable bowel syndrome, and Osteopenia. She also has no past medical history of Difficult intubation, Malignant hyperthermia due to anesthesia, Nausea & vomiting, Pseudocholinesterase deficiency, or Unspecified adverse effect of anesthesia. Ms. Dimitris Schwartz  has a past surgical history that includes hx tonsillectomy; hx orthopaedic; hx salpingo-oophorectomy (Right, 01/22/2014); and hx colonoscopy. Social History/Living Environment:  
  Lives alone in two story home.   
Prior Level of Function/Work/Activity: 
Independent. Patient works part time at [employer] for [employer]. Patient was taken out of work two weeks ago by MD per patient. Dominant Side:  
      RIGHT Personal Factors:   
      Sex:  female Age:  77 y.o. Ambulatory/Rehab Services H2 Model Falls Risk Assessment Risk Factors: 
     No Risk Factors Identified Ability to Rise from Chair: 
     (1)  Pushes up, successful in one attempt Falls Prevention Plan: No modifications necessary Total: (5 or greater = High Risk): 1 ©2010 [facility] [address] [place] [address] States Patent #[id number]. Federal Law prohibits the replication, distribution or use without written permission from [facility] mymission2 Current Medications:   
  
Current Outpatient Medications:  
  albuterol (PROVENTIL HFA, VENTOLIN HFA, PROAIR HFA) 90 mcg/actuation inhaler, Take 2 Puffs by inhalation. , Disp: , Rfl:  
  b complex vitamins tablet, Take 1 Tab by mouth every morning. Holding for surgery, Disp: , Rfl:  
  temazepam (RESTORIL) 15 mg capsule, Take  by mouth nightly as needed for Sleep., Disp: , Rfl:  
  DULoxetine (CYMBALTA) 30 mg capsule, Take 30 mg by mouth every morning., Disp: , Rfl:  
  ascorbic acid (VITAMIN C) 500 mg tablet, Take 500 mg by mouth every morning. Holding for surgery, Disp: , Rfl:  
  multivitamin (ONE A DAY) tablet, Take 1 Tab by mouth every morning. Holding for surgery, Disp: , Rfl:   
  
Number of Personal Factors/Comorbidities that affect the Plan of Care: 1-2: MODERATE COMPLEXITY EXAMINATION:  
Observation/Orthostatic Postural Assessment: Forward head/rounded shoulders posture. Palpation:   
      Increased tenderness to palpation along bilateral SI region, left worst than right.    
ROM:   
      Lumbar range of motion is as follows: flexion within normal limits, extension 25%, right rotation 75%, left rotation within normal limits, right lateral flexion 75%, and left lateral flexion within normal limits. Patient complains of pulling with right rotation and right lateral flexion and increased pain in lower back with extension. Strength:   
      Hip flexion right 5/5 and left 4+/5, hip abduction right 5/5 and left 4/5, hip adduction right 5/5 and left 4+/5, quadriceps 5/5, hamstrings right 5/5 and left 4/5, ankle dorsiflexion 5/5, ankle plantarflexion 5/5, and core strength 3-/5. Special Tests:   
      Straight leg raise negative bilateral.  Piriformis test negative bilateral.   
Neurological Screen: 
      Sensation: Within normal limits. Functional Mobility:  
      Gait/Ambulation:  Ambulates with normal gait. Body Structures Involved: 1. Nerves 2. Muscles Body Functions Affected: 1. Neuromusculoskeletal Activities and Participation Affected: 1. General Tasks and Demands 2. Interpersonal Interactions and Relationships 3. Community, Social and [place] Number of elements (examined above) that affect the Plan of Care: 4+: HIGH COMPLEXITY CLINICAL PRESENTATION:  
Presentation: Evolving clinical presentation with changing clinical characteristics: MODERATE COMPLEXITY CLINICAL DECISION MAKING:  
Use of outcome tool(s) and clinical judgement create a POC that gives a: Questionable prediction of patient's progress: MODERATE COMPLEXITY

## 2021-06-30 ENCOUNTER — HOSPITAL ENCOUNTER (OUTPATIENT)
Dept: PHYSICAL THERAPY | Age: 76
Discharge: HOME | End: 2021-06-30
Payer: MEDICARE

## 2021-06-30 PROCEDURE — 97140 MANUAL THERAPY 1/> REGIONS: CPT

## 2021-07-14 ENCOUNTER — HOSPITAL ENCOUNTER (OUTPATIENT)
Dept: PHYSICAL THERAPY | Age: 76
Discharge: HOME | End: 2021-07-14
Payer: MEDICARE

## 2021-07-14 PROCEDURE — 97140 MANUAL THERAPY 1/> REGIONS: CPT

## 2021-07-14 NOTE — PROGRESS NOTES
Susana Burger  : 1945  [place]  Secondary: Adrián Sanchez [address] at [place] [id number] [facility], [address] [place] [address],[address], [place] [address]  [phone]   [phone]          OUTPATIENT PHYSICAL THERAPY:Progress Report 2021   ICD-10: Treatment Diagnosis: Cervicalgia (M54.2)  Precautions/Allergies:   Adhesive, Alendronate sodium, Antihistamine [triprolidine-pseudoephedrine], and Boniva [ibandronate]   MD Orders: Evaluate and Treat MEDICAL/REFERRING DIAGNOSIS:  Cervicalgia [M54.2]   DATE OF ONSET: Chronic  REFERRING PHYSICIAN:  Liya Crow NP   RETURN PHYSICIAN APPOINTMENT: TBD     ASSESSMENT:  Ms. Lorena Carvalho presents with improving mobility and pain in cervical region secondary to degenerative changes. Patient has attended a total of 60 scheduled physical therapy visits. Treatment has consisted of stretching, strengthening, and manual therapy to improve overall pain and performance with activities of daily living. PROBLEM LIST (Impacting functional limitations):  1. Decreased Strength  2. Decreased ADL/Functional Activities  3. Increased Pain  4. Decreased Activity Tolerance INTERVENTIONS PLANNED: (Treatment may consist of any combination of the following)  1. Cold  2. Heat  3. Home Exercise Program (HEP)  4. Manual Therapy  5. Neuromuscular Re-education/Strengthening  6. Range of Motion (ROM)  7. Therapeutic Activites  8. Therapeutic Exercise/Strengthening   TREATMENT PLAN:  Effective Dates:  2021 TO 2021 (90 days). Frequency/Duration: 1 time every other week for 90 Day(s)  GOALS: (Goals have been discussed and agreed upon with patient.)  Short-Term Functional Goals: Time Frame: 30 days  1. Patient will be independent with home exercise program without exacerbation of symptoms or cueing needed--goal met.    2. Patient will be independent with correct sleeping positions and awareness/avoidance of aggravating positions without cueing needed--goal met. Discharge Goals: Time Frame: 90 days  1. Patient will be independent with all ADLs with minimal onset of neck pain and no deficits with daily tasks--goal ongoing. 2. Patient will report no fear avoidance with social or recreational activities due to neck pain --goal ongoing. 3. Patient will score less than or equal to 7/50 on Neck Disability Index with minimal effect of neck pain on patient's ability to manage every day life activities--goal ongoing. Rehabilitation Potential For Stated Goals: Good               HISTORY:   History of Present Injury/Illness (Reason for Referral):  1/13/2021 (Progress Note): Patient reports she is having tightness in her right neck more than her left right now. She reports she is dizzy as well. 2/24/2021 (Progress Note): Patient reports she is feeling overall better, but has been having more pain in her left shoulder. 2/25/8051 (Recertification): Patient reports therapy has been helpful, but she is still having pain. 5/19/2021 (Progres Note): Patient reports therapy has been helping and her shoulder is doing better, but still having some pain. She reports she has also had some dizziness. 1/97/5660 (Recertification): Patient reports she is still having pain in her neck and shoulders routinely. She reports some things make it worse, but she is doing well overall. She reports she feels like she is progressing overall. 7/14/2021 (Progress Note): Patient reports she is getting pain in both shoulders still that goes into her arms. Past Medical History/Comorbidities:   Ms. Tor Salmon  has a past medical history of Abnormal Pap smear, Anxiety, COPD (chronic obstructive pulmonary disease) ([facility] Depression, Hyperthyroidism, Insomnia, Irritable bowel syndrome, and Osteopenia.  She also has no past medical history of Difficult intubation, Malignant hyperthermia due to anesthesia, Nausea & vomiting, Pseudocholinesterase deficiency, or Unspecified adverse effect of anesthesia. Ms. Remington Marley  has a past surgical history that includes hx tonsillectomy; hx orthopaedic; hx salpingo-oophorectomy (Right, 2014); and hx colonoscopy. Social History/Living Environment:   Home Environment: Private residence  Living Alone: Yes  Support Systems: Family member(s), Friends \ neighbors  Prior Level of Function/Work/Activity:  Independent  Dominant Side:         RIGHT  Personal Factors:          Sex:  female        Age:  77 y.o. Current Medications:       Current Outpatient Medications:     albuterol (PROVENTIL HFA, VENTOLIN HFA, PROAIR HFA) 90 mcg/actuation inhaler, Take 2 Puffs by inhalation. , Disp: , Rfl:     b complex vitamins tablet, Take 1 Tab by mouth every morning. Holding for surgery, Disp: , Rfl:     temazepam (RESTORIL) 15 mg capsule, Take  by mouth nightly as needed for Sleep., Disp: , Rfl:     DULoxetine (CYMBALTA) 30 mg capsule, Take 30 mg by mouth every morning., Disp: , Rfl:     ascorbic acid (VITAMIN C) 500 mg tablet, Take 500 mg by mouth every morning. Holding for surgery, Disp: , Rfl:     multivitamin (ONE A DAY) tablet, Take 1 Tab by mouth every morning. Holding for surgery, Disp: , Rfl:    Date Last Reviewed:  2021    EXAMINATION:   Observation/Orthostatic Postural Assessment:          Posture Assessment: Rounded shoulders, Forward head   Palpation:          Tightness and tenderness to palpation noted throughout upper and lower cervical musculature. No bruising or swelling noted.   ROM:          Cervical Assessment (AROM):  · Flexion: 75% of full AROM  · Extension: 75% of full AROM  · Right side bendin% of full AROM  · Left side bending[de-identified] 75% of full AROM  · Right rotation: 75% of full AROM  · Left rotation: 75% of full AROM  Strength:          Cervical Assessment (Strength):  · Grossly 2+/5 with manual muscle testing  Special Tests:          Negative Hautant's test. Negative Cranial-[place] lift test. Negative [place]-Axis Sheer test. Negative Alar Ligament test. Negative Tectorial Membrane test.   Neurological Screen:        Dermatomes: Within normal limits        Reflexes:  2+  Functional Mobility:         Gait/Mobility:    ·   Independent         Transfers:     · Sit to Stand: Independent  · Stand to Sit: Independent  · Stand Pivot Transfers: Independent  · Bed to Chair: Independent  · Lateral Transfers: Independent         Bed Mobility:     · Rolling: Independent  · Supine to Sit: Independent  · Sit to Supine: Independent  · Scooting: Independent        CLINICAL DECISION MAKING:   Outcome Measure: Tool Used: Neck Disability Index (NDI)  Score:  Initial: 17/50  Most Recent: 14/50 (Date: 7/14/2021 )   Interpretation of Score: The Neck Disability Index is a revised form of the Oswestry Low Back Pain Index and is designed to measure the activities of daily living in person's with neck pain. Each section is scored on a 0-5 scale, 5 representing the greatest disability. The scores of each section are added together for a total score of 50. Medical Necessity:   · Patient is expected to demonstrate progress in strength and range of motion to improve safety during daily activities. · Patient demonstrates good rehab potential due to higher previous functional level. · Skilled intervention continues to be required due to decreased mobility. Reason for Services/Other Comments:  · Patient continues to demonstrate capacity to improve overall mobility which will increase independence and increase safety.

## 2021-07-14 NOTE — PROGRESS NOTES
Constance Bloom  : 1945  Primary:   Secondary:  [address] at [place] [address] [facility], [address] [place] [address],[address], [place] [address].  [phone]   [phone]         OUTPATIENT PHYSICAL THERAPY: Daily Treatment Note 2021  ICD-10: Treatment Diagnosis: Cervicalgia (M54.2)  Pre-treatment Symptoms/Complaints:  2021: Patient reports both shoulders and arms are hurting today. Pain: Initial: Pain Intensity 1: 4  Pain Location 1: Neck, Shoulder  Pain Orientation 1: Right, Left  Pain Intervention(s) 1: Rest, Medication (see MAR)  Post Session:  3/10   Medications Last Reviewed:  2021   Updated Objective Findings:  None Today   TREATMENT:   MANUAL THERAPY: (40 minutes): Joint mobilization and Soft tissue mobilization was utilized and necessary because of the patient's restricted joint motion, painful spasm, loss of articular motion and restricted motion of soft tissue. Treatment/Session Summary:    · Response to Treatment:  Patient tolerated treatment well with improving overall mobility after treatment. · Communication/Consultation:  None today  · Equipment provided today:  None today  · Recommendations/Intent for next treatment session: Next visit will focus on improving overall mobility with decreased pain. Treatment Plan of Care Effective Dates:  2021 TO 2021 (90 days).    Total Treatment Billable Duration:  40 minutes  PT Patient Time In/Time Out  Time In: 0845  Time Out: 6200 [facility], PT    Future Appointments   Date Time Provider Radha Bautista   2021  8:45 AM Ane Kc, PT [facility] SFE   2021  8:45 AM Ane Kc, PT [facility] SFE   2021  8:45 AM Ane Kc, PT [facility] SFE   2021  1:40 PM Isabella Montes MD END BS ENDO

## 2021-07-28 ENCOUNTER — HOSPITAL ENCOUNTER (OUTPATIENT)
Dept: PHYSICAL THERAPY | Age: 76
Discharge: HOME | End: 2021-07-28
Payer: MEDICARE

## 2021-07-28 PROCEDURE — 97140 MANUAL THERAPY 1/> REGIONS: CPT

## 2021-07-28 NOTE — PROGRESS NOTES
Armida Jackson  : 1945  Primary:   Secondary:  [address]  at [place] [address] [place] [address],[address], [place] [address].  [phone]   [phone]         OUTPATIENT PHYSICAL THERAPY: Daily Treatment Note 2021  ICD-10: Treatment Diagnosis: Cervicalgia (M54.2)  Pre-treatment Symptoms/Complaints:  2021: Patient reports her shoulders are doing better, but still have some limited range of motion. Pain: Initial: Pain Intensity 1: 4  Pain Location 1: Neck, Shoulder  Pain Orientation 1: Right, Left  Pain Intervention(s) 1: Rest, Medication (see MAR)  Post Session:  3/10   Medications Last Reviewed:  2021   Updated Objective Findings:  None Today   TREATMENT:   MANUAL THERAPY: (40 minutes): Joint mobilization and Soft tissue mobilization was utilized and necessary because of the patient's restricted joint motion, painful spasm, loss of articular motion and restricted motion of soft tissue. Treatment/Session Summary:    · Response to Treatment:  Patient tolerated treatment well with improving overall mobility. · Communication/Consultation:  None today  · Equipment provided today:  None today  · Recommendations/Intent for next treatment session: Next visit will focus on improving overall mobility with decreased pain. Treatment Plan of Care Effective Dates:  2021 TO 2021 (90 days).    Total Treatment Billable Duration:  40 minutes  PT Patient Time In/Time Out  Time In: 0845  Time Out: 6200 [facility], PT    Future Appointments   Date Time Provider Radha Bautista   2021  8:45 AM Karli Agent, PT [facility] SFE   2021  8:45 AM Karli Agent, PT [facility] SFE   2021  8:45 AM Karli Agent, PT [facility]FE[facility]PT SFE   2021  1:40 PM Neela Barger MD END BS ENDO

## 2021-08-11 ENCOUNTER — HOSPITAL ENCOUNTER (OUTPATIENT)
Dept: PHYSICAL THERAPY | Age: 76
Discharge: HOME | End: 2021-08-11
Payer: MEDICARE

## 2021-08-11 PROCEDURE — 97140 MANUAL THERAPY 1/> REGIONS: CPT

## 2021-08-11 NOTE — PROGRESS NOTES
Armida Jackson  : 1945  Primary:   Secondary:  [address]  at [place] [address] [place] [address],[address], [place] [address].  [phone]   [phone]         OUTPATIENT PHYSICAL THERAPY: Daily Treatment Note 2021  ICD-10: Treatment Diagnosis: Cervicalgia (M54.2)  Pre-treatment Symptoms/Complaints:  2021: Patient reports her shoulder is really hurting today. Pain: Initial: Pain Intensity 1: 4  Pain Location 1: Neck, Shoulder  Pain Orientation 1: Right, Left  Pain Intervention(s) 1: Rest, Medication (see MAR)  Post Session:  3/10   Medications Last Reviewed:  2021   Updated Objective Findings:  None Today   TREATMENT:   MANUAL THERAPY: (40 minutes): Joint mobilization and Soft tissue mobilization was utilized and necessary because of the patient's restricted joint motion, painful spasm, loss of articular motion and restricted motion of soft tissue. Treatment/Session Summary:    · Response to Treatment:  Patient tolerated treatment well with improved mobility noted after treatment. · Communication/Consultation:  None today  · Equipment provided today:  None today  · Recommendations/Intent for next treatment session: Next visit will focus on improving overall mobility with decreased pain. Treatment Plan of Care Effective Dates:  2021 TO 2021 (90 days).    Total Treatment Billable Duration:  40 minutes  PT Patient Time In/Time Out  Time In: 0845  Time Out: 6200 [facility], PT    Future Appointments   Date Time Provider Radha Bautista   2021  8:45 AM Karli Agent, PT [facility] SFE   2021  8:45 AM Karli Agent, PT SFEORPT SFE   2021  1:40 PM Neela Barger MD END BS ENDO

## 2021-08-12 ENCOUNTER — TRANSCRIBE ORDER (OUTPATIENT)
Dept: SCHEDULING | Age: 76
End: 2021-08-12

## 2021-08-12 DIAGNOSIS — Z12.31 SCREENING MAMMOGRAM FOR HIGH-RISK PATIENT: Primary | ICD-10-CM

## 2021-08-19 ENCOUNTER — HOSPITAL ENCOUNTER (OUTPATIENT)
Dept: PHYSICAL THERAPY | Age: 76
Discharge: HOME | End: 2021-08-19
Payer: COMMERCIAL

## 2021-08-19 PROCEDURE — 97110 THERAPEUTIC EXERCISES: CPT

## 2021-08-19 PROCEDURE — 97161 PT EVAL LOW COMPLEX 20 MIN: CPT

## 2021-08-19 PROCEDURE — 97140 MANUAL THERAPY 1/> REGIONS: CPT

## 2021-08-19 NOTE — THERAPY EVALUATION
Beltran Roper  : 1945  Primary: [facility] [employer]  Secondary:  [address] at [place] [address] [facility], [address] [place] [address],[address], [place] [address].  [phone]   [phone]        [address] Assessment 2021   ICD-10: Treatment Diagnosis: Low back pain (M54.5)  Precautions/Allergies:   Adhesive, Alendronate sodium, Antihistamine [triprolidine-pseudoephedrine], and Boniva [ibandronate]   TREATMENT PLAN:  Effective Dates: 2021 TO 2021 (90 days). Frequency/Duration: 2 times a week for 90 Day(s) MEDICAL/REFERRING DIAGNOSIS:  Low back pain [M54.5]   DATE OF ONSET: May 1, 2020   REFERRING PHYSICIAN: Ramonita Palmer*  MD Orders: Evaluate and treat   Return MD Appointment: unknown      INITIAL ASSESSMENT:  Ms. Gideon Sultana presents with increased pain, decreased strength, and increased muscular tightness. Patient would benefit from skilled physical therapy to address problems and goals. Thank you for this referral.      PROBLEM LIST (Impacting functional limitations):  1. Decreased Strength  2. Increased Pain  3. Decreased Flexibility/Joint Mobility  4. Decreased Eleanor with Home Exercise Program INTERVENTIONS PLANNED: (Treatment may consist of any combination of the following)  1. Cold  2. Heat  3. Home Exercise Program (HEP)  4. Manual Therapy  5. Range of Motion (ROM)  6. Therapeutic Exercise/Strengthening     GOALS: (Goals have been discussed and agreed upon with patient.)  Short-Term Functional Goals: Time Frame: 30 days   1. Patient will decrease score on Modified Oswestry Low Back Pain Questionnaire to less than or equal to 21 demonstrating improvement. Discharge Goals: Time Frame: 90 days   1. Patient will decrease score on Modified Oswestry Low Back Pain Questionnaire to less than or equal to 18 demonstrating improvement. 2. Patient will report being able to shower and fix meals with less complaints of back pain. 3. Patient will increase core strength to greater than or equal to 4/5 to improve ease with mobility. OUTCOME MEASURE:   Tool Used: Modified Oswestry Low Back Pain Questionnaire  Score:  Initial: 23/50  Most Recent: X/50 (Date: -- )   Interpretation of Score: Each section is scored on a 0-5 scale, 5 representing the greatest disability. The scores of each section are added together for a total score of 50. MEDICAL NECESSITY:   · Patient is expected to demonstrate progress in strength, range of motion and pain to improve ease with mobility. REASON FOR SERVICES/OTHER COMMENTS:  · Patient continues to require skilled intervention due to increased pain interfering with activities of daily living. Total Duration:  PT Patient Time In/Time Out  Time In: 0851  Time Out: 0900    Rehabilitation Potential For Stated Goals: [address] [place] [address] therapy, I certify that the treatment plan above will be carried out by a therapist or under their direction. Thank you for this referral,  Cornelia Alva, PT     Referring Physician Signature: Zakia Palma _______________________________ Date _____________      PAIN/SUBJECTIVE:   Initial: Pain Intensity 1: 4  Pain Location 1: Back  Pain Orientation 1: Lower  Post Session:  4/10   HISTORY:   History of Injury/Illness (Reason for Referral):  Patient was treated in the clinic for her back pain from December 2020 to April 2021. Patient originally injured her back in May 2020 when a patient jumped on her while working at [employer] for [address]. Patient continues to complain of an achy pain along bilateral lower back. Patient rates pain level as 4/10. Symptoms fluctuate per patient report. Patient reports occasionally having numbness in right lower extremity and radiating pain down left leg.     Past Medical History/Comorbidities:   Ms. Rip Payne  has a past medical history of Abnormal Pap smear, Anxiety, COPD (chronic obstructive pulmonary disease) ([facility] [address] Depression, Hyperthyroidism, Insomnia, Irritable bowel syndrome, and Osteopenia. She also has no past medical history of Difficult intubation, Malignant hyperthermia due to anesthesia, Nausea & vomiting, Pseudocholinesterase deficiency, or Unspecified adverse effect of anesthesia. Ms. Dee Dee Robles  has a past surgical history that includes hx tonsillectomy; hx orthopaedic; hx salpingo-oophorectomy (Right, 01/22/2014); and hx colonoscopy. Social History/Living Environment:     Lives alone in two story home. Prior Level of Function/Work/Activity:  Independent. Currently not working. Dominant Side:         RIGHT  Personal Factors:          Sex:  female        Age:  77 y.o. Ambulatory/Rehab Services H2 Model Falls Risk Assessment   Risk Factors:       No Risk Factors Identified Ability to Rise from Chair:       (1)  Pushes up, successful in one attempt   Falls Prevention Plan:       No modifications necessary   Total: (5 or greater = High Risk): 1   ©2010 [facility]. All Rights Reserved. [facility] States Patent #7,843,002. Federal Law prohibits the replication, distribution or use without written permission from [facility] of [address] [place] [address]   Current Medications:       Current Outpatient Medications:     albuterol (PROVENTIL HFA, VENTOLIN HFA, PROAIR HFA) 90 mcg/actuation inhaler, Take 2 Puffs by inhalation. , Disp: , Rfl:     b complex vitamins tablet, Take 1 Tab by mouth every morning. Holding for surgery, Disp: , Rfl:     temazepam (RESTORIL) 15 mg capsule, Take  by mouth nightly as needed for Sleep., Disp: , Rfl:     DULoxetine (CYMBALTA) 30 mg capsule, Take 30 mg by mouth every morning., Disp: , Rfl:     ascorbic acid (VITAMIN C) 500 mg tablet, Take 500 mg by mouth every morning. Holding for surgery, Disp: , Rfl:     multivitamin (ONE A DAY) tablet, Take 1 Tab by mouth every morning.  Holding for surgery, Disp: , Rfl:    Date Last Reviewed:  8/19/2021   Number of Personal Factors/Comorbidities that affect the Plan of Care: 0: LOW COMPLEXITY   EXAMINATION:   Observation/Orthostatic Postural Assessment: Forward head/rounded shoulders posture. Palpation:          Increased tenderness along bilateral SI region/bilateral lumbar paraspinals. ROM:          Lumbar range of motion is as follows: flexion within normal limits, extension 25% with pain, rotation within normal limits, lateral flexion 75%. Strength:          Hip flexion 4-/5, hip abduction 5/5, hip adduction 5/5, quadriceps 5/5, hamstrings 5/5, ankle dorsiflexion 5/5, ankle plantarflexion 5/5, core strength 3+/5. Special Tests:          Straight leg raise negative bilateral.  Piriformis test negative bilateral.   Neurological Screen:        Sensation: Within normal limits. Functional Mobility:         Gait/Ambulation:  Patient ambulates with normal gait pattern. Body Structures Involved:  1. Nerves  2. Joints  3. Muscles Body Functions Affected:  1. Neuromusculoskeletal Activities and Participation Affected:  1. General Tasks and Demands  2. Mobility  3.  Community, Social and [place]   Number of elements (examined above) that affect the Plan of Care: 4+: HIGH COMPLEXITY   CLINICAL PRESENTATION:   Presentation: Stable and uncomplicated: LOW COMPLEXITY   CLINICAL DECISION MAKING:   Use of outcome tool(s) and clinical judgement create a POC that gives a: Clear prediction of patient's progress: LOW COMPLEXITY

## 2021-08-19 NOTE — PROGRESS NOTES
Adrienne Hdez  : 1945  Primary: Rob [facility] [employer]  Secondary:  [address]  at [place] [address],[address], [place] [address].  [phone]   [phone]     OUTPATIENT PHYSICAL THERAPY: Daily Treatment Note 2021  Visit Count:  1    ICD-10: Treatment Diagnosis: Low back pain (M54.5)  Precautions/Allergies:   Adhesive, Alendronate sodium, Antihistamine [triprolidine-pseudoephedrine], and Boniva [ibandronate]   TREATMENT PLAN:  Effective Dates: 2021 TO 2021 (90 days). Frequency/Duration: 2 times a week for 90 Day(s)    Pre-treatment Symptoms/Complaints:  Increased back pain and decreased strength  Pain: Initial: Pain Intensity 1: 4  Pain Location 1: Back  Pain Orientation 1: Lower  Post Session:  4/10   Medications Last Reviewed:  2021  Updated Objective Findings:  See evaluation note from today  TREATMENT:     THERAPEUTIC EXERCISE: (10 minutes):  Exercises per grid below to improve strength. Required minimal verbal cues to promote proper body alignment. Progressed repetitions as indicated. Date:  2021 Date:   Date:     Activity/Exercise Parameters Parameters Parameters   Nustep  Level 4 x 5 minutes     Single knee to chest stretch 3x30 sec bilateral      Piriformis stretch 3x30 sec bilateral      Hamstring stretch with ankle pumps  3x30 sec bilateral                        .  MANUAL THERAPY: (15 minutes): Joint mobilization and Soft tissue mobilization was utilized and necessary because of the patient's restricted joint motion and painful spasm. Patient received trigger point release along bilateral SI region/bilateral lumbar paraspinals to decrease pain. Skin intact after treatment. [employer]  Treatment/Session Summary:    Response to Treatment:  tolerated well.   Communication/Consultation:  None today  Equipment provided today:  None today  Recommendations/Intent for next treatment session: Next visit will focus on range of motion, stretching, and manual.    Total Treatment Billable Duration:   25 minutes+ evaluation   PT Patient Time In/Time Out  Time In: 0851  Time Out: 0900  Shannon Lang PT    Future Appointments   Date Time Provider Radha Bautista   8/25/2021  8:45 AM Diana Post, PT [facility] SFE   8/26/2021  8:45 AM Christine Ashford, PT [facility]PT SFE   9/2/2021  7:45 AM SFE [facility] ROOM 1 [facility] SFE   9/2/2021  8:45 AM Christine Ashford, PT [facility]PT SFE   9/8/2021  8:45 AM Diana Post, PT [facility]PT SFE   9/9/2021  8:45 AM Christine Ashford, PT [facility]PT SFE   9/16/2021  8:45 AM Christine Ashford, PT [facility]PT SFE   9/22/2021  8:45 AM Diana Post, PT [facility]FE[facility]PT SFE   9/23/2021  8:45 AM Christine Ashford, PT [facility]PT SFE   9/27/2021  1:40 PM Nahun Madrid MD END BS ENDO   9/30/2021  8:45 AM Christine Ashford, PT SFE[facility]PT SFE     Visit # Therapist initials Date Arrived NS/ Cx < 24 hr >24 hr Cx Visit Comments   1 PV 8/19/2021 X   Initial Assessment and Treatment- worker comp- 12 visits approved                                                                                                                                                              Abbreviations: NS = No Show; CX = cancelled

## 2021-08-25 ENCOUNTER — HOSPITAL ENCOUNTER (OUTPATIENT)
Dept: PHYSICAL THERAPY | Age: 76
Discharge: HOME | End: 2021-08-25
Payer: MEDICARE

## 2021-08-25 PROCEDURE — 97140 MANUAL THERAPY 1/> REGIONS: CPT

## 2021-08-25 NOTE — PROGRESS NOTES
Vania Soto  : 1945  Primary:   Secondary:  [address] at [place] [address] [facility], [address] [place] [address],[address], [facility].  [phone]   [phone]         OUTPATIENT PHYSICAL THERAPY: Daily Treatment Note 2021  ICD-10: Treatment Diagnosis: Cervicalgia (M54.2)  Pre-treatment Symptoms/Complaints:  2021: Patient reports her shoulders are both hurting. Pain: Initial: Pain Intensity 1: 4  Pain Location 1: Neck, Shoulder  Pain Orientation 1: Right, Left  Pain Intervention(s) 1: Rest, Medication (see MAR)  Post Session:  3/10   Medications Last Reviewed:  2021   Updated Objective Findings:  None Today   TREATMENT:   MANUAL THERAPY: (40 minutes): Joint mobilization and Soft tissue mobilization was utilized and necessary because of the patient's restricted joint motion, painful spasm, loss of articular motion and restricted motion of soft tissue. Treatment/Session Summary:    · Response to Treatment:  Patient tolerated treatment well with improved mobility noted after treatment. · Communication/Consultation:  None today  · Equipment provided today:  None today  · Recommendations/Intent for next treatment session: Next visit will focus on improving overall mobility with decreased pain. Treatment Plan of Care Effective Dates:  2021 TO 2021 (90 days).    Total Treatment Billable Duration:  40 minutes  PT Patient Time In/Time Out  Time In: 0845  Time Out: 6200 [facility], PT     Visit # Therapist initials Date Arrived NS/ Cx < 24 hr >24 hr Cx Visit Comments   61 JS 2021 X   Medicare cap: $2110                                                                                                                                                              Abbreviations: NS = No Show; CX = cancelled     Future Appointments   Date Time Provider Radha Bautista   2021  8:45 AM Britney Bhatia, PT [facility] SFE   2021  8:45 AM Renny Oneill D, PT [facility]PT SFE   9/2/2021  7:45 AM SFE PHI BI ROOM 1 [facility] SFE   9/2/2021  8:45 AM Vissage, Charolett Breech, PT [facility]FE[facility]PT SFE   9/8/2021  8:45 AM Helga Teofilo, PT [facility]PT SFE   9/9/2021  8:45 AM Vissage, Charolett Breech, PT SFE[facility]PT SFE   9/16/2021  8:45 AM Vissage, Charolett Breech, PT SFE[facility]PT SFE   9/22/2021  8:45 AM Helga Teofilo, PT SFE[facility]PT SFE   9/23/2021  8:45 AM Vissage, Charolett Breech, PT SFE[facility]PT SFE   9/27/2021  1:40 PM Carlitos Correia MD END BS ENDO   9/30/2021  8:45 AM Vissage, Charolett Breech, PT SFEORPT SFE

## 2021-08-25 NOTE — PROGRESS NOTES
Chace Jolley  : 1945  [place]  Secondary: Adrián Christianson at [place] [id number] [facility], [address] [place] [address],[address], [place] [address].  [phone]   [phone]          OUTPATIENT PHYSICAL THERAPY:Progress Report 2021   ICD-10: Treatment Diagnosis: Cervicalgia (M54.2)  Precautions/Allergies:   Adhesive, Alendronate sodium, Antihistamine [triprolidine-pseudoephedrine], and Boniva [ibandronate]   MD Orders: Evaluate and Treat MEDICAL/REFERRING DIAGNOSIS:  Cervicalgia [M54.2]   DATE OF ONSET: Chronic  REFERRING PHYSICIAN:  Tomy Sanabria NP   RETURN PHYSICIAN APPOINTMENT: TBD     ASSESSMENT:  Ms. Susy Wu presents with improving mobility and pain in cervical region secondary to degenerative changes. Patient has attended a total of 63 scheduled physical therapy visits. Treatment has consisted of stretching, strengthening, and manual therapy to improve overall pain and performance with activities of daily living. PROBLEM LIST (Impacting functional limitations):  1. Decreased Strength  2. Decreased ADL/Functional Activities  3. Increased Pain  4. Decreased Activity Tolerance INTERVENTIONS PLANNED: (Treatment may consist of any combination of the following)  1. Cold  2. Heat  3. Home Exercise Program (HEP)  4. Manual Therapy  5. Neuromuscular Re-education/Strengthening  6. Range of Motion (ROM)  7. Therapeutic Activites  8. Therapeutic Exercise/Strengthening   TREATMENT PLAN:  Effective Dates:  2021 TO 2021 (90 days). Frequency/Duration: 1 time every other week for 90 Day(s)  GOALS: (Goals have been discussed and agreed upon with patient.)  Short-Term Functional Goals: Time Frame: 30 days  1. Patient will be independent with home exercise program without exacerbation of symptoms or cueing needed--goal met.    2. Patient will be independent with correct sleeping positions and awareness/avoidance of aggravating positions without cueing needed--goal met. Discharge Goals: Time Frame: 90 days  1. Patient will be independent with all ADLs with minimal onset of neck pain and no deficits with daily tasks--goal ongoing. 2. Patient will report no fear avoidance with social or recreational activities due to neck pain --goal ongoing. 3. Patient will score less than or equal to 7/50 on Neck Disability Index with minimal effect of neck pain on patient's ability to manage every day life activities--goal ongoing. Rehabilitation Potential For Stated Goals: Good               HISTORY:   History of Present Injury/Illness (Reason for Referral):  1/13/2021 (Progress Note): Patient reports she is having tightness in her right neck more than her left right now. She reports she is dizzy as well. 2/24/2021 (Progress Note): Patient reports she is feeling overall better, but has been having more pain in her left shoulder. 7/62/1730 (Recertification): Patient reports therapy has been helpful, but she is still having pain. 5/19/2021 (Progres Note): Patient reports therapy has been helping and her shoulder is doing better, but still having some pain. She reports she has also had some dizziness. 3/55/5961 (Recertification): Patient reports she is still having pain in her neck and shoulders routinely. She reports some things make it worse, but she is doing well overall. She reports she feels like she is progressing overall. 7/14/2021 (Progress Note): Patient reports she is getting pain in both shoulders still that goes into her arms. 8/25/2021 (Progress Note): Patient reports both her shoulders and around her neck are painful. Past Medical History/Comorbidities:   Ms. Gigi Siegel  has a past medical history of Abnormal Pap smear, Anxiety, COPD (chronic obstructive pulmonary disease) ([facility] [address] Depression, Hyperthyroidism, Insomnia, Irritable bowel syndrome, and Osteopenia.  She also has no past medical history of Difficult intubation, Malignant hyperthermia due to anesthesia, Nausea & vomiting, Pseudocholinesterase deficiency, or Unspecified adverse effect of anesthesia. Ms. David Brock  has a past surgical history that includes hx tonsillectomy; hx orthopaedic; hx salpingo-oophorectomy (Right, 2014); and hx colonoscopy. Social History/Living Environment:   Home Environment: Private residence  Living Alone: Yes  Support Systems: Family member(s), Friends \ neighbors  Prior Level of Function/Work/Activity:  Independent  Dominant Side:         RIGHT  Personal Factors:          Sex:  female        Age:  77 y.o. Current Medications:       Current Outpatient Medications:     albuterol (PROVENTIL HFA, VENTOLIN HFA, PROAIR HFA) 90 mcg/actuation inhaler, Take 2 Puffs by inhalation. , Disp: , Rfl:     b complex vitamins tablet, Take 1 Tab by mouth every morning. Holding for surgery, Disp: , Rfl:     temazepam (RESTORIL) 15 mg capsule, Take  by mouth nightly as needed for Sleep., Disp: , Rfl:     DULoxetine (CYMBALTA) 30 mg capsule, Take 30 mg by mouth every morning., Disp: , Rfl:     ascorbic acid (VITAMIN C) 500 mg tablet, Take 500 mg by mouth every morning. Holding for surgery, Disp: , Rfl:     multivitamin (ONE A DAY) tablet, Take 1 Tab by mouth every morning. Holding for surgery, Disp: , Rfl:    Date Last Reviewed:  2021    EXAMINATION:   Observation/Orthostatic Postural Assessment:          Posture Assessment: Rounded shoulders, Forward head   Palpation:          Tightness and tenderness to palpation noted throughout upper and lower cervical musculature. No bruising or swelling noted.   ROM:          Cervical Assessment (AROM):  · Flexion: 75% of full AROM  · Extension: 75% of full AROM  · Right side bendin% of full AROM  · Left side bending[de-identified] 75% of full AROM  · Right rotation: 75% of full AROM  · Left rotation: 75% of full AROM  Strength:          Cervical Assessment (Strength):  · Grossly 2+/5 with manual muscle testing  Special Tests:          Negative Hautant's test. Negative Cranial-[place] lift test. Negative [place]-Axis Sheer test. Negative Alar Ligament test. Negative Tectorial Membrane test.   Neurological Screen:        Dermatomes: Within normal limits        Reflexes:  2+  Functional Mobility:         Gait/Mobility:    ·   Independent         Transfers:     · Sit to Stand: Independent  · Stand to Sit: Independent  · Stand Pivot Transfers: Independent  · Bed to Chair: Independent  · Lateral Transfers: Independent         Bed Mobility:     · Rolling: Independent  · Supine to Sit: Independent  · Sit to Supine: Independent  · Scooting: Independent        CLINICAL DECISION MAKING:   Outcome Measure: Tool Used: Neck Disability Index (NDI)  Score:  Initial: 17/50  Most Recent: 14/50 (Date: 8/25/2021 )   Interpretation of Score: The Neck Disability Index is a revised form of the Oswestry Low Back Pain Index and is designed to measure the activities of daily living in person's with neck pain. Each section is scored on a 0-5 scale, 5 representing the greatest disability. The scores of each section are added together for a total score of 50. Medical Necessity:   · Patient is expected to demonstrate progress in strength and range of motion to improve safety during daily activities. · Patient demonstrates good rehab potential due to higher previous functional level. · Skilled intervention continues to be required due to decreased mobility. Reason for Services/Other Comments:  · Patient continues to demonstrate capacity to improve overall mobility which will increase independence and increase safety.

## 2021-08-26 ENCOUNTER — HOSPITAL ENCOUNTER (OUTPATIENT)
Dept: PHYSICAL THERAPY | Age: 76
Discharge: HOME | End: 2021-08-26
Payer: COMMERCIAL

## 2021-08-26 PROCEDURE — 97110 THERAPEUTIC EXERCISES: CPT

## 2021-08-26 PROCEDURE — 97140 MANUAL THERAPY 1/> REGIONS: CPT

## 2021-08-26 NOTE — PROGRESS NOTES
Thu Asp  : 1945  [place]  Secondary:  Therapy Center at [facility]  [facility], [address] [place] [address],[address], [place] [address].  [phone]   [phone]     OUTPATIENT PHYSICAL THERAPY: Daily Treatment Note 2021  Visit Count:  2    ICD-10: Treatment Diagnosis: Low back pain (M54.5)  Precautions/Allergies:   Adhesive, Alendronate sodium, Antihistamine [triprolidine-pseudoephedrine], and Boniva [ibandronate]   TREATMENT PLAN:  Effective Dates: 2021 TO 2021 (90 days). Frequency/Duration: 2 times a week for 90 Day(s)    Pre-treatment Symptoms/Complaints:  Patient reports she feels better today. Pain: Initial: Pain Intensity 1: 4  Pain Location 1: Back  Pain Orientation 1: Lower, Left  Post Session:  3/10   Medications Last Reviewed:  2021  Updated Objective Findings:  None Today  TREATMENT:     THERAPEUTIC EXERCISE: (30 minutes):  Exercises per grid below to improve strength. Required minimal verbal cues to promote proper body alignment. Progressed repetitions as indicated. Date:  2021 Date:  21 Date:     Activity/Exercise Parameters Parameters Parameters   Nustep  Level 4 x 5 minutes Level 4 x 8 minutes    Single knee to chest stretch 3x30 sec bilateral  X    Piriformis stretch 3x30 sec bilateral  3x30 sec bilateral     Hamstring stretch with ankle pumps  3x30 sec bilateral  3x30 sec bilateral     Supine lower trunk rotation  20 reps bilateral     Double knee to chest stretch  3x30 sec     Supine straight leg raise  2x10 reps bilateral    Supine isometric hip flexion  10 reps bilateral    Pelvic tilts  20 reps    Curl ups  5 reps    Double knee lifts  20 reps    . MANUAL THERAPY: (15 minutes): Joint mobilization and Soft tissue mobilization was utilized and necessary because of the patient's restricted joint motion and painful spasm.    Patient received trigger point release along bilateral SI region/bilateral lumbar paraspinals to decrease pain. Skin intact after treatment. [employer] [facility]  Treatment/Session Summary:    Response to Treatment:  Patient reports increased tenderness along left side of SI joint. Patient stopped curls ups due to increased pain from shingles.   Communication/Consultation:  None today  Equipment provided today:  None today  Recommendations/Intent for next treatment session: Next visit will focus on range of motion, stretching, and manual.    Total Treatment Billable Duration:   45 minutes   PT Patient Time In/Time Out  Time In: 0845  Time Out: [address] PT    Future Appointments   Date Time Provider Radha Bautista   9/2/2021  7:45 AM SFE [facility] BI ROOM 1 SFERMAM SFE   9/2/2021  8:45 AM Vissage, Wandra Ax, PT SFEORPT SFE   9/8/2021  8:45 AM Gerardo Germán, PT SFEORPT SFE   9/9/2021  8:45 AM Vissage, Wandra Ax, PT SFEORPT SFE   9/16/2021  8:45 AM Vissage, Wandra Ax, PT SFEORPT SFE   9/22/2021  8:45 AM Gerardo Germán, PT SFEORPT SFE   9/23/2021  8:45 AM Vissage, Wandra Ax, PT SFEORPT SFE   9/27/2021  1:40 PM Abbe Severance, MD END BS ENDO   9/30/2021  8:45 AM Vissage, Wandra Ax, PT SFEORPT SFE     Visit # Therapist initials Date Arrived NS/ Cx < 24 hr >24 hr Cx Visit Comments   1 PV 8/19/2021 X   Initial Assessment and Treatment- worker comp- 12 visits approved   2 PV 8/26/2021 X                                                                                                                                                        Abbreviations: NS = No Show; CX = cancelled

## 2021-09-02 ENCOUNTER — HOSPITAL ENCOUNTER (OUTPATIENT)
Dept: PHYSICAL THERAPY | Age: 76
Discharge: HOME | End: 2021-09-02
Payer: MEDICARE

## 2021-09-02 ENCOUNTER — HOSPITAL ENCOUNTER (OUTPATIENT)
Dept: MAMMOGRAPHY | Age: 76
Discharge: HOME | End: 2021-09-02
Attending: FAMILY MEDICINE
Payer: MEDICARE

## 2021-09-02 DIAGNOSIS — Z12.31 SCREENING MAMMOGRAM FOR HIGH-RISK PATIENT: ICD-10-CM

## 2021-09-02 PROCEDURE — 97140 MANUAL THERAPY 1/> REGIONS: CPT

## 2021-09-02 PROCEDURE — 97110 THERAPEUTIC EXERCISES: CPT

## 2021-09-02 PROCEDURE — 77063 BREAST TOMOSYNTHESIS BI: CPT

## 2021-09-02 NOTE — PROGRESS NOTES
Germania Boyd  : 1945  [place]  Secondary:  Therapy Center at [place] [id number] [facility], [address] [place] [address],[address], [place] [address].  [phone]   [phone]     OUTPATIENT PHYSICAL THERAPY: Daily Treatment Note 2021  Visit Count:  3    ICD-10: Treatment Diagnosis: Low back pain (M54.5)  Precautions/Allergies:   Adhesive, Alendronate sodium, Antihistamine [triprolidine-pseudoephedrine], and Boniva [ibandronate]   TREATMENT PLAN:  Effective Dates: 2021 TO 2021 (90 days). Frequency/Duration: 2 times a week for 90 Day(s)    Pre-treatment Symptoms/Complaints:  Patient reports she feels good right now. Pain: Initial: Pain Intensity 1: 2  Pain Location 1: Back  Pain Orientation 1: Lower, Left  Post Session:  1/10   Medications Last Reviewed:  2021  Updated Objective Findings:  None Today  TREATMENT:     THERAPEUTIC EXERCISE: (30 minutes):  Exercises per grid below to improve strength. Required minimal verbal cues to promote proper body alignment. Progressed repetitions as indicated. Date:  2021 Date:  21 Date:  2021   Activity/Exercise Parameters Parameters Parameters   Nustep  Level 4 x 5 minutes Level 4 x 8 minutes Level 4 x 8 minutes   Single knee to chest stretch 3x30 sec bilateral  X X   Piriformis stretch 3x30 sec bilateral  3x30 sec bilateral  3x30 sec    Hamstring stretch with ankle pumps  3x30 sec bilateral  3x30 sec bilateral  3x30 sec bilateral    Supine lower trunk rotation  20 reps bilateral  20 reps bilateral    Double knee to chest stretch  3x30 sec  3x30 sec    Supine straight leg raise  2x10 reps bilateral 2x10 reps bilateral   Supine isometric hip flexion  10 reps bilateral 10 reps bilateral   Pelvic tilts  20 reps 20 reps   Curl ups  5 reps 10 reps   Double knee lifts  20 reps 20 reps   .   MANUAL THERAPY: (15 minutes): Joint mobilization and Soft tissue mobilization was utilized and necessary because of the patient's restricted joint motion and painful spasm. Patient received trigger point release along bilateral SI region/bilateral lumbar paraspinals to decrease pain. Skin intact after treatment. [email or web address] Portal  Treatment/Session Summary:    · Response to Treatment:  Patient tolerated exercises without complaints. Left SI joint more stiff and more tender than right side.   · Communication/Consultation:  None today  · Equipment provided today:  None today  · Recommendations/Intent for next treatment session: Next visit will focus on range of motion, stretching, and manual.    Total Treatment Billable Duration:   45 minutes   PT Patient Time In/Time Out  Time In: 0845  Time Out: [address] PT    Future Appointments   Date Time Provider Radha Bautista   9/8/2021  8:45 AM Matias Connors, PT SFEORPT SFE   9/9/2021  8:45 AM Vissage, Red Christys, PT SFEORPT SFE   9/16/2021  8:45 AM Vissage, Marisoluetta [place], PT SFEORPT SFE   9/22/2021  8:45 AM Matias Connors, PT SFEORPT SFE   9/23/2021  8:45 AM Vissage, Ghassana Anastacio, PT SFEORPT SFE   9/27/2021  1:40 PM Etta Linn MD END BS ENDO   10/1/2021  8:45 AM Vissage, Ghassana [place], PT SFEORPT SFE     Visit # Therapist initials Date Arrived NS/ Cx < 24 hr >24 hr Cx Visit Comments   1 PV 8/19/2021 X   Initial Assessment and Treatment- worker comp- 12 visits approved   2 PV 8/26/2021 X      3 PV 9/2/2021 X                                                                                                                                               Abbreviations: NS = No Show; CX = cancelled

## 2021-09-08 ENCOUNTER — HOSPITAL ENCOUNTER (OUTPATIENT)
Dept: PHYSICAL THERAPY | Age: 76
Discharge: HOME | End: 2021-09-08
Payer: MEDICARE

## 2021-09-08 PROCEDURE — 97140 MANUAL THERAPY 1/> REGIONS: CPT

## 2021-09-08 NOTE — THERAPY RECERTIFICATION
Constance Bloom : 1945 [place] Secondary: Adrián Christianson at [place] [address] [facility], [address], [facility] [address] [phone]   [phone] OUTPATIENT PHYSICAL THERAPY:Recertification  ICD-10: Treatment Diagnosis: Cervicalgia (M54.2) Precautions/Allergies:  
Adhesive, Alendronate sodium, Antihistamine [triprolidine-pseudoephedrine], and Boniva [ibandronate] MD Orders: Evaluate and Treat MEDICAL/REFERRING DIAGNOSIS: 
Cervicalgia [M54.2] DATE OF ONSET: Chronic REFERRING PHYSICIAN:  Nely Crouch NP  
RETURN PHYSICIAN APPOINTMENT: TBD ASSESSMENT:  Ms. Rajinder Harley presents with improving mobility and pain in cervical region secondary to degenerative changes. Patient has attended a total of 64 scheduled physical therapy visits. Treatment has consisted of stretching, strengthening, and manual therapy to improve overall pain and performance with activities of daily living. After discussing with patient she agreed she would continue to benefit from physical therapy to improve overall mobility and pain. Please sign this re-certification if you concur. Thank you for the opportunity to serve this patient. PROBLEM LIST (Impacting functional limitations): 1. Decreased Strength 2. Decreased ADL/Functional Activities 3. Increased Pain 4. Decreased Activity Tolerance INTERVENTIONS PLANNED: (Treatment may consist of any combination of the following) 1. Cold 2. Heat 3. Home Exercise Program (HEP) 4. Manual Therapy 5. Neuromuscular Re-education/Strengthening 6. Range of Motion (ROM) 7. Therapeutic Activites 8. Therapeutic Exercise/Strengthening TREATMENT PLAN: 
Effective Dates:  2021 TO 2021 (90 days). Frequency/Duration: 1 time every other week for 90 Day(s) GOALS: (Goals have been discussed and agreed upon with patient.) Short-Term Functional Goals: Time Frame: 30 days 1. Patient will be independent with home exercise program without exacerbation of symptoms or cueing needed--goal met. 2. Patient will be independent with correct sleeping positions and awareness/avoidance of aggravating positions without cueing needed--goal met. Discharge Goals: Time Frame: 90 days 1. Patient will be independent with all ADLs with minimal onset of neck pain and no deficits with daily tasks--goal ongoing. 2. Patient will report no fear avoidance with social or recreational activities due to neck pain --goal ongoing. 3. Patient will score less than or equal to 7/50 on Neck Disability Index with minimal effect of neck pain on patient's ability to manage every day life activities--goal ongoing. Rehabilitation Potential For Stated Goals: Good Regarding Amira Beach's therapy, I certify that the treatment plan above will be carried out by a therapist or under their direction. Thank you for this referral, 
Stephie Holt PT Referring Physician Signature: Dorys Tamayo NP _______________________________ Date _____________ HISTORY:  
History of Present Injury/Illness (Reason for Referral): 
1/13/2021 (Progress Note): Patient reports she is having tightness in her right neck more than her left right now. She reports she is dizzy as well. 2/24/2021 (Progress Note): Patient reports she is feeling overall better, but has been having more pain in her left shoulder. 4/40/8850 (Recertification): Patient reports therapy has been helpful, but she is still having pain. 5/19/2021 (Progres Note): Patient reports therapy has been helping and her shoulder is doing better, but still having some pain. She reports she has also had some dizziness. 4/98/0114 (Recertification): Patient reports she is still having pain in her neck and shoulders routinely. She reports some things make it worse, but she is doing well overall.   She reports she feels like she is progressing overall. 7/14/2021 (Progress Note): Patient reports she is getting pain in both shoulders still that goes into her arms. 8/25/2021 (Progress Note): Patient reports both her shoulders and around her neck are painful. 3/3/0886 (Recertification): Patient reports she is doing about the same, but the back is more bothersome right now. Past Medical History/Comorbidities: Ms. Gigi Siegel  has a past medical history of Abnormal Pap smear, Anxiety, COPD (chronic obstructive pulmonary disease) ([address] Depression, Hyperthyroidism, Insomnia, Irritable bowel syndrome, and Osteopenia. She also has no past medical history of Difficult intubation, Malignant hyperthermia due to anesthesia, Nausea & vomiting, Pseudocholinesterase deficiency, or Unspecified adverse effect of anesthesia. Ms. Gigi Siegel  has a past surgical history that includes hx tonsillectomy; hx orthopaedic; hx salpingo-oophorectomy (Right, 01/22/2014); and hx colonoscopy. Social History/Living Environment:  
Home Environment: Private residence Living Alone: Yes Support Systems: Family member(s), Friends \ neighbors Prior Level of Function/Work/Activity: 
Independent Dominant Side:  
      RIGHT Personal Factors:   
      Sex:  female Age:  77 y.o. Current Medications:   
  
Current Outpatient Medications:  
  albuterol (PROVENTIL HFA, VENTOLIN HFA, PROAIR HFA) 90 mcg/actuation inhaler, Take 2 Puffs by inhalation. , Disp: , Rfl:  
  b complex vitamins tablet, Take 1 Tab by mouth every morning. Holding for surgery, Disp: , Rfl:  
  temazepam (RESTORIL) 15 mg capsule, Take  by mouth nightly as needed for Sleep., Disp: , Rfl:  
  DULoxetine (CYMBALTA) 30 mg capsule, Take 30 mg by mouth every morning., Disp: , Rfl:  
  ascorbic acid (VITAMIN C) 500 mg tablet, Take 500 mg by mouth every morning. Holding for surgery, Disp: , Rfl:  
  multivitamin (ONE A DAY) tablet, Take 1 Tab by mouth every morning.  Holding for surgery, Disp: , Rfl:   
Date Last Reviewed:  2021 EXAMINATION:  
Observation/Orthostatic Postural Assessment:   
      Posture Assessment: Rounded shoulders, Forward head Palpation:   
      Tightness and tenderness to palpation noted throughout upper and lower cervical musculature. No bruising or swelling noted. ROM:   
      Cervical Assessment (AROM): 
· Flexion: 75% of full AROM · Extension: 75% of full AROM · Right side bendin% of full AROM · Left side bending[de-identified] 75% of full AROM · Right rotation: 75% of full AROM · Left rotation: 75% of full AROM Strength:   
      Cervical Assessment (Strength): · Grossly 2+/5 with manual muscle testing Special Tests:   
      Negative Hautant's test. Negative Cranial-[place] lift test. Negative [place]-Axis Sheer test. Negative Alar Ligament test. Negative Tectorial Membrane test.  
Neurological Screen: 
      Dermatomes: Within normal limits Reflexes:  2+ Functional Mobility:  
      Gait/Mobility:  
 ·   Independent Transfers: · Sit to Stand: Independent · Stand to Sit: Independent · Stand Pivot Transfers: Independent · Bed to Chair: Independent · Lateral Transfers: Independent Bed Mobility: · Rolling: Independent · Supine to Sit: Independent · Sit to Supine: Independent · Scooting: Independent CLINICAL DECISION MAKING:  
Outcome Measure: Tool Used: Neck Disability Index (NDI) Score:  Initial:   Most Recent:  (Date: 2021 ) Interpretation of Score: The Neck Disability Index is a revised form of the Oswestry Low Back Pain Index and is designed to measure the activities of daily living in person's with neck pain. Each section is scored on a 0-5 scale, 5 representing the greatest disability. The scores of each section are added together for a total score of 50.   
 
Medical Necessity:  
· Patient is expected to demonstrate progress in strength and range of motion to improve safety during daily activities. · Patient demonstrates good rehab potential due to higher previous functional level. · Skilled intervention continues to be required due to decreased mobility. Reason for Services/Other Comments: 
· Patient continues to demonstrate capacity to improve overall mobility which will increase independence and increase safety.

## 2021-09-08 NOTE — PROGRESS NOTES
Michellechristianocici Madrid  : 1945  Primary:   Secondary:  [address] at [facility]  [address] [facility], [address] [place] [address],[address], [facility].  [phone]   [phone]         OUTPATIENT PHYSICAL THERAPY: Daily Treatment Note 2021  ICD-10: Treatment Diagnosis: Cervicalgia (M54.2)  Pre-treatment Symptoms/Complaints:  2021: Patient reports she is about the same, but her back is hurting more right now. Pain: Initial: Pain Intensity 1: 4  Pain Location 1: Neck, Shoulder  Pain Orientation 1: Right, Left  Pain Intervention(s) 1: Rest, Medication (see MAR)  Post Session:  3/10   Medications Last Reviewed:  2021   Updated Objective Findings:  None Today   TREATMENT:   MANUAL THERAPY: (40 minutes): Joint mobilization and Soft tissue mobilization was utilized and necessary because of the patient's restricted joint motion, painful spasm, loss of articular motion and restricted motion of soft tissue. Treatment/Session Summary:    · Response to Treatment:  Patient tolerated treatment well with improved mobility noted after treatment. · Communication/Consultation:  None today  · Equipment provided today:  None today  · Recommendations/Intent for next treatment session: Next visit will focus on improving overall mobility with decreased pain.   Treatment Plan of Care Effective Dates:  2021 TO 2021 (90 days)  Total Treatment Billable Duration:  40 minutes  PT Patient Time In/Time Out  Time In: 0845  Time Out: [facility], PT     Visit # Therapist initials Date Arrived NS/ Cx < 24 hr >24 hr Cx Visit Comments   61  2021 X   Medicare cap: $5399   33  5/3/29953 X   Recert                                                                                                                                                     Abbreviations: NS = No Show; CX = cancelled     Future Appointments   Date Time Provider Radha Bautista   2021  8:45 AM Gaby Aleman, PT [facility] S[facility]   9/9/2021  8:45 AM Lewis Ashford, PT [facility]   9/16/2021  8:45 AM Lewis Ashford, PT [facility]PT SFE   9/22/2021  8:45 AM Yolie Kc, PT [facility] S[facility]   9/23/2021  8:45 AM Lewis Ashford, PT [facility] SFE   9/27/2021  1:40 PM Isabella Montes MD END BS ENDO   10/1/2021  8:45 AM Lewis Ashford, PT [facility]PT SFE

## 2021-09-09 ENCOUNTER — HOSPITAL ENCOUNTER (OUTPATIENT)
Dept: PHYSICAL THERAPY | Age: 76
Discharge: HOME | End: 2021-09-09
Payer: MEDICARE

## 2021-09-09 PROCEDURE — 97110 THERAPEUTIC EXERCISES: CPT

## 2021-09-09 PROCEDURE — 97140 MANUAL THERAPY 1/> REGIONS: CPT

## 2021-09-09 NOTE — PROGRESS NOTES
Marvel Lema  : 1945  [place]  Secondary:  [facility] Center [facility]  [facility], [address] [address],[address], [place] [address].  [phone]   [phone]     OUTPATIENT PHYSICAL THERAPY: Daily Treatment Note 2021  Visit Count:  4    ICD-10: Treatment Diagnosis: Low back pain (M54.5)  Precautions/Allergies:   Adhesive, Alendronate sodium, Antihistamine [triprolidine-pseudoephedrine], and Boniva [ibandronate]   TREATMENT PLAN:  Effective Dates: 2021 TO 2021 (90 days). Frequency/Duration: 2 times a week for 90 Day(s)    Pre-treatment Symptoms/Complaints:  Patient reports feeling a little stiff this morning. Pain: Initial: Pain Intensity 1: 4  Pain Location 1: Back  Pain Orientation 1: Lower, Left  Post Session:  2/10   Medications Last Reviewed:  2021  Updated Objective Findings:  None Today  TREATMENT:     THERAPEUTIC EXERCISE: (25 minutes):  Exercises per grid below to improve strength. Required minimal verbal cues to promote proper body alignment. Progressed repetitions as indicated. Date:  2021 Date:  21 Date:  2021   Activity/Exercise Parameters Parameters Parameters   Nustep  Level 4 x 8 minutes Level 4 x 8 minutes Level 4 x 8 minutes   Single knee to chest stretch X X X   Piriformis stretch 3x30 sec bilateral  3x30 sec bilateral  3x30 sec    Hamstring stretch with ankle pumps  3x30 sec bilateral  3x30 sec bilateral  3x30 sec bilateral    Supine lower trunk rotation 20 reps bilateral 20 reps bilateral  20 reps bilateral    Double knee to chest stretch 3x30 sec  3x30 sec  3x30 sec    Supine straight leg raise 2x10 reps bilateral 2x10 reps bilateral 2x10 reps bilateral   Supine isometric hip flexion 10 reps bilateral 10 reps bilateral 10 reps bilateral   Pelvic tilts 20 reps  20 reps 20 reps   Curl ups 10 reps 5 reps 10 reps   Double knee lifts 20 reps 20 reps 20 reps   .   MANUAL THERAPY: (15 minutes): Joint mobilization and Soft tissue mobilization was utilized and necessary because of the patient's restricted joint motion and painful spasm. Patient received trigger point release along bilateral SI region/bilateral lumbar paraspinals to decrease pain. Skin intact after treatment. [employer] Portal  Treatment/Session Summary:    · Response to Treatment:  Patient tolerated exercises without issues.   · Communication/Consultation:  None today  · Equipment provided today:  None today  · Recommendations/Intent for next treatment session: Next visit will focus on range of motion, stretching, and manual.    Total Treatment Billable Duration:   40 minutes   PT Patient Time In/Time Out  Time In: 0850  Time Out: [address] PT    Future Appointments   Date Time Provider Radha Bautista   9/16/2021  8:45 AM Ghanshyam Ashford, PT [facility] S[facility]   9/22/2021  8:45 AM Gerardo Dunlap, PT [facility]   9/23/2021  8:45 AM Wayne Ashford Ax, PT [facility] SFE   9/27/2021  1:40 PM Abbe Severance, MD END BS ENDO   10/1/2021  8:45 AM Wayne Ashford Ax, PT [facility] S[facility]   10/6/2021 10:15 AM Gerardo Dunlap, PT [facility] SFE   10/20/2021  8:45 AM FERNANDO Peng, PT [facility]PT SFE     Visit # Therapist initials Date Arrived NS/ Cx < 24 hr >24 hr Cx Visit Comments   1 PV 8/19/2021 X   Initial Assessment and Treatment- worker comp- 12 visits approved   2 PV 8/26/2021 X      3 PV 9/2/2021 X      4  PV 9/9/2021 X                                                                                                                                      Abbreviations: NS = No Show; CX = cancelled

## 2021-09-16 ENCOUNTER — HOSPITAL ENCOUNTER (OUTPATIENT)
Dept: PHYSICAL THERAPY | Age: 76
Discharge: HOME | End: 2021-09-16
Payer: MEDICARE

## 2021-09-16 PROCEDURE — 97110 THERAPEUTIC EXERCISES: CPT

## 2021-09-16 NOTE — PROGRESS NOTES
Cassie Sandoval  : 1945  [place]  Secondary:  [address] at [facility]  [address], [address], [address]  [phone]   [phone]     OUTPATIENT PHYSICAL THERAPY: Daily Treatment Note 2021  Visit Count:  5    ICD-10: Treatment Diagnosis: Low back pain (M54.5)  Precautions/Allergies:   Adhesive, Alendronate sodium, Antihistamine [triprolidine-pseudoephedrine], and Boniva [ibandronate]   TREATMENT PLAN:  Effective Dates: 2021 TO 2021 (90 days). Frequency/Duration: 2 times a week for 90 Day(s)    Pre-treatment Symptoms/Complaints:  Patient reports just getting up this morning. \"I am stiff today\". Pain: Initial: Pain Intensity 1: 5  Pain Location 1: Back  Pain Orientation 1: Lower, Left  Post Session:  3/10   Medications Last Reviewed:  2021  Updated Objective Findings:  None Today  TREATMENT:     THERAPEUTIC EXERCISE: (45 minutes):  Exercises per grid below to improve strength. Required minimal verbal cues to promote proper body alignment. Progressed repetitions as indicated.    Date:  2021 Date:  21 Date:  2021   Activity/Exercise Parameters Parameters Parameters   Nustep  Level 4 x 8 minutes Level 4 x 8 minutes Level 4 x 8 minutes   Single knee to chest stretch X X X   Piriformis stretch 3x30 sec bilateral  3x30 sec bilateral  3x30 sec    Hamstring stretch with ankle pumps  3x30 sec bilateral  3x30 sec bilateral  3x30 sec bilateral    Supine lower trunk rotation 20 reps bilateral 20 reps bilateral  20 reps bilateral    Double knee to chest stretch 3x30 sec  3x30 sec  3x30 sec    Supine straight leg raise 2x10 reps bilateral 2x10 reps bilateral 2x10 reps bilateral   Supine isometric hip flexion 10 reps bilateral 15 reps bilateral 10 reps bilateral   Pelvic tilts 20 reps  20 reps 20 reps   Curl ups 10 reps 20 reps 10 reps   Double knee lifts 20 reps 20 reps 20 reps   Inversion table  10 minutes- 1 minute on, 1 minute off    . MANUAL THERAPY: (0 minutes): Joint mobilization and Soft tissue mobilization was utilized and necessary because of the patient's restricted joint motion and painful spasm. Patient received trigger point release along bilateral SI region/bilateral lumbar paraspinals to decrease pain. Skin intact after treatment. [employer] Portal  Treatment/Session Summary:    · Response to Treatment:  Patient reports decrease in stiffness after inversion table. Patient tolerated treatment well.   · Communication/Consultation:  None today  · Equipment provided today:  None today  · Recommendations/Intent for next treatment session: Next visit will focus on range of motion, stretching, and manual.    Total Treatment Billable Duration:   45 minutes   PT Patient Time In/Time Out  Time In: 0845  Time Out: [address] PT    Future Appointments   Date Time Provider Radha Bautista   9/22/2021  8:45 AM Santa Barraza, PT [facility] SFE   9/23/2021  7:00 PM Taina Ashford, PT [facility]PT SFE   9/27/2021  1:40 PM Alison Sherwood MD END BS ENDO   10/1/2021  8:45 AM Vissage, Recardo Cowden, PT [facility] SFE   10/6/2021 10:15 AM Santa Barraza, PT [facility] SFE   10/20/2021  8:45 AM Erwin Prabhakar, PT [facility]PT S[facility]     Visit # Therapist initials Date Arrived NS/ Cx < 24 hr >24 hr Cx Visit Comments   1 PV 8/19/2021 X   Initial Assessment and Treatment- worker comp- 12 visits approved   2 PV 8/26/2021 X      3 PV 9/2/2021 X      4  PV 9/9/2021 X      5 PV 9/16/2021 X                                                                                                                             Abbreviations: NS = No Show; CX = cancelled

## 2021-09-22 ENCOUNTER — HOSPITAL ENCOUNTER (OUTPATIENT)
Dept: PHYSICAL THERAPY | Age: 76
Discharge: HOME | End: 2021-09-22
Payer: MEDICARE

## 2021-09-22 PROCEDURE — 97140 MANUAL THERAPY 1/> REGIONS: CPT

## 2021-09-22 NOTE — PROGRESS NOTES
Donald Chavez  : 1945  Primary:   Secondary:  [address] at [facility]  [address] [facility], [address] [place] [address],[address], [address] [facility]  [phone]   [phone]         OUTPATIENT PHYSICAL THERAPY: Daily Treatment Note 2021  ICD-10: Treatment Diagnosis: Cervicalgia (M54.2)  Pre-treatment Symptoms/Complaints:  2021: Patient reports her shoulders are tight today. Pain: Initial: Pain Intensity 1: 4  Pain Location 1: Neck, Shoulder  Pain Orientation 1: Right, Left  Pain Intervention(s) 1: Rest, Medication (see MAR)  Post Session:  3/10   Medications Last Reviewed:  2021   Updated Objective Findings:  None Today   TREATMENT:   MANUAL THERAPY: (40 minutes): Joint mobilization and Soft tissue mobilization was utilized and necessary because of the patient's restricted joint motion, painful spasm, loss of articular motion and restricted motion of soft tissue. Treatment/Session Summary:    · Response to Treatment:  Patient tolerated treatment well with improved mobility noted after treatment. · Communication/Consultation:  None today  · Equipment provided today:  None today  · Recommendations/Intent for next treatment session: Next visit will focus on improving overall mobility with decreased pain.   Treatment Plan of Care Effective Dates:  2021 TO 2021 (90 days)  Total Treatment Billable Duration:  40 minutes  PT Patient Time In/Time Out  Time In: 0845  Time Out: [facility], PT     Visit # Therapist initials Date Arrived NS/ Cx < 24 hr >24 hr Cx Visit Comments   61  2021 X   Medicare cap: $2921   90  15638 X   Recert   72   X                                                                                                                                                Abbreviations: NS = No Show; CX = cancelled     Future Appointments   Date Time Provider Radha Bautista   2021  8:45 AM Fabian Tijerina, PT JOYCE MCKEON 9/23/2021  7:00 PM Cesar Ashford, PT S[facility]PT SFE   9/27/2021  1:40 PM Nico Gordillo MD END BS ENDO   10/1/2021  8:45 AM Cesar Ashford, PT [facility]PT SFE   10/6/2021 10:15 AM Fabian Tijerina, PT S[facility]PT SFE   10/20/2021  8:45 AM Abad Martinez, PT SFEORPT SFE

## 2021-09-23 ENCOUNTER — APPOINTMENT (OUTPATIENT)
Dept: PHYSICAL THERAPY | Age: 76
End: 2021-09-23
Payer: MEDICARE

## 2021-10-01 ENCOUNTER — HOSPITAL ENCOUNTER (OUTPATIENT)
Dept: PHYSICAL THERAPY | Age: 76
Discharge: HOME | End: 2021-10-01
Payer: MEDICARE

## 2021-10-01 PROCEDURE — 97110 THERAPEUTIC EXERCISES: CPT

## 2021-10-01 NOTE — PROGRESS NOTES
Cortes Lucia  : 1945  [place]  Secondary:  Therapy Center at [place] [address] [address],[address], [place] [address].  [phone]   [phone]     OUTPATIENT PHYSICAL THERAPY: Daily Treatment Note 10/1/2021  Visit Count:  6    ICD-10: Treatment Diagnosis: Low back pain (M54.5)  Precautions/Allergies:   Adhesive, Alendronate sodium, Antihistamine [triprolidine-pseudoephedrine], and Boniva [ibandronate]   TREATMENT PLAN:  Effective Dates: 2021 TO 2021 (90 days). Frequency/Duration: 2 times a week for 90 Day(s)    Pre-treatment Symptoms/Complaints:   \"Not to bad today\". Pain: Initial: Pain Intensity 1: 3  Pain Location 1: Back  Pain Orientation 1: Lower, Left  Post Session:  3/10   Medications Last Reviewed:  10/1/2021  Updated Objective Findings:  None Today  TREATMENT:     THERAPEUTIC EXERCISE: (40 minutes):  Exercises per grid below to improve strength. Required minimal verbal cues to promote proper body alignment. Progressed repetitions as indicated.    Date:  2021 Date:  21 Date:  10/1/2021   Activity/Exercise Parameters Parameters Parameters   Nustep  Level 4 x 8 minutes Level 4 x 8 minutes Level 4 x 10 minutes   Single knee to chest stretch X X X   Piriformis stretch 3x30 sec bilateral  3x30 sec bilateral  3x30 sec    Hamstring stretch with ankle pumps  3x30 sec bilateral  3x30 sec bilateral  3x30 sec bilateral    Supine lower trunk rotation 20 reps bilateral 20 reps bilateral  20 reps bilateral    Double knee to chest stretch 3x30 sec  3x30 sec  3x30 sec    Supine straight leg raise 2x10 reps bilateral 2x10 reps bilateral 2x10 reps bilateral   Supine isometric hip flexion 10 reps bilateral 15 reps bilateral 10 reps bilateral   Pelvic tilts 20 reps  20 reps 20 reps   Curl ups 10 reps 20 reps 20 reps   Double knee lifts 20 reps 20 reps 20 reps   Inversion table  10 minutes- 1 minute on, 1 minute off 10 minutes- 1 minute on, 1 minute off   . MANUAL THERAPY: (0 minutes): Joint mobilization and Soft tissue mobilization was utilized and necessary because of the patient's restricted joint motion and painful spasm. Patient received trigger point release along bilateral SI region/bilateral lumbar paraspinals to decrease pain. Skin intact after treatment. [employer] Portal  Treatment/Session Summary:    · Response to Treatment:  Patient tolerated treatment without complaints.   · Communication/Consultation:  None today  · Equipment provided today:  None today  · Recommendations/Intent for next treatment session: Next visit will focus on range of motion, stretching, and manual.    Total Treatment Billable Duration:   40 minutes   PT Patient Time In/Time Out  Time In: 0845  Time Out: [address] [facility]    Future Appointments   Date Time Provider Radha Bautista   10/6/2021 10:15 AM Isela Jefferson, PT [facility] SFE   10/12/2021  8:45 AM Patrice Ashford, PT SFEORPT SFE   10/20/2021  8:45 AM Isela Jefferson, PT S[facility]ORPT SFE   10/28/2021  8:45 AM Patrice Ashford, PT S[facility]PT SFE   9/20/2022  1:30 PM ENDO NURSE END BS ENDO   9/27/2022  1:40 PM Colten Sun MD END BS ENDO     Visit # Therapist initials Date Arrived NS/ Cx < 24 hr >24 hr Cx Visit Comments   1 PV 8/19/2021 X   Initial Assessment and Treatment- worker comp- 12 visits approved   2 PV 8/26/2021 X      3 PV 9/2/2021 X      4  PV 9/9/2021 X      5 PV 9/16/2021 X      6 PV  10/1/2021 X                                                                                                                    Abbreviations: NS = No Show; CX = cancelled

## 2021-10-06 ENCOUNTER — HOSPITAL ENCOUNTER (OUTPATIENT)
Dept: PHYSICAL THERAPY | Age: 76
Discharge: HOME | End: 2021-10-06
Payer: MEDICARE

## 2021-10-06 PROCEDURE — 97140 MANUAL THERAPY 1/> REGIONS: CPT

## 2021-10-06 NOTE — PROGRESS NOTES
Shira Negron  : 1945  Primary:   Secondary:  [address] at [facility]  [address], [address], [address] [facility]  [phone]   [phone]         OUTPATIENT PHYSICAL THERAPY: Daily Treatment Note 10/6/2021  ICD-10: Treatment Diagnosis: Cervicalgia (M54.2)  Pre-treatment Symptoms/Complaints:  10/6/2021: Patient reports she is hurting in her neck and shoulders and feel like a headache is coming on. Pain: Initial: Pain Intensity 1: 4  Pain Location 1: Neck, Shoulder  Pain Orientation 1: Right, Left  Pain Intervention(s) 1: Rest, Medication (see MAR)  Post Session:  3/10   Medications Last Reviewed:  10/6/2021   Updated Objective Findings:  None Today   TREATMENT:   MANUAL THERAPY: (40 minutes): Joint mobilization and Soft tissue mobilization was utilized and necessary because of the patient's restricted joint motion, painful spasm, loss of articular motion and restricted motion of soft tissue. Treatment/Session Summary:    · Response to Treatment:  Patient tolerated treatment well with improved mobility noted after treatment. · Communication/Consultation:  None today  · Equipment provided today:  None today  · Recommendations/Intent for next treatment session: Next visit will focus on improving overall mobility with decreased pain.   Treatment Plan of Care Effective Dates:  2021 TO 2021 (90 days)  Total Treatment Billable Duration:  40 minutes  PT Patient Time In/Time Out  Time In: 9401  Time Out: [address], PT     Visit # Therapist initials Date Arrived NS/ Cx < 24 hr >24 hr Cx Visit Comments   61  2021 X   Medicare cap: $2110   59 JS  X   Recert   72 JS 7/99/6931 X       77 JS 10/6/2021 X                                                                                                                                       Abbreviations: NS = No Show; CX = cancelled     Future Appointments   Date Time Provider Department [place]   10/6/2021 10:15 AM Jarrett Catching, PT SFEORPT SFE   10/12/2021  8:45 AM Teo Ashford, PT SFE[facility]PT SFE   10/20/2021  8:45 AM Jarrett Catching, PT S[facility]ORPT SFE   10/28/2021  8:45 AM Teo Ashford, PT SFEORPT SFE   9/20/2022  1:30 PM ENDO NURSE END BS ENDO   9/27/2022  1:40 PM Ricky Tinsley MD END BS ENDO

## 2021-10-12 ENCOUNTER — HOSPITAL ENCOUNTER (OUTPATIENT)
Dept: PHYSICAL THERAPY | Age: 76
Discharge: HOME | End: 2021-10-12
Payer: MEDICARE

## 2021-10-12 PROCEDURE — 97110 THERAPEUTIC EXERCISES: CPT

## 2021-10-12 NOTE — PROGRESS NOTES
[facility]  : 1945  [place]  Secondary:  [address] at [place] [address] [place] [address],[address], [address] [facility]  [phone]   [phone]     OUTPATIENT PHYSICAL THERAPY: Daily Treatment Note 10/12/2021  Visit Count:  7    ICD-10: Treatment Diagnosis: Low back pain (M54.5)  Precautions/Allergies:   Adhesive, Alendronate sodium, Antihistamine [triprolidine-pseudoephedrine], and Boniva [ibandronate]   TREATMENT PLAN:  Effective Dates: 2021 TO 2021 (90 days). Frequency/Duration: 2 times a week for 90 Day(s)    Pre-treatment Symptoms/Complaints:   \"I didn't sleep well last night\". Pain: Initial:   3/10 Post Session:  3/10   Medications Last Reviewed:  10/12/2021  Updated Objective Findings:  None Today  TREATMENT:     THERAPEUTIC EXERCISE: (40 minutes):  Exercises per grid below to improve strength. Required minimal verbal cues to promote proper body alignment. Progressed repetitions as indicated.    Date:  10/12/2021 Date:  21 Date:  10/1/2021   Activity/Exercise Parameters Parameters Parameters   Nustep  Level 4 x 8 minutes Level 4 x 8 minutes Level 4 x 10 minutes   Single knee to chest stretch X X X   Piriformis stretch 3x30 sec bilateral  3x30 sec bilateral  3x30 sec    Hamstring stretch with ankle pumps  3x30 sec bilateral  3x30 sec bilateral  3x30 sec bilateral    Supine lower trunk rotation 20 reps bilateral 20 reps bilateral  20 reps bilateral    Double knee to chest stretch 3x30 sec  3x30 sec  3x30 sec    Supine straight leg raise 2x10 reps bilateral 2x10 reps bilateral 2x10 reps bilateral   Supine isometric hip flexion 10 reps bilateral 15 reps bilateral 10 reps bilateral   Pelvic tilts 20 reps  20 reps 20 reps   Curl ups 10 reps 20 reps 20 reps   Double knee lifts 20 reps 20 reps 20 reps   Inversion table 10 minutes- 1 minute on, 1 minute off 10 minutes- 1 minute on, 1 minute off 10 minutes- 1 minute on, 1 minute off   .  MANUAL THERAPY: (0 minutes): Joint mobilization and Soft tissue mobilization was utilized and necessary because of the patient's restricted joint motion and painful spasm. Patient received trigger point release along bilateral SI region/bilateral lumbar paraspinals to decrease pain. Skin intact after treatment. [employer] Portal  Treatment/Session Summary:    · Response to Treatment:  Patient tolerated treatment well.   · Communication/Consultation:  None today  · Equipment provided today:  None today  · Recommendations/Intent for next treatment session: Next visit will focus on range of motion, stretching, and manual.    Total Treatment Billable Duration:   40 minutes   PT Patient Time In/Time Out  Time In: 0850  Time Out: [address] PT    Future Appointments   Date Time Provider Radha Lalai   10/20/2021  8:45 AM Jackson Carrizales, PT [facility] SFE   10/28/2021  8:45 AM Shanika Ashford, PT SFEORPT SFE   11/3/2021  8:45 AM Jackson Carrizales, PT SFEORPT SFE   11/11/2021  8:45 AM Shanika Ashford, PT SFEORPT SFE   11/23/2021  8:45 AM Shanika Ashford, PT SFEORPT SFE   9/20/2022  1:30 PM ENDO NURSE END BS ENDO   9/27/2022  1:40 PM Skyla Bedoya MD END BS ENDO     Visit # Therapist initials Date Arrived NS/ Cx < 24 hr >24 hr Cx Visit Comments   1 PV 8/19/2021 X   Initial Assessment and Treatment- worker comp- 12 visits approved   2 PV 8/26/2021 X      3 PV 9/2/2021 X      4  PV 9/9/2021 X      5 PV 9/16/2021 X      6 PV  10/1/2021 X      7 PV 10/12/2021 X                                                                                                           Abbreviations: NS = No Show; CX = cancelled

## 2021-10-20 ENCOUNTER — HOSPITAL ENCOUNTER (OUTPATIENT)
Dept: PHYSICAL THERAPY | Age: 76
Discharge: HOME | End: 2021-10-20
Payer: MEDICARE

## 2021-10-20 PROCEDURE — 97140 MANUAL THERAPY 1/> REGIONS: CPT

## 2021-10-20 NOTE — PROGRESS NOTES
Robby Ying  : 1945  Primary:   Secondary:  [address]  [address] [facility] [facility]  [facility] [place] [address],[address], [address] [facility]  [phone]   [phone]         OUTPATIENT PHYSICAL THERAPY: Daily Treatment Note 10/20/2021  ICD-10: Treatment Diagnosis: Cervicalgia (M54.2)  Pre-treatment Symptoms/Complaints:  10/20/2021: Patient reports her neck and both shoulders are hurting today. Pain: Initial: Pain Intensity 1: 4  Pain Location 1: Neck, Shoulder  Pain Orientation 1: Right, Left  Pain Intervention(s) 1: Rest, Medication (see MAR)  Post Session:  3/10   Medications Last Reviewed:  10/20/2021   Updated Objective Findings:  None Today   TREATMENT:   MANUAL THERAPY: (40 minutes): Joint mobilization and Soft tissue mobilization was utilized and necessary because of the patient's restricted joint motion, painful spasm, loss of articular motion and restricted motion of soft tissue. Treatment/Session Summary:    · Response to Treatment:  Patient tolerated treatment well with improved mobility noted after treatment. · Communication/Consultation:  None today  · Equipment provided today:  None today  · Recommendations/Intent for next treatment session: Next visit will focus on improving overall mobility with decreased pain.   Treatment Plan of Care Effective Dates:  2021 TO 2021 (90 days)  Total Treatment Billable Duration:  40 minutes  PT Patient Time In/Time Out  Time In: 0845  Time Out: [address] [facility], PT     Visit # Therapist initials Date Arrived NS/ Cx < 24 hr >24 hr Cx Visit Comments   61  2021 X   Medicare cap: $2110   59 JS 7/3/71095 X   Recert   72 JS 8/31/6742 X       77 JS 10/6/2021 X       79 JS 10/20/2021 X    Progress Note                                                                                                                          Abbreviations: NS = No Show; CX = cancelled     Future Appointments   Date Time Provider Department [place]   10/20/2021  8:45 AM Avril Moder, PT SFEORPT SFE   10/28/2021  8:45 AM VissaMaisha mckoy, PT SFEORPT SFE   11/3/2021  8:45 AM Avril Moder, PT SFEORPT SFE   11/11/2021  8:45 AM Maisha Ashford, PT SFEORPT SFE   11/23/2021  8:45 AM Maisha Ashford, PT SFEORPT SFE   9/20/2022  1:30 PM ENDO NURSE END BS ENDO   9/27/2022  1:40 PM Erick Lind MD END BS ENDO

## 2021-10-20 NOTE — PROGRESS NOTES
Sylvester Davis  : 1945  [place]  Secondary: Adrián Christianson at [address], [address] [place] [address],[address], [place] [address].  [phone]   [phone]          OUTPATIENT PHYSICAL THERAPY:Progress Report 10/20/2021   ICD-10: Treatment Diagnosis: Cervicalgia (M54.2)  Precautions/Allergies:   Adhesive, Alendronate sodium, Antihistamine [triprolidine-pseudoephedrine], and Boniva [ibandronate]   MD Orders: Evaluate and Treat MEDICAL/REFERRING DIAGNOSIS:  Cervicalgia [M54.2]   DATE OF ONSET: Chronic  REFERRING PHYSICIAN:  Amanda Rivera NP   RETURN PHYSICIAN APPOINTMENT: TBD     ASSESSMENT:  Ms. Bull Deleon presents with improving mobility and pain in cervical region secondary to degenerative changes. Patient has attended a total of 67 scheduled physical therapy visits. Treatment has consisted of stretching, strengthening, and manual therapy to improve overall pain and performance with activities of daily living. PROBLEM LIST (Impacting functional limitations):  1. Decreased Strength  2. Decreased ADL/Functional Activities  3. Increased Pain  4. Decreased Activity Tolerance INTERVENTIONS PLANNED: (Treatment may consist of any combination of the following)  1. Cold  2. Heat  3. Home Exercise Program (HEP)  4. Manual Therapy  5. Neuromuscular Re-education/Strengthening  6. Range of Motion (ROM)  7. Therapeutic Activites  8. Therapeutic Exercise/Strengthening   TREATMENT PLAN:  Effective Dates:  2021 TO 2021 (90 days). Frequency/Duration: 1 time every other week for 90 Day(s)  GOALS: (Goals have been discussed and agreed upon with patient.)  Short-Term Functional Goals: Time Frame: 30 days  1. Patient will be independent with home exercise program without exacerbation of symptoms or cueing needed--goal met.    2. Patient will be independent with correct sleeping positions and awareness/avoidance of aggravating positions without cueing needed--goal met. Discharge Goals: Time Frame: 90 days  1. Patient will be independent with all ADLs with minimal onset of neck pain and no deficits with daily tasks--goal ongoing. 2. Patient will report no fear avoidance with social or recreational activities due to neck pain --goal ongoing. 3. Patient will score less than or equal to 7/50 on Neck Disability Index with minimal effect of neck pain on patient's ability to manage every day life activities--goal ongoing. Rehabilitation Potential For Stated Goals: Good               HISTORY:   History of Present Injury/Illness (Reason for Referral):  1/13/2021 (Progress Note): Patient reports she is having tightness in her right neck more than her left right now. She reports she is dizzy as well. 2/24/2021 (Progress Note): Patient reports she is feeling overall better, but has been having more pain in her left shoulder. 6/66/8346 (Recertification): Patient reports therapy has been helpful, but she is still having pain. 5/19/2021 (Progres Note): Patient reports therapy has been helping and her shoulder is doing better, but still having some pain. She reports she has also had some dizziness. 7/79/0451 (Recertification): Patient reports she is still having pain in her neck and shoulders routinely. She reports some things make it worse, but she is doing well overall. She reports she feels like she is progressing overall. 7/14/2021 (Progress Note): Patient reports she is getting pain in both shoulders still that goes into her arms. 8/25/2021 (Progress Note): Patient reports both her shoulders and around her neck are painful. 5/8/5732 (Recertification): Patient reports she is doing about the same, but the back is more bothersome right now. 10/20/2021 (Progress Note): Patient reports she is doing well overall, just pain in her neck and both shoulders is still there.     Past Medical History/Comorbidities: Ms. Rickie Noguera  has a past medical history of Abnormal Pap smear, Anxiety, COPD (chronic obstructive pulmonary disease) ([address] Depression, Hyperthyroidism, Insomnia, Irritable bowel syndrome, and Osteopenia. She also has no past medical history of Difficult intubation, Malignant hyperthermia due to anesthesia, Nausea & vomiting, Pseudocholinesterase deficiency, or Unspecified adverse effect of anesthesia. Ms. Rickie Noguera  has a past surgical history that includes hx tonsillectomy; hx orthopaedic; hx salpingo-oophorectomy (Right, 01/22/2014); and hx colonoscopy. Social History/Living Environment:   Home Environment: Private residence  Living Alone: Yes  Support Systems: Family member(s), Friends \ neighbors  Prior Level of Function/Work/Activity:  Independent  Dominant Side:         RIGHT  Personal Factors:          Sex:  female        Age:  77 y.o. Current Medications:       Current Outpatient Medications:     lisinopriL (PRINIVIL, ZESTRIL) 10 mg tablet, TAKE 1 TABLET BY MOUTH DAILY FOR BLOOD PRESSURE, Disp: , Rfl:     meloxicam (MOBIC) 7.5 mg tablet, Take 7.5 mg by mouth daily. , Disp: , Rfl:     therapeutic multivitamin (THERAGRAN) tablet, Take 1 Tablet by mouth daily. , Disp: , Rfl:     b complex vitamins tablet, Take 1 Tab by mouth every morning. Holding for surgery, Disp: , Rfl:     temazepam (RESTORIL) 15 mg capsule, Take  by mouth nightly as needed for Sleep., Disp: , Rfl:     DULoxetine (CYMBALTA) 30 mg capsule, Take 30 mg by mouth every morning., Disp: , Rfl:     ascorbic acid (VITAMIN C) 500 mg tablet, Take 500 mg by mouth every morning. Holding for surgery, Disp: , Rfl:     multivitamin (ONE A DAY) tablet, Take 1 Tab by mouth every morning.  Holding for surgery, Disp: , Rfl:    Date Last Reviewed:  10/20/2021    EXAMINATION:   Observation/Orthostatic Postural Assessment:          Posture Assessment: Rounded shoulders, Forward head   Palpation:          Tightness and tenderness to palpation noted throughout upper and lower cervical musculature. No bruising or swelling noted. ROM:          Cervical Assessment (AROM):  · Flexion: 75% of full AROM  · Extension: 75% of full AROM  · Right side bendin% of full AROM  · Left side bending[de-identified] 75% of full AROM  · Right rotation: 75% of full AROM  · Left rotation: 75% of full AROM  Strength:          Cervical Assessment (Strength):  · Grossly 2+/5 with manual muscle testing  Special Tests:          Negative Hautant's test. Negative Cranial-Dumfries lift test. Negative Dumfries-Axis Sheer test. Negative Alar Ligament test. Negative Tectorial Membrane test.   Neurological Screen:        Dermatomes: Within normal limits        Reflexes:  2+  Functional Mobility:         Gait/Mobility:    ·   Independent         Transfers:     · Sit to Stand: Independent  · Stand to Sit: Independent  · Stand Pivot Transfers: Independent  · Bed to Chair: Independent  · Lateral Transfers: Independent         Bed Mobility:     · Rolling: Independent  · Supine to Sit: Independent  · Sit to Supine: Independent  · Scooting: Independent        CLINICAL DECISION MAKING:   Outcome Measure: Tool Used: Neck Disability Index (NDI)  Score:  Initial:   Most Recent:  (Date: 10/20/2021 )   Interpretation of Score: The Neck Disability Index is a revised form of the Oswestry Low Back Pain Index and is designed to measure the activities of daily living in person's with neck pain. Each section is scored on a 0-5 scale, 5 representing the greatest disability. The scores of each section are added together for a total score of 50. Medical Necessity:   · Patient is expected to demonstrate progress in strength and range of motion to improve safety during daily activities. · Patient demonstrates good rehab potential due to higher previous functional level. · Skilled intervention continues to be required due to decreased mobility.   Reason for Services/Other Comments:  · Patient continues to demonstrate capacity to improve overall mobility which will increase independence and increase safety.

## 2021-10-28 ENCOUNTER — HOSPITAL ENCOUNTER (OUTPATIENT)
Dept: PHYSICAL THERAPY | Age: 76
Discharge: HOME | End: 2021-10-28
Payer: MEDICARE

## 2021-10-28 PROCEDURE — 97140 MANUAL THERAPY 1/> REGIONS: CPT

## 2021-10-28 PROCEDURE — 97110 THERAPEUTIC EXERCISES: CPT

## 2021-10-28 NOTE — PROGRESS NOTES
Josh Sifuentes  : 1945  [place]  Secondary:  [address] at [place] [address] [facility], [address] [place] [address],[address], [place] [address].  [phone]   [phone]     OUTPATIENT PHYSICAL THERAPY: Daily Treatment Note 10/28/2021  Visit Count:  8    ICD-10: Treatment Diagnosis: Low back pain (M54.5)  Precautions/Allergies:   Adhesive, Alendronate sodium, Antihistamine [triprolidine-pseudoephedrine], and Boniva [ibandronate]   TREATMENT PLAN:  Effective Dates: 2021 TO 2021 (90 days). Frequency/Duration: 2 times a week for 90 Day(s)    Pre-treatment Symptoms/Complaints:   \"I had a fall on . I was at [address] and didn't see a shelve sticking out and landed on the left side of my body\". Pain: Initial: Pain Intensity 1: 3  Pain Location 1: Back  Pain Orientation 1: Lower, Left  Post Session:  3/10   Medications Last Reviewed:  10/28/2021  Updated Objective Findings:  None Today  TREATMENT:     THERAPEUTIC EXERCISE: (30 minutes):  Exercises per grid below to improve strength. Required minimal verbal cues to promote proper body alignment. Progressed repetitions as indicated.    Date:  10/12/2021 Date:  10/28/2021 Date:  10/1/2021   Activity/Exercise Parameters Parameters Parameters   Nustep  Level 4 x 8 minutes Level 4 x 5 minutes  Level 4 x 10 minutes   Single knee to chest stretch X X X   Piriformis stretch 3x30 sec bilateral  3x30 sec bilateral  3x30 sec    Hamstring stretch with ankle pumps  3x30 sec bilateral  3x30 sec bilateral  3x30 sec bilateral    Supine lower trunk rotation 20 reps bilateral 20 reps bilateral  20 reps bilateral    Double knee to chest stretch 3x30 sec  3x30 sec  3x30 sec    Supine straight leg raise 2x10 reps bilateral 2x10 reps bilateral 2x10 reps bilateral   Supine isometric hip flexion 10 reps bilateral X 10 reps bilateral   Pelvic tilts 20 reps  20 reps 20 reps   Curl ups 10 reps X 20 reps   Double knee lifts 20 reps X 20 reps   Inversion table 10 minutes- 1 minute on, 1 minute off 8 minutes- 1 minute on, 1 minute off 10 minutes- 1 minute on, 1 minute off   . MANUAL THERAPY: (10 minutes): Joint mobilization and Soft tissue mobilization was utilized and necessary because of the patient's restricted joint motion and painful spasm. Patient received trigger point release along bilateral SI region/bilateral lumbar paraspinals to decrease pain. Skin intact after treatment. [employer] Portal  Treatment/Session Summary:    · Response to Treatment:  Decreased time on Nustep due to shortness of breath. Patient tolerated other exercises without complaints.     · Communication/Consultation:  None today  · Equipment provided today:  None today  · Recommendations/Intent for next treatment session: Next visit will focus on range of motion, stretching, and manual.    Total Treatment Billable Duration:   40 minutes   PT Patient Time In/Time Out  Time In: 0850  Time Out: [address] PT    Future Appointments   Date Time Provider Radha Bautista   11/3/2021  8:45 AM Cassia Caal, PT [facility]   11/10/2021  8:45 AM Cassia Caal, PT [facility]   11/11/2021  8:45 AM Janice Ashford, PT [facility]   11/23/2021  8:45 AM Janice Ashford, PT [facility]   9/20/2022  1:30 PM ENDO NURSE [facility]   9/27/2022  1:40 PM Selin Cartwright MD [facility]     Visit # Therapist initials Date Arrived NS/ Cx < 24 hr >24 hr Cx Visit Comments   1 PV 8/19/2021 X   Initial Assessment and Treatment- worker comp- 12 visits approved   2 PV 8/26/2021 X      3 PV 9/2/2021 X      4  PV 9/9/2021 X      5 PV 9/16/2021 X      6 PV  10/1/2021 X      7 PV 10/12/2021 X      8  PV 10/28/2021 X                                                                                                  Abbreviations: NS = No Show; CX = cancelled

## 2021-11-03 ENCOUNTER — HOSPITAL ENCOUNTER (OUTPATIENT)
Dept: PHYSICAL THERAPY | Age: 76
Discharge: HOME | End: 2021-11-03
Payer: MEDICARE

## 2021-11-03 PROCEDURE — 97140 MANUAL THERAPY 1/> REGIONS: CPT

## 2021-11-03 NOTE — PROGRESS NOTES
Shyam Lewis  : 1945  Primary:   Secondary:  [address] at [address] [place] [address] [facility], [address] [place] [address],[address], [place] [address].  [phone]   [phone]         OUTPATIENT PHYSICAL THERAPY: Daily Treatment Note 11/3/2021  ICD-10: Treatment Diagnosis: Cervicalgia (M54.2)  Pre-treatment Symptoms/Complaints:  11/3/2021: Patient reports she fell on her shoulder at [employer] and feels like her shoulder and neck are jammed. She reports she hopes therapy can help some with that today. Pain: Initial: Pain Intensity 1: 4  Pain Location 1: Neck, Shoulder  Pain Orientation 1: Right, Left  Pain Intervention(s) 1: Rest, Medication (see MAR)  Post Session:  3/10   Medications Last Reviewed:  11/3/2021   Updated Objective Findings:  None Today   TREATMENT:   MANUAL THERAPY: (40 minutes): Joint mobilization and Soft tissue mobilization was utilized and necessary because of the patient's restricted joint motion, painful spasm, loss of articular motion and restricted motion of soft tissue. Treatment/Session Summary:    · Response to Treatment:  Patient tolerated treatment well with improved mobility noted after treatment. · Communication/Consultation:  None today  · Equipment provided today:  None today  · Recommendations/Intent for next treatment session: Next visit will focus on improving overall mobility with decreased pain.   Treatment Plan of Care Effective Dates:  2021 TO 2021 (90 days)  Total Treatment Billable Duration:  40 minutes  PT Patient Time In/Time Out  Time In: 0845  Time Out: [facility], PT     Visit # Therapist initials Date Arrived NS/ Cx < 24 hr >24 hr Cx Visit Comments   61  2021 X   Medicare cap: $2110   64   X   Recert   72  3/86/9903 X       77  10/6/2021 X       79  10/20/2021 X    Progress Note   68  11/3/2021 X Abbreviations: NS = No Show; CX = cancelled     Future Appointments   Date Time Provider Radha Bautista   11/3/2021  8:45 AM Giorgio Gutierrez, PT [facility]   11/10/2021  8:45 AM Giorgio Gutierrez, PT [facility]   11/11/2021  8:45 AM Cherry Ashford, PT [facility]   11/23/2021  8:45 AM Cherry Ashford, PT [facility]   9/20/2022  1:30 PM ENDO NURSE [facility]   9/27/2022  1:40 PM Yimi Harrison MD [facility]

## 2021-11-10 ENCOUNTER — HOSPITAL ENCOUNTER (OUTPATIENT)
Dept: PHYSICAL THERAPY | Age: 76
Discharge: HOME | End: 2021-11-10
Payer: MEDICARE

## 2021-11-10 PROCEDURE — 97140 MANUAL THERAPY 1/> REGIONS: CPT

## 2021-11-10 NOTE — PROGRESS NOTES
Terri Arteaga  : 1945  Primary:   Secondary:  [address] at [facility]  [address] [facility], [address] [place] [address],[address], [facility].  [phone]   [phone]         OUTPATIENT PHYSICAL THERAPY: Daily Treatment Note 11/10/2021  ICD-10: Treatment Diagnosis: Cervicalgia (M54.2)  Pre-treatment Symptoms/Complaints:  11/10/2021: Patient reports she is hurting in that shoulder and neck today. Pain: Initial: Pain Intensity 1: 4  Pain Location 1: Neck, Shoulder  Pain Orientation 1: Right, Left  Pain Intervention(s) 1: Rest, Medication (see MAR)  Post Session:  3/10   Medications Last Reviewed:  11/10/2021   Updated Objective Findings:  None Today   TREATMENT:   MANUAL THERAPY: (40 minutes): Joint mobilization and Soft tissue mobilization was utilized and necessary because of the patient's restricted joint motion, painful spasm, loss of articular motion and restricted motion of soft tissue. Treatment/Session Summary:    · Response to Treatment:  Patient tolerated treatment well with improving overall mobility. · Communication/Consultation:  None today  · Equipment provided today:  None today  · Recommendations/Intent for next treatment session: Next visit will focus on improving overall mobility with decreased pain.   Treatment Plan of Care Effective Dates:  2021 TO 2021 (90 days)  Total Treatment Billable Duration:  40 minutes  PT Patient Time In/Time Out  Time In: 0845  Time Out: [address] [facility], PT     Visit # Therapist initials Date Arrived NS/ Cx < 24 hr >24 hr Cx Visit Comments   61  2021 X   Medicare cap: $2110   59  [address] X   Recert   72  9/88/3367 X       77  10/6/2021 X       79  10/20/2021 X    Progress Note   76  11/3/2021 X       71  11/10/2021 X                                                                                                            Abbreviations: NS = No Show; CX = cancelled     Future Appointments   Date Time Provider Radha Bautista   11/10/2021  8:45 AM Lucila Messina, PT [facility]   11/11/2021  8:45 AM José Miguel Ashford, PT [facility]   11/23/2021  8:45 AM José Miguel Ashford, PT [facility]   9/20/2022  1:30 PM ENDO NURSE END [facility] END[facility]   9/27/2022  1:40 PM Roberta Evans MD [facility] END[facility]

## 2021-11-11 ENCOUNTER — HOSPITAL ENCOUNTER (OUTPATIENT)
Dept: PHYSICAL THERAPY | Age: 76
Discharge: HOME | End: 2021-11-11
Payer: MEDICARE

## 2021-11-11 PROCEDURE — 97110 THERAPEUTIC EXERCISES: CPT

## 2021-11-11 NOTE — PROGRESS NOTES
Pavan Sheldonvenecia  : 1945  [place]  Secondary:  [address]  at [address], [address] [place] [address],[address], [address] [facility]  [phone]   [phone]     OUTPATIENT PHYSICAL THERAPY: Daily Treatment Note 2021  Visit Count:  9    ICD-10: Treatment Diagnosis: Low back pain (M54.5)  Precautions/Allergies:   Adhesive, Alendronate sodium, Antihistamine [triprolidine-pseudoephedrine], and Boniva [ibandronate]   TREATMENT PLAN:  Effective Dates: 2021 TO 2022 (90 days). Frequency/Duration: 2 times a week for 90 Days)    Pre-treatment Symptoms/Complaints:   \"Monday a had a 1.5/10 pain. I woke up on Tuesday and had pain radiating down right leg. Tomorrow I go to [place] for another shot. First Friday in December I see Dr. Fabio Aragon". Pain: Initial: Pain Intensity 1: 8  Pain Location 1: Back  Pain Orientation 1: Right  Post Session:  7/10   Medications Last Reviewed:  2021  Updated Objective Findings:  Recertification note  TREATMENT:     THERAPEUTIC EXERCISE: (40 minutes):  Exercises per grid below to improve strength. Required minimal verbal cues to promote proper body alignment. Progressed repetitions as indicated.    Date:  10/12/2021 Date:  10/28/2021 Date:  2021   Activity/Exercise Parameters Parameters Parameters   Nustep  Level 4 x 8 minutes Level 4 x 5 minutes  X   Single knee to chest stretch X X X   Piriformis stretch 3x30 sec bilateral  3x30 sec bilateral  3x30 sec    Hamstring stretch with ankle pumps  3x30 sec bilateral  3x30 sec bilateral  3x30 sec bilateral    Supine lower trunk rotation 20 reps bilateral 20 reps bilateral  20 reps bilateral    Double knee to chest stretch 3x30 sec  3x30 sec  3x30 sec                     Supine straight leg raise 2x10 reps bilateral 2x10 reps bilateral 2x10 reps bilateral   Supine isometric hip flexion 10 reps bilateral X 10 reps bilateral   Pelvic tilts 20 reps  20 reps 20 reps   Curl ups 10 reps X 20 reps   Double knee lifts 20 reps X 20 reps   Inversion table 10 minutes- 1 minute on, 1 minute off 10 minutes- 1 minute on, 1 minute off 10 minutes- 1 minute on, 1 minute off   Strength testing   5 minutes   Back range of motion testing   5 minutes   . MANUAL THERAPY: (0 minutes): Joint mobilization and Soft tissue mobilization was utilized and necessary because of the patient's restricted joint motion and painful spasm. Patient received trigger point release along bilateral SI region/bilateral lumbar paraspinals to decrease pain. Skin intact after treatment. MODALITIES: (5 minutes): *  Hot Pack Therapy in order to relieve muscle spasm. Patient received moist heat to low back to decrease pain. Skin intact after treatment. [employer] Portal  Treatment/Session Summary:    · Response to Treatment:  Patient reports minimal relief in her back pain after treatment.     · Communication/Consultation:  None today  · Equipment provided today:  None today  · Recommendations/Intent for next treatment session: Next visit will focus on range of motion, stretching, and manual.    Total Treatment Billable Duration:   45 minutes   PT Patient Time In/Time Out  Time In: 0845  Time Out: [address] PT    Future Appointments   Date Time Provider Radah Bautista   11/23/2021  8:45 AM Gema Hughes, PT [facility]FE   12/1/2021  8:45 AM Lucila Messina, PT [facility]   12/15/2021  8:45 AM Lucila Messina, PT [facility]   9/20/2022  1:30 PM ENDO NURSE END [facility] ENDO   9/27/2022  1:40 PM Roberta Evans MD END [facility] ENDO     Visit # Therapist initials Date Arrived NS/ Cx < 24 hr >24 hr Cx Visit Comments   1 PV 8/19/2021 X   Initial Assessment and Treatment- worker comp- 12 visits approved   2 PV 8/26/2021 X      3 PV 9/2/2021 X      4  PV 9/9/2021 X      5 PV 9/16/2021 X      6 PV  10/1/2021 X      7 PV 10/12/2021 X      8  PV 10/28/2021 X      9 PV 33/60/3591 X   Recertification note                                                                                      Abbreviations: NS = No Show; CX = cancelled

## 2021-11-11 NOTE — THERAPY RECERTIFICATION
Beltran Negron  : 1945  [place]  Secondary:  [address]  at [facility]  [address] [facility], [address] [place] [address],[address], [facility] [address].  [phone]   [phone]        OUTPATIENT PHYSICAL [address]    ICD-10: Treatment Diagnosis: Low back pain (M54.5)  Precautions/Allergies:   Adhesive, Alendronate sodium, Antihistamine [triprolidine-pseudoephedrine], and Boniva [ibandronate]   TREATMENT PLAN:  Effective Dates: 2021 TO 2022 (90 days). Frequency/Duration: 2 times a week for 90 Days   MEDICAL/REFERRING DIAGNOSIS:  Low back pain [M54.5]   DATE OF ONSET: May 1, 2020   REFERRING PHYSICIAN: Elizabeth Marshall*  MD Orders: Evaluate and treat   Return MD Appointment: unknown      INITIAL ASSESSMENT:  Ms. Angel Umanzor presents with increased pain, decreased strength, and increased muscular tightness. Patient would benefit from skilled physical therapy to address problems and goals. Thank you for this referral.       Recertification note:  Patient has attended nine scheduled physical therapy appointments from 2021 to 2021. Patient continues to have good days and bad days regarding her back pain. Patient is currently in a flare up. Patient reports 8/10 pain with radiating pain down her right leg. Patient is scheduled to have an injection in her back tomorrow. Patient has a follow up appointment with Dr. Varun Ragland on the first Friday in December. Patient has three more appointments approved by Frankie. Patient would benefit from continuing skilled physical therapy to work on problems and goals. Thank you for this referral.     PROBLEM LIST (Impacting functional limitations):  1. Decreased Strength  2. Increased Pain  3. Decreased Flexibility/Joint Mobility  4.  Decreased [place] with Home Exercise Program INTERVENTIONS PLANNED: (Treatment may consist of any combination of the following)  1. Cold  2. Heat  3. Home Exercise Program (HEP)  4. Manual Therapy  5. Range of Motion (ROM)  6. Therapeutic Exercise/Strengthening     GOALS: (Goals have been discussed and agreed upon with patient.)  Short-Term Functional Goals: Time Frame: 30 days   1. Patient will decrease score on Modified Oswestry Low Back Pain Questionnaire to less than or equal to 21 demonstrating improvement. Goal ongoing. Discharge Goals: Time Frame: 90 days   1. Patient will decrease score on Modified Oswestry Low Back Pain Questionnaire to less than or equal to 18 demonstrating improvement. Goal ongoing. 2. Patient will report being able to shower and fix meals with less complaints of back pain. Goal ongoing. 3. Patient will increase core strength to greater than or equal to 4/5 to improve ease with mobility. Goal ongoing. OUTCOME MEASURE:   Tool Used: Modified Oswestry Low Back Pain Questionnaire  Score:  Initial: 23/50  Most Recent: 23/50 (Date: 11- )   Interpretation of Score: Each section is scored on a 0-5 scale, 5 representing the greatest disability. The scores of each section are added together for a total score of 50. MEDICAL NECESSITY:   · Patient is expected to demonstrate progress in strength, range of motion and pain to improve ease with mobility. REASON FOR SERVICES/OTHER COMMENTS:  · Patient continues to require skilled intervention due to increased pain interfering with activities of daily living. Total Duration:  PT Patient Time In/Time Out  Time In: 0845  Time Out: 0930    Rehabilitation Potential For Stated Goals: [address] [place] [address] therapy, I certify that the treatment plan above will be carried out by a therapist or under their direction.   Thank you for this referral,  Glenn Cortes PT     Referring Physician Signature: Jhonny Montanez _______________________________ Date _____________      PAIN/SUBJECTIVE:   Initial: Pain Intensity 1: 8  Pain Location 1: Back  Pain Orientation 1: Right  Post Session:  7/10   HISTORY:   History of Injury/Illness (Reason for Referral):  Patient was treated in the clinic for her back pain from December 2020 to April 2021. Patient originally injured her back in May 2020 when a patient jumped on her while working at [employer] for [address]. Patient continues to complain of an achy pain along bilateral lower back. Patient rates pain level as 4/10. Symptoms fluctuate per patient report. Patient reports occasionally having numbness in right lower extremity and radiating pain down left leg. Past Medical History/Comorbidities:   Ms. Angel Bee  has a past medical history of Abnormal Pap smear, Anxiety, COPD (chronic obstructive pulmonary disease) ([address] Depression, Hyperthyroidism, Insomnia, Irritable bowel syndrome, and Osteopenia. She also has no past medical history of Difficult intubation, Malignant hyperthermia due to anesthesia, Nausea & vomiting, Pseudocholinesterase deficiency, or Unspecified adverse effect of anesthesia. Ms. Angel Bee  has a past surgical history that includes hx tonsillectomy; hx orthopaedic; hx salpingo-oophorectomy (Right, 01/22/2014); and hx colonoscopy. Social History/Living Environment:     Lives alone in two [place] home. Prior Level of Function/Work/Activity:  Independent. Currently not working. Dominant Side:         RIGHT  Personal Factors:          Sex:  female        Age:  77 y.o. Ambulatory/Rehab Services H2 Model Falls Risk Assessment   Risk Factors:       No Risk Factors Identified Ability to Rise from Chair:       (1)  Pushes up, successful in one attempt   Falls Prevention Plan:       No modifications necessary   Total: (5 or greater = High Risk): 1   ©2010 [facility] [employer]. All Rights Reserved. [facility] Patent #[id number].  Federal Law prohibits the replication, distribution or use without written permission from [facility] [employer]   Current Medications:       Current Outpatient Medications:     lisinopriL (PRINIVIL, ZESTRIL) 10 mg tablet, TAKE 1 TABLET BY MOUTH DAILY FOR BLOOD PRESSURE, Disp: , Rfl:     meloxicam (MOBIC) 7.5 mg tablet, Take 7.5 mg by mouth daily. , Disp: , Rfl:     therapeutic multivitamin (THERAGRAN) tablet, Take 1 Tablet by mouth daily. , Disp: , Rfl:     b complex vitamins tablet, Take 1 Tab by mouth every morning. Holding for surgery, Disp: , Rfl:     temazepam (RESTORIL) 15 mg capsule, Take  by mouth nightly as needed for Sleep., Disp: , Rfl:     DULoxetine (CYMBALTA) 30 mg capsule, Take 30 mg by mouth every morning., Disp: , Rfl:     ascorbic acid (VITAMIN C) 500 mg tablet, Take 500 mg by mouth every morning. Holding for surgery, Disp: , Rfl:     multivitamin (ONE A DAY) tablet, Take 1 Tab by mouth every morning. Holding for surgery, Disp: , Rfl:    Date Last Reviewed:  11/11/2021   Number of Personal Factors/Comorbidities that affect the Plan of Care: 0: LOW COMPLEXITY   EXAMINATION:   Observation/Orthostatic Postural Assessment: Forward head/rounded shoulders posture. Palpation:          Increased tenderness along bilateral SI region/bilateral lumbar paraspinals. ROM:          Lumbar range of motion is as follows: flexion within normal limits, extension 25% with pain, rotation within normal limits, lateral flexion 75%. Retested on 11/11/2021: flexion within normal limits, extension 10%, rotation within normal limits, and lateral flexion within normal limits. Patient reports increased back pain with extension. Strength:          Hip flexion 4-/5, hip abduction 5/5, hip adduction 5/5, quadriceps 5/5, hamstrings 5/5, ankle dorsiflexion 5/5, ankle plantarflexion 5/5, core strength 3+/5. Retested on 11/11/2021: hip flexion 3+/5, hip abduction right 4+/5 and left 4/5, hip adduction 5/5, quadriceps right 5/5 and left 4+/5, hamstrings right 5/5 and left 4/5, ankle plantarflexion 5/5, ankle dorsiflexion 5/5. Special Tests:          Straight leg raise negative bilateral.  Piriformis test negative bilateral.   Neurological Screen:        Sensation: Within normal limits. Functional Mobility:         Gait/Ambulation:  Patient ambulates with normal gait pattern. Body Structures Involved:  1. Nerves  2. Joints  3. Muscles Body Functions Affected:  1. Neuromusculoskeletal Activities and Participation Affected:  1. General Tasks and Demands  2. Mobility  3.  Community, Social and [place]   Number of elements (examined above) that affect the Plan of Care: 4+: HIGH COMPLEXITY   CLINICAL PRESENTATION:   Presentation: Stable and uncomplicated: LOW COMPLEXITY   CLINICAL DECISION MAKING:   Use of outcome tool(s) and clinical judgement create a POC that gives a: Clear prediction of patient's progress: LOW COMPLEXITY

## 2021-11-23 ENCOUNTER — HOSPITAL ENCOUNTER (OUTPATIENT)
Dept: PHYSICAL THERAPY | Age: 76
Discharge: HOME | End: 2021-11-23
Payer: MEDICARE

## 2021-11-23 PROCEDURE — 97110 THERAPEUTIC EXERCISES: CPT

## 2021-11-23 PROCEDURE — 97140 MANUAL THERAPY 1/> REGIONS: CPT

## 2021-11-23 NOTE — PROGRESS NOTES
Cortes Lucia  : 1945  [place]  Secondary:  [address]  at [place] [address] [facility], [address] [place] [address],[address], [place] [address].  [phone]   [phone]     OUTPATIENT PHYSICAL THERAPY: Daily Treatment Note 2021  Visit Count:  10    ICD-10: Treatment Diagnosis: Low back pain (M54.5)  Precautions/Allergies:   Adhesive, Alendronate sodium, Antihistamine [triprolidine-pseudoephedrine], and Boniva [ibandronate]   TREATMENT PLAN:  Effective Dates: 2021 TO 2022 (90 days). Frequency/Duration: 2 times a week for 90 Days)    Pre-treatment Symptoms/Complaints:   \"I went for the shot and it was good. It was much more effective than last time. I still have pain when I go grocery shopping. I am having pain on my left side. Like a catch in my back. First Friday in December I see Dr. Mai [place]". Patient apologized for being late to her appointment. \"I got stuck in traffic\". Pain: Initial: Pain Intensity 1: 4  Pain Location 1: Back  Pain Orientation 1: Left  Post Session:  4/10   Medications Last Reviewed:  2021  Updated Objective Findings:  None Today  TREATMENT:     THERAPEUTIC EXERCISE: (15 minutes):  Exercises per grid below to improve strength. Required minimal verbal cues to promote proper body alignment. Progressed repetitions as indicated.    Date:  2021 Date:  10/28/2021 Date:  2021   Activity/Exercise Parameters Parameters Parameters   Nustep  X Level 4 x 5 minutes  X   Single knee to chest stretch X X X   Piriformis stretch 3x30 sec bilateral  3x30 sec bilateral  3x30 sec    Hamstring stretch with ankle pumps  3x30 sec bilateral  3x30 sec bilateral  3x30 sec bilateral    Supine lower trunk rotation X 20 reps bilateral  20 reps bilateral    Double knee to chest stretch 3x30 sec  3x30 sec  3x30 sec                     Supine straight leg raise X 2x10 reps bilateral 2x10 reps bilateral   Supine isometric hip flexion X X 10 reps bilateral   Pelvic tilts X 20 reps 20 reps   Curl ups X X 20 reps   Double knee lifts X X 20 reps   Inversion table 10 minutes- 1 minute on, 1 minute off 10 minutes- 1 minute on, 1 minute off 10 minutes- 1 minute on, 1 minute off   Strength testing   5 minutes   Back range of motion testing   5 minutes   . MANUAL THERAPY: (15 minutes): Joint mobilization and Soft tissue mobilization was utilized and necessary because of the patient's restricted joint motion and painful spasm. Patient received trigger point release along bilateral SI region/bilateral lumbar paraspinals to decrease pain. Skin intact after treatment. [employer] Portal  Treatment/Session Summary:    · Response to Treatment:  Patient reports mild pain relief after treatment.      · Communication/Consultation:  None today  · Equipment provided today:  None today  · Recommendations/Intent for next treatment session: Next visit will focus on range of motion, stretching, and manual.    Total Treatment Billable Duration:   30 minutes   PT Patient Time In/Time Out  Time In: 0900  Time Out: Francesca Sotomayor 66., PT    Future Appointments   Date Time Provider Radha Bautista   12/1/2021  8:45 AM Timothy Reza, PT [facility] SFE   12/7/2021  8:45 AM Gideon Ashford, B[facility] SFE   12/15/2021  8:45 AM Timothy Reza, PT [facility] SFE   1/17/2022 11:20 AM York Query, DO [facility]   9/20/2022  1:30 PM ENDO NURSE END BS ENDO   9/27/2022  1:40 PM Stef Chu MD END BS ENDO     Visit # Therapist initials Date Arrived NS/ Cx < 24 hr >24 hr Cx Visit Comments   1 PV 8/19/2021 X   Initial Assessment and Treatment- worker comp- 12 visits approved   2 PV 8/26/2021 X      3 PV 9/2/2021 X      4  PV 9/9/2021 X      5 PV 9/16/2021 X      6 PV  10/1/2021 X      7 PV 10/12/2021 X      8  PV 10/28/2021 X      9 PV 22/97/7719 X   Recertification note   10 PV 11/23/2021 X Abbreviations: NS = No Show; CX = cancelled

## 2021-12-01 ENCOUNTER — HOSPITAL ENCOUNTER (OUTPATIENT)
Dept: PHYSICAL THERAPY | Age: 76
Discharge: HOME | End: 2021-12-01
Payer: MEDICARE

## 2021-12-01 PROCEDURE — 97140 MANUAL THERAPY 1/> REGIONS: CPT

## 2021-12-01 NOTE — PROGRESS NOTES
Robby Ying  : 1945  Primary:   Secondary:  [address] at [place] [address] [facility], [address] [place] [address],[address], [facility].  [phone]   [phone]         OUTPATIENT PHYSICAL THERAPY: Daily Treatment Note 2021  ICD-10: Treatment Diagnosis: Cervicalgia (M54.2)  Pre-treatment Symptoms/Complaints:  2021: Patient reports her neck and right shoulder are hurting today. Pain: Initial: Pain Intensity 1: 4  Pain Location 1: Neck, Shoulder  Pain Orientation 1: Right, Left  Pain Intervention(s) 1: Rest, Medication (see MAR)  Post Session:  3/10   Medications Last Reviewed:  2021   Updated Objective Findings:  None Today   TREATMENT:   MANUAL THERAPY: (40 minutes): Joint mobilization and Soft tissue mobilization was utilized and necessary because of the patient's restricted joint motion, painful spasm, loss of articular motion and restricted motion of soft tissue. Treatment/Session Summary:    · Response to Treatment:  Patient tolerated treatment well with improved mobility and pain noted after treatment. · Communication/Consultation:  None today  · Equipment provided today:  None today  · Recommendations/Intent for next treatment session: Next visit will focus on improving overall mobility with decreased pain.   Treatment Plan of Care Effective Dates:  2021 TO 3/1/2022 (90 days)  Total Treatment Billable Duration:  40 minutes  PT Patient Time In/Time Out  Time In: 0845  Time Out: [address] [facility], PT     Visit # Therapist initials Date Arrived NS/ Cx < 24 hr >24 hr Cx Visit Comments   61  2021 X   Medicare cap: $2110   64  44196 X   Recert   65  3/37/2796 X       77  10/6/2021 X       67  10/20/2021 X    Progress Note   77  11/3/2021 X       69  11/10/2021 X       70  1196 X    Recert                                                                                               Abbreviations: NS = No Show; CX = cancelled Future Appointments   Date Time Provider Radha Lily   12/1/2021  8:45 AM Jackson Carrizales, PT [facility] SFE   12/7/2021  8:45 AM Shanika Ashford, PT [facility] SFE   12/15/2021  8:45 AM Jackson Carrizales, PT [facility] SFE   1/17/2022 11:20 AM DO JUSTYNA Hemphill [facility]   9/20/2022  1:30 PM ENDO NURSE END BS ENDO   9/27/2022  1:40 PM Skyla Bedoya MD END BS ENDO

## 2021-12-01 NOTE — THERAPY RECERTIFICATION
Hay Herring  : 1945  Primary:   Secondary:  [facility] at [place] [address] [place] [address],[address], [facility] [address].  [phone]   [phone]          OUTPATIENT PHYSICAL THERAPY:Recertification    ICD-10: Treatment Diagnosis: Cervicalgia (M54.2)  Precautions/Allergies:   Adhesive, Alendronate sodium, Antihistamine [triprolidine-pseudoephedrine], and Boniva [ibandronate]   MD Orders: Evaluate and Treat MEDICAL/REFERRING DIAGNOSIS:  Cervicalgia [M54.2]   DATE OF ONSET: Chronic  REFERRING PHYSICIAN:  Zachary Avila MD   RETURN PHYSICIAN APPOINTMENT: TBD     ASSESSMENT:  Ms. Nat Tilley presents with improving mobility and pain in cervical region secondary to degenerative changes. Patient has attended a total of 70 scheduled physical therapy visits. Treatment has consisted of stretching, strengthening, and manual therapy to improve overall pain and performance with activities of daily living. After discussing with patient she agreed she would continue to benefit from physical therapy to improve overall mobility and pain with daily activities. Please sign this re-certification if you concur. Thank you for the opportunity to serve this patient. PROBLEM LIST (Impacting functional limitations):  1. Decreased Strength  2. Decreased ADL/Functional Activities  3. Increased Pain  4. Decreased Activity Tolerance INTERVENTIONS PLANNED: (Treatment may consist of any combination of the following)  1. Cold  2. Heat  3. Home Exercise Program (HEP)  4. Manual Therapy  5. Neuromuscular Re-education/Strengthening  6. Range of Motion (ROM)  7. Therapeutic Activites  8. Therapeutic Exercise/Strengthening   TREATMENT PLAN:  Effective Dates:  2021 TO 3/1/2022  (90 days). Frequency/Duration: 1 time every other week for 90 Day(s)  GOALS: (Goals have been discussed and agreed upon with patient.)  Short-Term Functional Goals: Time Frame: 30 days  1.  Patient will be independent with home exercise program without exacerbation of symptoms or cueing needed--goal met. 2. Patient will be independent with correct sleeping positions and awareness/avoidance of aggravating positions without cueing needed--goal met. Discharge Goals: Time Frame: 90 days  1. Patient will be independent with all ADLs with minimal onset of neck pain and no deficits with daily tasks--goal ongoing. 2. Patient will report no fear avoidance with social or recreational activities due to neck pain --goal ongoing. 3. Patient will score less than or equal to 7/50 on Neck Disability Index with minimal effect of neck pain on patient's ability to manage every day life activities--goal ongoing. Rehabilitation Potential For Stated Goals: Good   Regarding Latoro Anupamasavannah Baylee's therapy, I certify that the treatment plan above will be carried out by a therapist or under their direction. Thank you for this referral,  Marion Buerger, PT     Referring Physician Signature: Harley [place] MD _______________________________ Date _____________               HISTORY:   History of Present Injury/Illness (Reason for Referral):  1/13/2021 (Progress Note): Patient reports she is having tightness in her right neck more than her left right now. She reports she is dizzy as well. 2/24/2021 (Progress Note): Patient reports she is feeling overall better, but has been having more pain in her left shoulder. 6/82/0505 (Recertification): Patient reports therapy has been helpful, but she is still having pain. 5/19/2021 (Progres Note): Patient reports therapy has been helping and her shoulder is doing better, but still having some pain. She reports she has also had some dizziness. 8/22/3612 (Recertification): Patient reports she is still having pain in her neck and shoulders routinely. She reports some things make it worse, but she is doing well overall. She reports she feels like she is progressing overall.     7/14/2021 (Progress Note): Patient reports she is getting pain in both shoulders still that goes into her arms. 8/25/2021 (Progress Note): Patient reports both her shoulders and around her neck are painful. 1/1/4253 (Recertification): Patient reports she is doing about the same, but the back is more bothersome right now. 10/20/2021 (Progress Note): Patient reports she is doing well overall, just pain in her neck and both shoulders is still there. 64/0/4389 (Recertification): Patient reports therapy has been helping her neck and shoulder mobility and pain to improve. Past Medical History/Comorbidities:   Ms. Michael Johnson  has a past medical history of Abnormal Pap smear, Anxiety, COPD (chronic obstructive pulmonary disease) ([facility] [address] Depression, Hyperthyroidism, Insomnia, Irritable bowel syndrome, and Osteopenia. She also has no past medical history of Difficult intubation, Malignant hyperthermia due to anesthesia, Nausea & vomiting, Pseudocholinesterase deficiency, or Unspecified adverse effect of anesthesia. Ms. Michael Johnson  has a past surgical history that includes hx tonsillectomy; hx orthopaedic; hx salpingo-oophorectomy (Right, 01/22/2014); and hx colonoscopy. Social History/Living Environment:   Home Environment: Private residence  Living Alone: Yes  Support Systems: Family member(s), Friends \ neighbors  Prior Level of Function/Work/Activity:  Independent  Dominant Side:         RIGHT  Personal Factors:          Sex:  female        Age:  77 y.o. Current Medications:       Current Outpatient Medications:     lisinopriL (PRINIVIL, ZESTRIL) 10 mg tablet, TAKE 1 TABLET BY MOUTH DAILY FOR BLOOD PRESSURE, Disp: , Rfl:     meloxicam (MOBIC) 7.5 mg tablet, Take 7.5 mg by mouth daily. , Disp: , Rfl:     therapeutic multivitamin (THERAGRAN) tablet, Take 1 Tablet by mouth daily. , Disp: , Rfl:     b complex vitamins tablet, Take 1 Tab by mouth every morning.  Holding for surgery, Disp: , Rfl:     temazepam (RESTORIL) 15 mg capsule, Take  by mouth nightly as needed for Sleep., Disp: , Rfl:     DULoxetine (CYMBALTA) 30 mg capsule, Take 30 mg by mouth every morning., Disp: , Rfl:     ascorbic acid (VITAMIN C) 500 mg tablet, Take 500 mg by mouth every morning. Holding for surgery, Disp: , Rfl:     multivitamin (ONE A DAY) tablet, Take 1 Tab by mouth every morning. Holding for surgery, Disp: , Rfl:    Date Last Reviewed:  2021    EXAMINATION:   Observation/Orthostatic Postural Assessment:          Posture Assessment: Rounded shoulders, Forward head   Palpation:          Tightness and tenderness to palpation noted throughout upper and lower cervical musculature. No bruising or swelling noted. ROM:          Cervical Assessment (AROM):  · Flexion: 75% of full AROM  · Extension: 75% of full AROM  · Right side bendin% of full AROM  · Left side bending[de-identified] 75% of full AROM  · Right rotation: 75% of full AROM  · Left rotation: 75% of full AROM  Strength:          Cervical Assessment (Strength):  · Grossly 2+/5 with manual muscle testing  Special Tests:          Negative Hautant's test. Negative Cranial-[place] lift test. Negative [place]-Axis Sheer test. Negative Alar Ligament test. Negative Tectorial Membrane test.   Neurological Screen:        Dermatomes: Within normal limits        Reflexes:  2+  Functional Mobility:         Gait/Mobility:    ·   Independent         Transfers:     · Sit to Stand: Independent  · Stand to Sit: Independent  · Stand Pivot Transfers: Independent  · Bed to Chair: Independent  · Lateral Transfers: Independent         Bed Mobility:     · Rolling: Independent  · Supine to Sit: Independent  · Sit to Supine: Independent  · Scooting: Independent        CLINICAL DECISION MAKING:   Outcome Measure: Tool Used: Neck Disability Index (NDI)  Score:  Initial:   Most Recent:  (Date: 2021 )   Interpretation of Score:  The Neck Disability Index is a revised form of the Oswestry Low Back Pain Index and is designed to measure the activities of daily living in person's with neck pain. Each section is scored on a 0-5 scale, 5 representing the greatest disability. The scores of each section are added together for a total score of 50. Medical Necessity:   · Patient is expected to demonstrate progress in strength and range of motion to improve safety during daily activities. · Patient demonstrates good rehab potential due to higher previous functional level. · Skilled intervention continues to be required due to decreased mobility. Reason for Services/Other Comments:  · Patient continues to demonstrate capacity to improve overall mobility which will increase independence and increase safety.

## 2021-12-07 ENCOUNTER — HOSPITAL ENCOUNTER (OUTPATIENT)
Dept: PHYSICAL THERAPY | Age: 76
Discharge: HOME | End: 2021-12-07

## 2021-12-07 NOTE — PROGRESS NOTES
Marleen Betancourt  : 1945  Primary: [employer]  Secondary:  [address] at [place] [id number] [facility], [address] [place] [address],[address], [facility] [address].  [phone]   [phone]          OUTPATIENT DAILY NOTE    NAME/AGE/GENDER: Marleen Betancourt is a 77 y.o. female. DATE: 2021    Patient cancelled (less than 24 hours ago) her appointment for today due to bad reaction from new medication. Will plan to follow up on next scheduled visit.     Manuela Clark, PT    Future Appointments   Date Time Provider Radha Bautista   12/15/2021  8:45 AM Catrachito Arguelles, PT [facility] SFE   2021  9:30 AM Catrachito Arguelles PT [facility] SFE   2022  8:45 AM Kiara Ashford, PT [facility] SFE   2022 11:20 AM DO JUSTYNA Davalos [facility]   2022  1:30 PM ENDO NURSE END BS ENDO   2022  1:40 PM Joe Hanson MD END BS ENDO

## 2021-12-15 ENCOUNTER — HOSPITAL ENCOUNTER (OUTPATIENT)
Dept: PHYSICAL THERAPY | Age: 76
Discharge: HOME | End: 2021-12-15
Payer: MEDICARE

## 2021-12-15 PROCEDURE — 97140 MANUAL THERAPY 1/> REGIONS: CPT

## 2021-12-15 NOTE — PROGRESS NOTES
Jaida Wilson  : 1945  Primary:   Secondary:  [address]  at [place] [address] [place] [address],[address], [facility].  [phone]   [phone]         OUTPATIENT PHYSICAL THERAPY: Daily Treatment Note 12/15/2021  ICD-10: Treatment Diagnosis: Cervicalgia (M54.2)  Pre-treatment Symptoms/Complaints:  12/15/2021: Patient reports her shoulders are hurting today. Pain: Initial: Pain Intensity 1: 4  Pain Location 1: Neck,Shoulder  Pain Orientation 1: Right,Left  Pain Intervention(s) 1: Rest,Medication (see MAR)  Post Session:  3/10   Medications Last Reviewed:  12/15/2021   Updated Objective Findings:  None Today   TREATMENT:   MANUAL THERAPY: (40 minutes): Joint mobilization and Soft tissue mobilization was utilized and necessary because of the patient's restricted joint motion, painful spasm, loss of articular motion and restricted motion of soft tissue. Treatment/Session Summary:    · Response to Treatment:  Patient tolerated treatment well with improved mobility and pain noted after treatment. · Communication/Consultation:  None today  · Equipment provided today:  None today  · Recommendations/Intent for next treatment session: Next visit will focus on improving overall mobility with decreased pain.   Treatment Plan of Care Effective Dates:  2021 TO 3/1/2022 (90 days)  Total Treatment Billable Duration:  40 minutes  PT Patient Time In/Time Out  Time In: 0845  Time Out: 6[address] [facility], PT     Visit # Therapist initials Date Arrived NS/ Cx < 24 hr >24 hr Cx Visit Comments   61  2021 X   Medicare cap: $2110   64  0/0/23301 X   Recert   72  5/40/2264 X       77  10/6/2021 X       67  10/20/2021 X    Progress Note   76  11/3/2021 X       69  11/10/2021 X       70   X    Recert   70   X                                                                                          Abbreviations: NS = No Show; CX = cancelled Future Appointments   Date Time Provider Radha Lily   12/15/2021  8:45 AM Lupillo Miller, PT [facility] SFE   12/22/2021  9:30 AM Lupillo Miller, PT [facility]   1/4/2022  8:45 AM Misty Ashford, [facility] SFE   1/17/2022 11:20 AM DO JUSTYNA Butterfield [facility]   9/20/2022  1:30 PM ENDO NURSE END BS ENDO   9/27/2022  1:40 PM Sancho Yates MD END BS ENDO

## 2021-12-22 ENCOUNTER — HOSPITAL ENCOUNTER (OUTPATIENT)
Dept: PHYSICAL THERAPY | Age: 76
Discharge: HOME | End: 2021-12-22
Payer: MEDICARE

## 2021-12-22 PROCEDURE — 97140 MANUAL THERAPY 1/> REGIONS: CPT

## 2021-12-22 NOTE — PROGRESS NOTES
Nelsy Fischer  : 1945  Primary:   Secondary:  [address] at [place] [address] [facility], [address] [place] [address],[address], [place] [address].  [phone]   [phone]         OUTPATIENT PHYSICAL THERAPY: Daily Treatment Note 2021  ICD-10: Treatment Diagnosis: Cervicalgia (M54.2)  Pre-treatment Symptoms/Complaints:  2021: Patient reports she is doing ok, but does have some pain in both shoulders. Pain: Initial: Pain Intensity 1: 4  Pain Location 1: Neck,Shoulder  Pain Orientation 1: Right,Left  Pain Intervention(s) 1: Rest,Medication (see MAR)  Post Session:  3/10   Medications Last Reviewed:  2021   Updated Objective Findings:  None Today   TREATMENT:   MANUAL THERAPY: (40 minutes): Joint mobilization and Soft tissue mobilization was utilized and necessary because of the patient's restricted joint motion, painful spasm, loss of articular motion and restricted motion of soft tissue. Treatment/Session Summary:    · Response to Treatment:  Patient tolerated treatment well with improved mobility and pain noted after treatment. · Communication/Consultation:  None today  · Equipment provided today:  None today  · Recommendations/Intent for next treatment session: Next visit will focus on improving overall mobility with decreased pain.   Treatment Plan of Care Effective Dates:  2021 TO 3/1/2022 (90 days)  Total Treatment Billable Duration:  40 minutes  PT Patient Time In/Time Out  Time In: 930  Time Out: 751 [facility], PT     Visit # Therapist initials Date Arrived NS/ Cx < 24 hr >24 hr Cx Visit Comments   61  2021 X   Medicare cap: $2110   59  3/5/13183 X   Recert   72  5/87/7517 X       77  10/6/2021 X       79  10/20/2021 X    Progress Note   68  11/3/2021 X       69  11/10/2021 X       70   X    Recert   71  04/86/0816 X       72  2021 X Abbreviations: NS = No Show; CX = cancelled     Future Appointments   Date Time Provider Radha Bautista   12/22/2021  9:30 AM Meredith Baron, PT [facility] SFE   1/4/2022  8:45 AM Gaby Ashford, PT [facility] SFE   1/5/2022  8:45 AM Meredith Baron, PT [facility] SFE   1/17/2022 11:20 AM DO JUSTYNA Pena [facility]   1/19/2022  8:45 AM Meredith Baron, PT [facility] SFE   9/20/2022  1:30 PM ENDO NURSE END BS ENDO   9/27/2022  1:40 PM Dandre Aquino MD END BS ENDO

## 2022-01-05 ENCOUNTER — HOSPITAL ENCOUNTER (OUTPATIENT)
Dept: PHYSICAL THERAPY | Age: 77
Discharge: HOME | End: 2022-01-05
Payer: MEDICARE

## 2022-01-05 PROCEDURE — 97140 MANUAL THERAPY 1/> REGIONS: CPT

## 2022-01-05 NOTE — PROGRESS NOTES
Juany Bellamy  : 1945  Primary:   Secondary:  [address] at [place] [address] [place] [address],[address], [place] [address].  [phone]   [phone]         OUTPATIENT PHYSICAL THERAPY: Daily Treatment Note 2022  ICD-10: Treatment Diagnosis: Cervicalgia (M54.2)  Pre-treatment Symptoms/Complaints:  2022: Patient reports she is having more pain in her right shoulder today. Pain: Initial: Pain Intensity 1: 4  Pain Location 1: Neck,Shoulder  Pain Orientation 1: Right,Left  Pain Intervention(s) 1: Rest,Medication (see MAR)  Post Session:  3/10   Medications Last Reviewed:  2022   Updated Objective Findings:  None Today   TREATMENT:   MANUAL THERAPY: (40 minutes): Joint mobilization and Soft tissue mobilization was utilized and necessary because of the patient's restricted joint motion, painful spasm, loss of articular motion and restricted motion of soft tissue. Treatment/Session Summary:    · Response to Treatment:  Patient tolerated treatment well with improved mobility and pain noted after treatment. · Communication/Consultation:  None today  · Equipment provided today:  None today  · Recommendations/Intent for next treatment session: Next visit will focus on improving overall mobility with decreased pain.   Treatment Plan of Care Effective Dates:  2021 TO 3/1/2022 (90 days)  Total Treatment Billable Duration:  40 minutes  PT Patient Time In/Time Out  Time In: 0845  Time Out: 6[address] [facility], PT     Visit # Therapist initials Date Arrived NS/ Cx < 24 hr >24 hr Cx Visit Comments   61  2021 X   Medicare cap: $2110   59  [address] X   Recert   72  9/60/4531 X       77  10/6/2021 X       79  10/20/2021 X    Progress Note   68  11/3/2021 X       69  11/10/2021 X       70   X    Recert   71  39/22/2391 X       72  12/22/2021 X       73  2022 X    Progress note Abbreviations: NS = No Show; CX = cancelled     Future Appointments   Date Time Provider Radha Lily   1/5/2022  8:45 AM Shayne Godfrey, PT [facility] SFE   1/6/2022  8:45 AM Denisa Ashford, PT [facility] SFE   1/17/2022 11:20 AM DO JUSTYNA Dorantes [facility]   1/19/2022  8:45 AM Shayne Godfrey, PT [facility] SFE   9/20/2022  1:30 PM ENDO NURSE END BS ENDO   9/27/2022  1:40 PM Raine Pires MD END BS ENDO

## 2022-01-05 NOTE — PROGRESS NOTES
Steph Ramirez  : 1945  Primary:   Secondary:  [address] at [place] [address] [facility], [address] [place] [address],[address], [place] [address].  [phone]   [phone]          OUTPATIENT PHYSICAL THERAPY:Progress Report 2022   ICD-10: Treatment Diagnosis: Cervicalgia (M54.2)  Precautions/Allergies:   Adhesive, Alendronate sodium, Antihistamine [triprolidine-pseudoephedrine], and Boniva [ibandronate]   MD Orders: Evaluate and Treat MEDICAL/REFERRING DIAGNOSIS:  Cervicalgia [M54.2]   DATE OF ONSET: Chronic  REFERRING PHYSICIAN:  Shaw Obregon MD   RETURN PHYSICIAN APPOINTMENT: TBD     ASSESSMENT:  Ms. Joaquin Meyer presents with improving mobility and pain in cervical region secondary to degenerative changes. Patient has attended a total of 73 scheduled physical therapy visits. Treatment has consisted of stretching, strengthening, and manual therapy to improve overall pain and performance with activities of daily living. PROBLEM LIST (Impacting functional limitations):  1. Decreased Strength  2. Decreased ADL/Functional Activities  3. Increased Pain  4. Decreased Activity Tolerance INTERVENTIONS PLANNED: (Treatment may consist of any combination of the following)  1. Cold  2. Heat  3. Home Exercise Program (HEP)  4. Manual Therapy  5. Neuromuscular Re-education/Strengthening  6. Range of Motion (ROM)  7. Therapeutic Activites  8. Therapeutic Exercise/Strengthening   TREATMENT PLAN:  Effective Dates:  2021 TO 3/1/2022  (90 days). Frequency/Duration: 1 time every other week for 90 Day(s)  GOALS: (Goals have been discussed and agreed upon with patient.)  Short-Term Functional Goals: Time Frame: 30 days  1. Patient will be independent with home exercise program without exacerbation of symptoms or cueing needed--goal met. 2. Patient will be independent with correct sleeping positions and awareness/avoidance of aggravating positions without cueing needed--goal met.    Discharge Goals: Time Frame: 90 days  1. Patient will be independent with all ADLs with minimal onset of neck pain and no deficits with daily tasks--goal ongoing. 2. Patient will report no fear avoidance with social or recreational activities due to neck pain --goal ongoing. 3. Patient will score less than or equal to 7/50 on Neck Disability Index with minimal effect of neck pain on patient's ability to manage every day life activities--goal ongoing. Rehabilitation Potential For Stated Goals: Good              HISTORY:   History of Present Injury/Illness (Reason for Referral):  1/5/2022 (Progress Note): Patient reports her shoulders and neck are very painful overall. Past Medical History/Comorbidities:   Ms. Ghulam Woodruff  has a past medical history of Abnormal Pap smear, Anxiety, COPD (chronic obstructive pulmonary disease) ([facility] [address] Depression, Hyperthyroidism, Insomnia, Irritable bowel syndrome, and Osteopenia. She has no past medical history of Difficult intubation, Malignant hyperthermia due to anesthesia, Nausea & vomiting, Pseudocholinesterase deficiency, or Unspecified adverse effect of anesthesia. Ms. Ghulam Woodruff  has a past surgical history that includes hx tonsillectomy; hx orthopaedic; hx salpingo-oophorectomy (Right, 01/22/2014); and hx colonoscopy. Social History/Living Environment:   Home Environment: Private residence  Living Alone: Yes  Support Systems: Family member(s), Friends \ neighbors  Prior Level of Function/Work/Activity:  Independent  Dominant Side:         RIGHT  Personal Factors:          Sex:  female        Age:  77 y.o. Current Medications:       Current Outpatient Medications:     lisinopriL (PRINIVIL, ZESTRIL) 10 mg tablet, TAKE 1 TABLET BY MOUTH DAILY FOR BLOOD PRESSURE, Disp: , Rfl:     meloxicam (MOBIC) 7.5 mg tablet, Take 7.5 mg by mouth daily. , Disp: , Rfl:     therapeutic multivitamin (THERAGRAN) tablet, Take 1 Tablet by mouth daily. , Disp: , Rfl:     b complex vitamins tablet, Take 1 Tab by mouth every morning. Holding for surgery, Disp: , Rfl:     temazepam (RESTORIL) 15 mg capsule, Take  by mouth nightly as needed for Sleep., Disp: , Rfl:     DULoxetine (CYMBALTA) 30 mg capsule, Take 30 mg by mouth every morning., Disp: , Rfl:     ascorbic acid (VITAMIN C) 500 mg tablet, Take 500 mg by mouth every morning. Holding for surgery, Disp: , Rfl:     multivitamin (ONE A DAY) tablet, Take 1 Tab by mouth every morning. Holding for surgery, Disp: , Rfl:    Date Last Reviewed:  2022    EXAMINATION:   Observation/Orthostatic Postural Assessment:          Posture Assessment: Rounded shoulders, Forward head   Palpation:          Tightness and tenderness to palpation noted throughout upper and lower cervical musculature. No bruising or swelling noted. ROM:          Cervical Assessment (AROM):  · Flexion: 75% of full AROM  · Extension: 75% of full AROM  · Right side bendin% of full AROM  · Left side bending[de-identified] 75% of full AROM  · Right rotation: 75% of full AROM  · Left rotation: 75% of full AROM  Strength:          Cervical Assessment (Strength):  · Grossly 2+/5 with manual muscle testing  Special Tests:          Negative Hautant's test. Negative Cranial-Richfield lift test. Negative Richfield-Axis Sheer test. Negative Alar Ligament test. Negative Tectorial Membrane test.   Neurological Screen:        Dermatomes: Within normal limits        Reflexes:  2+  Functional Mobility:         Gait/Mobility:    ·   Independent         Transfers:     · Sit to Stand: Independent  · Stand to Sit: Independent  · Stand Pivot Transfers: Independent  · Bed to Chair: Independent  · Lateral Transfers: Independent         Bed Mobility:     · Rolling: Independent  · Supine to Sit: Independent  · Sit to Supine: Independent  · Scooting: Independent        CLINICAL DECISION MAKING:   Outcome Measure:    Tool Used: Neck Disability Index (NDI)  Score:  Initial:   Most Recent:  (Date: 2022 )   Interpretation of Score: The Neck Disability Index is a revised form of the Oswestry Low Back Pain Index and is designed to measure the activities of daily living in person's with neck pain. Each section is scored on a 0-5 scale, 5 representing the greatest disability. The scores of each section are added together for a total score of 50. Medical Necessity:   · Patient is expected to demonstrate progress in strength and range of motion to improve safety during daily activities. · Patient demonstrates good rehab potential due to higher previous functional level. · Skilled intervention continues to be required due to decreased mobility. Reason for Services/Other Comments:  · Patient continues to demonstrate capacity to improve overall mobility which will increase independence and increase safety.

## 2022-01-06 ENCOUNTER — HOSPITAL ENCOUNTER (OUTPATIENT)
Dept: PHYSICAL THERAPY | Age: 77
Discharge: HOME | End: 2022-01-06
Payer: COMMERCIAL

## 2022-01-06 PROCEDURE — 97140 MANUAL THERAPY 1/> REGIONS: CPT

## 2022-01-06 PROCEDURE — 97110 THERAPEUTIC EXERCISES: CPT

## 2022-01-06 NOTE — PROGRESS NOTES
Malia Door  : 1945  Primary: [employer]  Secondary:  [address] at [place] [address] [facility], [address] [place] [address],[address], [place] [address].  [phone]   [phone]     OUTPATIENT PHYSICAL THERAPY: Daily Treatment Note 2022  Visit Count:  11    ICD-10: Treatment Diagnosis: Low back pain (M54.5)  Precautions/Allergies:   Adhesive, Alendronate sodium, Antihistamine [triprolidine-pseudoephedrine], and Boniva [ibandronate]   TREATMENT PLAN:  Effective Dates: 2021 TO 2022 (90 days). Frequency/Duration: 2 times a week for 90 Days)    Pre-treatment Symptoms/Complaints:   \"I am not as active. Back still hurts when I stand or go to the grocery store\". Pain: Initial: Pain Intensity 1: 3  Pain Location 1: Back  Pain Orientation 1: Left  Post Session:  3/10   Medications Last Reviewed:  2022  Updated Objective Findings:  None Today  TREATMENT:     THERAPEUTIC EXERCISE: (30 minutes):  Exercises per grid below to improve strength. Required minimal verbal cues to promote proper body alignment. Progressed repetitions as indicated.    Date:  2021 Date:  2022 Date:  2021   Activity/Exercise Parameters Parameters Parameters   Nustep  X Level 3 x 5 minutes  X   Single knee to chest stretch X X X   Piriformis stretch 3x30 sec bilateral  3x30 sec bilateral  3x30 sec    Hamstring stretch with ankle pumps  3x30 sec bilateral  3x30 sec bilateral  3x30 sec bilateral    Supine lower trunk rotation X 20 reps bilateral  20 reps bilateral    Double knee to chest stretch 3x30 sec  3x30 sec  3x30 sec                     Supine straight leg raise X 2x10 reps bilateral 2x10 reps bilateral   Supine isometric hip flexion X 10 reps bilateral with 5 second hold 10 reps bilateral   Pelvic tilts X 20 reps 20 reps   Curl ups X 20 reps 20 reps   Double knee lifts X 20 reps 20 reps   Inversion table 10 minutes- 1 minute on, 1 minute off X 10 minutes- 1 minute on, 1 minute off [place] MANUAL THERAPY: (15 minutes): Joint mobilization and Soft tissue mobilization was utilized and necessary because of the patient's restricted joint motion and painful spasm. Patient received trigger point release along bilateral SI region/bilateral lumbar paraspinals to decrease pain. Skin intact after treatment. [employer] [facility]  Treatment/Session Summary:    Response to Treatment:  Patient tolerated exercises without complaints of increased pain.       Communication/Consultation:  None today  Equipment provided today:  None today  Recommendations/Intent for next treatment session: Next visit will focus on range of motion, stretching, and manual.    Total Treatment Billable Duration:   45 minutes   PT Patient Time In/Time Out  Time In: 0845  Time Out: [address] PT    Future Appointments   Date Time Provider Radha Bautista   1/14/2022  8:45 AM Jessie Ralph, PT [facility] SFE   1/17/2022 11:20 AM DO JUSTYNA Elias [facility]   1/19/2022  8:45 AM Bob Grijalva, PT [facility] SFE   1/21/2022  8:45 AM Pro Ashford, PT [facility]FE   1/28/2022  9:30 AM Pro Ashford, PT [facility]FE   9/20/2022  1:30 PM ENDO NURSE END BS ENDO   9/27/2022  1:40 PM Jai Loomis MD END BS ENDO     Visit # Therapist initials Date Arrived NS/ Cx < 24 hr >24 hr Cx Visit Comments   1 PV 8/19/2021 X   Initial Assessment and Treatment- worker comp- 12 visits approved   2 PV 8/26/2021 X      3 PV 9/2/2021 X      4  PV 9/9/2021 X      5 PV 9/16/2021 X      6 PV  10/1/2021 X      7 PV 10/12/2021 X      8  PV 10/28/2021 X      9 PV 76/40/6204 X   Recertification note   10 PV 11/23/2021 X      11 PV 1/6/2022 X   12 more visits approved through [employer] 2/28/2022                                                                    Abbreviations: NS = No Show; CX = cancelled

## 2022-01-18 ENCOUNTER — HOSPITAL ENCOUNTER (OUTPATIENT)
Dept: PHYSICAL THERAPY | Age: 77
Discharge: HOME | End: 2022-01-18
Payer: MEDICARE

## 2022-01-18 NOTE — PROGRESS NOTES
Alexa Andres  : 1945  [place]  Secondary: [address] at [place] [id number] [facility], [address] [place] [address],[address], [facility] [address]  [phone]   [phone]          OUTPATIENT DAILY NOTE    NAME/AGE/GENDER: Alexa Andres is a 77 y.o. female. DATE: 2022    Patient cancelled (less than 24 hours ago) her appointment for today due to weather concerns. Will plan to follow up on next scheduled visit.     Bunny Romeo, PT    Future Appointments   Date Time Provider Radha Bautista   2022  8:45 AM Horris Sandifer, PT [facility] SFE   2022  2:00 PM Jacqueline Bustamante NP [facility]   2022 10:15 AM Dwain Ashford, [facility] SFE   2022  1:30 PM ENDO NURSE END BS ENDO   2022  1:40 PM Jimmie Bernard MD END BS ENDO

## 2022-01-20 ENCOUNTER — HOSPITAL ENCOUNTER (OUTPATIENT)
Dept: PHYSICAL THERAPY | Age: 77
Discharge: HOME | End: 2022-01-20
Payer: MEDICARE

## 2022-01-20 PROCEDURE — 97140 MANUAL THERAPY 1/> REGIONS: CPT

## 2022-01-20 NOTE — PROGRESS NOTES
Alexa Andres  : 1945  Primary:   Secondary:  [address] at [address] [place] [address],[address], [place] [address].  [phone]   [phone]         OUTPATIENT PHYSICAL THERAPY: Daily Treatment Note 2022  ICD-10: Treatment Diagnosis: Cervicalgia (M54.2)  Pre-treatment Symptoms/Complaints:  2022: Patient reports her shoulder is hurting today. Pain: Initial: Pain Intensity 1: 4  Pain Location 1: Neck,Shoulder  Pain Orientation 1: Right,Left  Pain Intervention(s) 1: Rest,Medication (see MAR)  Post Session:  3/10   Medications Last Reviewed:  2022   Updated Objective Findings:  None Today   TREATMENT:   MANUAL THERAPY: (40 minutes): Joint mobilization and Soft tissue mobilization was utilized and necessary because of the patient's restricted joint motion, painful spasm, loss of articular motion and restricted motion of soft tissue. Treatment/Session Summary:    · Response to Treatment:  Patient tolerated treatment well with improved mobility and pain noted after treatment. · Communication/Consultation:  None today  · Equipment provided today:  None today  · Recommendations/Intent for next treatment session: Next visit will focus on improving overall mobility with decreased pain.   Treatment Plan of Care Effective Dates:  2021 TO 3/1/2022 (90 days)  Total Treatment Billable Duration:  40 minutes  PT Patient Time In/Time Out  Time In: 0800  Time Out: [address], PT     Visit # Therapist initials Date Arrived NS/ Cx < 24 hr >24 hr Cx Visit Comments   61  2021 X   Medicare cap: $2110   59   X   Recert   72  1/02/0799 X       77  10/6/2021 X       79  10/20/2021 X    Progress Note   76  11/3/2021 X       69  11/10/2021 X       70   X    Recert   71  09/25/7000 X       72  12/22/2021 X       73  2022 X    Progress note   74  2022 X Abbreviations: NS = No Show; CX = cancelled     Future Appointments   Date Time Provider Radha Bautista   1/20/2022  8:00 AM Gerald Meneses, PT [facility] SFE   1/21/2022  8:45 AM Cristin Ashford, PT [facility] SFE   1/24/2022  2:00 PM Iman Yan NP [facility]   1/27/2022 10:15 AM Cristin Ashford, PT [facility] SFE   2/2/2022  9:30 AM Gerald Meneses, PT [facility] SFE   9/20/2022  1:30 PM ENDO NURSE END BS ENDO   9/27/2022  1:40 PM Chidi Mott MD END BS ENDO

## 2022-01-21 ENCOUNTER — HOSPITAL ENCOUNTER (OUTPATIENT)
Dept: PHYSICAL THERAPY | Age: 77
Discharge: HOME | End: 2022-01-21
Payer: COMMERCIAL

## 2022-01-21 PROCEDURE — 97110 THERAPEUTIC EXERCISES: CPT

## 2022-01-21 PROCEDURE — 97140 MANUAL THERAPY 1/> REGIONS: CPT

## 2022-01-21 NOTE — PROGRESS NOTES
Amy Luna  : 1945  Primary: [employer] [facility] [employer] [facility]  Secondary:  [address] at [place] [address] [facility], [address] [place] [address],[address], [facility].  [phone]   [phone]     OUTPATIENT PHYSICAL THERAPY: Daily Treatment Note 2022  Visit Count:  12    ICD-10: Treatment Diagnosis: Low back pain (M54.5)  Precautions/Allergies:   Adhesive, Alendronate sodium, Antihistamine [triprolidine-pseudoephedrine], and Boniva [ibandronate]   TREATMENT PLAN:  Effective Dates: 2021 TO 2022 (90 days). Frequency/Duration: 2 times a week for 90 Days)    Pre-treatment Symptoms/Complaints:   \"I have been hurting more. All I have to do is stand for a minute or two and I lock up\". Patient apologized for missing her appointment last time. Patient looked at the wrong week in her calendar. Pain: Initial: Pain Intensity 1: 5  Pain Location 1: Back  Pain Orientation 1: Lower  Post Session:  3/10   Medications Last Reviewed:  2022  Updated Objective Findings:  None Today  TREATMENT:     THERAPEUTIC EXERCISE: (30 minutes):  Exercises per grid below to improve strength. Required minimal verbal cues to promote proper body alignment. Progressed repetitions as indicated.    Date:  2021 Date:  2022 Date:  22   Activity/Exercise Parameters Parameters Parameters   Nustep  X Level 3 x 5 minutes  Level 4 x 7 minutes   Single knee to chest stretch X X X   Piriformis stretch 3x30 sec bilateral  3x30 sec bilateral  3x30 sec    Hamstring stretch with ankle pumps  3x30 sec bilateral  3x30 sec bilateral  3x30 sec bilateral    Supine lower trunk rotation X 20 reps bilateral  20 reps bilateral    Double knee to chest stretch 3x30 sec  3x30 sec  3x30 sec                     Supine straight leg raise X 2x10 reps bilateral 2x10 reps bilateral   Supine isometric hip flexion X 10 reps bilateral with 5 second hold 20 reps bilateral   Pelvic tilts X 20 reps 20 reps   Curl ups X 20 reps 20 reps   Double knee lifts X 20 reps 20 reps   Inversion table 10 minutes- 1 minute on, 1 minute off X X               . MANUAL THERAPY: (15 minutes): Joint mobilization and Soft tissue mobilization was utilized and necessary because of the patient's restricted joint motion and painful spasm. Patient received trigger point release along bilateral SI region/bilateral lumbar paraspinals to decrease pain. Skin intact after treatment. [employer] Portal  Treatment/Session Summary:    Response to Treatment:  Patient reports slight decrease in back pain after manual therapy.       Communication/Consultation:  None today  Equipment provided today:  None today  Recommendations/Intent for next treatment session: Next visit will focus on range of motion, stretching, and manual.    Total Treatment Billable Duration:   45 minutes   PT Patient Time In/Time Out  Time In: 0845  Time Out: [address] PT    Future Appointments   Date Time Provider Radha Bautista   1/24/2022  2:00 PM Merline Barrow, NP [facility]   1/27/2022 10:15 AM Anton Ashford, PT [facility]   2/2/2022  9:30 AM Taisha Rivas PT [facility]   2/4/2022  8:45 AM Anton Ashford, PT [facility]   9/20/2022  1:30 PM ENDO NURSE END BS ENDO   9/27/2022  1:40 PM Chika Summers MD END BS ENDO     Visit # Therapist initials Date Arrived NS/ Cx < 24 hr >24 hr Cx Visit Comments   1 PV 8/19/2021 X   Initial Assessment and Treatment- worker comp- 12 visits approved   2 PV 8/26/2021 X      3 PV 9/2/2021 X      4  PV 9/9/2021 X      5 PV 9/16/2021 X      6 PV  10/1/2021 X      7 PV 10/12/2021 X      8  PV 10/28/2021 X      9 PV 18/94/0455 X   Recertification note   10 PV 11/23/2021 X      11 PV 1/6/2022 X   12 more visits approved through Worker's comp 2/28/2022    PV 1/14/2022  X     12 PV 1/21/2022 X                                                     Abbreviations: NS = No Show; CX = cancelled

## 2022-01-27 ENCOUNTER — HOSPITAL ENCOUNTER (OUTPATIENT)
Dept: PHYSICAL THERAPY | Age: 77
Discharge: HOME | End: 2022-01-27
Payer: COMMERCIAL

## 2022-01-27 NOTE — PROGRESS NOTES
Vimal Amaya  : 1945  Primary: [employer] [facility] [employer] [facility]  Secondary:  [address] at [place] [address],[address], [address]  [phone]   [phone]          OUTPATIENT DAILY NOTE    NAME/AGE/GENDER: Vimal Amaya is a 77 y.o. female. DATE: 2022    Patient cancelled (less than 24 hours ago) her appointment for today due to UTI. Will plan to follow up on next scheduled visit.     Mariano Gongora, PT    Future Appointments   Date Time Provider Radha Bautista   2022  9:30 AM [facility]   2022 11:00 AM JAQUELINE Hernandez [facility]   2022  8:45 AM Walter Ashford, B[facility]   2022  1:30 PM ENDO NURSE END ALINA ENDO   2022  1:40 PM MD NELLY Alcantar BS ENDO

## 2022-02-02 ENCOUNTER — HOSPITAL ENCOUNTER (OUTPATIENT)
Dept: PHYSICAL THERAPY | Age: 77
Discharge: HOME | End: 2022-02-02
Payer: COMMERCIAL

## 2022-02-02 PROBLEM — R79.89 LOW TSH LEVEL: Status: ACTIVE | Noted: 2019-03-13

## 2022-02-02 PROBLEM — G89.29 CHRONIC MIDLINE LOW BACK PAIN WITH BILATERAL SCIATICA: Status: ACTIVE | Noted: 2021-11-04

## 2022-02-02 PROBLEM — K58.2 IRRITABLE BOWEL SYNDROME WITH BOTH CONSTIPATION AND DIARRHEA: Status: ACTIVE | Noted: 2018-09-17

## 2022-02-02 PROBLEM — G47.00 INSOMNIA: Status: ACTIVE | Noted: 2018-12-19

## 2022-02-02 PROBLEM — M54.42 CHRONIC MIDLINE LOW BACK PAIN WITH BILATERAL SCIATICA: Status: ACTIVE | Noted: 2021-11-04

## 2022-02-02 PROBLEM — M54.41 CHRONIC MIDLINE LOW BACK PAIN WITH BILATERAL SCIATICA: Status: ACTIVE | Noted: 2021-11-04

## 2022-02-02 PROBLEM — E78.5 DYSLIPIDEMIA (HIGH LDL; LOW HDL): Status: ACTIVE | Noted: 2021-09-07

## 2022-02-02 PROBLEM — M54.2 NECK PAIN: Status: ACTIVE | Noted: 2019-02-25

## 2022-02-02 PROBLEM — F17.210 CIGARETTE SMOKER: Status: ACTIVE | Noted: 2019-08-28

## 2022-02-02 PROBLEM — J31.0 CHRONIC RHINITIS: Status: ACTIVE | Noted: 2022-02-02

## 2022-02-02 PROBLEM — J44.9 CHRONIC OBSTRUCTIVE PULMONARY DISEASE (HCC): Status: ACTIVE | Noted: 2021-08-18

## 2022-02-02 PROBLEM — M75.101 NONTRAUMATIC TEAR OF RIGHT ROTATOR CUFF: Status: ACTIVE | Noted: 2020-02-03

## 2022-02-02 PROBLEM — I49.1 PREMATURE ATRIAL CONTRACTION: Status: ACTIVE | Noted: 2020-06-08

## 2022-02-02 PROBLEM — F33.42 RECURRENT MAJOR DEPRESSIVE DISORDER, IN FULL REMISSION (HCC): Status: ACTIVE | Noted: 2020-04-30

## 2022-02-02 PROBLEM — I10 BENIGN HYPERTENSION: Status: ACTIVE | Noted: 2020-12-30

## 2022-02-02 PROBLEM — M81.0 AGE-RELATED OSTEOPOROSIS WITHOUT CURRENT PATHOLOGICAL FRACTURE: Status: ACTIVE | Noted: 2018-12-19

## 2022-02-02 PROBLEM — E78.00 PURE HYPERCHOLESTEROLEMIA: Status: ACTIVE | Noted: 2019-08-28

## 2022-02-02 PROBLEM — F13.20 BENZODIAZEPINE DEPENDENCE (HCC): Status: ACTIVE | Noted: 2021-08-18

## 2022-02-02 PROBLEM — J41.8 MIXED SIMPLE AND MUCOPURULENT CHRONIC BRONCHITIS (HCC): Status: ACTIVE | Noted: 2020-12-30

## 2022-02-02 PROCEDURE — 97140 MANUAL THERAPY 1/> REGIONS: CPT

## 2022-02-02 NOTE — PROGRESS NOTES
Jesica Huber  : 1945  Primary:   Secondary:  [address] at [facility]  [address] [facility], [address] [place] [address],[address], [facility] [address]  [phone]   [phone]         OUTPATIENT PHYSICAL THERAPY: Daily Treatment Note 2022  ICD-10: Treatment Diagnosis: Cervicalgia (M54.2)  Pre-treatment Symptoms/Complaints:  2022: Patient reports she is having pain in her right neck and shoulder. Pain: Initial: Pain Intensity 1: 4  Pain Location 1: Neck,Shoulder  Pain Orientation 1: Right,Left  Pain Intervention(s) 1: Rest,Medication (see MAR)  Post Session:  3/10   Medications Last Reviewed:  2022   Updated Objective Findings:  None Today   TREATMENT:   MANUAL THERAPY: (40 minutes): Joint mobilization and Soft tissue mobilization was utilized and necessary because of the patient's restricted joint motion, painful spasm, loss of articular motion and restricted motion of soft tissue. Treatment/Session Summary:    · Response to Treatment:  Patient tolerated treatment well with improved mobility and pain noted after treatment. · Communication/Consultation:  None today  · Equipment provided today:  None today  · Recommendations/Intent for next treatment session: Next visit will focus on improving overall mobility with decreased pain.   Treatment Plan of Care Effective Dates:  2021 TO 3/1/2022 (90 days)  Total Treatment Billable Duration:  40 minutes  PT Patient Time In/Time Out  Time In: 930  Time Out: [facility], PT     Visit # Therapist initials Date Arrived NS/ Cx < 24 hr >24 hr Cx Visit Comments   61  2021 X   Medicare cap: $2110   59  X   Recert   72 JS 6/02/2697 X       77 JS 10/6/2021 X       79 JS 10/20/2021 X    Progress Note   68 JS 11/3/2021 X       69 JS 11/10/2021 X       70 JS  X    Recert   71 JS /2622 X       72 JS 12/22/2021 X       73 JS 2022 X    Progress note   74 JS 1/20/2022 X      75 JS 2022 X Abbreviations: NS = No Show; CX = cancelled     Future Appointments   Date Time Provider Radha Bautista   2/2/2022  9:30 AM Valdez Roberts, PT [facility]   2/2/2022 11:00 AM JAQUELINE Robert [facility]   2/4/2022  8:45 AM Michele Ashford, B[facility]A [facility] E   9/20/2022  1:30 PM ENDO NURSE END BS ENDO   9/27/2022  1:40 PM Maryjo Wilhelm MD END BS ENDO

## 2022-02-04 ENCOUNTER — APPOINTMENT (OUTPATIENT)
Dept: PHYSICAL THERAPY | Age: 77
End: 2022-02-04
Payer: COMMERCIAL

## 2022-02-10 ENCOUNTER — HOSPITAL ENCOUNTER (OUTPATIENT)
Dept: PHYSICAL THERAPY | Age: 77
Discharge: HOME | End: 2022-02-10
Payer: COMMERCIAL

## 2022-02-10 PROCEDURE — 97110 THERAPEUTIC EXERCISES: CPT

## 2022-02-10 PROCEDURE — 97140 MANUAL THERAPY 1/> REGIONS: CPT

## 2022-02-10 NOTE — PROGRESS NOTES
Recardo Host  : 1945  Primary: [employer] [facility]  Secondary:  Ronn Lay at [address] [place] [address] [facility], [address] [place] [address] [facility], [address] [facility]  [phone]   [phone]     OUTPATIENT PHYSICAL THERAPY: Daily Treatment Note 2/10/2022  Visit Count:  76    ICD-10: Treatment Diagnosis: Low back pain (M54.5)  Precautions/Allergies:   Adhesive, Alendronate sodium, Antihistamine [triprolidine-pseudoephedrine], and Boniva [ibandronate]   TREATMENT PLAN:  Effective Dates: 2/10/2022 TO 2022 (90 days). Frequency/Duration: 2 times a week for 90 Days      Pre-treatment Symptoms/Complaints:   \"I went through a four day period where walking was hard because of the back pain. It is doing better right now\". Pain: Initial: Pain Intensity 1: 3  Pain Location 1: Back  Pain Orientation 1: Lower  Post Session:  2/10   Medications Last Reviewed:  2/10/2022  Updated Objective Findings:  See recertification note  TREATMENT:     THERAPEUTIC EXERCISE: (30 minutes):  Exercises per grid below to improve strength. Required minimal verbal cues to promote proper body alignment. Progressed repetitions as indicated.    Date:  2/10/2022 Date:  2022 Date:  22   Activity/Exercise Parameters Parameters Parameters   Nustep  Level 4 x 7 minutes Level 3 x 5 minutes  Level 4 x 7 minutes   Single knee to chest stretch X X X   Piriformis stretch 3x30 sec bilateral  3x30 sec bilateral  3x30 sec    Hamstring stretch with ankle pumps  3x30 sec bilateral  3x30 sec bilateral  3x30 sec bilateral    Supine lower trunk rotation 20 reps bilateral 20 reps bilateral  20 reps bilateral    Double knee to chest stretch 3x30 sec  3x30 sec  3x30 sec                     Supine straight leg raise 2x10 reps bilateral 2x10 reps bilateral 2x10 reps bilateral   Supine isometric hip flexion X 10 reps bilateral with 5 second hold 20 reps bilateral   Pelvic tilts 20 reps 20 reps 20 reps   Curl ups X 20 reps 20 reps   Double knee lifts X 20 reps 20 reps   Inversion table 10 minutes- 1 minute on, 1 minute off X X               . MANUAL THERAPY: (15 minutes): Joint mobilization and Soft tissue mobilization was utilized and necessary because of the patient's restricted joint motion and painful spasm. Patient received trigger point release along bilateral SI region/bilateral lumbar paraspinals to decrease pain. Skin intact after treatment. [employer] Portal  Treatment/Session Summary:    Response to Treatment:  Patient reports slight decrease in back pain after manual therapy.       Communication/Consultation:  None today  Equipment provided today:  None today  Recommendations/Intent for next treatment session: Next visit will focus on range of motion, stretching, and manual.    Total Treatment Billable Duration:   45 minutes   PT Patient Time In/Time Out  Time In: 0930  Time Out: Eulogio Evans PT    Future Appointments   Date Time Provider Radha Bautista   2/16/2022  8:45 AM Valdez Roberts, PT [facility]   2/17/2022 10:15 AM Michele Ashford, PT [facility]   2/21/2022  4:00 PM DO JUSTYNA Callahan [facility]   2/24/2022 10:15 AM Michele Ashford, PT [facility]   3/2/2022  8:45 AM Thressa Auburn, PT [facility]   3/16/2022  1:00 PM Thressa Auburn, PT [facility]   3/30/2022  8:45 AM Thressa Auburn, PT [facility]   9/20/2022  1:30 PM ENDO NURSE END BS ENDO   9/27/2022  1:40 PM Maryjo Wilhelm MD END BS ENDO     Visit # Therapist initials Date Arrived NS/ Cx < 24 hr >24 hr Cx Visit Comments   1 PV 8/19/2021 X   Initial Assessment and Treatment- worker comp- 12 visits approved   2 PV 8/26/2021 X      3 PV 9/2/2021 X      4  PV 9/9/2021 X      5 PV 9/16/2021 X      6 PV  10/1/2021 X      7 PV 10/12/2021 X      8  PV 10/28/2021 X      9 PV 09/73/3749 X   Recertification note   10 PV 11/23/2021 X      11 PV 1/6/2022 X   12 more visits approved through Worker's comp 2/28/2022    PV 1/14/2022  X     12 PV 1/21/2022 X      13 PV 0/83/6792 X   Recertification note                                         Abbreviations: NS = No Show; CX = cancelled

## 2022-02-10 NOTE — THERAPY RECERTIFICATION
{  Yola Ready  : 1945  Primary: [employer] [facility]  Secondary:  [address] at [facility]  [address] [facility], [address] [place] [address],[address], [place] [address].  [phone]   [phone]        OUTPATIENT PHYSICAL [address]    ICD-10: Treatment Diagnosis: Low back pain (M54.5)  Precautions/Allergies:   Prednisone, Adhesive, Alendronate sodium, Antihistamine [triprolidine-pseudoephedrine], Antihistamine-1, Boniva [ibandronate], and Triamcinolone   TREATMENT PLAN:  Effective Dates: 2/10/2022 TO 2022 (90 days). Frequency/Duration: 2 times a week for 90 Days     MEDICAL/REFERRING DIAGNOSIS:  Low back pain [M54.5]   DATE OF ONSET: May 1, 2020   REFERRING PHYSICIAN: Gerson Ba*  MD Orders: Evaluate and treat   Return MD Appointment: unknown      INITIAL ASSESSMENT:  Ms. Linda Kaufman presents with increased pain, decreased strength, and increased muscular tightness. Patient would benefit from skilled physical therapy to address problems and goals. Thank you for this referral.       Recertification note:  Patient has attended thirteen scheduled physical therapy appointments from 2021 to 2/10/2022. Patient continuse to experience temporary relief from back pain with physiacl therapy. Patient had to cancel an appointment due to UTI. [facility] Patient would benefit from continuing skilled physical therapy to work on problems and goals. Thank you for this referral.     PROBLEM LIST (Impacting functional limitations):  1. Decreased Strength  2. Increased Pain  3. Decreased Flexibility/Joint Mobility  4. Decreased Nance with Home Exercise Program INTERVENTIONS PLANNED: (Treatment may consist of any combination of the following)  1. Cold  2. Heat  3. Home Exercise Program (HEP)  4. Manual Therapy  5. Range of Motion (ROM)  6.  Therapeutic Exercise/Strengthening     GOALS: (Goals have been discussed and agreed upon with patient.)  Short-Term Functional Goals: Time Frame: 30 days   1. Patient will decrease score on Modified Oswestry Low Back Pain Questionnaire to less than or equal to 21 demonstrating improvement. Goal ongoing. Discharge Goals: Time Frame: 90 days   1. Patient will decrease score on Modified Oswestry Low Back Pain Questionnaire to less than or equal to 18 demonstrating improvement. Goal ongoing. 2. Patient will report being able to shower and fix meals with less complaints of back pain. Goal ongoing. 3. Patient will increase core strength to greater than or equal to 4/5 to improve ease with mobility. Goal ongoing. OUTCOME MEASURE:   Tool Used: Modified Oswestry Low Back Pain Questionnaire  Score:  Initial: 23/50  Most Recent: 23/50 (Date: 2- )   Interpretation of Score: Each section is scored on a 0-5 scale, 5 representing the greatest disability. The scores of each section are added together for a total score of 50. MEDICAL NECESSITY:   · Patient is expected to demonstrate progress in strength, range of motion and pain to improve ease with mobility. REASON FOR SERVICES/OTHER COMMENTS:  · Patient continues to require skilled intervention due to increased pain interfering with activities of daily living. Total Duration:  PT Patient Time In/Time Out  Time In: 0930  Time Out: 1015    Rehabilitation Potential For Stated Goals: [address] [place] [address]'s therapy, I certify that the treatment plan above will be carried out by a therapist or under their direction.   Thank you for this referral,  Avtar Cleveland, PT     Referring Physician Signature: Cruzito Alejo _______________________________ Date _____________      PAIN/SUBJECTIVE:   Initial: Pain Intensity 1: 3  Pain Location 1: Back  Pain Orientation 1: Lower  Post Session:  2/10   HISTORY:   History of Injury/Illness (Reason for Referral):  Patient was treated in the clinic for her back pain from December 2020 to April 2021. Patient originally injured her back in May 2020 when a patient jumped on her while working at [employer] [address]. Patient continues to complain of an achy pain along bilateral lower back. Patient rates pain level as 4/10. Symptoms fluctuate per patient report. Patient reports occasionally having numbness in right lower extremity and radiating pain down left leg. Past Medical History/Comorbidities:   Ms. Matamoros  has a past medical history of Abnormal Pap smear, Anxiety, Back problem, COPD (chronic obstructive pulmonary disease) ([address] Depression, Hyperthyroidism, Insomnia, Irritable bowel syndrome, and Osteopenia. She has no past medical history of Difficult intubation, Malignant hyperthermia due to anesthesia, Nausea & vomiting, Pseudocholinesterase deficiency, or Unspecified adverse effect of anesthesia. Ms. Matamoros  has a past surgical history that includes hx tonsillectomy; hx orthopaedic; hx salpingo-oophorectomy (Right, 01/22/2014); and hx colonoscopy. Social History/Living Environment:     Lives alone in two story home. Prior Level of Function/Work/Activity:  Independent. Currently not working. Dominant Side:         RIGHT  Personal Factors:          Sex:  female        Age:  77 y.o. Ambulatory/Rehab Services H2 Model Falls Risk Assessment   Risk Factors:       No Risk Factors Identified Ability to Rise from Chair:       (1)  Pushes up, successful in one attempt   Falls Prevention Plan:       No modifications necessary   Total: (5 or greater = High Risk): 1   ©2010 [facility] of [employer]. All Rights Reserved. [facility] Patent #4,979,320.  Federal Law prohibits the replication, distribution or use without written permission from [facility] [employer]   Current Medications:       Current Outpatient Medications:     Nebulizer & Compressor machine, Use as directed., Disp: 1 Each, Rfl: 0    albuterol (PROVENTIL VENTOLIN) 2.5 mg /3 mL (0.083 %) nebu, 3 mL by Nebulization route every six (6) hours as needed for Wheezing or Shortness of Breath., Disp: 75 mL, Rfl: 0    calcium carbonate (CALCIUM 500 PO), 500 mg daily. , Disp: , Rfl:     temazepam (RESTORIL) 7.5 mg capsule, Take 1 Capsule by mouth nightly as needed for Sleep. Max Daily Amount: 7.5 mg., Disp: 30 Capsule, Rfl: 0    lisinopriL (PRINIVIL, ZESTRIL) 10 mg tablet, TAKE 1 TABLET BY MOUTH DAILY FOR BLOOD PRESSURE, Disp: , Rfl:     meloxicam (MOBIC) 7.5 mg tablet, Take 7.5 mg by mouth daily. , Disp: , Rfl:     b complex vitamins tablet, Take 1 Tab by mouth every morning. Holding for surgery, Disp: , Rfl:     DULoxetine (CYMBALTA) 30 mg capsule, Take 30 mg by mouth every morning., Disp: , Rfl:     ascorbic acid (VITAMIN C) 500 mg tablet, Take 500 mg by mouth every morning. Holding for surgery, Disp: , Rfl:     multivitamin (ONE A DAY) tablet, Take 1 Tab by mouth every morning. Holding for surgery, Disp: , Rfl:    Date Last Reviewed:  2/10/2022   Number of Personal Factors/Comorbidities that affect the Plan of Care: 0: LOW COMPLEXITY   EXAMINATION:   Observation/Orthostatic Postural Assessment: Forward head/rounded shoulders posture. Palpation:          Increased tenderness along bilateral SI region/bilateral lumbar paraspinals. ROM:          Lumbar range of motion is as follows: flexion within normal limits, extension 25% with pain, rotation within normal limits, lateral flexion 75%. Retested on 11/11/2021: flexion within normal limits, extension 10%, rotation within normal limits, and lateral flexion within normal limits. Patient reports increased back pain with extension. Strength:          Hip flexion 4-/5, hip abduction 5/5, hip adduction 5/5, quadriceps 5/5, hamstrings 5/5, ankle dorsiflexion 5/5, ankle plantarflexion 5/5, core strength 3+/5.   Retested on 11/11/2021: hip flexion 3+/5, hip abduction right 4+/5 and left 4/5, hip adduction 5/5, quadriceps right 5/5 and left 4+/5, hamstrings right 5/5 and left 4/5, ankle plantarflexion 5/5, ankle dorsiflexion 5/5. Special Tests:          Straight leg raise negative bilateral.  Piriformis test negative bilateral.   Neurological Screen:        Sensation: Within normal limits. Functional Mobility:         Gait/Ambulation:  Patient ambulates with normal gait pattern. Body Structures Involved:  1. Nerves  2. Joints  3. Muscles Body Functions Affected:  1. Neuromusculoskeletal Activities and Participation Affected:  1. General Tasks and Demands  2. Mobility  3.  Community, Social and [place]   Number of elements (examined above) that affect the Plan of Care: 4+: HIGH COMPLEXITY   CLINICAL PRESENTATION:   Presentation: Stable and uncomplicated: LOW COMPLEXITY   CLINICAL DECISION MAKING:   Use of outcome tool(s) and clinical judgement create a POC that gives a: Clear prediction of patient's progress: LOW COMPLEXITY

## 2022-02-16 ENCOUNTER — HOSPITAL ENCOUNTER (OUTPATIENT)
Dept: PHYSICAL THERAPY | Age: 77
Discharge: HOME | End: 2022-02-16
Payer: MEDICARE

## 2022-02-16 PROCEDURE — 97140 MANUAL THERAPY 1/> REGIONS: CPT

## 2022-02-16 NOTE — PROGRESS NOTES
Holden Dodson  : 1945  Primary:   Secondary:  [address] at [place] [address] [place] [address],[address], [facility] [address]  [phone]   [phone]         OUTPATIENT PHYSICAL THERAPY: Daily Treatment Note 2022  ICD-10: Treatment Diagnosis: Cervicalgia (M54.2)  Pre-treatment Symptoms/Complaints:  2022: Patient reports her right shoulder and left neck are hurting more. Pain: Initial: Pain Intensity 1: 4  Pain Location 1: Neck,Shoulder  Pain Orientation 1: Right,Left  Pain Intervention(s) 1: Rest,Medication (see MAR)  Post Session:  3/10   Medications Last Reviewed:  2022   Updated Objective Findings:  None Today   TREATMENT:   MANUAL THERAPY: (40 minutes): Joint mobilization and Soft tissue mobilization was utilized and necessary because of the patient's restricted joint motion, painful spasm, loss of articular motion and restricted motion of soft tissue. Treatment/Session Summary:    · Response to Treatment:  Patient tolerated treatment well with improved mobility noted after treatment. · Communication/Consultation:  None today  · Equipment provided today:  None today  · Recommendations/Intent for next treatment session: Next visit will focus on improving overall mobility with decreased pain.   Treatment Plan of Care Effective Dates:  2021 TO 3/1/2022 (90 days)  Total Treatment Billable Duration:  40 minutes  PT Patient Time In/Time Out  Time In: 8440  Time Out: BOLA Bailey     Visit # Therapist initials Date Arrived NS/ Cx < 24 hr >24 hr Cx Visit Comments   61  2021 X   Medicare cap: $2110   59  X   Recert   72 JS [address]4576 X       77 JS 10/6/2021 X       79 JS 10/20/2021 X    Progress Note   68 JS 11/3/2021 X       69 JS 11/10/2021 X       70 JS  X    Recert   71 JS 48/09/1356 X       72 JS 12/22/2021 X       73 JS 2022 X    Progress note   74 JS 1/20/2022 X      75 JS 2/2/2022 X      76 JS 2022 X   Progress note                                         Abbreviations: NS = No Show; CX = cancelled     Future Appointments   Date Time Provider Radha Lily   2/16/2022  8:45 AM Juancarlos Sulma, PT [facility]   2/17/2022 10:15 AM Vissage, Evelia Dull, PT [facility]   2/21/2022  4:00 PM DO JUSTYNA Crane [facility]   2/24/2022 10:15 AM Vissage, Evelia Pramodl, PT [facility]   3/2/2022  8:45 AM Juancarlos Sulma, PT [facility]   3/16/2022  1:00 PM Juancarlos Pratt, PT [facility]   3/30/2022  8:45 AM Juancarlos Sulma, PT [facility]   9/20/2022  1:30 PM ENDO NURSE END BS ENDO   9/27/2022  1:40 PM Williams Keenan MD END BS ENDO

## 2022-02-16 NOTE — PROGRESS NOTES
Wm Ennis  : 1945  Primary:   Secondary:  [address] at [facility]  [address], [address], [facility] [address]  [phone]   [phone]          OUTPATIENT PHYSICAL THERAPY:Progress Report 2022   ICD-10: Treatment Diagnosis: Cervicalgia (M54.2)  Precautions/Allergies:   Prednisone, Adhesive, Alendronate sodium, Antihistamine [triprolidine-pseudoephedrine], Antihistamine-1, Boniva [ibandronate], and Triamcinolone   MD Orders: Evaluate and Treat MEDICAL/REFERRING DIAGNOSIS:  Cervicalgia [M54.2]   DATE OF ONSET: Chronic  REFERRING PHYSICIAN:  Juan Wright MD   RETURN PHYSICIAN APPOINTMENT: TBD     ASSESSMENT:  Ms. Comfort Grant presents with improving mobility and pain in cervical region secondary to degenerative changes. Patient has attended a total of 76 scheduled physical therapy visits. Treatment has consisted of stretching, strengthening, and manual therapy to improve overall pain and performance with activities of daily living. PROBLEM LIST (Impacting functional limitations):  1. Decreased Strength  2. Decreased ADL/Functional Activities  3. Increased Pain  4. Decreased Activity Tolerance INTERVENTIONS PLANNED: (Treatment may consist of any combination of the following)  1. Cold  2. Heat  3. Home Exercise Program (HEP)  4. Manual Therapy  5. Neuromuscular Re-education/Strengthening  6. Range of Motion (ROM)  7. Therapeutic Activites  8. Therapeutic Exercise/Strengthening   TREATMENT PLAN:  Effective Dates:  2021 TO 3/1/2022  (90 days). Frequency/Duration: 1 time every other week for 90 Day(s)  GOALS: (Goals have been discussed and agreed upon with patient.)  Short-Term Functional Goals: Time Frame: 30 days  1. Patient will be independent with home exercise program without exacerbation of symptoms or cueing needed--goal met.    2. Patient will be independent with correct sleeping positions and awareness/avoidance of aggravating positions without cueing needed--goal met. Discharge Goals: Time Frame: 90 days  1. Patient will be independent with all ADLs with minimal onset of neck pain and no deficits with daily tasks--goal ongoing. 2. Patient will report no fear avoidance with social or recreational activities due to neck pain --goal ongoing. 3. Patient will score less than or equal to 7/50 on Neck Disability Index with minimal effect of neck pain on patient's ability to manage every day life activities--goal ongoing. Rehabilitation Potential For Stated Goals: Good              HISTORY:   History of Present Injury/Illness (Reason for Referral):  1/5/2022 (Progress Note): Patient reports her shoulders and neck are very painful overall. 2/16/202 (Progress Note): Patient reports her right shoulder and left neck are the more painful now. Past Medical History/Comorbidities:   Ms. Di Lowry  has a past medical history of Abnormal Pap smear, Anxiety, Back problem, COPD (chronic obstructive pulmonary disease) ([facility] [address] Depression, Hyperthyroidism, Insomnia, Irritable bowel syndrome, and Osteopenia. She has no past medical history of Difficult intubation, Malignant hyperthermia due to anesthesia, Nausea & vomiting, Pseudocholinesterase deficiency, or Unspecified adverse effect of anesthesia. Ms. Di Lowry  has a past surgical history that includes hx tonsillectomy; hx orthopaedic; hx salpingo-oophorectomy (Right, 01/22/2014); and hx colonoscopy. Social History/Living Environment:   Home Environment: Private residence  Living Alone: Yes  Support Systems: Family member(s), Friends \ neighbors  Prior Level of Function/Work/Activity:  Independent  Dominant Side:         RIGHT  Personal Factors:          Sex:  female        Age:  69 y.o.   Current Medications:       Current Outpatient Medications:     Nebulizer & Compressor machine, Use as directed., Disp: 1 Each, Rfl: 0    albuterol (PROVENTIL VENTOLIN) 2.5 mg /3 mL (0.083 %) nebu, 3 mL by Nebulization route every six (6) hours as needed for Wheezing or Shortness of Breath., Disp: 75 mL, Rfl: 0    calcium carbonate (CALCIUM 500 PO), 500 mg daily. , Disp: , Rfl:     temazepam (RESTORIL) 7.5 mg capsule, Take 1 Capsule by mouth nightly as needed for Sleep. Max Daily Amount: 7.5 mg., Disp: 30 Capsule, Rfl: 0    lisinopriL (PRINIVIL, ZESTRIL) 10 mg tablet, TAKE 1 TABLET BY MOUTH DAILY FOR BLOOD PRESSURE, Disp: , Rfl:     meloxicam (MOBIC) 7.5 mg tablet, Take 7.5 mg by mouth daily. , Disp: , Rfl:     b complex vitamins tablet, Take 1 Tab by mouth every morning. Holding for surgery, Disp: , Rfl:     DULoxetine (CYMBALTA) 30 mg capsule, Take 30 mg by mouth every morning., Disp: , Rfl:     ascorbic acid (VITAMIN C) 500 mg tablet, Take 500 mg by mouth every morning. Holding for surgery, Disp: , Rfl:     multivitamin (ONE A DAY) tablet, Take 1 Tab by mouth every morning. Holding for surgery, Disp: , Rfl:    Date Last Reviewed:  2022    EXAMINATION:   Observation/Orthostatic Postural Assessment:          Posture Assessment: Rounded shoulders, Forward head   Palpation:          Tightness and tenderness to palpation noted throughout upper and lower cervical musculature. No bruising or swelling noted. ROM:          Cervical Assessment (AROM):  · Flexion: 75% of full AROM  · Extension: 75% of full AROM  · Right side bendin% of full AROM  · Left side bending[de-identified] 75% of full AROM  · Right rotation: 75% of full AROM  · Left rotation: 75% of full AROM  Strength:          Cervical Assessment (Strength):  · Grossly 2+/5 with manual muscle testing  Special Tests:          Negative Hautant's test. Negative Cranial-[place] lift test. Negative [place]-Axis Sheer test. Negative Alar Ligament test. Negative Tectorial Membrane test.   Neurological Screen:        Dermatomes:   Within normal limits        Reflexes:  2+  Functional Mobility:         Gait/Mobility:    ·   Independent         Transfers:     · Sit to Stand: Independent  · Stand to Sit: Independent  · Stand Pivot Transfers: Independent  · Bed to Chair: Independent  · Lateral Transfers: Independent         Bed Mobility:     · Rolling: Independent  · Supine to Sit: Independent  · Sit to Supine: Independent  · Scooting: Independent        CLINICAL DECISION MAKING:   Outcome Measure: Tool Used: Neck Disability Index (NDI)  Score:  Initial: 17/50  Most Recent: 14/50 (Date: 1/5/2022 )   Interpretation of Score: The Neck Disability Index is a revised form of the Oswestry Low Back Pain Index and is designed to measure the activities of daily living in person's with neck pain. Each section is scored on a 0-5 scale, 5 representing the greatest disability. The scores of each section are added together for a total score of 50. Medical Necessity:   · Patient is expected to demonstrate progress in strength and range of motion to improve safety during daily activities. · Patient demonstrates good rehab potential due to higher previous functional level. · Skilled intervention continues to be required due to decreased mobility. Reason for Services/Other Comments:  · Patient continues to demonstrate capacity to improve overall mobility which will increase independence and increase safety.

## 2022-02-17 ENCOUNTER — HOSPITAL ENCOUNTER (OUTPATIENT)
Dept: PHYSICAL THERAPY | Age: 77
Discharge: HOME | End: 2022-02-17
Payer: COMMERCIAL

## 2022-02-17 PROCEDURE — 97140 MANUAL THERAPY 1/> REGIONS: CPT

## 2022-02-17 PROCEDURE — 97110 THERAPEUTIC EXERCISES: CPT

## 2022-02-17 NOTE — PROGRESS NOTES
Melissa Herson  : 1945  Primary: [facility]  Secondary:  [address] at [address], [address] [place] [address],[address], [place] [address].  [phone]   [phone]     OUTPATIENT PHYSICAL THERAPY: Daily Treatment Note 2022  Visit Count:  77    ICD-10: Treatment Diagnosis: Low back pain (M54.5)  Precautions/Allergies:   Adhesive, Alendronate sodium, Antihistamine [triprolidine-pseudoephedrine], and Boniva [ibandronate]   TREATMENT PLAN:  Effective Dates: 2/10/2022 TO 2022 (90 days). Frequency/Duration: 2 times a week for 90 Days      Pre-treatment Symptoms/Complaints:   Patient reports she is feeling good today. \"I was hurting yesterday, but I feel really good today\". Pain: Initial: Pain Intensity 1: 1  Pain Location 1: Back  Pain Orientation 1: Lower  Post Session:  1/10   Medications Last Reviewed:  2022  Updated Objective Findings:  None Today  TREATMENT:     THERAPEUTIC EXERCISE: (30 minutes):  Exercises per grid below to improve strength. Required minimal verbal cues to promote proper body alignment. Progressed repetitions as indicated.    Date:  2/10/2022 Date:  2022 Date:  22   Activity/Exercise Parameters Parameters Parameters   Nustep  Level 4 x 7 minutes Level 4 x 10 minutes  Level 4 x 7 minutes   Single knee to chest stretch X X X   Piriformis stretch 3x30 sec bilateral  3x30 sec bilateral  3x30 sec    Hamstring stretch with ankle pumps  3x30 sec bilateral  3x30 sec bilateral  3x30 sec bilateral    Supine lower trunk rotation 20 reps bilateral 20 reps bilateral  20 reps bilateral    Double knee to chest stretch 3x30 sec  3x30 sec  3x30 sec                     Supine straight leg raise 2x10 reps bilateral 2x10 reps bilateral 2x10 reps bilateral   Supine isometric hip flexion X 10 reps bilateral with 5 second hold 20 reps bilateral   Pelvic tilts 20 reps 20 reps 20 reps   Curl ups X 20 reps 20 reps   Double knee lifts X 20 reps 20 reps   Inversion table 10 minutes- 1 minute on, 1 minute off X X               . MANUAL THERAPY: (15 minutes): Joint mobilization and Soft tissue mobilization was utilized and necessary because of the patient's restricted joint motion and painful spasm. Patient received trigger point release along bilateral SI region/bilateral lumbar paraspinals to decrease pain. Skin intact after treatment. [employer] Portal  Treatment/Session Summary:    Response to Treatment:  Patient tolerated increased time on Nustep without complaints or issues.       Communication/Consultation:  None today  Equipment provided today:  None today  Recommendations/Intent for next treatment session: Next visit will focus on range of motion, stretching, and manual.    Total Treatment Billable Duration:   45 minutes   PT Patient Time In/Time Out  Time In: 1015  Time Out: Ainsley 51, PT    Future Appointments   Date Time Provider Radha Bautista   2/21/2022  4:00 PM DO JUSTYNA Rothman [facility]   2/24/2022 10:15 AM Kalani Ashford, PT [facility]   3/2/2022  8:45 AM Radha Lute, PT [facility]   3/16/2022  1:00 PM Radha Lute, PT [facility]   3/30/2022  8:45 AM Radha Lute, PT [facility]   9/20/2022  1:30 PM ENDO NURSE [facility]   9/27/2022  1:40 PM Maritza Callaway MD [facility]     Visit # Therapist initials Date Arrived NS/ Cx < 24 hr >24 hr Cx Visit Comments   1 PV 8/19/2021 X   Initial Assessment and Treatment- worker comp- 12 visits approved   2 PV 8/26/2021 X      3 PV 9/2/2021 X      4  PV 9/9/2021 X      5 PV 9/16/2021 X      6 PV  10/1/2021 X      7 PV 10/12/2021 X      8  PV 10/28/2021 X      9 PV 11/51/7663 X   Recertification note   10 PV 11/23/2021 X      11 PV 1/6/2022 X   12 more visits approved through Worker's comp 2/28/2022    PV 1/14/2022  X     12 PV 1/21/2022 X      13 PV 5/04/3402 X   Recertification note   14 PV 2/17/2022 X                                   Abbreviations: NS = No Show; CX = cancelled

## 2022-02-24 ENCOUNTER — HOSPITAL ENCOUNTER (OUTPATIENT)
Dept: PHYSICAL THERAPY | Age: 77
Discharge: HOME | End: 2022-02-24
Payer: COMMERCIAL

## 2022-02-24 PROCEDURE — 97140 MANUAL THERAPY 1/> REGIONS: CPT

## 2022-02-24 PROCEDURE — 97110 THERAPEUTIC EXERCISES: CPT

## 2022-02-24 NOTE — PROGRESS NOTES
Beltran Luna  : 1945  Primary: [employer] [facility] [employer] [facility]  Secondary:  [address] at [address] [place] [address],[address], [place] [address].  [phone]   [phone]        OUTPATIENT PHYSICAL THERAPY:Progress Report 2022   ICD-10: Treatment Diagnosis: Low back pain (M54.5)  Precautions/Allergies:   Prednisone, Adhesive, Alendronate sodium, Antihistamine [triprolidine-pseudoephedrine], Antihistamine-1, Boniva [ibandronate], and Triamcinolone   TREATMENT PLAN:  Effective Dates: 2/10/2022 TO 2022 (90 days). Frequency/Duration: 2 times a week for 90 Days     MEDICAL/REFERRING DIAGNOSIS:  Low back pain [M54.5]   DATE OF ONSET: May 1, 2020   REFERRING PHYSICIAN: Emmie Malave  MD Orders: Evaluate and treat   Return MD Appointment: unknown      INITIAL ASSESSMENT:  Ms. Manuel Crane presents with increased pain, decreased strength, and increased muscular tightness. Patient would benefit from skilled physical therapy to address problems and goals. Thank you for this referral.       Progress note:  Patient has attended fifteen scheduled physical therapy appointments from 2021 to 2022. Patient continues to experience temporary relief from back pain with physical therapy. Patient has used 5 of her 12 approved visits. Worker's comp end date is on 2022. I would recommend the end date be extended for patient to finish her last 7 approved appointments. Patient would benefit from continuing skilled physical therapy to work on problems and goals.   Thank you for this referral.     PROBLEM LIST (Impacting functional limitations):  Decreased Strength  Increased Pain  Decreased Flexibility/Joint Mobility  Decreased [place] with Home Exercise Program INTERVENTIONS PLANNED: (Treatment may consist of any combination of the following)  Cold  Heat  Home Exercise Program (HEP)  Manual Therapy  Range of Motion (ROM)  Therapeutic Exercise/Strengthening GOALS: (Goals have been discussed and agreed upon with patient.)  Short-Term Functional Goals: Time Frame: 30 days   Patient will decrease score on Modified Oswestry Low Back Pain Questionnaire to less than or equal to 21 demonstrating improvement. Goal ongoing. Discharge Goals: Time Frame: 90 days   Patient will decrease score on Modified Oswestry Low Back Pain Questionnaire to less than or equal to 18 demonstrating improvement. Goal ongoing. Patient will report being able to shower and fix meals with less complaints of back pain. Goal ongoing. Patient will increase core strength to greater than or equal to 4/5 to improve ease with mobility. Goal ongoing. OUTCOME MEASURE:   Tool Used: Modified Oswestry Low Back Pain Questionnaire  Score:  Initial: 23/50  Most Recent: 23/50 (Date: 2- )   Interpretation of Score: Each section is scored on a 0-5 scale, 5 representing the greatest disability. The scores of each section are added together for a total score of 50. MEDICAL NECESSITY:   Patient is expected to demonstrate progress in strength, range of motion and pain to improve ease with mobility. REASON FOR SERVICES/OTHER COMMENTS:  Patient continues to require skilled intervention due to increased pain interfering with activities of daily living. PAIN/SUBJECTIVE:   Initial: Pain Intensity 1: 5  Pain Location 1: Back  Pain Orientation 1: Lower  Post Session:  4/10   HISTORY:   History of Injury/Illness (Reason for Referral):  Patient was treated in the clinic for her back pain from December 2020 to April 2021. Patient originally injured her back in May 2020 when a patient jumped on her while working at [facility] [employer] [facility]. Patient continues to complain of an achy pain along bilateral lower back. Patient rates pain level as 4/10. Symptoms fluctuate per patient report.   Patient reports occasionally having numbness in right lower extremity and radiating pain down left leg. Past Medical History/Comorbidities:   Ms. Ghulam Woodruff  has a past medical history of Abnormal Pap smear, Anxiety, Back problem, COPD (chronic obstructive pulmonary disease) ([address] Depression, Hyperthyroidism, Insomnia, Irritable bowel syndrome, and Osteopenia. She has no past medical history of Difficult intubation, Malignant hyperthermia due to anesthesia, Nausea & vomiting, Pseudocholinesterase deficiency, or Unspecified adverse effect of anesthesia. Ms. Ghulam Woodruff  has a past surgical history that includes hx tonsillectomy; hx orthopaedic; hx salpingo-oophorectomy (Right, 01/22/2014); and hx colonoscopy. Social History/Living Environment:     Lives alone in two [place] home. Prior Level of Function/Work/Activity:  Independent. Currently not working. Dominant Side:         RIGHT  Personal Factors:          Sex:  female        Age:  69 y.o. Ambulatory/Rehab Services H2 Model Falls Risk Assessment   Risk Factors:       No Risk Factors Identified Ability to Rise from Chair:       (1)  Pushes up, successful in one attempt   Falls Prevention Plan:       No modifications necessary   Total: (5 or greater = High Risk): 1   ©2010 [facility] [employer]. All Rights Reserved. [facility] Patent #[id number]. Federal Law prohibits the replication, distribution or use without written permission from [facility] [employer]   Current Medications:       Current Outpatient Medications:     Nebulizer & Compressor machine, Use as directed. Dx J44.9, Disp: 1 Each, Rfl: 0    OTHER, Spacer for inhaler, Disp: 1 Device, Rfl: 1    DULoxetine (Cymbalta) 30 mg capsule, Take 1 Capsule by mouth every morning., Disp: 90 Capsule, Rfl: 2    lisinopriL (PRINIVIL, ZESTRIL) 10 mg tablet, Take 1 Tablet by mouth daily. , Disp: 90 Tablet, Rfl: 2    albuterol (PROVENTIL VENTOLIN) 2.5 mg /3 mL (0.083 %) nebu, 3 mL by Nebulization route every six (6) hours as needed for Wheezing, Shortness of Breath or Respiratory Distress. , Disp: 30 Nebule, Rfl: 5    albuterol (PROVENTIL HFA, VENTOLIN HFA, PROAIR HFA) 90 mcg/actuation inhaler, Take 1 Puff by inhalation every six (6) hours as needed for Wheezing, Shortness of Breath or Respiratory Distress. , Disp: 18 g, Rfl: 3    guaiFENesin ER (MUCINEX) 600 mg ER tablet, Take 2 Tablets by mouth two (2) times a day., Disp: 30 Tablet, Rfl: 0    Nebulizer & Compressor machine, Use as directed. (Patient not taking: Reported on 2/21/2022), Disp: 1 Each, Rfl: 0    calcium carbonate (CALCIUM 500 PO), 500 mg daily. , Disp: , Rfl:     temazepam (RESTORIL) 7.5 mg capsule, Take 1 Capsule by mouth nightly as needed for Sleep. Max Daily Amount: 7.5 mg., Disp: 30 Capsule, Rfl: 0    meloxicam (MOBIC) 7.5 mg tablet, Take 7.5 mg by mouth daily. (Patient not taking: Reported on 2/21/2022), Disp: , Rfl:     b complex vitamins tablet, Take 1 Tab by mouth every morning. Holding for surgery, Disp: , Rfl:     ascorbic acid (VITAMIN C) 500 mg tablet, Take 500 mg by mouth every morning. Holding for surgery, Disp: , Rfl:     multivitamin (ONE A DAY) tablet, Take 1 Tab by mouth every morning. Holding for surgery, Disp: , Rfl:    Date Last Reviewed:  2/24/2022   Number of Personal Factors/Comorbidities that affect the Plan of Care: 0: LOW COMPLEXITY   EXAMINATION:   Observation/Orthostatic Postural Assessment: Forward head/rounded shoulders posture. Palpation:          Increased tenderness along bilateral SI region/bilateral lumbar paraspinals. ROM:          Lumbar range of motion is as follows: flexion within normal limits, extension 25% with pain, rotation within normal limits, lateral flexion 75%. Retested on 11/11/2021: flexion within normal limits, extension 10%, rotation within normal limits, and lateral flexion within normal limits. Patient reports increased back pain with extension.     Strength:          Hip flexion 4-/5, hip abduction 5/5, hip adduction 5/5, quadriceps 5/5, hamstrings 5/5, ankle dorsiflexion 5/5, ankle plantarflexion 5/5, core strength 3+/5. Retested on 11/11/2021: hip flexion 3+/5, hip abduction right 4+/5 and left 4/5, hip adduction 5/5, quadriceps right 5/5 and left 4+/5, hamstrings right 5/5 and left 4/5, ankle plantarflexion 5/5, ankle dorsiflexion 5/5. Special Tests:          Straight leg raise negative bilateral.  Piriformis test negative bilateral.   Neurological Screen:        Sensation: Within normal limits. Functional Mobility:         Gait/Ambulation:  Patient ambulates with normal gait pattern.     Body Structures Involved:  Nerves  Joints  Muscles Body Functions Affected:  Neuromusculoskeletal Activities and Participation Affected:  General Tasks and Demands  Mobility  Community, Social and Civic Life   Number of elements (examined above) that affect the Plan of Care: 4+: HIGH COMPLEXITY   CLINICAL PRESENTATION:   Presentation: Stable and uncomplicated: LOW COMPLEXITY   CLINICAL DECISION MAKING:   Use of outcome tool(s) and clinical judgement create a POC that gives a: Clear prediction of patient's progress: LOW COMPLEXITY

## 2022-02-24 NOTE — PROGRESS NOTES
Marleen Betancourt  : 1945  Primary: [facility]  Secondary:  [address] at [place] [address] [facility], [address] [place] [address],[address], [place] [address].  [phone]   [phone]     OUTPATIENT PHYSICAL THERAPY: Daily Treatment Note 2022  Visit Count:  78    ICD-10: Treatment Diagnosis: Low back pain (M54.5)  Precautions/Allergies:   Adhesive, Alendronate sodium, Antihistamine [triprolidine-pseudoephedrine], and Boniva [ibandronate]   TREATMENT PLAN:  Effective Dates: 2/10/2022 TO 2022 (90 days). Frequency/Duration: 2 times a week for 90 Days      Pre-treatment Symptoms/Complaints:   \"I need you today. I was really hurting yesterday. It was a 7/10 and today is a 5/10\". Pain: Initial: Pain Intensity 1: 5  Pain Location 1: Back  Pain Orientation 1: Lower  Post Session:  10   Medications Last Reviewed:  2022  Updated Objective Findings:  None Today  TREATMENT:     THERAPEUTIC EXERCISE: (30 minutes):  Exercises per grid below to improve strength. Required minimal verbal cues to promote proper body alignment. Progressed repetitions as indicated.    Date:  2/10/2022 Date:  2022 Date:  22   Activity/Exercise Parameters Parameters Parameters   Nustep  Level 4 x 7 minutes Level 4 x 10 minutes  Level 4 x 8 minutes   Single knee to chest stretch X X X   Piriformis stretch 3x30 sec bilateral  3x30 sec bilateral  3x30 sec    Hamstring stretch with ankle pumps  3x30 sec bilateral  3x30 sec bilateral  3x30 sec bilateral    Supine lower trunk rotation 20 reps bilateral 20 reps bilateral  20 reps bilateral    Double knee to chest stretch 3x30 sec  3x30 sec  3x30 sec                     Supine straight leg raise 2x10 reps bilateral 2x10 reps bilateral 2x10 reps bilateral   Supine isometric hip flexion X 10 reps bilateral with 5 second hold 20 reps bilateral   Pelvic tilts 20 reps 20 reps 20 reps   Curl ups X 20 reps 20 reps   Double knee lifts X 20 reps 20 reps Inversion table 10 minutes- 1 minute on, 1 minute off X X               . MANUAL THERAPY: (15 minutes): Joint mobilization and Soft tissue mobilization was utilized and necessary because of the patient's restricted joint motion and painful spasm. Patient received trigger point release along bilateral SI region/bilateral lumbar paraspinals to decrease pain. Skin intact after treatment. [employer] Portal  Treatment/Session Summary:    Response to Treatment:  Patient reports slight decreased in pain after manual therapy.         Communication/Consultation:  None today  Equipment provided today:  None today  Recommendations/Intent for next treatment session: Next visit will focus on range of motion, stretching, and manual.    Total Treatment Billable Duration:   45 minutes   PT Patient Time In/Time Out  Time In: 1015  Time Out: Ainsley 51, PT    Future Appointments   Date Time Provider Radha Bautista   2/25/2022 11:00 AM Arie Taveras DO [facility]   3/2/2022  8:45 AM Radha Lute, PT [facility]   3/16/2022  1:00 PM Radha Lute, PT [facility]   3/30/2022  8:45 AM Radha Lute, PT [facility]   3/30/2022 10:10 AM MAT LAB [facility]   4/6/2022 11:00 AM Arie Taveras DO [facility]   9/20/2022  1:30 PM ENDO NURSE [facility]   9/27/2022  1:40 PM Maritza Callaway MD [facility]     Visit # Therapist initials Date Arrived NS/ Cx < 24 hr >24 hr Cx Visit Comments   1 PV 8/19/2021 X   Initial Assessment and Treatment- worker comp- 12 visits approved   2 PV 8/26/2021 X      3 PV 9/2/2021 X      4  PV 9/9/2021 X      5 PV 9/16/2021 X      6 PV  10/1/2021 X      7 PV 10/12/2021 X      8  PV 10/28/2021 X      9 PV 13/88/9524 X   Recertification note   10 PV 11/23/2021 X      11 PV 1/6/2022 X   12 more visits approved through Worker's comp 2/28/2022    PV 1/14/2022  X     12 PV 1/21/2022 X      13 PV 4/53/8682 X   Recertification note   14 PV 2/17/2022 X      15 PV 2/24/2022 X Abbreviations: NS = No Show; CX = cancelled

## 2022-03-02 ENCOUNTER — HOSPITAL ENCOUNTER (OUTPATIENT)
Dept: PHYSICAL THERAPY | Age: 77
Discharge: HOME | End: 2022-03-02
Payer: MEDICARE

## 2022-03-02 PROCEDURE — 97140 MANUAL THERAPY 1/> REGIONS: CPT

## 2022-03-02 NOTE — THERAPY RECERTIFICATION
Marleen Betancourt  : 1945  Primary:   Secondary:  [address] at [place] [id number] [facility], [address] [place] [address],[address], [facility] [address]  [phone]   [phone]          OUTPATIENT PHYSICAL THERAPY:Progress Report 3/2/2022   ICD-10: Treatment Diagnosis: Cervicalgia (M54.2)  Precautions/Allergies:   Prednisone, Adhesive, Alendronate sodium, Antihistamine [triprolidine-pseudoephedrine], Antihistamine-1, Boniva [ibandronate], and Triamcinolone   MD Orders: Evaluate and Treat MEDICAL/REFERRING DIAGNOSIS:  Cervicalgia [M54.2]   DATE OF ONSET: Chronic  REFERRING PHYSICIAN:  Leigh Lynch MD   RETURN PHYSICIAN APPOINTMENT: TBD     ASSESSMENT:  Ms. Lois Rg presents with improving mobility and pain in cervical region secondary to degenerative changes. Patient has attended a total of 77 scheduled physical therapy visits. Treatment has consisted of stretching, strengthening, and manual therapy to improve overall pain and performance with activities of daily living. After discussing with patient she agreed she would continue to benefit from physical therapy to improve overall mobility. Please sign this re-certification if you concur. Thank you for the opportunity to serve this patient. PROBLEM LIST (Impacting functional limitations):  1. Decreased Strength  2. Decreased ADL/Functional Activities  3. Increased Pain  4. Decreased Activity Tolerance INTERVENTIONS PLANNED: (Treatment may consist of any combination of the following)  1. Cold  2. Heat  3. Home Exercise Program (HEP)  4. Manual Therapy  5. Neuromuscular Re-education/Strengthening  6. Range of Motion (ROM)  7. Therapeutic Activites  8. Therapeutic Exercise/Strengthening   TREATMENT PLAN:  Effective Dates:  3/1/2022 TO 2022  (90 days). Frequency/Duration: 1 time every other week for 90 Day(s)  GOALS: (Goals have been discussed and agreed upon with patient.)  Short-Term Functional Goals: Time Frame: 30 days  1.  Patient will be independent with home exercise program without exacerbation of symptoms or cueing needed--goal met. 2. Patient will be independent with correct sleeping positions and awareness/avoidance of aggravating positions without cueing needed--goal met. Discharge Goals: Time Frame: 90 days  1. Patient will be independent with all ADLs with minimal onset of neck pain and no deficits with daily tasks--goal ongoing. 2. Patient will report no fear avoidance with social or recreational activities due to neck pain --goal ongoing. 3. Patient will score less than or equal to 7/50 on Neck Disability Index with minimal effect of neck pain on patient's ability to manage every day life activities--goal ongoing. Rehabilitation Potential For Stated Goals: Good   Regarding Cary Beach's therapy, I certify that the treatment plan above will be carried out by a therapist or under their direction. Thank you for this referral,  Ashley Kinney PT     Referring Physician Signature: Joselyn Novoa MD _______________________________ Date _____________               HISTORY:   History of Present Injury/Illness (Reason for Referral):  1/5/2022 (Progress Note): Patient reports her shoulders and neck are very painful overall. 2/16/202 (Progress Note): Patient reports her right shoulder and left neck are the more painful now. 4/2/7079 (Recertification): Patient reports her neck and shoulder are doing ok, but she has been having some tingling in her arm. Past Medical History/Comorbidities:   Ms. Brendan Dickinson  has a past medical history of Abnormal Pap smear, Anxiety, Back problem, COPD (chronic obstructive pulmonary disease) ([facility] [address] Depression, Hyperthyroidism, Insomnia, Irritable bowel syndrome, and Osteopenia. She has no past medical history of Difficult intubation, Malignant hyperthermia due to anesthesia, Nausea & vomiting, Pseudocholinesterase deficiency, or Unspecified adverse effect of anesthesia.   Ms. Brendan Dickinson  has a past surgical history that includes hx tonsillectomy; hx orthopaedic; hx salpingo-oophorectomy (Right, 01/22/2014); and hx colonoscopy. Social History/Living Environment:   Home Environment: Private residence  Living Alone: Yes  Support Systems: Family member(s), Friends \ neighbors  Prior Level of Function/Work/Activity:  Independent  Dominant Side:         RIGHT  Personal Factors:          Sex:  female        Age:  69 y.o. Current Medications:       Current Outpatient Medications:     Nebulizer & Compressor machine, Use as directed. Dx J44.9, Disp: 1 Each, Rfl: 0    OTHER, Spacer for inhaler, Disp: 1 Device, Rfl: 1    DULoxetine (Cymbalta) 30 mg capsule, Take 1 Capsule by mouth every morning., Disp: 90 Capsule, Rfl: 2    lisinopriL (PRINIVIL, ZESTRIL) 10 mg tablet, Take 1 Tablet by mouth daily. , Disp: 90 Tablet, Rfl: 2    albuterol (PROVENTIL VENTOLIN) 2.5 mg /3 mL (0.083 %) nebu, 3 mL by Nebulization route every six (6) hours as needed for Wheezing, Shortness of Breath or Respiratory Distress. , Disp: 30 Nebule, Rfl: 5    albuterol (PROVENTIL HFA, VENTOLIN HFA, PROAIR HFA) 90 mcg/actuation inhaler, Take 1 Puff by inhalation every six (6) hours as needed for Wheezing, Shortness of Breath or Respiratory Distress. , Disp: 18 g, Rfl: 3    guaiFENesin ER (MUCINEX) 600 mg ER tablet, Take 2 Tablets by mouth two (2) times a day., Disp: 30 Tablet, Rfl: 0    Nebulizer & Compressor machine, Use as directed., Disp: 1 Each, Rfl: 0    calcium carbonate (CALCIUM 500 PO), 500 mg daily. , Disp: , Rfl:     temazepam (RESTORIL) 7.5 mg capsule, Take 1 Capsule by mouth nightly as needed for Sleep. Max Daily Amount: 7.5 mg., Disp: 30 Capsule, Rfl: 0    meloxicam (MOBIC) 7.5 mg tablet, Take 7.5 mg by mouth daily. , Disp: , Rfl:     b complex vitamins tablet, Take 1 Tab by mouth every morning. Holding for surgery, Disp: , Rfl:     ascorbic acid (VITAMIN C) 500 mg tablet, Take 500 mg by mouth every morning.  Holding for surgery, Disp: , Rfl:     multivitamin (ONE A DAY) tablet, Take 1 Tab by mouth every morning. Holding for surgery, Disp: , Rfl:    Date Last Reviewed:  3/2/2022    EXAMINATION:   Observation/Orthostatic Postural Assessment:          Posture Assessment: Rounded shoulders, Forward head   Palpation:          Tightness and tenderness to palpation noted throughout upper and lower cervical musculature. No bruising or swelling noted. ROM:          Cervical Assessment (AROM):  · Flexion: 75% of full AROM  · Extension: 75% of full AROM  · Right side bendin% of full AROM  · Left side bending[de-identified] 75% of full AROM  · Right rotation: 75% of full AROM  · Left rotation: 75% of full AROM  Strength:          Cervical Assessment (Strength):  · Grossly 2+/5 with manual muscle testing  Special Tests:          Negative Hautant's test. Negative Cranial-Bryant lift test. Negative Bryant-Axis Sheer test. Negative Alar Ligament test. Negative Tectorial Membrane test.   Neurological Screen:        Dermatomes: Within normal limits        Reflexes:  2+  Functional Mobility:         Gait/Mobility:    ·   Independent         Transfers:     · Sit to Stand: Independent  · Stand to Sit: Independent  · Stand Pivot Transfers: Independent  · Bed to Chair: Independent  · Lateral Transfers: Independent         Bed Mobility:     · Rolling: Independent  · Supine to Sit: Independent  · Sit to Supine: Independent  · Scooting: Independent        CLINICAL DECISION MAKING:   Outcome Measure: Tool Used: Neck Disability Index (NDI)  Score:  Initial:   Most Recent:  (Date: 3/25/2022 )   Interpretation of Score: The Neck Disability Index is a revised form of the Oswestry Low Back Pain Index and is designed to measure the activities of daily living in person's with neck pain. Each section is scored on a 0-5 scale, 5 representing the greatest disability. The scores of each section are added together for a total score of 50.      Medical Necessity: · Patient is expected to demonstrate progress in strength and range of motion to improve safety during daily activities. · Patient demonstrates good rehab potential due to higher previous functional level. · Skilled intervention continues to be required due to decreased mobility. Reason for Services/Other Comments:  · Patient continues to demonstrate capacity to improve overall mobility which will increase independence and increase safety.

## 2022-03-02 NOTE — PROGRESS NOTES
Lindsey Dowd  : 1945  Primary:   Secondary:  [address] at [place] [id number] [facility], [address] [place] [address],[address], [facility].  [phone]   [phone]         OUTPATIENT PHYSICAL THERAPY: Daily Treatment Note 3/2/2022  ICD-10: Treatment Diagnosis: Cervicalgia (M54.2)  Pre-treatment Symptoms/Complaints:  3/2/2022: Patient reports her arm has been tingling lately. Pain: Initial: Pain Intensity 1: 5  Pain Location 1: Neck,Shoulder  Pain Orientation 1: Right,Left  Pain Intervention(s) 1: Rest,Medication (see MAR)  Post Session:  3/10   Medications Last Reviewed:  3/2/2022   Updated Objective Findings:  None Today   TREATMENT:   MANUAL THERAPY: (40 minutes): Joint mobilization and Soft tissue mobilization was utilized and necessary because of the patient's restricted joint motion, painful spasm, loss of articular motion and restricted motion of soft tissue. Treatment/Session Summary:    · Response to Treatment:  Patient tolerated treatment well with improved mobility noted after treatment. · Communication/Consultation:  None today  · Equipment provided today:  None today  · Recommendations/Intent for next treatment session: Next visit will focus on improving overall mobility with decreased pain.   Treatment Plan of Care Effective Dates:  3/1/2022 TO 2022 (90 days)  Total Treatment Billable Duration:  40 minutes  PT Patient Time In/Time Out  Time In: 1300  Time Out: 75 Agnieszkaan , PT     Visit # Therapist initials Date Arrived NS/ Cx < 24 hr >24 hr Cx Visit Comments   61  2021 X   Medicare cap: $2110   59 JS 10 X   Recert   72 JS 2/05/6347 X       77 JS 10/6/2021 X       79 JS 10/20/2021 X    Progress Note   68 JS 11/3/2021 X       69 JS 11/10/2021 X       70 JS  X    Recert   71 JS 97/96/4382 X       72 JS 12/22/2021 X       73 JS 2022 X    Progress note   74 JS 1/20/2022 X      75 JS 2/2/2022 X      76 JS 2022 X   Progress note Yamilamark 1/2/1666 X   Recertification                                Abbreviations: NS = No Show; CX = cancelled     Future Appointments   Date Time Provider Radha Lily   3/2/2022  1:00 PM Taisha Rivas, PT [facility]   3/16/2022  1:00 PM Taisha Rivas, PT [facility]   3/30/2022  8:45 AM BOLA MccurdyEORPCHRIS E   3/30/2022 10:10 AM [facility]   4/6/2022 11:00 AM DO JUSTYNA Lange MAT [facility]   9/20/2022  1:30 PM ENDO NURSE END [facility]   9/27/2022  1:40 PM Chika Summers MD END BS ENDO

## 2022-03-03 ENCOUNTER — HOSPITAL ENCOUNTER (OUTPATIENT)
Dept: PHYSICAL THERAPY | Age: 77
Discharge: HOME | End: 2022-03-03
Payer: COMMERCIAL

## 2022-03-03 PROCEDURE — 97140 MANUAL THERAPY 1/> REGIONS: CPT

## 2022-03-03 PROCEDURE — 97110 THERAPEUTIC EXERCISES: CPT

## 2022-03-03 NOTE — PROGRESS NOTES
Shera Apgar  : 1945  Primary: [employer] [facility] [employer] [facility]  Secondary:  [address] at [place] [address],[address], [place] [address].  [phone]   [phone]     OUTPATIENT PHYSICAL THERAPY: Daily Treatment Note 3/3/2022  Visit Count:  78    ICD-10: Treatment Diagnosis: Low back pain (M54.5)  Precautions/Allergies:   Adhesive, Alendronate sodium, Antihistamine [triprolidine-pseudoephedrine], and Boniva [ibandronate]   TREATMENT PLAN:  Effective Dates: 2/10/2022 TO 2022 (90 days). Frequency/Duration: 2 times a week for 90 Days      Pre-treatment Symptoms/Complaints: \"This morning it was 5/10, after exercises it was 4/10\". Pain: Initial: Pain Intensity 1: 4  Pain Location 1: Back  Pain Orientation 1: Lower  Post Session:  410   Medications Last Reviewed:  3/3/2022  Updated Objective Findings:  None Today  TREATMENT:     THERAPEUTIC EXERCISE: (30 minutes):  Exercises per grid below to improve strength. Required minimal verbal cues to promote proper body alignment. Progressed repetitions as indicated.    Date:  3/3/2022 Date:  2022 Date:  22   Activity/Exercise Parameters Parameters Parameters   Nustep  Level 4 x 7 minutes Level 4 x 10 minutes  Level 4 x 8 minutes   Single knee to chest stretch X X X   Piriformis stretch 3x30 sec bilateral  3x30 sec bilateral  3x30 sec    Hamstring stretch with ankle pumps  3x30 sec bilateral  3x30 sec bilateral  3x30 sec bilateral    Supine lower trunk rotation 20 reps bilateral 20 reps bilateral  20 reps bilateral    Double knee to chest stretch 3x30 sec  3x30 sec  3x30 sec                     Supine straight leg raise 2x10 reps bilateral 2x10 reps bilateral 2x10 reps bilateral   Supine isometric hip flexion X 10 reps bilateral with 5 second hold 20 reps bilateral   Pelvic tilts 20 reps 20 reps 20 reps   Curl ups X 20 reps 20 reps   Double knee lifts X 20 reps 20 reps   Inversion table 10 minutes- 1 minute on, 1 minute off X X               . MANUAL THERAPY: (15 minutes): Joint mobilization and Soft tissue mobilization was utilized and necessary because of the patient's restricted joint motion and painful spasm. Patient received trigger point release along bilateral SI region/bilateral lumbar paraspinals to decrease pain. Skin intact after treatment. [employer] Portal  Treatment/Session Summary:    Response to Treatment:  Patient tolerated treatment well.         Communication/Consultation:  None today  Equipment provided today:  None today  Recommendations/Intent for next treatment session: Next visit will focus on range of motion, stretching, and manual.    Total Treatment Billable Duration:   45 minutes   PT Patient Time In/Time Out  Time In: 1515  Time Out: [address], PT    Future Appointments   Date Time Provider Radha Bautista   3/16/2022  1:00 PM Valdez Roberts PT [facility]FE   3/30/2022  8:45 AM Valdez Roberts PT S[facility]RIS SFE   3/30/2022 10:10 AM MAT LAB [facility]   4/6/2022 11:00 AM DO JUSTYNA Callahan [facility]   9/20/2022  1:30 PM ENDO NURSE END BS ENDO   9/27/2022  1:40 PM Maryjo Wilhelm MD END BS ENDO     Visit # Therapist initials Date Arrived NS/ Cx < 24 hr >24 hr Cx Visit Comments   1 PV 8/19/2021 X   Initial Assessment and Treatment- worker comp- 12 visits approved   2 PV 8/26/2021 X      3 PV 9/2/2021 X      4  PV 9/9/2021 X      5 PV 9/16/2021 X      6 PV  10/1/2021 X      7 PV 10/12/2021 X      8  PV 10/28/2021 X      9 PV 64/41/6140 X   Recertification note   10 PV 11/23/2021 X      11 PV 1/6/2022 X   12 more visits approved through Worker's comp 2/28/2022    PV 1/14/2022  X     12 PV 1/21/2022 X      13 PV 6/55/8540 X   Recertification note   14 PV 2/17/2022 X      15 PV 2/24/2022 X      16 PV 3/3/2022 X   End date 3/31/2022 for 7 visits              Abbreviations: NS = No Show; CX = cancelled

## 2022-03-08 ENCOUNTER — HOSPITAL ENCOUNTER (OUTPATIENT)
Dept: PHYSICAL THERAPY | Age: 77
Discharge: HOME | End: 2022-03-08
Payer: COMMERCIAL

## 2022-03-08 PROCEDURE — 97140 MANUAL THERAPY 1/> REGIONS: CPT

## 2022-03-08 PROCEDURE — 97110 THERAPEUTIC EXERCISES: CPT

## 2022-03-08 NOTE — PROGRESS NOTES
Wm Ennis  : 1945  Primary: [employer]  Secondary:  [address] at [address], [address] [place] [address],[address], [place] [address].  [phone]   [phone]     OUTPATIENT PHYSICAL THERAPY: Daily Treatment Note 3/8/2022  Visit Count:  79    ICD-10: Treatment Diagnosis: Low back pain (M54.5)  Precautions/Allergies:   Adhesive, Alendronate sodium, Antihistamine [triprolidine-pseudoephedrine], and Boniva [ibandronate]   TREATMENT PLAN:  Effective Dates: 2/10/2022 TO 2022 (90 days). Frequency/Duration: 2 times a week for 90 Days      Pre-treatment Symptoms/Complaints:   \"I am around a 4 or 5 with the pain\". Pain: Initial: Pain Intensity 1: 4  Pain Location 1: Back  Pain Orientation 1: Lower  Post Session:  4/10   Medications Last Reviewed:  3/8/2022  Updated Objective Findings:  None Today  TREATMENT:     THERAPEUTIC EXERCISE: (30 minutes):  Exercises per grid below to improve strength. Required minimal verbal cues to promote proper body alignment. Progressed repetitions as indicated.    Date:  3/3/2022 Date:  3/8/2022 Date:  22   Activity/Exercise Parameters Parameters Parameters   Nustep  Level 4 x 7 minutes Level 4 x 10 minutes  Level 4 x 8 minutes   Single knee to chest stretch X X X   Piriformis stretch 3x30 sec bilateral  3x30 sec bilateral  3x30 sec    Hamstring stretch with ankle pumps  3x30 sec bilateral  3x30 sec bilateral  3x30 sec bilateral    Supine lower trunk rotation 20 reps bilateral 20 reps bilateral  20 reps bilateral    Double knee to chest stretch 3x30 sec  3x30 sec  3x30 sec                     Supine straight leg raise 2x10 reps bilateral 2x10 reps bilateral 2x10 reps bilateral   Supine isometric hip flexion X 10 reps bilateral with 5 second hold 20 reps bilateral   Pelvic tilts 20 reps X 20 reps   Curl ups X X 20 reps   Double knee lifts X X 20 reps   Inversion table 10 minutes- 1 minute on, 1 minute off 10 minutes- 1 minute on, 1 minute off X               . MANUAL THERAPY: (15 minutes): Joint mobilization and Soft tissue mobilization was utilized and necessary because of the patient's restricted joint motion and painful spasm. Patient received trigger point release along bilateral SI region/bilateral lumbar paraspinals to decrease pain. Skin intact after treatment. [employer] Portal  Treatment/Session Summary:    Response to Treatment:  Patient tolerated exercises without issues.         Communication/Consultation:  None today  Equipment provided today:  None today  Recommendations/Intent for next treatment session: Next visit will focus on range of motion, stretching, and manual.    Total Treatment Billable Duration:   45 minutes   PT Patient Time In/Time Out  Time In: 1015  Time Out: [address], PT    Future Appointments   Date Time Provider Radha Bautista   3/10/2022  9:30 AM Sharad Bowden, PT [facility] SFE   3/15/2022  9:30 AM Mackenzie Ashford, PT [facility] SFE   3/16/2022  1:00 PM Raghu Edgar, PT [facility] SFE   3/22/2022  9:30 AM Mackenzie Ashford, PT [facility] SFE   3/24/2022  9:30 AM Mackenzie Ashford, PT [facility] SFE   3/28/2022  9:30 AM Mackenzie Ashford, PT [facility] SFE   3/30/2022  8:45 AM Raghu Cashing, PT [facility] SFE   3/30/2022 10:10 AM MAT LAB [facility]   4/6/2022 11:00 AM Laron Sutton DO [facility]   4/13/2022  8:45 AM Raghu Cashing, PT SFEORPT SFE   4/27/2022  8:45 AM Raghu Cashing, PT [facility] SFE   9/20/2022  1:30 PM ENDO NURSE END BS ENDO   9/27/2022  1:40 PM Orly Horner MD END BS ENDO     Visit # Therapist initials Date Arrived NS/ Cx < 24 hr >24 hr Cx Visit Comments   1 PV 8/19/2021 X   Initial Assessment and Treatment- worker comp- 12 visits approved   2 PV 8/26/2021 X      3 PV 9/2/2021 X      4  PV 9/9/2021 X      5 PV 9/16/2021 X      6 PV  10/1/2021 X      7 PV 10/12/2021 X      8  PV 10/28/2021 X      9 PV 81/08/0079 X   Recertification note   10 PV 11/23/2021 X      11 PV 1/6/2022 X   12 more visits approved through Worker's comp 2/28/2022    PV 1/14/2022  X     12 PV 1/21/2022 X      13 PV 0/74/2442 X   Recertification note   14 PV 2/17/2022 X      15 PV 2/24/2022 X      16 PV 3/3/2022 X   End date 3/31/2022 for 7 visits   17 PV 3/8/2022 X        Abbreviations: NS = No Show; CX = cancelled

## 2022-03-10 ENCOUNTER — HOSPITAL ENCOUNTER (OUTPATIENT)
Dept: PHYSICAL THERAPY | Age: 77
Discharge: HOME | End: 2022-03-10
Payer: COMMERCIAL

## 2022-03-10 PROCEDURE — 97140 MANUAL THERAPY 1/> REGIONS: CPT

## 2022-03-10 PROCEDURE — 97110 THERAPEUTIC EXERCISES: CPT

## 2022-03-10 NOTE — PROGRESS NOTES
Colton Caller  : 1945  Primary: [employer]  Secondary:  [address]  at [facility]  [address] [facility] [address], [facility] [place] [address]  [phone]   [phone]     OUTPATIENT PHYSICAL THERAPY: Daily Treatment Note 3/10/2022  Visit Count:  80    ICD-10: Treatment Diagnosis: Low back pain (M54.5)  Precautions/Allergies:   Adhesive, Alendronate sodium, Antihistamine [triprolidine-pseudoephedrine], and Boniva [ibandronate]   TREATMENT PLAN:  Effective Dates: 2/10/2022 TO 2022 (90 days). Frequency/Duration: 2 times a week for 90 Days      Pre-treatment Symptoms/Complaints:   Patient reports she is hurting. Pain: Initial: Pain Intensity 1: 4  Pain Location 1: Back  Pain Orientation 1: Lower  Post Session:  3/10   Medications Last Reviewed:  3/10/2022  Updated Objective Findings:  None Today  TREATMENT:     THERAPEUTIC EXERCISE: (30 minutes):  Exercises per grid below to improve strength. Required minimal verbal cues to promote proper body alignment. Progressed repetitions as indicated.    Date:  3/3/2022 Date:  3/8/2022 Date:  3/10/2022   Activity/Exercise Parameters Parameters Parameters   Nustep  Level 4 x 7 minutes Level 4 x 10 minutes  Level 4 x 8 minutes   Single knee to chest stretch X X X   Piriformis stretch 3x30 sec bilateral  3x30 sec bilateral  3x30 sec    Hamstring stretch with ankle pumps  3x30 sec bilateral  3x30 sec bilateral  3x30 sec bilateral    Supine lower trunk rotation 20 reps bilateral 20 reps bilateral  20 reps bilateral    Double knee to chest stretch 3x30 sec  3x30 sec  3x30 sec                     Supine straight leg raise 2x10 reps bilateral 2x10 reps bilateral 2x10 reps bilateral   Supine isometric hip flexion X 10 reps bilateral with 5 second hold 20 reps bilateral   Pelvic tilts 20 reps X X   Curl ups X X X   Double knee lifts X X X   Inversion table 10 minutes- 1 minute on, 1 minute off 10 minutes- 1 minute on, 1 minute off 10 minutes- 1 minute on, 1 minute off               . MANUAL THERAPY: (15 minutes): Joint mobilization and Soft tissue mobilization was utilized and necessary because of the patient's restricted joint motion and painful spasm. Patient received trigger point release along bilateral SI region/bilateral lumbar paraspinals to decrease pain. Skin intact after treatment. "[employer] Portal  Treatment/Session Summary:    Response to Treatment:  Patient tolerated exercises well.         Communication/Consultation:  None today  Equipment provided today:  None today  Recommendations/Intent for next treatment session: Next visit will focus on range of motion, stretching, and manual.    Total Treatment Billable Duration:   45 minutes   PT Patient Time In/Time Out  Time In: 0930  Time Out: Eulogio Evans, PT    Future Appointments   Date Time Provider Radha Bautista   3/15/2022  9:30 AM Luis Ashford, PT [facility] SFE   3/16/2022  1:00 PM Bob Rudi, PT [facility] SFE   3/22/2022  9:30 AM Vissage, [place], PT [facility] SFE   3/24/2022  9:30 AM Vissage, [place], PT [facility] SFE   3/28/2022  9:30 AM Vissage, [place], PT SFE[facility]PT SFE   3/30/2022  8:45 AM Bob Darien, PT [facility] SFE   3/30/2022 10:10 AM [facility]   4/6/2022 11:00 AM Thomas Adkins DO [facility]   4/13/2022  8:45 AM Bob Rudi, PT [facility] SFE   4/27/2022  8:45 AM Bob Darien, PT [facility] SFE   9/20/2022  1:30 PM ENDO NURSE END BS ENDO   9/27/2022  1:40 PM Jai Loomis MD END BS ENDO     Visit # Therapist initials Date Arrived NS/ Cx < 24 hr >24 hr Cx Visit Comments   1 PV 8/19/2021 X   Initial Assessment and Treatment- worker comp- 12 visits approved   2 PV 8/26/2021 X      3 PV 9/2/2021 X      4  PV 9/9/2021 X      5 PV 9/16/2021 X      6 PV  10/1/2021 X      7 PV 10/12/2021 X      8  PV 10/28/2021 X      9 PV 47/97/6271 X   Recertification note   10 PV 11/23/2021 X      11 PV 1/6/2022 X   12 more visits approved through [employer] comp 2/28/2022    PV 1/14/2022  X     12 PV 1/21/2022 X      13 PV 7/83/3936 X   Recertification note   14 PV 2/17/2022 X      15 PV 2/24/2022 X      16 PV 3/3/2022 X   End date 3/31/2022 for 7 visits   17 PV 3/8/2022 X      18 PV 3/10/2022 X                                                              Abbreviations: NS = No Show; CX = cancelled

## 2022-03-15 ENCOUNTER — HOSPITAL ENCOUNTER (OUTPATIENT)
Dept: PHYSICAL THERAPY | Age: 77
Discharge: HOME | End: 2022-03-15
Payer: COMMERCIAL

## 2022-03-15 PROCEDURE — 97110 THERAPEUTIC EXERCISES: CPT

## 2022-03-15 PROCEDURE — 97140 MANUAL THERAPY 1/> REGIONS: CPT

## 2022-03-15 NOTE — PROGRESS NOTES
Ana Hodgkin  : 1945  Primary: [employer]  Secondary:  [address] at [place] [address] [facility], [address] [place] [address],[address], [place] [address].  [phone]   [phone]     OUTPATIENT PHYSICAL THERAPY: Daily Treatment Note 3/15/2022  Visit Count:  81    ICD-10: Treatment Diagnosis: Low back pain (M54.5)  Precautions/Allergies:   Adhesive, Alendronate sodium, Antihistamine [triprolidine-pseudoephedrine], and Boniva [ibandronate]   TREATMENT PLAN:  Effective Dates: 2/10/2022 TO 2022 (90 days). Frequency/Duration: 2 times a week for 90 Days      Pre-treatment Symptoms/Complaints:   Patient reports she was hurting over the past two days. Pain: Initial: Pain Intensity 1: 4  Pain Location 1: Back  Pain Orientation 1: Lower  Post Session:  3/10   Medications Last Reviewed:  3/15/2022  Updated Objective Findings:  None Today  TREATMENT:     THERAPEUTIC EXERCISE: (30 minutes):  Exercises per grid below to improve strength. Required minimal verbal cues to promote proper body alignment. Progressed repetitions as indicated.    Date:  3/15/2022 Date:  3/8/2022 Date:  3/10/2022   Activity/Exercise Parameters Parameters Parameters   Nustep  Level 4 x 8 minutes Level 4 x 10 minutes  Level 4 x 8 minutes   Single knee to chest stretch X X X   Piriformis stretch 3x30 sec bilateral  3x30 sec bilateral  3x30 sec    Hamstring stretch with ankle pumps  3x30 sec bilateral  3x30 sec bilateral  3x30 sec bilateral    Supine lower trunk rotation 20 reps bilateral 20 reps bilateral  20 reps bilateral    Double knee to chest stretch 3x30 sec  3x30 sec  3x30 sec                     Supine straight leg raise 2x10 reps bilateral 2x10 reps bilateral 2x10 reps bilateral   Supine isometric hip flexion X 10 reps bilateral with 5 second hold 20 reps bilateral   Pelvic tilts 20 reps X X   Curl ups X X X   Double knee lifts X X X   Inversion table 10 minutes- 1 minute on, 1 minute off 10 minutes- 1 minute on, 1 minute off 10 minutes- 1 minute on, 1 minute off               . MANUAL THERAPY: (10 minutes): Joint mobilization and Soft tissue mobilization was utilized and necessary because of the patient's restricted joint motion and painful spasm. Patient received trigger point release along bilateral SI region/bilateral lumbar paraspinals to decrease pain. Skin intact after treatment. [employer] Portal  Treatment/Session Summary:    Response to Treatment:  Patient reports slight decrease in pain after treatment.         Communication/Consultation:  None today  Equipment provided today:  None today  Recommendations/Intent for next treatment session: Next visit will focus on range of motion, stretching, and manual.    Total Treatment Billable Duration:   40 minutes   PT Patient Time In/Time Out  Time In: 0805  Time Out: Hallie Howe 34, PT    Future Appointments   Date Time Provider Radha Bautista   3/16/2022  1:00 PM Kaylyn Velásquez, PT [facility] SFE   3/22/2022  9:30 AM Vissage, Germaine Lapping, PT [facility] SFE   3/24/2022  9:30 AM Vissage, Germaine Lapping, PT [facility] SFE   3/28/2022  9:30 AM Vissage, Germaine Lapping, PT [facility] SFE   3/30/2022  8:45 AM Betha Christen, PT [facility] SFE   3/30/2022 10:10 AM MAT LAB [facility]   3/30/2022 12:10 PM SFE DEXA BI GE LUNAR DEXA SFERMAM SFE   4/6/2022 11:00 AM Ivory Holloway DO SSA MAT [facility]   4/13/2022  8:45 AM Betha Christen, PT [facility] SFE   4/27/2022  8:45 AM Betha Christen, PT [facility] SFE   9/20/2022  1:30 PM ENDO NURSE END BS ENDO   9/27/2022  1:40 PM Regina Lee MD END BS ENDO     Visit # Therapist initials Date Arrived NS/ Cx < 24 hr >24 hr Cx Visit Comments   1 PV 8/19/2021 X   Initial Assessment and Treatment- worker comp- 12 visits approved   2 PV 8/26/2021 X      3 PV 9/2/2021 X      4  PV 9/9/2021 X      5 PV 9/16/2021 X      6 PV  10/1/2021 X      7 PV 10/12/2021 X      8  PV 10/28/2021 X      9 PV 41/19/6116 X   Recertification note   10 PV 11/23/2021 X      11 PV 1/6/2022 X   12 more visits approved through Worker's comp 2/28/2022    PV 1/14/2022  X     12 PV 1/21/2022 X      13 PV 1/77/5088 X   Recertification note   14 PV 2/17/2022 X      15 PV 2/24/2022 X      16 PV 3/3/2022 X   End date 3/31/2022 for 7 visits   17 PV 3/8/2022 X      18 PV 3/10/2022 X      19 PV 3/15/2022 X                                                     Abbreviations: NS = No Show; CX = cancelled

## 2022-03-16 ENCOUNTER — HOSPITAL ENCOUNTER (OUTPATIENT)
Dept: PHYSICAL THERAPY | Age: 77
Discharge: HOME | End: 2022-03-16
Payer: MEDICARE

## 2022-03-16 PROCEDURE — 97140 MANUAL THERAPY 1/> REGIONS: CPT

## 2022-03-16 NOTE — PROGRESS NOTES
Sara Escobar  : 1945  Primary:   Secondary:  [address] at [place] [address] [place] [address],[address], [facility] [address]  [phone]   [phone]         OUTPATIENT PHYSICAL THERAPY: Daily Treatment Note 3/16/2022  ICD-10: Treatment Diagnosis: Cervicalgia (M54.2)  Pre-treatment Symptoms/Complaints:  3/16/2022: Patient reports her arm has been tingling lately. Pain: Initial: Pain Intensity 1: 5  Pain Location 1: Neck,Shoulder  Pain Orientation 1: Right,Left  Pain Intervention(s) 1: Rest,Medication (see MAR)  Post Session:  3/10   Medications Last Reviewed:  3/16/2022   Updated Objective Findings:  None Today   TREATMENT:   MANUAL THERAPY: (40 minutes): Joint mobilization and Soft tissue mobilization was utilized and necessary because of the patient's restricted joint motion, painful spasm, loss of articular motion and restricted motion of soft tissue. Treatment/Session Summary:    · Response to Treatment:  Patient tolerated treatment well with improved mobility noted after treatment. · Communication/Consultation:  None today  · Equipment provided today:  None today  · Recommendations/Intent for next treatment session: Next visit will focus on improving overall mobility with decreased pain.   Treatment Plan of Care Effective Dates:  3/1/2022 TO 2022 (90 days)  Total Treatment Billable Duration:  40 minutes  PT Patient Time In/Time Out  Time In: 1300  Time Out: [address], PT     Visit # Therapist initials Date Arrived NS/ Cx < 24 hr >24 hr Cx Visit Comments   61  2021 X   Medicare cap: $2110   59  61823 X   Recert   72  6036 X       77 JS 10/6/2021 X       79 JS 10/20/2021 X    Progress Note   68 JS 11/3/2021 X       69 JS 11/10/2021 X       70 JS 3470 X    Recert   71 JS 0146 X       72 JS 12/22/2021 X       73 JS 2022 X    Progress note   74  1/20/2022 X      75 JS 2/2/2022 X      76  2022 X   Progress note   68 JS 7/8/3203 X   Recertification   66 JS 3/16/2022 X                          Abbreviations: NS = No Show; CX = cancelled     Future Appointments   Date Time Provider Radha Bautista   3/16/2022  1:00 PM Amy Sack, PT [facility] SFE   3/22/2022  9:30 AM Vissage, Joseph Sear, PT [facility] SFE   3/24/2022  9:30 AM Vissage, Joseph Sear, PT [facility] SFE   3/28/2022  9:30 AM Vissage, Joseph Sear, PT [facility] SFE   3/30/2022  8:45 AM [place] Sack, PT [facility] SFE   3/30/2022 10:10 AM MAT LAB [facility]   3/30/2022 12:10 PM SFE DEXA BI GE LUNAR DEXA SFERMAM SFE   4/6/2022 11:00 AM Eugenio Eugene DO [facility]   4/13/2022  8:45 AM Amy Sack, PT [facility] SFE   4/27/2022  8:45 AM [place] Sack, PT [facility] SFE   9/20/2022  1:30 PM ENDO NURSE END BS ENDO   9/27/2022  1:40 PM Jenaro Eden MD END BS ENDO

## 2022-03-18 PROBLEM — M54.41 CHRONIC MIDLINE LOW BACK PAIN WITH BILATERAL SCIATICA: Status: ACTIVE | Noted: 2021-11-04

## 2022-03-18 PROBLEM — G89.29 CHRONIC MIDLINE LOW BACK PAIN WITH BILATERAL SCIATICA: Status: ACTIVE | Noted: 2021-11-04

## 2022-03-18 PROBLEM — E78.5 DYSLIPIDEMIA (HIGH LDL; LOW HDL): Status: ACTIVE | Noted: 2021-09-07

## 2022-03-18 PROBLEM — F33.42 RECURRENT MAJOR DEPRESSIVE DISORDER, IN FULL REMISSION (HCC): Status: ACTIVE | Noted: 2020-04-30

## 2022-03-18 PROBLEM — I49.1 PREMATURE ATRIAL CONTRACTION: Status: ACTIVE | Noted: 2020-06-08

## 2022-03-18 PROBLEM — F17.210 CIGARETTE SMOKER: Status: ACTIVE | Noted: 2019-08-28

## 2022-03-18 PROBLEM — M54.42 CHRONIC MIDLINE LOW BACK PAIN WITH BILATERAL SCIATICA: Status: ACTIVE | Noted: 2021-11-04

## 2022-03-18 PROBLEM — J41.8 MIXED SIMPLE AND MUCOPURULENT CHRONIC BRONCHITIS (HCC): Status: ACTIVE | Noted: 2020-12-30

## 2022-03-19 PROBLEM — M85.80 OSTEOPENIA: Status: ACTIVE | Noted: 2019-01-25

## 2022-03-19 PROBLEM — E04.2 MULTINODULAR GOITER: Status: ACTIVE | Noted: 2019-01-25

## 2022-03-19 PROBLEM — I10 BENIGN HYPERTENSION: Status: ACTIVE | Noted: 2020-12-30

## 2022-03-19 PROBLEM — M54.2 NECK PAIN: Status: ACTIVE | Noted: 2019-02-25

## 2022-03-19 PROBLEM — J44.9 CHRONIC OBSTRUCTIVE PULMONARY DISEASE (HCC): Status: ACTIVE | Noted: 2021-08-18

## 2022-03-20 PROBLEM — M75.101 NONTRAUMATIC TEAR OF RIGHT ROTATOR CUFF: Status: ACTIVE | Noted: 2020-02-03

## 2022-03-20 PROBLEM — G47.00 INSOMNIA: Status: ACTIVE | Noted: 2018-12-19

## 2022-03-20 PROBLEM — K58.2 IRRITABLE BOWEL SYNDROME WITH BOTH CONSTIPATION AND DIARRHEA: Status: ACTIVE | Noted: 2018-09-17

## 2022-03-20 PROBLEM — R79.89 LOW TSH LEVEL: Status: ACTIVE | Noted: 2019-03-13

## 2022-03-20 PROBLEM — F13.20 BENZODIAZEPINE DEPENDENCE (HCC): Status: ACTIVE | Noted: 2021-08-18

## 2022-03-20 PROBLEM — E78.00 PURE HYPERCHOLESTEROLEMIA: Status: ACTIVE | Noted: 2019-08-28

## 2022-03-20 PROBLEM — J31.0 CHRONIC RHINITIS: Status: ACTIVE | Noted: 2022-02-02

## 2022-03-22 ENCOUNTER — HOSPITAL ENCOUNTER (OUTPATIENT)
Dept: PHYSICAL THERAPY | Age: 77
Discharge: HOME | End: 2022-03-22
Payer: COMMERCIAL

## 2022-03-22 PROCEDURE — 97110 THERAPEUTIC EXERCISES: CPT

## 2022-03-22 PROCEDURE — 97112 NEUROMUSCULAR REEDUCATION: CPT

## 2022-03-22 NOTE — PROGRESS NOTES
Marleen Betancourt  : 1945  Primary: [employer]  Secondary:  [address] at [place] [address] [facility], [address] [place] [address],[address], [place] [address].  [phone]   [phone]     OUTPATIENT PHYSICAL THERAPY: Daily Treatment Note 3/22/2022  Visit Count:  79    ICD-10: Treatment Diagnosis: Low back pain (M54.5)  Precautions/Allergies:   Adhesive, Alendronate sodium, Antihistamine [triprolidine-pseudoephedrine], and Boniva [ibandronate]   TREATMENT PLAN:  Effective Dates: 2/10/2022 TO 2022 (90 days). Frequency/Duration: 2 times a week for 90 Days      Pre-treatment Symptoms/Complaints:   Patient reports she has been hurting over the past several days. \"I saw the doctor on Friday. He didn't say much. He wants me to come eight more visits\". Pain: Initial: Pain Intensity 1: 6  Pain Location 1: Back  Pain Orientation 1: Lower  Post Session:  5/10   Medications Last Reviewed:  3/22/2022  Updated Objective Findings:  None Today  TREATMENT:     THERAPEUTIC EXERCISE: (30 minutes):  Exercises per grid below to improve strength. Required minimal verbal cues to promote proper body alignment. Progressed repetitions as indicated.    Date:  3/15/2022 Date:  3/22/2022 Date:  3/10/2022   Activity/Exercise Parameters Parameters Parameters   Nustep  Level 4 x 8 minutes Level 4 x 10 minutes  Level 4 x 8 minutes   Single knee to chest stretch X X X   Piriformis stretch 3x30 sec bilateral  3x30 sec bilateral  3x30 sec    Hamstring stretch with ankle pumps  3x30 sec bilateral  3x30 sec bilateral  3x30 sec bilateral    Supine lower trunk rotation 20 reps bilateral 20 reps bilateral  20 reps bilateral    Double knee to chest stretch 3x30 sec  3x30 sec  3x30 sec                     Supine straight leg raise 2x10 reps bilateral 2x10 reps bilateral 2x10 reps bilateral   Supine isometric hip flexion X 10 reps bilateral with 5 second hold 20 reps bilateral   Pelvic tilts 20 reps X X   Curl ups X X X   Double knee lifts X X X   Inversion table 10 minutes- 1 minute on, 1 minute off 10 minutes- 1 minute on, 1 minute off 10 minutes- 1 minute on, 1 minute off               . MANUAL THERAPY: (10 minutes): Joint mobilization and Soft tissue mobilization was utilized and necessary because of the patient's restricted joint motion and painful spasm. Patient received trigger point release along bilateral SI region/bilateral lumbar paraspinals to decrease pain. Skin intact after treatment. [facility]  Treatment/Session Summary:    Response to Treatment:  Patient tolerated treatment well. Patient reports no increase in pain after therapy.         Communication/Consultation:  None today  Equipment provided today:  None today  Recommendations/Intent for next treatment session: Next visit will focus on range of motion, stretching, and manual.    Total Treatment Billable Duration:   40 minutes   PT Patient Time In/Time Out  Time In: 0935  Time Out: Eulogio Evans, PT    Future Appointments   Date Time Provider Radha Bautista   3/24/2022  9:30 AM Denisa Peacock, PT [facility] SFE   3/28/2022  9:30 AM Vissage, Lavonda Schlatter, PT [facility] SFE   3/30/2022  8:45 AM Gerson Dyllan, PT [facility] SFE   3/30/2022 10:10 AM MAT LAB [facility]   3/30/2022 12:10 PM SFE DEXA BI GE LUNAR DEXA SFERMAM SFE   4/6/2022 11:00 AM Jeyson Mae DO SSA MAT [facility]   4/13/2022  8:45 AM Gerson Dyllan, PT [facility] SFE   4/27/2022  8:45 AM Gerson Dyllan, PT [facility] SFE   9/20/2022  1:30 PM ENDO NURSE END BS ENDO   9/27/2022  1:40 PM Kevin Anderson MD END BS ENDO     Visit # Therapist initials Date Arrived NS/ Cx < 24 hr >24 hr Cx Visit Comments   1 PV 8/19/2021 X   Initial Assessment and Treatment- worker comp- 12 visits approved   2 PV 8/26/2021 X      3 PV 9/2/2021 X      4  PV 9/9/2021 X      5 PV 9/16/2021 X      6 PV  10/1/2021 X      7 PV 10/12/2021 X      8  PV 10/28/2021 X      9 PV 28/21/6556 X   Recertification note   10 PV 11/23/2021 X      11 PV 1/6/2022 X   12 more visits approved through Worker's comp 2/28/2022    PV 1/14/2022  X     12 PV 1/21/2022 X      13 PV 9/53/2299 X   Recertification note   14 PV 2/17/2022 X      15 PV 2/24/2022 X      16 PV 3/3/2022 X   End date 3/31/2022 for 7 visits   17 PV 3/8/2022 X      18 PV 3/10/2022 X      19 PV 3/15/2022 X      20 PV 3/22/2022 X                                            Abbreviations: NS = No Show; CX = cancelled

## 2022-03-24 ENCOUNTER — HOSPITAL ENCOUNTER (OUTPATIENT)
Dept: PHYSICAL THERAPY | Age: 77
Discharge: HOME | End: 2022-03-24
Payer: COMMERCIAL

## 2022-03-24 PROCEDURE — 97140 MANUAL THERAPY 1/> REGIONS: CPT

## 2022-03-24 PROCEDURE — 97110 THERAPEUTIC EXERCISES: CPT

## 2022-03-24 NOTE — PROGRESS NOTES
Darrel Ibarra  : 1945  Primary: [employer]  Secondary:  [address] at [facility]  [address] [facility], [address] [place] [address],[address], [place] [address].  [phone]   [phone]     OUTPATIENT PHYSICAL THERAPY: Daily Treatment Note 3/24/2022  Visit Count:  80    ICD-10: Treatment Diagnosis: Low back pain (M54.5)  Precautions/Allergies:   Adhesive, Alendronate sodium, Antihistamine [triprolidine-pseudoephedrine], and Boniva [ibandronate]   TREATMENT PLAN:  Effective Dates: 2/10/2022 TO 2022 (90 days). Frequency/Duration: 2 times a week for 90 Days      Pre-treatment Symptoms/Complaints:   Patient reports she is hurting today. Pain: Initial: Pain Intensity 1: 6  Pain Location 1: Back  Pain Orientation 1: Lower  Post Session:  5/10   Medications Last Reviewed:  3/24/2022  Updated Objective Findings:  None Today  TREATMENT:     THERAPEUTIC EXERCISE: (30 minutes):  Exercises per grid below to improve strength. Required minimal verbal cues to promote proper body alignment. Progressed repetitions as indicated.    Date:  3/15/2022 Date:  3/22/2022 Date:  3/24/2022   Activity/Exercise Parameters Parameters Parameters   Nustep  Level 4 x 8 minutes Level 4 x 10 minutes  X   Single knee to chest stretch X X X   Piriformis stretch 3x30 sec bilateral  3x30 sec bilateral  3x30 sec    Hamstring stretch with ankle pumps  3x30 sec bilateral  3x30 sec bilateral  3x30 sec bilateral    Supine lower trunk rotation 20 reps bilateral 20 reps bilateral  20 reps bilateral    Double knee to chest stretch 3x30 sec  3x30 sec  3x30 sec                     Supine straight leg raise 2x10 reps bilateral 2x10 reps bilateral 2x10 reps bilateral   Supine isometric hip flexion X 10 reps bilateral with 5 second hold 20 reps bilateral   Pelvic tilts 20 reps X 20 reps   Curl ups X X X   Double knee lifts X X 10 reps   Inversion table 10 minutes- 1 minute on, 1 minute off 10 minutes- 1 minute on, 1 minute off 10 minutes- 1 minute on, 1 minute off               . MANUAL THERAPY: (10 minutes): Joint mobilization and Soft tissue mobilization was utilized and necessary because of the patient's restricted joint motion and painful spasm. Patient received trigger point release along bilateral SI region/bilateral lumbar paraspinals to decrease pain. Skin intact after treatment. [employer] Portal  Treatment/Session Summary:    Response to Treatment:  Patient reports slight decrease in pain after treatment.          Communication/Consultation:  None today  Equipment provided today:  None today  Recommendations/Intent for next treatment session: Next visit will focus on range of motion, stretching, and manual.    Total Treatment Billable Duration:   40 minutes   PT Patient Time In/Time Out  Time In: 0930  Time Out: Eulogio Evans, PT    Future Appointments   Date Time Provider Radha Bautista   3/28/2022  9:30 AM Cande Salinas, PT [facility] SFE   3/30/2022  8:45 AM Radha Marlow, PT [facility] SFE   3/30/2022 10:10 AM MAT LAB [facility] MAT [facility]   3/30/2022 12:10 PM SFE DEXA BI GE LUNAR DEXA SFERMAM SFE   4/6/2022 11:00 AM DO JUSTYNA Cook MAT [facility]   4/13/2022  8:45 AM Radha Marlow, PT [facility] SFE   4/27/2022  8:45 AM Radha Marlow, PT [facility] SFE   9/20/2022  1:30 PM ENDO NURSE END BS ENDO   9/27/2022  1:40 PM Gertrudis Mari MD END BS ENDO     Visit # Therapist initials Date Arrived NS/ Cx < 24 hr >24 hr Cx Visit Comments   1 PV 8/19/2021 X   Initial Assessment and Treatment- worker comp- 12 visits approved   2 PV 8/26/2021 X      3 PV 9/2/2021 X      4  PV 9/9/2021 X      5 PV 9/16/2021 X      6 PV  10/1/2021 X      7 PV 10/12/2021 X      8  PV 10/28/2021 X      9 PV 69/76/2597 X   Recertification note   10 PV 11/23/2021 X      11 PV 1/6/2022 X   12 more visits approved through Worker's comp 2/28/2022    PV 1/14/2022  X     12 PV 1/21/2022 X      13 PV 2/36/1249 X   Recertification note   14 PV 2/17/2022 X      15 PV 2/24/2022 X      16 PV 3/3/2022 X   End date 3/31/2022 for 7 visits   17 PV 3/8/2022 X      18 PV 3/10/2022 X      19 PV 3/15/2022 X      20 PV 3/22/2022 X      21 PV 3/24/2022 X                                   Abbreviations: NS = No Show; CX = cancelled

## 2022-03-28 ENCOUNTER — HOSPITAL ENCOUNTER (OUTPATIENT)
Dept: PHYSICAL THERAPY | Age: 77
Discharge: HOME | End: 2022-03-28
Payer: COMMERCIAL

## 2022-03-28 PROCEDURE — 97140 MANUAL THERAPY 1/> REGIONS: CPT

## 2022-03-28 PROCEDURE — 97110 THERAPEUTIC EXERCISES: CPT

## 2022-03-28 NOTE — PROGRESS NOTES
Shira Flores  : 1945  Primary: [employer]  Secondary:  [address] at [place] [address] [facility], [address] [place] [address],[address], [place] [address].  [phone]   [phone]     OUTPATIENT PHYSICAL THERAPY: Daily Treatment Note 3/28/2022  Visit Count:  81    ICD-10: Treatment Diagnosis: Low back pain (M54.5)  Precautions/Allergies:   Adhesive, Alendronate sodium, Antihistamine [triprolidine-pseudoephedrine], and Boniva [ibandronate]   TREATMENT PLAN:  Effective Dates: 2/10/2022 TO 2022 (90 days). Frequency/Duration: 2 times a week for 90 Days      Pre-treatment Symptoms/Complaints:   Patient reports yesterday was a good day. \"It wasn't to bad this morning. I could stand up\". Pain: Initial: Pain Intensity 1: 4  Pain Location 1: Back  Pain Orientation 1: Lower  Post Session:  2/10   Medications Last Reviewed:  3/28/2022  Updated Objective Findings:  See progress note  TREATMENT:     THERAPEUTIC EXERCISE: (30 minutes):  Exercises per grid below to improve strength. Required minimal verbal cues to promote proper body alignment. Progressed repetitions as indicated.    Date:  3/28/2022 Date:  3/22/2022 Date:  3/24/2022   Activity/Exercise Parameters Parameters Parameters   Nustep  Level 4 x 6 minutes Level 4 x 10 minutes  X   Single knee to chest stretch X X X   Piriformis stretch 3x30 sec bilateral  3x30 sec bilateral  3x30 sec    Hamstring stretch with ankle pumps  3x30 sec bilateral  3x30 sec bilateral  3x30 sec bilateral    Supine lower trunk rotation 20 reps bilateral 20 reps bilateral  20 reps bilateral    Double knee to chest stretch 3x30 sec  3x30 sec  3x30 sec                     Supine straight leg raise 2x10 reps bilateral 2x10 reps bilateral 2x10 reps bilateral   Supine isometric hip flexion X 10 reps bilateral with 5 second hold 20 reps bilateral   Pelvic tilts 20 reps X 20 reps   Curl ups X X X   Double knee lifts X X 10 reps   Inversion table 10 minutes- 1 minute on, 1 minute off 10 minutes- 1 minute on, 1 minute off 10 minutes- 1 minute on, 1 minute off               . MANUAL THERAPY: (10 minutes): Joint mobilization and Soft tissue mobilization was utilized and necessary because of the patient's restricted joint motion and painful spasm. Patient received trigger point release along bilateral SI region/bilateral lumbar paraspinals to decrease pain. Skin intact after treatment. [employer] Portal  Treatment/Session Summary:    Response to Treatment:  Patient reports decrease in pain after manual therapy.          Communication/Consultation:  None today  Equipment provided today:  None today  Recommendations/Intent for next treatment session: Next visit will focus on range of motion, stretching, and manual.    Total Treatment Billable Duration:   40 minutes   PT Patient Time In/Time Out  Time In: 0930  Time Out: Eulogio Evans, PT    Future Appointments   Date Time Provider Radha Bautista   3/30/2022  8:45 AM Yoni Wen, PT [facility] SFE   3/30/2022 10:10 AM MAT LAB [facility]   3/30/2022 12:10 PM SFE DEXA BI GE LUNAR DEXA SFERMAM SFE   4/6/2022 11:00 AM Anabella Méndez DO SSA MAT [facility]   4/13/2022  8:45 AM Yoni Wen, PT [facility] SFE   4/27/2022  8:45 AM Yoni Wen, PT [facility] SFE   9/20/2022  1:30 PM ENDO NURSE END BS ENDO   9/27/2022  1:40 PM Molly Marinelli MD END BS ENDO     Visit # Therapist initials Date Arrived NS/ Cx < 24 hr >24 hr Cx Visit Comments   1 PV 8/19/2021 X   Initial Assessment and Treatment- worker comp- 12 visits approved   2 PV 8/26/2021 X      3 PV 9/2/2021 X      4  PV 9/9/2021 X      5 PV 9/16/2021 X      6 PV  10/1/2021 X      7 PV 10/12/2021 X      8  PV 10/28/2021 X      9 PV 66/20/3233 X   Recertification note   10 PV 11/23/2021 X      11 PV 1/6/2022 X   12 more visits approved through Worker's comp 2/28/2022    PV 1/14/2022  X     12 PV 1/21/2022 X      13 PV 7/79/7753 X   Recertification note   14 PV 2/17/2022 X      15 PV 2/24/2022 X      16 PV 3/3/2022 X   End date 3/31/2022 for 7 visits   17 PV 3/8/2022 X      18 PV 3/10/2022 X      19 PV 3/15/2022 X      20 PV 3/22/2022 X      21 PV 3/24/2022 X      22 PV 3/28/2022 X   Progress note                       Abbreviations: NS = No Show; CX = cancelled

## 2022-03-28 NOTE — PROGRESS NOTES
Beltran Flores  : 1945  Primary: [facility]  Secondary:  [address] at [place] [id number] [facility], [address] [place] [address],[address], [facility] [address]  [phone]   [phone]        OUTPATIENT PHYSICAL THERAPY:Progress Report 3/28/2022   ICD-10: Treatment Diagnosis: Low back pain (M54.5)  Precautions/Allergies:   Prednisone, Adhesive, Alendronate sodium, Antihistamine [triprolidine-pseudoephedrine], Antihistamine-1, Boniva [ibandronate], and Triamcinolone   TREATMENT PLAN:  Effective Dates: 2/10/2022 TO 2022 (90 days). Frequency/Duration: 1-2 times a week for 90 Days     MEDICAL/REFERRING DIAGNOSIS:  Low back pain [M54.5]   DATE OF ONSET: May 1, 2020   REFERRING PHYSICIAN: Sahara Ching*  MD Orders: Evaluate and treat   Return MD Appointment: unknown      INITIAL ASSESSMENT:  Ms. Christopher Oquendo presents with increased pain, decreased strength, and increased muscular tightness. Patient would benefit from skilled physical therapy to address problems and goals. Thank you for this referral.       Progress note:  Patient has attended twenty-two scheduled physical therapy appointments from 2021 to 3/28/2022. Patient is compliant with home exercise program for stretching and strengthening. Patient continues to experience temporary relief from back pain with physical therapy. Patient reports seeing her MD a week ago this past Friday. MD would like patient to continue once a week for eight more weeks. Patient would benefit from continuing skilled physical therapy to work on problems and goals.   Thank you for this referral.     PROBLEM LIST (Impacting functional limitations):  Decreased Strength  Increased Pain  Decreased Flexibility/Joint Mobility  Decreased [place] with Home Exercise Program INTERVENTIONS PLANNED: (Treatment may consist of any combination of the following)  Cold  Heat  Home Exercise Program (HEP)  Manual Therapy  Range of Motion (ROM)  Therapeutic Exercise/Strengthening     GOALS: (Goals have been discussed and agreed upon with patient.)  Short-Term Functional Goals: Time Frame: 30 days   Patient will decrease score on Modified Oswestry Low Back Pain Questionnaire to less than or equal to 21 demonstrating improvement. Goal ongoing. Discharge Goals: Time Frame: 90 days   Patient will decrease score on Modified Oswestry Low Back Pain Questionnaire to less than or equal to 18 demonstrating improvement. Goal ongoing. Patient will report being able to shower and fix meals with less complaints of back pain. Goal ongoing. Patient will increase core strength to greater than or equal to 4/5 to improve ease with mobility. Goal ongoing. OUTCOME MEASURE:   Tool Used: Modified Oswestry Low Back Pain Questionnaire  Score:  Initial: 23/50  Most Recent: 22/50 (Date: 3- )   Interpretation of Score: Each section is scored on a 0-5 scale, 5 representing the greatest disability. The scores of each section are added together for a total score of 50. MEDICAL NECESSITY:   Patient is expected to demonstrate progress in strength, range of motion and pain to improve ease with mobility. REASON FOR SERVICES/OTHER COMMENTS:  Patient continues to require skilled intervention due to increased pain interfering with activities of daily living. PAIN/SUBJECTIVE:   Initial: Pain Intensity 1: 4  Pain Location 1: Back  Pain Orientation 1: Lower  Post Session:  2/10   HISTORY:   History of Injury/Illness (Reason for Referral):  Patient was treated in the clinic for her back pain from December 2020 to April 2021. Patient originally injured her back in May 2020 when a patient jumped on her while working at [employer]. Patient continues to complain of an achy pain along bilateral lower back. Patient rates pain level as 4/10. Symptoms fluctuate per patient report.   Patient reports occasionally having numbness in right lower extremity and radiating pain down left leg. Past Medical History/Comorbidities:   Ms. Gigi Siegel  has a past medical history of Abnormal Pap smear, Anxiety, Back problem, COPD (chronic obstructive pulmonary disease) ([address] Depression, Hyperthyroidism, Insomnia, Irritable bowel syndrome, and Osteopenia. She has no past medical history of Difficult intubation, Malignant hyperthermia due to anesthesia, Nausea & vomiting, Pseudocholinesterase deficiency, or Unspecified adverse effect of anesthesia. Ms. Gigi Siegel  has a past surgical history that includes hx tonsillectomy; hx orthopaedic; hx salpingo-oophorectomy (Right, 01/22/2014); and hx colonoscopy. Social History/Living Environment:     Lives alone in two story home. Prior Level of Function/Work/Activity:  Independent. Currently not working. Dominant Side:         RIGHT  Personal Factors:          Sex:  female        Age:  69 y.o. Ambulatory/Rehab Services H2 Model Falls Risk Assessment   Risk Factors:       No Risk Factors Identified Ability to Rise from Chair:       (1)  Pushes up, successful in one attempt   Falls Prevention Plan:       No modifications necessary   Total: (5 or greater = High Risk): 1   ©2010 [facility]. All Rights Reserved. [facility] Patent #[id number]. Federal Law prohibits the replication, distribution or use without written permission from [facility] [employer]   Current Medications:       Current Outpatient Medications:     Nebulizer & Compressor machine, Use as directed. Dx J44.9, Disp: 1 Each, Rfl: 0    OTHER, Spacer for inhaler, Disp: 1 Device, Rfl: 1    DULoxetine (Cymbalta) 30 mg capsule, Take 1 Capsule by mouth every morning., Disp: 90 Capsule, Rfl: 2    lisinopriL (PRINIVIL, ZESTRIL) 10 mg tablet, Take 1 Tablet by mouth daily. , Disp: 90 Tablet, Rfl: 2    albuterol (PROVENTIL VENTOLIN) 2.5 mg /3 mL (0.083 %) nebu, 3 mL by Nebulization route every six (6) hours as needed for Wheezing, Shortness of Breath or Respiratory Distress. , Disp: 30 Nebule, Rfl: 5    albuterol (PROVENTIL HFA, VENTOLIN HFA, PROAIR HFA) 90 mcg/actuation inhaler, Take 1 Puff by inhalation every six (6) hours as needed for Wheezing, Shortness of Breath or Respiratory Distress. , Disp: 18 g, Rfl: 3    guaiFENesin ER (MUCINEX) 600 mg ER tablet, Take 2 Tablets by mouth two (2) times a day., Disp: 30 Tablet, Rfl: 0    Nebulizer & Compressor machine, Use as directed., Disp: 1 Each, Rfl: 0    calcium carbonate (CALCIUM 500 PO), 500 mg daily. , Disp: , Rfl:     temazepam (RESTORIL) 7.5 mg capsule, Take 1 Capsule by mouth nightly as needed for Sleep. Max Daily Amount: 7.5 mg., Disp: 30 Capsule, Rfl: 0    meloxicam (MOBIC) 7.5 mg tablet, Take 7.5 mg by mouth daily. , Disp: , Rfl:     b complex vitamins tablet, Take 1 Tab by mouth every morning. Holding for surgery, Disp: , Rfl:     ascorbic acid (VITAMIN C) 500 mg tablet, Take 500 mg by mouth every morning. Holding for surgery, Disp: , Rfl:     multivitamin (ONE A DAY) tablet, Take 1 Tab by mouth every morning. Holding for surgery, Disp: , Rfl:    Date Last Reviewed:  3/28/2022   Number of Personal Factors/Comorbidities that affect the Plan of Care: 0: LOW COMPLEXITY   EXAMINATION:   Observation/Orthostatic Postural Assessment: Forward head/rounded shoulders posture. Palpation:          Increased tenderness along bilateral SI region/bilateral lumbar paraspinals. ROM:          Lumbar range of motion is as follows: flexion within normal limits, extension 25% with pain, rotation within normal limits, lateral flexion 75%. Retested on 11/11/2021: flexion within normal limits, extension 10%, rotation within normal limits, and lateral flexion within normal limits. Patient reports increased back pain with extension.   Retested on 3/28/2022:  Flexion within normal limits, extension 10% with increased pain, rotation within normal limits, left lateral flexion 75% with pain, right lateral flexion within normal limits. Strength:          Hip flexion 4-/5, hip abduction 5/5, hip adduction 5/5, quadriceps 5/5, hamstrings 5/5, ankle dorsiflexion 5/5, ankle plantarflexion 5/5, core strength 3+/5. Retested on 11/11/2021: hip flexion 3+/5, hip abduction right 4+/5 and left 4/5, hip adduction 5/5, quadriceps right 5/5 and left 4+/5, hamstrings right 5/5 and left 4/5, ankle plantarflexion 5/5, ankle dorsiflexion 5/5. Retested on 3/28/2022: hip flexion 4/5, hip abduction 5/5, hip adduction 5/5, quadriceps 5/5, hamstrings 5/5, ankle dorsiflexion 5/5, ankle plantarflexion 5/5, core strength 4-/5. Special Tests:          Straight leg raise negative bilateral.  Piriformis test negative bilateral.   Neurological Screen:        Sensation: Within normal limits. Functional Mobility:         Gait/Ambulation:  Patient ambulates with normal gait pattern.     Body Structures Involved:  Nerves  Joints  Muscles Body Functions Affected:  Neuromusculoskeletal Activities and Participation Affected:  General Tasks and Demands  Mobility  Community, Social and Civic Life   Number of elements (examined above) that affect the Plan of Care: 4+: HIGH COMPLEXITY   CLINICAL PRESENTATION:   Presentation: Stable and uncomplicated: LOW COMPLEXITY   CLINICAL DECISION MAKING:   Use of outcome tool(s) and clinical judgement create a POC that gives a: Clear prediction of patient's progress: LOW COMPLEXITY

## 2022-03-30 ENCOUNTER — HOSPITAL ENCOUNTER (OUTPATIENT)
Dept: MAMMOGRAPHY | Age: 77
Discharge: HOME | End: 2022-03-30
Attending: NURSE PRACTITIONER
Payer: MEDICARE

## 2022-03-30 ENCOUNTER — HOSPITAL ENCOUNTER (OUTPATIENT)
Dept: PHYSICAL THERAPY | Age: 77
Discharge: HOME | End: 2022-03-30
Payer: MEDICARE

## 2022-03-30 DIAGNOSIS — M85.80 OSTEOPENIA, UNSPECIFIED LOCATION: ICD-10-CM

## 2022-03-30 DIAGNOSIS — M85.88 OTHER SPECIFIED DISORDERS OF BONE DENSITY AND STRUCTURE, OTHER SITE: ICD-10-CM

## 2022-03-30 PROCEDURE — 77080 DXA BONE DENSITY AXIAL: CPT

## 2022-03-30 PROCEDURE — 97140 MANUAL THERAPY 1/> REGIONS: CPT

## 2022-03-30 NOTE — PROGRESS NOTES
Neela Hampton  : 1945  Primary:   Secondary:  [address] at [address] [facility]  [address] [facility], [address] [place] [address],[address], [place] [address].  [phone]   [phone]         OUTPATIENT PHYSICAL THERAPY: Daily Treatment Note 3/30/2022  ICD-10: Treatment Diagnosis: Cervicalgia (M54.2)  Pre-treatment Symptoms/Complaints:  3/30/2022: Patient reports her arm has been tingling lately. Pain: Initial: Pain Intensity 1: 4  Pain Location 1: Neck,Shoulder  Pain Orientation 1: Right,Left  Pain Intervention(s) 1: Rest,Medication (see MAR)  Post Session:  3/10   Medications Last Reviewed:  3/30/2022   Updated Objective Findings:  None Today   TREATMENT:   MANUAL THERAPY: (40 minutes): Joint mobilization and Soft tissue mobilization was utilized and necessary because of the patient's restricted joint motion, painful spasm, loss of articular motion and restricted motion of soft tissue. Treatment/Session Summary:    · Response to Treatment:  Patient tolerated treatment well with improved mobility noted after treatment. · Communication/Consultation:  None today  · Equipment provided today:  None today  · Recommendations/Intent for next treatment session: Next visit will focus on improving overall mobility with decreased pain.   Treatment Plan of Care Effective Dates:  3/1/2022 TO 2022 (90 days)  Total Treatment Billable Duration:  40 minutes  PT Patient Time In/Time Out  Time In: 0845  Time Out: [address] [facility], PT     Visit # Therapist initials Date Arrived NS/ Cx < 24 hr >24 hr Cx Visit Comments   61  2021 X   Medicare cap: $2110   59   X   Recert   72  4/77/8855 X       77  10/6/2021 X       79 JS 10/20/2021 X    Progress Note   76 JS 11/3/2021 X       69  11/10/2021 X       70   X    Recert   71  92/09/6262 X       72 JS 12/22/2021 X       73 JS 2022 X    Progress note   74  1/20/2022 X      75  2/2/2022 X      76  2022 X   Progress note   68 JS 4/1/7664 X   Recertification   66 JS 3/16/2022 X      79 JS 3/30/202 X                 Abbreviations: NS = No Show; CX = cancelled     Future Appointments   Date Time Provider Radha Lily   3/30/2022  8:45 AM Alverto Adamson PT [facility] SFE   3/30/2022 10:10 AM [facility]   3/30/2022 12:10 PM SFE DEXA BI GE LUNAR DEXA SFERMAM SFE   4/6/2022 11:00 AM Jason Frederick DO SSA MAT [facility]   4/13/2022  8:45 AM Alverto Adamson PT [facility]PT SFE   4/27/2022  8:45 AM Alverto Adamson PT [facility]PT SFE   9/20/2022  1:30 PM ENDO NURSE END BS ENDO   9/27/2022  1:40 PM Donna Cartwright MD END BS ENDO

## 2022-04-04 NOTE — PROGRESS NOTES
Please call patient to let her know that her bone density scan is slightly worse than when it was done in 2020.  I recommend that we restart the Fosamax that she took in the past.

## 2022-04-13 ENCOUNTER — HOSPITAL ENCOUNTER (OUTPATIENT)
Dept: PHYSICAL THERAPY | Age: 77
Discharge: HOME | End: 2022-04-13
Payer: MEDICARE

## 2022-04-13 PROCEDURE — 97140 MANUAL THERAPY 1/> REGIONS: CPT

## 2022-04-13 NOTE — PROGRESS NOTES
Joseph Mulligan  : 1945  Primary:   Secondary:  [address] at [place] [id number] [facility], [address] [place] [address],[address] Floor [facility], [place] [address].  [phone]   [phone]         OUTPATIENT PHYSICAL THERAPY: Daily Treatment Note 2022  ICD-10: Treatment Diagnosis: Cervicalgia (M54.2)  Pre-treatment Symptoms/Complaints:  2022: Patient reports her shoulder is hurting more today. Pain: Initial: Pain Intensity 1: 4  Pain Location 1: Neck,Shoulder  Pain Orientation 1: Right,Left  Pain Intervention(s) 1: Rest,Medication (see MAR)  Post Session:  3/10   Medications Last Reviewed:  2022   Updated Objective Findings:  None Today   TREATMENT:   MANUAL THERAPY: (40 minutes): Joint mobilization and Soft tissue mobilization was utilized and necessary because of the patient's restricted joint motion, painful spasm, loss of articular motion and restricted motion of soft tissue. Treatment/Session Summary:    · Response to Treatment:  Patient tolerated treatment well with improved mobility noted after treatment. · Communication/Consultation:  None today  · Equipment provided today:  None today  · Recommendations/Intent for next treatment session: Next visit will focus on improving overall mobility with decreased pain.   Treatment Plan of Care Effective Dates:  3/1/2022 TO 2022 (90 days)  Total Treatment Billable Duration:  40 minutes  PT Patient Time In/Time Out  Time In: 0845  Time Out: 6[address] [facility], PT     Visit # Therapist initials Date Arrived NS/ Cx < 24 hr >24 hr Cx Visit Comments   61  2021 X   Medicare cap: $2110   59  5/3/50025 X   Recert   72  7/99/8470 X       77 JS 10/6/2021 X       79 JS 10/20/2021 X    Progress Note   76 JS 11/3/2021 X       69  11/10/2021 X       70   X    Recert   71 JS 80//9746 X       72 JS 12/22/2021 X       73 JS 2022 X    Progress note   74  1/20/2022 X      75  2/2/2022 X      76  2022 X Progress note   68 JS 7/3/8463 X   Recertification   78 JS 3/16/2022 X      79 JS 3/30/202 X      80 JS 4/13/2022 X        Abbreviations: NS = No Show; CX = cancelled     Future Appointments   Date Time Provider Radha Lily   4/13/2022  8:45 AM Aleene Orchard, PT [facility]FE[facility]PT SFE   4/27/2022  8:45 AM Aleene Orchard, PT SFEORPT SFE   5/20/2022  1:20 PM DO JUSTYNA Gamnio [facility]   9/20/2022  1:30 PM ENDO NURSE END BS ENDO   9/27/2022  1:40 PM Oh Marquez MD END BS ENDO

## 2022-04-28 ENCOUNTER — HOSPITAL ENCOUNTER (OUTPATIENT)
Dept: PHYSICAL THERAPY | Age: 77
Discharge: HOME | End: 2022-04-28
Payer: MEDICARE

## 2022-04-28 PROCEDURE — 97140 MANUAL THERAPY 1/> REGIONS: CPT

## 2022-04-28 NOTE — PROGRESS NOTES
Armida Jackson  : 1945  Primary:   Secondary:  [address] at [address] [facility]  [address] [facility], [address] [place] [address],[address], [address] [facility]  [phone]   [phone]         OUTPATIENT PHYSICAL THERAPY: Daily Treatment Note 2022  ICD-10: Treatment Diagnosis: Cervicalgia (M54.2)  Pre-treatment Symptoms/Complaints:  2022: Patient reports her neck and shoulder are hurting today. Pain: Initial: Pain Intensity 1: 4  Pain Location 1: Neck,Shoulder  Pain Orientation 1: Right,Left  Pain Intervention(s) 1: Rest,Medication (see MAR)  Post Session:  3/10   Medications Last Reviewed:  2022   Updated Objective Findings:  None Today   TREATMENT:   MANUAL THERAPY: (40 minutes): Joint mobilization and Soft tissue mobilization was utilized and necessary because of the patient's restricted joint motion, painful spasm, loss of articular motion and restricted motion of soft tissue. Treatment/Session Summary:    · Response to Treatment:  Patient tolerated treatment well with improved mobility noted after treatment. · Communication/Consultation:  None today  · Equipment provided today:  None today  · Recommendations/Intent for next treatment session: Next visit will focus on improving overall mobility with decreased pain.   Treatment Plan of Care Effective Dates:  3/1/2022 TO 2022 (90 days)  Total Treatment Billable Duration:  40 minutes  PT Patient Time In/Time Out  Time In: 0845  Time Out: 6[address] [facility], PT     Visit # Therapist initials Date Arrived NS/ Cx < 24 hr >24 hr Cx Visit Comments   61  2021 X   Medicare cap: $2110   59 JS 0/3/43249 X   Recert   72 JS 1245 X       77 JS 10/6/2021 X       79 JS 10/20/2021 X    Progress Note   76 JS 11/3/2021 X       69 JS 11/10/2021 X       70 JS  X    Recert   71 JS 79/35/4485 X       72 JS 12/22/2021 X       73 JS 2022 X    Progress note   74 JS 1/20/2022 X      75 JS 2/2/2022 X      76 JS 2022 X Progress note   68 JS 3/8/8941 X   Recertification   78 JS 3/16/2022 X      79 JS 3/30/202 X      80 JS 4/13/2022 X      81 JS 4/28/2022 X   Progress Note                                Abbreviations: NS = No Show; CX = cancelled     Future Appointments   Date Time Provider Radha Lily   4/28/2022  8:45 AM Alverto Adamson, PT [facility] SFE   5/11/2022  8:45 AM Kitty Rodríguez, PT SFEORPT E   5/20/2022  1:20 PM DO JUSTYNA [facility]   9/20/2022  1:30 PM ENDO NURSE END BS ENDO   9/27/2022  1:40 PM Donna Cartwright MD END BS ENDO

## 2022-04-28 NOTE — PROGRESS NOTES
Michael Wing  : 1945  Primary:   Secondary:  [address] at [place] [id number] [facility], [address] [place] [address],[address], [facility].  [phone]   [phone]          OUTPATIENT PHYSICAL THERAPY:Progress Report 2022   ICD-10: Treatment Diagnosis: Cervicalgia (M54.2)  Precautions/Allergies:   Prednisone, Adhesive, Alendronate sodium, Antihistamine [triprolidine-pseudoephedrine], Antihistamine-1, Boniva [ibandronate], and Triamcinolone   MD Orders: Evaluate and Treat MEDICAL/REFERRING DIAGNOSIS:  Cervicalgia [M54.2]   DATE OF ONSET: Chronic  REFERRING PHYSICIAN:  Coreen Handy MD   RETURN PHYSICIAN APPOINTMENT: TBD     ASSESSMENT:  Ms. Adonis Franco presents with improving mobility and pain in cervical region secondary to degenerative changes. Patient has attended a total of 81 scheduled physical therapy visits. Treatment has consisted of stretching, strengthening, and manual therapy to improve overall pain and performance with activities of daily living. PROBLEM LIST (Impacting functional limitations):  1. Decreased Strength  2. Decreased ADL/Functional Activities  3. Increased Pain  4. Decreased Activity Tolerance INTERVENTIONS PLANNED: (Treatment may consist of any combination of the following)  1. Cold  2. Heat  3. Home Exercise Program (HEP)  4. Manual Therapy  5. Neuromuscular Re-education/Strengthening  6. Range of Motion (ROM)  7. Therapeutic Activites  8. Therapeutic Exercise/Strengthening   TREATMENT PLAN:  Effective Dates:  3/1/2022 TO 2022  (90 days). Frequency/Duration: 1 time every other week for 90 Day(s)  GOALS: (Goals have been discussed and agreed upon with patient.)  Short-Term Functional Goals: Time Frame: 30 days  1. Patient will be independent with home exercise program without exacerbation of symptoms or cueing needed--goal met.    2. Patient will be independent with correct sleeping positions and awareness/avoidance of aggravating positions without cueing needed--goal met. Discharge Goals: Time Frame: 90 days  1. Patient will be independent with all ADLs with minimal onset of neck pain and no deficits with daily tasks--goal ongoing. 2. Patient will report no fear avoidance with social or recreational activities due to neck pain --goal ongoing. 3. Patient will score less than or equal to 7/50 on Neck Disability Index with minimal effect of neck pain on patient's ability to manage every day life activities--goal ongoing. Rehabilitation Potential For Stated Goals: Good               HISTORY:   History of Present Injury/Illness (Reason for Referral):  1/5/2022 (Progress Note): Patient reports her shoulders and neck are very painful overall. 2/16/202 (Progress Note): Patient reports her right shoulder and left neck are the more painful now. 3/7/3646 (Recertification): Patient reports her neck and shoulder are doing ok, but she has been having some tingling in her arm.    4/28/2022 (Progress Note): Patient reports her neck and shoulder are improving, but she will still have pain if she does too much. Past Medical History/Comorbidities:   Ms. Ronald Pantoja  has a past medical history of Abnormal Pap smear, Anxiety, Back problem, COPD (chronic obstructive pulmonary disease) ([facility] [address] Depression, Hyperthyroidism, Insomnia, Irritable bowel syndrome, and Osteopenia. She has no past medical history of Difficult intubation, Malignant hyperthermia due to anesthesia, Nausea & vomiting, Pseudocholinesterase deficiency, or Unspecified adverse effect of anesthesia. Ms. Ronald Pantoja  has a past surgical history that includes hx tonsillectomy; hx orthopaedic; hx salpingo-oophorectomy (Right, 01/22/2014); and hx colonoscopy.   Social History/Living Environment:   Home Environment: Private residence  Living Alone: Yes  Support Systems: Family member(s), Friends \ neighbors  Prior Level of Function/Work/Activity:  Independent  Dominant Side:         RIGHT  Personal Factors:          Sex:  female        Age:  69 y.o. Current Medications:       Current Outpatient Medications:     tiotropium (SPIRIVA) 18 mcg inhalation capsule, Take 1 Capsule by inhalation daily. , Disp: 30 Capsule, Rfl: 2    temazepam (RESTORIL) 15 mg capsule, Take 1 Capsule by mouth nightly as needed for Sleep. Max Daily Amount: 15 mg., Disp: 30 Capsule, Rfl: 1    alendronate (FOSAMAX) 70 mg tablet, Take 1 Tablet by mouth every seven (7) days. , Disp: 13 Tablet, Rfl: 1    Nebulizer & Compressor machine, Use as directed. Dx J44.9, Disp: 1 Each, Rfl: 0    OTHER, Spacer for inhaler, Disp: 1 Device, Rfl: 1    DULoxetine (Cymbalta) 30 mg capsule, Take 1 Capsule by mouth every morning., Disp: 90 Capsule, Rfl: 2    lisinopriL (PRINIVIL, ZESTRIL) 10 mg tablet, Take 1 Tablet by mouth daily. , Disp: 90 Tablet, Rfl: 2    albuterol (PROVENTIL VENTOLIN) 2.5 mg /3 mL (0.083 %) nebu, 3 mL by Nebulization route every six (6) hours as needed for Wheezing, Shortness of Breath or Respiratory Distress. , Disp: 30 Nebule, Rfl: 5    albuterol (PROVENTIL HFA, VENTOLIN HFA, PROAIR HFA) 90 mcg/actuation inhaler, Take 1 Puff by inhalation every six (6) hours as needed for Wheezing, Shortness of Breath or Respiratory Distress. , Disp: 18 g, Rfl: 3    guaiFENesin ER (MUCINEX) 600 mg ER tablet, Take 2 Tablets by mouth two (2) times a day., Disp: 30 Tablet, Rfl: 0    Nebulizer & Compressor machine, Use as directed., Disp: 1 Each, Rfl: 0    calcium carbonate (CALCIUM 500 PO), 500 mg daily. , Disp: , Rfl:     meloxicam (MOBIC) 7.5 mg tablet, Take 7.5 mg by mouth daily. , Disp: , Rfl:     b complex vitamins tablet, Take 1 Tab by mouth every morning. Holding for surgery, Disp: , Rfl:     ascorbic acid (VITAMIN C) 500 mg tablet, Take 500 mg by mouth every morning. Holding for surgery, Disp: , Rfl:     multivitamin (ONE A DAY) tablet, Take 1 Tab by mouth every morning.  Holding for surgery, Disp: , Rfl:    Date Last Reviewed:  4/28/2022 EXAMINATION:   Observation/Orthostatic Postural Assessment:          Posture Assessment: Rounded shoulders, Forward head   Palpation:          Tightness and tenderness to palpation noted throughout upper and lower cervical musculature. No bruising or swelling noted. ROM:          Cervical Assessment (AROM):  · Flexion: 75% of full AROM  · Extension: 75% of full AROM  · Right side bendin% of full AROM  · Left side bending[de-identified] 75% of full AROM  · Right rotation: 75% of full AROM  · Left rotation: 75% of full AROM  Strength:          Cervical Assessment (Strength):  · Grossly 2+/5 with manual muscle testing  Special Tests:          Negative Hautant's test. Negative Cranial-[place] lift test. Negative [place]-Axis Sheer test. Negative Alar Ligament test. Negative Tectorial Membrane test.   Neurological Screen:        Dermatomes: Within normal limits        Reflexes:  2+  Functional Mobility:         Gait/Mobility:    ·   Independent         Transfers:     · Sit to Stand: Independent  · Stand to Sit: Independent  · Stand Pivot Transfers: Independent  · Bed to Chair: Independent  · Lateral Transfers: Independent         Bed Mobility:     · Rolling: Independent  · Supine to Sit: Independent  · Sit to Supine: Independent  · Scooting: Independent        CLINICAL DECISION MAKING:   Outcome Measure: Tool Used: Neck Disability Index (NDI)  Score:  Initial:   Most Recent:  (Date: 2022 )   Interpretation of Score: The Neck Disability Index is a revised form of the Oswestry Low Back Pain Index and is designed to measure the activities of daily living in person's with neck pain. Each section is scored on a 0-5 scale, 5 representing the greatest disability. The scores of each section are added together for a total score of 50. Medical Necessity:   · Patient is expected to demonstrate progress in strength and range of motion to improve safety during daily activities.   · Patient demonstrates good rehab potential due to higher previous functional level. · Skilled intervention continues to be required due to decreased mobility. Reason for Services/Other Comments:  · Patient continues to demonstrate capacity to improve overall mobility which will increase independence and increase safety.

## 2022-05-02 ENCOUNTER — HOSPITAL ENCOUNTER (OUTPATIENT)
Dept: PHYSICAL THERAPY | Age: 77
Setting detail: RECURRING SERIES
Discharge: HOME | End: 2022-05-05
Payer: MEDICARE

## 2022-05-03 ENCOUNTER — HOSPITAL ENCOUNTER (OUTPATIENT)
Dept: PHYSICAL THERAPY | Age: 77
Discharge: HOME | End: 2022-05-03
Payer: COMMERCIAL

## 2022-05-03 PROCEDURE — 97140 MANUAL THERAPY 1/> REGIONS: CPT

## 2022-05-03 PROCEDURE — 97110 THERAPEUTIC EXERCISES: CPT

## 2022-05-03 NOTE — THERAPY RECERTIFICATION
Beltran Martin  : 1945  Primary: [facility]  Secondary:  [address] at [address], [address] [place] [address],[address], [place] [address].  [phone]   [phone]        OUTPATIENT PHYSICAL THERAPY:Recertification 1/3/2301   ICD-10: Treatment Diagnosis: Low back pain (M54.5)  Precautions/Allergies:   Prednisone, Adhesive, Alendronate sodium, Antihistamine [triprolidine-pseudoephedrine], Antihistamine-1, Boniva [ibandronate], and Triamcinolone   TREATMENT PLAN:  Effective Dates: 5/3/2022 TO 2022 (90 days). Frequency/Duration: 1-2 times a week for 90 Days       MEDICAL/REFERRING DIAGNOSIS:  Low back pain [M54.5]   DATE OF ONSET: May 1, 2020   REFERRING PHYSICIAN: Cherrie Kinsey*  MD Orders: Evaluate and treat   Return MD Appointment: unknown      INITIAL ASSESSMENT:  Ms. Abraham Villa presents with increased pain, decreased strength, and increased muscular tightness. Patient would benefit from skilled physical therapy to address problems and goals. Thank you for this referral.       Recertification note:  Patient has attended twenty-three scheduled physical therapy appointments from 2021 to 5/3/2022. Patient is compliant with home exercise program for stretching and strengthening. Patient continues to experience temporary relief from back pain with physical therapy. Patient has been approved for eight additional visits through [facility]. Patient would benefit from continuing skilled physical therapy to work on problems and goals. Thank you for this referral.     PROBLEM LIST (Impacting functional limitations):  1. Decreased Strength  2. Increased Pain  3. Decreased Flexibility/Joint Mobility  4. Decreased [place] with Home Exercise Program INTERVENTIONS PLANNED: (Treatment may consist of any combination of the following)  1. Cold  2. Heat  3. Home Exercise Program (HEP)  4. Manual Therapy  5. Range of Motion (ROM)  6.  Therapeutic Exercise/Strengthening     GOALS: (Goals have been discussed and agreed upon with patient.)  Short-Term Functional Goals: Time Frame: 30 days   1. Patient will decrease score on Modified Oswestry Low Back Pain Questionnaire to less than or equal to 21 demonstrating improvement. Goal ongoing. Discharge Goals: Time Frame: 90 days   1. Patient will decrease score on Modified Oswestry Low Back Pain Questionnaire to less than or equal to 18 demonstrating improvement. Goal ongoing. 2. Patient will report being able to shower and fix meals with less complaints of back pain. Goal ongoing. 3. Patient will increase core strength to greater than or equal to 4/5 to improve ease with mobility. Goal ongoing. OUTCOME MEASURE:   Tool Used: Modified Oswestry Low Back Pain Questionnaire  Score:  Initial: 23/50  Most Recent: 22/50 (Date: 3- )   Interpretation of Score: Each section is scored on a 0-5 scale, 5 representing the greatest disability. The scores of each section are added together for a total score of 50. MEDICAL NECESSITY:   · Patient is expected to demonstrate progress in strength, range of motion and pain to improve ease with mobility. REASON FOR SERVICES/OTHER COMMENTS:  · Patient continues to require skilled intervention due to increased pain interfering with activities of daily living. Regarding [place]'s therapy, I certify that the treatment plan above will be carried out by a therapist or under their direction. Thank you for this referral,  Melissa Harden PT     Referring Physician Signature: Armaan Saucedo _______________________________ Date _____________         PAIN/SUBJECTIVE:   Initial:   4/10 Post Session:  2/10   HISTORY:   History of Injury/Illness (Reason for Referral):  Patient was treated in the clinic for her back pain from December 2020 to April 2021.   Patient originally injured her back in May 2020 when a patient jumped on her while working at [employer] for [address]. Patient continues to complain of an achy pain along bilateral lower back. Patient rates pain level as 4/10. Symptoms fluctuate per patient report. Patient reports occasionally having numbness in right lower extremity and radiating pain down left leg. Past Medical History/Comorbidities:   Ms. Lexie [place]  has a past medical history of Abnormal Pap smear, Anxiety, Back problem, COPD (chronic obstructive pulmonary disease) ([address] Depression, Hyperthyroidism, Insomnia, Irritable bowel syndrome, and Osteopenia. She has no past medical history of Difficult intubation, Malignant hyperthermia due to anesthesia, Nausea & vomiting, Pseudocholinesterase deficiency, or Unspecified adverse effect of anesthesia. Ms. Lexie [place]  has a past surgical history that includes hx tonsillectomy; hx orthopaedic; hx salpingo-oophorectomy (Right, 01/22/2014); and hx colonoscopy. Social History/Living Environment:     Lives alone in two Fellows home. Prior Level of Function/Work/Activity:  Independent. Currently not working. Dominant Side:         RIGHT  Personal Factors:          Sex:  female        Age:  69 y.o. Ambulatory/Rehab Services H2 Model Falls Risk Assessment   Risk Factors:       No Risk Factors Identified Ability to Rise from Chair:       (1)  Pushes up, successful in one attempt   Falls Prevention Plan:       No modifications necessary   Total: (5 or greater = High Risk): 1   ©2010 [facility] [employer]. All Rights Reserved. [facility] Patent #[id number]. Federal Law prohibits the replication, distribution or use without written permission from [facility] [employer]   Current Medications:       Current Outpatient Medications:     tiotropium (SPIRIVA) 18 mcg inhalation capsule, Take 1 Capsule by inhalation daily. , Disp: 30 Capsule, Rfl: 2    temazepam (RESTORIL) 15 mg capsule, Take 1 Capsule by mouth nightly as needed for Sleep.  Max Daily Amount: 15 mg., Disp: 30 Capsule, Rfl: 1    alendronate (FOSAMAX) 70 mg tablet, Take 1 Tablet by mouth every seven (7) days. , Disp: 13 Tablet, Rfl: 1    Nebulizer & Compressor machine, Use as directed. Dx J44.9, Disp: 1 Each, Rfl: 0    OTHER, Spacer for inhaler, Disp: 1 Device, Rfl: 1    DULoxetine (Cymbalta) 30 mg capsule, Take 1 Capsule by mouth every morning., Disp: 90 Capsule, Rfl: 2    lisinopriL (PRINIVIL, ZESTRIL) 10 mg tablet, Take 1 Tablet by mouth daily. , Disp: 90 Tablet, Rfl: 2    albuterol (PROVENTIL VENTOLIN) 2.5 mg /3 mL (0.083 %) nebu, 3 mL by Nebulization route every six (6) hours as needed for Wheezing, Shortness of Breath or Respiratory Distress. , Disp: 30 Nebule, Rfl: 5    albuterol (PROVENTIL HFA, VENTOLIN HFA, PROAIR HFA) 90 mcg/actuation inhaler, Take 1 Puff by inhalation every six (6) hours as needed for Wheezing, Shortness of Breath or Respiratory Distress. , Disp: 18 g, Rfl: 3    guaiFENesin ER (MUCINEX) 600 mg ER tablet, Take 2 Tablets by mouth two (2) times a day., Disp: 30 Tablet, Rfl: 0    Nebulizer & Compressor machine, Use as directed., Disp: 1 Each, Rfl: 0    calcium carbonate (CALCIUM 500 PO), 500 mg daily. , Disp: , Rfl:     meloxicam (MOBIC) 7.5 mg tablet, Take 7.5 mg by mouth daily. , Disp: , Rfl:     b complex vitamins tablet, Take 1 Tab by mouth every morning. Holding for surgery, Disp: , Rfl:     ascorbic acid (VITAMIN C) 500 mg tablet, Take 500 mg by mouth every morning. Holding for surgery, Disp: , Rfl:     multivitamin (ONE A DAY) tablet, Take 1 Tab by mouth every morning. Holding for surgery, Disp: , Rfl:    Date Last Reviewed:  5/3/2022   Number of Personal Factors/Comorbidities that affect the Plan of Care: 0: LOW COMPLEXITY   EXAMINATION:   Observation/Orthostatic Postural Assessment: Forward head/rounded shoulders posture. Palpation:          Increased tenderness along bilateral SI region/bilateral lumbar paraspinals.   ROM:          Lumbar range of motion is as follows: flexion within normal limits, extension 25% with pain, rotation within normal limits, lateral flexion 75%. Retested on 11/11/2021: flexion within normal limits, extension 10%, rotation within normal limits, and lateral flexion within normal limits. Patient reports increased back pain with extension. Retested on 3/28/2022:  Flexion within normal limits, extension 10% with increased pain, rotation within normal limits, left lateral flexion 75% with pain, right lateral flexion within normal limits. Strength:          Hip flexion 4-/5, hip abduction 5/5, hip adduction 5/5, quadriceps 5/5, hamstrings 5/5, ankle dorsiflexion 5/5, ankle plantarflexion 5/5, core strength 3+/5. Retested on 11/11/2021: hip flexion 3+/5, hip abduction right 4+/5 and left 4/5, hip adduction 5/5, quadriceps right 5/5 and left 4+/5, hamstrings right 5/5 and left 4/5, ankle plantarflexion 5/5, ankle dorsiflexion 5/5. Retested on 3/28/2022: hip flexion 4/5, hip abduction 5/5, hip adduction 5/5, quadriceps 5/5, hamstrings 5/5, ankle dorsiflexion 5/5, ankle plantarflexion 5/5, core strength 4-/5. Special Tests:          Straight leg raise negative bilateral.  Piriformis test negative bilateral.   Neurological Screen:        Sensation: Within normal limits. Functional Mobility:         Gait/Ambulation:  Patient ambulates with normal gait pattern. Body Structures Involved:  1. Nerves  2. Joints  3. Muscles Body Functions Affected:  1. Neuromusculoskeletal Activities and Participation Affected:  1. General Tasks and Demands  2. Mobility  3.  Community, Social and [place]   Number of elements (examined above) that affect the Plan of Care: 4+: HIGH COMPLEXITY   CLINICAL PRESENTATION:   Presentation: Stable and uncomplicated: LOW COMPLEXITY   CLINICAL DECISION MAKING:   Use of outcome tool(s) and clinical judgement create a POC that gives a: Clear prediction of patient's progress: LOW COMPLEXITY

## 2022-05-03 NOTE — PROGRESS NOTES
Kalani Marquez  : 1945  Primary: [facility]  Secondary:  [address] at [facility]  [facility], [address] [place] [address],[address], [place] [address].  [phone]   [phone]     OUTPATIENT PHYSICAL THERAPY: Daily Treatment Note 5/3/2022  Visit Count:  23    ICD-10: Treatment Diagnosis: Low back pain (M54.5)  Precautions/Allergies:   Adhesive, Alendronate sodium, Antihistamine [triprolidine-pseudoephedrine], and Boniva [ibandronate]   TREATMENT PLAN:  Effective Dates: 5/3/2022 TO 2022 (90 days). Frequency/Duration: 1-2 times a week for 90 Days      Pre-treatment Symptoms/Complaints:  \"I took some Tylenol last night. The pain is not to bad today. It was rough yesterday\". Pain: Initial: Pain Intensity 1: (P) 4  Pain Location 1: (P) Back  Pain Orientation 1: (P) Lower  Post Session:  2/10   Medications Last Reviewed:  5/3/2022  Updated Objective Findings:  Recertification note  TREATMENT:     THERAPEUTIC EXERCISE: (30 minutes):  Exercises per grid below to improve strength. Required minimal verbal cues to promote proper body alignment. Progressed repetitions as indicated.    Date:  3/28/2022 Date:  5/3/2022 Date:  3/24/2022   Activity/Exercise Parameters Parameters Parameters   Nustep  Level 4 x 6 minutes Level 4 x 10 minutes  X   Single knee to chest stretch X X X   Piriformis stretch 3x30 sec bilateral  3x30 sec bilateral  3x30 sec    Hamstring stretch with ankle pumps  3x30 sec bilateral  3x30 sec bilateral  3x30 sec bilateral    Supine lower trunk rotation 20 reps bilateral 20 reps bilateral  20 reps bilateral    Double knee to chest stretch 3x30 sec  3x30 sec  3x30 sec                     Supine straight leg raise 2x10 reps bilateral 2x10 reps bilateral 2x10 reps bilateral   Supine isometric hip flexion X 10 reps bilateral with 5 second hold 20 reps bilateral   Pelvic tilts 20 reps X 20 reps   Curl ups X X X   Double knee lifts X X 10 reps   Inversion table 10 minutes- 1 minute on, 1 minute off 10 minutes- 1 minute on, 1 minute off 10 minutes- 1 minute on, 1 minute off               . MANUAL THERAPY: (15 minutes): Joint mobilization and Soft tissue mobilization was utilized and necessary because of the patient's restricted joint motion and painful spasm. Patient received trigger point release along bilateral SI region/bilateral lumbar paraspinals to decrease pain. Skin intact after treatment. [employer]  Treatment/Session Summary:    Response to Treatment:  Patient reports decreased tightness and pain after treatment.          Communication/Consultation:  None today  Equipment provided today:  None today  Recommendations/Intent for next treatment session: Next visit will focus on range of motion, stretching, and manual.    Total Treatment Billable Duration:   45 minutes   PT Patient Time In/Time Out  Time In: 0845  Time Out: Francesca Sotomayor 66., PT    Future Appointments   Date Time Provider Radha Bautista   5/10/2022  8:45 AM Ruslan Chaparro, [facility] SFE   5/11/2022 11:00 AM Woodard Nageotte, PT SFEORPT E   5/17/2022  8:45 AM Amanda Ashford, PT SFE[facility]PT SFE   5/20/2022  1:20 PM DO JUSTYNA Gallegos [facility]   9/20/2022  1:30 PM ENDO NURSE END BS ENDO   9/27/2022  1:40 PM Mateusz Wu MD END BS ENDO     Visit # Therapist initials Date Arrived NS/ Cx < 24 hr >24 hr Cx Visit Comments   1 PV 8/19/2021 X   Initial Assessment and Treatment- worker comp- 12 visits approved   2 PV 8/26/2021 X      3 PV 9/2/2021 X      4  PV 9/9/2021 X      5 PV 9/16/2021 X      6 PV  10/1/2021 X      7 PV 10/12/2021 X      8  PV 10/28/2021 X      9 PV 04/80/8760 X   Recertification note   10 PV 11/23/2021 X      11 PV 1/6/2022 X   12 more visits approved through Worker's comp 2/28/2022    PV 1/14/2022  X     12 PV 1/21/2022 X      13 PV 0/34/1454 X   Recertification note   14 PV 2/17/2022 X      15 PV 2/24/2022 X      16 PV 3/3/2022 X   End date 3/31/2022 for 7 visits   17 PV 3/8/2022 X      18 PV 3/10/2022 X      19 PV 3/15/2022 X      20 PV 3/22/2022 X      21 PV 3/24/2022 X      22 PV 3/28/2022 X   Progress note   23 PV 1/1/6202 X   Recertification note.   8 more visits approved              Abbreviations: NS = No Show; CX = cancelled

## 2022-05-10 ENCOUNTER — HOSPITAL ENCOUNTER (OUTPATIENT)
Dept: PHYSICAL THERAPY | Age: 77
Discharge: HOME | End: 2022-05-10
Payer: COMMERCIAL

## 2022-05-10 PROCEDURE — 97140 MANUAL THERAPY 1/> REGIONS: CPT

## 2022-05-10 PROCEDURE — 97110 THERAPEUTIC EXERCISES: CPT

## 2022-05-10 NOTE — PROGRESS NOTES
Joseph Mulligan  : 1945  Primary: [place] [facility] [employer]  Secondary:  [address] at [address] [address] [facility], [address] [place] [address],[address], [address] [facility]  [phone]   [phone]     OUTPATIENT PHYSICAL THERAPY: Daily Treatment Note 5/10/2022  Visit Count:  24    ICD-10: Treatment Diagnosis: Low back pain (M54.5)  Precautions/Allergies:   Adhesive, Alendronate sodium, Antihistamine [triprolidine-pseudoephedrine], and Boniva [ibandronate]   TREATMENT PLAN:  Effective Dates: 5/3/2022 TO 2022 (90 days). Frequency/Duration: 1-2 times a week for 90 Days      Pre-treatment Symptoms/Complaints:  \"I am hurting more today\". Pain: Initial: Pain Intensity 1: 5  Pain Location 1: Back  Pain Orientation 1: Lower  Post Session:  4/10   Medications Last Reviewed:  5/10/2022  Updated Objective Findings:  None Today  TREATMENT:     THERAPEUTIC EXERCISE: (30 minutes):  Exercises per grid below to improve strength. Required minimal verbal cues to promote proper body alignment. Progressed repetitions as indicated.    Date:  3/28/2022 Date:  5/3/2022 Date:  5/10/2022   Activity/Exercise Parameters Parameters Parameters   Nustep  Level 4 x 6 minutes Level 4 x 10 minutes  Level 4 x 10 minutes   Single knee to chest stretch X X X   Piriformis stretch 3x30 sec bilateral  3x30 sec bilateral  3x30 sec    Hamstring stretch with ankle pumps  3x30 sec bilateral  3x30 sec bilateral  3x30 sec bilateral    Supine lower trunk rotation 20 reps bilateral 20 reps bilateral  20 reps bilateral    Double knee to chest stretch 3x30 sec  3x30 sec  3x30 sec                     Supine straight leg raise 2x10 reps bilateral 2x10 reps bilateral 2x10 reps bilateral   Supine isometric hip flexion X 10 reps bilateral with 5 second hold X   Pelvic tilts 20 reps X 20 reps   Curl ups X X X   Double knee lifts X X X   Inversion table 10 minutes- 1 minute on, 1 minute off 10 minutes- 1 minute on, 1 minute off 10 minutes- 1 minute on, 1 minute off               . MANUAL THERAPY: (15 minutes): Joint mobilization and Soft tissue mobilization was utilized and necessary because of the patient's restricted joint motion and painful spasm. Patient received trigger point release along bilateral SI region/bilateral lumbar paraspinals to decrease pain. Skin intact after treatment. [employer] Portal  Treatment/Session Summary:    Response to Treatment:  Patient reports slight decrease in pain after treatment. Communication/Consultation:  None today  Equipment provided today:  None today  Recommendations/Intent for next treatment session: Next visit will focus on range of motion, stretching, and manual.    Total Treatment Billable Duration:   45 minutes   PT Patient Time In/Time Out  Time In: 0845  Time Out: [address] PT    Future Appointments   Date Time Provider Radha Bautista   5/11/2022 11:00 AM [facility]   5/17/2022  8:45 AM Janie Ashford, PT [facility]   5/20/2022  1:20 PM DO JUSTYNA Cedillo [facility]     Visit # Therapist initials Date Arrived NS/ Cx < 24 hr >24 hr Cx Visit Comments   1 PV 8/19/2021 X   Initial Assessment and Treatment- worker comp- 12 visits approved   2 PV 8/26/2021 X      3 PV 9/2/2021 X      4  PV 9/9/2021 X      5 PV 9/16/2021 X      6 PV  10/1/2021 X      7 PV 10/12/2021 X      8  PV 10/28/2021 X      9 PV 12/87/1324 X   Recertification note   10 PV 11/23/2021 X      11 PV 1/6/2022 X   12 more visits approved through Worker's comp 2/28/2022    PV 1/14/2022  X     12 PV 1/21/2022 X      13 PV 2/71/3133 X   Recertification note   14 PV 2/17/2022 X      15 PV 2/24/2022 X      16 PV 3/3/2022 X   End date 3/31/2022 for 7 visits   17 PV 3/8/2022 X      18 PV 3/10/2022 X      19 PV 3/15/2022 X      20 PV 3/22/2022 X      21 PV 3/24/2022 X      22 PV 3/28/2022 X   Progress note   23 PV 8/4/4198 X   Recertification note.   8 more visits approved   24 PV 5/10/2022 X                                                     Abbreviations: NS = No Show; CX = cancelled

## 2022-05-11 ENCOUNTER — HOSPITAL ENCOUNTER (OUTPATIENT)
Dept: PHYSICAL THERAPY | Age: 77
Discharge: HOME | End: 2022-05-11
Payer: MEDICARE

## 2022-05-11 PROCEDURE — 97140 MANUAL THERAPY 1/> REGIONS: CPT

## 2022-05-11 PROCEDURE — 9990 CHARGE CONVERSION

## 2022-05-11 NOTE — PROGRESS NOTES
Juana Radha  : 1945  Primary:   Secondary:  [address] at [address] [address] [facility], [address] [place] [address],[address], [address]  [phone]   [phone]         OUTPATIENT PHYSICAL THERAPY: Daily Treatment Note 2022  ICD-10: Treatment Diagnosis: Cervicalgia (M54.2)  Pre-treatment Symptoms/Complaints:  2022: Patient states her neck isn't feeling too bad today, pain 2/10. Pain: Initial:    Post Session:  2/10   Medications Last Reviewed:  2022   Updated Objective Findings:  None Today   TREATMENT:   MANUAL THERAPY: (40 minutes): Joint mobilization and Soft tissue mobilization was utilized and necessary because of the patient's restricted joint motion, painful spasm, loss of articular motion and restricted motion of soft tissue. Treatment/Session Summary:    · Response to Treatment:  Patient tolerated treatment well with improved mobility noted after treatment. Pt requested deep soft tissue work through neck and shoulders. · Communication/Consultation:  None today  · Equipment provided today:  None today  · Recommendations/Intent for next treatment session: Next visit will focus on improving overall mobility with decreased pain.   Treatment Plan of Care Effective Dates:  3/1/2022 TO 2022 (90 days)  Total Treatment Billable Duration:  40 minutes  PT Patient Time In/Time Out  Time In: 2356  Time Out: [address], PT     Visit # Therapist initials Date Arrived NS/ Cx < 24 hr >24 hr Cx Visit Comments   61  2021 X   Medicare cap: $2110   59  84892 X   Recert   72  8/29/7021 X       77  10/6/2021 X       79 JS 10/20/2021 X    Progress Note   68  11/3/2021 X       69 JS 11/10/2021 X       70   X    Recert   71  39/71/3316 X       72  12/22/2021 X       73  2022 X    Progress note   74  1/20/2022 X      75  2/2/2022 X      76  2022 X   Progress note   77  6508 X   Recertification   78  3/16/2022 X      79 JS 3/30/202 X      80 JS 4/13/2022 X      81 JS 4/28/2022 X   Progress Note   82  05-11-22 X                          Abbreviations: NS = No Show; CX = cancelled     Future Appointments   Date Time Provider Radha Bautista   5/17/2022  8:45 AM Eric Ashford, PT [facility]   5/20/2022  1:20 PM DO JUSTYNA Benítez [facility]

## 2022-05-17 ENCOUNTER — HOSPITAL ENCOUNTER (OUTPATIENT)
Dept: PHYSICAL THERAPY | Age: 77
Discharge: HOME | End: 2022-05-17
Payer: COMMERCIAL

## 2022-05-17 PROCEDURE — 97140 MANUAL THERAPY 1/> REGIONS: CPT

## 2022-05-17 PROCEDURE — 97110 THERAPEUTIC EXERCISES: CPT

## 2022-05-17 NOTE — PROGRESS NOTES
Barak Rojo  : 1945  Primary: [place] [employer]  Secondary:  [address] at [address] [address] [facility], [address] [place] [address],[address], [facility].  [phone]   [phone]     OUTPATIENT PHYSICAL THERAPY: Daily Treatment Note 2022  Visit Count:  25    ICD-10: Treatment Diagnosis: Low back pain (M54.5)  Precautions/Allergies:   Adhesive, Alendronate sodium, Antihistamine [triprolidine-pseudoephedrine], and Boniva [ibandronate]   TREATMENT PLAN:  Effective Dates: 5/3/2022 TO 2022 (90 days). Frequency/Duration: 1-2 times a week for 90 Days      Pre-treatment Symptoms/Complaints: \"The past couple of days have been better. The pain always flares when I stand for a long time\". Pain: Initial: Pain Intensity 1: 3  Pain Location 1: Back  Pain Orientation 1: Lower  Post Session:  3/10   Medications Last Reviewed:  2022  Updated Objective Findings:  None Today  TREATMENT:     THERAPEUTIC EXERCISE: (30 minutes):  Exercises per grid below to improve strength. Required minimal verbal cues to promote proper body alignment. Progressed repetitions as indicated.    Date:  2022 Date:  5/3/2022 Date:  5/10/2022   Activity/Exercise Parameters Parameters Parameters   Nustep  Level 4 x 8 minutes Level 4 x 10 minutes  Level 4 x 10 minutes   Single knee to chest stretch X X X   Piriformis stretch 3x30 sec bilateral  3x30 sec bilateral  3x30 sec    Hamstring stretch with ankle pumps  3x30 sec bilateral  3x30 sec bilateral  3x30 sec bilateral    Supine lower trunk rotation 20 reps bilateral 20 reps bilateral  20 reps bilateral    Double knee to chest stretch 3x30 sec  3x30 sec  3x30 sec                     Supine straight leg raise 2x10 reps bilateral 2x10 reps bilateral 2x10 reps bilateral   Supine isometric hip flexion X 10 reps bilateral with 5 second hold X   Pelvic tilts 20 reps X 20 reps   Curl ups X X X   Double knee lifts X X X   Inversion table 10 minutes- 1 minute on, 1 minute off 10 minutes- 1 minute on, 1 minute off 10 minutes- 1 minute on, 1 minute off               . MANUAL THERAPY: (15 minutes): Joint mobilization and Soft tissue mobilization was utilized and necessary because of the patient's restricted joint motion and painful spasm. Patient received trigger point release along bilateral SI region/bilateral lumbar paraspinals to decrease pain. Skin intact after treatment. [employer] Portal  Treatment/Session Summary:    Response to Treatment:  Patient tolerated exercises without complaints. Communication/Consultation:  None today  Equipment provided today:  None today  Recommendations/Intent for next treatment session: Next visit will focus on range of motion, stretching, and manual.    Total Treatment Billable Duration:   45 minutes   PT Patient Time In/Time Out  Time In: 0845  Time Out: [address] PT    Future Appointments   Date Time Provider Radha Bautista   5/20/2022  1:20 PM DO JUSTYNA Andrea [facility]     Visit # Therapist initials Date Arrived NS/ Cx < 24 hr >24 hr Cx Visit Comments   1 PV 8/19/2021 X   Initial Assessment and Treatment- worker comp- 12 visits approved   2 PV 8/26/2021 X      3 PV 9/2/2021 X      4  PV 9/9/2021 X      5 PV 9/16/2021 X      6 PV  10/1/2021 X      7 PV 10/12/2021 X      8  PV 10/28/2021 X      9 PV 59/93/7149 X   Recertification note   10 PV 11/23/2021 X      11 PV 1/6/2022 X   12 more visits approved through Worker's comp 2/28/2022    PV 1/14/2022  X     12 PV 1/21/2022 X      13 PV 5/07/5193 X   Recertification note   14 PV 2/17/2022 X      15 PV 2/24/2022 X      16 PV 3/3/2022 X   End date 3/31/2022 for 7 visits   17 PV 3/8/2022 X      18 PV 3/10/2022 X      19 PV 3/15/2022 X      20 PV 3/22/2022 X      21 PV 3/24/2022 X      22 PV 3/28/2022 X   Progress note   23 PV 7/3/8407 X   Recertification note.   8 more visits approved   24 PV 5/10/2022 X      25 PV 5/17/2022 X Abbreviations: NS = No Show; CX = cancelled

## 2022-05-24 ENCOUNTER — HOSPITAL ENCOUNTER (OUTPATIENT)
Dept: PHYSICAL THERAPY | Age: 77
Setting detail: RECURRING SERIES
Discharge: HOME | End: 2022-05-27
Payer: COMMERCIAL

## 2022-05-24 PROCEDURE — 97110 THERAPEUTIC EXERCISES: CPT

## 2022-05-24 PROCEDURE — 97140 MANUAL THERAPY 1/> REGIONS: CPT

## 2022-05-24 ASSESSMENT — PAIN DESCRIPTION - ORIENTATION: ORIENTATION: LOWER

## 2022-05-24 ASSESSMENT — PAIN SCALES - GENERAL: PAINLEVEL_OUTOF10: 4

## 2022-05-24 ASSESSMENT — PAIN DESCRIPTION - DESCRIPTORS: DESCRIPTORS: ACHING

## 2022-05-24 ASSESSMENT — PAIN DESCRIPTION - LOCATION: LOCATION: BACK

## 2022-05-24 NOTE — PROGRESS NOTES
straight leg raise 2x10 reps bilateral 2x10 reps bilateral 2x10 reps bilateral   Supine isometric hip flexion X 10 reps bilateral with 5 second hold X   Pelvic tilts 20 reps X 20 reps   Curl ups X X X   Double knee lifts X X X   Inversion table 10 minutes- 1 minute on, 1 minute off X 10 minutes- 1 minute on, 1 minute off                            MANUAL THERAPY: (15 minutes): Joint mobilization and Soft tissue mobilization was utilized and necessary because of the patient's restricted joint motion and painful spasm. Patient received trigger point release along bilateral SI region/bilateral lumbar paraspinals to decrease pain. Skin intact after treatment. Treatment/Session Summary:    Treatment Assessment:   Decreased muscle tightness after manual therapy.   [employer] tolerated exercises without complaints  Communication/Consultation:  None today  Equipment provided today:  None  Recommendations/Intent for next treatment session: Next visit will focus on range of motion, strengthening, and manual.    Total Treatment Billable Duration:  45 minutes  Time In: 0845  Time Out: 0930    DARON Rodriguez, PT       Post Session Pain  Charge Capture  [employer] Portal  MD Guidelines  Scanned Media  Benefits  MyChart

## 2022-05-25 ENCOUNTER — HOSPITAL ENCOUNTER (OUTPATIENT)
Dept: PHYSICAL THERAPY | Age: 77
Setting detail: RECURRING SERIES
Discharge: HOME | End: 2022-05-28
Payer: MEDICARE

## 2022-05-25 ENCOUNTER — TELEPHONE (OUTPATIENT)
Dept: INTERNAL MEDICINE CLINIC | Facility: CLINIC | Age: 77
End: 2022-05-25

## 2022-05-25 PROCEDURE — 97140 MANUAL THERAPY 1/> REGIONS: CPT

## 2022-05-25 ASSESSMENT — PAIN SCALES - GENERAL: PAINLEVEL_OUTOF10: 3

## 2022-05-25 NOTE — PROGRESS NOTES
Uzair Marcano  : 1945  Primary: Medicare Part A And B  Secondary: [address] @ [address] [address]  Phone: [phone]  Fax: [phone] No data recorded  Plan of Care/Certification Expiration Date: 22      PT Visit Info: Total # of Visits to Date: 32      OUTPATIENT PHYSICAL THERAPY:OP NOTE TYPE: Treatment Note 2022       Episode   Appt Desk       Treatment Diagnosis:  Cervicalgia (M54.2)  Medical/Referring Diagnosis:  No admission diagnoses are documented for this encounter. Referring Physician:  Wili Zuniga MD MD Orders:  PT Eval and Treat   Date of Onset:  Onset Date: 20     Allergies:  Prednisone, Ibandronic acid, Triamcinolone, Alendronate sodium, and Triprolidine-pseudoephedrine  Restrictions/Precautions:    No data recordedNo data recorded   Interventions Planned (Treatment may consist of any combination of the following):    No data recorded   Subjective Comments:  Pt reports some tightness in her L cervical region today. She states she also has a little dizziness this morning which she states she has had before with her neck issues. Initial:     3/10 Post Session:     2/10  Medications Last Reviewed:  2022  Updated Objective Findings:  None Today  Treatment   MANUAL THERAPY: (40 minutes):   Joint mobilization and Soft tissue mobilization was utilized and necessary because of the patient's restricted joint motion and restricted motion of soft tissue. To Cervical spine         Treatment/Session Summary:    · Treatment Assessment:   Pt tolerated treatments well today with decreased pain and increased mobility  · Communication/Consultation:  None today  · Equipment provided today:  None  · Recommendations/Intent for next treatment session: Next visit will focus on manual therapy.     Total Treatment Billable Duration:  40 minutes  Time In: 930  Time Out: [address], PT       Post Session Pain  Charge Capture  [employer] Portal  MD Guidelines  Scanned Media  Benefits  MyChart

## 2022-05-25 NOTE — TELEPHONE ENCOUNTER
Patient states she has has a raspy cough in the mornings for past 3 days with clear phlegm. Any suggestions as to what she should do. Please advise.

## 2022-06-01 DIAGNOSIS — E05.90 HYPERTHYROIDISM: Primary | ICD-10-CM

## 2022-06-02 ENCOUNTER — APPOINTMENT (OUTPATIENT)
Dept: PHYSICAL THERAPY | Age: 77
End: 2022-06-02
Payer: COMMERCIAL

## 2022-06-07 ENCOUNTER — HOSPITAL ENCOUNTER (OUTPATIENT)
Dept: PHYSICAL THERAPY | Age: 77
Setting detail: RECURRING SERIES
Discharge: HOME | End: 2022-06-10
Payer: COMMERCIAL

## 2022-06-07 PROCEDURE — 97110 THERAPEUTIC EXERCISES: CPT

## 2022-06-07 PROCEDURE — 97140 MANUAL THERAPY 1/> REGIONS: CPT

## 2022-06-07 ASSESSMENT — PAIN DESCRIPTION - ORIENTATION: ORIENTATION: LOWER

## 2022-06-07 ASSESSMENT — PAIN DESCRIPTION - DESCRIPTORS: DESCRIPTORS: ACHING

## 2022-06-07 ASSESSMENT — PAIN DESCRIPTION - LOCATION: LOCATION: BACK

## 2022-06-07 ASSESSMENT — PAIN SCALES - GENERAL: PAINLEVEL_OUTOF10: 4

## 2022-06-07 NOTE — PROGRESS NOTES
Flakita Marie  : 1945  Primary: [employer]  Secondary:  Jignesh Garcia  [address] [place] [address]  Phone: [phone]  Fax: [phone] No data recorded  Plan of Care/Certification Expiration Date: 22      PT Visit Info: Total # of Visits to Date: 32      OUTPATIENT PHYSICAL THERAPY:OP NOTE TYPE: Treatment Note 2022       Episode   Appt Desk       Treatment Diagnosis:  Low Back Pain (M54.5)  Medical/Referring Diagnosis:  Low back pain, unspecified [M54.50]  Referring Physician:  Yassine Rogers MD Orders:  PT Eval and Treat   Date of Onset:  Onset Date: 20     Allergies:  Prednisone, Ibandronic acid, Triamcinolone, Alendronate sodium, and Triprolidine-pseudoephedrine  Restrictions/Precautions:    No data recordedNo data recorded   Interventions Planned (Treatment may consist of any combination of the following):    No data recorded   Subjective Comments: \"On  pain was 8/10\". Initial: Lower Back 10 Post Session: Lower Back 3/10  Medications Last Reviewed:  2022  Updated Objective Findings:  None Today  Treatment   THERAPEUTIC EXERCISE: (25 minutes):    Exercises per grid below to improve strength. Required minimal verbal cues to promote proper body alignment.   Progressed repetitions as indicated.      Date:  2022 Date:  2022 Date:  2022   Activity/Exercise Parameters Parameters Parameters   Nustep  Level 4 x 8 minutes X Level 4 x 8 minutes   Single knee to chest stretch X X X   Piriformis stretch 3x30 sec bilateral  3x30 sec bilateral  3x30 sec    Hamstring stretch with ankle pumps  3x30 sec bilateral  3x30 sec bilateral  3x30 sec bilateral    Supine lower trunk rotation 20 reps bilateral 20 reps bilateral  20 reps bilateral    Double knee to chest stretch 3x30 sec  3x30 sec  3x30 sec                     Supine straight leg raise 2x10 reps bilateral 2x10 reps bilateral 2x10 reps bilateral   Supine isometric hip flexion X 10 reps bilateral with 5 second hold 10 reps bilateral with 5 second hold   Pelvic tilts 20 reps X 20 reps   Curl ups X X X   Double knee lifts X X X   Inversion table 10 minutes- 1 minute on, 1 minute off X X                            MANUAL THERAPY: (15 minutes): Joint mobilization and Soft tissue mobilization was utilized and necessary because of the patient's restricted joint motion and painful spasm. Patient received trigger point release along bilateral SI region/bilateral lumbar paraspinals to decrease pain. Skin intact after treatment. Treatment/Session Summary:    Treatment Assessment:   Patient tolerated exercises without complaints.   Communication/Consultation:  None today  Equipment provided today:  None  Recommendations/Intent for next treatment session: Next visit will focus on range of motion, strengthening, and manual.    Total Treatment Billable Duration:  40 minutes  Time In: 0850  Time Out: 0930    DARON Carrasco, PT       Post Session Pain  Charge Capture  [employer] Portal  MD Guidelines  Scanned Media  Benefits  [employer]

## 2022-06-09 ENCOUNTER — HOSPITAL ENCOUNTER (OUTPATIENT)
Dept: PHYSICAL THERAPY | Age: 77
Setting detail: RECURRING SERIES
Discharge: HOME | End: 2022-06-12
Payer: MEDICARE

## 2022-06-09 PROCEDURE — 97140 MANUAL THERAPY 1/> REGIONS: CPT

## 2022-06-09 ASSESSMENT — PAIN SCALES - GENERAL: PAINLEVEL_OUTOF10: 3

## 2022-06-09 NOTE — PROGRESS NOTES
Florence Harden  : 1945  Primary: Medicare Part A And B  Secondary: [address] @ [address] [address]  Phone: [phone]  Fax: [phone] Plan Frequency: 1 time per week for every other week for 90 days    Plan of Care/Certification Expiration Date: 22      PT Visit Info: Total # of Visits to Date: 80  Progress Note Due Date: 22      OUTPATIENT PHYSICAL THERAPY:OP NOTE TYPE: Treatment Note 2022       Episode   Appt Desk       Treatment Diagnosis:  Cervicalgia (M54.2)  Medical/Referring Diagnosis:  No admission diagnoses are documented for this encounter. Referring Physician:  Antelmo Turcios MD MD Orders:  PT Eval and Treat   Date of Onset:  Onset Date: 20     Allergies:  Prednisone, Ibandronic acid, Triamcinolone, Alendronate sodium, and Triprolidine-pseudoephedrine  Restrictions/Precautions:    No data recordedNo data recorded   Interventions Planned (Treatment may consist of any combination of the following):    No data recorded   Subjective Comments:  Patient reports she is having some tightness in her shoulder and neck on both sides today   Initial:     3/10 Post Session:     2/10  Medications Last Reviewed:  2022  Updated Objective Findings:  None Today  Treatment   MANUAL THERAPY: (40 minutes): Joint mobilization and Soft tissue mobilization was utilized and necessary because of the patient's restricted joint motion, painful spasm, loss of articular motion and restricted motion of soft tissue. Treatment/Session Summary:    · Treatment Assessment:   Patient tolerated treatment well with improving overall mobility   · Communication/Consultation:  None today  · Equipment provided today:  None  · Recommendations/Intent for next treatment session: Next visit will focus on manual therapy.     Total Treatment Billable Duration:  40 minutes  Time In: 0845  Time Out: [address] ABA, PT       Post Session Pain  Charge Capture  [employer] Portal  MD Guidelines  Scanned Media  Benefits  [employer]
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
## 2022-06-14 ENCOUNTER — HOSPITAL ENCOUNTER (OUTPATIENT)
Dept: PHYSICAL THERAPY | Age: 77
Setting detail: RECURRING SERIES
Discharge: HOME | End: 2022-06-17
Payer: COMMERCIAL

## 2022-06-14 PROCEDURE — 97110 THERAPEUTIC EXERCISES: CPT

## 2022-06-14 PROCEDURE — 97140 MANUAL THERAPY 1/> REGIONS: CPT

## 2022-06-14 ASSESSMENT — PAIN SCALES - GENERAL: PAINLEVEL_OUTOF10: 3

## 2022-06-14 NOTE — PROGRESS NOTES
Elyssa Delacruz  : 1945  Primary: [employer]  Secondary:  Toby Foy  [address] [facility] [address]  Phone: [phone]  Fax: [phone] Plan Frequency: 1 time per week for every other week for 90 days    Plan of Care/Certification Expiration Date: 22      PT Visit Info: Total # of Visits to Date: 29  Progress Note Due Date: 22      OUTPATIENT PHYSICAL THERAPY:OP NOTE TYPE: Treatment Note 2022       Episode   Appt Desk       Treatment Diagnosis:  Low Back Pain (M54.5)  Medical/Referring Diagnosis:  Low back pain, unspecified [M54.50]  Referring Physician:  Irvin Palumbo MD Orders:  PT Eval and Treat   Date of Onset:  Onset Date: 20     Allergies:  Prednisone, Ibandronic acid, Triamcinolone, Alendronate sodium, and Triprolidine-pseudoephedrine  Restrictions/Precautions:    No data recordedNo data recorded   Interventions Planned (Treatment may consist of any combination of the following):    No data recorded   Subjective Comments:  Patient reports she is not hurting as much today. Initial:     3/10 Post Session:     3/10  Medications Last Reviewed:  2022  Updated Objective Findings:  None Today  Treatment   THERAPEUTIC EXERCISE: (25 minutes):    Exercises per grid below to improve strength. Required minimal verbal cues to promote proper body alignment.   Progressed repetitions as indicated.      Date:  2022 Date:  2022 Date:  2022   Activity/Exercise Parameters Parameters Parameters   Nustep  Level 4 x 8 minutes X Level 4 x 8 minutes   Single knee to chest stretch X X X   Piriformis stretch 3x30 sec bilateral  3x30 sec bilateral  3x30 sec    Hamstring stretch with ankle pumps  3x30 sec bilateral  3x30 sec bilateral  3x30 sec bilateral    Supine lower trunk rotation 20 reps bilateral 20 reps bilateral  20 reps bilateral    Double knee to chest stretch 3x30 sec  3x30 sec  3x30 sec Supine straight leg raise 2x10 reps bilateral 2x10 reps bilateral 2x10 reps bilateral   Supine isometric hip flexion X 10 reps bilateral with 5 second hold 10 reps bilateral with 5 second hold   Pelvic tilts 20 reps X 20 reps   Curl ups X X X   Double knee lifts X X X   Inversion table X X X    Quadriped with leg extension  10 reps bilateral                      MANUAL THERAPY: (15 minutes): Joint mobilization and Soft tissue mobilization was utilized and necessary because of the patient's restricted joint motion and painful spasm. Patient received trigger point release along bilateral SI region/bilateral lumbar paraspinals to decrease pain. Skin intact after treatment. Treatment/Session Summary:    Treatment Assessment:   Patient tolerated exercises without complaints of increased back pain. .  Communication/Consultation:  None today  Equipment provided today:  None  Recommendations/Intent for next treatment session: Next visit will focus on range of motion, strengthening, and manual.    Total Treatment Billable Duration:  40 minutes  Time In: 0850  Time Out: 0930    DARON Morfin, PT       Post Session Pain  Charge Capture  [employer] Portal  MD Guidelines  Scanned Media  Benefits  MyChart

## 2022-06-21 ENCOUNTER — HOSPITAL ENCOUNTER (OUTPATIENT)
Dept: PHYSICAL THERAPY | Age: 77
Setting detail: RECURRING SERIES
Discharge: HOME | End: 2022-06-24
Payer: COMMERCIAL

## 2022-06-21 PROCEDURE — 97140 MANUAL THERAPY 1/> REGIONS: CPT

## 2022-06-21 PROCEDURE — 97110 THERAPEUTIC EXERCISES: CPT

## 2022-06-21 ASSESSMENT — PAIN SCALES - GENERAL: PAINLEVEL_OUTOF10: 3

## 2022-06-21 NOTE — PROGRESS NOTES
sec  3x30 sec  3x30 sec                     Supine straight leg raise 2x10 reps bilateral 2x10 reps bilateral 2x10 reps bilateral   Supine isometric hip flexion X 10 reps bilateral with 5 second hold 10 reps bilateral with 5 second hold   Pelvic tilts 20 reps 20 reps 20 reps   Curl ups X X X   Double knee lifts X X X   Inversion table X X X    Quadriped with leg extension  10 reps bilateral                      MANUAL THERAPY: (16 minutes): Joint mobilization and Soft tissue mobilization was utilized and necessary because of the patient's restricted joint motion and painful spasm. Patient received trigger point release along bilateral SI region/bilateral lumbar paraspinals to decrease pain. Skin intact after treatment. Treatment/Session Summary:    Treatment Assessment:   Patient tolerated treatment well. .  Communication/Consultation:  None today  Equipment provided today:  None  Recommendations/Intent for next treatment session: Next visit will focus on range of motion, strengthening, and manual.    Total Treatment Billable Duration:  41 minutes  Time In: 0849  Time Out: 0930    DARON Terry, DEANNA       Post Session Pain  Charge Capture  [email or web address] Portal  MD Guidelines  Scanned Media  Benefits  MyChart

## 2022-06-22 ENCOUNTER — HOSPITAL ENCOUNTER (OUTPATIENT)
Dept: PHYSICAL THERAPY | Age: 77
Setting detail: RECURRING SERIES
Discharge: HOME | End: 2022-06-25
Payer: MEDICARE

## 2022-06-22 PROCEDURE — 97140 MANUAL THERAPY 1/> REGIONS: CPT

## 2022-06-22 ASSESSMENT — PAIN SCALES - GENERAL: PAINLEVEL_OUTOF10: 4

## 2022-06-22 NOTE — PROGRESS NOTES
Anca Mortensen  : 1945  Primary: Medicare Part A And B  Secondary: [address] @ [address] [address]  Phone: [phone]  Fax: [phone] Plan Frequency: 1 time per week for every other week for 90 days    Plan of Care/Certification Expiration Date: 22      PT Visit Info: Total # of Visits to Date: 80  Progress Note Due Date: 22      OUTPATIENT PHYSICAL THERAPY:OP NOTE TYPE: Treatment Note 2022       Episode   Appt Desk       Treatment Diagnosis:  Cervicalgia (M54.2)  Medical/Referring Diagnosis:  No admission diagnoses are documented for this encounter. Referring Physician:  Kelly Monsalve MD MD Orders:  PT Eval and Treat   Date of Onset:  Onset Date: 20     Allergies:  Prednisone, Ibandronic acid, Triamcinolone, Alendronate sodium, and Triprolidine-pseudoephedrine  Restrictions/Precautions:    No data recordedNo data recorded   Interventions Planned (Treatment may consist of any combination of the following):    No data recorded   Subjective Comments:  Patient reports her neck and shoulder are hurting today   Initial:     4/10 Post Session:     4/10  Medications Last Reviewed:  2022  Updated Objective Findings:  None Today  Treatment   MANUAL THERAPY: (40 minutes): Joint mobilization and Soft tissue mobilization was utilized and necessary because of the patient's restricted joint motion, painful spasm, loss of articular motion and restricted motion of soft tissue. Treatment/Session Summary:    · Treatment Assessment:   Patient tolerated treatment well with improving overall mobility. · Communication/Consultation:  None today  · Equipment provided today:  None  · Recommendations/Intent for next treatment session: Next visit will focus on manual therapy.     Total Treatment Billable Duration:  40 minutes  Time In: 0845  Time Out: [address] Sue, PT       Post Session Pain  Charge Capture  [employer] Portal  MD Guidelines  Scanned Media  Benefits  MyChart

## 2022-06-28 ENCOUNTER — HOSPITAL ENCOUNTER (OUTPATIENT)
Dept: PHYSICAL THERAPY | Age: 77
Setting detail: RECURRING SERIES
Discharge: HOME | End: 2022-07-01
Payer: MEDICARE

## 2022-06-28 PROCEDURE — 97140 MANUAL THERAPY 1/> REGIONS: CPT

## 2022-06-28 ASSESSMENT — PAIN SCALES - GENERAL: PAINLEVEL_OUTOF10: 5

## 2022-06-28 NOTE — PROGRESS NOTES
Cecil Archuleta  : 1945  Primary: Medicare Part A And B  Secondary: [address] @ [address] [address]  Phone: [phone]  Fax: [phone] Plan Frequency: 1 time per week for every other week for 90 days    Plan of Care/Certification Expiration Date: 22      PT Visit Info: Total # of Visits to Date: 80  Progress Note Due Date: 22      OUTPATIENT PHYSICAL THERAPY:OP NOTE TYPE: Treatment Note 2022       Episode   Appt Desk       Treatment Diagnosis:  Cervicalgia (M54.2)  Medical/Referring Diagnosis:  No admission diagnoses are documented for this encounter. Referring Physician:  Ruperto Lockwood MD MD Orders:  PT Eval and Treat   Date of Onset:  Onset Date: 20     Allergies:  Prednisone, Ibandronic acid, Triamcinolone, Alendronate sodium, and Triprolidine-pseudoephedrine  Restrictions/Precautions:    No data recordedNo data recorded   Interventions Planned (Treatment may consist of any combination of the following):    No data recorded   Subjective Comments:  Patient reports she hurts across both shoulders   Initial:     5/10 Post Session:     5/10  Medications Last Reviewed:  2022  Updated Objective Findings:  None Today  Treatment   MANUAL THERAPY: (40 minutes): Joint mobilization and Soft tissue mobilization was utilized and necessary because of the patient's restricted joint motion, painful spasm, loss of articular motion and restricted motion of soft tissue. Treatment/Session Summary:    · Treatment Assessment:   Pateint tolerated treatment well with improved mobiltiy noted after treatment. · Communication/Consultation:  None today  · Equipment provided today:  None  · Recommendations/Intent for next treatment session: Next visit will focus on manual therapy.     Total Treatment Billable Duration:  40 minutes  Time In: 845  Time Out: 930    Teodoro Esparza PT       Post Session Pain  Charge Capture  [employer] Portal  MD Guidelines  Scanned Media  Benefits  [employer]

## 2022-07-06 ENCOUNTER — HOSPITAL ENCOUNTER (OUTPATIENT)
Dept: PHYSICAL THERAPY | Age: 77
Setting detail: RECURRING SERIES
Discharge: HOME | End: 2022-07-09
Payer: MEDICARE

## 2022-07-06 PROCEDURE — 97140 MANUAL THERAPY 1/> REGIONS: CPT

## 2022-07-06 ASSESSMENT — PAIN SCALES - GENERAL: PAINLEVEL_OUTOF10: 5
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
## 2022-07-06 NOTE — PROGRESS NOTES
Nicola Jonathan  : 1945  Primary: Medicare Part A And B  Secondary: [address] @ [address] [address]  Phone: [phone]  Fax: [phone] Plan Frequency: 1 time per week for every other week for 90 days    Plan of Care/Certification Expiration Date: 22      PT Visit Info: Total # of Visits to Date: 80  Progress Note Due Date: 22       OUTPATIENT PHYSICAL THERAPY:OP NOTE TYPE: Treatment Note 2022       Episode   Appt Desk       Treatment Diagnosis:  Cervicalgia (M54.2)  Medical/Referring Diagnosis:  No admission diagnoses are documented for this encounter. Referring Physician:  Jeancarlos Marin MD MD Orders:  PT Eval and Treat   Date of Onset:  Onset Date: 20     Allergies:  Prednisone, Ibandronic acid, Triamcinolone, Alendronate sodium, and Triprolidine-pseudoephedrine  Restrictions/Precautions:      None  Interventions Planned (Treatment may consist of any combination of the following):    No data recorded   Subjective Comments:  Patient reports that her neck and shoulder are still painful   Initial:     5/10 Post Session:     4/10  Medications Last Reviewed:  2022  Updated Objective Findings:  None Today  Treatment   MANUAL THERAPY: (40 minutes): Joint mobilization and Soft tissue mobilization was utilized and necessary because of the patient's restricted joint motion, painful spasm, loss of articular motion and restricted motion of soft tissue. Treatment/Session Summary:    · Treatment Assessment:   Patient tolerated treatment well with improving overall mobility   · Communication/Consultation:  None today  · Equipment provided today:  None  · Recommendations/Intent for next treatment session: Next visit will focus on manual therapy.     Total Treatment Billable Duration:  40 minutes  Time In: 845  Time Out: 930    Haritha Purchase, PT       Post Session Pain  Charge Capture [address] [facility] Portal  MD Guidelines  Scanned Media  Benefits  MyChart

## 2022-07-12 ENCOUNTER — HOSPITAL ENCOUNTER (OUTPATIENT)
Dept: PHYSICAL THERAPY | Age: 77
Setting detail: RECURRING SERIES
Discharge: HOME | End: 2022-07-15
Payer: COMMERCIAL

## 2022-07-12 PROCEDURE — 97140 MANUAL THERAPY 1/> REGIONS: CPT

## 2022-07-12 PROCEDURE — 97110 THERAPEUTIC EXERCISES: CPT

## 2022-07-12 ASSESSMENT — PAIN SCALES - GENERAL: PAINLEVEL_OUTOF10: 2

## 2022-07-12 NOTE — PROGRESS NOTES
Kim Whalen  : 1945  Primary: [employer]  Secondary:  Richardine Severs  [address] [place] [address]  Phone: [phone]  Fax: [phone] Plan Frequency: 1 time per week for every other week for 90 days    Plan of Care/Certification Expiration Date: 22      PT Visit Info: Total # of Visits to Date: 34  Progress Note Due Date: 22      OUTPATIENT PHYSICAL THERAPY:OP NOTE TYPE: Treatment Note 2022       Episode   Appt Desk       Treatment Diagnosis:  Low Back Pain (M54.5)  Medical/Referring Diagnosis:  Low back pain, unspecified [M54.50]  Referring Physician:  Bailey Tse*  MD Orders:  PT Eval and Treat   Date of Onset:  Onset Date: 20     Allergies:  Prednisone, Ibandronic acid, Triamcinolone, Alendronate sodium, and Triprolidine-pseudoephedrine  Restrictions/Precautions:    No data recordedNo data recorded   Interventions Planned (Treatment may consist of any combination of the following):    No data recorded   Subjective Comments:  \"I haven't been doing a lot to irritate my back\". Initial:     2/10 Post Session:     2/10  Medications Last Reviewed:  2022  Updated Objective Findings:  None Today  Treatment   THERAPEUTIC EXERCISE: (25 minutes):    Exercises per grid below to improve strength. Required minimal verbal cues to promote proper body alignment.   Progressed repetitions as indicated.      Date:  2022 Date:  2022 Date:  2022   Activity/Exercise Parameters Parameters Parameters   Nustep  Level 4 x 8 minutes Level 4 x 10 minutes Level 4 x 8 minutes   Single knee to chest stretch X X X   Piriformis stretch 3x30 sec bilateral  3x30 sec bilateral  3x30 sec    Hamstring stretch with ankle pumps  3x30 sec bilateral  3x30 sec bilateral  3x30 sec bilateral    Supine lower trunk rotation 20 reps bilateral 20 reps bilateral  20 reps bilateral    Double knee to chest stretch 3x30 sec  3x30

## 2022-07-22 ENCOUNTER — HOSPITAL ENCOUNTER (OUTPATIENT)
Dept: PHYSICAL THERAPY | Age: 77
Setting detail: RECURRING SERIES
Discharge: HOME | End: 2022-07-25
Payer: MEDICARE

## 2022-07-22 PROCEDURE — 97140 MANUAL THERAPY 1/> REGIONS: CPT

## 2022-07-22 ASSESSMENT — PAIN SCALES - GENERAL: PAINLEVEL_OUTOF10: 3

## 2022-07-22 NOTE — PROGRESS NOTES
Isamar Ramirez  : 1945  Primary: Medicare Part A And B  Secondary: [address] @ [facility]  [address] [facility] [address]  Phone: [phone]  Fax: [phone] Plan Frequency: 1 time per week for every other week for 90 days    Plan of Care/Certification Expiration Date: 22      PT Visit Info: Total # of Visits to Date: 80  Progress Note Due Date: 22       OUTPATIENT PHYSICAL THERAPY:OP NOTE TYPE: Treatment Note 2022       Episode   Appt Desk       Treatment Diagnosis:  Cervicalgia (M54.2)  Medical/Referring Diagnosis:  No admission diagnoses are documented for this encounter. Referring Physician:  Thuy Thomas MD MD Orders:  PT Eval and Treat   Date of Onset:  Onset Date: 20     Allergies:  Prednisone, Ibandronic acid, Triamcinolone, Alendronate sodium, and Triprolidine-pseudoephedrine  Restrictions/Precautions:      None  Interventions Planned (Treatment may consist of any combination of the following):    No data recorded   Subjective Comments:  Patient reports she felt better after her last treatment session   Initial:     3/10 Post Session:     2/10  Medications Last Reviewed:  2022  Updated Objective Findings:  None Today  Treatment   MANUAL THERAPY: (40 minutes): Joint mobilization and Soft tissue mobilization was utilized and necessary because of the patient's restricted joint motion, painful spasm, loss of articular motion and restricted motion of soft tissue. Treatment/Session Summary:    Treatment Assessment:   Patient tolerated treatment well with improving overall mobility   Communication/Consultation:  None today  Equipment provided today:  None  Recommendations/Intent for next treatment session: Next visit will focus on manual therapy.     Total Treatment Billable Duration:  40 minutes  Time In: 4246  Time Out: [address] [facility], PT       Post Session Pain  Charge Capture [address] [facility] Portal  MD Guidelines  Scanned Media  Benefits  MyChart

## 2022-07-27 ENCOUNTER — HOSPITAL ENCOUNTER (OUTPATIENT)
Dept: PHYSICAL THERAPY | Age: 77
Setting detail: RECURRING SERIES
Discharge: HOME | End: 2022-07-30
Payer: MEDICARE

## 2022-07-27 PROCEDURE — 97140 MANUAL THERAPY 1/> REGIONS: CPT

## 2022-07-27 ASSESSMENT — PAIN SCALES - GENERAL: PAINLEVEL_OUTOF10: 3

## 2022-07-27 NOTE — PROGRESS NOTES
Brice Donohue  : 1945  Primary: Medicare Part A And B  Secondary: [address] @ [facility] [place]  [place] Al[place] [address]  Phone: [phone]  Fax: [phone] Plan Frequency: 1 time per week for every other week for 90 days    Plan of Care/Certification Expiration Date: 22      PT Visit Info: Total # of Visits to Date: 80  Progress Note Due Date: 22       OUTPATIENT PHYSICAL THERAPY:OP NOTE TYPE: Treatment Note 2022       Episode   Appt Desk       Treatment Diagnosis:  Cervicalgia (M54.2)  Medical/Referring Diagnosis:  No admission diagnoses are documented for this encounter. Referring Physician:  Evan French MD MD Orders:  PT Eval and Treat   Date of Onset:  Onset Date: 20     Allergies:  Prednisone, Ibandronic acid, Triamcinolone, Alendronate sodium, and Triprolidine-pseudoephedrine  Restrictions/Precautions:      None  Interventions Planned (Treatment may consist of any combination of the following):    No data recorded   Subjective Comments:  Patient reports she has had more shoulder pain lately. Initial:     3/10 Post Session:     2/10  Medications Last Reviewed:  2022  Updated Objective Findings:  None Today  Treatment   MANUAL THERAPY: (40 minutes): Joint mobilization and Soft tissue mobilization was utilized and necessary because of the patient's restricted joint motion, painful spasm, loss of articular motion and restricted motion of soft tissue. Treatment/Session Summary:    Treatment Assessment:   Patient tolerated treatment well with improving overall mobility noted after treatment. Communication/Consultation:  None today  Equipment provided today:  None  Recommendations/Intent for next treatment session: Next visit will focus on manual therapy.     Total Treatment Billable Duration:  40 minutes  Time In: 1100  Time Out: [address], PT       Post Session Pain  Charge Capture  [employer] Portal  MD Guidelines  Scanned Media  Benefits  [employer]

## 2022-08-03 ENCOUNTER — APPOINTMENT (OUTPATIENT)
Dept: PHYSICAL THERAPY | Age: 77
End: 2022-08-03
Payer: COMMERCIAL

## 2022-08-08 ENCOUNTER — HOSPITAL ENCOUNTER (OUTPATIENT)
Dept: PHYSICAL THERAPY | Age: 77
Setting detail: RECURRING SERIES
Discharge: HOME | End: 2022-08-11
Payer: COMMERCIAL

## 2022-08-08 PROCEDURE — 97110 THERAPEUTIC EXERCISES: CPT

## 2022-08-08 PROCEDURE — 97140 MANUAL THERAPY 1/> REGIONS: CPT

## 2022-08-08 ASSESSMENT — PAIN SCALES - GENERAL: PAINLEVEL_OUTOF10: 6

## 2022-08-08 NOTE — PROGRESS NOTES
Carlos Montague  : 1945  Primary: [employer] [facility]  Secondary:  Cyndie Wiseman  [address] [facility] [address]  Phone: [phone]  Fax: [phone] Plan Frequency: 1 time per week for every other week for 90 days    Plan of Care/Certification Expiration Date: 22      PT Visit Info: Total # of Visits to Date: 30  Progress Note Counter: 1  Progress Note Due Date: 22      OUTPATIENT PHYSICAL THERAPY:OP NOTE TYPE: Treatment Note 2022       Episode   Appt Desk       Treatment Diagnosis:  Low Back Pain (M54.5)  Medical/Referring Diagnosis:  Low back pain, unspecified [M54.50]  Referring Physician:  Yohannes Segura MD Orders:  PT Eval and Treat   Date of Onset:  Onset Date: 20     Allergies:  Prednisone, Ibandronic acid, Triamcinolone, Alendronate sodium, and Triprolidine-pseudoephedrine  Restrictions/Precautions:    No data recordedNo data recorded   Interventions Planned (Treatment may consist of any combination of the following):    No data recorded   Subjective Comments: \"Up until the last couple of days the back has been more tolerable. It has been more painful as of late\". Initial:     6/10 Post Session:     5/10  Medications Last Reviewed:  2022  Updated Objective Findings:   See recertification note  Treatment   THERAPEUTIC EXERCISE: (25 minutes):    Exercises per grid below to improve strength. Required minimal verbal cues to promote proper body alignment. Progressed repetitions as indicated.       Date:  2022 Date:  2022 Date:  2022   Activity/Exercise Parameters Parameters Parameters   Nustep  Level 4 x 10 minutes Level 4 x 10 minutes Level 4 x 8 minutes   Single knee to chest stretch X X X   Piriformis stretch 3x30 sec bilateral  3x30 sec bilateral  3x30 sec    Hamstring stretch with ankle pumps  3x30 sec bilateral  3x30 sec bilateral  3x30 sec bilateral    Supine lower trunk rotation 20 reps bilateral 20 reps bilateral  20 reps bilateral    Double knee to chest stretch 3x30 sec  3x30 sec  3x30 sec                     Supine straight leg raise 2x10 reps bilateral 2x10 reps bilateral 2x10 reps bilateral   Supine isometric hip flexion X 10 reps bilateral with 5 second hold 10 reps bilateral with 5 second hold   Pelvic tilts 20 reps 20 reps 20 reps   Curl ups X X X   Double knee lifts X X X   Inversion table X X X    Quadriped with leg extension  10 reps bilateral                      MANUAL THERAPY: (20 minutes): Joint mobilization and Soft tissue mobilization was utilized and necessary because of the patient's restricted joint motion and painful spasm. Patient received trigger point release along bilateral SI region/bilateral lumbar paraspinals to decrease pain. Skin intact after treatment. Treatment/Session Summary:    Treatment Assessment:   Patient demonstrates improved core strength.   Communication/Consultation:  None today  Equipment provided today:  None  Recommendations/Intent for next treatment session: Next visit will focus on range of motion, strengthening, and manual.    Total Treatment Billable Duration:  45 minutes  Time In: 0800  Time Out: 0845    Brian Cunningham PT       Post Session Pain  Charge Capture  [employer] Portal  MD Guidelines  Scanned Media  Benefits  MyChart
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
## 2022-08-10 ENCOUNTER — HOSPITAL ENCOUNTER (OUTPATIENT)
Dept: PHYSICAL THERAPY | Age: 77
Setting detail: RECURRING SERIES
Discharge: HOME | End: 2022-08-13
Payer: COMMERCIAL

## 2022-08-10 PROCEDURE — 97140 MANUAL THERAPY 1/> REGIONS: CPT

## 2022-08-10 ASSESSMENT — PAIN SCALES - GENERAL: PAINLEVEL_OUTOF10: 3
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
## 2022-08-10 NOTE — PROGRESS NOTES
Eliza Ayoub  : 1945  Primary: Medicare Part A And B  Secondary: [address] @ [address] [address]  Phone: [phone]  Fax: [phone] Plan Frequency: 1 time per week for every other week for 90 days    Plan of Care/Certification Expiration Date: 22      PT Visit Info: Total # of Visits to Date: 80  Progress Note Counter: 1  Progress Note Due Date: 09/10/22       OUTPATIENT PHYSICAL THERAPY:OP NOTE TYPE: Treatment Note 8/10/2022       Episode   Appt Desk       Treatment Diagnosis:  Cervicalgia (M54.2)  Medical/Referring Diagnosis:  No admission diagnoses are documented for this encounter. Referring Physician:  Austin Woodard MD MD Orders:  PT Eval and Treat   Date of Onset:  Onset Date: 20     Allergies:  Prednisone, Ibandronic acid, Triamcinolone, Alendronate sodium, and Triprolidine-pseudoephedrine  Restrictions/Precautions:      None  Interventions Planned (Treatment may consist of any combination of the following):    No data recorded   Subjective Comments:  Patient reports that her shoulder is hurting today   Initial:     3/10 Post Session:     2/10  Medications Last Reviewed:  8/10/2022  Updated Objective Findings:  None Today  Treatment   MANUAL THERAPY: (40 minutes): Joint mobilization and Soft tissue mobilization was utilized and necessary because of the patient's restricted joint motion, painful spasm, loss of articular motion and restricted motion of soft tissue. Treatment/Session Summary:    Treatment Assessment:   Patient tolerated treatment well with improving overall mobility   Communication/Consultation:  None today  Equipment provided today:  None  Recommendations/Intent for next treatment session: Next visit will focus on manual therapy.     Total Treatment Billable Duration:  40 minutes  Time In: 45  Time Out: [address], PT       Post Session Pain  Charge

## 2022-08-17 NOTE — PROGRESS NOTES
Darell Alegria (:  1945) is a 69 y.o. female,Established patient, here for evaluation of the following chief complaint(s):  Follow-up (Fu on COPD and sleep)         ASSESSMENT/PLAN:  1. Chronic obstructive pulmonary disease, unspecified COPD type ([address]  Shortness of breath, occasional wheezing  Primary symptom appears to be cough  Further management of cough as below  Okay to stop Spiriva given no obvious benefit, as needed albuterol    - albuterol sulfate HFA (VENTOLIN HFA) 108 (90 Base) MCG/ACT inhaler; Inhale 1 puff into the lungs 4 times daily  Dispense: 54 g; Refill: 2    2. Chronic insomnia  PDMP reviewed showing temazepam 15 mg capsules, quantity #30, 30-day supply last filled on 2022  Continue temazepam at current dose    - temazepam (RESTORIL) 15 MG capsule; Take 1 capsule by mouth nightly as needed for Sleep for up to 90 days. Dispense: 30 capsule; Refill: 2    3. Irritable bowel syndrome with diarrhea  Patient adheres to a low FODMAP diet  Continue supportive care    4. Anxiety and depression  Continue Cymbalta    5. Multinodular goiter  Ultrasound soft tissue head and neck on 2021 showed the following:  Right lobe with exophytic cystic and solid isoechoic nodule in upper pole with macrocalcifications measuring 8 x 7 x 12 mm; solid isoechoic echogenic nodule in mid pole measuring 15 x 8 x 16 mm with microcalcifications; solid isoechoic echogenic nodule in lower pole measuring 14 x 8 x 15 mm with microcalcifications  Left lobe with 9 x 15 x 12 mm solid isoechoic echogenic nodule in left midpole with microcalcifications  Prior thyroid scan within normal limits on 2019  TSH within normal limits on 3/30/2022  Follow-up and monitoring per endocrinology    6. Osteopenia of multiple sites  DEXA scan on 3/30/2022 with osteopenia. 10-year overall fracture risk 19.8% with 10-year hip fracture risk 7.3%  Currently on Fosamax    7.  Chronic cough  Differential diagnosis includes COPD especially given patient continues to smoke versus allergy mediated versus LPR  CT chest on 11/24/2021 with unremarkable trachea, central bronchi  Discussed trial of azelastine nasal spray given intolerance to Flonase and antihistamines but respectfully declines  Discussed trial of PPI or H2 antagonist for possible LPR but respectfully declines  Offered referral to ENT for possible flexible laryngoscopy but wishes to defer      No follow-ups on file. Subjective   SUBJECTIVE/OBJECTIVE:  Patient is a 60-year-old  female who presents to the office today for follow-up. Patient still endorses chronic cough for at least a year. Stated her previous cough which was more associated with congestion improved after stopping lisinopril however still has more of a hacking cough despite taking over-the-counter cough syrup. Does have mild wheezing but no hemoptysis, shortness of breath, chest pain, palpitations, fevers or chills. No dysphagia, postnasal drip, sore throat, reflux or odynophagia. Does have history of allergies but is intolerant to antihistamines which causes jitteriness, instead taking honey. Previously tried [place] but did not like the aftereffects in terms of smell and taste. Has used Zicam in the past.      Review of Systems   Constitutional:  Negative for chills and fever. HENT:  Negative for postnasal drip, sore throat and trouble swallowing. Denies odynophagia   Respiratory:  Positive for cough and wheezing. Negative for shortness of breath. Denies hemoptysis   Cardiovascular:  Negative for chest pain and palpitations. Gastrointestinal:  Negative for nausea and vomiting. Denies reflux   Neurological:  Negative for dizziness and syncope. Objective   Physical Exam  Vitals reviewed. Constitutional:       General: She is not in acute distress. Appearance: Normal appearance. She is not ill-appearing, toxic-appearing or diaphoretic.    HENT:      Head: Normocephalic and atraumatic. Right Ear: Tympanic membrane, ear canal and external ear normal. There is no impacted cerumen. Left Ear: Tympanic membrane, ear canal and external ear normal. There is no impacted cerumen. Nose: No rhinorrhea. Mouth/Throat:      Mouth: Mucous membranes are moist.      Comments: Some cobblestoning of posterior oropharynx  Eyes:      General:         Right eye: No discharge. Left eye: No discharge. Extraocular Movements: Extraocular movements intact. Neck:      Vascular: No carotid bruit. Cardiovascular:      Rate and Rhythm: Normal rate. Heart sounds: No murmur heard. No friction rub. No gallop. Comments: Generally regular rhythm with occasional premature beat  Pulmonary:      Effort: Pulmonary effort is normal. No respiratory distress. Breath sounds: No stridor. No wheezing, rhonchi or rales. Abdominal:      General: Bowel sounds are normal.      Palpations: Abdomen is soft. Tenderness: There is no abdominal tenderness. There is no guarding. Musculoskeletal:      Cervical back: Neck supple. Lymphadenopathy:      Cervical: Cervical adenopathy present. Right cervical: Superficial cervical adenopathy present. Left cervical: Superficial cervical adenopathy present. Skin:     General: Skin is warm and dry. Coloration: Skin is not jaundiced. Neurological:      Mental Status: She is alert. Mental status is at baseline. Psychiatric:         Attention and Perception: Attention normal.         Mood and Affect: Mood normal. Mood is not anxious or depressed. Affect is blunt. Affect is not tearful. Speech: Speech normal. Speech is not rapid and pressured or slurred. Behavior: Behavior is not agitated, aggressive or combative. Behavior is cooperative. Thought Content: Thought content normal.                An electronic signature was used to authenticate this note.     --Angie Oliveira DO

## 2022-08-19 ENCOUNTER — OFFICE VISIT (OUTPATIENT)
Dept: INTERNAL MEDICINE CLINIC | Facility: CLINIC | Age: 77
End: 2022-08-19
Payer: MEDICARE

## 2022-08-19 VITALS
SYSTOLIC BLOOD PRESSURE: 128 MMHG | BODY MASS INDEX: 22.89 KG/M2 | OXYGEN SATURATION: 95 % | WEIGHT: 116.6 LBS | HEART RATE: 84 BPM | HEIGHT: 60 IN | TEMPERATURE: 97.3 F | DIASTOLIC BLOOD PRESSURE: 82 MMHG

## 2022-08-19 DIAGNOSIS — F51.04 CHRONIC INSOMNIA: ICD-10-CM

## 2022-08-19 DIAGNOSIS — E04.2 MULTINODULAR GOITER: ICD-10-CM

## 2022-08-19 DIAGNOSIS — F32.A ANXIETY AND DEPRESSION: ICD-10-CM

## 2022-08-19 DIAGNOSIS — K58.0 IRRITABLE BOWEL SYNDROME WITH DIARRHEA: ICD-10-CM

## 2022-08-19 DIAGNOSIS — R05.3 CHRONIC COUGH: ICD-10-CM

## 2022-08-19 DIAGNOSIS — F41.9 ANXIETY AND DEPRESSION: ICD-10-CM

## 2022-08-19 DIAGNOSIS — M85.89 OSTEOPENIA OF MULTIPLE SITES: ICD-10-CM

## 2022-08-19 DIAGNOSIS — J44.9 CHRONIC OBSTRUCTIVE PULMONARY DISEASE, UNSPECIFIED COPD TYPE (HCC): Primary | ICD-10-CM

## 2022-08-19 PROCEDURE — G8427 DOCREV CUR MEDS BY ELIG CLIN: HCPCS | Performed by: STUDENT IN AN ORGANIZED HEALTH CARE EDUCATION/TRAINING PROGRAM

## 2022-08-19 PROCEDURE — 1123F ACP DISCUSS/DSCN MKR DOCD: CPT | Performed by: STUDENT IN AN ORGANIZED HEALTH CARE EDUCATION/TRAINING PROGRAM

## 2022-08-19 PROCEDURE — G8399 PT W/DXA RESULTS DOCUMENT: HCPCS | Performed by: STUDENT IN AN ORGANIZED HEALTH CARE EDUCATION/TRAINING PROGRAM

## 2022-08-19 PROCEDURE — G8420 CALC BMI NORM PARAMETERS: HCPCS | Performed by: STUDENT IN AN ORGANIZED HEALTH CARE EDUCATION/TRAINING PROGRAM

## 2022-08-19 PROCEDURE — 3023F SPIROM DOC REV: CPT | Performed by: STUDENT IN AN ORGANIZED HEALTH CARE EDUCATION/TRAINING PROGRAM

## 2022-08-19 PROCEDURE — 4004F PT TOBACCO SCREEN RCVD TLK: CPT | Performed by: STUDENT IN AN ORGANIZED HEALTH CARE EDUCATION/TRAINING PROGRAM

## 2022-08-19 PROCEDURE — 1090F PRES/ABSN URINE INCON ASSESS: CPT | Performed by: STUDENT IN AN ORGANIZED HEALTH CARE EDUCATION/TRAINING PROGRAM

## 2022-08-19 PROCEDURE — 99214 OFFICE O/P EST MOD 30 MIN: CPT | Performed by: STUDENT IN AN ORGANIZED HEALTH CARE EDUCATION/TRAINING PROGRAM

## 2022-08-19 RX ORDER — ALBUTEROL SULFATE 90 UG/1
1 AEROSOL, METERED RESPIRATORY (INHALATION) 4 TIMES DAILY
Qty: 54 G | Refills: 2 | Status: SHIPPED | OUTPATIENT
Start: 2022-08-19 | End: 2022-09-27

## 2022-08-19 RX ORDER — TEMAZEPAM 15 MG/1
15 CAPSULE ORAL NIGHTLY PRN
Qty: 30 CAPSULE | Refills: 2 | Status: SHIPPED | OUTPATIENT
Start: 2022-09-10 | End: 2022-12-09

## 2022-08-19 ASSESSMENT — ENCOUNTER SYMPTOMS
SHORTNESS OF BREATH: 0
WHEEZING: 1
COUGH: 1
NAUSEA: 0
TROUBLE SWALLOWING: 0
VOMITING: 0
SORE THROAT: 0

## 2022-08-24 ENCOUNTER — HOSPITAL ENCOUNTER (OUTPATIENT)
Dept: PHYSICAL THERAPY | Age: 77
Setting detail: RECURRING SERIES
Discharge: HOME | End: 2022-08-27
Payer: COMMERCIAL

## 2022-08-24 PROCEDURE — 97140 MANUAL THERAPY 1/> REGIONS: CPT

## 2022-08-24 ASSESSMENT — PAIN SCALES - GENERAL: PAINLEVEL_OUTOF10: 3

## 2022-08-24 NOTE — PROGRESS NOTES
Jessica Ambrose  : 1945  Primary: Medicare Part A And B  Secondary: [address] @ [address] [address]  Phone: [phone]  Fax: [phone] Plan Frequency: 1 time per week for every other week for 90 days    Plan of Care/Certification Expiration Date: 22      PT Visit Info: Total # of Visits to Date: 80  Progress Note Counter: 1  Progress Note Due Date: 09/10/22       OUTPATIENT PHYSICAL THERAPY:OP NOTE TYPE: Treatment Note 2022       Episode   Appt Desk       Treatment Diagnosis:  Cervicalgia (M54.2)  Medical/Referring Diagnosis:  Low back pain, unspecified [M54.50]  Referring Physician:  Franck Santa MD MD Orders:  PT Eval and Treat   Date of Onset:  Onset Date: 20     Allergies:  Prednisone, Ibandronic acid, Triamcinolone, Alendronate sodium, and Triprolidine-pseudoephedrine  Restrictions/Precautions:      None  Interventions Planned (Treatment may consist of any combination of the following):    No data recorded   Subjective Comments:  Patient reports she is dizzy and her right shoulder is more painful   Initial:     3/10 Post Session:     2/10  Medications Last Reviewed:  2022  Updated Objective Findings:  None Today  Treatment   MANUAL THERAPY: (40 minutes): Joint mobilization and Soft tissue mobilization was utilized and necessary because of the patient's restricted joint motion, painful spasm, loss of articular motion and restricted motion of soft tissue. Treatment/Session Summary:    Treatment Assessment:   Patient tolerated treatment well with improved mobility noted after treatment. Communication/Consultation:  None today  Equipment provided today:  None  Recommendations/Intent for next treatment session: Next visit will focus on manual therapy.     Total Treatment Billable Duration:  40 minutes  Time In: 845  Time Out: 930    Shira Perry, DEANNA       Post Session Pain Charge Capture  [facility] Portal  MD Guidelines  Scanned Media  Benefits  Lennon Linest

## 2022-09-07 ENCOUNTER — HOSPITAL ENCOUNTER (OUTPATIENT)
Dept: MAMMOGRAPHY | Age: 77
Discharge: HOME | End: 2022-09-10
Payer: MEDICARE

## 2022-09-07 ENCOUNTER — HOSPITAL ENCOUNTER (OUTPATIENT)
Dept: PHYSICAL THERAPY | Age: 77
Setting detail: RECURRING SERIES
Discharge: HOME | End: 2022-09-10
Payer: COMMERCIAL

## 2022-09-07 DIAGNOSIS — Z12.31 VISIT FOR SCREENING MAMMOGRAM: ICD-10-CM

## 2022-09-07 PROCEDURE — 97140 MANUAL THERAPY 1/> REGIONS: CPT

## 2022-09-07 PROCEDURE — 77063 BREAST TOMOSYNTHESIS BI: CPT

## 2022-09-07 ASSESSMENT — PAIN SCALES - GENERAL: PAINLEVEL_OUTOF10: 4

## 2022-09-07 NOTE — PROGRESS NOTES
Kim Whalen  : 1945  Primary: Medicare Part A And B  Secondary: [address] @ [address] [address]  Phone: [phone]  Fax: [phone] Plan Frequency: 1 time per week for every other week for 90 days    Plan of Care/Certification Expiration Date: 22      PT Visit Info: Total # of Visits to Date: 80  Progress Note Counter: 1  Progress Note Due Date: 10/07/22       OUTPATIENT PHYSICAL THERAPY:OP NOTE TYPE: Treatment Note 2022       Episode   Appt Desk       Treatment Diagnosis:  Cervicalgia (M54.2)  Medical/Referring Diagnosis:  Low back pain, unspecified [M54.50]  Referring Physician:  Kimmy Salgado MD MD Orders:  PT Eval and Treat   Date of Onset:  Onset Date: 20     Allergies:  Prednisone, Ibandronic acid, Triamcinolone, Alendronate sodium, and Triprolidine-pseudoephedrine  Restrictions/Precautions:      None  Interventions Planned (Treatment may consist of any combination of the following):    No data recorded   Subjective Comments:  Patient reports she is having more pain in her neck and shoulder on the left side today. Initial:     4/10 Post Session:     2/10  Medications Last Reviewed:  2022  Updated Objective Findings:  None Today  Treatment   MANUAL THERAPY: (40 minutes): Joint mobilization and Soft tissue mobilization was utilized and necessary because of the patient's restricted joint motion, painful spasm, loss of articular motion and restricted motion of soft tissue. Treatment/Session Summary:    Treatment Assessment:   Patient tolerated treatment well with improving overall mobility noted after treatment. Communication/Consultation:  None today  Equipment provided today:  None  Recommendations/Intent for next treatment session: Next visit will focus on manual therapy.     Total Treatment Billable Duration:  40 minutes  Time In: 930  Time Out:  [facility], PT       Post Session Pain  Charge Capture  [facility] Portal  MD Guidelines  Scanned Media  Benefits  MyChart
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
## 2022-09-20 DIAGNOSIS — E05.90 HYPERTHYROIDISM: ICD-10-CM

## 2022-09-20 LAB
T4 FREE SERPL-MCNC: 0.9 NG/DL (ref 0.78–1.46)
TSH, 3RD GENERATION: 0.39 UIU/ML (ref 0.36–3.74)

## 2022-09-21 ENCOUNTER — HOSPITAL ENCOUNTER (OUTPATIENT)
Dept: PHYSICAL THERAPY | Age: 77
Setting detail: RECURRING SERIES
Discharge: HOME | End: 2022-09-24
Payer: COMMERCIAL

## 2022-09-21 LAB — T3 SERPL-MCNC: 1.27 NG/ML (ref 0.6–1.81)

## 2022-09-21 PROCEDURE — 97140 MANUAL THERAPY 1/> REGIONS: CPT

## 2022-09-21 ASSESSMENT — PAIN SCALES - GENERAL: PAINLEVEL_OUTOF10: 4

## 2022-09-21 NOTE — PROGRESS NOTES
Paige Cisneros  : 1945  Primary: Medicare Part A And B  Secondary: [address] @ [address] [address]  Phone: [phone]  Fax: [phone] Plan Frequency: 1 time per week for every other week for 90 days    Plan of Care/Certification Expiration Date: 22      PT Visit Info: Total # of Visits to Date: 80  Progress Note Counter: 1  Progress Note Due Date: 10/07/22       OUTPATIENT PHYSICAL THERAPY:OP NOTE TYPE: Treatment Note 2022       Episode   Appt Desk       Treatment Diagnosis:  Cervicalgia (M54.2)  Medical/Referring Diagnosis:  Low back pain, unspecified [M54.50]  Referring Physician:  Lindsay Lam MD MD Orders:  PT Eval and Treat   Date of Onset:  Onset Date: 20     Allergies:  Prednisone, Ibandronic acid, Triamcinolone, Alendronate sodium, and Triprolidine-pseudoephedrine  Restrictions/Precautions:      None  Interventions Planned (Treatment may consist of any combination of the following):    No data recorded   Subjective Comments:  Patient reports she is hurting along the right side of her neck and shoulder   Initial:     4/10 Post Session:     3/10  Medications Last Reviewed:  2022  Updated Objective Findings:  None Today  Treatment   MANUAL THERAPY: (40 minutes): Joint mobilization and Soft tissue mobilization was utilized and necessary because of the patient's restricted joint motion, painful spasm, loss of articular motion and restricted motion of soft tissue. Treatment/Session Summary:    Treatment Assessment:   Patient tolerated treatment well with improving overall mobility noted after treatment. Communication/Consultation:  None today  Equipment provided today:  None  Recommendations/Intent for next treatment session: Next visit will focus on manual therapy.     Total Treatment Billable Duration:  40 minutes  Time In: 0845  Time Out: 930    Maye Sparks, PT Post Session Pain  Charge Capture  [facility] Portal  MD Guidelines  Scanned Media  Benefits  MyChart

## 2022-09-27 ENCOUNTER — OFFICE VISIT (OUTPATIENT)
Dept: ENDOCRINOLOGY | Age: 77
End: 2022-09-27
Payer: MEDICARE

## 2022-09-27 VITALS
WEIGHT: 119 LBS | OXYGEN SATURATION: 98 % | BODY MASS INDEX: 23.36 KG/M2 | DIASTOLIC BLOOD PRESSURE: 68 MMHG | SYSTOLIC BLOOD PRESSURE: 122 MMHG | HEART RATE: 54 BPM | HEIGHT: 60 IN

## 2022-09-27 DIAGNOSIS — M85.80 OSTEOPENIA, UNSPECIFIED LOCATION: ICD-10-CM

## 2022-09-27 DIAGNOSIS — E05.90 HYPERTHYROIDISM: Primary | ICD-10-CM

## 2022-09-27 DIAGNOSIS — E04.2 MULTINODULAR GOITER: ICD-10-CM

## 2022-09-27 PROCEDURE — G8399 PT W/DXA RESULTS DOCUMENT: HCPCS | Performed by: INTERNAL MEDICINE

## 2022-09-27 PROCEDURE — 4004F PT TOBACCO SCREEN RCVD TLK: CPT | Performed by: INTERNAL MEDICINE

## 2022-09-27 PROCEDURE — 1090F PRES/ABSN URINE INCON ASSESS: CPT | Performed by: INTERNAL MEDICINE

## 2022-09-27 PROCEDURE — G8420 CALC BMI NORM PARAMETERS: HCPCS | Performed by: INTERNAL MEDICINE

## 2022-09-27 PROCEDURE — G8427 DOCREV CUR MEDS BY ELIG CLIN: HCPCS | Performed by: INTERNAL MEDICINE

## 2022-09-27 PROCEDURE — 1123F ACP DISCUSS/DSCN MKR DOCD: CPT | Performed by: INTERNAL MEDICINE

## 2022-09-27 PROCEDURE — 99214 OFFICE O/P EST MOD 30 MIN: CPT | Performed by: INTERNAL MEDICINE

## 2022-09-27 PROCEDURE — 76536 US EXAM OF HEAD AND NECK: CPT | Performed by: INTERNAL MEDICINE

## 2022-09-27 RX ORDER — OMEGA-3S/DHA/EPA/FISH OIL/D3 300MG-1000
400 CAPSULE ORAL
COMMUNITY

## 2022-09-27 ASSESSMENT — ENCOUNTER SYMPTOMS
DIARRHEA: 0
CONSTIPATION: 0
TROUBLE SWALLOWING: 0

## 2022-09-27 NOTE — PROGRESS NOTES
Ramon Bledsoe MD, [facility]  [address], [address]  Phone [phone]          Seferino Alexandra is a 69 y.o. female seen 9/27/2022 for follow up of hyperthyroidism        ASSESSMENT AND PLAN:    1. Hyperthyroidism  She has had an intermittently, mildly suppressed TSH in the setting of a normal T4 and T3, consistent with subclinical hyperthyroidism. She reports that her TSH has been suppressed for decades. Based on her thyroid ultrasound appearance, she likely has a mildly toxic multinodular goiter. Thyroid scan/uptake was fairly normal, however. She does not have any symptoms of thyrotoxicosis and we have elected to monitor her thyroid function tests closely. Her most recent thyroid function tests were actually normal.  Follow-up in 1 year. 2. Multinodular goiter  There were no obvious cold nodules on thyroid scan. Thyroid ultrasound performed 8 today revealed that the nodules were fairly stable in size compared to the prior study. I therefore do not think an FNA biopsy is warranted at this time. I will consider repeating a thyroid ultrasound in 2 to 3 years to document stability. Procedures:    Thyroid ultrasound 9/27/2022: Right lobe 1.60 x 1.67 x 4.64 cm. There are multiple nodules in the right lobe. In the superior right lobe laterally there is a predominantly solid isoechoic nodule measuring 0.56 x 0.73 x 0.81 cm with punctate echogenic foci (TR 4). Just inferiorly is a solid isoechoic nodule with punctate echogenic foci measuring 0.60 x 0.72 x 0.73 cm (TR 4). There is a curvilinear calcification measuring 0.65 x 0.62 cm, possibly associated with a nodule (TR 4). In the mid right lobe anteriorly there is a solid isoechoic nodule measuring 0.52 x 1.19 x 1.08 cm containing punctate echogenic foci (TR 4).   In the inferior right lobe there is a solid isoechoic nodule measuring 11/25/2019: Right lobe 1.74 x 1.55 x 5.36 cm. There are multiple nodules in the right lobe. In the superior right lobe there is a solid, fairly isoechoic nodule measuring 0.70 x 0.83 x 0.72 cm. In the mid right lobe there is a complex nodule measuring 1.20 x 1.52 x 1.52 cm (it is approximately 50% solid and 50% cystic). Just inferiorly is a solid, isoechoic nodule measuring 0.49 x 0.67 x 0.79 cm with a peripheral calcification posteriorly. In the inferior right lobe there is a complex, mostly solid and fairly isoechoic nodule measuring 0.76 x 1.46 x 1.50 cm without microcalcifications. In the inferior right lobe extending medially there is a solid, isoechoic nodule measuring 0.83 x 1.36 x 1.40 cm without microcalcifications. Isthmus measures 0.42 cm. There is a complex nodule in the left isthmus measuring 0.39 x 0.69 x 0.74 cm. Left lobe 1.41 x 1.83 x 4.10 cm. In the superior left lobe anteriorly there is a mostly cystic nodule measuring 0.50 x 1.13 x 1.28 cm. In the mid left lobe there is a complex, mostly cystic nodule measuring 0.60 x 0.93 x 1.14 cm. In the mid left lobe laterally there is a solid, fairly isoechoic nodule measuring 0.83 x 1.04 x 1.26 cm with peripheral blood flow and no microcalcifications. In the inferior left lobe there is a complex, mostly cystic nodule measuring 0.56 x 0.83 x 0.86 cm. Thyroid ultrasound 8/30/2021 [facility]): The isthmus measures 0.41 cm. Right lobe 1.9 x 4.4 x 1.6 cm. There is an exophytic cystic and solid isoechoic nodule in the superior right lobe containing a macrocalcification measuring 0.8 x 0.7 x 1.2 cm (TR 3). There is a solid isoechoic nodule in the mid right lobe measuring 1.5 x 0.8 x 1.6 cm containing microcalcifications (TR 4). There is a solid isoechoic nodule in the inferior left lobe measuring 1.4 x 0.8 x 1.5 cm containing microcalcifications (TR 4). Left level 2.0 x 4.2 x 1.4 cm.   In the mid left lobe there is a solid isoechoic nodule measuring 0.9 x 1.5 x 1.2 cm containing microcalcifications (TR 4). Multiple additional nodules are present bilaterally. Thyroid ultrasound 9/27/2022: Right lobe 1.60 x 1.67 x 4.64 cm. There are multiple nodules in the right lobe. In the superior right lobe laterally there is a predominantly solid isoechoic nodule measuring 0.56 x 0.73 x 0.81 cm with punctate echogenic foci (TR 4). Just inferiorly is a solid isoechoic nodule with punctate echogenic foci measuring 0.60 x 0.72 x 0.73 cm (TR 4). There is a curvilinear calcification measuring 0.65 x 0.62 cm, possibly associated with a nodule (TR 4). In the mid right lobe anteriorly there is a solid isoechoic nodule measuring 0.52 x 1.19 x 1.08 cm containing punctate echogenic foci (TR 4). In the inferior right lobe there is a solid isoechoic nodule measuring 0.85 x 1.38 x 1.31 cm with punctate echogenic foci (TR 4). Isthmus measures 0.42 cm. There is a complex isoechoic nodule in the isthmus measured 0.51 x 0.70 x 0.72 cm (TR 2). There is a predominantly solid isoechoic nodule in the left isthmus measuring 0.33 x 0.65 x 0.73 cm (TR 3). Left lobe 1.51 x 1.54 x 4.09 cm. In the superior left lobe anteriorly there is a colloid cyst measuring 0.48 x 0.74 x 0.71 cm (TR 1). In the mid to superior left lobe more posteriorly there is a complex isoechoic nodule measuring 0.74 x 1.19 x 1.22 cm (TR 2). In the mid left lobe laterally there is a predominantly solid isoechoic nodule measuring 0.81 x 0.94 x 1.12 cm with punctate echogenic foci (TR 4). In the inferior left lobe there is a complex isoechoic nodule measuring 0.52 x 0.83 x 1.01 cm (TR 2). Just posteriorly is a complex, predominantly solid isoechoic nodule measuring 0.37 x 0.77 cm (TR 3). Labs:  10/9/2018: TSH 0.251, calcium 10.5, creatinine 0.79 (GFR 75).   12/19/2018: TSH 0.271, total T4 8.4, total T3 128.  1/25/2019: TSH 0.362, free T4 1.12, free T3 3.5, calcium 10.0, ionized calcium 5.2, phosphorus 3.7, creatinine 0.72 (GFR 84), PTH 21, 25-OH vitamin D 42.0.  3/13/2019: TSH 0.432, calcium 10.0.  11/20/2019: TSH 0.653, free T4 1.22, free T3 3.5.  9/28/2020: TSH 0.621, free T4 1.31.  8/18/2021: TSH 0.548.  9/23/2021: TSH 0.421, free T4 1.01.  3/30/2022: TSH 0.523.  9/20/2022: TSH 0.385, free T4 0.9, total T3 1.27. Review of Systems   Constitutional:  Negative for diaphoresis and fatigue. Weight increased 3 pounds recently. HENT:  Negative for trouble swallowing. Cardiovascular:  Negative for palpitations. Gastrointestinal:  Negative for constipation and diarrhea. Endocrine: Negative for cold intolerance and heat intolerance. Musculoskeletal:         Bone density 1/2020 revealed stable osteopenia. She took Fosamax for an unknown duration and her PCP stopped it in 1/2020. She had a bone density in 3/2022 and her PCP restarted alendronate in 4/2022. Neurological:  Negative for tremors. Vital Signs:  /68   Pulse 54   Ht 5' (1.524 m)   Wt 119 lb (54 kg)   SpO2 98%   BMI 23.24 kg/m²     Wt Readings from Last 3 Encounters:   09/27/22 119 lb (54 kg)   08/19/22 116 lb 9.6 oz (52.9 kg)   05/20/22 116 lb 9.6 oz (52.9 kg)       Physical Exam  Constitutional:       General: She is not in acute distress. Neck:      Comments: 2 cm right thyroid nodule. Cardiovascular:      Rate and Rhythm: Normal rate and regular rhythm. Lymphadenopathy:      Cervical: No cervical adenopathy. Neurological:      Motor: No tremor.          Orders Placed This Encounter   Procedures    TSH     Standing Status:   Future     Standing Expiration Date:   9/27/2024    T4, Free     Standing Status:   Future     Standing Expiration Date:   9/27/2024    T3, Free     Standing Status:   Future     Standing Expiration Date:   9/27/2024    US, HEAD/NECK TISSUES,REAL TIME         Current Outpatient Medications   Medication Sig Dispense Refill    vitamin D3 (CHOLECALCIFEROL) 10 MCG (400 UNIT) TABS tablet Take 400 Units by mouth      temazepam (RESTORIL) 15 MG capsule Take 1 capsule by mouth nightly as needed for Sleep for up to 90 days. 30 capsule 2    alendronate (FOSAMAX) 70 MG tablet Take 70 mg by mouth every 7 days      ascorbic acid (VITAMIN C) 500 MG tablet Take 500 mg by mouth      DULoxetine (CYMBALTA) 30 MG extended release capsule Take 30 mg by mouth      meloxicam (MOBIC) 7.5 MG tablet Take 7.5 mg by mouth daily       No current facility-administered medications for this visit.

## 2022-10-05 ENCOUNTER — HOSPITAL ENCOUNTER (OUTPATIENT)
Dept: PHYSICAL THERAPY | Age: 77
Setting detail: RECURRING SERIES
Discharge: HOME | End: 2022-10-08
Payer: COMMERCIAL

## 2022-10-05 PROCEDURE — 97140 MANUAL THERAPY 1/> REGIONS: CPT

## 2022-10-05 ASSESSMENT — PAIN SCALES - GENERAL: PAINLEVEL_OUTOF10: 3

## 2022-10-05 NOTE — PROGRESS NOTES
Alize Watters  : 1945  Primary: Medicare Part A And B  Secondary: [address] @ [facility]  [place] [address]  Phone: [phone]  Fax: [phone] Plan Frequency: 1 time per week for every other week for 90 days    Plan of Care/Certification Expiration Date: 22      PT Visit Info: Total # of Visits to Date: 80  Progress Note Counter: 1  Progress Note Due Date: 10/07/22       OUTPATIENT PHYSICAL THERAPY:OP NOTE TYPE: Treatment Note 10/5/2022       Episode   Appt Desk       Treatment Diagnosis:  Cervicalgia (M54.2)  Medical/Referring Diagnosis:  Low back pain, unspecified [M54.50]  Referring Physician:  Clari Henriquez MD MD Orders:  PT Eval and Treat   Date of Onset:  Onset Date: 20     Allergies:  Prednisone, Adhesive tape, Ibandronic acid, Triamcinolone, Alendronate sodium, and Triprolidine-pseudoephedrine  Restrictions/Precautions:      None  Interventions Planned (Treatment may consist of any combination of the following):    No data recorded   Subjective Comments:  Patient reports her neck is more painful today. Initial:     3/10 Post Session:     2/10  Medications Last Reviewed:  10/5/2022  Updated Objective Findings:  None Today  Treatment   MANUAL THERAPY: (40 minutes): Joint mobilization and Soft tissue mobilization was utilized and necessary because of the patient's restricted joint motion, painful spasm, loss of articular motion and restricted motion of soft tissue. Treatment/Session Summary:    Treatment Assessment:   Patient tolerated treatment well with improving overall mobility noted after treatment. Communication/Consultation:  None today  Equipment provided today:  None  Recommendations/Intent for next treatment session: Next visit will focus on manual therapy.     Total Treatment Billable Duration:  40 minutes  Time In: 0845  Time Out: [address], [place]       Post Session Pain  Charge Capture  MedEmotient Portal  MD Guidelines  Scanned Media  Benefits  MyChart

## 2022-10-20 ENCOUNTER — HOSPITAL ENCOUNTER (OUTPATIENT)
Dept: PHYSICAL THERAPY | Age: 77
Setting detail: RECURRING SERIES
Discharge: HOME | End: 2022-10-23
Payer: COMMERCIAL

## 2022-10-20 PROCEDURE — 97140 MANUAL THERAPY 1/> REGIONS: CPT

## 2022-10-20 ASSESSMENT — PAIN SCALES - GENERAL: PAINLEVEL_OUTOF10: 3
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
## 2022-10-20 NOTE — PROGRESS NOTES
Delcia Duverney  : 1945  Primary: Medicare Part A And B  Secondary: [address] @ [address] [address]  Phone: [phone]  Fax: [phone] Plan Frequency: 1 time per week for every other week for 90 days    Plan of Care/Certification Expiration Date: 22      PT Visit Info: Total # of Visits to Date: 80  Progress Note Counter: 1  Progress Note Due Date: 22       OUTPATIENT PHYSICAL THERAPY:OP NOTE TYPE: Treatment Note 10/20/2022       Episode   Appt Desk       Treatment Diagnosis:  Cervicalgia (M54.2)  Medical/Referring Diagnosis:  Low back pain, unspecified [M54.50]  Referring Physician:  Elza Dow MD MD Orders:  PT Eval and Treat   Date of Onset:  Onset Date: 20     Allergies:  Prednisone, Adhesive tape, Ibandronic acid, Triamcinolone, Alendronate sodium, and Triprolidine-pseudoephedrine  Restrictions/Precautions:      None  Interventions Planned (Treatment may consist of any combination of the following):    No data recorded   Subjective Comments:  Patient reports that her neck hurts on the left and her right shoulder hurts as well. Initial:     3/10 Post Session:     2/10  Medications Last Reviewed:  10/20/2022  Updated Objective Findings:  None Today  Treatment   MANUAL THERAPY: (40 minutes): Joint mobilization and Soft tissue mobilization was utilized and necessary because of the patient's restricted joint motion, painful spasm, loss of articular motion and restricted motion of soft tissue. Treatment/Session Summary:    Treatment Assessment:   Patient tolerated treatment well with improving overall mobility noted after treatment. Communication/Consultation:  None today  Equipment provided today:  None  Recommendations/Intent for next treatment session: Next visit will focus on manual therapy.     Total Treatment Billable Duration:  40 minutes  Time In: 1100  Time Out: [address] [place] [address],[address], PT       Post Session Pain  Charge Capture  Workday Portal  MD Guidelines  Scanned Media  Benefits  MyChart

## 2022-11-02 ENCOUNTER — HOSPITAL ENCOUNTER (OUTPATIENT)
Dept: PHYSICAL THERAPY | Age: 77
Setting detail: RECURRING SERIES
Discharge: HOME | End: 2022-11-05
Payer: MEDICARE

## 2022-11-02 PROCEDURE — 97140 MANUAL THERAPY 1/> REGIONS: CPT

## 2022-11-02 ASSESSMENT — PAIN SCALES - GENERAL: PAINLEVEL_OUTOF10: 4

## 2022-11-02 NOTE — PROGRESS NOTES
Joe Dennison  : 1945  Primary: [facility]  Secondary:  Ben Guthrie  [address] [facility] [address]  Phone: [phone]  Fax: [phone] Plan Frequency: 1 time per week for every other week for 90 days    Plan of Care/Certification Expiration Date: 23      PT Visit Info: Total # of Visits to Date: 95  Progress Note Counter: 1  Progress Note Due Date: 22      OUTPATIENT PHYSICAL THERAPY:OP NOTE TYPE: Recertification                Episode  Appt Desk         Treatment Diagnosis:  Cervicalgia (M54.2)    Medical/Referring Diagnosis:  Low back pain, unspecified [M54.50]  Referring Physician:  Howard Callahan MD MD Orders:  PT Eval and Treat   Return MD Appt:  TBD  Date of Onset:  Onset Date: 20     Allergies:  Prednisone, Adhesive tape, Ibandronic acid, Triamcinolone, Alendronate sodium, and Triprolidine-pseudoephedrine  Restrictions/Precautions:     None  Medications Last Reviewed:  2022     SUBJECTIVE   History of Injury/Illness (Reason for Referral):  2022 (Progress Note): Patient reports that her neck and shoulder have good days and not-so-good days. She reports overall therapy has been helping.  (Recertification): Patient reports she is doing pretty well overall, but her shoulder is hurting. 2022 (Progress Note): Patient reports she is doing pretty well, but is having more pain on her left side recently. 10/20/2022 (Progress Note): Patient reports she is doing well overall, but is having pain in her right shoulder and the left side of her neck.  (Recertification): Patient reports that therapy has been helping, but she still has pain in her neck that is causing increased dizziness when she rolls over from her back to her side or stomach.     Initial:     4/10 Post Session:     3/10  Past Medical History/Comorbidities:   Ms. Joshua Tate  has a past medical history of Abnormal Pap smear, Anxiety, Back problem, COPD (chronic obstructive pulmonary disease) ([address] Depression, Hyperthyroidism, Insomnia, Irritable bowel syndrome, and Osteopenia. Ms. Jaimie Gillis  has a past surgical history that includes Tonsillectomy; orthopedic surgery; Salpingo-oophorectomy (Right, 2014); and Colonoscopy. Social History/Living Environment:   Lives With: Alone  Type of Home: House     Prior Level of Function/Work/Activity:   Prior level of function: Independent  Occupation: Retired  No data [id number] recorded   Learning:   Does the patient/guardian have any barriers to learning?: No barriers  Will there be a co-learner?: No  What is the preferred language of the patient/guardian?: English  Is an  required?: No  How does the patient/guardian prefer to learn new concepts?: Listening; Reading; Demonstration     Fall Risk Scale: Hernandez Total Score: 0  Hernandez Fall Risk: Low (0-24)     Dominant Side:  right handed    Personal Factors:        Sex:  female        Age:  69 y.o.        OBJECTIVE   Observation/Orthostatic Postural Assessment:          Posture Assessment: Rounded shoulders, Forward head   Palpation:          Tightness and tenderness to palpation noted throughout upper and lower cervical musculature. No bruising or swelling noted. ROM:          Cervical Assessment (AROM):  Flexion: 75% of full AROM  Extension: 75% of full AROM  Right side bendin% of full AROM  Left side bending[de-identified] 75% of full AROM  Right rotation: 75% of full AROM  Left rotation: 75% of full AROM  Strength:          Cervical Assessment (Strength):  Grossly 2+/5 with manual muscle testing  Special Tests:          Negative Hautant's test. Negative Cranial-[place] lift test. Negative [place]-Axis Sheer test. Negative Alar Ligament test. Negative Tectorial Membrane test.   Neurological Screen:        Dermatomes:   Within normal limits        Reflexes:  2+  Functional Mobility:         Gait/Mobility:       Independent Transfers:      Sit to Stand: Independent  Stand to Sit: Independent  Stand Pivot Transfers: Independent  Bed to Chair: Independent  Lateral Transfers: Independent         Bed Mobility:      Rolling: Independent  Supine to Sit: Independent  Sit to Supine: Independent  Scooting: Independent         ASSESSMENT   Assessment:  Ms. Joshua Pardo presents with improving mobility and pain in cervical region secondary to degenerative changes. Patient has attended a total of 96 scheduled physical therapy visits. Treatment has consisted of stretching, strengthening, and manual therapy to improve overall pain and performance with activities of daily living. After discussing with patient, she agreed she  would continue to benefit from physical therapy to improve overall mobility. Please sign this re-certification if you concur. Thank you for the opportunity to serve this patient. Problem List: (Impacting functional limitations):      Decreased Strength  Decreased ADL/Functional Activities  Increased Pain  Decreased Activity Tolerance  Therapy Prognosis:   Therapy Prognosis: Good       PLAN   Effective Dates: 11/20/2022 TO Plan of Care/Certification Expiration Date: 01/31/23       Frequency/Duration: Plan Frequency: 1 time per week for every other week for 90 days     Interventions Planned (Treatment may consist of any combination of the following):      Cold  Heat  Home Exercise Program (HEP)  Manual Therapy  Neuromuscular Re-education/Strengthening  Range of Motion (ROM)  Therapeutic Activites  Therapeutic Exercise/Strengthening    GOALS: (Goals have been discussed and agreed upon with patient.)  Discharge Goals: Time Frame: 90 days  Patient will be independent with all ADLs with minimal onset of neck pain and no deficits with daily tasks--goal ongoing. Patient will report no fear avoidance with social or recreational activities due to neck pain --goal ongoing.   Patient will score less than or equal to 7/50 on Neck Disability Index with minimal effect of neck pain on patient's ability to manage every day life activities--goal ongoing. Outcome Measure: Tool Used: Neck Disability Index (NDI)  Score:  Initial: 17/50  Most Recent: 14/50 (Date: 11/2/2022 )   Interpretation of Score: The Neck Disability Index is a revised form of the Oswestry Low Back Pain Index and is designed to measure the activities of daily living in person's with neck pain. Each section is scored on a 0-5 scale, 5 representing the greatest disability. The scores of each section are added together for a total score of 50. Medical Necessity:   Patient is expected to demonstrate progress in strength and range of motion to improve safety during daily activities. Patient demonstrates good rehab potential due to higher previous functional level. Skilled intervention continues to be required due to decreased mobility. Reason for Services/Other Comments:  Patient continues to demonstrate capacity to improve overall mobility which will increase independence and increase safety. Total Duration:  Time In: 1100  Time Out: Faustino Chun's therapy, I certify that the treatment plan above will be carried out by a therapist or under their direction. Thank you for this referral,  Killian Amador PT     Referring Physician Signature: Chanda Harmon MD No Signature is Required for this note.         Post Session Pain  Charge Capture   POC Link  Treatment Note Link  MD Guidelines  [facility]

## 2022-11-02 NOTE — THERAPY RECERTIFICATION
Juwan Delatorre  : 1945  Primary: [facility]  Secondary:  Joshua oMrfin  [address] [facility] [address]  Phone: [phone]  Fax: [phone] Plan Frequency: 1 time per week for every other week for 90 days    Plan of Care/Certification Expiration Date: 23      PT Visit Info: Total # of Visits to Date: 95  Progress Note Counter: 1  Progress Note Due Date: 22      OUTPATIENT PHYSICAL THERAPY:OP NOTE TYPE: Recertification 44/3/1404               Episode  Appt Desk         Treatment Diagnosis:  Cervicalgia (M54.2)    Medical/Referring Diagnosis:  Low back pain, unspecified [M54.50]  Referring Physician:  Ada Bush MD MD Orders:  PT Eval and Treat   Return MD Appt:  TBD  Date of Onset:  Onset Date: 20     Allergies:  Prednisone, Adhesive tape, Ibandronic acid, Triamcinolone, Alendronate sodium, and Triprolidine-pseudoephedrine  Restrictions/Precautions:     None  Medications Last Reviewed:  2022     SUBJECTIVE   History of Injury/Illness (Reason for Referral):  2022 (Progress Note): Patient reports that her neck and shoulder have good days and not-so-good days. She reports overall therapy has been helping.  (Recertification): Patient reports she is doing pretty well overall, but her shoulder is hurting. 2022 (Progress Note): Patient reports she is doing pretty well, but is having more pain on her left side recently. 10/20/2022 (Progress Note): Patient reports she is doing well overall, but is having pain in her right shoulder and the left side of her neck.  (Recertification): Patient reports that therapy has been helping, but she still has pain in her neck that is causing increased dizziness when she rolls over from her back to her side or stomach.     Initial:     4/10 Post Session:     3/10  Past Medical History/Comorbidities:   Ms. Joshua Pardo  has a past medical history of Abnormal Pap smear, Anxiety, Back problem, COPD (chronic obstructive pulmonary disease) ([address] Depression, Hyperthyroidism, Insomnia, Irritable bowel syndrome, and Osteopenia. Ms. Moshe Srivastava  has a past surgical history that includes Tonsillectomy; orthopedic surgery; Salpingo-oophorectomy (Right, 2014); and Colonoscopy. Social History/Living Environment:   Lives With: Alone  Type of Home: House     Prior Level of Function/Work/Activity:   Prior level of function: Independent  Occupation: Retired  No data [id number] recorded   Learning:   Does the patient/guardian have any barriers to learning?: No barriers  Will there be a co-learner?: No  What is the preferred language of the patient/guardian?: English  Is an  required?: No  How does the patient/guardian prefer to learn new concepts?: Listening; Reading; Demonstration     Fall Risk Scale: Hernandez Total Score: 0  Hernandez Fall Risk: Low (0-24)     Dominant Side:  right handed    Personal Factors:        Sex:  female        Age:  69 y.o.        OBJECTIVE   Observation/Orthostatic Postural Assessment:          Posture Assessment: Rounded shoulders, Forward head   Palpation:          Tightness and tenderness to palpation noted throughout upper and lower cervical musculature. No bruising or swelling noted. ROM:          Cervical Assessment (AROM):  Flexion: 75% of full AROM  Extension: 75% of full AROM  Right side bendin% of full AROM  Left side bending[de-identified] 75% of full AROM  Right rotation: 75% of full AROM  Left rotation: 75% of full AROM  Strength:          Cervical Assessment (Strength):  Grossly 2+/5 with manual muscle testing  Special Tests:          Negative Hautant's test. Negative Cranial-[place] lift test. Negative [place]-Axis Sheer test. Negative Alar Ligament test. Negative Tectorial Membrane test.   Neurological Screen:        Dermatomes:   Within normal limits        Reflexes:  2+  Functional Mobility:         Gait/Mobility:       Independent Transfers:      Sit to Stand: Independent  Stand to Sit: Independent  Stand Pivot Transfers: Independent  Bed to Chair: Independent  Lateral Transfers: Independent         Bed Mobility:      Rolling: Independent  Supine to Sit: Independent  Sit to Supine: Independent  Scooting: Independent         ASSESSMENT   Assessment:  Ms. Seth Noble presents with improving mobility and pain in cervical region secondary to degenerative changes. Patient has attended a total of 96 scheduled physical therapy visits. Treatment has consisted of stretching, strengthening, and manual therapy to improve overall pain and performance with activities of daily living. After discussing with patient, she agreed she  would continue to benefit from physical therapy to improve overall mobility. Please sign this re-certification if you concur. Thank you for the opportunity to serve this patient. Problem List: (Impacting functional limitations):      Decreased Strength  Decreased ADL/Functional Activities  Increased Pain  Decreased Activity Tolerance  Therapy Prognosis:   Therapy Prognosis: Good       PLAN   Effective Dates: 11/20/2022 TO Plan of Care/Certification Expiration Date: 01/31/23       Frequency/Duration: Plan Frequency: 1 time per week for every other week for 90 days     Interventions Planned (Treatment may consist of any combination of the following):      Cold  Heat  Home Exercise Program (HEP)  Manual Therapy  Neuromuscular Re-education/Strengthening  Range of Motion (ROM)  Therapeutic Activites  Therapeutic Exercise/Strengthening    GOALS: (Goals have been discussed and agreed upon with patient.)  Discharge Goals: Time Frame: 90 days  Patient will be independent with all ADLs with minimal onset of neck pain and no deficits with daily tasks--goal ongoing. Patient will report no fear avoidance with social or recreational activities due to neck pain --goal ongoing.   Patient will score less than or equal to 7/50 on Neck Disability Index with minimal effect of neck pain on patient's ability to manage every day life activities--goal ongoing. Outcome Measure: Tool Used: Neck Disability Index (NDI)  Score:  Initial: 17/50  Most Recent: 14/50 (Date: 11/2/2022 )   Interpretation of Score: The Neck Disability Index is a revised form of the Oswestry Low Back Pain Index and is designed to measure the activities of daily living in person's with neck pain. Each section is scored on a 0-5 scale, 5 representing the greatest disability. The scores of each section are added together for a total score of 50. Medical Necessity:   Patient is expected to demonstrate progress in strength and range of motion to improve safety during daily activities. Patient demonstrates good rehab potential due to higher previous functional level. Skilled intervention continues to be required due to decreased mobility. Reason for Services/Other Comments:  Patient continues to demonstrate capacity to improve overall mobility which will increase independence and increase safety. Total Duration:  Time In: 1100  Time Out: Faustino Chun's therapy, I certify that the treatment plan above will be carried out by a therapist or under their direction. Thank you for this referral,  Chan Meng PT     Referring Physician Signature: Elza Dow MD No Signature is Required for this note.         Post Session Pain  Charge Capture   POC Link  Treatment Note Link  MD Sim  [facility]

## 2022-11-02 NOTE — PROGRESS NOTES
Yaya Perales  : 1945  Primary: [facility]  Secondary:  Matthias Choi  [address] [facility] [address]  Phone: [phone]  Fax: [phone] Plan Frequency: 1 time per week for every other week for 90 days    Plan of Care/Certification Expiration Date: 23      PT Visit Info: Total # of Visits to Date: 95  Progress Note Counter: 1  Progress Note Due Date: 22       OUTPATIENT PHYSICAL THERAPY:OP NOTE TYPE: Treatment Note 2022       Episode   Appt Desk       Treatment Diagnosis:  Cervicalgia (M54.2)  Medical/Referring Diagnosis:  Low back pain, unspecified [M54.50]  Referring Physician:  Jordin Cardenas MD MD Orders:  PT Eval and Treat   Date of Onset:  Onset Date: 20     Allergies:  Prednisone, Adhesive tape, Ibandronic acid, Triamcinolone, Alendronate sodium, and Triprolidine-pseudoephedrine  Restrictions/Precautions:      None  Interventions Planned (Treatment may consist of any combination of the following):    No data recorded   Subjective Comments:  Patient reports that her neck is hurting today and it will make her dizzy if she rolls over from her back to her stomach. Initial:     4/10 Post Session:     3/10  Medications Last Reviewed:  2022  Updated Objective Findings:  None Today  Treatment   MANUAL THERAPY: (40 minutes): Joint mobilization and Soft tissue mobilization was utilized and necessary because of the patient's restricted joint motion, painful spasm, loss of articular motion and restricted motion of soft tissue. Treatment/Session Summary:    Treatment Assessment:   Patient tolerated treatment well with improving overall mobility noted after treatment. Communication/Consultation:  None today  Equipment provided today:  None  Recommendations/Intent for next treatment session: Next visit will focus on manual therapy.     Total Treatment Billable Duration:  40 minutes  Time In: 1100  Time Out: [address] [place] [address],[address], PT       Post Session Pain  Charge Capture  [facility] Portal  MD Guidelines  Scanned Media  Benefits  MyChart    Future Appointments   Date Time Provider José Miguel Tan   11/21/2022  2:40 PM DO MARIA A Moseley [facility]   11/23/2022  9:30 AM Joann Campbell, DEANNA [facility]   9/27/2023 11:00 AM Ramana Mendez MD END [facility]

## 2022-11-15 ENCOUNTER — OFFICE VISIT (OUTPATIENT)
Dept: INTERNAL MEDICINE CLINIC | Facility: CLINIC | Age: 77
End: 2022-11-15
Payer: MEDICARE

## 2022-11-15 VITALS
WEIGHT: 120 LBS | HEART RATE: 81 BPM | RESPIRATION RATE: 16 BRPM | DIASTOLIC BLOOD PRESSURE: 70 MMHG | BODY MASS INDEX: 23.56 KG/M2 | TEMPERATURE: 96.8 F | SYSTOLIC BLOOD PRESSURE: 120 MMHG | HEIGHT: 60 IN

## 2022-11-15 DIAGNOSIS — R35.0 URINARY FREQUENCY: ICD-10-CM

## 2022-11-15 DIAGNOSIS — R10.32 LLQ PAIN: Primary | ICD-10-CM

## 2022-11-15 LAB
BILIRUBIN, URINE, POC: NEGATIVE
BLOOD URINE, POC: NEGATIVE
GLUCOSE URINE, POC: NEGATIVE
KETONES, URINE, POC: NEGATIVE
LEUKOCYTE ESTERASE, URINE, POC: NEGATIVE
NITRITE, URINE, POC: NEGATIVE
PH, URINE, POC: 6.5 (ref 4.6–8)
PROTEIN,URINE, POC: NEGATIVE
SPECIFIC GRAVITY, URINE, POC: 1.01 (ref 1–1.03)
URINALYSIS CLARITY, POC: CLEAR
URINALYSIS COLOR, POC: YELLOW
UROBILINOGEN, POC: 0.2

## 2022-11-15 PROCEDURE — 3078F DIAST BP <80 MM HG: CPT | Performed by: NURSE PRACTITIONER

## 2022-11-15 PROCEDURE — G8420 CALC BMI NORM PARAMETERS: HCPCS | Performed by: NURSE PRACTITIONER

## 2022-11-15 PROCEDURE — 1090F PRES/ABSN URINE INCON ASSESS: CPT | Performed by: NURSE PRACTITIONER

## 2022-11-15 PROCEDURE — 99214 OFFICE O/P EST MOD 30 MIN: CPT | Performed by: NURSE PRACTITIONER

## 2022-11-15 PROCEDURE — 81003 URINALYSIS AUTO W/O SCOPE: CPT | Performed by: NURSE PRACTITIONER

## 2022-11-15 PROCEDURE — 1123F ACP DISCUSS/DSCN MKR DOCD: CPT | Performed by: NURSE PRACTITIONER

## 2022-11-15 PROCEDURE — 4004F PT TOBACCO SCREEN RCVD TLK: CPT | Performed by: NURSE PRACTITIONER

## 2022-11-15 PROCEDURE — G8427 DOCREV CUR MEDS BY ELIG CLIN: HCPCS | Performed by: NURSE PRACTITIONER

## 2022-11-15 PROCEDURE — 3074F SYST BP LT 130 MM HG: CPT | Performed by: NURSE PRACTITIONER

## 2022-11-15 PROCEDURE — G8399 PT W/DXA RESULTS DOCUMENT: HCPCS | Performed by: NURSE PRACTITIONER

## 2022-11-15 PROCEDURE — G8484 FLU IMMUNIZE NO ADMIN: HCPCS | Performed by: NURSE PRACTITIONER

## 2022-11-15 ASSESSMENT — PATIENT HEALTH QUESTIONNAIRE - PHQ9
SUM OF ALL RESPONSES TO PHQ QUESTIONS 1-9: 0
2. FEELING DOWN, DEPRESSED OR HOPELESS: 0
SUM OF ALL RESPONSES TO PHQ9 QUESTIONS 1 & 2: 0
SUM OF ALL RESPONSES TO PHQ QUESTIONS 1-9: 0
1. LITTLE INTEREST OR PLEASURE IN DOING THINGS: 0

## 2022-11-15 ASSESSMENT — ENCOUNTER SYMPTOMS
SORE THROAT: 0
ABDOMINAL DISTENTION: 1
ABDOMINAL PAIN: 1
RHINORRHEA: 0
CONSTIPATION: 1
DIARRHEA: 0
SHORTNESS OF BREATH: 0
SINUS PAIN: 0
NAUSEA: 0
VOMITING: 0
BLOOD IN STOOL: 0
COUGH: 0

## 2022-11-15 NOTE — PROGRESS NOTES
[facility]      11/15/2022    Patient Name: Susanna Apple  :  1945      Chief Complaint:  Chief Complaint   Patient presents with    Abdominal Pain     discomfort         HPI  Patient presents today with complaint of LLQ pain. Symptoms started about 5 days ago. She reports intense LLQ pain on Thursday after eating dinner. Thursday and Friday, the pain was intermittent but is now more constant. She describes the pain as dull and aching now. Eating worsens the pain. Associated symptoms include fatigue, bloating, gas and urinary frequency. She has been eating mainly canned soup since symptom onset. She denies any fever, chills, nausea, vomiting or blood in the stool. She reports history of IBS with both constipation and diarrhea. She reports more trouble recently with constipation.        Past Medical History:   Diagnosis Date    Abnormal Pap smear     Anxiety     Back problem     COPD (chronic obstructive pulmonary disease) (HCC)     Depression     Hyperthyroidism     Insomnia     Irritable bowel syndrome     Osteopenia        Past Surgical History:   Procedure Laterality Date    COLONOSCOPY      2017    ORTHOPEDIC SURGERY      Left 5th toe surgery x3    SALPINGO-OOPHORECTOMY Right 2014    TONSILLECTOMY         Family History   Problem Relation Age of Onset    Heart Disease Maternal Grandfather     Breast Cancer Paternal Aunt     Cancer Maternal Grandmother         colon    Other Brother         heart valve issue    Hypertension Father     Diabetes Father     Heart Disease Father     Other Mother         bilateral brain tumor    Asthma Mother     Heart Disease Mother     Hypertension Mother     Breast Cancer Niece     Thyroid Disease Neg Hx        Social History     Tobacco Use    Smoking status: Every Day     Packs/day: 1.00     Types: Cigarettes    Smokeless tobacco: Never   Substance Use Topics    Alcohol use: No    Drug use: Never       Current Outpatient Medications:     Multiple Vitamins-Minerals (MULTIVITAMIN ADULTS PO), Take by mouth, Disp: , Rfl:     vitamin D3 (CHOLECALCIFEROL) 10 MCG (400 UNIT) TABS tablet, Take 400 Units by mouth, Disp: , Rfl:     temazepam (RESTORIL) 15 MG capsule, Take 1 capsule by mouth nightly as needed for Sleep for up to 90 days. , Disp: 30 capsule, Rfl: 2    alendronate (FOSAMAX) 70 MG tablet, Take 70 mg by mouth every 7 days, Disp: , Rfl:     ascorbic acid (VITAMIN C) 500 MG tablet, Take 500 mg by mouth, Disp: , Rfl:     DULoxetine (CYMBALTA) 30 MG extended release capsule, Take 30 mg by mouth, Disp: , Rfl:     meloxicam (MOBIC) 7.5 MG tablet, Take 7.5 mg by mouth daily, Disp: , Rfl:     Allergies   Allergen Reactions    Prednisone Other (See Comments)    Adhesive Tape Hives     bandaids cause skin irritations  bandaids cause skin irritations  bandaids cause skin irritations      Ibandronic Acid Diarrhea    Triamcinolone Itching    Alendronate Sodium Rash    Triprolidine-Pseudoephedrine Anxiety       Review of Systems   Constitutional:  Positive for fatigue. Negative for chills and fever. HENT:  Negative for congestion, postnasal drip, rhinorrhea, sinus pain and sore throat. Respiratory:  Negative for cough and shortness of breath. Cardiovascular:  Negative for chest pain and palpitations. Gastrointestinal:  Positive for abdominal distention, abdominal pain and constipation. Negative for blood in stool, diarrhea, nausea and vomiting. Genitourinary:  Positive for frequency. Negative for dysuria, flank pain, hematuria and urgency. Skin:  Negative for rash. Neurological:  Negative for dizziness, light-headedness and headaches. Objective:  /70 (Site: Left Upper Arm, Position: Sitting, Cuff Size: Medium Adult)   Pulse 81   Temp 96.8 °F (36 °C) (Temporal)   Resp 16   Ht 5' (1.524 m)   Wt 120 lb (54.4 kg)   PF 96 L/min   BMI 23.44 kg/m²     Examination:  Physical Exam  Vitals and nursing note reviewed.    Constitutional: General: She is not in acute distress. Appearance: Normal appearance. Cardiovascular:      Rate and Rhythm: Normal rate and regular rhythm. Pulmonary:      Effort: Pulmonary effort is normal. No respiratory distress. Breath sounds: Normal breath sounds. Abdominal:      General: Bowel sounds are normal. There is no distension. Palpations: Abdomen is soft. Tenderness: There is abdominal tenderness in the left lower quadrant. There is guarding. There is no right CVA tenderness, left CVA tenderness or rebound. Musculoskeletal:      Right lower leg: No edema. Left lower leg: No edema. Skin:     General: Skin is warm and dry. Neurological:      Mental Status: She is alert and oriented to person, place, and time. Psychiatric:         Mood and Affect: Mood normal.         Assessment/Plan:    Diego Cook was seen today for abdominal pain. Diagnoses and all orders for this visit:    LLQ pain  -     CT ABDOMEN PELVIS W IV CONTRAST Additional Contrast? Radiologist Recommendation; Future  STAT CT to further evaluate. Urinary frequency  -     AMB POC URINALYSIS DIP STICK AUTO W/O MICRO  UA in the office unremarkable. On this date, 11/15/22, I have spent 30 minutes reviewing previous notes, test results and face to face with the patient discussing the diagnosis and importance of compliance with the treatment plan as well as documenting on the day of the visit. An electronic signature was used to authenticate this note.   KATLYN Mazariegos

## 2022-11-16 ENCOUNTER — HOSPITAL ENCOUNTER (OUTPATIENT)
Dept: CT IMAGING | Age: 77
Discharge: HOME | End: 2022-11-19
Payer: MEDICARE

## 2022-11-16 DIAGNOSIS — R10.32 LLQ PAIN: ICD-10-CM

## 2022-11-16 LAB — CREAT BLD-MCNC: 0.72 MG/DL (ref 0.8–1.5)

## 2022-11-16 PROCEDURE — 6360000004 HC RX CONTRAST MEDICATION: Performed by: NURSE PRACTITIONER

## 2022-11-16 PROCEDURE — 82565 ASSAY OF CREATININE: CPT

## 2022-11-16 PROCEDURE — 74177 CT ABD & PELVIS W/CONTRAST: CPT

## 2022-11-16 PROCEDURE — 2580000003 HC RX 258: Performed by: NURSE PRACTITIONER

## 2022-11-16 RX ORDER — SODIUM CHLORIDE 0.9 % (FLUSH) 0.9 %
10 SYRINGE (ML) INJECTION
Status: DISCONTINUED | OUTPATIENT
Start: 2022-11-16 | End: 2022-11-20 | Stop reason: HOSPADM

## 2022-11-16 RX ORDER — 0.9 % SODIUM CHLORIDE 0.9 %
100 INTRAVENOUS SOLUTION INTRAVENOUS
Status: COMPLETED | OUTPATIENT
Start: 2022-11-16 | End: 2022-11-16

## 2022-11-16 RX ADMIN — IOPAMIDOL 100 ML: 755 INJECTION, SOLUTION INTRAVENOUS at 16:09

## 2022-11-16 RX ADMIN — SODIUM CHLORIDE 100 ML: 9 INJECTION, SOLUTION INTRAVENOUS at 16:10

## 2022-11-16 RX ADMIN — DIATRIZOATE MEGLUMINE AND DIATRIZOATE SODIUM 15 ML: 660; 100 LIQUID ORAL; RECTAL at 16:09

## 2022-11-20 NOTE — PROGRESS NOTES
Vinay Sargent (:  1945) is a 69 y.o. female,Established patient, here for evaluation of the following chief complaint(s):  Follow-up (3 mth fu and refills and fu on abd pain), Abdominal Pain, and Medication Refill         ASSESSMENT/PLAN:  1. Chronic obstructive pulmonary disease, unspecified COPD type ([address]  No benefit noted with prior trial of Spiriva  Continue as needed albuterol  Continue working toward smoking cessation    2. Chronic insomnia  PDMP reviewed  Continue as needed temazepam.  Patient currently tolerating without adverse effects    - temazepam (RESTORIL) 15 MG capsule; Take 1 capsule by mouth nightly as needed for Sleep for up to 90 days. Dispense: 30 capsule; Refill: 2    3. Anxiety and depression  Continue Cymbalta    - DULoxetine (CYMBALTA) 30 MG extended release capsule; Take 1 capsule by mouth daily  Dispense: 90 capsule; Refill: 3    4. Osteopenia of multiple sites  DEXA scan on 3/30/2022 with osteopenia. 10-year overall fracture risk 19.8% with 10-year hip fracture risk 7.3%  Continue Fosamax, tolerating without adverse effects    - alendronate (FOSAMAX) 70 MG tablet; Take 1 tablet by mouth every 7 days  Dispense: 4 tablet; Refill: 3    5. Lower abdominal pain  History of IBS, generally managed conservatively with low FODMAP diet  Currently manages supportively with a low FODMAP diet  CT abdomen/pelvis with oral and IV contrast on 11/15/2022 with mild cholelithiasis, small left renal cyst but no acute findings in the abdomen or pelvis  Possibility for smoldering diverticulitis. Check CBC to evaluate for leukocytosis, sed rate and CRP and if elevated start empiric Cipro and Flagyl  Recheck urinalysis to rule out underlying cystitis  If inflammatory tests unremarkable, likely IBS exacerbation. Prescription for Levsin sent to pharmacy    - Urinalysis with Reflex to Culture; Future  - CBC with Auto Differential; Future  - Sedimentation Rate;  Future  - C-Reactive Protein; Future  - Hyoscyamine Sulfate SL (LEVSIN/SL) 0.125 MG SUBL; Place 1 tablet under the tongue every 6 hours as needed (abdominal pain)  Dispense: 120 each; Refill: 1    Return in about 3 months (around 2/21/2023) for nonfasting labs today . Subjective   SUBJECTIVE/OBJECTIVE:  Patient is a 60-year-old  female who presents to the office today for follow-up. Patient has been having bilateral lower quadrant abdominal pain for a week and a half, originating in the left lower quadrant. Described as a stabbing sensation that was worse with oral intake. Has since modified her diet to soups and grazing on snacks with pain not as severe. Does have history of IBS with fluctuations between diarrhea and constipation which have been continuing with most recent symptoms. Uncertain if she is having black stool or blood per stool as she has not been looking. No dysuria or hematuria but does have some urinary frequency but is uncertain if this is due to her hydration as she is drinking tea. No vaginal bleeding or discharge, fevers or chills. Had 1 episode of nausea but no vomiting. Does note abdominal bloating and at times sensation of incomplete bowel emptying. Anxiety and depression well controlled with use of Cymbalta. Sleep remains adequate with use of temazepam denying significant next-day drowsiness or grogginess. No chest pain, shortness of breath or difficulty swallowing. Has occasional cough but no hemoptysis. Has not required rescue inhaler. Has been on and off Fosamax for years currently tolerating without adverse effects. Review of Systems   Constitutional:  Negative for chills and fever. HENT:  Negative for trouble swallowing. Respiratory:  Positive for cough (Occasional, no hemoptysis). Negative for shortness of breath. Cardiovascular:  Negative for chest pain. Gastrointestinal:  Positive for abdominal pain, constipation, diarrhea and nausea. Negative for vomiting. Genitourinary:  Positive for frequency. Negative for dysuria, hematuria, vaginal bleeding and vaginal discharge. Psychiatric/Behavioral:  Negative for self-injury. Objective   Physical Exam  Vitals reviewed. Constitutional:       General: She is not in acute distress. Appearance: Normal appearance. She is not ill-appearing, toxic-appearing or diaphoretic. HENT:      Head: Normocephalic and atraumatic. Eyes:      General:         Right eye: No discharge. Left eye: No discharge. Extraocular Movements: Extraocular movements intact. Cardiovascular:      Rate and Rhythm: Normal rate and regular rhythm. Heart sounds: No murmur heard. No friction rub. No gallop. Pulmonary:      Effort: Pulmonary effort is normal. No respiratory distress. Breath sounds: Wheezing (Faint expiratory) present. No rhonchi or rales. Abdominal:      General: Bowel sounds are normal.      Palpations: Abdomen is soft. Tenderness: There is no abdominal tenderness. There is no right CVA tenderness, left CVA tenderness or guarding. Skin:     General: Skin is warm and dry. Coloration: Skin is not jaundiced. Neurological:      Mental Status: She is alert. Mental status is at baseline. Psychiatric:         Attention and Perception: Attention normal.         Mood and Affect: Mood is not anxious. Affect is blunt. Affect is not tearful. Speech: Speech normal. Speech is not rapid and pressured or slurred. Behavior: Behavior normal. Behavior is not agitated, aggressive or combative. Behavior is cooperative. Thought Content: Thought content normal. Thought content does not include homicidal or suicidal plan. An electronic signature was used to authenticate this note.     --Sean Ortiz, DO

## 2022-11-21 ENCOUNTER — OFFICE VISIT (OUTPATIENT)
Dept: INTERNAL MEDICINE CLINIC | Facility: CLINIC | Age: 77
End: 2022-11-21
Payer: MEDICARE

## 2022-11-21 VITALS
TEMPERATURE: 97.3 F | HEIGHT: 60 IN | OXYGEN SATURATION: 94 % | WEIGHT: 120 LBS | HEART RATE: 63 BPM | DIASTOLIC BLOOD PRESSURE: 68 MMHG | BODY MASS INDEX: 23.56 KG/M2 | SYSTOLIC BLOOD PRESSURE: 118 MMHG

## 2022-11-21 DIAGNOSIS — J44.9 CHRONIC OBSTRUCTIVE PULMONARY DISEASE, UNSPECIFIED COPD TYPE (HCC): Primary | ICD-10-CM

## 2022-11-21 DIAGNOSIS — F32.A ANXIETY AND DEPRESSION: ICD-10-CM

## 2022-11-21 DIAGNOSIS — M85.89 OSTEOPENIA OF MULTIPLE SITES: ICD-10-CM

## 2022-11-21 DIAGNOSIS — R10.30 LOWER ABDOMINAL PAIN: ICD-10-CM

## 2022-11-21 DIAGNOSIS — F51.04 CHRONIC INSOMNIA: ICD-10-CM

## 2022-11-21 DIAGNOSIS — F41.9 ANXIETY AND DEPRESSION: ICD-10-CM

## 2022-11-21 PROBLEM — M81.0 AGE-RELATED OSTEOPOROSIS WITHOUT CURRENT PATHOLOGICAL FRACTURE: Status: ACTIVE | Noted: 2018-12-19

## 2022-11-21 PROCEDURE — 3078F DIAST BP <80 MM HG: CPT | Performed by: STUDENT IN AN ORGANIZED HEALTH CARE EDUCATION/TRAINING PROGRAM

## 2022-11-21 PROCEDURE — 4004F PT TOBACCO SCREEN RCVD TLK: CPT | Performed by: STUDENT IN AN ORGANIZED HEALTH CARE EDUCATION/TRAINING PROGRAM

## 2022-11-21 PROCEDURE — 3074F SYST BP LT 130 MM HG: CPT | Performed by: STUDENT IN AN ORGANIZED HEALTH CARE EDUCATION/TRAINING PROGRAM

## 2022-11-21 PROCEDURE — G8427 DOCREV CUR MEDS BY ELIG CLIN: HCPCS | Performed by: STUDENT IN AN ORGANIZED HEALTH CARE EDUCATION/TRAINING PROGRAM

## 2022-11-21 PROCEDURE — G8484 FLU IMMUNIZE NO ADMIN: HCPCS | Performed by: STUDENT IN AN ORGANIZED HEALTH CARE EDUCATION/TRAINING PROGRAM

## 2022-11-21 PROCEDURE — 1090F PRES/ABSN URINE INCON ASSESS: CPT | Performed by: STUDENT IN AN ORGANIZED HEALTH CARE EDUCATION/TRAINING PROGRAM

## 2022-11-21 PROCEDURE — 3023F SPIROM DOC REV: CPT | Performed by: STUDENT IN AN ORGANIZED HEALTH CARE EDUCATION/TRAINING PROGRAM

## 2022-11-21 PROCEDURE — 99214 OFFICE O/P EST MOD 30 MIN: CPT | Performed by: STUDENT IN AN ORGANIZED HEALTH CARE EDUCATION/TRAINING PROGRAM

## 2022-11-21 PROCEDURE — 1123F ACP DISCUSS/DSCN MKR DOCD: CPT | Performed by: STUDENT IN AN ORGANIZED HEALTH CARE EDUCATION/TRAINING PROGRAM

## 2022-11-21 PROCEDURE — G8420 CALC BMI NORM PARAMETERS: HCPCS | Performed by: STUDENT IN AN ORGANIZED HEALTH CARE EDUCATION/TRAINING PROGRAM

## 2022-11-21 PROCEDURE — G8399 PT W/DXA RESULTS DOCUMENT: HCPCS | Performed by: STUDENT IN AN ORGANIZED HEALTH CARE EDUCATION/TRAINING PROGRAM

## 2022-11-21 RX ORDER — TEMAZEPAM 15 MG/1
15 CAPSULE ORAL NIGHTLY PRN
Qty: 30 CAPSULE | Refills: 2 | Status: SHIPPED | OUTPATIENT
Start: 2022-12-18 | End: 2023-03-18

## 2022-11-21 RX ORDER — ALENDRONATE SODIUM 70 MG/1
70 TABLET ORAL
Qty: 4 TABLET | Refills: 3 | Status: SHIPPED | OUTPATIENT
Start: 2022-11-21

## 2022-11-21 RX ORDER — HYOSCYAMINE SULFATE 0.12 MG/1
1 TABLET SUBLINGUAL EVERY 6 HOURS PRN
Qty: 120 EACH | Refills: 1 | Status: SHIPPED | OUTPATIENT
Start: 2022-11-21

## 2022-11-21 RX ORDER — DULOXETIN HYDROCHLORIDE 30 MG/1
30 CAPSULE, DELAYED RELEASE ORAL DAILY
Qty: 90 CAPSULE | Refills: 3 | Status: SHIPPED | OUTPATIENT
Start: 2022-11-21

## 2022-11-21 ASSESSMENT — ENCOUNTER SYMPTOMS
VOMITING: 0
DIARRHEA: 1
CONSTIPATION: 1
SHORTNESS OF BREATH: 0
TROUBLE SWALLOWING: 0
ABDOMINAL PAIN: 1
COUGH: 1
NAUSEA: 1

## 2022-11-22 LAB
APPEARANCE UR: ABNORMAL
BACTERIA URNS QL MICRO: NEGATIVE /HPF
BASOPHILS # BLD: 0 K/UL (ref 0–0.2)
BASOPHILS NFR BLD: 0 % (ref 0–2)
BILIRUB UR QL: NEGATIVE
CASTS URNS QL MICRO: ABNORMAL /LPF (ref 0–2)
COLOR UR: ABNORMAL
CRP SERPL-MCNC: <0.3 MG/DL (ref 0–0.9)
DIFFERENTIAL METHOD BLD: ABNORMAL
EOSINOPHIL # BLD: 0.2 K/UL (ref 0–0.8)
EOSINOPHIL NFR BLD: 2 % (ref 0.5–7.8)
EPI CELLS #/AREA URNS HPF: ABNORMAL /HPF (ref 0–5)
ERYTHROCYTE [DISTWIDTH] IN BLOOD BY AUTOMATED COUNT: 12.9 % (ref 11.9–14.6)
ERYTHROCYTE [SEDIMENTATION RATE] IN BLOOD: 5 MM/HR (ref 0–30)
GLUCOSE UR STRIP.AUTO-MCNC: NEGATIVE MG/DL
HCT VFR BLD AUTO: 47.3 % (ref 35.8–46.3)
HGB BLD-MCNC: 15.3 G/DL (ref 11.7–15.4)
HGB UR QL STRIP: NEGATIVE
IMM GRANULOCYTES # BLD AUTO: 0 K/UL (ref 0–0.5)
IMM GRANULOCYTES NFR BLD AUTO: 0 % (ref 0–5)
KETONES UR QL STRIP.AUTO: ABNORMAL MG/DL
LEUKOCYTE ESTERASE UR QL STRIP.AUTO: ABNORMAL
LYMPHOCYTES # BLD: 2.2 K/UL (ref 0.5–4.6)
LYMPHOCYTES NFR BLD: 24 % (ref 13–44)
MCH RBC QN AUTO: 30.6 PG (ref 26.1–32.9)
MCHC RBC AUTO-ENTMCNC: 32.3 G/DL (ref 31.4–35)
MCV RBC AUTO: 94.6 FL (ref 82–102)
MONOCYTES # BLD: 0.9 K/UL (ref 0.1–1.3)
MONOCYTES NFR BLD: 10 % (ref 4–12)
MUCOUS THREADS URNS QL MICRO: 0 /LPF
NEUTS SEG # BLD: 5.7 K/UL (ref 1.7–8.2)
NEUTS SEG NFR BLD: 64 % (ref 43–78)
NITRITE UR QL STRIP.AUTO: NEGATIVE
NRBC # BLD: 0 K/UL (ref 0–0.2)
PH UR STRIP: 5 [PH] (ref 5–9)
PLATELET # BLD AUTO: 248 K/UL (ref 150–450)
PMV BLD AUTO: 12.1 FL (ref 9.4–12.3)
PROT UR STRIP-MCNC: NEGATIVE MG/DL
RBC # BLD AUTO: 5 M/UL (ref 4.05–5.2)
RBC #/AREA URNS HPF: ABNORMAL /HPF (ref 0–5)
SP GR UR REFRACTOMETRY: 1.02 (ref 1–1.02)
URINE CULTURE IF INDICATED: ABNORMAL
UROBILINOGEN UR QL STRIP.AUTO: 0.2 EU/DL (ref 0.2–1)
WBC # BLD AUTO: 9 K/UL (ref 4.3–11.1)
WBC URNS QL MICRO: ABNORMAL /HPF (ref 0–4)

## 2022-11-23 ENCOUNTER — HOSPITAL ENCOUNTER (OUTPATIENT)
Dept: PHYSICAL THERAPY | Age: 77
Setting detail: RECURRING SERIES
Discharge: HOME | End: 2022-11-26
Payer: MEDICARE

## 2022-11-23 ENCOUNTER — TELEPHONE (OUTPATIENT)
Dept: INTERNAL MEDICINE CLINIC | Facility: CLINIC | Age: 77
End: 2022-11-23

## 2022-11-23 DIAGNOSIS — R10.30 LOWER ABDOMINAL PAIN: Primary | ICD-10-CM

## 2022-11-23 PROCEDURE — 97140 MANUAL THERAPY 1/> REGIONS: CPT

## 2022-11-23 ASSESSMENT — PAIN SCALES - GENERAL: PAINLEVEL_OUTOF10: 4

## 2022-11-23 NOTE — TELEPHONE ENCOUNTER
Patient requesting gastroenterology referral given chronic abdominal pain and history of IBS. Referral order placed.     Orders Placed This Encounter    [facility]     Referral Priority:   Routine     Referral Type:   Eval and Treat     Referral Reason:   Specialty Services Required     Referral Location:   [facility]     Requested Specialty:   Gastroenterology     Number of Visits Requested:   1

## 2022-11-23 NOTE — PROGRESS NOTES
Alize Watters  : 1945  Primary: Medicare Part A And B  Secondary: [address] [facility] [address] @ [address] [address]  Phone: [phone]  Fax: [phone] Plan Frequency: 1 time per week for every other week for 90 days    Plan of Care/Certification Expiration Date: 23      PT Visit Info: Total # of Visits to Date: 80  Progress Note Counter: 1  Progress Note Due Date: 22       OUTPATIENT PHYSICAL THERAPY:OP NOTE TYPE: Treatment Note 2022       Episode   Appt Desk       Treatment Diagnosis:  Cervicalgia (M54.2)  Medical/Referring Diagnosis:  Low back pain, unspecified [M54.50]  Referring Physician:  Clari Henriquez MD MD Orders:  PT Eval and Treat   Date of Onset:  Onset Date: 20     Allergies:  Prednisone, Adhesive tape, Ibandronic acid, Triamcinolone, Alendronate sodium, and Triprolidine-pseudoephedrine  Restrictions/Precautions:      None  Interventions Planned (Treatment may consist of any combination of the following):    No data recorded   Subjective Comments:  Patient reports her neck and shoulder are hurting today. Initial:     4/10 Post Session:     3/10  Medications Last Reviewed:  2022  Updated Objective Findings:  None Today  Treatment   MANUAL THERAPY: (40 minutes): Joint mobilization and Soft tissue mobilization was utilized and necessary because of the patient's restricted joint motion, painful spasm, loss of articular motion and restricted motion of soft tissue. Treatment/Session Summary:    Treatment Assessment:   Patient tolerated treatment well with improving overall mobility noted after treatment. Communication/Consultation:  None today  Equipment provided today:  None  Recommendations/Intent for next treatment session: Next visit will focus on manual therapy.     Total Treatment Billable Duration:  40 minutes  Time In: 930  Time Out:  Medical [place], PT       Post Session Pain  Charge Capture  MedBridge Portal  MD Guidelines  Scanned Media  Benefits  MyChart    Future Appointments   Date Time Provider [place]   12/7/2022 10:15 AM Gina Odom, PT [facility]   12/15/2022 11:00 AM Gina Odmo PT [facility]   2/21/2023 11:40 AM DO MARIA A Saldivar [facility]   9/27/2023 11:00 AM Keturah Ware MD [facility] AMB

## 2022-12-01 NOTE — TELEPHONE ENCOUNTER
Pt advised referral sent to [facility] and they will contact her with an appt.

## 2022-12-07 ENCOUNTER — HOSPITAL ENCOUNTER (OUTPATIENT)
Dept: PHYSICAL THERAPY | Age: 77
Setting detail: RECURRING SERIES
Discharge: HOME | End: 2022-12-10
Payer: MEDICARE

## 2022-12-07 PROCEDURE — 97140 MANUAL THERAPY 1/> REGIONS: CPT

## 2022-12-07 ASSESSMENT — PAIN SCALES - GENERAL: PAINLEVEL_OUTOF10: 4

## 2022-12-07 NOTE — PROGRESS NOTES
Arnaldo Samuel  : 1945  Primary: Medicare Part A And B  Secondary: [address] [facility] [address] @ [address] [address]  Phone: [phone]  Fax: [phone] Plan Frequency: 1 time per week for every other week for 90 days    Plan of Care/Certification Expiration Date: 23      PT Visit Info: Total # of Visits to Date: 80  Progress Note Counter: 1  Progress Note Due Date: 22       OUTPATIENT PHYSICAL THERAPY:OP NOTE TYPE: Treatment Note 2022       Episode   Appt Desk       Treatment Diagnosis:  Cervicalgia (M54.2)  Medical/Referring Diagnosis:  Low back pain, unspecified [M54.50]  Referring Physician:  Nathan Monroe MD MD Orders:  PT Eval and Treat   Date of Onset:  Onset Date: 20     Allergies:  Prednisone, Adhesive tape, Ibandronic acid, Triamcinolone, Alendronate sodium, and Triprolidine-pseudoephedrine  Restrictions/Precautions:      None  Interventions Planned (Treatment may consist of any combination of the following):    No data recorded   Subjective Comments:  Patient reports her neck and shoulder are hurting today. Initial:     4/10 Post Session:     3/10  Medications Last Reviewed:  2022  Updated Objective Findings:  None Today  Treatment   MANUAL THERAPY: (40 minutes): Joint mobilization and Soft tissue mobilization was utilized and necessary because of the patient's restricted joint motion, painful spasm, loss of articular motion and restricted motion of soft tissue. Treatment/Session Summary:    Treatment Assessment:   Patient tolerated treatment well with improving overall mobility noted after treatment. Communication/Consultation:  None today  Equipment provided today:  None  Recommendations/Intent for next treatment session: Next visit will focus on manual therapy.     Total Treatment Billable Duration:  40 minutes  Time In: 2337  Time Out: [address] [facility],        Post Session Pain Charge Capture  MedBridge Portal  MD Guidelines  Scanned Media  Benefits  MyChart    Future Appointments   Date Time Provider [place]   12/15/2022  9:30 AM TUNG Jernigan - CNP [facility] AMB   12/15/2022 11:00 AM Clementeen Corners, PT [facility]   1/4/2023  9:30 AM Clementeen Corners, PT [facility]   2/21/2023 11:40 AM Nilda Bunch DO [facility]   9/27/2023 11:00 AM Carmen Cristobal MD [facility] AMB

## 2022-12-15 ENCOUNTER — HOSPITAL ENCOUNTER (OUTPATIENT)
Dept: PHYSICAL THERAPY | Age: 77
Setting detail: RECURRING SERIES
Discharge: HOME | End: 2022-12-18
Payer: COMMERCIAL

## 2022-12-15 PROCEDURE — 97140 MANUAL THERAPY 1/> REGIONS: CPT

## 2022-12-15 ASSESSMENT — PAIN SCALES - GENERAL: PAINLEVEL_OUTOF10: 4

## 2022-12-15 NOTE — PROGRESS NOTES
Alize Watters  : 1945  Primary: [employer]  Secondary:  Sulma Dobbs  [address] [facility] [address]  Phone: [phone]  Fax: [phone] Plan Frequency: 1 time per week for every other week for 90 days    Plan of Care/Certification Expiration Date: 23      PT Visit Info: Total # of Visits to Date: 80  Progress Note Counter: 1  Progress Note Due Date: 22      OUTPATIENT PHYSICAL THERAPY:OP NOTE TYPE: Progress Report 12/15/2022               Episode  Appt Desk         Treatment Diagnosis:  Cervicalgia (M54.2)    Medical/Referring Diagnosis:  Low back pain, unspecified [M54.50]  Referring Physician:  Clari Henriquez MD MD Orders:  PT Eval and Treat   Return MD Appt:  TBD  Date of Onset:  Onset Date: 20     Allergies:  Prednisone, Adhesive tape, Ibandronic acid, Triamcinolone, Alendronate sodium, and Triprolidine-pseudoephedrine  Restrictions/Precautions:     None  Medications Last Reviewed:  12/15/2022     SUBJECTIVE   History of Injury/Illness (Reason for Referral):  2022 (Progress Note): Patient reports that her neck and shoulder have good days and not-so-good days. She reports overall therapy has been helping.  (Recertification): Patient reports she is doing pretty well overall, but her shoulder is hurting. 2022 (Progress Note): Patient reports she is doing pretty well, but is having more pain on her left side recently. 10/20/2022 (Progress Note): Patient reports she is doing well overall, but is having pain in her right shoulder and the left side of her neck. 9531 (Recertification): Patient reports that therapy has been helping, but she still has pain in her neck that is causing increased dizziness when she rolls over from her back to her side or stomach.     12/15/2022 (Progress Note): Patient reports she is having pain in her neck and shoulders that will vary with activity. Initial:     4/10 Post Session:     4/10  Past Medical History/Comorbidities:   Ms. Ivonne Das  has a past medical history of Abnormal Pap smear, Anxiety, Back problem, COPD (chronic obstructive pulmonary disease) ([address] Depression, Hyperthyroidism, Insomnia, Irritable bowel syndrome, and Osteopenia. Ms. Ivonne Das  has a past surgical history that includes Tonsillectomy; orthopedic surgery; Salpingo-oophorectomy (Right, 2014); and Colonoscopy. Social History/Living Environment:   Lives With: Alone  Type of Home: House     Prior Level of Function/Work/Activity:   Prior level of function: Independent  Occupation: Retired  No data [address] recorded   Learning:   Does the patient/guardian have any barriers to learning?: No barriers  Will there be a co-learner?: No  What is the preferred language of the patient/guardian?: English  Is an  required?: No  How does the patient/guardian prefer to learn new concepts?: Listening; Reading; Demonstration     Fall Risk Scale: Hernandez Total Score: 0  Hernandez Fall Risk: Low (0-24)     Dominant Side:  right handed    Personal Factors:        Sex:  female        Age:  69 y.o.        OBJECTIVE   Observation/Orthostatic Postural Assessment:          Posture Assessment: Rounded shoulders, Forward head   Palpation:          Tightness and tenderness to palpation noted throughout upper and lower cervical musculature. No bruising or swelling noted.   ROM:          Cervical Assessment (AROM):  Flexion: 75% of full AROM  Extension: 75% of full AROM  Right side bendin% of full AROM  Left side bending[de-identified] 75% of full AROM  Right rotation: 75% of full AROM  Left rotation: 75% of full AROM  Strength:          Cervical Assessment (Strength):  Grossly 2+/5 with manual muscle testing  Special Tests:          Negative Hautant's test. Negative Cranial-Philadelphia lift test. Negative Philadelphia-Axis Sheer test. Negative Alar Ligament test. Negative Tectorial Membrane test. Neurological Screen:        Dermatomes: Within normal limits        Reflexes:  2+  Functional Mobility:         Gait/Mobility:       Independent         Transfers:      Sit to Stand: Independent  Stand to Sit: Independent  Stand Pivot Transfers: Independent  Bed to Chair: Independent  Lateral Transfers: Independent         Bed Mobility:      Rolling: Independent  Supine to Sit: Independent  Sit to Supine: Independent  Scooting: Independent         ASSESSMENT   Assessment:  Ms. Moshe Srivastava presents with improving mobility and pain in cervical region secondary to degenerative changes. Patient has attended a total of 96 scheduled physical therapy visits. Treatment has consisted of stretching, strengthening, and manual therapy to improve overall pain and performance with activities of daily living. After discussing with patient, she agreed she  would continue to benefit from physical therapy to improve overall mobility. Please sign this re-certification if you concur. Thank you for the opportunity to serve this patient. Problem List: (Impacting functional limitations):      Decreased Strength  Decreased ADL/Functional Activities  Increased Pain  Decreased Activity Tolerance  Therapy Prognosis:   Therapy Prognosis: Good       PLAN   Effective Dates: 11/20/2022 TO Plan of Care/Certification Expiration Date: 01/31/23       Frequency/Duration: Plan Frequency: 1 time per week for every other week for 90 days     Interventions Planned (Treatment may consist of any combination of the following):      Cold  Heat  Home Exercise Program (HEP)  Manual Therapy  Neuromuscular Re-education/Strengthening  Range of Motion (ROM)  Therapeutic Activites  Therapeutic Exercise/Strengthening    GOALS: (Goals have been discussed and agreed upon with patient.)  Discharge Goals: Time Frame: 90 days  Patient will be independent with all ADLs with minimal onset of neck pain and no deficits with daily tasks--goal ongoing.   Patient will report no fear avoidance with social or recreational activities due to neck pain --goal ongoing. Patient will score less than or equal to 7/50 on Neck Disability Index with minimal effect of neck pain on patient's ability to manage every day life activities--goal ongoing. Outcome Measure: Tool Used: Neck Disability Index (NDI)  Score:  Initial: 17/50  Most Recent: 14/50 (Date: 12/15/2022 )   Interpretation of Score: The Neck Disability Index is a revised form of the Oswestry Low Back Pain Index and is designed to measure the activities of daily living in person's with neck pain. Each section is scored on a 0-5 scale, 5 representing the greatest disability. The scores of each section are added together for a total score of 50. Medical Necessity:   Patient is expected to demonstrate progress in strength and range of motion to improve safety during daily activities. Patient demonstrates good rehab potential due to higher previous functional level. Skilled intervention continues to be required due to decreased mobility. Reason for Services/Other Comments:  Patient continues to demonstrate capacity to improve overall mobility which will increase independence and increase safety. Total Duration:  Time In: 1100  Time Out: Faustino Chun's therapy, I certify that the treatment plan above will be carried out by a therapist or under their direction. Thank you for this referral,  Paty Gomez PT     Referring Physician Signature: Alla Gimenez MD No Signature is Required for this note.         Post Session Pain  Charge Capture   POC Link  Treatment Note Link  MD Guidelines  [place]

## 2022-12-15 NOTE — PROGRESS NOTES
Vannessa Florence  : 1945  Primary: [employer]  Secondary:  [address] [place] [address]  Phone: [phone]  Fax: [phone] Plan Frequency: 1 time per week for every other week for 90 days    Plan of Care/Certification Expiration Date: 23      PT Visit Info: Total # of Visits to Date: 80  Progress Note Counter: 1  Progress Note Due Date: 22       OUTPATIENT PHYSICAL THERAPY:OP NOTE TYPE: Treatment Note 12/15/2022       Episode   Appt Desk       Treatment Diagnosis:  Cervicalgia (M54.2)  Medical/Referring Diagnosis:  Low back pain, unspecified [M54.50]  Referring Physician:  Hakan Poe MD MD Orders:  PT Eval and Treat   Date of Onset:  Onset Date: 20     Allergies:  Prednisone, Adhesive tape, Ibandronic acid, Triamcinolone, Alendronate sodium, and Triprolidine-pseudoephedrine  Restrictions/Precautions:      None  Interventions Planned (Treatment may consist of any combination of the following):    No data recorded   Subjective Comments:  Patient reports her neck and shoulders are hurting today. Initial:     4/10 Post Session:     4/10  Medications Last Reviewed:  12/15/2022  Updated Objective Findings:  None Today  Treatment   MANUAL THERAPY: (40 minutes): Joint mobilization and Soft tissue mobilization was utilized and necessary because of the patient's restricted joint motion, painful spasm, loss of articular motion and restricted motion of soft tissue. Treatment/Session Summary:    Treatment Assessment:   Patient tolerated treatment well with improving overall mobility noted after treatment. Communication/Consultation:  None today  Equipment provided today:  None  Recommendations/Intent for next treatment session: Next visit will focus on manual therapy.     Total Treatment Billable Duration:  40 minutes  Time In: 1100  Time Out: 1243    SONNY TRONCOSO, PT       Post Session Pain  Charge Capture [address] [facility] Portal  MD Guidelines  Scanned Media  Benefits  MyChart    Future Appointments   Date Time Provider [place]   1/4/2023  9:30 AM Kenney Crigler, PT [facility]   2/21/2023 11:40 AM DO MARIA A Wiley L AMB   9/27/2023 11:00 AM Tommy Rao MD [facility] AMB

## 2022-12-18 DIAGNOSIS — R10.30 LOWER ABDOMINAL PAIN: ICD-10-CM

## 2023-01-04 ENCOUNTER — HOSPITAL ENCOUNTER (OUTPATIENT)
Dept: PHYSICAL THERAPY | Age: 78
Setting detail: RECURRING SERIES
Discharge: HOME | End: 2023-01-07

## 2023-01-04 PROCEDURE — 97140 MANUAL THERAPY 1/> REGIONS: CPT

## 2023-01-04 ASSESSMENT — PAIN SCALES - GENERAL: PAINLEVEL_OUTOF10: 4

## 2023-01-04 NOTE — PROGRESS NOTES
Floyd Hernandez  : 1945  Primary: [employer]  Secondary:  Theodora Reynoso  [address] [facility] [address]  Phone: [phone]  Fax: [phone] Plan Frequency: 1 time per week for every other week for 90 days    Plan of Care/Certification Expiration Date: 23      PT Visit Info: Total # of Visits to Date: 80  Progress Note Counter: 1  Progress Note Due Date: 22       OUTPATIENT PHYSICAL THERAPY:OP NOTE TYPE: Treatment Note 2023       Episode   Appt Desk       Treatment Diagnosis:  Cervicalgia (M54.2)  Medical/Referring Diagnosis:  Low back pain, unspecified [M54.50]  Referring Physician:  Jairo Mackenzie MD MD Orders:  PT Eval and Treat   Date of Onset:  Onset Date: 20     Allergies:  Prednisone, Adhesive tape, Ibandronic acid, Triamcinolone, Alendronate sodium, and Triprolidine-pseudoephedrine  Restrictions/Precautions:      None  Interventions Planned (Treatment may consist of any combination of the following):    No data recorded   Subjective Comments:  Patient reports her shoulder and neck are hurting today. Initial:     4/10 Post Session:     4/10  Medications Last Reviewed:  2023  Updated Objective Findings:  None Today  Treatment   MANUAL THERAPY: (40 minutes): Joint mobilization and Soft tissue mobilization was utilized and necessary because of the patient's restricted joint motion, painful spasm, loss of articular motion and restricted motion of soft tissue. Treatment/Session Summary:    Treatment Assessment:   Patient tolerated treatment well with improving overall mobility noted after treatment. Communication/Consultation:  None today  Equipment provided today:  None  Recommendations/Intent for next treatment session: Next visit will focus on manual therapy.     Total Treatment Billable Duration:  40 minutes  Time In: 9271  Time Out: [address] ABA, PT       Post Session Pain  Charge Capture [address] [facility] Portal  MD Guidelines  Scanned Media  Benefits  MyChart    Future Appointments   Date Time Provider José [place]   1/19/2023  8:45 AM Makenzie Sparks PT [facility]   2/21/2023 11:40 AM DO MARIA A Santoro [facility] AM[facility]   9/27/2023 11:00 AM Golden Nagy MD [facility] AMB

## 2023-01-19 ENCOUNTER — HOSPITAL ENCOUNTER (OUTPATIENT)
Dept: PHYSICAL THERAPY | Age: 78
Setting detail: RECURRING SERIES
End: 2023-01-19
Payer: MEDICARE

## 2023-01-19 NOTE — PROGRESS NOTES
Tasha Petit  : 1945  Primary:   Secondary:  [address] @ [address] [address]  Phone: [phone]  Fax: [phone]    PT Visit Info: Total # of Visits to Date: 80  Progress Note Counter: 1  Progress Note Due Date: 22     OT Visit Info:  No data recorded    OUTPATIENT THERAPY:OP NOTE TYPE: Progress Report 2023               Episode  Appt Desk           Tasha Petit cancelled her appointment for today due to illness. Will plan to follow up next during next appointment.   Thank you,  Julian Gramajo, PT    Future Appointments   Date Time Provider José Miguel Tan   2023  8:45 AM Cody Edouard, PT [facility]   2023 11:40 AM DO MARIA A Gloria [facility] AMB   2023 11:00 AM Genaro Brown MD [facility] AMB

## 2023-01-25 ENCOUNTER — HOSPITAL ENCOUNTER (OUTPATIENT)
Dept: PHYSICAL THERAPY | Age: 78
Setting detail: RECURRING SERIES
Discharge: HOME | End: 2023-01-28
Payer: MEDICARE

## 2023-01-25 PROCEDURE — 97140 MANUAL THERAPY 1/> REGIONS: CPT

## 2023-01-25 ASSESSMENT — PAIN SCALES - GENERAL: PAINLEVEL_OUTOF10: 4

## 2023-01-25 NOTE — THERAPY RECERTIFICATION
Floyd Hernandez  : 1945  Primary: Medicare Part A And B  Secondary: [address] @ [address] [address]  Phone: [phone]  Fax: [phone] Plan Frequency: 1 time per week for every other week for 90 days    Plan of Care/Certification Expiration Date: 23      PT Visit Info: Total # of Visits to Date: 1  Progress Note Counter: 1  Progress Note Due Date: 23      OUTPATIENT PHYSICAL THERAPY:OP NOTE TYPE: Recertification                Episode  Appt Desk         Treatment Diagnosis:  Cervicalgia (M54.2)    Medical/Referring Diagnosis:  Low back pain, unspecified [M54.50]  Referring Physician:  Jairo Mackenzie MD MD Orders:  PT Eval and Treat   Return MD Appt:  TBD  Date of Onset:  Onset Date: 20     Allergies:  Prednisone, Adhesive tape, Ibandronic acid, Triamcinolone, Alendronate sodium, and Triprolidine-pseudoephedrine  Restrictions/Precautions:     None  Medications Last Reviewed:  2023     SUBJECTIVE   History of Injury/Illness (Reason for Referral):  2022 (Progress Note): Patient reports that her neck and shoulder have good days and not-so-good days. She reports overall therapy has been helping.  (Recertification): Patient reports she is doing pretty well overall, but her shoulder is hurting. 2022 (Progress Note): Patient reports she is doing pretty well, but is having more pain on her left side recently. 10/20/2022 (Progress Note): Patient reports she is doing well overall, but is having pain in her right shoulder and the left side of her neck.  (Recertification): Patient reports that therapy has been helping, but she still has pain in her neck that is causing increased dizziness when she rolls over from her back to her side or stomach.     12/15/2022 (Progress Note): Patient reports she is having pain in her neck and shoulders that will vary with activity.  (Recertification): Patient reports therapy has been helping overall, but with certain activities she still has pain in her neck and shoulders. Initial:     4/10 Post Session:     4/10  Past Medical History/Comorbidities:   Ms. Citlali Messina  has a past medical history of Abnormal Pap smear, Anxiety, Back problem, COPD (chronic obstructive pulmonary disease) ([address] Depression, Hyperthyroidism, Insomnia, Irritable bowel syndrome, and Osteopenia. Ms. Citlali Messina  has a past surgical history that includes Tonsillectomy; orthopedic surgery; Salpingo-oophorectomy (Right, 2014); and Colonoscopy. Social History/Living Environment:   Lives With: Alone  Type of Home: House     Prior Level of Function/Work/Activity:   Prior level of function: Independent  Occupation: Retired  No data [id number] recorded   Learning:   Does the patient/guardian have any barriers to learning?: No barriers  Will there be a co-learner?: No  What is the preferred language of the patient/guardian?: English  Is an  required?: No  How does the patient/guardian prefer to learn new concepts?: Listening; Reading; Demonstration     Fall Risk Scale: Hernandez Total Score: 0  Hernandez Fall Risk: Low (0-24)     Dominant Side:  right handed    Personal Factors:        Sex:  female        Age:  69 y.o.        OBJECTIVE   Observation/Orthostatic Postural Assessment:          Posture Assessment: Rounded shoulders, Forward head   Palpation:          Tightness and tenderness to palpation noted throughout upper and lower cervical musculature. No bruising or swelling noted.   ROM:          Cervical Assessment (AROM):  Flexion: 75% of full AROM  Extension: 75% of full AROM  Right side bendin% of full AROM  Left side bending[de-identified] 75% of full AROM  Right rotation: 75% of full AROM  Left rotation: 75% of full AROM  Strength:          Cervical Assessment (Strength):  Grossly 2+/5 with manual muscle testing  Special Tests: Negative Hautant's test. Negative Cranial-Jourdanton lift test. Negative Jourdanton-Axis Sheer test. Negative Alar Ligament test. Negative Tectorial Membrane test.   Neurological Screen:        Dermatomes: Within normal limits        Reflexes:  2+  Functional Mobility:         Gait/Mobility:       Independent         Transfers:      Sit to Stand: Independent  Stand to Sit: Independent  Stand Pivot Transfers: Independent  Bed to Chair: Independent  Lateral Transfers: Independent         Bed Mobility:      Rolling: Independent  Supine to Sit: Independent  Sit to Supine: Independent  Scooting: Independent         ASSESSMENT   Assessment:  Ms. Warren Mcgregor presents with improving mobility and pain in cervical region secondary to degenerative changes. Patient has attended a total of 96 scheduled physical therapy visits. Treatment has consisted of stretching, strengthening, and manual therapy to improve overall pain and performance with activities of daily living. After discussing with patient, she agreed she  would continue to benefit from physical therapy to improve overall mobility. Please sign this re-certification if you concur. Thank you for the opportunity to serve this patient.    Problem List: (Impacting functional limitations):      Decreased Strength  Decreased ADL/Functional Activities  Increased Pain  Decreased Activity Tolerance  Therapy Prognosis:   Therapy Prognosis: Good       PLAN   Effective Dates: 1/31/2023 TO Plan of Care/Certification Expiration Date: 04/25/23       Frequency/Duration: Plan Frequency: 1 time per week for every other week for 90 days     Interventions Planned (Treatment may consist of any combination of the following):      Cold  Heat  Home Exercise Program (HEP)  Manual Therapy  Neuromuscular Re-education/Strengthening  Range of Motion (ROM)  Therapeutic Activites  Therapeutic Exercise/Strengthening    GOALS: (Goals have been discussed and agreed upon with patient.)  Discharge Goals: Time Frame: 90 days  Patient will be independent with all ADLs with minimal onset of neck pain and no deficits with daily tasks--goal ongoing. Patient will report no fear avoidance with social or recreational activities due to neck pain --goal ongoing. Patient will score less than or equal to 7/50 on Neck Disability Index with minimal effect of neck pain on patient's ability to manage every day life activities--goal ongoing. Outcome Measure: Tool Used: Neck Disability Index (NDI)  Score:  Initial: 17/50  Most Recent: 12/50 (Date: 1/25/2023 )   Interpretation of Score: The Neck Disability Index is a revised form of the Oswestry Low Back Pain Index and is designed to measure the activities of daily living in person's with neck pain. Each section is scored on a 0-5 scale, 5 representing the greatest disability. The scores of each section are added together for a total score of 50. Medical Necessity:   Patient is expected to demonstrate progress in strength and range of motion to improve safety during daily activities. Patient demonstrates good rehab potential due to higher previous functional level. Skilled intervention continues to be required due to decreased mobility. Reason for Services/Other Comments:  Patient continues to demonstrate capacity to improve overall mobility which will increase independence and increase safety. Total Duration:  Time In: 1100  Time Out: Faustino Chun's therapy, I certify that the treatment plan above will be carried out by a therapist or under their direction.   Thank you for this referral,  Andi Mittal PT     Referring Physician Signature: Tex Nascimento MD _______________________________ Date _____________        Post Session Pain  Charge Capture   POC Link  Treatment Note Link  MD Guidelines  MyChart

## 2023-01-25 NOTE — PROGRESS NOTES
Roxie Beltran  : 1945  Primary: Medicare Part A And B  Secondary: [address] @ [address] [address]  Phone: [phone]  Fax: [phone] Plan Frequency: 1 time per week for every other week for 90 days    Plan of Care/Certification Expiration Date: 23      PT Visit Info: Total # of Visits to Date: 1  Progress Note Counter: 1  Progress Note Due Date: 23       OUTPATIENT PHYSICAL THERAPY:OP NOTE TYPE: Treatment Note 2023       Episode   Appt Desk       Treatment Diagnosis:  Cervicalgia (M54.2)  Medical/Referring Diagnosis:  Low back pain, unspecified [M54.50]  Referring Physician:  Taco Greer MD MD Orders:  PT Eval and Treat   Date of Onset:  Onset Date: 20     Allergies:  Prednisone, Adhesive tape, Ibandronic acid, Triamcinolone, Alendronate sodium, and Triprolidine-pseudoephedrine  Restrictions/Precautions:      None  Interventions Planned (Treatment may consist of any combination of the following):    No data recorded   Subjective Comments:  Patient reports that she is hurting in her neck and shoulder today. Initial:     4/10 Post Session:     4/10  Medications Last Reviewed:  2023  Updated Objective Findings:  None Today  Treatment   MANUAL THERAPY: (40 minutes): Joint mobilization and Soft tissue mobilization was utilized and necessary because of the patient's restricted joint motion, painful spasm, loss of articular motion and restricted motion of soft tissue. Treatment/Session Summary:    Treatment Assessment:   Patient tolerated treatment well with improving overall mobility noted after treatment. Communication/Consultation:  None today  Equipment provided today:  None  Recommendations/Intent for next treatment session: Next visit will focus on manual therapy.     Total Treatment Billable Duration:  40 minutes  Time In: 1100  Time Out: [address], PT Post Session Pain  Charge Capture  MedBridge Portal  MD Guidelines  Scanned Media  Benefits  MyChart    Future Appointments   Date Time Provider José Miguel Britney   1/25/2023 11:00 AM Tomie Files, PT [facility]   2/8/2023 10:15 AM Camron Files, PT [facility]   2/21/2023 11:40 AM DO MARIA A Cowan [facility]   9/27/2023 11:00 AM MD CORDELL Reed [facility] AMB

## 2023-02-08 ENCOUNTER — HOSPITAL ENCOUNTER (OUTPATIENT)
Dept: PHYSICAL THERAPY | Age: 78
Setting detail: RECURRING SERIES
Discharge: HOME | End: 2023-02-11
Payer: MEDICARE

## 2023-02-08 PROCEDURE — 97140 MANUAL THERAPY 1/> REGIONS: CPT

## 2023-02-08 ASSESSMENT — PAIN SCALES - GENERAL: PAINLEVEL_OUTOF10: 5

## 2023-02-08 NOTE — PROGRESS NOTES
Fei Hedrickdonovan  : 1945  Primary: Medicare Part A And B  Secondary: [address] @ [facility]  [address] [facility] [address]  Phone: [phone]  Fax: [phone] Plan Frequency: 1 time per week for every other week for 90 days    Plan of Care/Certification Expiration Date: 23      PT Visit Info: Total # of Visits to Date: 3  Progress Note Counter: 1  Progress Note Due Date: 23       OUTPATIENT PHYSICAL THERAPY:OP NOTE TYPE: Treatment Note 2023       Episode   Appt Desk       Treatment Diagnosis:  Cervicalgia (M54.2)  Medical/Referring Diagnosis:  Low back pain, unspecified [M54.50]  Referring Physician:  Darin Erazo MD MD Orders:  PT Eval and Treat   Date of Onset:  Onset Date: 20     Allergies:  Prednisone, Adhesive tape, Ibandronic acid, Triamcinolone, Alendronate sodium, and Triprolidine-pseudoephedrine  Restrictions/Precautions:      None  Interventions Planned (Treatment may consist of any combination of the following):    No data recorded   Subjective Comments:  Patient reports that her neck and shoulders are hurting today. Initial:     5/10 Post Session:     5/10  Medications Last Reviewed:  2023  Updated Objective Findings:  None Today  Treatment   MANUAL THERAPY: (40 minutes): Joint mobilization and Soft tissue mobilization was utilized and necessary because of the patient's restricted joint motion, painful spasm, loss of articular motion and restricted motion of soft tissue. Treatment/Session Summary:    Treatment Assessment:   Patient tolerated treatment well with improving overall mobility and pain noted after treatment. Communication/Consultation:  None today  Equipment provided today:  None  Recommendations/Intent for next treatment session: Next visit will focus on manual therapy.     Total Treatment Billable Duration:  40 minutes  Time In: 1345  Time Out: [address],  Post Session Pain  Charge Capture  MedBridge Portal  MD Guidelines  Scanned Media  Benefits  MyChart    Future Appointments   Date Time Provider José Miguel Britney   2/21/2023 11:40 AM DO MARIA A Pelaez [facility]   2/22/2023 11:00 AM Anayeli Yoder PT [facility]   9/27/2023 11:00 AM MD CORDELL Araujo [facility] AMB

## 2023-02-18 PROBLEM — F33.42 RECURRENT MAJOR DEPRESSIVE DISORDER, IN FULL REMISSION (HCC): Status: RESOLVED | Noted: 2020-04-30 | Resolved: 2023-02-18

## 2023-02-18 PROBLEM — F13.20 BENZODIAZEPINE DEPENDENCE (HCC): Status: RESOLVED | Noted: 2021-08-18 | Resolved: 2023-02-18

## 2023-02-18 NOTE — PROGRESS NOTES
Naina Cardona (:  1945) is a 69 y.o. female,Established patient, here for evaluation of the following chief complaint(s):  Follow-up (3 month Follow up ), Cough, and Rash (Armpit area on right side, had for about a week now )         ASSESSMENT/PLAN:  1. Chronic obstructive pulmonary disease, unspecified COPD type ([address]  No benefit noted with prior trial of Spiriva  Restart as needed albuterol  Recommended taking guaifenesin 1200 mg twice a day as needed to facilitate sputum production given congestion  Given chronicity of symptoms, underlying COPD, lack of fever hold off empiric antibiotics at this time  Suspect decreased exercise capacity from shortness of breath due to underlying COPD, especially in light of continued tobacco use  EKG in office today done to evaluate for new cardiac abnormalities. EKG shows sinus rhythm, normal axis, T wave flattening in aVL but no acute ST segment or T wave abnormalities    - EKG 12 Lead  - albuterol sulfate HFA (VENTOLIN HFA) 108 (90 Base) MCG/ACT inhaler; Inhale 1 puff into the lungs every 4 hours as needed for Wheezing or Shortness of Breath  Dispense: 54 g; Refill: 1    2. Chronic insomnia  Continue as needed temazepam at current dose as patient tolerating with improvement in sleep quality  PDMP reviewed showing temazepam 15 mg capsules, quantity #30, 30-day supply last filled on 2023. Per chart review patient should have 1 refill remaining on medication    3. Anxiety and depression  Continue Cymbalta    4. Osteopenia of multiple sites  DEXA scan on 3/30/2022 with osteopenia. 10-year overall fracture risk 19.8% with 10-year hip fracture risk 7.3%  Continue Fosamax, vitamin D supplementation    5.  Irritable bowel syndrome with diarrhea  Traditionally manages with low FODMAP diet  CT abdomen/pelvis with oral and IV contrast on 11/15/2022 with mild cholelithiasis, small left renal cyst but no acute findings in the abdomen or pelvis  Continue supportive care, as needed Levsin    6. Multinodular goiter  Endocrinology notes reviewed with suspicion for mildly toxic multinodular goiter given chronic history of mildly suppressed TSH in the setting of normal T4 and T3  History of fairly normal thyroid scan/uptake  Plans to repeat thyroid ultrasound in 2 to 3 years per endocrinology    - T4, Free; Future  - TSH; Future    7. Pure hypercholesterolemia  Lipid panel from 3/30/2022 reviewed with mildly elevated LDL at 114, balanced by sufficient HDL of 63  Continue lifestyle modifications, monitor off statin therapy at this time    - CBC with Auto Differential; Future  - Comprehensive Metabolic Panel; Future  - Lipid Panel; Future    8. Rash  Suspect allergic/contact dermatitis  Prescription for hydrocortisone cream sent to pharmacy. Patient believes she may have some of this at home, advised to use twice a day as needed    - hydrocortisone 2.5 % cream; Apply topically 2 times daily. Dispense: 30 g; Refill: 0    No follow-ups on file. Subjective   SUBJECTIVE/OBJECTIVE:  Patient is a 80-year-old  female who presents to the office today for follow-up. Around [place] time patient was overseas in [place] and since returning has noticed worsening of her chronic cough. Does have chest congestion and is producing clear sputum. No hemoptysis, fevers or chills. Has occasional wheezing. Patient notes decreasing exercise capacity, having previously been able to walk a mile or 2 for exercise but now is only able to walk a quarter of a mile before she gets out of breath. Not currently using any inhalers. Does admit to rhinorrhea but no postnasal drip, sinus pain or pressure, chest pain, palpitations, leg swelling, orthopnea or paroxysmal nocturnal dyspnea. Compounding her ability to exercise is bilateral leg heaviness.   Patient does have history of chronic low back issues currently being managed through [facility] and having received a series of steroid injections which helped but only temporarily. Continues to do home physical therapy exercises. When pain is severe she will take a hot bath, apply an ice pack and Biofreeze and take Tylenol. No lower extremity numbness or paresthesias, saddle anesthesia or bowel/bladder incontinence. Denies cramping sensation in her legs when she walks. Regarding her IBS she still has between 4-5 bowel movements a day but is not aware of any melena or hematochezia. No nausea or vomiting. Has not required Levsin, stating she actually forgot that she had it. Mood symptoms stable and well controlled through use of Cymbalta. Denies throat pain from use of Fosamax for osteoporosis. Sleep quality is doing well with use of temazepam.  Patient also reports for approximately 1 week she has had an erythematous pruritic rash on her right axilla. No insect bites, changes in soaps, cleaning products or razors. Denies similar lesions elsewhere. Has tried applying antibiotic ointment and hydrogen peroxide without significant improvement. Review of Systems   HENT:  Positive for congestion and rhinorrhea. Negative for postnasal drip, sinus pressure, sinus pain and sore throat. Respiratory:  Positive for cough (Positive for sputum production, denies hemoptysis), shortness of breath and wheezing. Denies orthopnea or paroxysmal nocturnal dyspnea   Cardiovascular:  Negative for chest pain, palpitations and leg swelling. Gastrointestinal:  Negative for anal bleeding, blood in stool, nausea and vomiting. Musculoskeletal:  Positive for back pain. Skin:  Positive for rash. Neurological:  Negative for numbness. Positive for heaviness in both legs  Denies paresthesias, saddle anesthesia or bowel/bladder incontinence   Psychiatric/Behavioral:  Negative for sleep disturbance. Objective   Physical Exam  Vitals reviewed. Constitutional:       General: She is not in acute distress.      Appearance: Normal appearance. She is not ill-appearing, toxic-appearing or diaphoretic.      Interventions: She is not intubated.  HENT:      Head: Normocephalic and atraumatic.   Eyes:      General:         Right eye: No discharge.         Left eye: No discharge.      Extraocular Movements: Extraocular movements intact.   Neck:      Vascular: No carotid bruit.   Cardiovascular:      Rate and Rhythm: Normal rate and regular rhythm.      Pulses:           Dorsalis pedis pulses are 2+ on the right side.        Posterior tibial pulses are 2+ on the left side.      Heart sounds: No murmur heard.    No friction rub. No gallop.   Pulmonary:      Effort: Pulmonary effort is normal. No tachypnea, accessory muscle usage, respiratory distress or retractions. She is not intubated.      Breath sounds: No wheezing, rhonchi or rales.   Abdominal:      General: Bowel sounds are normal.      Palpations: Abdomen is soft.      Tenderness: There is no abdominal tenderness. There is no right CVA tenderness, left CVA tenderness or guarding.   Musculoskeletal:      Right lower leg: No edema.      Left lower leg: No edema.      Comments: Lower lumbar spinous processes mildly tender to palpation  No radiculopathy on straight leg raise testing or Braggard's testing bilaterally   Passive range of motion of both hips preserved in flexion, internal and external rotation   Skin:     General: Skin is dry.      Coloration: Skin is not jaundiced.      Comments: Irregularly shaped erythematous 2 to 3 cm patch along right axilla without fluctuance, tenderness, bleeding or discharge   Neurological:      Mental Status: She is alert. Mental status is at baseline.   Psychiatric:         Attention and Perception: Attention normal.         Mood and Affect: Mood and affect normal. Mood is not anxious or depressed. Affect is not tearful.         Speech: Speech normal. Speech is not rapid and pressured or slurred.         Behavior: Behavior normal. Behavior is not  agitated, aggressive or combative. Behavior is cooperative. Thought Content: Thought content normal.                An electronic signature was used to authenticate this note.     --Nilda Bunch,

## 2023-02-21 ENCOUNTER — OFFICE VISIT (OUTPATIENT)
Dept: INTERNAL MEDICINE CLINIC | Facility: CLINIC | Age: 78
End: 2023-02-21
Payer: MEDICARE

## 2023-02-21 VITALS
DIASTOLIC BLOOD PRESSURE: 82 MMHG | BODY MASS INDEX: 23.16 KG/M2 | TEMPERATURE: 98.1 F | HEIGHT: 60 IN | SYSTOLIC BLOOD PRESSURE: 128 MMHG | OXYGEN SATURATION: 99 % | HEART RATE: 85 BPM | WEIGHT: 118 LBS | RESPIRATION RATE: 16 BRPM

## 2023-02-21 DIAGNOSIS — F51.04 CHRONIC INSOMNIA: ICD-10-CM

## 2023-02-21 DIAGNOSIS — J44.9 CHRONIC OBSTRUCTIVE PULMONARY DISEASE, UNSPECIFIED COPD TYPE (HCC): Primary | ICD-10-CM

## 2023-02-21 DIAGNOSIS — F41.9 ANXIETY AND DEPRESSION: ICD-10-CM

## 2023-02-21 DIAGNOSIS — E78.00 PURE HYPERCHOLESTEROLEMIA: ICD-10-CM

## 2023-02-21 DIAGNOSIS — K58.0 IRRITABLE BOWEL SYNDROME WITH DIARRHEA: ICD-10-CM

## 2023-02-21 DIAGNOSIS — M85.89 OSTEOPENIA OF MULTIPLE SITES: ICD-10-CM

## 2023-02-21 DIAGNOSIS — F32.A ANXIETY AND DEPRESSION: ICD-10-CM

## 2023-02-21 DIAGNOSIS — R21 RASH: ICD-10-CM

## 2023-02-21 DIAGNOSIS — E04.2 MULTINODULAR GOITER: ICD-10-CM

## 2023-02-21 PROCEDURE — 3023F SPIROM DOC REV: CPT | Performed by: STUDENT IN AN ORGANIZED HEALTH CARE EDUCATION/TRAINING PROGRAM

## 2023-02-21 PROCEDURE — G8399 PT W/DXA RESULTS DOCUMENT: HCPCS | Performed by: STUDENT IN AN ORGANIZED HEALTH CARE EDUCATION/TRAINING PROGRAM

## 2023-02-21 PROCEDURE — 93000 ELECTROCARDIOGRAM COMPLETE: CPT | Performed by: STUDENT IN AN ORGANIZED HEALTH CARE EDUCATION/TRAINING PROGRAM

## 2023-02-21 PROCEDURE — G8420 CALC BMI NORM PARAMETERS: HCPCS | Performed by: STUDENT IN AN ORGANIZED HEALTH CARE EDUCATION/TRAINING PROGRAM

## 2023-02-21 PROCEDURE — 1123F ACP DISCUSS/DSCN MKR DOCD: CPT | Performed by: STUDENT IN AN ORGANIZED HEALTH CARE EDUCATION/TRAINING PROGRAM

## 2023-02-21 PROCEDURE — 4004F PT TOBACCO SCREEN RCVD TLK: CPT | Performed by: STUDENT IN AN ORGANIZED HEALTH CARE EDUCATION/TRAINING PROGRAM

## 2023-02-21 PROCEDURE — 3074F SYST BP LT 130 MM HG: CPT | Performed by: STUDENT IN AN ORGANIZED HEALTH CARE EDUCATION/TRAINING PROGRAM

## 2023-02-21 PROCEDURE — G8482 FLU IMMUNIZE ORDER/ADMIN: HCPCS | Performed by: STUDENT IN AN ORGANIZED HEALTH CARE EDUCATION/TRAINING PROGRAM

## 2023-02-21 PROCEDURE — 1090F PRES/ABSN URINE INCON ASSESS: CPT | Performed by: STUDENT IN AN ORGANIZED HEALTH CARE EDUCATION/TRAINING PROGRAM

## 2023-02-21 PROCEDURE — G8427 DOCREV CUR MEDS BY ELIG CLIN: HCPCS | Performed by: STUDENT IN AN ORGANIZED HEALTH CARE EDUCATION/TRAINING PROGRAM

## 2023-02-21 PROCEDURE — 99214 OFFICE O/P EST MOD 30 MIN: CPT | Performed by: STUDENT IN AN ORGANIZED HEALTH CARE EDUCATION/TRAINING PROGRAM

## 2023-02-21 PROCEDURE — 3079F DIAST BP 80-89 MM HG: CPT | Performed by: STUDENT IN AN ORGANIZED HEALTH CARE EDUCATION/TRAINING PROGRAM

## 2023-02-21 RX ORDER — ALBUTEROL SULFATE 90 UG/1
1 AEROSOL, METERED RESPIRATORY (INHALATION) EVERY 4 HOURS PRN
Qty: 54 G | Refills: 1 | Status: SHIPPED | OUTPATIENT
Start: 2023-02-21

## 2023-02-21 SDOH — ECONOMIC STABILITY: INCOME INSECURITY: HOW HARD IS IT FOR YOU TO PAY FOR THE VERY BASICS LIKE FOOD, HOUSING, MEDICAL CARE, AND HEATING?: NOT HARD AT ALL

## 2023-02-21 SDOH — ECONOMIC STABILITY: HOUSING INSECURITY
IN THE LAST 12 MONTHS, WAS THERE A TIME WHEN YOU DID NOT HAVE A STEADY PLACE TO SLEEP OR SLEPT IN A SHELTER (INCLUDING NOW)?: NO

## 2023-02-21 SDOH — ECONOMIC STABILITY: FOOD INSECURITY: WITHIN THE PAST 12 MONTHS, YOU WORRIED THAT YOUR FOOD WOULD RUN OUT BEFORE YOU GOT MONEY TO BUY MORE.: NEVER TRUE

## 2023-02-21 SDOH — ECONOMIC STABILITY: FOOD INSECURITY: WITHIN THE PAST 12 MONTHS, THE FOOD YOU BOUGHT JUST DIDN'T LAST AND YOU DIDN'T HAVE MONEY TO GET MORE.: NEVER TRUE

## 2023-02-21 ASSESSMENT — ENCOUNTER SYMPTOMS
COUGH: 1
ANAL BLEEDING: 0
BACK PAIN: 1
NAUSEA: 0
SINUS PRESSURE: 0
WHEEZING: 1
SHORTNESS OF BREATH: 1
VOMITING: 0
RHINORRHEA: 1
SORE THROAT: 0
SINUS PAIN: 0
BLOOD IN STOOL: 0

## 2023-02-21 ASSESSMENT — PATIENT HEALTH QUESTIONNAIRE - PHQ9
2. FEELING DOWN, DEPRESSED OR HOPELESS: 0
SUM OF ALL RESPONSES TO PHQ QUESTIONS 1-9: 0
SUM OF ALL RESPONSES TO PHQ9 QUESTIONS 1 & 2: 0
1. LITTLE INTEREST OR PLEASURE IN DOING THINGS: 0

## 2023-02-22 ENCOUNTER — HOSPITAL ENCOUNTER (OUTPATIENT)
Dept: PHYSICAL THERAPY | Age: 78
Setting detail: RECURRING SERIES
Discharge: HOME | End: 2023-02-25
Payer: MEDICARE

## 2023-02-22 PROCEDURE — 97140 MANUAL THERAPY 1/> REGIONS: CPT

## 2023-02-22 ASSESSMENT — PAIN SCALES - GENERAL: PAINLEVEL_OUTOF10: 4

## 2023-02-22 NOTE — PROGRESS NOTES
Alphia Claude  : 1945  Primary: Medicare Part A And B  Secondary: [address] @ [address] [address]  Phone: [phone]  Fax: [phone] Plan Frequency: 1 time per week for every other week for 90 days    Plan of Care/Certification Expiration Date: 23      PT Visit Info: Total # of Visits to Date: 4  Progress Note Counter: 1  Progress Note Due Date: 23      OUTPATIENT PHYSICAL THERAPY:OP NOTE TYPE: Progress Report 2023               Episode  Appt Desk         Treatment Diagnosis:  Cervicalgia (M54.2)    Medical/Referring Diagnosis:  Low back pain, unspecified [M54.50]  Referring Physician:  Rose Mary Salguero MD MD Orders:  PT Eval and Treat   Return MD Appt:  TBD  Date of Onset:  Onset Date: 20     Allergies:  Prednisone, Adhesive tape, Ibandronic acid, Triamcinolone, Alendronate sodium, and Triprolidine-pseudoephedrine  Restrictions/Precautions:     None  Medications Last Reviewed:  2023     SUBJECTIVE   History of Injury/Illness (Reason for Referral):  12/15/2022 (Progress Note): Patient reports she is having pain in her neck and shoulders that will vary with activity. 6034 (Recertification): Patient reports therapy has been helping overall, but with certain activities she still has pain in her neck and shoulders. 2023 (Progress Note): Patient reports she is doing well overall but continues to feel tightness throughout her neck and shoulders. Initial:     4/10 Post Session:     4/10  Past Medical History/Comorbidities:   Ms. Leanne Russell  has a past medical history of Abnormal Pap smear, Anxiety, Back problem, COPD (chronic obstructive pulmonary disease) ([address] Depression, Hyperthyroidism, Insomnia, Irritable bowel syndrome, and Osteopenia. Ms. Leanne Russell  has a past surgical history that includes Tonsillectomy; orthopedic surgery;  Salpingo-oophorectomy (Right, 2014); and Colonoscopy. Social History/Living Environment:   Lives With: Alone  Type of Home: House     Prior Level of Function/Work/Activity:   Prior level of function: Independent  Occupation: Retired  No data [id number] recorded   Learning:   Does the patient/guardian have any barriers to learning?: No barriers  Will there be a co-learner?: No  What is the preferred language of the patient/guardian?: English  Is an  required?: No  How does the patient/guardian prefer to learn new concepts?: Listening; Reading; Demonstration     Fall Risk Scale: Hernandez Total Score: 0  Hernandez Fall Risk: Low (0-24)     Dominant Side:  right handed    Personal Factors:        Sex:  female        Age:  69 y.o. OBJECTIVE   Observation/Orthostatic Postural Assessment:          Posture Assessment: Rounded shoulders, Forward head   Palpation:          Tightness and tenderness to palpation noted throughout upper and lower cervical musculature. No bruising or swelling noted. ROM:          Cervical Assessment (AROM):  Flexion: 75% of full AROM  Extension: 75% of full AROM  Right side bendin% of full AROM  Left side bending[de-identified] 75% of full AROM  Right rotation: 75% of full AROM  Left rotation: 75% of full AROM  Strength:          Cervical Assessment (Strength):  Grossly 2+/5 with manual muscle testing  Special Tests:          Negative Hautant's test. Negative Cranial-[place] lift test. Negative [place]-Axis Sheer test. Negative Alar Ligament test. Negative Tectorial Membrane test.   Neurological Screen:        Dermatomes:   Within normal limits        Reflexes:  2+  Functional Mobility:         Gait/Mobility:       Independent         Transfers:      Sit to Stand: Independent  Stand to Sit: Independent  Stand Pivot Transfers: Independent  Bed to Chair: Independent  Lateral Transfers: Independent         Bed Mobility:      Rolling: Independent  Supine to Sit: Independent  Sit to Supine: Independent  Scooting: Independent         ASSESSMENT   Assessment:  Ms. Sean Caballero presents with improving mobility and pain in cervical region secondary to degenerative changes. Patient has attended a total of 96 scheduled physical therapy visits. Treatment has consisted of stretching, strengthening, and manual therapy to improve overall pain and performance with activities of daily living. Problem List: (Impacting functional limitations):      Decreased Strength  Decreased ADL/Functional Activities  Increased Pain  Decreased Activity Tolerance  Therapy Prognosis:   Therapy Prognosis: Good       PLAN   Effective Dates: 1/31/2023 TO Plan of Care/Certification Expiration Date: 04/25/23       Frequency/Duration: Plan Frequency: 1 time per week for every other week for 90 days     Interventions Planned (Treatment may consist of any combination of the following):      Cold  Heat  Home Exercise Program (HEP)  Manual Therapy  Neuromuscular Re-education/Strengthening  Range of Motion (ROM)  Therapeutic Activites  Therapeutic Exercise/Strengthening    GOALS: (Goals have been discussed and agreed upon with patient.)  Discharge Goals: Time Frame: 90 days  Patient will be independent with all ADLs with minimal onset of neck pain and no deficits with daily tasks--goal ongoing. Patient will report no fear avoidance with social or recreational activities due to neck pain --goal ongoing. Patient will score less than or equal to 7/50 on Neck Disability Index with minimal effect of neck pain on patient's ability to manage every day life activities--goal ongoing. Outcome Measure: Tool Used: Neck Disability Index (NDI)  Score:  Initial: 17/50  Most Recent: 12/50 (Date: 2/22/2023 )   Interpretation of Score: The Neck Disability Index is a revised form of the Oswestry Low Back Pain Index and is designed to measure the activities of daily living in person's with neck pain.   Each section is scored on a 0-5 scale, 5 representing the greatest disability. The scores of each section are added together for a total score of 50. Medical Necessity:   Patient is expected to demonstrate progress in strength and range of motion to improve safety during daily activities. Patient demonstrates good rehab potential due to higher previous functional level. Skilled intervention continues to be required due to decreased mobility. Reason for Services/Other Comments:  Patient continues to demonstrate capacity to improve overall mobility which will increase independence and increase safety. Total Duration:  Time In: 1100  Time Out: Faustino Chun's therapy, I certify that the treatment plan above will be carried out by a therapist or under their direction. Thank you for this referral,  Kirstin Blunt PT     Referring Physician Signature: Garth Ray MD No Signature is Required for this note.         Post Session Pain  Charge Capture   POC Link  Treatment Note Link  MD Guidelines  [facility]

## 2023-02-22 NOTE — PROGRESS NOTES
Juwan Delatorre  : 1945  Primary: Medicare Part A And B  Secondary: [address] @ [address] [address]  Phone: [phone]  Fax: [phone] Plan Frequency: 1 time per week for every other week for 90 days    Plan of Care/Certification Expiration Date: 23      PT Visit Info: Total # of Visits to Date: 4  Progress Note Counter: 1  Progress Note Due Date: 23       OUTPATIENT PHYSICAL THERAPY:OP NOTE TYPE: Treatment Note 2023       Episode   Appt Desk       Treatment Diagnosis:  Cervicalgia (M54.2)  Medical/Referring Diagnosis:  Low back pain, unspecified [M54.50]  Referring Physician:  Ada Bush MD MD Orders:  PT Eval and Treat   Date of Onset:  Onset Date: 20     Allergies:  Prednisone, Adhesive tape, Ibandronic acid, Triamcinolone, Alendronate sodium, and Triprolidine-pseudoephedrine  Restrictions/Precautions:      None  Interventions Planned (Treatment may consist of any combination of the following):    No data recorded   Subjective Comments:  Patient reports that she did some yard work so her neck and shoulder are painful today. Initial:     4/10 Post Session:     4/10  Medications Last Reviewed:  2023  Updated Objective Findings:  None Today  Treatment   MANUAL THERAPY: (40 minutes): Joint mobilization and Soft tissue mobilization was utilized and necessary because of the patient's restricted joint motion, painful spasm, loss of articular motion and restricted motion of soft tissue. Treatment/Session Summary:    Treatment Assessment:   Patient tolerated treatment well with improving overall mobility and pain noted after treatment. Communication/Consultation:  None today  Equipment provided today:  None  Recommendations/Intent for next treatment session: Next visit will focus on manual therapy.     Total Treatment Billable Duration:  40 minutes  Time In: 1100  Time Out: [address] [place] [address],[address], PT       Post Session Pain  Charge Capture  [facility] Portal  MD Guidelines  Scanned Media  Benefits  MyChart    Future Appointments   Date Time Provider José Miguel Britney   3/8/2023  9:30 AM Miriam Andrews, PT [facility]   3/22/2023  9:30 AM Miriam Andrews, PT [facility] SFE   5/18/2023  9:00 AM SFE LAB DS [facility]S E   5/22/2023 10:20 AM DO MARIA A Maldonado [facility]   9/27/2023 11:00 AM Aissatou Phillips MD END [facility]

## 2023-03-09 ENCOUNTER — HOSPITAL ENCOUNTER (OUTPATIENT)
Dept: PHYSICAL THERAPY | Age: 78
Setting detail: RECURRING SERIES
Discharge: HOME | End: 2023-03-12
Payer: MEDICARE

## 2023-03-09 PROCEDURE — 97140 MANUAL THERAPY 1/> REGIONS: CPT

## 2023-03-09 ASSESSMENT — PAIN SCALES - GENERAL: PAINLEVEL_OUTOF10: 4

## 2023-03-22 ENCOUNTER — HOSPITAL ENCOUNTER (OUTPATIENT)
Dept: PHYSICAL THERAPY | Age: 78
Setting detail: RECURRING SERIES
Discharge: HOME | End: 2023-03-25
Payer: MEDICARE

## 2023-03-22 PROCEDURE — 97140 MANUAL THERAPY 1/> REGIONS: CPT

## 2023-03-22 ASSESSMENT — PAIN SCALES - GENERAL: PAINLEVEL_OUTOF10: 4

## 2023-03-22 NOTE — PROGRESS NOTES
[facility] Portal  MD Guidelines  Scanned Media  Benefits  MyChart    Future Appointments   Date Time Provider [place]   4/5/2023 10:15 AM Chema Tinoco PT [facility] SFE   5/18/2023  9:00 AM SFE LAB DS SFEDS E   5/22/2023 10:20 AM Dal Boas, DO MAT [facility] AMB   9/27/2023 11:00 AM Sarah Perez MD [facility] G[facility] AMB
are added together for a total score of 50. Medical Necessity:   Patient is expected to demonstrate progress in strength and range of motion to improve safety during daily activities. Patient demonstrates good rehab potential due to higher previous functional level. Skilled intervention continues to be required due to decreased mobility. Reason for Services/Other Comments:  Patient continues to demonstrate capacity to improve overall mobility which will increase independence and increase safety. Total Duration:  Time In: 0930  Time Out: 36    Regarding Sally Chun's therapy, I certify that the treatment plan above will be carried out by a therapist or under their direction. Thank you for this referral,  Jelly Guzman PT     Referring Physician Signature: Darin Erazo MD No Signature is Required for this note.         Post Session Pain  Charge Capture   POC Link  Treatment Note Link  MD Guidelines  MyChart

## 2023-04-05 ENCOUNTER — TELEPHONE (OUTPATIENT)
Dept: INTERNAL MEDICINE CLINIC | Facility: CLINIC | Age: 78
End: 2023-04-05

## 2023-04-05 ENCOUNTER — HOSPITAL ENCOUNTER (OUTPATIENT)
Dept: PHYSICAL THERAPY | Age: 78
Setting detail: RECURRING SERIES
Discharge: HOME | End: 2023-04-08
Payer: MEDICARE

## 2023-04-05 DIAGNOSIS — G89.29 CHRONIC LOW BACK PAIN WITH RIGHT-SIDED SCIATICA, UNSPECIFIED BACK PAIN LATERALITY: Primary | ICD-10-CM

## 2023-04-05 DIAGNOSIS — M54.41 CHRONIC LOW BACK PAIN WITH RIGHT-SIDED SCIATICA, UNSPECIFIED BACK PAIN LATERALITY: Primary | ICD-10-CM

## 2023-04-05 DIAGNOSIS — F51.04 CHRONIC INSOMNIA: ICD-10-CM

## 2023-04-05 PROCEDURE — 97140 MANUAL THERAPY 1/> REGIONS: CPT

## 2023-04-05 RX ORDER — TEMAZEPAM 15 MG/1
15 CAPSULE ORAL NIGHTLY PRN
Qty: 30 CAPSULE | Refills: 2 | Status: SHIPPED | OUTPATIENT
Start: 2023-04-05 | End: 2023-07-04

## 2023-04-05 RX ORDER — MELOXICAM 7.5 MG/1
7.5 TABLET ORAL DAILY PRN
Qty: 90 TABLET | Refills: 1 | Status: SHIPPED | OUTPATIENT
Start: 2023-04-05

## 2023-04-05 ASSESSMENT — PAIN SCALES - GENERAL: PAINLEVEL_OUTOF10: 4

## 2023-04-05 NOTE — PROGRESS NOTES
Post Session Pain  Charge Capture  [facility] Portal  MD Guidelines  Scanned Media  Benefits  MyChart    Future Appointments   Date Time Provider José Miguel Tan   4/26/2023  9:30 AM Kaitlyn Gaitan PT [facility]PT SFE   5/18/2023  9:00 AM E LAB DS [facility]   5/22/2023 10:20 AM DO MARIA A Ortega [facility] AMB   9/27/2023 11:00 AM Sandeep hCeng MD END [facility] AMB

## 2023-04-05 NOTE — TELEPHONE ENCOUNTER
Patient requesting refills on meloxicam and temazepam. PDMP reviewed showing temazepam 15 mg capsules, quantity #30, 30-day supply last filled on 3/4/2023. New prescriptions sent to pharmacy on record    Orders Placed This Encounter    meloxicam (MOBIC) 7.5 MG tablet     Sig: Take 1 tablet by mouth daily as needed for Pain Take with food. Avoid other pain medications except for Tylenol while on this medicine     Dispense:  90 tablet     Refill:  1    temazepam (RESTORIL) 15 MG capsule     Sig: Take 1 capsule by mouth nightly as needed for Sleep for up to 90 days.  Do not drink alcohol, drive or operate heavy machinery after taking this medication Max Daily Amount: 15 mg     Dispense:  30 capsule     Refill:  2

## 2023-04-05 NOTE — TELEPHONE ENCOUNTER
Patient requesting refills on meloxicam and temazepam. Send to [facility]

## 2023-04-26 ENCOUNTER — HOSPITAL ENCOUNTER (OUTPATIENT)
Dept: PHYSICAL THERAPY | Age: 78
Setting detail: RECURRING SERIES
Discharge: HOME | End: 2023-04-29
Payer: MEDICARE

## 2023-04-26 PROCEDURE — 97140 MANUAL THERAPY 1/> REGIONS: CPT

## 2023-04-26 ASSESSMENT — PAIN SCALES - GENERAL: PAINLEVEL_OUTOF10: 4

## 2023-04-26 NOTE — THERAPY RECERTIFICATION
Erendira Thompson  : 1945  Primary: Medicare Part A And B  Secondary: [address] @ [address] [address]  Phone: [phone]  Fax: [phone] Plan Frequency: 1 time per week for every other week for 90 days    Plan of Care/Certification Expiration Date: 23      PT Visit Info: Total # of Visits to Date: 8  Progress Note Counter: 1  Progress Note Due Date: 23      OUTPATIENT PHYSICAL THERAPY:OP NOTE TYPE: Recertification                Episode  Appt Desk         Treatment Diagnosis:  Cervicalgia (M54.2)    Medical/Referring Diagnosis:  Low back pain, unspecified [M54.50]  Referring Physician:  Angela Artis MD MD Orders:  PT Eval and Treat   Return MD Appt:  TBD  Date of Onset:  Onset Date: 20     Allergies:  Prednisone, Adhesive tape, Ibandronic acid, Triamcinolone, Alendronate sodium, and Triprolidine-pseudoephedrine  Restrictions/Precautions:     None  Medications Last Reviewed:  2023     SUBJECTIVE   History of Injury/Illness (Reason for Referral):   (Recertification): Patient reports therapy has been helping overall, but with certain activities she still has pain in her neck and shoulders. 2023 (Progress Note): Patient reports she is doing well overall but continues to feel tightness throughout her neck and shoulders. 3/22/2023 (Progress Note): Patient reports her neck has been more painful than usual.     (Recertification): Patient reports that she is doing better overall, but still has painful days. Initial:     4/10 Post Session:     3/10  Past Medical History/Comorbidities:   Ms. Liz Srinivasan  has a past medical history of Abnormal Pap smear, Anxiety, Back problem, COPD (chronic obstructive pulmonary disease) ([facility] [address] Depression, Hyperthyroidism, Insomnia, Irritable bowel syndrome, and Osteopenia.   Ms. Liz Srinivasan  has a past surgical history that

## 2023-04-26 NOTE — PROGRESS NOTES
Capture  [facility]  MD Guidelines  Scanned Media  Benefits  MyChart    Future Appointments   Date Time Provider [place]   5/3/2023 10:15 AM Irma Cuellar, DEANNA DAUGHERTYEBONGT SFE   5/18/2023  9:00 AM NESHA LAB DS SFEDS SFE   5/22/2023 10:20 AM DO MARIA A Davis [facility] AMB   5/23/2023  9:30 AM DEANNA MccrackenT SFE   9/27/2023 11:00 AM Chris Jackson MD END [facility] AMB

## 2023-05-01 DIAGNOSIS — M85.89 OSTEOPENIA OF MULTIPLE SITES: ICD-10-CM

## 2023-05-01 RX ORDER — ALENDRONATE SODIUM 70 MG/1
TABLET ORAL
Qty: 12 TABLET | Refills: 1 | OUTPATIENT
Start: 2023-05-01

## 2023-05-03 ENCOUNTER — HOSPITAL ENCOUNTER (OUTPATIENT)
Dept: PHYSICAL THERAPY | Age: 78
Setting detail: RECURRING SERIES
Discharge: HOME | End: 2023-05-06
Payer: MEDICARE

## 2023-05-03 PROCEDURE — 97140 MANUAL THERAPY 1/> REGIONS: CPT

## 2023-05-03 ASSESSMENT — PAIN SCALES - GENERAL: PAINLEVEL_OUTOF10: 4

## 2023-05-03 NOTE — PROGRESS NOTES
Moreno Young  : 1945  Primary: Medicare Part A And B  Secondary: [address] @ [address] [address]  Phone: [phone]  Fax: [phone] Plan Frequency: 1 time per week for every other week for 90 days    Plan of Care/Certification Expiration Date: 23      PT Visit Info: Total # of Visits to Date: 9  Progress Note Counter: 1  Progress Note Due Date: 23       OUTPATIENT PHYSICAL THERAPY:OP NOTE TYPE: Treatment Note 5/3/2023       Episode   Appt Desk       Treatment Diagnosis:  Cervicalgia (M54.2)  Medical/Referring Diagnosis:  Low back pain, unspecified [M54.50]  Referring Physician:  Daniella Styles MD MD Orders:  PT Eval and Treat   Date of Onset:  Onset Date: 20     Allergies:  Prednisone, Adhesive tape, Ibandronic acid, Triamcinolone, Alendronate sodium, and Triprolidine-pseudoephedrine  Restrictions/Precautions:      None  Interventions Planned (Treatment may consist of any combination of the following):    No data recorded   Subjective Comments:  Patient reports that her neck and shoulder are hurting today. Initial:     4/10 Post Session:     3/10  Medications Last Reviewed:  5/3/2023  Updated Objective Findings:  None Today  Treatment   MANUAL THERAPY: (40 minutes): Joint mobilization and Soft tissue mobilization was utilized and necessary because of the patient's restricted joint motion, painful spasm, loss of articular motion and restricted motion of soft tissue. Treatment/Session Summary:    Treatment Assessment:   Patient tolerated treatment well with improved mobility and pain noted after treatment. Communication/Consultation:  None today  Equipment provided today:  None  Recommendations/Intent for next treatment session: Next visit will focus on manual therapy.     Total Treatment Billable Duration:  40 minutes  Time In: 7241  Time Out: [address] [facility],        Post

## 2023-05-18 ENCOUNTER — HOSPITAL ENCOUNTER (OUTPATIENT)
Dept: LAB | Age: 78
Discharge: HOME | End: 2023-05-21

## 2023-05-18 DIAGNOSIS — E04.2 MULTINODULAR GOITER: ICD-10-CM

## 2023-05-18 DIAGNOSIS — E78.00 PURE HYPERCHOLESTEROLEMIA: ICD-10-CM

## 2023-05-18 LAB
ALBUMIN SERPL-MCNC: 3.6 G/DL (ref 3.2–4.6)
ALBUMIN/GLOB SERPL: 1.2 (ref 0.4–1.6)
ALP SERPL-CCNC: 91 U/L (ref 50–136)
ALT SERPL-CCNC: 29 U/L (ref 12–65)
ANION GAP SERPL CALC-SCNC: 2 MMOL/L (ref 2–11)
AST SERPL-CCNC: 20 U/L (ref 15–37)
BASOPHILS # BLD: 0 K/UL (ref 0–0.2)
BASOPHILS NFR BLD: 1 % (ref 0–2)
BILIRUB SERPL-MCNC: 0.4 MG/DL (ref 0.2–1.1)
BUN SERPL-MCNC: 17 MG/DL (ref 8–23)
CALCIUM SERPL-MCNC: 9.2 MG/DL (ref 8.3–10.4)
CHLORIDE SERPL-SCNC: 110 MMOL/L (ref 101–110)
CHOLEST SERPL-MCNC: 199 MG/DL
CO2 SERPL-SCNC: 29 MMOL/L (ref 21–32)
CREAT SERPL-MCNC: 0.8 MG/DL (ref 0.6–1)
DIFFERENTIAL METHOD BLD: ABNORMAL
EOSINOPHIL # BLD: 0.3 K/UL (ref 0–0.8)
EOSINOPHIL NFR BLD: 3 % (ref 0.5–7.8)
ERYTHROCYTE [DISTWIDTH] IN BLOOD BY AUTOMATED COUNT: 13.2 % (ref 11.9–14.6)
GLOBULIN SER CALC-MCNC: 3.1 G/DL (ref 2.8–4.5)
GLUCOSE SERPL-MCNC: 99 MG/DL (ref 65–100)
HCT VFR BLD AUTO: 47.4 % (ref 35.8–46.3)
HDLC SERPL-MCNC: 72 MG/DL (ref 40–60)
HDLC SERPL: 2.8
HGB BLD-MCNC: 15.5 G/DL (ref 11.7–15.4)
IMM GRANULOCYTES # BLD AUTO: 0 K/UL (ref 0–0.5)
IMM GRANULOCYTES NFR BLD AUTO: 0 % (ref 0–5)
LDLC SERPL CALC-MCNC: 101.8 MG/DL
LYMPHOCYTES # BLD: 2.1 K/UL (ref 0.5–4.6)
LYMPHOCYTES NFR BLD: 25 % (ref 13–44)
MCH RBC QN AUTO: 30.7 PG (ref 26.1–32.9)
MCHC RBC AUTO-ENTMCNC: 32.7 G/DL (ref 31.4–35)
MCV RBC AUTO: 93.9 FL (ref 82–102)
MONOCYTES # BLD: 0.7 K/UL (ref 0.1–1.3)
MONOCYTES NFR BLD: 9 % (ref 4–12)
NEUTS SEG # BLD: 5 K/UL (ref 1.7–8.2)
NEUTS SEG NFR BLD: 62 % (ref 43–78)
NRBC # BLD: 0 K/UL (ref 0–0.2)
PLATELET # BLD AUTO: 264 K/UL (ref 150–450)
PMV BLD AUTO: 11.8 FL (ref 9.4–12.3)
POTASSIUM SERPL-SCNC: 4.2 MMOL/L (ref 3.5–5.1)
PROT SERPL-MCNC: 6.7 G/DL (ref 6.3–8.2)
RBC # BLD AUTO: 5.05 M/UL (ref 4.05–5.2)
SODIUM SERPL-SCNC: 141 MMOL/L (ref 133–143)
T4 FREE SERPL-MCNC: 0.9 NG/DL (ref 0.78–1.46)
TRIGL SERPL-MCNC: 126 MG/DL (ref 35–150)
TSH, 3RD GENERATION: 0.48 UIU/ML (ref 0.36–3.74)
VLDLC SERPL CALC-MCNC: 25.2 MG/DL (ref 6–23)
WBC # BLD AUTO: 8.1 K/UL (ref 4.3–11.1)

## 2023-05-22 ENCOUNTER — OFFICE VISIT (OUTPATIENT)
Dept: INTERNAL MEDICINE CLINIC | Facility: CLINIC | Age: 78
End: 2023-05-22
Payer: MEDICARE

## 2023-05-22 VITALS
WEIGHT: 117.2 LBS | RESPIRATION RATE: 16 BRPM | DIASTOLIC BLOOD PRESSURE: 70 MMHG | SYSTOLIC BLOOD PRESSURE: 118 MMHG | HEART RATE: 87 BPM | HEIGHT: 60 IN | TEMPERATURE: 98.6 F | BODY MASS INDEX: 23.01 KG/M2 | OXYGEN SATURATION: 95 %

## 2023-05-22 DIAGNOSIS — F51.04 CHRONIC INSOMNIA: ICD-10-CM

## 2023-05-22 DIAGNOSIS — K58.0 IRRITABLE BOWEL SYNDROME WITH DIARRHEA: ICD-10-CM

## 2023-05-22 DIAGNOSIS — M85.89 OSTEOPENIA OF MULTIPLE SITES: ICD-10-CM

## 2023-05-22 DIAGNOSIS — R91.8 PULMONARY NODULES: ICD-10-CM

## 2023-05-22 DIAGNOSIS — J44.9 CHRONIC OBSTRUCTIVE PULMONARY DISEASE, UNSPECIFIED COPD TYPE (HCC): Primary | ICD-10-CM

## 2023-05-22 PROCEDURE — G8420 CALC BMI NORM PARAMETERS: HCPCS | Performed by: STUDENT IN AN ORGANIZED HEALTH CARE EDUCATION/TRAINING PROGRAM

## 2023-05-22 PROCEDURE — 4004F PT TOBACCO SCREEN RCVD TLK: CPT | Performed by: STUDENT IN AN ORGANIZED HEALTH CARE EDUCATION/TRAINING PROGRAM

## 2023-05-22 PROCEDURE — 3074F SYST BP LT 130 MM HG: CPT | Performed by: STUDENT IN AN ORGANIZED HEALTH CARE EDUCATION/TRAINING PROGRAM

## 2023-05-22 PROCEDURE — 1123F ACP DISCUSS/DSCN MKR DOCD: CPT | Performed by: STUDENT IN AN ORGANIZED HEALTH CARE EDUCATION/TRAINING PROGRAM

## 2023-05-22 PROCEDURE — G8399 PT W/DXA RESULTS DOCUMENT: HCPCS | Performed by: STUDENT IN AN ORGANIZED HEALTH CARE EDUCATION/TRAINING PROGRAM

## 2023-05-22 PROCEDURE — 3023F SPIROM DOC REV: CPT | Performed by: STUDENT IN AN ORGANIZED HEALTH CARE EDUCATION/TRAINING PROGRAM

## 2023-05-22 PROCEDURE — 3078F DIAST BP <80 MM HG: CPT | Performed by: STUDENT IN AN ORGANIZED HEALTH CARE EDUCATION/TRAINING PROGRAM

## 2023-05-22 PROCEDURE — 1090F PRES/ABSN URINE INCON ASSESS: CPT | Performed by: STUDENT IN AN ORGANIZED HEALTH CARE EDUCATION/TRAINING PROGRAM

## 2023-05-22 PROCEDURE — 99214 OFFICE O/P EST MOD 30 MIN: CPT | Performed by: STUDENT IN AN ORGANIZED HEALTH CARE EDUCATION/TRAINING PROGRAM

## 2023-05-22 PROCEDURE — G8427 DOCREV CUR MEDS BY ELIG CLIN: HCPCS | Performed by: STUDENT IN AN ORGANIZED HEALTH CARE EDUCATION/TRAINING PROGRAM

## 2023-05-22 RX ORDER — UMECLIDINIUM BROMIDE AND VILANTEROL TRIFENATATE 62.5; 25 UG/1; UG/1
1 POWDER RESPIRATORY (INHALATION) DAILY
Qty: 1 EACH | Refills: 2 | Status: SHIPPED | OUTPATIENT
Start: 2023-05-22

## 2023-05-22 RX ORDER — ALENDRONATE SODIUM 70 MG/1
70 TABLET ORAL
Qty: 4 TABLET | Refills: 3 | Status: SHIPPED | OUTPATIENT
Start: 2023-05-22

## 2023-05-22 SDOH — ECONOMIC STABILITY: INCOME INSECURITY: HOW HARD IS IT FOR YOU TO PAY FOR THE VERY BASICS LIKE FOOD, HOUSING, MEDICAL CARE, AND HEATING?: NOT HARD AT ALL

## 2023-05-22 SDOH — ECONOMIC STABILITY: FOOD INSECURITY: WITHIN THE PAST 12 MONTHS, YOU WORRIED THAT YOUR FOOD WOULD RUN OUT BEFORE YOU GOT MONEY TO BUY MORE.: NEVER TRUE

## 2023-05-22 SDOH — ECONOMIC STABILITY: FOOD INSECURITY: WITHIN THE PAST 12 MONTHS, THE FOOD YOU BOUGHT JUST DIDN'T LAST AND YOU DIDN'T HAVE MONEY TO GET MORE.: NEVER TRUE

## 2023-05-22 ASSESSMENT — ANXIETY QUESTIONNAIRES
5. BEING SO RESTLESS THAT IT IS HARD TO SIT STILL: 0
3. WORRYING TOO MUCH ABOUT DIFFERENT THINGS: 0
1. FEELING NERVOUS, ANXIOUS, OR ON EDGE: 0
4. TROUBLE RELAXING: 0
7. FEELING AFRAID AS IF SOMETHING AWFUL MIGHT HAPPEN: 0
GAD7 TOTAL SCORE: 0
2. NOT BEING ABLE TO STOP OR CONTROL WORRYING: 0
6. BECOMING EASILY ANNOYED OR IRRITABLE: 0
IF YOU CHECKED OFF ANY PROBLEMS ON THIS QUESTIONNAIRE, HOW DIFFICULT HAVE THESE PROBLEMS MADE IT FOR YOU TO DO YOUR WORK, TAKE CARE OF THINGS AT HOME, OR GET ALONG WITH OTHER PEOPLE: NOT DIFFICULT AT ALL

## 2023-05-22 ASSESSMENT — ENCOUNTER SYMPTOMS
BACK PAIN: 1
COUGH: 1
SHORTNESS OF BREATH: 0
BLOOD IN STOOL: 0
WHEEZING: 1
DIARRHEA: 1
CONSTIPATION: 1
ANAL BLEEDING: 0

## 2023-05-22 ASSESSMENT — PATIENT HEALTH QUESTIONNAIRE - PHQ9: DEPRESSION UNABLE TO ASSESS: FUNCTIONAL CAPACITY MOTIVATION LIMITS ACCURACY

## 2023-05-23 ENCOUNTER — HOSPITAL ENCOUNTER (OUTPATIENT)
Dept: PHYSICAL THERAPY | Age: 78
Setting detail: RECURRING SERIES
Discharge: HOME | End: 2023-05-26
Payer: MEDICARE

## 2023-05-23 PROCEDURE — 97140 MANUAL THERAPY 1/> REGIONS: CPT

## 2023-05-23 ASSESSMENT — PAIN SCALES - GENERAL: PAINLEVEL_OUTOF10: 4

## 2023-05-23 NOTE — PROGRESS NOTES
Noelle Blnaco  : 1945  Primary: Medicare Part A And B  Secondary: [address] [facility] [address] @ [address] [address]  Phone: [phone]  Fax: [phone] Plan Frequency: 1 time per week for every other week for 90 days    Plan of Care/Certification Expiration Date: 23      PT Visit Info: Total # of Visits to Date: 10  Progress Note Counter: 1  Progress Note Due Date: 23       OUTPATIENT PHYSICAL THERAPY:OP NOTE TYPE: Treatment Note 2023       Episode   Appt Desk       Treatment Diagnosis:  Cervicalgia (M54.2)  Medical/Referring Diagnosis:  Low back pain, unspecified [M54.50]  Referring Physician:  Anisha Calvert MD MD Orders:  PT Eval and Treat   Date of Onset:  Onset Date: 20     Allergies:  Prednisone, Adhesive tape, Ibandronic acid, Triamcinolone, Alendronate sodium, and Triprolidine-pseudoephedrine  Restrictions/Precautions:      None  Interventions Planned (Treatment may consist of any combination of the following):    No data recorded   Subjective Comments:  Patient reports that her shoulder and neck are tight today. Initial:     4/10 Post Session:     3/10  Medications Last Reviewed:  2023  Updated Objective Findings:  None Today  Treatment   MANUAL THERAPY: (40 minutes): Joint mobilization and Soft tissue mobilization was utilized and necessary because of the patient's restricted joint motion, painful spasm, loss of articular motion and restricted motion of soft tissue. Treatment/Session Summary:    Treatment Assessment:   Patient tolerated treatment well with improving mobility noted after treatment. Communication/Consultation:  None today  Equipment provided today:  None  Recommendations/Intent for next treatment session: Next visit will focus on manual therapy.     Total Treatment Billable Duration:  40 minutes  Time In: 930  Time Out:  Medical [place], PT       Post Session Pain

## 2023-06-07 ENCOUNTER — HOSPITAL ENCOUNTER (OUTPATIENT)
Dept: PHYSICAL THERAPY | Age: 78
Setting detail: RECURRING SERIES
Discharge: HOME | End: 2023-06-10
Payer: MEDICARE

## 2023-06-07 PROCEDURE — 97140 MANUAL THERAPY 1/> REGIONS: CPT

## 2023-06-07 ASSESSMENT — PAIN SCALES - GENERAL: PAINLEVEL_OUTOF10: 4

## 2023-06-07 NOTE — PROGRESS NOTES
Satinder Solano  : 1945  Primary: Medicare Part A And B  Secondary: [address] @ [address] [address]  Phone: [phone]  Fax: [phone] Plan Frequency: 1 time per week for every other week for 90 days    Plan of Care/Certification Expiration Date: 23      PT Visit Info: Total # of Visits to Date: 6  Progress Note Counter: 1  Progress Note Due Date: 23       OUTPATIENT PHYSICAL THERAPY:OP NOTE TYPE: Treatment Note 2023       Episode   Appt Desk       Treatment Diagnosis:  Cervicalgia (M54.2)  Medical/Referring Diagnosis:  Low back pain, unspecified [M54.50]  Referring Physician:  Rigo Hood MD MD Orders:  PT Eval and Treat   Date of Onset:  Onset Date: 20     Allergies:  Prednisone, Adhesive tape, Ibandronic acid, Triamcinolone, Alendronate sodium, and Triprolidine-pseudoephedrine  Restrictions/Precautions:      None  Interventions Planned (Treatment may consist of any combination of the following):    No data recorded   Subjective Comments:  Patient reports that her neck and shoulder on the right side are painful and she's been feeling dizzy. Initial:     4/10 Post Session:     3/10  Medications Last Reviewed:  2023  Updated Objective Findings:  None Today  Treatment   MANUAL THERAPY: (40 minutes): Joint mobilization and Soft tissue mobilization was utilized and necessary because of the patient's restricted joint motion, painful spasm, loss of articular motion and restricted motion of soft tissue. Treatment/Session Summary:    Treatment Assessment:   Patient tolerated treatment well with improving overall mobility and decreased pain noted after treatment. Communication/Consultation:  None today  Equipment provided today:  None  Recommendations/Intent for next treatment session: Next visit will focus on manual therapy.     Total Treatment Billable Duration:  40
Oswestry Low Back Pain Index and is designed to measure the activities of daily living in person's with neck pain. Each section is scored on a 0-5 scale, 5 representing the greatest disability. The scores of each section are added together for a total score of 50. Medical Necessity:   Patient is expected to demonstrate progress in strength and range of motion to improve safety during daily activities. Patient demonstrates good rehab potential due to higher previous functional level. Skilled intervention continues to be required due to decreased mobility. Reason for Services/Other Comments:  Patient continues to demonstrate capacity to improve overall mobility which will increase independence and increase safety. Total Duration:  Time In: 1102  Time Out: 0      Regarding Sally Chun's therapy, I certify that the treatment plan above will be carried out by a therapist or under their direction. Thank you for this referral,  Calli Zaragoza PT     Referring Physician Signature: Gus Pereira MD No Signature is Required for this note.         Post Session Pain  Charge Capture   POC Link  Treatment Note Link  MD Guidelines  [facility]

## 2023-06-08 ENCOUNTER — APPOINTMENT (OUTPATIENT)
Dept: PHYSICAL THERAPY | Age: 78
End: 2023-06-08
Payer: MEDICARE

## 2023-06-21 ENCOUNTER — HOSPITAL ENCOUNTER (OUTPATIENT)
Dept: PHYSICAL THERAPY | Age: 78
Setting detail: RECURRING SERIES
Discharge: HOME | End: 2023-06-24
Payer: MEDICARE

## 2023-06-21 ENCOUNTER — TELEPHONE (OUTPATIENT)
Dept: INTERNAL MEDICINE CLINIC | Facility: CLINIC | Age: 78
End: 2023-06-21

## 2023-06-21 DIAGNOSIS — J44.9 CHRONIC OBSTRUCTIVE PULMONARY DISEASE, UNSPECIFIED COPD TYPE (HCC): Primary | ICD-10-CM

## 2023-06-21 PROCEDURE — 97140 MANUAL THERAPY 1/> REGIONS: CPT

## 2023-06-21 ASSESSMENT — PAIN SCALES - GENERAL: PAINLEVEL_OUTOF10: 3

## 2023-06-21 NOTE — TELEPHONE ENCOUNTER
Called and advised patient Dr. Jeffery Lugo sent in alternative to [facility] [employer]. Patient voiced understanding.

## 2023-06-21 NOTE — TELEPHONE ENCOUNTER
Patient walked into office stating the prescription for the inhaler was too expensive and she was to let Dr. Jen Zafar know. Patient wasn't sure if it was the albuterol or umeclidinum since she did not have anything with her. Please call pt to verify which med.

## 2023-06-21 NOTE — PROGRESS NOTES
Stephanie Mendenhall  : 1945  Primary: Medicare Part A And B  Secondary: [address] @ [address] [address]  Phone: [phone]  Fax: [phone] No data recorded    Plan of Care/Certification Expiration Date: 23        PT Visit Info: Total # of Visits to Date: 12  Progress Note Counter: 1  Progress Note Due Date: 23       OUTPATIENT PHYSICAL THERAPY:OP NOTE TYPE: Treatment Note 2023       Episode   Appt Desk       Treatment Diagnosis:  Cervicalgia (M54.2)  Medical/Referring Diagnosis:  Low back pain, unspecified [M54.50]  Referring Physician:  Gagan Magana MD MD Orders:  PT Eval and Treat   Date of Onset:  Onset Date: 20     Allergies:  Latex, Prednisone, Adhesive tape, Ibandronic acid, Triamcinolone, Alendronate sodium, and Triprolidine-pseudoephedrine  Restrictions/Precautions:      None  Interventions Planned (Treatment may consist of any combination of the following):    No data recorded   Subjective Comments:  Patient reports that both her shoulders are hurting today. Initial:     3/10 Post Session:     3/10  Medications Last Reviewed:  2023  Updated Objective Findings:  None Today  Treatment   MANUAL THERAPY: (40 minutes): Joint mobilization and Soft tissue mobilization was utilized and necessary because of the patient's restricted joint motion, painful spasm, loss of articular motion and restricted motion of soft tissue. Treatment/Session Summary:    Treatment Assessment:   Patient tolerated treatment well with improved mobility and pain noted after treatment. Communication/Consultation:  None today  Equipment provided today:  None  Recommendations/Intent for next treatment session: Next visit will focus on manual therapy.     Total Treatment Billable Duration:  40 minutes  Time In: 1464  Time Out: Maile PT       Post Session Pain  Charge Capture

## 2023-06-21 NOTE — TELEPHONE ENCOUNTER
Suspect it was likely the [facility] prescription which was cost prohibitive. I will try to send in [address]  to see if this is better covered. If not may need to consider alternative therapy such as Advair.     Orders Placed This Encounter    tiotropium-olodaterol (STIOLTO RESPIMAT) 2.5-2.5 MCG/ACT AERS     Sig: Inhale 2 puffs into the lungs daily     Dispense:  1 each     Refill:  2

## 2023-06-22 ENCOUNTER — HOSPITAL ENCOUNTER (OUTPATIENT)
Dept: ULTRASOUND IMAGING | Age: 78
Discharge: HOME | End: 2023-06-22
Payer: MEDICARE

## 2023-06-22 DIAGNOSIS — R10.32 LEFT LOWER QUADRANT PAIN: ICD-10-CM

## 2023-06-22 PROCEDURE — 76856 US EXAM PELVIC COMPLETE: CPT

## 2023-07-06 ENCOUNTER — HOSPITAL ENCOUNTER (OUTPATIENT)
Dept: PHYSICAL THERAPY | Age: 78
Setting detail: RECURRING SERIES
Discharge: HOME | End: 2023-07-09
Payer: MEDICARE

## 2023-07-06 PROCEDURE — 97140 MANUAL THERAPY 1/> REGIONS: CPT

## 2023-07-06 ASSESSMENT — PAIN SCALES - GENERAL: PAINLEVEL_OUTOF10: 3

## 2023-07-20 ENCOUNTER — HOSPITAL ENCOUNTER (OUTPATIENT)
Dept: PHYSICAL THERAPY | Age: 78
Setting detail: RECURRING SERIES
Discharge: HOME | End: 2023-07-23
Payer: MEDICARE

## 2023-07-20 DIAGNOSIS — J44.9 CHRONIC OBSTRUCTIVE PULMONARY DISEASE, UNSPECIFIED COPD TYPE (HCC): Primary | ICD-10-CM

## 2023-07-20 DIAGNOSIS — F51.04 CHRONIC INSOMNIA: ICD-10-CM

## 2023-07-20 PROCEDURE — 97140 MANUAL THERAPY 1/> REGIONS: CPT

## 2023-07-20 RX ORDER — TEMAZEPAM 15 MG/1
15 CAPSULE ORAL NIGHTLY PRN
Qty: 30 CAPSULE | Refills: 2 | Status: SHIPPED | OUTPATIENT
Start: 2023-07-20 | End: 2023-10-18

## 2023-07-20 RX ORDER — UMECLIDINIUM 62.5 UG/1
1 AEROSOL, POWDER ORAL DAILY
Qty: 1 EACH | Refills: 2 | Status: SHIPPED | OUTPATIENT
Start: 2023-07-20

## 2023-07-20 ASSESSMENT — PAIN SCALES - GENERAL: PAINLEVEL_OUTOF10: 5

## 2023-07-20 NOTE — PROGRESS NOTES
Debbi Torres  : 1945  Primary: Medicare Part A And B  Secondary: Palmer Garza @ [address] [address]  Phone: [phone]  Fax: [phone] No data recorded    Plan of Care/Certification Expiration Date: 23        PT Visit Info: Total # of Visits to Date: 14  Progress Note Counter: 1  Progress Note Due Date: 23       OUTPATIENT PHYSICAL THERAPY:OP NOTE TYPE: Treatment Note 2023       Episode   Appt Desk       Treatment Diagnosis:  Cervicalgia (M54.2)  Medical/Referring Diagnosis:  Low back pain, unspecified [M54.50]  Referring Physician:  Romaine Webb MD MD Orders:  PT Eval and Treat   Date of Onset:  No data recorded     Allergies:  Latex, Prednisone, Adhesive tape, Ibandronic acid, Triamcinolone, Alendronate sodium, and Triprolidine-pseudoephedrine  Restrictions/Precautions:      None  Interventions Planned (Treatment may consist of any combination of the following):    No data recorded   Subjective Comments:  Patient reports she broke a toe on her left foot. she reports that she has pain throughout her left shoulder. Initial:     5/10 Post Session:     3/10  Medications Last Reviewed:  2023  Updated Objective Findings:  None Today  Treatment   MANUAL THERAPY: (40 minutes): Joint mobilization and Soft tissue mobilization was utilized and necessary because of the patient's restricted joint motion, painful spasm, loss of articular motion and restricted motion of soft tissue. Treatment/Session Summary:    Treatment Assessment:   Patient tolerated treatment well with improved mobility noted after treatment. Communication/Consultation:  None today  Equipment provided today:  None  Recommendations/Intent for next treatment session: Next visit will focus on manual therapy.     Total Treatment Billable Duration:  40 minutes  Time In: 935  Time Out: [address], PT       Post

## 2023-07-20 NOTE — TELEPHONE ENCOUNTER
P{t visited office, states last 2 inhalers that were prescribed for her were around $400 each.  Pt requ to try a different inhaler at a lower cost.    Pt also requ refill of temazepam 15 mg tablet    Sent to [facility] on [address] and [address] Cheilitis Aggressive Treatment: I recommended application of Vaseline or Aquaphor numerous times a day (as often as every hour) and before going to bed. I also prescribed a topical steroid for twice daily use.

## 2023-07-20 NOTE — TELEPHONE ENCOUNTER
Prescription for Incruse sent to pharmacy however may require prior authorization. PDMP reviewed showing temazepam 15 mg capsules, quantity #30, 30-day supply last filled on 6/16/2023. New prescription sent to pharmacy on record. Patient with follow-up appointment scheduled for 8/22/2023. Orders Placed This Encounter    temazepam (RESTORIL) 15 MG capsule     Sig: Take 1 capsule by mouth nightly as needed for Sleep for up to 90 days. Do not drink alcohol, drive or operate heavy machinery after use of this medication.  Max Daily Amount: 15 mg     Dispense:  30 capsule     Refill:  2    umeclidinium bromide (INCRUSE ELLIPTA) 62.5 MCG/ACT inhaler     Sig: Inhale 1 puff into the lungs daily     Dispense:  1 each     Refill:  2

## 2023-07-20 NOTE — TELEPHONE ENCOUNTER
Is there another inhaler we could try for the patient? Temazepam last written 4/5/23 30 days with 2 refills.  Next appt 8/22/23

## 2023-08-03 ENCOUNTER — TELEPHONE (OUTPATIENT)
Dept: INTERNAL MEDICINE CLINIC | Facility: CLINIC | Age: 78
End: 2023-08-03

## 2023-08-03 ENCOUNTER — HOSPITAL ENCOUNTER (OUTPATIENT)
Dept: PHYSICAL THERAPY | Age: 78
Setting detail: RECURRING SERIES
Discharge: HOME | End: 2023-08-06
Payer: MEDICARE

## 2023-08-03 DIAGNOSIS — J44.9 CHRONIC OBSTRUCTIVE PULMONARY DISEASE, UNSPECIFIED COPD TYPE (HCC): Primary | ICD-10-CM

## 2023-08-03 PROCEDURE — 97140 MANUAL THERAPY 1/> REGIONS: CPT

## 2023-08-03 ASSESSMENT — PAIN SCALES - GENERAL: PAINLEVEL_OUTOF10: 5

## 2023-08-03 NOTE — PROGRESS NOTES
Jane Oz  : 1945  Primary: Medicare Part A And B  Secondary: [address] @ [address] [place] [address]  Phone: [phone]  Fax: [phone] No data recorded    Plan of Care/Certification Expiration Date: 23        PT Visit Info: Total # of Visits to Date: 15  Progress Note Counter: 1  Progress Note Due Date: 23       OUTPATIENT PHYSICAL THERAPY:OP NOTE TYPE: Treatment Note 8/3/2023       Episode   Appt Desk       Treatment Diagnosis:  Cervicalgia (M54.2)  Medical/Referring Diagnosis:  Low back pain, unspecified [M54.50]  Referring Physician:  Afia Lipscomb MD MD Orders:  PT Eval and Treat   Date of Onset:  No data recorded     Allergies:  Latex, Prednisone, Adhesive tape, Ibandronic acid, Triamcinolone, Alendronate sodium, and Triprolidine-pseudoephedrine  Restrictions/Precautions:      None  Interventions Planned (Treatment may consist of any combination of the following):    Cold  Heat  Home Exercise Program (HEP)  Manual Therapy  Neuromuscular Re-education/Strengthening  Range of Motion (ROM)  Therapeutic Activites  Therapeutic Exercise/Strengthening   Subjective Comments:  Patient reports she has tightness in her neck today. Initial:     5/10 Post Session:     3/10  Medications Last Reviewed:  8/3/2023  Updated Objective Findings:  None Today  Treatment   MANUAL THERAPY: (40 minutes): Joint mobilization and Soft tissue mobilization was utilized and necessary because of the patient's restricted joint motion, painful spasm, loss of articular motion and restricted motion of soft tissue. Treatment/Session Summary:    Treatment Assessment:   Patient tolerated treatment well with improving pain after treatment. Communication/Consultation:  None today  Equipment provided today:  None  Recommendations/Intent for next treatment session: Next visit will focus on manual therapy.     Total Treatment Billable

## 2023-08-03 NOTE — THERAPY RECERTIFICATION
Jane Oz  : 1945  Primary: Medicare Part A And B  Secondary: [address] @ [address] [place] [address]  Phone: [phone]  Fax: [phone] No data recorded    Plan of Care/Certification Expiration Date: 23      PT Visit Info: Total # of Visits to Date: 15  Progress Note Counter: 1  Progress Note Due Date: 23      OUTPATIENT PHYSICAL THERAPY:OP NOTE TYPE: Recertification [id number]               Episode  Appt Desk         Treatment Diagnosis:  Cervicalgia (M54.2)    Medical/Referring Diagnosis:  Low back pain, unspecified [M54.50]  Referring Physician:  Afia Lipscomb MD MD Orders:  PT Eval and Treat   Return MD Appt:  TBD  Date of Onset:  No data recorded     Allergies:  Latex, Prednisone, Adhesive tape, Ibandronic acid, Triamcinolone, Alendronate sodium, and Triprolidine-pseudoephedrine  Restrictions/Precautions:     None  Medications Last Reviewed:  8/3/2023     SUBJECTIVE   History of Injury/Illness (Reason for Referral):  2023 (Progress Note): Patient reports she has been having some tightness in her neck as well as dizziness. 9703 (Recertification): Patient reports that therapy has been helpful. She reports she is still having tightness in her neck and shoulder. Initial:     5/10 Post Session:     3/10  Past Medical History/Comorbidities:   Ms. Jareth Dillon  has a past medical history of Abnormal Pap smear, Anxiety, Back problem, COPD (chronic obstructive pulmonary disease) ([address]), Depression, Hyperthyroidism, Insomnia, Irritable bowel syndrome, and Osteopenia. Ms. Jareth Dillon  has a past surgical history that includes Tonsillectomy; orthopedic surgery; Salpingo-oophorectomy (Right, 2014); and Colonoscopy.   Social History/Living Environment:   No data recorded     Prior Level of Function/Work/Activity:   No data recorded  No data recordedNo data recorded   Learning:   Does the

## 2023-08-03 NOTE — TELEPHONE ENCOUNTER
----- Message from [facility] sent at 8/3/2023  3:46 PM EDT -----  Subject: Message to Provider    QUESTIONS  Information for Provider? Inhalers that Dr. Peggy Gottron prescribed are close   to $500. Lamar Hagan called [employer]. They told her about ipratropium albuterol   inhaler that would be in her price range. Needs PA with use and setting. Please call [employer]. They need to determine if [facility] or [facility] will cover. ---------------------------------------------------------------------------  --------------  Varun DELGADILLOOV  [phone]; OK to leave message on voicemail  ---------------------------------------------------------------------------  --------------  SCRIPT ANSWERS  Relationship to Patient?  Self

## 2023-08-04 NOTE — TELEPHONE ENCOUNTER
Prescription for ipratropium inhaler sent to pharmacy. Orders Placed This Encounter    ipratropium (ATROVENT HFA) 17 MCG/ACT inhaler     Sig: Inhale 2 puffs into the lungs in the morning and 2 puffs at noon and 2 puffs in the evening and 2 puffs before bedtime.      Dispense:  1 each     Refill:  3

## 2023-08-11 ENCOUNTER — TRANSCRIBE ORDERS (OUTPATIENT)
Dept: SCHEDULING | Age: 78
End: 2023-08-11

## 2023-08-11 DIAGNOSIS — Z12.31 ENCOUNTER FOR SCREENING MAMMOGRAM FOR BREAST CANCER: Primary | ICD-10-CM

## 2023-08-17 ENCOUNTER — HOSPITAL ENCOUNTER (OUTPATIENT)
Dept: PHYSICAL THERAPY | Age: 78
Setting detail: RECURRING SERIES
Discharge: HOME | End: 2023-08-20
Payer: MEDICARE

## 2023-08-17 PROCEDURE — 97140 MANUAL THERAPY 1/> REGIONS: CPT

## 2023-08-17 ASSESSMENT — PAIN SCALES - GENERAL: PAINLEVEL_OUTOF10: 4

## 2023-08-31 ENCOUNTER — HOSPITAL ENCOUNTER (OUTPATIENT)
Dept: PHYSICAL THERAPY | Age: 78
Setting detail: RECURRING SERIES
End: 2023-08-31
Payer: MEDICARE

## 2023-08-31 PROCEDURE — 97140 MANUAL THERAPY 1/> REGIONS: CPT

## 2023-08-31 ASSESSMENT — PAIN SCALES - GENERAL: PAINLEVEL_OUTOF10: 4

## 2023-08-31 NOTE — PROGRESS NOTES
Miguelangel Ching  : 1945  Primary: Medicare Part A And B  Secondary: Palmer Garza @ [address] [address]  Phone: [phone]  Fax: [phone] No data recorded    Plan of Care/Certification Expiration Date: 23        PT Visit Info: Total # of Visits to Date: 17  Progress Note Due Date: 23       OUTPATIENT PHYSICAL THERAPY:OP NOTE TYPE: Treatment Note 2023       Episode   Appt Desk       Treatment Diagnosis:  Cervicalgia (M54.2)  Medical/Referring Diagnosis:  Low back pain, unspecified [M54.50]  Referring Physician:  America Maurer MD MD Orders:  PT Eval and Treat   Date of Onset:  No data recorded     Allergies:  Latex, Prednisone, Adhesive tape, Ibandronic acid, Triamcinolone, Alendronate sodium, and Triprolidine-pseudoephedrine  Restrictions/Precautions:      None  Interventions Planned (Treatment may consist of any combination of the following):    Cold  Heat  Home Exercise Program (HEP)  Manual Therapy  Neuromuscular Re-education/Strengthening  Range of Motion (ROM)  Therapeutic Activites  Therapeutic Exercise/Strengthening   Subjective Comments:  Patient reports that she is having pain in both shoulders right now. Initial:     4/10 Post Session:     3/10  Medications Last Reviewed:  2023  Updated Objective Findings:  None Today  Treatment   MANUAL THERAPY: (40 minutes): Joint mobilization and Soft tissue mobilization was utilized and necessary because of the patient's restricted joint motion, painful spasm, loss of articular motion and restricted motion of soft tissue. Treatment/Session Summary:    Treatment Assessment:   Patient tolerated treatment well with improving overall mobility noted after treatment. Communication/Consultation:  None today  Equipment provided today:  None  Recommendations/Intent for next treatment session: Next visit will focus on manual therapy.     Total

## 2023-09-11 ENCOUNTER — HOSPITAL ENCOUNTER (OUTPATIENT)
Dept: MAMMOGRAPHY | Age: 78
Discharge: HOME | End: 2023-09-14
Attending: STUDENT IN AN ORGANIZED HEALTH CARE EDUCATION/TRAINING PROGRAM
Payer: MEDICARE

## 2023-09-11 VITALS — HEIGHT: 60 IN | WEIGHT: 118 LBS | BODY MASS INDEX: 23.16 KG/M2

## 2023-09-11 DIAGNOSIS — Z12.31 ENCOUNTER FOR SCREENING MAMMOGRAM FOR BREAST CANCER: ICD-10-CM

## 2023-09-11 PROCEDURE — 77063 BREAST TOMOSYNTHESIS BI: CPT

## 2023-09-13 ENCOUNTER — HOSPITAL ENCOUNTER (OUTPATIENT)
Dept: PHYSICAL THERAPY | Age: 78
Setting detail: RECURRING SERIES
Discharge: HOME | End: 2023-09-16
Payer: MEDICARE

## 2023-09-13 PROCEDURE — 97140 MANUAL THERAPY 1/> REGIONS: CPT

## 2023-09-13 ASSESSMENT — PAIN SCALES - GENERAL: PAINLEVEL_OUTOF10: 6

## 2023-09-13 NOTE — PROGRESS NOTES
Miguelangel Ching  : 1945  Primary: Medicare Part A And B  Secondary: Palmer Garza @ [place] [id number]  Phone: [phone]  Fax: [phone] No data recorded    Plan of Care/Certification Expiration Date: 23        PT Visit Info: Total # of Visits to Date: 25  Progress Note Due Date: 10/13/23       OUTPATIENT PHYSICAL THERAPY:OP NOTE TYPE: Treatment Note 2023       Episode   Appt Desk       Treatment Diagnosis:  Cervicalgia (M54.2)  Medical/Referring Diagnosis:  Low back pain, unspecified [M54.50]  Referring Physician:  America Maurer MD MD Orders:  PT Eval and Treat   Date of Onset:  No data recorded     Allergies:  Latex, Prednisone, Adhesive tape, Ibandronic acid, Triamcinolone, Alendronate sodium, and Triprolidine-pseudoephedrine  Restrictions/Precautions:      None  Interventions Planned (Treatment may consist of any combination of the following):    Cold  Heat  Home Exercise Program (HEP)  Manual Therapy  Neuromuscular Re-education/Strengthening  Range of Motion (ROM)  Therapeutic Activites  Therapeutic Exercise/Strengthening   Subjective Comments:  Patient reports that the pain goes from the base of her neck to her right shoulder. Initial:     6/10 Post Session:     4/10  Medications Last Reviewed:  2023  Updated Objective Findings:  None Today  Treatment   MANUAL THERAPY: (40 minutes): Joint mobilization and Soft tissue mobilization was utilized and necessary because of the patient's restricted joint motion, painful spasm, loss of articular motion and restricted motion of soft tissue. Treatment/Session Summary:    Treatment Assessment:   Patient tolerated treatment well with improving overall mobility noted after treatment.    Communication/Consultation:  None today  Equipment provided today:  None  Recommendations/Intent for next treatment session: Next visit will focus on manual
independence and increase safety. Total Duration:  Time In: 0930  Time Out: 12      Regarding Sally BARNETT Paulateagan's therapy, I certify that the treatment plan above will be carried out by a therapist or under their direction. Thank you for this referral,  Shawn Ontiveros PT     Referring Physician Signature: Hilary Rucker MD No Signature is Required for this note.         Post Session Pain  Charge Capture   POC Link  Treatment Note Link  MD Guidelines  [facility]

## 2023-09-25 DIAGNOSIS — E05.90 HYPERTHYROIDISM: ICD-10-CM

## 2023-09-25 LAB
T4 FREE SERPL-MCNC: 1 NG/DL (ref 0.78–1.46)
TSH, 3RD GENERATION: 0.58 UIU/ML (ref 0.36–3.74)

## 2023-09-27 ENCOUNTER — OFFICE VISIT (OUTPATIENT)
Dept: ENDOCRINOLOGY | Age: 78
End: 2023-09-27
Payer: MEDICARE

## 2023-09-27 VITALS
HEART RATE: 89 BPM | WEIGHT: 121 LBS | SYSTOLIC BLOOD PRESSURE: 126 MMHG | BODY MASS INDEX: 23.63 KG/M2 | OXYGEN SATURATION: 95 % | DIASTOLIC BLOOD PRESSURE: 60 MMHG

## 2023-09-27 DIAGNOSIS — E04.2 MULTINODULAR GOITER: ICD-10-CM

## 2023-09-27 DIAGNOSIS — E05.90 HYPERTHYROIDISM: Primary | ICD-10-CM

## 2023-09-27 LAB — T3FREE SERPL-MCNC: 3.5 PG/ML (ref 2–4.4)

## 2023-09-27 PROCEDURE — G8420 CALC BMI NORM PARAMETERS: HCPCS | Performed by: INTERNAL MEDICINE

## 2023-09-27 PROCEDURE — G8399 PT W/DXA RESULTS DOCUMENT: HCPCS | Performed by: INTERNAL MEDICINE

## 2023-09-27 PROCEDURE — 3074F SYST BP LT 130 MM HG: CPT | Performed by: INTERNAL MEDICINE

## 2023-09-27 PROCEDURE — 4004F PT TOBACCO SCREEN RCVD TLK: CPT | Performed by: INTERNAL MEDICINE

## 2023-09-27 PROCEDURE — 99214 OFFICE O/P EST MOD 30 MIN: CPT | Performed by: INTERNAL MEDICINE

## 2023-09-27 PROCEDURE — G8427 DOCREV CUR MEDS BY ELIG CLIN: HCPCS | Performed by: INTERNAL MEDICINE

## 2023-09-27 PROCEDURE — 3078F DIAST BP <80 MM HG: CPT | Performed by: INTERNAL MEDICINE

## 2023-09-27 PROCEDURE — 1090F PRES/ABSN URINE INCON ASSESS: CPT | Performed by: INTERNAL MEDICINE

## 2023-09-27 PROCEDURE — 1123F ACP DISCUSS/DSCN MKR DOCD: CPT | Performed by: INTERNAL MEDICINE

## 2023-09-27 ASSESSMENT — ENCOUNTER SYMPTOMS
CONSTIPATION: 0
TROUBLE SWALLOWING: 0
DIARRHEA: 0

## 2023-09-27 NOTE — PROGRESS NOTES
Ramon Araujo MD, [facility]  [address] [facility], [address], [address]  Phone [phone]  Facsimile [phone]          Alfredo Dickerson is a 69 y.o. female seen 9/27/2023 for follow up of hyperthyroidism and multinodular goiter        ASSESSMENT AND PLAN:    1. Hyperthyroidism  She has had an intermittently, mildly suppressed TSH in the setting of a normal T4 and T3, consistent with subclinical hyperthyroidism. She reports that her TSH has been suppressed for decades. Based on her thyroid ultrasound appearance, she likely has a mildly toxic multinodular goiter. Thyroid scan/uptake was fairly normal, however. She does not have any symptoms of thyrotoxicosis and we have elected to monitor her thyroid function tests closely. Her most recent thyroid function tests were actually normal.  Follow-up in 1 year. 2. Multinodular goiter  There were no obvious cold nodules on thyroid scan. Thyroid ultrasound performed 9/2022 revealed that the nodules were fairly stable in size compared to the prior study. I therefore do not think an FNA biopsy is warranted at this time. I will consider repeating a thyroid ultrasound in 2 to 3 years from the prior study to document stability. Follow-up and Dispositions    Return in about 1 year (around 9/27/2024). HISTORY OF PRESENT ILLNESS:    THYROID DISEASE     Presentation/Diagnosis: Abnormal thyroid function tests noted on routine lab screen. She reports being told decades ago that her thyroid was mildly overactive. Symptoms: See review of systems. Treatment: None. Imaging:  Thyroid ultrasound 1/7/2019 ([facility]): Right lobe 5.8 x 1.9 x 1.5 cm, left lobe 4.5 x 1.6 x 1.6 cm, isthmus measures 0.6 cm. There is a complex nodule in the mid right lobe measuring 1.5 x 1.5 x 1.4 cm with an associated calcification.   There is a solid, isoechoic nodule in the inferior right lobe measuring

## 2023-09-28 ENCOUNTER — HOSPITAL ENCOUNTER (OUTPATIENT)
Dept: PHYSICAL THERAPY | Age: 78
Setting detail: RECURRING SERIES
End: 2023-09-28
Payer: MEDICARE

## 2023-09-28 PROCEDURE — 97140 MANUAL THERAPY 1/> REGIONS: CPT

## 2023-09-28 ASSESSMENT — PAIN SCALES - GENERAL: PAINLEVEL_OUTOF10: 6

## 2023-09-28 NOTE — PROGRESS NOTES
Candance Gravel  : 1945  Primary: Medicare Part A And B  Secondary: Palmer Garza @ [address] [address]  Phone: [phone]  Fax: [phone] No data recorded    Plan of Care/Certification Expiration Date: 23        PT Visit Info: Total # of Visits to Date: 23  Progress Note Due Date: 10/13/23       OUTPATIENT PHYSICAL THERAPY:OP NOTE TYPE: Treatment Note 2023       Episode   Appt Desk       Treatment Diagnosis:  Cervicalgia (M54.2)  Medical/Referring Diagnosis:  Low back pain, unspecified [M54.50]  Referring Physician:  Sacha Cannon MD MD Orders:  PT Eval and Treat   Date of Onset:  No data recorded     Allergies:  Latex, Prednisone, Adhesive tape, Ibandronic acid, Triamcinolone, Alendronate sodium, and Triprolidine-pseudoephedrine  Restrictions/Precautions:      None  Interventions Planned (Treatment may consist of any combination of the following):    Cold  Heat  Home Exercise Program (HEP)  Manual Therapy  Neuromuscular Re-education/Strengthening  Range of Motion (ROM)  Therapeutic Activites  Therapeutic Exercise/Strengthening   Subjective Comments:  Patient reoprts that her neck and shoulders are really tight today. Initial:     6/10 Post Session:     5/10  Medications Last Reviewed:  2023  Updated Objective Findings:  None Today  Treatment   MANUAL THERAPY: (40 minutes): Joint mobilization and Soft tissue mobilization was utilized and necessary because of the patient's restricted joint motion, painful spasm, loss of articular motion and restricted motion of soft tissue. Treatment/Session Summary:    Treatment Assessment:   Patient tolerated treatment well with improving overall mobility and pain. Communication/Consultation:  None today  Equipment provided today:  None  Recommendations/Intent for next treatment session: Next visit will focus on manual therapy.     Total Treatment

## 2023-10-05 ENCOUNTER — NURSE ONLY (OUTPATIENT)
Dept: INTERNAL MEDICINE CLINIC | Facility: CLINIC | Age: 78
End: 2023-10-05

## 2023-10-05 DIAGNOSIS — M85.89 OSTEOPENIA OF MULTIPLE SITES: ICD-10-CM

## 2023-10-05 DIAGNOSIS — Z23 NEED FOR IMMUNIZATION AGAINST INFLUENZA: Primary | ICD-10-CM

## 2023-10-05 RX ORDER — ALENDRONATE SODIUM 70 MG/1
70 TABLET ORAL
Qty: 4 TABLET | Refills: 3 | Status: SHIPPED | OUTPATIENT
Start: 2023-10-05

## 2023-10-12 ENCOUNTER — HOSPITAL ENCOUNTER (OUTPATIENT)
Dept: PHYSICAL THERAPY | Age: 78
Setting detail: RECURRING SERIES
Discharge: HOME | End: 2023-10-15
Payer: MEDICARE

## 2023-10-12 PROCEDURE — 97140 MANUAL THERAPY 1/> REGIONS: CPT

## 2023-10-12 ASSESSMENT — PAIN SCALES - GENERAL: PAINLEVEL_OUTOF10: 6

## 2023-10-12 NOTE — PROGRESS NOTES
Treatment Billable Duration:  40 minutes  Time In: 3762  Time Out: [address], PT       Post Session Pain  Charge Capture  Med[employer] Portal  MD Guidelines  Scanned Media  Benefits  MyChart    Future Appointments   Date Time Provider [address]   10/25/2023  8:45 AM Radha Mcdaniel PT [facility]   11/1/2023  3:00 PM DO MARIA A Prakash [facility]   9/27/2024 11:00 AM Joycelyn Olmedo MD [facility]

## 2023-10-25 ENCOUNTER — HOSPITAL ENCOUNTER (OUTPATIENT)
Dept: PHYSICAL THERAPY | Age: 78
Setting detail: RECURRING SERIES
Discharge: HOME | End: 2023-10-28
Payer: MEDICARE

## 2023-10-25 PROCEDURE — 97140 MANUAL THERAPY 1/> REGIONS: CPT

## 2023-10-25 ASSESSMENT — PAIN SCALES - GENERAL: PAINLEVEL_OUTOF10: 4

## 2023-10-25 NOTE — PROGRESS NOTES
Yifan Staton  : 1945  Primary: Medicare Part A And B  Secondary: Palmer Garza @ [place] [address]  Phone: [phone]  Fax: [phone] No data recorded    Plan of Care/Certification Expiration Date: 23        PT Visit Info: Total # of Visits to Date: 24  Progress Note Due Date: 10/13/23       OUTPATIENT PHYSICAL THERAPY:OP NOTE TYPE: Treatment Note 10/25/2023       Episode   Appt Desk       Treatment Diagnosis:  Cervicalgia (M54.2)  Medical/Referring Diagnosis:  Low back pain, unspecified [M54.50]  Referring Physician:  Josefa Davis MD MD Orders:  PT Eval and Treat   Date of Onset:  No data recorded     Allergies:  Latex, Prednisone, Adhesive tape, Ibandronic acid, Triamcinolone, Alendronate sodium, and Triprolidine-pseudoephedrine  Restrictions/Precautions:      None  Interventions Planned (Treatment may consist of any combination of the following):    Cold  Heat  Home Exercise Program (HEP)  Manual Therapy  Neuromuscular Re-education/Strengthening  Range of Motion (ROM)  Therapeutic Activites  Therapeutic Exercise/Strengthening   Subjective Comments:  Patient reports she is having more pain in her right shoulder and neck. she reports her one brother had surgery on his knee and so she has been helping to take care of him so she is tired and a little stressed. Initial:     4/10 Post Session:     4/10  Medications Last Reviewed:  10/25/2023  Updated Objective Findings:  None Today  Treatment   MANUAL THERAPY: (40 minutes): Joint mobilization and Soft tissue mobilization was utilized and necessary because of the patient's restricted joint motion, painful spasm, loss of articular motion and restricted motion of soft tissue. Treatment/Session Summary:    Treatment Assessment:     Patient tolerated treatment well with improving overall mobility and pain after session.   Communication/Consultation:

## 2023-10-26 ENCOUNTER — APPOINTMENT (OUTPATIENT)
Dept: PHYSICAL THERAPY | Age: 78
End: 2023-10-26
Payer: MEDICARE

## 2023-10-28 NOTE — PROGRESS NOTES
swelling. Skin:  Negative for rash. Psychiatric/Behavioral:  Negative for self-injury, sleep disturbance and suicidal ideas. Objective   Physical Exam  Vitals reviewed. Constitutional:       General: She is not in acute distress. Appearance: She is not ill-appearing, toxic-appearing or diaphoretic. Comments: Pleasant, elderly, tired appearing  female in no acute cardiopulmonary distress   HENT:      Head: Normocephalic and atraumatic. Mouth/Throat:      Pharynx: Oropharynx is clear. Eyes:      General: No visual field deficit. Extraocular Movements: Extraocular movements intact. Cardiovascular:      Rate and Rhythm: Normal rate. Rhythm irregular. Heart sounds: No murmur heard. No friction rub. No gallop. Pulmonary:      Effort: Pulmonary effort is normal. No respiratory distress. Breath sounds: Examination of the left-lower field reveals wheezing and rhonchi. Wheezing and rhonchi present. No rales. Abdominal:      Palpations: Abdomen is soft. Tenderness: There is no abdominal tenderness. There is no guarding. Skin:     General: Skin is dry. Coloration: Skin is not jaundiced. Neurological:      Mental Status: She is alert. Cranial Nerves: No dysarthria. Sensory: Sensation is intact. Motor: No tremor or pronator drift. Coordination: Finger-Nose-Finger Test normal.      Comments: No facial asymmetry, tongue deviation, significant difficulty with head rotation, asymmetric shoulder shrug  Muscle strength 5/5 in all 4 extremities  No pronator drift  Generally normal cerebellar finger-nose testing bilaterally   Psychiatric:         Attention and Perception: Attention normal.         Mood and Affect: Mood normal. Mood is not anxious or depressed. Affect is blunt. Affect is not tearful. Speech: Speech normal. Speech is not rapid and pressured or slurred.          Behavior: Behavior normal. Behavior is not agitated,

## 2023-11-01 ENCOUNTER — OFFICE VISIT (OUTPATIENT)
Dept: INTERNAL MEDICINE CLINIC | Facility: CLINIC | Age: 78
End: 2023-11-01
Payer: MEDICARE

## 2023-11-01 VITALS
TEMPERATURE: 97.8 F | HEIGHT: 60 IN | SYSTOLIC BLOOD PRESSURE: 112 MMHG | WEIGHT: 122.5 LBS | HEART RATE: 84 BPM | DIASTOLIC BLOOD PRESSURE: 70 MMHG | OXYGEN SATURATION: 96 % | BODY MASS INDEX: 24.05 KG/M2

## 2023-11-01 DIAGNOSIS — F51.04 CHRONIC INSOMNIA: ICD-10-CM

## 2023-11-01 DIAGNOSIS — J44.9 CHRONIC OBSTRUCTIVE PULMONARY DISEASE, UNSPECIFIED COPD TYPE (HCC): Primary | ICD-10-CM

## 2023-11-01 DIAGNOSIS — M85.89 OSTEOPENIA OF MULTIPLE SITES: ICD-10-CM

## 2023-11-01 DIAGNOSIS — R53.82 CHRONIC FATIGUE: ICD-10-CM

## 2023-11-01 DIAGNOSIS — G89.29 CHRONIC BILATERAL LOW BACK PAIN WITH RIGHT-SIDED SCIATICA: ICD-10-CM

## 2023-11-01 DIAGNOSIS — E04.2 MULTINODULAR GOITER: ICD-10-CM

## 2023-11-01 DIAGNOSIS — F32.A ANXIETY AND DEPRESSION: ICD-10-CM

## 2023-11-01 DIAGNOSIS — M54.41 CHRONIC BILATERAL LOW BACK PAIN WITH RIGHT-SIDED SCIATICA: ICD-10-CM

## 2023-11-01 DIAGNOSIS — E05.90 SUBCLINICAL HYPERTHYROIDISM: ICD-10-CM

## 2023-11-01 DIAGNOSIS — E55.9 VITAMIN D DEFICIENCY: ICD-10-CM

## 2023-11-01 DIAGNOSIS — F41.9 ANXIETY AND DEPRESSION: ICD-10-CM

## 2023-11-01 DIAGNOSIS — I49.9 IRREGULAR HEART BEAT: ICD-10-CM

## 2023-11-01 DIAGNOSIS — K58.0 IRRITABLE BOWEL SYNDROME WITH DIARRHEA: ICD-10-CM

## 2023-11-01 DIAGNOSIS — R91.8 PULMONARY NODULES: ICD-10-CM

## 2023-11-01 LAB
11-NOR-9-THC-9-COOH, POC: NEGATIVE
AMPHETAMINE, URINE, POC: NEGATIVE
BENZODIAZEPINES, URINE, POC: NORMAL
BENZOYLECGONINE, URINE, POC: NEGATIVE
METHADONE, URINE, POC: NEGATIVE
METHAMPHETAMINE, URINE, POC: NEGATIVE
MORPHINE, URINE, POC: NORMAL
PHENCYCLIDINE, URINE, POC: NEGATIVE
URINALYSIS COLOR, POC: YELLOW

## 2023-11-01 PROCEDURE — 1090F PRES/ABSN URINE INCON ASSESS: CPT | Performed by: STUDENT IN AN ORGANIZED HEALTH CARE EDUCATION/TRAINING PROGRAM

## 2023-11-01 PROCEDURE — 93000 ELECTROCARDIOGRAM COMPLETE: CPT | Performed by: STUDENT IN AN ORGANIZED HEALTH CARE EDUCATION/TRAINING PROGRAM

## 2023-11-01 PROCEDURE — 3074F SYST BP LT 130 MM HG: CPT | Performed by: STUDENT IN AN ORGANIZED HEALTH CARE EDUCATION/TRAINING PROGRAM

## 2023-11-01 PROCEDURE — G8484 FLU IMMUNIZE NO ADMIN: HCPCS | Performed by: STUDENT IN AN ORGANIZED HEALTH CARE EDUCATION/TRAINING PROGRAM

## 2023-11-01 PROCEDURE — 3078F DIAST BP <80 MM HG: CPT | Performed by: STUDENT IN AN ORGANIZED HEALTH CARE EDUCATION/TRAINING PROGRAM

## 2023-11-01 PROCEDURE — 1123F ACP DISCUSS/DSCN MKR DOCD: CPT | Performed by: STUDENT IN AN ORGANIZED HEALTH CARE EDUCATION/TRAINING PROGRAM

## 2023-11-01 PROCEDURE — 3023F SPIROM DOC REV: CPT | Performed by: STUDENT IN AN ORGANIZED HEALTH CARE EDUCATION/TRAINING PROGRAM

## 2023-11-01 PROCEDURE — G8427 DOCREV CUR MEDS BY ELIG CLIN: HCPCS | Performed by: STUDENT IN AN ORGANIZED HEALTH CARE EDUCATION/TRAINING PROGRAM

## 2023-11-01 PROCEDURE — G8399 PT W/DXA RESULTS DOCUMENT: HCPCS | Performed by: STUDENT IN AN ORGANIZED HEALTH CARE EDUCATION/TRAINING PROGRAM

## 2023-11-01 PROCEDURE — 4004F PT TOBACCO SCREEN RCVD TLK: CPT | Performed by: STUDENT IN AN ORGANIZED HEALTH CARE EDUCATION/TRAINING PROGRAM

## 2023-11-01 PROCEDURE — G8420 CALC BMI NORM PARAMETERS: HCPCS | Performed by: STUDENT IN AN ORGANIZED HEALTH CARE EDUCATION/TRAINING PROGRAM

## 2023-11-01 PROCEDURE — 99214 OFFICE O/P EST MOD 30 MIN: CPT | Performed by: STUDENT IN AN ORGANIZED HEALTH CARE EDUCATION/TRAINING PROGRAM

## 2023-11-01 PROCEDURE — 80305 DRUG TEST PRSMV DIR OPT OBS: CPT | Performed by: STUDENT IN AN ORGANIZED HEALTH CARE EDUCATION/TRAINING PROGRAM

## 2023-11-01 RX ORDER — TEMAZEPAM 15 MG/1
CAPSULE ORAL
COMMUNITY
End: 2023-11-01 | Stop reason: SDUPTHER

## 2023-11-01 RX ORDER — TEMAZEPAM 15 MG/1
CAPSULE ORAL
Qty: 30 CAPSULE | Refills: 2 | Status: SHIPPED | OUTPATIENT
Start: 2023-11-04 | End: 2024-04-01

## 2023-11-01 ASSESSMENT — ENCOUNTER SYMPTOMS
BLOOD IN STOOL: 0
ANAL BLEEDING: 0
NAUSEA: 1
WHEEZING: 0
ABDOMINAL PAIN: 0
VOMITING: 0
COUGH: 1
BACK PAIN: 0

## 2023-11-08 ENCOUNTER — HOSPITAL ENCOUNTER (OUTPATIENT)
Dept: PHYSICAL THERAPY | Age: 78
Setting detail: RECURRING SERIES
Discharge: HOME | End: 2023-11-11
Payer: MEDICARE

## 2023-11-08 DIAGNOSIS — R53.82 CHRONIC FATIGUE: ICD-10-CM

## 2023-11-08 DIAGNOSIS — E55.9 VITAMIN D DEFICIENCY: ICD-10-CM

## 2023-11-08 LAB
BASOPHILS # BLD: 0 K/UL (ref 0–0.2)
BASOPHILS NFR BLD: 0 % (ref 0–2)
DIFFERENTIAL METHOD BLD: NORMAL
EOSINOPHIL # BLD: 0.2 K/UL (ref 0–0.8)
EOSINOPHIL NFR BLD: 2 % (ref 0.5–7.8)
ERYTHROCYTE [DISTWIDTH] IN BLOOD BY AUTOMATED COUNT: 13.2 % (ref 11.9–14.6)
ERYTHROCYTE [SEDIMENTATION RATE] IN BLOOD: 9 MM/HR (ref 0–30)
HCT VFR BLD AUTO: 45.2 % (ref 35.8–46.3)
HGB BLD-MCNC: 14.5 G/DL (ref 11.7–15.4)
IMM GRANULOCYTES # BLD AUTO: 0 K/UL (ref 0–0.5)
IMM GRANULOCYTES NFR BLD AUTO: 0 % (ref 0–5)
LYMPHOCYTES # BLD: 2 K/UL (ref 0.5–4.6)
LYMPHOCYTES NFR BLD: 26 % (ref 13–44)
MCH RBC QN AUTO: 29.8 PG (ref 26.1–32.9)
MCHC RBC AUTO-ENTMCNC: 32.1 G/DL (ref 31.4–35)
MCV RBC AUTO: 93 FL (ref 82–102)
MONOCYTES # BLD: 0.8 K/UL (ref 0.1–1.3)
MONOCYTES NFR BLD: 10 % (ref 4–12)
NEUTS SEG # BLD: 4.5 K/UL (ref 1.7–8.2)
NEUTS SEG NFR BLD: 62 % (ref 43–78)
NRBC # BLD: 0 K/UL (ref 0–0.2)
PLATELET # BLD AUTO: 267 K/UL (ref 150–450)
PMV BLD AUTO: 11.8 FL (ref 9.4–12.3)
RBC # BLD AUTO: 4.86 M/UL (ref 4.05–5.2)
WBC # BLD AUTO: 7.4 K/UL (ref 4.3–11.1)

## 2023-11-08 PROCEDURE — 97140 MANUAL THERAPY 1/> REGIONS: CPT

## 2023-11-08 ASSESSMENT — PAIN SCALES - GENERAL: PAINLEVEL_OUTOF10: 5

## 2023-11-08 NOTE — PROGRESS NOTES
Dejuan Barone  : 1945  Primary: Medicare Part A And B  Secondary: Palmer Garza @ [address] [address]  Phone: [phone]  Fax: [phone] No data recorded    Plan of Care/Certification Expiration Date: 24        PT Visit Info: Total # of Visits to Date: 22  Progress Note Due Date: 23       OUTPATIENT PHYSICAL THERAPY:OP NOTE TYPE: Treatment Note 2023       Episode   Appt Desk       Treatment Diagnosis:  Cervicalgia (M54.2)  Medical/Referring Diagnosis:  Low back pain, unspecified [M54.50]  Referring Physician:  David Perales MD MD Orders:  PT Eval and Treat   Date of Onset:  No data recorded     Allergies:  Latex, Prednisone, Adhesive tape, Ibandronic acid, Triamcinolone, Alendronate sodium, and Triprolidine-pseudoephedrine  Restrictions/Precautions:      None  Interventions Planned (Treatment may consist of any combination of the following):    Cold  Heat  Home Exercise Program (HEP)  Manual Therapy  Neuromuscular Re-education/Strengthening  Range of Motion (ROM)  Therapeutic Activites  Therapeutic Exercise/Strengthening   Subjective Comments:  Patient reports she orginally had car trouble this morning but was able to borrow her niece's car and so she was able to come to therapy. She reports that her brother is home from the hospital and nursing care which is a big relief, but she still has a lot of tightness in her neck and shoulders. Initial:     5/10 Post Session:     3/10  Medications Last Reviewed:  2023  Updated Objective Findings:  None Today  Treatment   MANUAL THERAPY: (40 minutes): Joint mobilization and Soft tissue mobilization was utilized and necessary because of the patient's restricted joint motion, painful spasm, loss of articular motion and restricted motion of soft tissue.        Treatment/Session Summary:    Treatment Assessment:     Patient tolerated treatment well

## 2023-11-08 NOTE — THERAPY RECERTIFICATION
Independent  Scooting: Independent         ASSESSMENT   Assessment:  Ms. Roberth Bolaños presents with improving mobility and pain in cervical region secondary to degenerative changes. Patient has attended a total of 18 scheduled physical therapy visits. Treatment has consisted of stretching, strengthening, and manual therapy to improve overall pain and performance with activities of daily living. After discussing with patient, she agreed she  would continue to benefit from physical therapy to improve overall mobility and pain. Please sign this re-certification if you concur. Thank you for the opportunity to serve this patient. Problem List: (Impacting functional limitations):      Decreased Strength  Decreased ADL/Functional Activities  Increased Pain  Decreased Activity Tolerance  Therapy Prognosis:   Therapy Prognosis: Good       PLAN   Effective Dates: 11/1/2023 TO Plan of Care/Certification Expiration Date: 02/06/24       Frequency/Duration: Plan Frequency: 1 time per week for every other week for 90 days     Interventions Planned (Treatment may consist of any combination of the following):      Cold  Heat  Home Exercise Program (HEP)  Manual Therapy  Neuromuscular Re-education/Strengthening  Range of Motion (ROM)  Therapeutic Activites  Therapeutic Exercise/Strengthening    GOALS: (Goals have been discussed and agreed upon with patient.)  Discharge Goals: Time Frame: 90 days  Patient will be independent with all ADLs with minimal onset of neck pain and no deficits with daily tasks--goal ongoing. Patient will report no fear avoidance with social or recreational activities due to neck pain --goal ongoing. Patient will score less than or equal to 7/50 on Neck Disability Index with minimal effect of neck pain on patient's ability to manage every day life activities--goal ongoing. Outcome Measure:    Tool Used: Neck Disability Index (NDI)  Score:  Initial: 17/50  Most Recent: 12/50 (Date: 11/8/2023 )

## 2023-11-09 LAB
25(OH)D3 SERPL-MCNC: 42.6 NG/ML (ref 30–100)
ALBUMIN SERPL-MCNC: 3.7 G/DL (ref 3.2–4.6)
ALBUMIN/GLOB SERPL: 1.4 (ref 0.4–1.6)
ALP SERPL-CCNC: 87 U/L (ref 50–136)
ALT SERPL-CCNC: 31 U/L (ref 12–65)
ANION GAP SERPL CALC-SCNC: 8 MMOL/L (ref 2–11)
AST SERPL-CCNC: 24 U/L (ref 15–37)
BILIRUB SERPL-MCNC: 0.3 MG/DL (ref 0.2–1.1)
BUN SERPL-MCNC: 18 MG/DL (ref 8–23)
CALCIUM SERPL-MCNC: 9.8 MG/DL (ref 8.3–10.4)
CHLORIDE SERPL-SCNC: 106 MMOL/L (ref 101–110)
CO2 SERPL-SCNC: 28 MMOL/L (ref 21–32)
CREAT SERPL-MCNC: 0.8 MG/DL (ref 0.6–1)
CRP SERPL-MCNC: <0.3 MG/DL (ref 0–0.9)
GLOBULIN SER CALC-MCNC: 2.7 G/DL (ref 2.8–4.5)
GLUCOSE SERPL-MCNC: 89 MG/DL (ref 65–100)
POTASSIUM SERPL-SCNC: 4.5 MMOL/L (ref 3.5–5.1)
PROT SERPL-MCNC: 6.4 G/DL (ref 6.3–8.2)
SODIUM SERPL-SCNC: 142 MMOL/L (ref 133–143)
T4 FREE SERPL-MCNC: 1 NG/DL (ref 0.78–1.46)
TSH, 3RD GENERATION: 0.39 UIU/ML (ref 0.36–3.74)
VIT B12 SERPL-MCNC: 546 PG/ML (ref 193–986)

## 2023-11-10 LAB — ANA SER QL: NEGATIVE

## 2023-11-22 ENCOUNTER — HOSPITAL ENCOUNTER (OUTPATIENT)
Dept: PHYSICAL THERAPY | Age: 78
Setting detail: RECURRING SERIES
Discharge: HOME | End: 2023-11-25
Payer: MEDICARE

## 2023-11-22 PROCEDURE — 97140 MANUAL THERAPY 1/> REGIONS: CPT

## 2023-11-22 ASSESSMENT — PAIN SCALES - GENERAL: PAINLEVEL_OUTOF10: 5

## 2023-11-22 NOTE — PROGRESS NOTES
Caitlin Montelongo  : 1945  Primary: Medicare Part A And B  Secondary: Palmer Garza @ [place] [id number]  Phone: [phone]  Fax: [phone] No data recorded    Plan of Care/Certification Expiration Date: 24        PT Visit Info: Total # of Visits to Date: 21  Progress Note Due Date: 23       OUTPATIENT PHYSICAL THERAPY:OP NOTE TYPE: Treatment Note 2023       Episode   Appt Desk       Treatment Diagnosis:  Cervicalgia (M54.2)  Medical/Referring Diagnosis:  Low back pain, unspecified [M54.50]  Referring Physician:  Brett Abarca MD MD Orders:  PT Eval and Treat   Date of Onset:  No data recorded     Allergies:  Latex, Prednisone, Adhesive tape, Ibandronic acid, Triamcinolone, Alendronate sodium, and Triprolidine-pseudoephedrine  Restrictions/Precautions:      None  Interventions Planned (Treatment may consist of any combination of the following):    Cold  Heat  Home Exercise Program (HEP)  Manual Therapy  Neuromuscular Re-education/Strengthening  Range of Motion (ROM)  Therapeutic Activites  Therapeutic Exercise/Strengthening   Subjective Comments:    Patient reports her right shoulder and both sides of her neck hurt. Initial:     5/10  Post Session:     4/10  Medications Last Reviewed:  2023  Updated Objective Findings:  None Today  Treatment   MANUAL THERAPY: (40 minutes): Joint mobilization and Soft tissue mobilization was utilized and necessary because of the patient's restricted joint motion, painful spasm, loss of articular motion and restricted motion of soft tissue. Treatment/Session Summary:    Treatment Assessment:     Patient tolerated treatment with improving overall mobility noted after treatment. Communication/Consultation:  None today  Equipment provided today:  None  Recommendations/Intent for next treatment session: Next visit will focus on manual therapy.     Total

## 2023-12-27 ENCOUNTER — TELEPHONE (OUTPATIENT)
Dept: INTERNAL MEDICINE CLINIC | Facility: CLINIC | Age: 78
End: 2023-12-27

## 2023-12-27 DIAGNOSIS — M85.89 OSTEOPENIA OF MULTIPLE SITES: ICD-10-CM

## 2023-12-27 RX ORDER — ALENDRONATE SODIUM 70 MG/1
TABLET ORAL
Qty: 12 TABLET | Refills: 1 | OUTPATIENT
Start: 2023-12-27

## 2023-12-27 NOTE — TELEPHONE ENCOUNTER
Refills sent to [facility] by Dr. Panda Speaks on 10/5/23 for 4 tabs with 3 refills. LVM on 12/27/23 @ [id number] to inform pt.

## 2023-12-27 NOTE — TELEPHONE ENCOUNTER
----- Message from Sonusirisha Blandon sent at 12/27/2023  1:58 PM EST -----  Subject: Refill Request    QUESTIONS  Name of Medication? alendronate (FOSAMAX) 70 MG tablet  Patient-reported dosage and instructions? once a week  How many days do you have left? 0  Preferred Pharmacy? [id number]  Pharmacy phone number (if available)? [phone]  ---------------------------------------------------------------------------  --------------  Willa GUTIERRES  What is the best way for the office to contact you? OK to leave message on   voicemail  Preferred Call Back Phone Number? [phone]  ---------------------------------------------------------------------------  --------------  SCRIPT ANSWERS  Relationship to Patient?  Self

## 2024-01-03 ENCOUNTER — HOSPITAL ENCOUNTER (OUTPATIENT)
Dept: PHYSICAL THERAPY | Age: 79
Setting detail: RECURRING SERIES
Discharge: HOME | End: 2024-01-06
Payer: MEDICARE

## 2024-01-03 PROCEDURE — 97140 MANUAL THERAPY 1/> REGIONS: CPT

## 2024-01-03 ASSESSMENT — PAIN SCALES - GENERAL: PAINLEVEL_OUTOF10: 4

## 2024-01-03 NOTE — PROGRESS NOTES
Sally Chun  : 1945  Primary: Medicare Part A And B  Secondary: [employer] MEDICARE SUPP [facility] @ [address]  [facility] [address]  Phone: [phone]  Fax: [phone] No data recorded    Plan of Care/Certification Expiration Date: 24        PT Visit Info:    Total # of Visits to Date: 25  Progress Note Due Date: 24       OUTPATIENT PHYSICAL THERAPY:OP NOTE TYPE: Treatment Note 1/3/2024       Episode   Appt Desk       Treatment Diagnosis:  Cervicalgia (M54.2)  Medical/Referring Diagnosis:  Low back pain, unspecified [M54.50]  Referring Physician:  Arielle Toledo MD MD Orders:  PT Eval and Treat   Date of Onset:  No data recorded     Allergies:  Latex, Prednisone, Adhesive tape, Ibandronic acid, Triamcinolone, Alendronate sodium, and Triprolidine-pseudoephedrine  Restrictions/Precautions:      None  Interventions Planned (Treatment may consist of any combination of the following):    Cold  Heat  Home Exercise Program (HEP)  Manual Therapy  Neuromuscular Re-education/Strengthening  Range of Motion (ROM)  Therapeutic Activites  Therapeutic Exercise/Strengthening   Subjective Comments:    Patient reports she is hurting in her right neck/shoulder and it's difficult to turn her head.  Initial:     4/10  Post Session:     3/10  Medications Last Reviewed:  1/3/2024  Updated Objective Findings:  None Today  Treatment   MANUAL THERAPY: (40 minutes): Joint mobilization and Soft tissue mobilization was utilized and necessary because of the patient's restricted joint motion, painful spasm, loss of articular motion and restricted motion of soft tissue.       Treatment/Session Summary:    Treatment Assessment:     Patient tolerated treatment well with improving overall mobility and pain.  Communication/Consultation:  None today  Equipment provided today:  None  Recommendations/Intent for next treatment session: Next visit will focus on manual

## 2024-01-17 ENCOUNTER — TELEPHONE (OUTPATIENT)
Dept: INTERNAL MEDICINE CLINIC | Facility: CLINIC | Age: 79
End: 2024-01-17

## 2024-01-17 ENCOUNTER — HOSPITAL ENCOUNTER (OUTPATIENT)
Dept: PHYSICAL THERAPY | Age: 79
Setting detail: RECURRING SERIES
Discharge: HOME | End: 2024-01-20
Payer: MEDICARE

## 2024-01-17 DIAGNOSIS — M54.41 CHRONIC BILATERAL LOW BACK PAIN WITH RIGHT-SIDED SCIATICA: Primary | ICD-10-CM

## 2024-01-17 DIAGNOSIS — G89.29 CHRONIC BILATERAL LOW BACK PAIN WITH RIGHT-SIDED SCIATICA: Primary | ICD-10-CM

## 2024-01-17 PROCEDURE — 97140 MANUAL THERAPY 1/> REGIONS: CPT

## 2024-01-17 ASSESSMENT — PAIN SCALES - GENERAL: PAINLEVEL_OUTOF10: 4

## 2024-01-17 NOTE — TELEPHONE ENCOUNTER
Jill came in asking if she could get an order for PT for her Sciatica - please call and let her know

## 2024-01-17 NOTE — TELEPHONE ENCOUNTER
Order for physical therapy placed     Orders Placed This Encounter    [facility] - Physical Therapy, [facility]     Referral Priority:   Routine     Referral Type:   Eval and Treat     Referral Reason:   Physical Therapy     Requested Specialty:   Physical Therapist     Number of Visits Requested:   1

## 2024-01-17 NOTE — PROGRESS NOTES
Sally Chun  : 1945  Primary: Medicare Part A And B  Secondary: [employer] HE[employer] C[employer] MEDICARE SUPP [facility] @ [address]  [facility] [address]  Phone: [phone]  Fax: [phone] No data recorded    Plan of Care/Certification Expiration Date: 24        PT Visit Info:    Total # of Visits to Date: 26  Progress Note Due Date: 24       OUTPATIENT PHYSICAL THERAPY:OP NOTE TYPE: Treatment Note 2024       Episode   Appt Desk       Treatment Diagnosis:  Cervicalgia (M54.2)  Medical/Referring Diagnosis:  Low back pain, unspecified [M54.50]  Referring Physician:  Airelle Toledo MD MD Orders:  PT Eval and Treat   Date of Onset:  No data recorded     Allergies:  Latex, Prednisone, Adhesive tape, Ibandronic acid, Triamcinolone, Alendronate sodium, and Triprolidine-pseudoephedrine  Restrictions/Precautions:      None  Interventions Planned (Treatment may consist of any combination of the following):    Cold  Heat  Home Exercise Program (HEP)  Manual Therapy  Neuromuscular Re-education/Strengthening  Range of Motion (ROM)  Therapeutic Activites  Therapeutic Exercise/Strengthening   Subjective Comments:    Patient reports her back is hurting more today including her sciatica.  She reports that her neck is mild compared to that with most of her discomfort along her left shoulder.  Initial:     4/10  Post Session:     3/10  Medications Last Reviewed:  2024  Updated Objective Findings:  None Today  Treatment   MANUAL THERAPY: (40 minutes): Joint mobilization and Soft tissue mobilization was utilized and necessary because of the patient's restricted joint motion, painful spasm, loss of articular motion and restricted motion of soft tissue.       Treatment/Session Summary:    Treatment Assessment:     Patient tolerated treatment well with improving overall mobility and pain noted after treatment.  Communication/Consultation:  None today  Equipment 
Index is a revised form of the Oswestry Low Back Pain Index and is designed to measure the activities of daily living in person's with neck pain.  Each section is scored on a 0-5 scale, 5 representing the greatest disability.  The scores of each section are added together for a total score of 50.      Medical Necessity:   Patient is expected to demonstrate progress in strength and range of motion to improve safety during daily activities.  Patient demonstrates good rehab potential due to higher previous functional level.  Skilled intervention continues to be required due to decreased mobility.  Reason for Services/Other Comments:  Patient continues to demonstrate capacity to improve overall mobility which will increase independence and increase safety.    Total Duration:  Time In: 0930  Time Out: 1015      Regarding Sally Chun's therapy, I certify that the treatment plan above will be carried out by a therapist or under their direction.  Thank you for this referral,  SONNY TRONCOSO PT     Referring Physician Signature: Arielle Toledo MD No Signature is Required for this note.        Post Session Pain  Charge Capture   POC Link  Treatment Note Link  MD Guidelines  [facility]

## 2024-01-25 DIAGNOSIS — M85.89 OSTEOPENIA OF MULTIPLE SITES: ICD-10-CM

## 2024-01-25 RX ORDER — ALENDRONATE SODIUM 70 MG/1
TABLET ORAL
Qty: 12 TABLET | Refills: 1 | OUTPATIENT
Start: 2024-01-25

## 2024-01-31 ENCOUNTER — HOSPITAL ENCOUNTER (OUTPATIENT)
Dept: PHYSICAL THERAPY | Age: 79
Setting detail: RECURRING SERIES
Discharge: HOME | End: 2024-02-03
Payer: MEDICARE

## 2024-01-31 DIAGNOSIS — M85.89 OSTEOPENIA OF MULTIPLE SITES: ICD-10-CM

## 2024-01-31 PROCEDURE — 97140 MANUAL THERAPY 1/> REGIONS: CPT

## 2024-01-31 RX ORDER — ALENDRONATE SODIUM 70 MG/1
70 TABLET ORAL
Qty: 4 TABLET | Refills: 3 | Status: SHIPPED | OUTPATIENT
Start: 2024-01-31

## 2024-01-31 ASSESSMENT — PAIN SCALES - GENERAL: PAINLEVEL_OUTOF10: 4

## 2024-01-31 NOTE — PROGRESS NOTES
Sally Chun  : 1945  Primary: Medicare Part A And B  Secondary: [employer] HE[employer] C[employer] MEDICARE SUPP [facility] @ [address]  [facility] [address]  Phone: [phone]  Fax: [phone] No data recorded    Plan of Care/Certification Expiration Date: 24        PT Visit Info:    Total # of Visits to Date: 27  Progress Note Due Date: 24       OUTPATIENT PHYSICAL THERAPY:OP NOTE TYPE: Treatment Note 2024       Episode   Appt Desk       Treatment Diagnosis:  Cervicalgia (M54.2)  Medical/Referring Diagnosis:  Chronic bilateral low back pain with right-sided sciatica [M54.41, G89.29]  Referring Physician:  Arielle Toledo MD MD Orders:  PT Eval and Treat   Date of Onset:  No data recorded     Allergies:  Latex, Prednisone, Adhesive tape, Ibandronic acid, Triamcinolone, Alendronate sodium, and Triprolidine-pseudoephedrine  Restrictions/Precautions:      None  Interventions Planned (Treatment may consist of any combination of the following):    Cold  Heat  Home Exercise Program (HEP)  Manual Therapy  Neuromuscular Re-education/Strengthening  Range of Motion (ROM)  Therapeutic Activites  Therapeutic Exercise/Strengthening   Subjective Comments:    Patient reports she is hurting in both shoulders today.  Initial:     4/10  Post Session:     4/10  Medications Last Reviewed:  2024  Updated Objective Findings:  None Today  Treatment   MANUAL THERAPY: (40 minutes): Joint mobilization and Soft tissue mobilization was utilized and necessary because of the patient's restricted joint motion, painful spasm, loss of articular motion and restricted motion of soft tissue.       Treatment/Session Summary:    Treatment Assessment:     Patient tolerated treatment well with improving overall mobility.  Communication/Consultation:  None today  Equipment provided today:  None  Recommendations/Intent for next treatment session: Next visit will focus on manual

## 2024-02-05 DIAGNOSIS — F51.04 CHRONIC INSOMNIA: ICD-10-CM

## 2024-02-05 RX ORDER — TEMAZEPAM 15 MG/1
CAPSULE ORAL
Qty: 30 CAPSULE | Refills: 2 | Status: SHIPPED | OUTPATIENT
Start: 2024-02-21 | End: 2024-07-03

## 2024-02-05 NOTE — TELEPHONE ENCOUNTER
Patient requesting refill on temazepam.  Last seen in office on 11/1/2023.  Follow-up visit scheduled for 5/14/2024.  Controlled substance agreement previously signed.  Urine drug screen previously performed.  PDMP reviewed showing temazepam 15 mg capsules, quantity #30, 30-day supply last filled on 1/22/2024.  Predated prescription sent to pharmacy on record.    Orders Placed This Encounter    temazepam (RESTORIL) 15 MG capsule     Sig: Take 1 capsule by mouth at nighttime as needed for sleep.  Do not drink alcohol, drive or operate heavy machinery after use.  DO NOT FILL PRIOR TO 2/21/24     Dispense:  30 capsule     Refill:  2

## 2024-02-05 NOTE — TELEPHONE ENCOUNTER
----- Message from Stacey Granado sent at 2/5/2024  9:14 AM EST -----  Subject: Referral Request    Reason for referral request? Pt following up on PT referral, saw one in   her chart but patient said she did not want to go to where she was   originally referred?   Provider patient wants to be referred to(if known):     Provider Phone Number(if known):    Additional Information for Provider?   ---------------------------------------------------------------------------  --------------  CALL BACK INFO    [phone]; OK to leave message on voicemail  ---------------------------------------------------------------------------  --------------

## 2024-02-05 NOTE — TELEPHONE ENCOUNTER
----- Message from Stacey Granado sent at 2/5/2024  9:10 AM EST -----  Subject: Refill Request    QUESTIONS  Name of Medication? temazepam (RESTORIL) 15 MG capsule  Patient-reported dosage and instructions? 15 mg capsules  How many days do you have left? 10  [id number]  Pharmacy phone number (if available)? [phone]  Additional Information for Provider? Pt stated that since her appt on the   16th was cancelled that she will need a refill on this medication please  ---------------------------------------------------------------------------  --------------  CALL BACK INFO  What is the best way for the office to contact you? OK to leave message on   voicemail  Preferred Call Back Phone Number? [phone]  ---------------------------------------------------------------------------  --------------  SCRIPT ANSWERS  Relationship to Patient? Self

## 2024-02-05 NOTE — TELEPHONE ENCOUNTER
See below. Patient sees PT here at [facility], would like the referral to be sent to the same place for this.

## 2024-02-12 ENCOUNTER — HOSPITAL ENCOUNTER (OUTPATIENT)
Dept: PHYSICAL THERAPY | Age: 79
Setting detail: RECURRING SERIES
Discharge: HOME | End: 2024-02-15
Payer: MEDICARE

## 2024-02-12 DIAGNOSIS — M54.59 OTHER LOW BACK PAIN: Primary | ICD-10-CM

## 2024-02-12 DIAGNOSIS — G89.29 CHRONIC LEFT-SIDED LOW BACK PAIN WITH LEFT-SIDED SCIATICA: ICD-10-CM

## 2024-02-12 DIAGNOSIS — M54.42 CHRONIC LEFT-SIDED LOW BACK PAIN WITH LEFT-SIDED SCIATICA: ICD-10-CM

## 2024-02-12 PROCEDURE — 97140 MANUAL THERAPY 1/> REGIONS: CPT

## 2024-02-12 PROCEDURE — 97162 PT EVAL MOD COMPLEX 30 MIN: CPT

## 2024-02-12 NOTE — PROGRESS NOTES
Salyl Chun  : 1945  Primary: Medicare Part A And B (Medicare)  Secondary: [employer] HE[employer] C[employer] MEDICARE SUPP [facility] @ [address]  [facility] [address]  Phone: [phone]  Fax: [phone] Plan Frequency: one time a week or every other week for 60-90 days    Plan of Care/Certification Expiration Date: 24        Plan of Care/Certification Expiration Date:  Plan of Care/Certification Expiration Date: 24    Frequency/Duration: Plan Frequency: one time a week or every other week for 60-90 days      Time In/Out:   Time In: 1015  Time Out: 1100      PT Visit Info:    Progress Note Due Date: 24  Total # of Visits to Date: 1      Visit Count:  1    OUTPATIENT PHYSICAL THERAPY:   Treatment Note 2024       Episode  (Low back pain)               Treatment Diagnosis:    Other low back pain  Chronic left-sided low back pain with left-sided sciatica  Medical/Referring Diagnosis:    Chronic bilateral low back pain with right-sided sciatica   Low back pain, unspecified [M54.50]    M54.41, G89.29 (ICD-10-CM) - Chronic bilateral low back pain with right-sided sciatica   Referring Physician:  David Howe DO MD Orders:  PT Eval and Treat   Return MD Appt:    Date of Onset:  Onset Date: 24 (chronic with recent flare up in January)     Allergies:   Latex, Prednisone, Adhesive tape, Ibandronic acid, Triamcinolone, Alendronate sodium, and Triprolidine-pseudoephedrine  Restrictions/Precautions:   None      Interventions Planned (Treatment may consist of any combination of the following):     See Assessment Note    Subjective Comments:   L low back and leg hurt  Initial Pain Level:: Left Back, Leg 2/10  Post Session Pain Level:  Left  Back, Leg 1/10  Medications Last Reviewed:  2024  Updated Objective Findings:  See Evaluation Note from today  Treatment   THERAPEUTIC EXERCISE: (5 minutes):    Exercises per grid below to improve mobility and

## 2024-02-12 NOTE — THERAPY EVALUATION
Sally Chun  : 1945  Primary: Medicare Part A And B (Medicare)  Secondary: [employer]ARP HE[employer] C[employer] MEDICARE SUPP [facility] @ [address]  [facility] [address]  Phone: [phone]  Fax: [phone] Plan Frequency: one time a week or every other week for 60-90 days    Plan of Care/Certification Expiration Date: 24        Plan of Care/Certification Expiration Date:  Plan of Care/Certification Expiration Date: 24    Frequency/Duration: Plan Frequency: one time a week or every other week for 60-90 days       Time In/Out:    Time In: 1015  Time Out: 1100      PT Visit Info:     Progress Note Due Date: 24  Total # of Visits to Date: 1      Visit Count:  1                OUTPATIENT PHYSICAL THERAPY:             Initial Assessment 2024               Episode (Low back pain)         Treatment Diagnosis:     Other low back pain  Chronic left-sided low back pain with left-sided sciatica  Medical/Referring Diagnosis:    Chronic bilateral low back pain with right-sided sciatica   Low back pain, unspecified [M54.50]    M54.41, G89.29 (ICD-10-CM) - Chronic bilateral low back pain with right-sided sciatica     Referring Physician:  David Howe DO MD Orders:  PT Eval and Treat   Return MD Appt:    Date of Onset:  Onset Date: 24 (chronic with recent flare up in January)     Allergies:  Latex, Prednisone, Adhesive tape, Ibandronic acid, Triamcinolone, Alendronate sodium, and Triprolidine-pseudoephedrine  Restrictions/Precautions:    None      Medications Last Reviewed:  2024     SUBJECTIVE   History of Injury/Illness (Reason for Referral):    I feel it knee to low back    Had a shot at [facility] in January, and massage and helped.     Was in a rental car lately and it messed up my back.      If I sit in a chair not suited for my height my back would hurt.    Can't last as long walking and cramp up faster.   1/4 mile around neighborhood and used

## 2024-02-14 ENCOUNTER — HOSPITAL ENCOUNTER (OUTPATIENT)
Dept: PHYSICAL THERAPY | Age: 79
Setting detail: RECURRING SERIES
Discharge: HOME | End: 2024-02-17
Payer: MEDICARE

## 2024-02-14 PROCEDURE — 97140 MANUAL THERAPY 1/> REGIONS: CPT

## 2024-02-14 NOTE — THERAPY RECERTIFICATION
orthopedic surgery; Salpingo-oophorectomy (Right, 2014); and Colonoscopy.  Social History/Living Environment:   Lives With: Alone  Type of Home: House     Prior Level of Function/Work/Activity:   Prior level of function: Independent  Occupation: Retired  No data recordedNo data recorded   Learning:   Does the patient/guardian have any barriers to learning?: No barriers  Will there be a co-learner?: No  What is the preferred language of the patient/guardian?: English  Is an  required?: No  How does the patient/guardian prefer to learn new concepts?: Listening; Reading; Demonstration; Pictures/Videos     Fall Risk Scale:   Hernandez Total Score: 0  Hernandez Fall Risk: Low (0-24)     Dominant Side:  right handed    Personal Factors:        Sex:  female        Age:  78 y.o.        OBJECTIVE   Observation/Orthostatic Postural Assessment:          Posture Assessment: Rounded shoulders, Forward head   Palpation:          Tightness and tenderness to palpation noted throughout upper and lower cervical musculature.  No bruising or swelling noted.  ROM:          Cervical Assessment (AROM):  Flexion: 75% of full AROM  Extension: 75% of full AROM  Right side bendin% of full AROM  Left side bending:: 75% of full AROM  Right rotation: 75% of full AROM  Left rotation: 75% of full AROM  Strength:          Cervical Assessment (Strength):  Grossly 3+/5 with manual muscle testing  Special Tests:          Negative Hautant's test. Negative Cranial-[place] lift test. Negative [place]-Axis Sheer test. Negative Alar Ligament test. Negative Tectorial Membrane test.   Neurological Screen:        Dermatomes:  Within normal limits        Reflexes:  2+  Functional Mobility:         Gait/Mobility:       Independent         Transfers:      Sit to Stand: Independent  Stand to Sit: Independent  Stand Pivot Transfers: Independent  Bed to Chair: Independent  Lateral Transfers: Independent         Bed Mobility:      Rolling:

## 2024-02-14 NOTE — PROGRESS NOTES
Sally BARNETT Lay  : 1945  Primary: Medicare Part A And B  Secondary: [employer] HE[employer] C[employer] MEDICARE SUPP [facility] @ [address]  [facility] [address]  Phone: [phone]  Fax: [phone] Plan Frequency: 1 time per week for every other week for 90 days     Plan of Care/Certification Expiration Date: 2024        PT Visit Info:    Total # of Visits to Date: 28  Progress Note Counter: 1  Progress Note Due Date: 03/15/24       OUTPATIENT PHYSICAL THERAPY:OP NOTE TYPE: Treatment Note 2024       Episode   Appt Desk       Treatment Diagnosis:  Cervicalgia (M54.2)  Medical/Referring Diagnosis:  Cervicalgia (M54.2)  Referring Physician:  Arielle Toledo MD MD Orders:  PT Eval and Treat   Date of Onset:  Onset Date: 24 (chronic with recent flare up in January)     Allergies:  Latex, Prednisone, Adhesive tape, Ibandronic acid, Triamcinolone, Alendronate sodium, and Triprolidine-pseudoephedrine  Restrictions/Precautions:      None  Interventions Planned (Treatment may consist of any combination of the following):    Cold  Heat  Home Exercise Program (HEP)  Manual Therapy  Neuromuscular Re-education/Strengthening  Range of Motion (ROM)  Therapeutic Activites  Therapeutic Exercise/Strengthening   Subjective Comments:    Patient reports she is doing well today.  Initial:     3/10  Post Session:     3/10  Medications Last Reviewed:  2024  Updated Objective Findings:  None Today  Treatment   MANUAL THERAPY: (40 minutes): Joint mobilization and Soft tissue mobilization was utilized and necessary because of the patient's restricted joint motion, painful spasm, loss of articular motion and restricted motion of soft tissue.       Treatment/Session Summary:    Treatment Assessment:     Patient tolerated treatment well with improving overall mobility and pain.  Communication/Consultation:  None today  Equipment provided today:  None  Recommendations/Intent for next

## 2024-02-21 ENCOUNTER — HOSPITAL ENCOUNTER (OUTPATIENT)
Dept: PHYSICAL THERAPY | Age: 79
Setting detail: RECURRING SERIES
Discharge: HOME | End: 2024-02-24
Payer: MEDICARE

## 2024-02-21 PROCEDURE — 97110 THERAPEUTIC EXERCISES: CPT

## 2024-02-21 PROCEDURE — 97140 MANUAL THERAPY 1/> REGIONS: CPT

## 2024-02-21 ASSESSMENT — PAIN SCALES - GENERAL: PAINLEVEL_OUTOF10: 2

## 2024-02-21 ASSESSMENT — PAIN DESCRIPTION - ORIENTATION: ORIENTATION: LEFT

## 2024-02-21 ASSESSMENT — PAIN DESCRIPTION - LOCATION: LOCATION: BACK;LEG

## 2024-02-21 NOTE — PROGRESS NOTES
Sally Chun  : 1945  Primary: Medicare Part A And B (Medicare)  Secondary: [employer] MEDICARE SUPP [facility] @ [address]  [facility] [address]  Phone: [phone]  Fax: [phone] Plan Frequency: one time a week or every other week for 60-90 days    Plan of Care/Certification Expiration Date: 24        Plan of Care/Certification Expiration Date:  Plan of Care/Certification Expiration Date: 24    Frequency/Duration: Plan Frequency: one time a week or every other week for 60-90 days      Time In/Out:   Time In: 0850  Time Out: 0930      PT Visit Info:    Progress Note Due Date: 24  Total # of Visits to Date: 2  Progress Note Counter: 2      Visit Count:  2    OUTPATIENT PHYSICAL THERAPY:   Treatment Note 2024       Episode  (Low back pain)               Treatment Diagnosis:    Other low back pain  Chronic left-sided low back pain with left-sided sciatica  Medical/Referring Diagnosis:    Chronic bilateral low back pain with right-sided sciatica   Low back pain, unspecified [M54.50]    M54.41, G89.29 (ICD-10-CM) - Chronic bilateral low back pain with right-sided sciatica   Referring Physician:  David Howe DO MD Orders:  PT Eval and Treat   Return MD Appt:    Date of Onset:  Onset Date: 24 (chronic with recent flare up in January)     Allergies:   Latex, Prednisone, Adhesive tape, Ibandronic acid, Triamcinolone, Alendronate sodium, and Triprolidine-pseudoephedrine  Restrictions/Precautions:   None      Interventions Planned (Treatment may consist of any combination of the following):     See Assessment Note    Subjective Comments:   Doing okay. Get a catch in low back sometimes. Manual therapy helped last time.   Initial Pain Level:: Left Back, Leg 2/10  Post Session Pain Level:  Left  Back, Leg 1/10  Medications Last Reviewed:  2024  Updated Objective Findings:  None Today  Treatment   THERAPEUTIC EXERCISE:

## 2024-02-22 DIAGNOSIS — M54.2 CERVICALGIA: Primary | ICD-10-CM

## 2024-02-22 NOTE — TELEPHONE ENCOUNTER
Received request in system for updated physical therapy order for cervicalgia.  Referral order placed.    Orders Placed This Encounter    [facility] - Physical Therapy, [facility]     Referral Priority:   Routine     Referral Type:   Eval and Treat     Referral Reason:   Physical Therapy     Requested Specialty:   Physical Therapist     Number of Visits Requested:   1

## 2024-02-28 ENCOUNTER — HOSPITAL ENCOUNTER (OUTPATIENT)
Dept: PHYSICAL THERAPY | Age: 79
Setting detail: RECURRING SERIES
End: 2024-02-28
Payer: MEDICARE

## 2024-03-06 ENCOUNTER — HOSPITAL ENCOUNTER (OUTPATIENT)
Dept: PHYSICAL THERAPY | Age: 79
Setting detail: RECURRING SERIES
Discharge: HOME | End: 2024-03-09
Payer: MEDICARE

## 2024-03-06 PROCEDURE — 97140 MANUAL THERAPY 1/> REGIONS: CPT

## 2024-03-06 PROCEDURE — 97110 THERAPEUTIC EXERCISES: CPT

## 2024-03-06 ASSESSMENT — PAIN DESCRIPTION - LOCATION: LOCATION: BACK;LEG

## 2024-03-06 ASSESSMENT — PAIN DESCRIPTION - ORIENTATION: ORIENTATION: LEFT

## 2024-03-06 ASSESSMENT — PAIN SCALES - GENERAL: PAINLEVEL_OUTOF10: 3

## 2024-03-06 NOTE — PROGRESS NOTES
Sally Chun  : 1945  Primary: Medicare Part A And B (Medicare)  Secondary: [employer] HE[employer] C[employer] MEDICARE SUPP [facility] @ [address]  [facility] [address]  Phone: [phone]  Fax: [phone] Plan Frequency: one time a week or every other week for 60-90 days    Plan of Care/Certification Expiration Date: 24        Plan of Care/Certification Expiration Date:  Plan of Care/Certification Expiration Date: 24    Frequency/Duration: Plan Frequency: one time a week or every other week for 60-90 days      Time In/Out:   Time In: 933  Time Out: 1013      PT Visit Info:    Progress Note Due Date: 24  Total # of Visits to Date: 3  Progress Note Counter: 3      Visit Count:  3    OUTPATIENT PHYSICAL THERAPY:   Treatment Note 3/6/2024       Episode  (Low back pain)               Treatment Diagnosis:    Other low back pain  Chronic left-sided low back pain with left-sided sciatica  Medical/Referring Diagnosis:    Chronic bilateral low back pain with right-sided sciatica   Low back pain, unspecified [M54.50]    M54.41, G89.29 (ICD-10-CM) - Chronic bilateral low back pain with right-sided sciatica   Referring Physician:  David Howe DO MD Orders:  PT Eval and Treat   Return MD Appt:    Date of Onset:  Onset Date: 24 (chronic with recent flare up in January)     Allergies:   Latex, Prednisone, Adhesive tape, Ibandronic acid, Triamcinolone, Alendronate sodium, and Triprolidine-pseudoephedrine  Restrictions/Precautions:   None      Interventions Planned (Treatment may consist of any combination of the following):     See Assessment Note    Subjective Comments:   Doing okay. I can feel it down L leg but nothing restrictive.   Initial Pain Level:: Left Back, Leg 3/10  Post Session Pain Level:  Left  Back, Leg 2/10  Medications Last Reviewed:  3/6/2024  Updated Objective Findings:  None Today  Treatment   THERAPEUTIC EXERCISE: (15 minutes):

## 2024-03-08 DIAGNOSIS — J44.9 CHRONIC OBSTRUCTIVE PULMONARY DISEASE, UNSPECIFIED COPD TYPE (HCC): Primary | ICD-10-CM

## 2024-03-08 RX ORDER — ALBUTEROL SULFATE 90 UG/1
1 AEROSOL, METERED RESPIRATORY (INHALATION) EVERY 4 HOURS PRN
Qty: 18 G | Refills: 5 | Status: SHIPPED | OUTPATIENT
Start: 2024-03-08

## 2024-03-08 NOTE — TELEPHONE ENCOUNTER
Received message from patient's insurance that albuterol inhaler not covered requesting Ventolin as alternative.  New prescription sent to pharmacy on record.  However may require prior authorization.    Orders Placed This Encounter    albuterol sulfate HFA (VENTOLIN HFA) 108 (90 Base) MCG/ACT inhaler     Sig: Inhale 1 puff into the lungs every 4 hours as needed for Wheezing or Shortness of Breath     Dispense:  18 g     Refill:  5

## 2024-03-12 ENCOUNTER — HOSPITAL ENCOUNTER (OUTPATIENT)
Dept: PHYSICAL THERAPY | Age: 79
Setting detail: RECURRING SERIES
Discharge: HOME | End: 2024-03-15
Payer: MEDICARE

## 2024-03-12 PROCEDURE — 97140 MANUAL THERAPY 1/> REGIONS: CPT

## 2024-03-12 ASSESSMENT — PAIN SCALES - GENERAL: PAINLEVEL_OUTOF10: 3

## 2024-03-12 NOTE — PROGRESS NOTES
Sally BARNETT Lay  : 1945  Primary: Medicare Part A And B  Secondary: [employer] HE[employer] C[employer] MEDICARE SUPP [facility] @ [address]  [facility] [address]  Phone: [phone]  Fax: [phone] Plan Frequency: 1 time per week for every other week for 90 days     Plan of Care/Certification Expiration Date: 2024        PT Visit Info:    Total # of Visits to Date: 29  Progress Note Counter: 2  Progress Note Due Date: 03/15/24  Canceled Appointment: 1 (2024)       OUTPATIENT PHYSICAL THERAPY:OP NOTE TYPE: Treatment Note 3/12/2024       Episode   Appt Desk       Treatment Diagnosis:  Cervicalgia (M54.2)  Medical/Referring Diagnosis:  Cervicalgia (M54.2)  Referring Physician:  Arielle Toledo MD MD Orders:  PT Eval and Treat   Date of Onset:  Onset Date: 24 (chronic with recent flare up in January)     Allergies:  Latex, Prednisone, Adhesive tape, Ibandronic acid, Triamcinolone, Alendronate sodium, and Triprolidine-pseudoephedrine  Restrictions/Precautions:      None  Interventions Planned (Treatment may consist of any combination of the following):    Cold  Heat  Home Exercise Program (HEP)  Manual Therapy  Neuromuscular Re-education/Strengthening  Range of Motion (ROM)  Therapeutic Activites  Therapeutic Exercise/Strengthening   Subjective Comments:    Patient reports she is hurting today after working on her [place].  Initial:     3/10  Post Session:     3/10  Medications Last Reviewed:  3/12/2024  Updated Objective Findings:  None Today  Treatment   MANUAL THERAPY: (40 minutes): Joint mobilization and Soft tissue mobilization was utilized and necessary because of the patient's restricted joint motion, painful spasm, loss of articular motion and restricted motion of soft tissue.       Treatment/Session Summary:    Treatment Assessment:     Patient tolerated treatment well with improving overall pain and mobility noted after

## 2024-03-16 DIAGNOSIS — M85.89 OSTEOPENIA OF MULTIPLE SITES: ICD-10-CM

## 2024-03-18 ENCOUNTER — HOSPITAL ENCOUNTER (OUTPATIENT)
Dept: PHYSICAL THERAPY | Age: 79
Setting detail: RECURRING SERIES
Discharge: HOME | End: 2024-03-21
Payer: MEDICARE

## 2024-03-18 PROCEDURE — 97110 THERAPEUTIC EXERCISES: CPT

## 2024-03-18 PROCEDURE — 97140 MANUAL THERAPY 1/> REGIONS: CPT

## 2024-03-18 RX ORDER — ALENDRONATE SODIUM 70 MG/1
70 TABLET ORAL
Qty: 12 TABLET | Refills: 1 | OUTPATIENT
Start: 2024-03-18

## 2024-03-18 ASSESSMENT — PAIN DESCRIPTION - LOCATION: LOCATION: BACK

## 2024-03-18 ASSESSMENT — PAIN SCALES - GENERAL: PAINLEVEL_OUTOF10: 3

## 2024-03-18 NOTE — PROGRESS NOTES
Sally Chun  : 1945  Primary: Medicare Part A And B (Medicare)  Secondary: [employer] HE[employer] C[employer] MEDICARE SUPP [facility] @ [address]  [facility] [address]  Phone: [phone]  Fax: [phone] Plan Frequency: one time a week or every other week for 60-90 days    Plan of Care/Certification Expiration Date: 24        Plan of Care/Certification Expiration Date:  Plan of Care/Certification Expiration Date: 24    Frequency/Duration: Plan Frequency: one time a week or every other week for 60-90 days      Time In/Out:   Time In: 0847  Time Out: 930      PT Visit Info:    Progress Note Due Date: 24  Total # of Visits to Date: 4  Progress Note Counter: 1      Visit Count:  4    OUTPATIENT PHYSICAL THERAPY:   Treatment Note 3/18/2024       Episode  (Low back pain)               Treatment Diagnosis:    Other low back pain  Chronic left-sided low back pain with left-sided sciatica  Medical/Referring Diagnosis:    Chronic bilateral low back pain with right-sided sciatica   Low back pain, unspecified [M54.50]    M54.41, G89.29 (ICD-10-CM) - Chronic bilateral low back pain with right-sided sciatica   Referring Physician:  David Howe DO MD Orders:  PT Eval and Treat   Return MD Appt:    Date of Onset:  Onset Date: 24 (chronic with recent flare up in January)     Allergies:   Latex, Prednisone, Adhesive tape, Ibandronic acid, Triamcinolone, Alendronate sodium, and Triprolidine-pseudoephedrine  Restrictions/Precautions:   None      Interventions Planned (Treatment may consist of any combination of the following):     See Assessment Note    Subjective Comments:   Bottom of foot has been bothering me for no good reason. B low back in sore.   Initial Pain Level::   Back 3/10  Post Session Pain Level:     Back 2/10  Medications Last Reviewed:  3/18/2024  Updated Objective Findings:  None Today  Treatment   THERAPEUTIC EXERCISE: (18 minutes):  
flexion, 4/5 hip abduction, 4/5 hip adduction, 4/5 hip extension, 4/5 knee extension, 4/5 knee flexion, 4/5 ankle dorsiflexion, 4/5 ankle plantarflexion  ASSESSMENT   Progress note 3/18/24:  Patient has attended 4 PT sessions from 2/12/24 to 3/18/24 for L low back pain and sciatica since January when positioning during sitting for work and in a rental car aggravated her back. Patient has been compliant with HEP. Patient continues to have episodes of tightness in low back pelvic regions, but she is responding well to exercises and manual therapy. Patient would benefit from continuing PT to address these problems to improve patient's independence and safety with mobility and daily activities. Thank you.    Therapy Problem List: (Impacting functional limitations):    Increased Pain, Decreased Strength, Decreased ROM, Decreased Transfer Abilities, Decreased Ambulation Ability/Technique, Decreased Functional Mobility, Decreased Door with Home Exercise Program, Decreased Posture, Decreased Body Mechanics, and Decreased Activity Tolerance/Endurance*   Therapy Prognosis:   Good     Initial Assessment Complexity:   Moderate Complexity       PLAN   Effective Dates: 2/12/2024 TO Plan of Care/Certification Expiration Date: 05/12/24     Frequency/Duration: Plan Frequency: one time a week or every other week for 60-90 days      Interventions Planned (Treatment may consist of any combination of the following):    Home Exercise Program (HEP), Manual Therapy, Positioning, Modalities:,   Heat/Cold,   Ultrasound, and   E-stim - unattended, Range of Motion (ROM), Therapeutic Activites, and Therapeutic Exercise/Strengthening   Goals: (Goals have been discussed and agreed upon with patient.)  Short-Term Functional Goals: Time Frame: 2-4 weeks  Patient will demonstrate independence and compliance with home exercise program to improve ROM and strength for daily activities. MET    Discharge Goals: Time Frame: 8-12 weeks  Patient

## 2024-03-26 ENCOUNTER — HOSPITAL ENCOUNTER (OUTPATIENT)
Dept: PHYSICAL THERAPY | Age: 79
Setting detail: RECURRING SERIES
Discharge: HOME | End: 2024-03-29
Payer: MEDICARE

## 2024-03-26 PROCEDURE — 97140 MANUAL THERAPY 1/> REGIONS: CPT

## 2024-03-26 ASSESSMENT — PAIN DESCRIPTION - LOCATION: LOCATION: NECK

## 2024-03-26 ASSESSMENT — PAIN SCALES - GENERAL: PAINLEVEL_OUTOF10: 3

## 2024-03-26 NOTE — PROGRESS NOTES
Sally Chun  : 1945  Primary: Medicare Part A And B  Secondary: [employer] HE[employer] C[employer] MEDICARE SUPP [facility] @ [address]  [facility] [address]  Phone: [phone]  Fax: [phone] Plan Frequency: 1 time per week for every other week for 90 days     Plan of Care/Certification Expiration Date: 2024        PT Visit Info:    Total # of Visits to Date: 30  Progress Note Counter: 1  Progress Note Due Date: 24  Canceled Appointment: 1 (2024)       OUTPATIENT PHYSICAL THERAPY:OP NOTE TYPE: Treatment Note 3/26/2024       Episode   Appt Desk       Treatment Diagnosis:  Cervicalgia (M54.2)  Medical/Referring Diagnosis:  Cervicalgia (M54.2)  Referring Physician:  David Howe DO MD Orders:  PT Eval and Treat   Date of Onset:  Onset Date: 24 (chronic with recent flare up in January)     Allergies:  Latex, Prednisone, Adhesive tape, Ibandronic acid, Triamcinolone, Alendronate sodium, and Triprolidine-pseudoephedrine  Restrictions/Precautions:      None  Interventions Planned (Treatment may consist of any combination of the following):    Cold  Heat  Home Exercise Program (HEP)  Manual Therapy  Neuromuscular Re-education/Strengthening  Range of Motion (ROM)  Therapeutic Activites  Therapeutic Exercise/Strengthening   Subjective Comments:    Patient reports she is having some dizziness today.  Initial:   Neck 3/10  Post Session:   Neck 3/10  Medications Last Reviewed:  3/26/2024  Updated Objective Findings:  None Today  Treatment   MANUAL THERAPY: (40 minutes): Joint mobilization and Soft tissue mobilization was utilized and necessary because of the patient's restricted joint motion, painful spasm, loss of articular motion and restricted motion of soft tissue.       Treatment/Session Summary:    Treatment Assessment:     Patient tolerated treatment well with improving overall mobility and pain after treatment.  Communication/Consultation:  None

## 2024-03-26 NOTE — PROGRESS NOTES
Sally Chun  : 1945  Primary: Medicare Part A And B  Secondary: [employer]ARP HE[employer] C[employer] MEDICARE SUPP [facility] @ [address]  [facility] [address]  Phone: [phone]  Fax: [phone] Plan Frequency: one time a week or every other week for 60-90 days    Plan of Care/Certification Expiration Date: 2024      PT Visit Info:    Total # of Visits to Date: 30  Progress Note Counter: 1  Progress Note Due Date: 24  Canceled Appointment: 1 (2024)      OUTPATIENT PHYSICAL THERAPY:Progress Report 3/26/2024               Episode  Appt Desk         Treatment Diagnosis:  Cervicalgia (M54.2)  Medical/Referring Diagnosis:  Cervicalgia (M54.2)  Referring Physician:  David Howe DO MD Orders:  PT Eval and Treat   Return MD Appt:  TBD  Date of Onset:  Onset Date: 24 (chronic with recent flare up in January)     Allergies:  Latex, Prednisone, Adhesive tape, Ibandronic acid, Triamcinolone, Alendronate sodium, and Triprolidine-pseudoephedrine  Restrictions/Precautions:     None  Medications Last Reviewed:  3/26/2024     SUBJECTIVE   History of Injury/Illness (Reason for Referral):  2024 (Recertification): Patient reports that her neck is about the same.  She reports that she has been working more and so that can add to her discomfort.    3/26/2024 (Progress Note): Patient reports she is having continued pain in her neck that has lately caused an increase in her dizziness.    Initial:   Neck 3/10   Post Session:   Neck 3/10  Past Medical History/Comorbidities:   Ms. Chun  has a past medical history of Abnormal Pap smear, Anxiety, Back problem, COPD (chronic obstructive pulmonary disease) (HCC), Depression, Hyperthyroidism, Insomnia, Irritable bowel syndrome, and Osteopenia.  Ms. Chun  has a past surgical history that includes Tonsillectomy; orthopedic surgery; Salpingo-oophorectomy (Right, 2014); and Colonoscopy.  Social

## 2024-04-02 ENCOUNTER — HOSPITAL ENCOUNTER (OUTPATIENT)
Dept: PHYSICAL THERAPY | Age: 79
Setting detail: RECURRING SERIES
Discharge: HOME | End: 2024-04-05
Payer: MEDICARE

## 2024-04-02 PROCEDURE — 97110 THERAPEUTIC EXERCISES: CPT

## 2024-04-02 PROCEDURE — 97140 MANUAL THERAPY 1/> REGIONS: CPT

## 2024-04-02 ASSESSMENT — PAIN DESCRIPTION - LOCATION: LOCATION: BACK

## 2024-04-02 ASSESSMENT — PAIN SCALES - GENERAL: PAINLEVEL_OUTOF10: 2

## 2024-04-02 NOTE — PROGRESS NOTES
Sally Chun  : 1945  Primary: Medicare Part A And B (Medicare)  Secondary: [employer] HE[employer] C[employer] MEDICARE SUPP [facility] @ [address]  [facility] [address]  Phone: [phone]  Fax: [phone] Plan Frequency: one time a week or every other week for 60-90 days    Plan of Care/Certification Expiration Date: 24        Plan of Care/Certification Expiration Date:  Plan of Care/Certification Expiration Date: 24    Frequency/Duration: Plan Frequency: one time a week or every other week for 60-90 days      Time In/Out:   Time In: 1015  Time Out: 1055      PT Visit Info:    Progress Note Due Date: 24  Total # of Visits to Date: 5  Progress Note Counter: 2      Visit Count:  5    OUTPATIENT PHYSICAL THERAPY:   Treatment Note 2024       Episode  (Low back pain)               Treatment Diagnosis:    Other low back pain  Chronic left-sided low back pain with left-sided sciatica  Medical/Referring Diagnosis:    Chronic bilateral low back pain with right-sided sciatica   Low back pain, unspecified [M54.50]    M54.41, G89.29 (ICD-10-CM) - Chronic bilateral low back pain with right-sided sciatica   Referring Physician:  David Howe DO MD Orders:  PT Eval and Treat   Return MD Appt:    Date of Onset:  Onset Date: 24 (chronic with recent flare up in January)     Allergies:   Latex, Prednisone, Adhesive tape, Ibandronic acid, Triamcinolone, Alendronate sodium, and Triprolidine-pseudoephedrine  Restrictions/Precautions:   None      Interventions Planned (Treatment may consist of any combination of the following):     See Assessment Note    Subjective Comments:   Doing better. Walked a quarter of a mile and lower leg didn't start hurting til the end. Stretches helping.  Initial Pain Level::   Back 210  Post Session Pain Level:     Back 10  Medications Last Reviewed:  2024  Updated Objective Findings:  None Today  Treatment   THERAPEUTIC

## 2024-04-09 ENCOUNTER — HOSPITAL ENCOUNTER (OUTPATIENT)
Dept: PHYSICAL THERAPY | Age: 79
Setting detail: RECURRING SERIES
Discharge: HOME | End: 2024-04-12
Payer: MEDICARE

## 2024-04-09 PROCEDURE — 97140 MANUAL THERAPY 1/> REGIONS: CPT

## 2024-04-09 ASSESSMENT — PAIN SCALES - GENERAL: PAINLEVEL_OUTOF10: 3

## 2024-04-09 ASSESSMENT — PAIN DESCRIPTION - LOCATION: LOCATION: NECK

## 2024-04-09 NOTE — PROGRESS NOTES
Sally Chun  : 1945  Primary: Medicare Part A And B  Secondary: [employer] HE[employer] C[employer] MEDICARE SUPP [facility] @ [address]  [facility] [address]  Phone: [phone]  Fax: [phone] Plan Frequency: 1 time per week for every other week for 90 days     Plan of Care/Certification Expiration Date: 2024        PT Visit Info:    Total # of Visits to Date: 31  Progress Note Counter: 2  Progress Note Due Date: 24  Canceled Appointment: 1 (2024)       OUTPATIENT PHYSICAL THERAPY:OP NOTE TYPE: Treatment Note 2024       Episode   Appt Desk       Treatment Diagnosis:  Cervicalgia (M54.2)  Medical/Referring Diagnosis:  Cervicalgia (M54.2)  Referring Physician:  David Howe DO MD Orders:  PT Eval and Treat   Date of Onset:  Onset Date: 24 (chronic with recent flare up in January)     Allergies:  Latex, Prednisone, Adhesive tape, Ibandronic acid, Triamcinolone, Alendronate sodium, and Triprolidine-pseudoephedrine  Restrictions/Precautions:      None  Interventions Planned (Treatment may consist of any combination of the following):    Cold  Heat  Home Exercise Program (HEP)  Manual Therapy  Neuromuscular Re-education/Strengthening  Range of Motion (ROM)  Therapeutic Activites  Therapeutic Exercise/Strengthening   Subjective Comments:    Patient reports she is dizzy today and she thinks it is coming from the left side of her neck.  Initial:   Neck 3/10  Post Session:   Neck 3/10  Medications Last Reviewed:  2024  Updated Objective Findings:  None Today  Treatment   MANUAL THERAPY: (40 minutes): Joint mobilization and Soft tissue mobilization was utilized and necessary because of the patient's restricted joint motion, painful spasm, loss of articular motion and restricted motion of soft tissue.       Treatment/Session Summary:    Treatment Assessment:     Patient tolerated treatment well with improved mobility and dizziness noted after

## 2024-04-16 ENCOUNTER — HOSPITAL ENCOUNTER (OUTPATIENT)
Dept: PHYSICAL THERAPY | Age: 79
Setting detail: RECURRING SERIES
Discharge: HOME | End: 2024-04-19
Payer: MEDICARE

## 2024-04-16 PROCEDURE — 97140 MANUAL THERAPY 1/> REGIONS: CPT

## 2024-04-16 ASSESSMENT — PAIN SCALES - GENERAL: PAINLEVEL_OUTOF10: 2

## 2024-04-16 ASSESSMENT — PAIN DESCRIPTION - LOCATION: LOCATION: BACK

## 2024-04-16 NOTE — PROGRESS NOTES
TODAY  Exercises per grid below to improve mobility and strength.  Required minimal verbal cues to promote proper body alignment, promote proper body posture, and promote proper body mechanics.  Progressed resistance, range, repetitions, and complexity of movement as indicated.   Date     Activity/Exercise    Single knee to chest stretch 3x 20 sec B   Double knee to chest stretch 3x 20 sec B   Piriformis stretch 3x 20 sec B   Hamstring stretch 3x 20 sec B   Supine hooklying lower trunk rotation X 20 reps B   Pelvic tilts 5 sec x 20 reps   Supine hooklying hip adduction    Supine hooklying hip abduction    bridging 5 sec hold x 15 reps   Supine straight leg raises X 20 reps each LE   Supine hooklying isometric hip flexion (core) 5 sec holds x 5 reps each LE   Standing IT band stretch    Standing calf stretch 3x 20 sec B           MANUAL THERAPY: (20 minutes):   Soft tissue mobilization was utilized and necessary because of the patient's painful spasm and restricted motion of soft tissue. Pt in prone. Soft tissue mobilization and stretching to B glut medius, B piriformis and B SI joint and B hip flexors and quads to decrease pain and tissue tightness. Grade 3 sacral distraction 4 x 20 sec for low back pressure relief        Treatment/Session Summary:    Treatment Assessment:   Patient tolerated session well. L glut medius and R piriformis were tighter today but released well with stretching and manual therapy.  Communication/Consultation:  None today  Equipment provided today:  None  Recommendations/Intent for next treatment session: Next visit will focus on strengthening, stretching, manual therapy and modalities as needed..    >Total Treatment Billable Duration:  20 minutes   Time In: 0930  Time Out: 0950    DRAKE JUÁREZ PT         Charge Capture  [email or web address] Portal  Appt Desk     Future Appointments   Date Time Provider Department Center   4/23/2024  8:45 AM Natalie Hill PT [facility]   4/30/2024

## 2024-04-23 ENCOUNTER — HOSPITAL ENCOUNTER (OUTPATIENT)
Dept: PHYSICAL THERAPY | Age: 79
Setting detail: RECURRING SERIES
Discharge: HOME | End: 2024-04-26
Payer: MEDICARE

## 2024-04-23 PROCEDURE — 97140 MANUAL THERAPY 1/> REGIONS: CPT

## 2024-04-23 ASSESSMENT — PAIN DESCRIPTION - LOCATION: LOCATION: NECK

## 2024-04-23 ASSESSMENT — PAIN SCALES - GENERAL: PAINLEVEL_OUTOF10: 3

## 2024-04-23 NOTE — PROGRESS NOTES
Sally Chun  : 1945  Primary: Medicare Part A And B  Secondary: [employer] HE[employer] C[employer] MEDICARE SUPP [facility] @ [address]  [place] [address]  Phone: [phone]  Fax: [phone] Plan Frequency: 1 time per week for every other week for 90 days     Plan of Care/Certification Expiration Date: 2024        PT Visit Info:    Total # of Visits to Date: 32  Progress Note Counter: 3  Progress Note Due Date: 24  Canceled Appointment: 1 (2024)       OUTPATIENT PHYSICAL THERAPY:OP NOTE TYPE: Treatment Note 2024       Episode   Appt Desk       Treatment Diagnosis:  Cervicalgia (M54.2)  Medical/Referring Diagnosis:  Cervicalgia (M54.2)  Referring Physician:  David Howe DO MD Orders:  PT Eval and Treat   Date of Onset:  Onset Date: 24 (chronic with recent flare up in January)     Allergies:  Latex, Prednisone, Adhesive tape, Ibandronic acid, Triamcinolone, Alendronate sodium, and Triprolidine-pseudoephedrine  Restrictions/Precautions:      None  Interventions Planned (Treatment may consist of any combination of the following):    Cold  Heat  Home Exercise Program (HEP)  Manual Therapy  Neuromuscular Re-education/Strengthening  Range of Motion (ROM)  Therapeutic Activites  Therapeutic Exercise/Strengthening   Subjective Comments:    Patient reports she is dizzy today and she thinks it is coming from the left side of her neck.  Initial:   Neck 3/10  Post Session:   Neck 3/10  Medications Last Reviewed:  2024  Updated Objective Findings:  None Today  Treatment   MANUAL THERAPY: (40 minutes): Joint mobilization and Soft tissue mobilization was utilized and necessary because of the patient's restricted joint motion, painful spasm, loss of articular motion and restricted motion of soft tissue.       Treatment/Session Summary:    Treatment Assessment:     Patient tolerated treatment well with improving overall mobility and pain noted

## 2024-04-25 DIAGNOSIS — E78.00 PURE HYPERCHOLESTEROLEMIA: ICD-10-CM

## 2024-04-25 DIAGNOSIS — E05.90 SUBCLINICAL HYPERTHYROIDISM: ICD-10-CM

## 2024-04-25 DIAGNOSIS — Z11.59 ENCOUNTER FOR HEPATITIS C SCREENING TEST FOR LOW RISK PATIENT: Primary | ICD-10-CM

## 2024-04-30 ENCOUNTER — HOSPITAL ENCOUNTER (OUTPATIENT)
Dept: PHYSICAL THERAPY | Age: 79
Setting detail: RECURRING SERIES
Discharge: HOME | End: 2024-05-03
Payer: MEDICARE

## 2024-04-30 PROCEDURE — 97140 MANUAL THERAPY 1/> REGIONS: CPT

## 2024-04-30 PROCEDURE — 97110 THERAPEUTIC EXERCISES: CPT

## 2024-04-30 ASSESSMENT — PAIN DESCRIPTION - LOCATION: LOCATION: BACK

## 2024-04-30 ASSESSMENT — PAIN SCALES - GENERAL: PAINLEVEL_OUTOF10: 4

## 2024-04-30 NOTE — PROGRESS NOTES
Sally Chun  : 1945  Primary: Medicare Part A And B (Medicare)  Secondary: [employer] HE[employer] MEDICARE SUPP [facility] @ [address]  [facility] [address]  Phone: [phone]  Fax: [phone] Plan Frequency: one time a week or every other week for 60-90 days    Plan of Care/Certification Expiration Date: 24        Plan of Care/Certification Expiration Date:  Plan of Care/Certification Expiration Date: 24    Frequency/Duration: Plan Frequency: one time a week or every other week for 60-90 days       Time In/Out:     Time In: 845  Time Out: 929      PT Visit Info:     Progress Note Due Date: 24  Total # of Visits to Date: 7  Progress Note Counter: 1      Visit Count:  7                OUTPATIENT PHYSICAL THERAPY:             Progress Report 2024               Episode (Low back pain)         Treatment Diagnosis:     Other low back pain  Chronic left-sided low back pain with left-sided sciatica  Medical/Referring Diagnosis:    Chronic bilateral low back pain with right-sided sciatica   Low back pain, unspecified [M54.50]    M54.41, G89.29 (ICD-10-CM) - Chronic bilateral low back pain with right-sided sciatica     Referring Physician:  David Howe DO MD Orders:  PT Eval and Treat   Return MD Appt:    Date of Onset:  Onset Date: 24 (chronic with recent flare up in January)     Allergies:  Latex, Prednisone, Adhesive tape, Ibandronic acid, Triamcinolone, Alendronate sodium, and Triprolidine-pseudoephedrine  Restrictions/Precautions:    None      Medications Last Reviewed:  2024     SUBJECTIVE   History of Injury/Illness (Reason for Referral):    I feel it knee to low back    Had a shot at [facility] in January, and massage and helped.     Was in a rental car lately and it messed up my back.      If I sit in a chair not suited for my height my back would hurt.    Can't last as long walking and cramp up faster.   1/4 mile 
EXERCISE: ( 24 minutes):     Exercises per grid below to improve mobility and strength.  Required minimal verbal cues to promote proper body alignment, promote proper body posture, and promote proper body mechanics.  Progressed resistance, range, repetitions, and complexity of movement as indicated.   Date  4/30/24   Activity/Exercise    Single knee to chest stretch 3x 20 sec B   Double knee to chest stretch 3x 20 sec B   Piriformis stretch 3x 20 sec B   Hamstring stretch 3x 20 sec B   Supine hooklying lower trunk rotation X 20 reps B   Pelvic tilts 5 sec x 20 reps   Supine hooklying hip adduction    Supine hooklying hip abduction    Bird dog 5 reps each side, 5 sec hold   bridging 5 sec hold x 15 reps   Supine straight leg raises X 20 reps each LE   Supine hooklying isometric hip flexion (core) 5 sec holds x 5 reps each LE   Standing IT band stretch    Standing calf stretch 3x 20 sec B           MANUAL THERAPY: (20  minutes):   Soft tissue mobilization was utilized and necessary because of the patient's painful spasm and restricted motion of soft tissue. Pt in prone. Soft tissue mobilization and stretching to B glut medius, B piriformis and B SI joint and B hip flexors and quads to decrease pain and tissue tightness. Grade 3 sacral distraction 4 x 20 sec for low back pressure relief        Treatment/Session Summary:    Treatment Assessment:    Patient tolerated session well. She had a lot of tightness in B gluteals and SI joints today.  Communication/Consultation:  None today  Equipment provided today:  None  Recommendations/Intent for next treatment session: Next visit will focus on strengthening, stretching, manual therapy and modalities as needed..    >Total Treatment Billable Duration:  44 minutes   Time In: 0845  Time Out: 0929    DRAKE JUÁREZ PT         Charge Capture  [employer] Portal  Appt Desk     Future Appointments   Date Time Provider Department Center   5/7/2024  9:00 AM MAT LAB [facility] AM[facility]

## 2024-05-07 ENCOUNTER — NURSE ONLY (OUTPATIENT)
Dept: INTERNAL MEDICINE CLINIC | Facility: CLINIC | Age: 79
End: 2024-05-07

## 2024-05-07 DIAGNOSIS — Z11.59 ENCOUNTER FOR HEPATITIS C SCREENING TEST FOR LOW RISK PATIENT: ICD-10-CM

## 2024-05-07 DIAGNOSIS — E78.00 PURE HYPERCHOLESTEROLEMIA: ICD-10-CM

## 2024-05-07 DIAGNOSIS — E05.90 SUBCLINICAL HYPERTHYROIDISM: ICD-10-CM

## 2024-05-07 LAB
ALBUMIN SERPL-MCNC: 3.8 G/DL (ref 3.2–4.6)
ALBUMIN/GLOB SERPL: 1.4 (ref 1–1.9)
ALP SERPL-CCNC: 126 U/L (ref 35–104)
ALT SERPL-CCNC: 37 U/L (ref 12–65)
ANION GAP SERPL CALC-SCNC: 10 MMOL/L (ref 9–18)
AST SERPL-CCNC: 27 U/L (ref 15–37)
BASOPHILS # BLD: 0 K/UL (ref 0–0.2)
BASOPHILS NFR BLD: 1 % (ref 0–2)
BILIRUB SERPL-MCNC: 0.3 MG/DL (ref 0–1.2)
BUN SERPL-MCNC: 16 MG/DL (ref 8–23)
CALCIUM SERPL-MCNC: 9.7 MG/DL (ref 8.8–10.2)
CHLORIDE SERPL-SCNC: 104 MMOL/L (ref 98–107)
CHOLEST SERPL-MCNC: 211 MG/DL (ref 0–200)
CO2 SERPL-SCNC: 26 MMOL/L (ref 20–28)
CREAT SERPL-MCNC: 0.82 MG/DL (ref 0.6–1.1)
DIFFERENTIAL METHOD BLD: ABNORMAL
EOSINOPHIL # BLD: 0.2 K/UL (ref 0–0.8)
EOSINOPHIL NFR BLD: 3 % (ref 0.5–7.8)
ERYTHROCYTE [DISTWIDTH] IN BLOOD BY AUTOMATED COUNT: 13.2 % (ref 11.9–14.6)
GLOBULIN SER CALC-MCNC: 2.8 G/DL (ref 2.3–3.5)
GLUCOSE SERPL-MCNC: 101 MG/DL (ref 70–99)
HCT VFR BLD AUTO: 48.3 % (ref 35.8–46.3)
HCV AB SER QL: NONREACTIVE
HDLC SERPL-MCNC: 65 MG/DL (ref 40–60)
HDLC SERPL: 3.3 (ref 0–5)
HGB BLD-MCNC: 15.5 G/DL (ref 11.7–15.4)
IMM GRANULOCYTES # BLD AUTO: 0 K/UL (ref 0–0.5)
IMM GRANULOCYTES NFR BLD AUTO: 0 % (ref 0–5)
LDLC SERPL CALC-MCNC: 120 MG/DL (ref 0–100)
LYMPHOCYTES # BLD: 1.8 K/UL (ref 0.5–4.6)
LYMPHOCYTES NFR BLD: 25 % (ref 13–44)
MCH RBC QN AUTO: 29.8 PG (ref 26.1–32.9)
MCHC RBC AUTO-ENTMCNC: 32.1 G/DL (ref 31.4–35)
MCV RBC AUTO: 92.7 FL (ref 82–102)
MONOCYTES # BLD: 0.7 K/UL (ref 0.1–1.3)
MONOCYTES NFR BLD: 9 % (ref 4–12)
NEUTS SEG # BLD: 4.6 K/UL (ref 1.7–8.2)
NEUTS SEG NFR BLD: 62 % (ref 43–78)
NRBC # BLD: 0 K/UL (ref 0–0.2)
PLATELET # BLD AUTO: 308 K/UL (ref 150–450)
PMV BLD AUTO: 11.6 FL (ref 9.4–12.3)
POTASSIUM SERPL-SCNC: 4.2 MMOL/L (ref 3.5–5.1)
PROT SERPL-MCNC: 6.6 G/DL (ref 6.3–8.2)
RBC # BLD AUTO: 5.21 M/UL (ref 4.05–5.2)
SODIUM SERPL-SCNC: 140 MMOL/L (ref 136–145)
T4 FREE SERPL-MCNC: 1 NG/DL (ref 0.9–1.7)
TRIGL SERPL-MCNC: 134 MG/DL (ref 0–150)
TSH, 3RD GENERATION: 0.68 UIU/ML (ref 0.27–4.2)
VLDLC SERPL CALC-MCNC: 27 MG/DL (ref 6–23)
WBC # BLD AUTO: 7.3 K/UL (ref 4.3–11.1)

## 2024-05-09 ENCOUNTER — HOSPITAL ENCOUNTER (OUTPATIENT)
Dept: PHYSICAL THERAPY | Age: 79
Setting detail: RECURRING SERIES
End: 2024-05-09
Payer: MEDICARE

## 2024-05-09 NOTE — PROGRESS NOTES
Sally Chun  : 1945  Primary: Medicare Part A And B  Secondary: AARP HE[employer] CARE MEDICARE SUPP [facility] @ [address]  ESTELLA Ramirez  [facility] [address]  Phone: [phone]  Fax: [phone]    PT Visit Info:    Total # of Visits to Date: 7  Progress Note Counter: 1  Progress Note Due Date: 24  No Show: 1 (2024)  Canceled Appointment: 2 (2024)      OUTPATIENT THERAPY: 2024  Episode  Appt Desk        Sally Chun cancelled her appointment for today due to transportation issues.  Will plan to follow up next during next appointment.  Thank you,  SONNY TRONCOSO, DEANNA    Future Appointments   Date Time Provider Department Center   2024  7:00 PM Sonny Troncoso, PT SFEORPT SFE   2024 10:15 AM Delores Chavez, PT SFEORPT SFE   2024  1:40 PM David Howe DO MAT [facility] AMB   2024 10:15 AM Sonny Troncoso, PT SFEORPT SFE   2024  8:00 AM Delores Chavez, PT SFEORPT SFE   2024 11:00 AM Ramon Riddle MD END [facility] AMB

## 2024-05-13 NOTE — PROGRESS NOTES
Sally Chun (:  1945) is a 79 y.o. female,Established patient, here for evaluation of the following chief complaint(s):  Follow-up, Leg Pain, and Medication Refill      Assessment & Plan   1. Chronic obstructive pulmonary disease, unspecified COPD type (HCC)  2. Pulmonary nodules  No prior improvement of respiratory symptoms with trial of Spiriva  CT lung cancer screening from 2023 with stable small nodules on the right lung the largest measuring 5 mm in the upper lobe, stable 6 mm nodule in the left lower lobe with stable subsolid nodule in the left upper lobe, stable emphysematous changes, no lymphadenopathy of the mediastinum  Currently on as needed albuterol.     3. Chronic insomnia  Controlled substance agreement previously signed  Urine drug screen previously performed which was positive for benzodiazepines consistent with use of temazepam  Continue current dosage of temazepam with careful monitoring for adverse effects  Options discussed with patient given increased sleep onset insomnia including increasing dose of temazepam versus increasing dose of Cymbalta versus adding melatonin versus adding low-dose hydroxyzine to assist with possible psychophysiological component.  However patient would like to monitor for now    4. Osteopenia of multiple sites  DEXA scan on 3/30/2022 with osteopenia.  10-year overall fracture risk 19.8% with 10-year hip fracture risk 7.3%  Continue Fosamax (patient has been off and on this medication for almost 6 years, reassess continued use after next bone density), calcium and vitamin D supplementation   Repeat DEXA scan ordered    - DEXA BONE DENSITY AXIAL SKELETON; Future  - alendronate (FOSAMAX) 70 MG tablet; Take 1 tablet by mouth every 7 days  Dispense: 4 tablet; Refill: 3    5. Irritable bowel syndrome with diarrhea  CT abdomen/pelvis with oral and IV contrast on 11/15/2022 with mild cholelithiasis, small left renal cyst but no acute findings in the

## 2024-05-14 ENCOUNTER — OFFICE VISIT (OUTPATIENT)
Dept: INTERNAL MEDICINE CLINIC | Facility: CLINIC | Age: 79
End: 2024-05-14
Payer: MEDICARE

## 2024-05-14 ENCOUNTER — HOSPITAL ENCOUNTER (OUTPATIENT)
Dept: PHYSICAL THERAPY | Age: 79
Setting detail: RECURRING SERIES
End: 2024-05-14
Payer: MEDICARE

## 2024-05-14 VITALS
HEART RATE: 69 BPM | BODY MASS INDEX: 24.35 KG/M2 | WEIGHT: 124 LBS | OXYGEN SATURATION: 94 % | SYSTOLIC BLOOD PRESSURE: 142 MMHG | DIASTOLIC BLOOD PRESSURE: 74 MMHG | TEMPERATURE: 97.5 F | HEIGHT: 60 IN

## 2024-05-14 DIAGNOSIS — F32.A ANXIETY AND DEPRESSION: ICD-10-CM

## 2024-05-14 DIAGNOSIS — I73.9 CLAUDICATION (HCC): ICD-10-CM

## 2024-05-14 DIAGNOSIS — M85.89 OSTEOPENIA OF MULTIPLE SITES: ICD-10-CM

## 2024-05-14 DIAGNOSIS — J44.9 CHRONIC OBSTRUCTIVE PULMONARY DISEASE, UNSPECIFIED COPD TYPE (HCC): Primary | ICD-10-CM

## 2024-05-14 DIAGNOSIS — K58.0 IRRITABLE BOWEL SYNDROME WITH DIARRHEA: ICD-10-CM

## 2024-05-14 DIAGNOSIS — M54.50 CHRONIC BILATERAL LOW BACK PAIN WITHOUT SCIATICA: ICD-10-CM

## 2024-05-14 DIAGNOSIS — F51.04 CHRONIC INSOMNIA: ICD-10-CM

## 2024-05-14 DIAGNOSIS — G89.29 CHRONIC BILATERAL LOW BACK PAIN WITHOUT SCIATICA: ICD-10-CM

## 2024-05-14 DIAGNOSIS — E78.00 PURE HYPERCHOLESTEROLEMIA: ICD-10-CM

## 2024-05-14 DIAGNOSIS — R91.8 PULMONARY NODULES: ICD-10-CM

## 2024-05-14 DIAGNOSIS — F41.9 ANXIETY AND DEPRESSION: ICD-10-CM

## 2024-05-14 PROCEDURE — 3023F SPIROM DOC REV: CPT | Performed by: STUDENT IN AN ORGANIZED HEALTH CARE EDUCATION/TRAINING PROGRAM

## 2024-05-14 PROCEDURE — 1090F PRES/ABSN URINE INCON ASSESS: CPT | Performed by: STUDENT IN AN ORGANIZED HEALTH CARE EDUCATION/TRAINING PROGRAM

## 2024-05-14 PROCEDURE — 3077F SYST BP >= 140 MM HG: CPT | Performed by: STUDENT IN AN ORGANIZED HEALTH CARE EDUCATION/TRAINING PROGRAM

## 2024-05-14 PROCEDURE — 4004F PT TOBACCO SCREEN RCVD TLK: CPT | Performed by: STUDENT IN AN ORGANIZED HEALTH CARE EDUCATION/TRAINING PROGRAM

## 2024-05-14 PROCEDURE — G8399 PT W/DXA RESULTS DOCUMENT: HCPCS | Performed by: STUDENT IN AN ORGANIZED HEALTH CARE EDUCATION/TRAINING PROGRAM

## 2024-05-14 PROCEDURE — 99215 OFFICE O/P EST HI 40 MIN: CPT | Performed by: STUDENT IN AN ORGANIZED HEALTH CARE EDUCATION/TRAINING PROGRAM

## 2024-05-14 PROCEDURE — 3078F DIAST BP <80 MM HG: CPT | Performed by: STUDENT IN AN ORGANIZED HEALTH CARE EDUCATION/TRAINING PROGRAM

## 2024-05-14 PROCEDURE — G8427 DOCREV CUR MEDS BY ELIG CLIN: HCPCS | Performed by: STUDENT IN AN ORGANIZED HEALTH CARE EDUCATION/TRAINING PROGRAM

## 2024-05-14 PROCEDURE — 1123F ACP DISCUSS/DSCN MKR DOCD: CPT | Performed by: STUDENT IN AN ORGANIZED HEALTH CARE EDUCATION/TRAINING PROGRAM

## 2024-05-14 PROCEDURE — G8420 CALC BMI NORM PARAMETERS: HCPCS | Performed by: STUDENT IN AN ORGANIZED HEALTH CARE EDUCATION/TRAINING PROGRAM

## 2024-05-14 RX ORDER — ALENDRONATE SODIUM 70 MG/1
70 TABLET ORAL
Qty: 4 TABLET | Refills: 3 | Status: SHIPPED | OUTPATIENT
Start: 2024-05-14

## 2024-05-14 RX ORDER — SODIUM FLUORIDE AND POTASSIUM NITRATE 5.8; 57.5 MG/ML; MG/ML
GEL, DENTIFRICE DENTAL
COMMUNITY

## 2024-05-14 RX ORDER — VALACYCLOVIR HYDROCHLORIDE 1 G/1
TABLET, FILM COATED ORAL
COMMUNITY

## 2024-05-14 RX ORDER — MELOXICAM 7.5 MG/1
7.5 TABLET ORAL DAILY PRN
Qty: 90 TABLET | Refills: 1 | Status: SHIPPED | OUTPATIENT
Start: 2024-05-14

## 2024-05-14 ASSESSMENT — PATIENT HEALTH QUESTIONNAIRE - PHQ9
SUM OF ALL RESPONSES TO PHQ QUESTIONS 1-9: 0
8. MOVING OR SPEAKING SO SLOWLY THAT OTHER PEOPLE COULD HAVE NOTICED. OR THE OPPOSITE, BEING SO FIGETY OR RESTLESS THAT YOU HAVE BEEN MOVING AROUND A LOT MORE THAN USUAL: NOT AT ALL
4. FEELING TIRED OR HAVING LITTLE ENERGY: NOT AT ALL
SUM OF ALL RESPONSES TO PHQ9 QUESTIONS 1 & 2: 0
7. TROUBLE CONCENTRATING ON THINGS, SUCH AS READING THE NEWSPAPER OR WATCHING TELEVISION: NOT AT ALL
6. FEELING BAD ABOUT YOURSELF - OR THAT YOU ARE A FAILURE OR HAVE LET YOURSELF OR YOUR FAMILY DOWN: NOT AT ALL
3. TROUBLE FALLING OR STAYING ASLEEP: NOT AT ALL
SUM OF ALL RESPONSES TO PHQ QUESTIONS 1-9: 0
5. POOR APPETITE OR OVEREATING: NOT AT ALL
10. IF YOU CHECKED OFF ANY PROBLEMS, HOW DIFFICULT HAVE THESE PROBLEMS MADE IT FOR YOU TO DO YOUR WORK, TAKE CARE OF THINGS AT HOME, OR GET ALONG WITH OTHER PEOPLE: NOT DIFFICULT AT ALL
SUM OF ALL RESPONSES TO PHQ QUESTIONS 1-9: 0
2. FEELING DOWN, DEPRESSED OR HOPELESS: NOT AT ALL
1. LITTLE INTEREST OR PLEASURE IN DOING THINGS: NOT AT ALL
9. THOUGHTS THAT YOU WOULD BE BETTER OFF DEAD, OR OF HURTING YOURSELF: NOT AT ALL
SUM OF ALL RESPONSES TO PHQ QUESTIONS 1-9: 0

## 2024-05-14 ASSESSMENT — ENCOUNTER SYMPTOMS
BLOOD IN STOOL: 0
BACK PAIN: 1
COUGH: 1
ABDOMINAL PAIN: 1
ANAL BLEEDING: 0
WHEEZING: 1

## 2024-05-14 NOTE — PROGRESS NOTES
Sally Chun  : 1945  Primary: Medicare Part A And B  Secondary: AARP HEALTH CARE MEDICARE SUPP [facility] @ [address]  [facility] [address]  Phone: [phone]  Fax: [phone]    PT Visit Info:    Total # of Visits to Date: 7  Progress Note Counter: 1  Progress Note Due Date: 24  No Show: 1 (2024)  Canceled Appointment: 2 (2024)     OT Visit Info:  No data recorded    OUTPATIENT THERAPY: 2024  Episode  Appt Desk        Sally Chun cancelled her appointment for today (>24 hours ago) due to  dentist .  Will plan to follow up next during next appointment.  Thank you,  DRAKE CHAVEZ, PT    Future Appointments   Date Time Provider Department Center   2024  1:40 PM David Howe DO MAT [facility] AMB   2024  7:00 PM Drake Chavez, PT SFEORPT SFE   2024 10:15 AM Natalie Hill, PT SFEORPT SFE   2024  8:00 AM Drake Chavez, PT SFEORPT SFE   2024 11:00 AM Ramon Riddle MD END [facility] AMB

## 2024-05-21 ENCOUNTER — HOSPITAL ENCOUNTER (OUTPATIENT)
Dept: PHYSICAL THERAPY | Age: 79
Setting detail: RECURRING SERIES
Discharge: HOME | End: 2024-05-24
Payer: MEDICARE

## 2024-05-21 PROCEDURE — 97140 MANUAL THERAPY 1/> REGIONS: CPT

## 2024-05-21 ASSESSMENT — PAIN SCALES - GENERAL: PAINLEVEL_OUTOF10: 4

## 2024-05-21 ASSESSMENT — PAIN DESCRIPTION - LOCATION: LOCATION: NECK

## 2024-05-21 NOTE — THERAPY RECERTIFICATION
Sally Chun  : 1945  Primary: Medicare Part A And B  Secondary: AARP HEALTH CARE MEDICARE SUPP [facility] @ [address]  [facility] [address]  Phone: [phone]  Fax: [phone] Plan Frequency: one time a week or every other week for 60-90 days    Plan of Care/Certification Expiration Date: 2024      PT Visit Info:    Total # of Visits to Date: 33  Progress Note Counter: 1  Progress Note Due Date: 24  No Show: 1 (2024)  Canceled Appointment: 1 (24)      OUTPATIENT PHYSICAL THERAPY:Recertification 2024               Episode  Appt Desk         Treatment Diagnosis:  Cervicalgia (M54.2)  Medical/Referring Diagnosis:  Cervicalgia (M54.2)  Referring Physician:  David Howe DO MD Orders:  PT Eval and Treat   Return MD Appt:  TBD  Date of Onset:  Onset Date: 24 (chronic with recent flare up in January)     Allergies:  Latex, Prednisone, Adhesive tape, Ibandronic acid, Iodinated contrast media, Triamcinolone, Alendronate sodium, and Triprolidine-pseudoephedrine  Restrictions/Precautions:     None  Medications Last Reviewed:  2024     SUBJECTIVE   History of Injury/Illness (Reason for Referral):  2024 (Recertification): Patient reports that her neck is about the same.  She reports that she has been working more and so that can add to her discomfort.    3/26/2024 (Progress Note): Patient reports she is having continued pain in her neck that has lately caused an increase in her dizziness.    2024 (Recertification): Patient reports that she is still having pain in her upper and lower cervical region.    Initial:   Neck 4/10   Post Session:   Neck 3/10  Past Medical History/Comorbidities:   Ms. Chun  has a past medical history of Abnormal Pap smear, Anxiety, Back problem, COPD (chronic obstructive pulmonary disease) (HCC), Depression, Hyperthyroidism, Insomnia, Irritable bowel syndrome, and Osteopenia.  Ms.

## 2024-05-27 NOTE — PROGRESS NOTES
manual therapy.  Communication/Consultation:  None today  Equipment provided today:  None  Recommendations/Intent for next treatment session: Next visit will focus on strengthening, stretching, manual therapy and modalities as needed..    >Total Treatment Billable Duration:  45 minutes   Time In: 0800  Time Out: 0845    DRAKE CHAVEZ, DEANNA         Charge Capture  [employer] Portal  Appt Desk     Future Appointments   Date Time Provider Department Center   5/29/2024  7:30 AM SFE US LOGIC 7 SFERUS SFE   5/29/2024  8:50 AM SFE DEXA BI GE LUNAR DEXA SFERMAM SFE   6/4/2024  8:00 AM Natalie Hill, PT SFEORPT SFE   6/11/2024  9:30 AM Drake Chavez, PT SFEORPT SFE   9/16/2024  3:20 PM David Howe DO MAT [facility] AMB   9/27/2024 11:00 AM Ramon Riddle MD [facility] G[facility] AMB

## 2024-05-27 NOTE — THERAPY RECERTIFICATION
Sally Chun  : 1945  Primary: Medicare Part A And B (Medicare)  Secondary: AARP HEALTH CARE MEDICARE SUPP [facility] @ [address]  [facility] [address]  Phone: [phone]  Fax: [phone] Plan Frequency: one time a week or every other week for 60-90 days    Plan of Care/Certification Expiration Date: 08/10/24        Plan of Care/Certification Expiration Date:  Plan of Care/Certification Expiration Date: 08/10/24    Frequency/Duration: Plan Frequency: one time a week or every other week for 60-90 days       Time In/Out:     Time In: 0800  Time Out: 0845      PT Visit Info:     Progress Note Due Date: 24  Total # of Visits to Date: 8  Progress Note Counter: 1  Canceled Appointment: 1 (24)      Visit Count:  8                OUTPATIENT PHYSICAL THERAPY:             Recertification 2024               Episode (Low back pain)         Treatment Diagnosis:     Other low back pain  Chronic left-sided low back pain with left-sided sciatica  Medical/Referring Diagnosis:    Chronic bilateral low back pain with right-sided sciatica   Low back pain, unspecified [M54.50]    M54.41, G89.29 (ICD-10-CM) - Chronic bilateral low back pain with right-sided sciatica     Referring Physician:  David Howe DO MD Orders:  PT Eval and Treat   Return MD Appt:    Date of Onset:  Onset Date: 24 (chronic with recent flare up in January)     Allergies:  Latex, Prednisone, Adhesive tape, Ibandronic acid, Iodinated contrast media, Triamcinolone, Alendronate sodium, and Triprolidine-pseudoephedrine  Restrictions/Precautions:    None      Medications Last Reviewed:  2024     SUBJECTIVE   History of Injury/Illness (Reason for Referral):    I feel it knee to low back    Had a shot at [facility] in January, and massage and helped.     Was in a rental car lately and it messed up my back.      If I sit in a chair not suited for my height my back would

## 2024-05-28 ENCOUNTER — HOSPITAL ENCOUNTER (OUTPATIENT)
Dept: PHYSICAL THERAPY | Age: 79
Setting detail: RECURRING SERIES
Discharge: HOME | End: 2024-05-31
Payer: MEDICARE

## 2024-05-28 PROCEDURE — 97140 MANUAL THERAPY 1/> REGIONS: CPT

## 2024-05-28 PROCEDURE — 97110 THERAPEUTIC EXERCISES: CPT

## 2024-05-28 ASSESSMENT — PAIN DESCRIPTION - LOCATION: LOCATION: BACK

## 2024-05-28 ASSESSMENT — PAIN SCALES - GENERAL: PAINLEVEL_OUTOF10: 3

## 2024-05-29 ENCOUNTER — HOSPITAL ENCOUNTER (OUTPATIENT)
Dept: MAMMOGRAPHY | Age: 79
Discharge: HOME | End: 2024-06-01
Attending: STUDENT IN AN ORGANIZED HEALTH CARE EDUCATION/TRAINING PROGRAM
Payer: MEDICARE

## 2024-05-29 ENCOUNTER — TELEPHONE (OUTPATIENT)
Dept: INTERNAL MEDICINE CLINIC | Facility: CLINIC | Age: 79
End: 2024-05-29

## 2024-05-29 ENCOUNTER — HOSPITAL ENCOUNTER (OUTPATIENT)
Dept: ULTRASOUND IMAGING | Age: 79
Discharge: HOME | End: 2024-06-01
Attending: STUDENT IN AN ORGANIZED HEALTH CARE EDUCATION/TRAINING PROGRAM
Payer: MEDICARE

## 2024-05-29 DIAGNOSIS — I73.9 CLAUDICATION (HCC): ICD-10-CM

## 2024-05-29 DIAGNOSIS — M85.89 OSTEOPENIA OF MULTIPLE SITES: ICD-10-CM

## 2024-05-29 DIAGNOSIS — I73.9 PERIPHERAL VASCULAR DISEASE (HCC): Primary | ICD-10-CM

## 2024-05-29 PROCEDURE — 77080 DXA BONE DENSITY AXIAL: CPT

## 2024-05-29 PROCEDURE — 93925 LOWER EXTREMITY STUDY: CPT | Performed by: RADIOLOGY

## 2024-05-29 PROCEDURE — 93925 LOWER EXTREMITY STUDY: CPT

## 2024-05-29 NOTE — TELEPHONE ENCOUNTER
Patient with claudication symptoms, recent lower extremity arterial ultrasound showing significant peripheral vascular disease.  Patient agreeable to vascular surgery evaluation.  Referral order placed.    Orders Placed This Encounter    [facility] - Ruperto Wu MD, Vascular Surgery, [place]     Referral Priority:   Routine     Referral Type:   Eval and Treat     Referral Reason:   Specialty Services Required     Referred to Provider:   Ruperto Wu MD     Requested Specialty:   Vascular Surgery     Number of Visits Requested:   1

## 2024-05-30 ENCOUNTER — TELEPHONE (OUTPATIENT)
Dept: INTERNAL MEDICINE CLINIC | Facility: CLINIC | Age: 79
End: 2024-05-30

## 2024-05-30 NOTE — TELEPHONE ENCOUNTER
Patient notified and is fine continuing Fosamax. She states she will check and make sure she is taking the correct dosage of Vit D and Calcium.

## 2024-05-30 NOTE — TELEPHONE ENCOUNTER
----- Message from David Howe DO sent at 5/30/2024  1:13 PM EDT -----  Bone density shows osteopenia (precursor to osteoporosis) with high risk of fracture. Can either continue with fosamax or consider alternative medication such as prolia. Can discuss at next visit if you prefer. Please make sure you are taking at least 4000 units of vitamin D3 and 600mg of calcium daily

## 2024-06-04 ENCOUNTER — HOSPITAL ENCOUNTER (OUTPATIENT)
Dept: PHYSICAL THERAPY | Age: 79
Setting detail: RECURRING SERIES
Discharge: HOME | End: 2024-06-07
Payer: MEDICARE

## 2024-06-04 PROCEDURE — 97140 MANUAL THERAPY 1/> REGIONS: CPT

## 2024-06-04 ASSESSMENT — PAIN SCALES - GENERAL: PAINLEVEL_OUTOF10: 4

## 2024-06-04 ASSESSMENT — PAIN DESCRIPTION - LOCATION: LOCATION: NECK

## 2024-06-04 NOTE — PROGRESS NOTES
Sally Chun  : 1945  Primary: Medicare Part A And B  Secondary: AARP HEALTH CARE MEDICARE SUPP [facility] @ [address]  [facility] [address]  Phone: [phone]  Fax: [phone] Plan Frequency: 1 time per week for every other week for 90 days     Plan of Care/Certification Expiration Date: 2024        PT Visit Info:    Total # of Visits to Date: 34  Progress Note Counter: 2  Progress Note Due Date: 24  No Show: 1 (2024)  Canceled Appointment: 1 (24)       OUTPATIENT PHYSICAL THERAPY:OP NOTE TYPE: Treatment Note 2024       Episode   Appt Desk       Treatment Diagnosis:  Cervicalgia (M54.2)  Medical/Referring Diagnosis:  Cervicalgia (M54.2)  Referring Physician:  David Howe DO MD Orders:  PT Eval and Treat   Date of Onset:  Onset Date: 24 (chronic with recent flare up in January)     Allergies:  Latex, Prednisone, Adhesive tape, Ibandronic acid, Iodinated contrast media, Triamcinolone, Alendronate sodium, and Triprolidine-pseudoephedrine  Restrictions/Precautions:      None  Interventions Planned (Treatment may consist of any combination of the following):    Cold  Heat  Home Exercise Program (HEP)  Manual Therapy  Neuromuscular Re-education/Strengthening  Range of Motion (ROM)  Therapeutic Activites  Therapeutic Exercise/Strengthening   Subjective Comments:    Patient reports she fell on the concrete at her apartment building and so she is sore today.  Initial:   Neck 4/10  Post Session:   Neck 3/10  Medications Last Reviewed:  2024  Updated Objective Findings:  None Today  Treatment   MANUAL THERAPY: (40 minutes): Joint mobilization and Soft tissue mobilization was utilized and necessary because of the patient's restricted joint motion, painful spasm, loss of articular motion and restricted motion of soft tissue.     Treatment/Session Summary:    Treatment Assessment:     Patient tolerated treatment well with

## 2024-06-11 ENCOUNTER — HOSPITAL ENCOUNTER (OUTPATIENT)
Dept: PHYSICAL THERAPY | Age: 79
Setting detail: RECURRING SERIES
Discharge: HOME | End: 2024-06-14
Payer: MEDICARE

## 2024-06-11 PROCEDURE — 97140 MANUAL THERAPY 1/> REGIONS: CPT

## 2024-06-11 PROCEDURE — 97110 THERAPEUTIC EXERCISES: CPT

## 2024-06-11 ASSESSMENT — PAIN SCALES - GENERAL: PAINLEVEL_OUTOF10: 3

## 2024-06-11 ASSESSMENT — PAIN DESCRIPTION - LOCATION: LOCATION: BACK

## 2024-06-11 NOTE — PROGRESS NOTES
Sally Chun  : 1945  Primary: Medicare Part A And B (Medicare)  Secondary: AARP HEALTH CARE MEDICARE SUPP [facility] @ [address]  [facility] [address]  Phone: [phone]  Fax: [phone] Plan Frequency: one time a week or every other week for 60-90 days    Plan of Care/Certification Expiration Date: 08/10/24        Plan of Care/Certification Expiration Date:  Plan of Care/Certification Expiration Date: 08/10/24    Frequency/Duration: Plan Frequency: one time a week or every other week for 60-90 days      Time In/Out:   Time In: 09  Time Out: 1010      PT Visit Info:    Progress Note Due Date: 24  Total # of Visits to Date: 9  Progress Note Counter: 2  Canceled Appointment: 1 (24)      Visit Count:  9    OUTPATIENT PHYSICAL THERAPY:   Treatment Note 2024       Episode  (Low back pain)               Treatment Diagnosis:    Other low back pain  Chronic left-sided low back pain with left-sided sciatica  Medical/Referring Diagnosis:    Chronic bilateral low back pain with right-sided sciatica   Low back pain, unspecified [M54.50]    M54.41, G89.29 (ICD-10-CM) - Chronic bilateral low back pain with right-sided sciatica   Referring Physician:  David Howe DO MD Orders:  PT Eval and Treat   Return MD Appt:    Date of Onset:  Onset Date: 24 (chronic with recent flare up in January)     Allergies:   Latex, Prednisone, Adhesive tape, Ibandronic acid, Iodinated contrast media, Triamcinolone, Alendronate sodium, and Triprolidine-pseudoephedrine  Restrictions/Precautions:   None      Interventions Planned (Treatment may consist of any combination of the following):     See Assessment Note    Subjective Comments:   Fell last week flat on my face on the pavement. Had had a root canal and was on 2 antibiotics and maybe I was a little \"off\".  Saw Dr. 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

## 2024-06-18 ENCOUNTER — HOSPITAL ENCOUNTER (OUTPATIENT)
Dept: PHYSICAL THERAPY | Age: 79
Setting detail: RECURRING SERIES
Discharge: HOME | End: 2024-06-21
Payer: MEDICARE

## 2024-06-18 PROCEDURE — 97140 MANUAL THERAPY 1/> REGIONS: CPT

## 2024-06-18 ASSESSMENT — PAIN SCALES - GENERAL: PAINLEVEL_OUTOF10: 4

## 2024-06-18 NOTE — PROGRESS NOTES
Sally Chun  : 1945  Primary: Medicare Part A And B  Secondary: AARP HE[employer] CARE MEDICARE SUPP [facility] @ [address]  [facility] [address]  Phone: [phone]  Fax: [phone] Plan Frequency: 1 time per week for every other week for 90 days     Plan of Care/Certification Expiration Date: 2024        PT Visit Info:    Total # of Visits to Date: 35  Progress Note Counter: 3  Progress Note Due Date: 24  No Show: 1 (2024)  Canceled Appointment: 1 (24)       OUTPATIENT PHYSICAL THERAPY:OP NOTE TYPE: Treatment Note 2024       Episode   Appt Desk       Treatment Diagnosis:  Cervicalgia (M54.2)  Medical/Referring Diagnosis:  Cervicalgia (M54.2)  Referring Physician:  David Howe DO MD Orders:  PT Eval and Treat   Date of Onset:  Onset Date: 24 (chronic with recent flare up in January)     Allergies:  Latex, Prednisone, Adhesive tape, Ibandronic acid, Iodinated contrast media, Triamcinolone, Alendronate sodium, and Triprolidine-pseudoephedrine  Restrictions/Precautions:      None  Interventions Planned (Treatment may consist of any combination of the following):    Cold  Heat  Home Exercise Program (HEP)  Manual Therapy  Neuromuscular Re-education/Strengthening  Range of Motion (ROM)  Therapeutic Activites  Therapeutic Exercise/Strengthening   Subjective Comments:    Patient reports she is hurting in her neck and shoulders.    Initial:   Neck 4/10  Post Session:   Neck 3/10  Medications Last Reviewed:  2024  Updated Objective Findings:  None Today  Treatment   MANUAL THERAPY: (40 minutes): Joint mobilization and Soft tissue mobilization was utilized and necessary because of the patient's restricted joint motion, painful spasm, loss of articular motion and restricted motion of soft tissue.     Treatment/Session Summary:    Treatment Assessment:     Patient tolerated treatment well with improving overall mobility noted

## 2024-06-21 ENCOUNTER — TELEPHONE (OUTPATIENT)
Dept: INTERNAL MEDICINE CLINIC | Facility: CLINIC | Age: 79
End: 2024-06-21

## 2024-06-21 DIAGNOSIS — F51.04 CHRONIC INSOMNIA: ICD-10-CM

## 2024-06-21 RX ORDER — TEMAZEPAM 15 MG/1
CAPSULE ORAL
Qty: 30 CAPSULE | Refills: 2 | Status: SHIPPED | OUTPATIENT
Start: 2024-06-21 | End: 2024-11-01

## 2024-06-21 NOTE — TELEPHONE ENCOUNTER
Patient needs refill on her temazepam 15Mg     Sent to:    [facility] [address] [facility], in [place]

## 2024-06-21 NOTE — TELEPHONE ENCOUNTER
Pt requesting refill of temazepam.     Scripts last fill date: 5/11 for 30 day supply    Last OV: 5/14  Next OV: 9/16

## 2024-06-21 NOTE — TELEPHONE ENCOUNTER
Patient requesting refill of temazepam.  Last seen in office on 5/14/2024.  Follow-up visit scheduled for 9/16/2024.  Urine drug screen previously performed.  Controlled substance agreement previously signed.  PDMP reviewed showing temazepam 15 mg capsules, quantity #30, 30-day supply last filled on 5/11/2024.  New prescription sent to pharmacy on record    Orders Placed This Encounter    temazepam (RESTORIL) 15 MG capsule     Sig: Take 1 capsule by mouth at nighttime as needed for sleep.  Do not drink alcohol, drive or operate heavy machinery after use.     Dispense:  30 capsule     Refill:  2

## 2024-06-24 ENCOUNTER — OFFICE VISIT (OUTPATIENT)
Dept: VASCULAR SURGERY | Age: 79
End: 2024-06-24
Payer: MEDICARE

## 2024-06-24 VITALS
WEIGHT: 121 LBS | DIASTOLIC BLOOD PRESSURE: 97 MMHG | OXYGEN SATURATION: 95 % | HEIGHT: 60 IN | SYSTOLIC BLOOD PRESSURE: 157 MMHG | BODY MASS INDEX: 23.75 KG/M2 | HEART RATE: 96 BPM

## 2024-06-24 DIAGNOSIS — I73.9 PAD (PERIPHERAL ARTERY DISEASE) (HCC): ICD-10-CM

## 2024-06-24 DIAGNOSIS — G89.29 CHRONIC BILATERAL LOW BACK PAIN, UNSPECIFIED WHETHER SCIATICA PRESENT: Primary | ICD-10-CM

## 2024-06-24 DIAGNOSIS — M54.50 CHRONIC BILATERAL LOW BACK PAIN, UNSPECIFIED WHETHER SCIATICA PRESENT: Primary | ICD-10-CM

## 2024-06-24 PROCEDURE — 1090F PRES/ABSN URINE INCON ASSESS: CPT | Performed by: STUDENT IN AN ORGANIZED HEALTH CARE EDUCATION/TRAINING PROGRAM

## 2024-06-24 PROCEDURE — 99204 OFFICE O/P NEW MOD 45 MIN: CPT | Performed by: STUDENT IN AN ORGANIZED HEALTH CARE EDUCATION/TRAINING PROGRAM

## 2024-06-24 PROCEDURE — G8420 CALC BMI NORM PARAMETERS: HCPCS | Performed by: STUDENT IN AN ORGANIZED HEALTH CARE EDUCATION/TRAINING PROGRAM

## 2024-06-24 PROCEDURE — 1123F ACP DISCUSS/DSCN MKR DOCD: CPT | Performed by: STUDENT IN AN ORGANIZED HEALTH CARE EDUCATION/TRAINING PROGRAM

## 2024-06-24 PROCEDURE — G8399 PT W/DXA RESULTS DOCUMENT: HCPCS | Performed by: STUDENT IN AN ORGANIZED HEALTH CARE EDUCATION/TRAINING PROGRAM

## 2024-06-24 PROCEDURE — 4004F PT TOBACCO SCREEN RCVD TLK: CPT | Performed by: STUDENT IN AN ORGANIZED HEALTH CARE EDUCATION/TRAINING PROGRAM

## 2024-06-24 PROCEDURE — G8427 DOCREV CUR MEDS BY ELIG CLIN: HCPCS | Performed by: STUDENT IN AN ORGANIZED HEALTH CARE EDUCATION/TRAINING PROGRAM

## 2024-06-24 PROCEDURE — 3080F DIAST BP >= 90 MM HG: CPT | Performed by: STUDENT IN AN ORGANIZED HEALTH CARE EDUCATION/TRAINING PROGRAM

## 2024-06-24 PROCEDURE — 3077F SYST BP >= 140 MM HG: CPT | Performed by: STUDENT IN AN ORGANIZED HEALTH CARE EDUCATION/TRAINING PROGRAM

## 2024-06-24 NOTE — PROGRESS NOTES
ideations.    Endocrine:   Negative for uncontrolled diabetic symptoms including polyuria, polydipsia and poor wound healing.     Physical Examination:   Height: 1.524 m (5'), Weight - Scale: 54.9 kg (121 lb), BP: (!) 157/97    General: No acute distress  HENT: AT  CV: Regular rhythm.    LUNG: No respiratory distress on RA  Abdominal: No distension.  Extremities: No wounds or edema, motor and sensation grossly intact  Vascular:   Radial:  L    2+     R    2+    Femoral: L    2+     R    2+  DP:   L    1+     R    2+      Past Medical History:   Diagnosis Date    Abnormal Pap smear     Anxiety     Back problem     COPD (chronic obstructive pulmonary disease) ([facility])     Depression     Hyperthyroidism     Insomnia     Irritable bowel syndrome     Osteopenia        Current Outpatient Medications   Medication Sig Dispense Refill    temazepam (RESTORIL) 15 MG capsule Take 1 capsule by mouth at nighttime as needed for sleep.  Do not drink alcohol, drive or operate heavy machinery after use. 30 capsule 2    PREVIDENT 5000 ENAMEL PROTECT 1.1-5 % GEL PLEASE SEE ATTACHED FOR DETAILED DIRECTIONS      meloxicam (MOBIC) 7.5 MG tablet Take 1 tablet by mouth daily as needed for Pain Take with food.  Avoid other pain medications except for Tylenol while on this medicine 90 tablet 1    alendronate (FOSAMAX) 70 MG tablet Take 1 tablet by mouth every 7 days 4 tablet 3    albuterol sulfate HFA (VENTOLIN HFA) 108 (90 Base) MCG/ACT inhaler Inhale 1 puff into the lungs every 4 hours as needed for Wheezing or Shortness of Breath 18 g 5    DULoxetine (CYMBALTA) 30 MG extended release capsule Take 1 capsule by mouth daily 90 capsule 3    ipratropium (ATROVENT HFA) 17 MCG/ACT inhaler Inhale 2 puffs into the lungs in the morning and 2 puffs at noon and 2 puffs in the evening and 2 puffs before bedtime. 1 each 3    B Complex Vitamins (B COMPLEX 1 PO) Orally      Calcium Carb-Cholecalciferol 500-10 MG-MCG TABS 1 tab Oral      Multiple

## 2024-06-25 ENCOUNTER — HOSPITAL ENCOUNTER (OUTPATIENT)
Dept: PHYSICAL THERAPY | Age: 79
Setting detail: RECURRING SERIES
Discharge: HOME | End: 2024-06-28
Payer: MEDICARE

## 2024-06-25 PROCEDURE — 97110 THERAPEUTIC EXERCISES: CPT

## 2024-06-25 PROCEDURE — 97140 MANUAL THERAPY 1/> REGIONS: CPT

## 2024-06-25 ASSESSMENT — PAIN SCALES - GENERAL: PAINLEVEL_OUTOF10: 3

## 2024-06-25 ASSESSMENT — PAIN DESCRIPTION - LOCATION: LOCATION: BACK

## 2024-06-25 NOTE — PROGRESS NOTES
Sally Chun  : 1945  Primary: Medicare Part A And B (Medicare)  Secondary: [employer] HE[employer] MEDICARE SUPP [facility] @ [address]  [facility] [address]  Phone: [phone]  Fax: [phone] Plan Frequency: one time a week or every other week for 60-90 days    Plan of Care/Certification Expiration Date: 08/10/24        Plan of Care/Certification Expiration Date:  Plan of Care/Certification Expiration Date: 08/10/24    Frequency/Duration: Plan Frequency: one time a week or every other week for 60-90 days      Time In/Out:   Time In: 930  Time Out: 1010      PT Visit Info:    Progress Note Due Date: 24  Total # of Visits to Date: 10  Progress Note Counter: 3  Canceled Appointment: 1 (24)      Visit Count:  10    OUTPATIENT PHYSICAL THERAPY:   Treatment Note 2024       Episode  (Low back pain)               Treatment Diagnosis:    Other low back pain  Chronic left-sided low back pain with left-sided sciatica  Medical/Referring Diagnosis:    Chronic bilateral low back pain with right-sided sciatica   Low back pain, unspecified [M54.50]    M54.41, G89.29 (ICD-10-CM) - Chronic bilateral low back pain with right-sided sciatica   Referring Physician:  David Howe DO MD Orders:  PT Eval and Treat   Return MD Appt:    Date of Onset:  Onset Date: 24 (chronic with recent flare up in January)     Allergies:   Latex, Prednisone, Adhesive tape, Ibandronic acid, Iodinated contrast media, Triamcinolone, Alendronate sodium, and Triprolidine-pseudoephedrine  Restrictions/Precautions:   None      Interventions Planned (Treatment may consist of any combination of the following):     See Assessment Note    Subjective Comments:   Saw vascular surgeon. Said no surgery but need to walk 30 minutes a day and take 81 mg aspirin and see GP for a statin. To go back in about 3 months.   Initial Pain Level::   Back 3/10  Post Session Pain Level:     Back

## 2024-07-03 ENCOUNTER — HOSPITAL ENCOUNTER (OUTPATIENT)
Dept: PHYSICAL THERAPY | Age: 79
Setting detail: RECURRING SERIES
Discharge: HOME | End: 2024-07-06
Payer: MEDICARE

## 2024-07-03 PROCEDURE — 97140 MANUAL THERAPY 1/> REGIONS: CPT

## 2024-07-03 ASSESSMENT — PAIN DESCRIPTION - LOCATION: LOCATION: NECK

## 2024-07-03 ASSESSMENT — PAIN SCALES - GENERAL: PAINLEVEL_OUTOF10: 4

## 2024-07-03 NOTE — PROGRESS NOTES
Sally Chun  : 1945  Primary: Medicare Part A And B  Secondary: [employer]ARP HE[employer] CARE MEDICARE SUPP [facility] @ [address]  [facility] [address]  Phone: [phone]  Fax: [phone] Plan Frequency: one time a week or every other week for 60-90 days    Plan of Care/Certification Expiration Date: 2024      PT Visit Info:    Total # of Visits to Date: 36  Progress Note Counter: 4  Progress Note Due Date: 24  No Show: 1 (2024)  Canceled Appointment: 1 (24)      OUTPATIENT PHYSICAL THERAPY:Progress Report 7/3/2024               Episode  Appt Desk         Treatment Diagnosis:  Cervicalgia (M54.2)  Medical/Referring Diagnosis:  Cervicalgia (M54.2)  Referring Physician:  David Howe DO MD Orders:  PT Eval and Treat   Return MD Appt:  TBD  Date of Onset:  Onset Date: 24 (chronic with recent flare up in January)     Allergies:  Latex, Prednisone, Adhesive tape, Ibandronic acid, Iodinated contrast media, Triamcinolone, Alendronate sodium, and Triprolidine-pseudoephedrine  Restrictions/Precautions:     None  Medications Last Reviewed:  7/3/2024     SUBJECTIVE   History of Injury/Illness (Reason for Referral):  2024 (Recertification): Patient reports that her neck is about the same.  She reports that she has been working more and so that can add to her discomfort.    3/26/2024 (Progress Note): Patient reports she is having continued pain in her neck that has lately caused an increase in her dizziness.    2024 (Recertification): Patient reports that she is still having pain in her upper and lower cervical region.    7/3/2024 (Progress Note): Patient reports she is doing well overall, but still having pain in her neck.    Initial:   Neck 4/10   Post Session:   Neck 3/10  Past Medical History/Comorbidities:   Ms. Chun  has a past medical history of Abnormal Pap smear, Anxiety, Back problem, COPD (chronic obstructive 
  Communication/Consultation:  None today  Equipment provided today:  None  Recommendations/Intent for next treatment session: Next visit will focus on manual therapy.    Total Treatment Billable Duration:  40 minutes  Time In: 0815  Time Out: 0900     SONNY TRONCOSO, PT       Post Session Pain  Charge Capture  [facility]    Future Appointments   Date Time Provider Department Center   7/9/2024  8:15 AM Delores Chavez, PT [facility] SFE   7/17/2024  8:15 AM Sonny Troncoso, PT SFEORPT SFE   8/26/2024  9:30 AM Ruperto Wu MD [facility] AM[facility]   9/16/2024  3:20 PM David Howe DO MAT [facility] AMB   9/27/2024 11:00 AM Ramon Riddle MD [facility] AMB

## 2024-07-09 ENCOUNTER — HOSPITAL ENCOUNTER (OUTPATIENT)
Dept: PHYSICAL THERAPY | Age: 79
Setting detail: RECURRING SERIES
Discharge: HOME | End: 2024-07-12
Payer: MEDICARE

## 2024-07-09 PROCEDURE — 97140 MANUAL THERAPY 1/> REGIONS: CPT

## 2024-07-09 PROCEDURE — 97110 THERAPEUTIC EXERCISES: CPT

## 2024-07-09 ASSESSMENT — PAIN SCALES - GENERAL: PAINLEVEL_OUTOF10: 3

## 2024-07-09 ASSESSMENT — PAIN DESCRIPTION - LOCATION: LOCATION: BACK;HIP

## 2024-07-09 ASSESSMENT — PAIN DESCRIPTION - ORIENTATION: ORIENTATION: LEFT

## 2024-07-09 NOTE — PROGRESS NOTES
Sally Chun  : 1945  Primary: Medicare Part A And B (Medicare)  Secondary: [employer]ARP HE[employer] CARE MEDICARE SUPP [facility] @ [address]  [facility] [address]  Phone: [phone]  Fax: [phone] Plan Frequency: one time a week or every other week for 60-90 days    Plan of Care/Certification Expiration Date: 08/10/24        Plan of Care/Certification Expiration Date:  Plan of Care/Certification Expiration Date: 08/10/24    Frequency/Duration: Plan Frequency: one time a week or every other week for 60-90 days      Time In/Out:   Time In: 0818  Time Out: 09      PT Visit Info:    Progress Note Due Date: 24  Total # of Visits to Date: 11  Progress Note Counter: 4  Canceled Appointment: 1 (24)      Visit Count:  11    OUTPATIENT PHYSICAL THERAPY:   Treatment Note 2024       Episode  (Low back pain)               Treatment Diagnosis:    Other low back pain  Chronic left-sided low back pain with left-sided sciatica  Medical/Referring Diagnosis:    Chronic bilateral low back pain with right-sided sciatica   Low back pain, unspecified [M54.50]    M54.41, G89.29 (ICD-10-CM) - Chronic bilateral low back pain with right-sided sciatica   Referring Physician:  David Howe DO MD Orders:  PT Eval and Treat   Return MD Appt:    Date of Onset:  Onset Date: 24 (chronic with recent flare up in January)     Allergies:   Latex, Prednisone, Adhesive tape, Ibandronic acid, Iodinated contrast media, Triamcinolone, Alendronate sodium, and Triprolidine-pseudoephedrine  Restrictions/Precautions:   None      Interventions Planned (Treatment may consist of any combination of the following):     See Assessment Note    Subjective Comments:   Doing okay. Walking when I have time and can find a place indoors. Legs feel heavy when I walk. Back was sore when I got up this morning 5/10, but now 3/10. L hip has been hurting.   Initial Pain Level:: Left Back, Hip

## 2024-07-17 ENCOUNTER — HOSPITAL ENCOUNTER (OUTPATIENT)
Dept: PHYSICAL THERAPY | Age: 79
Setting detail: RECURRING SERIES
Discharge: HOME | End: 2024-07-20
Payer: MEDICARE

## 2024-07-17 PROCEDURE — 97140 MANUAL THERAPY 1/> REGIONS: CPT

## 2024-07-17 ASSESSMENT — PAIN DESCRIPTION - LOCATION: LOCATION: NECK

## 2024-07-17 ASSESSMENT — PAIN SCALES - GENERAL: PAINLEVEL_OUTOF10: 4

## 2024-07-17 NOTE — PROGRESS NOTES
overall mobility noted after treatment.  Communication/Consultation:  None today  Equipment provided today:  None  Recommendations/Intent for next treatment session: Next visit will focus on manual therapy.    Total Treatment Billable Duration:  40 minutes  Time In: 0815  Time Out: 0855     SONNY TRONCOSO, PT       Post Session Pain  Charge Capture  [facility]    Future Appointments   Date Time Provider Department Center   7/24/2024  8:15 AM Delores Chavez, PT [facility]   7/31/2024  8:15 AM Sonny Troncoso, PT [facility] SFE   8/26/2024  9:30 AM Ruperto Wu MD [facility]   9/16/2024  3:20 PM [facility]   9/27/2024 11:00 AM Ramon Riddle MD [facility] AMB

## 2024-07-22 ENCOUNTER — TELEPHONE (OUTPATIENT)
Dept: INTERNAL MEDICINE CLINIC | Facility: CLINIC | Age: 79
End: 2024-07-22

## 2024-07-22 NOTE — TELEPHONE ENCOUNTER
Pt thinks she has hearburn but not sure she has been to urgent care 2x and doesn't want to go again  started when she got antibiotic at dentist office    I made an appt for tomorrow , with Shyam

## 2024-07-22 NOTE — TELEPHONE ENCOUNTER
Called and spoke to pt in regards to concerns for heartburn. Pt states they describe the pain as a discomfort and soreness in chest/sternum. Pt reports sx of headache but denies SOB, dizziness, lightheadedness, or syncope. Pt states the pain is gone in the morning but progresses as her day goes on.

## 2024-07-24 ENCOUNTER — HOSPITAL ENCOUNTER (OUTPATIENT)
Dept: PHYSICAL THERAPY | Age: 79
Setting detail: RECURRING SERIES
Discharge: HOME | End: 2024-07-27
Payer: MEDICARE

## 2024-07-24 PROCEDURE — 97110 THERAPEUTIC EXERCISES: CPT

## 2024-07-24 PROCEDURE — 97140 MANUAL THERAPY 1/> REGIONS: CPT

## 2024-07-24 NOTE — PROGRESS NOTES
stretching, manual therapy and modalities as needed..    >Total Treatment Billable Duration:  40 minutes   Time In: 0820  Time Out: 0900    DRAKE JUÁREZ, PT         Charge Capture  [employer] [facility]  Appt Desk     Future Appointments   Date Time Provider Department Center   7/29/2024  9:00 AM Elyse Jackson APRN - CNP [facility]   7/31/2024  8:15 AM Natalie Hill, PT SFEORPT E   8/26/2024  9:30 AM Ruperto Wu MD [facility]   9/16/2024  3:20 PM David Howe DO [facility]   9/27/2024 11:00 AM Ramon Riddle MD [facility] AMB

## 2024-07-24 NOTE — THERAPY DISCHARGE
Sally Chun  : 1945  Primary: Medicare Part A And B (Medicare)  Secondary: [employer]ARP HE[employer] CARE MEDICARE SUPP [facility] @ [address]  [facility] [address]  Phone: [phone]  Fax: [phone] Plan Frequency: one time a week or every other week for 60-90 days    Plan of Care/Certification Expiration Date: 08/10/24        Plan of Care/Certification Expiration Date:  Plan of Care/Certification Expiration Date: 08/10/24    Frequency/Duration: Plan Frequency: one time a week or every other week for 60-90 days       Time In/Out:     Time In: 0820  Time Out: 0900      PT Visit Info:     Progress Note Due Date: 24  Total # of Visits to Date: 12  Progress Note Counter: 1  Canceled Appointment: 1 (24)      Visit Count:  12                OUTPATIENT PHYSICAL THERAPY:             Discharge Summary 2024               Episode (Low back pain)         Treatment Diagnosis:     Other low back pain  Chronic left-sided low back pain with left-sided sciatica  Medical/Referring Diagnosis:    Chronic bilateral low back pain with right-sided sciatica   Low back pain, unspecified [M54.50]    M54.41, G89.29 (ICD-10-CM) - Chronic bilateral low back pain with right-sided sciatica     Referring Physician:  David Howe DO MD Orders:  PT Eval and Treat   Return MD Appt:    Date of Onset:  Onset Date: 24 (chronic with recent flare up in January)     Allergies:  Latex, Prednisone, Adhesive tape, Ibandronic acid, Iodinated contrast media, Triamcinolone, Alendronate sodium, and Triprolidine-pseudoephedrine  Restrictions/Precautions:    None      Medications Last Reviewed:  2024     SUBJECTIVE   History of Injury/Illness (Reason for Referral):    I feel it knee to low back    Had a shot at [facility] in January, and massage and helped.     Was in a rental car lately and it messed up my back.      If I sit in a chair not suited for my height my back would

## 2024-07-29 ENCOUNTER — OFFICE VISIT (OUTPATIENT)
Dept: INTERNAL MEDICINE CLINIC | Facility: CLINIC | Age: 79
End: 2024-07-29
Payer: MEDICARE

## 2024-07-29 VITALS
BODY MASS INDEX: 24.15 KG/M2 | DIASTOLIC BLOOD PRESSURE: 92 MMHG | HEART RATE: 91 BPM | HEIGHT: 60 IN | TEMPERATURE: 97.1 F | SYSTOLIC BLOOD PRESSURE: 150 MMHG | OXYGEN SATURATION: 94 % | WEIGHT: 123 LBS

## 2024-07-29 DIAGNOSIS — M94.0 COSTOCHONDRITIS: Primary | ICD-10-CM

## 2024-07-29 PROCEDURE — 99213 OFFICE O/P EST LOW 20 MIN: CPT | Performed by: NURSE PRACTITIONER

## 2024-07-29 PROCEDURE — G8427 DOCREV CUR MEDS BY ELIG CLIN: HCPCS | Performed by: NURSE PRACTITIONER

## 2024-07-29 PROCEDURE — 1123F ACP DISCUSS/DSCN MKR DOCD: CPT | Performed by: NURSE PRACTITIONER

## 2024-07-29 PROCEDURE — 4004F PT TOBACCO SCREEN RCVD TLK: CPT | Performed by: NURSE PRACTITIONER

## 2024-07-29 PROCEDURE — 1090F PRES/ABSN URINE INCON ASSESS: CPT | Performed by: NURSE PRACTITIONER

## 2024-07-29 PROCEDURE — G8399 PT W/DXA RESULTS DOCUMENT: HCPCS | Performed by: NURSE PRACTITIONER

## 2024-07-29 PROCEDURE — G8420 CALC BMI NORM PARAMETERS: HCPCS | Performed by: NURSE PRACTITIONER

## 2024-07-29 PROCEDURE — 3077F SYST BP >= 140 MM HG: CPT | Performed by: NURSE PRACTITIONER

## 2024-07-29 PROCEDURE — 3080F DIAST BP >= 90 MM HG: CPT | Performed by: NURSE PRACTITIONER

## 2024-07-29 SDOH — ECONOMIC STABILITY: FOOD INSECURITY: WITHIN THE PAST 12 MONTHS, THE FOOD YOU BOUGHT JUST DIDN'T LAST AND YOU DIDN'T HAVE MONEY TO GET MORE.: NEVER TRUE

## 2024-07-29 SDOH — ECONOMIC STABILITY: FOOD INSECURITY: WITHIN THE PAST 12 MONTHS, YOU WORRIED THAT YOUR FOOD WOULD RUN OUT BEFORE YOU GOT MONEY TO BUY MORE.: NEVER TRUE

## 2024-07-29 SDOH — ECONOMIC STABILITY: INCOME INSECURITY: HOW HARD IS IT FOR YOU TO PAY FOR THE VERY BASICS LIKE FOOD, HOUSING, MEDICAL CARE, AND HEATING?: NOT HARD AT ALL

## 2024-07-29 ASSESSMENT — ENCOUNTER SYMPTOMS
WHEEZING: 0
COUGH: 1
SHORTNESS OF BREATH: 0
ABDOMINAL PAIN: 0
VOMITING: 0
NAUSEA: 0

## 2024-07-29 NOTE — PROGRESS NOTES
[facility]      2024    Patient Name: Sally Chun  :  1945      Chief Complaint:  Chief Complaint   Patient presents with    Other     Patient c/o tenderness to the touch on her sternum. States this started after she took an antibiotic prescribed for dental work.          HPI  Patient presents today with complaint of chest wall pain. Symptoms started about one month ago after taking an antibiotic that was prescribed by dentist. She had what she believed to be heartburn initially after starting the antibiotic which was resolved with Leila-seltzer. Then about one week later, she began having pain at her sternum. No injury or trauma to the chest wall. The pain is a fairly constant dull ache. The pain is aggravated by deep breathing, coughing and palpation. Symptoms are still present but are improved since onset. No further heartburn.       Past Medical History:   Diagnosis Date    Abnormal Pap smear     Anxiety     Back problem     COPD (chronic obstructive pulmonary disease) (HCC)     Depression     Hyperthyroidism     Insomnia     Irritable bowel syndrome     Osteopenia        Past Surgical History:   Procedure Laterality Date    COLONOSCOPY      2017    ORTHOPEDIC SURGERY      Left 5th toe surgery x3    SALPINGO-OOPHORECTOMY Right 2014    TONSILLECTOMY         Family History   Problem Relation Age of Onset    Heart Disease Maternal Grandfather     Breast Cancer Paternal Aunt     Cancer Maternal Grandmother         colon    Other Brother         heart valve issue    Hypertension Father     Diabetes Father     Heart Disease Father     Other Mother         bilateral brain tumor    Asthma Mother     Heart Disease Mother     Hypertension Mother     Breast Cancer Niece     Thyroid Disease Neg Hx        Social History     Tobacco Use    Smoking status: Every Day     Current packs/day: 1.00     Average packs/day: 1 pack/day for 50.0 years (50.0 ttl pk-yrs)     Types: Cigarettes

## 2024-07-31 ENCOUNTER — HOSPITAL ENCOUNTER (OUTPATIENT)
Dept: PHYSICAL THERAPY | Age: 79
Setting detail: RECURRING SERIES
Discharge: HOME | End: 2024-08-03
Payer: MEDICARE

## 2024-07-31 PROCEDURE — 97140 MANUAL THERAPY 1/> REGIONS: CPT

## 2024-07-31 ASSESSMENT — PAIN DESCRIPTION - LOCATION: LOCATION: NECK

## 2024-07-31 ASSESSMENT — PAIN SCALES - GENERAL: PAINLEVEL_OUTOF10: 4

## 2024-07-31 NOTE — PROGRESS NOTES
Sally Chun  : 1945  Primary: Medicare Part A And B  Secondary: [employer]ARP HE[employer] CARE MEDICARE SUPP [facility] @ [address]  [facility] [address]  Phone: [phone]  Fax: [phone] Plan Frequency: 1 time per week for every other week for 90 days     Plan of Care/Certification Expiration Date: 2024        PT Visit Info:    Total # of Visits to Date: 38  Progress Note Counter: 2  Progress Note Due Date: 24  No Show: 1 (2024)  Canceled Appointment: 1 (24)       OUTPATIENT PHYSICAL THERAPY:OP NOTE TYPE: Treatment Note 2024       Episode   Appt Desk       Treatment Diagnosis:  Cervicalgia (M54.2)  Medical/Referring Diagnosis:  Cervicalgia (M54.2)  Referring Physician:  David Howe DO MD Orders:  PT Eval and Treat   Date of Onset:  Onset Date: 24 (chronic with recent flare up in January)     Allergies:  Latex, Prednisone, Adhesive tape, Ibandronic acid, Iodinated contrast media, Triamcinolone, Alendronate sodium, and Triprolidine-pseudoephedrine  Restrictions/Precautions:      None  Interventions Planned (Treatment may consist of any combination of the following):    Cold  Heat  Home Exercise Program (HEP)  Manual Therapy  Neuromuscular Re-education/Strengthening  Range of Motion (ROM)  Therapeutic Activites  Therapeutic Exercise/Strengthening   Subjective Comments:    Patient reports she has been having a \"fuzzy head\" feeling for a few days now.    Initial:   Neck 4/10  Post Session:   Neck 3/10  Medications Last Reviewed:  2024  Updated Objective Findings:  None Today  Treatment   MANUAL THERAPY: (40 minutes): Joint mobilization and Soft tissue mobilization was utilized and necessary because of the patient's restricted joint motion, painful spasm, loss of articular motion and restricted motion of soft tissue.     Treatment/Session Summary:    Treatment Assessment:     Patient tolerated treatment well with improving

## 2024-08-13 ENCOUNTER — HOSPITAL ENCOUNTER (OUTPATIENT)
Dept: PHYSICAL THERAPY | Age: 79
Setting detail: RECURRING SERIES
Discharge: HOME | End: 2024-08-16
Payer: MEDICARE

## 2024-08-13 PROCEDURE — 97140 MANUAL THERAPY 1/> REGIONS: CPT

## 2024-08-13 ASSESSMENT — PAIN SCALES - GENERAL: PAINLEVEL_OUTOF10: 4

## 2024-08-13 NOTE — PROGRESS NOTES
Sally Chun  : 1945  Primary: Medicare Part A And B  Secondary: [employer]ARP HEALTH CARE MEDICARE SUPP [facility] @ [address]  [place] [address]  Phone: [phone]  Fax: [phone] Plan Frequency: 1 time per week for every other week for 90 days     Plan of Care/Certification Expiration Date: 2024        PT Visit Info:    Total # of Visits to Date: 39  Progress Note Counter: 1  Progress Note Due Date: 24  No Show: 1 (2024)  Canceled Appointment: 1 (24)       OUTPATIENT PHYSICAL THERAPY:OP NOTE TYPE: Treatment Note 2024       Episode   Appt Desk       Treatment Diagnosis:  Cervicalgia (M54.2)  Medical/Referring Diagnosis:  Cervicalgia (M54.2)  Referring Physician:  David Howe DO MD Orders:  PT Eval and Treat   Date of Onset:  Onset Date: 24 (chronic with recent flare up in January)     Allergies:  Latex, Prednisone, Adhesive tape, Ibandronic acid, Iodinated contrast media, Triamcinolone, Alendronate sodium, and Triprolidine-pseudoephedrine  Restrictions/Precautions:      None  Interventions Planned (Treatment may consist of any combination of the following):    Cold  Heat  Home Exercise Program (HEP)  Manual Therapy  Neuromuscular Re-education/Strengthening  Range of Motion (ROM)  Therapeutic Activites  Therapeutic Exercise/Strengthening   Subjective Comments:    Patient reports she has been dizzy today.    Initial:   Neck 4/10  Post Session:   Neck 4/10  Medications Last Reviewed:  2024  Updated Objective Findings:  None Today  Treatment   MANUAL THERAPY: (40 minutes): Joint mobilization and Soft tissue mobilization was utilized and necessary because of the patient's restricted joint motion, painful spasm, loss of articular motion and restricted motion of soft tissue.     Treatment/Session Summary:    Treatment Assessment:     Patient tolerated treatment well with improving overall mobility noted after

## 2024-08-13 NOTE — THERAPY RECERTIFICATION
Sally Chun  : 1945  Primary: Medicare Part A And B  Secondary: [employer]ARP HEALTH CARE MEDICARE SUPP [facility] @ [address]  [facility] [address]  Phone: [phone]  Fax: [phone] Plan Frequency: one time a week or every other week for 60-90 days    Plan of Care/Certification Expiration Date: 2024      PT Visit Info:    Total # of Visits to Date: 39  Progress Note Counter: 1  Progress Note Due Date: 24  No Show: 1 (2024)  Canceled Appointment: 1 (24)      OUTPATIENT PHYSICAL THERAPY:Recertification 2024               Episode  Appt Desk         Treatment Diagnosis:  Cervicalgia (M54.2)  Medical/Referring Diagnosis:  Cervicalgia (M54.2)  Referring Physician:  David Howe DO MD Orders:  PT Eval and Treat   Return MD Appt:  TBD  Date of Onset:  Onset Date: 24 (chronic with recent flare up in January)     Allergies:  Latex, Prednisone, Adhesive tape, Ibandronic acid, Iodinated contrast media, Triamcinolone, Alendronate sodium, and Triprolidine-pseudoephedrine  Restrictions/Precautions:     None  Medications Last Reviewed:  2024     SUBJECTIVE   History of Injury/Illness (Reason for Referral):  7/3/2024 (Progress Note): Patient reports she is doing well overall, but still having pain in her neck.    2024 (Recertification): Patient reports she has been dizzy lately and still with some neck pain.    Initial:   Neck 4/10   Post Session:   Neck 10  Past Medical History/Comorbidities:   Ms. Chun  has a past medical history of Abnormal Pap smear, Anxiety, Back problem, COPD (chronic obstructive pulmonary disease) (HCC), Depression, Hyperthyroidism, Insomnia, Irritable bowel syndrome, and Osteopenia.  Ms. Chun  has a past surgical history that includes Tonsillectomy; orthopedic surgery; Salpingo-oophorectomy (Right, 2014); and Colonoscopy.  Social History/Living Environment:   Lives With: Alone  Type of

## 2024-08-20 ENCOUNTER — TRANSCRIBE ORDERS (OUTPATIENT)
Dept: SCHEDULING | Age: 79
End: 2024-08-20

## 2024-08-20 DIAGNOSIS — M85.89 OSTEOPENIA OF MULTIPLE SITES: ICD-10-CM

## 2024-08-20 DIAGNOSIS — Z12.31 VISIT FOR SCREENING MAMMOGRAM: Primary | ICD-10-CM

## 2024-08-20 RX ORDER — ALENDRONATE SODIUM 70 MG/1
TABLET ORAL
Qty: 12 TABLET | Refills: 1 | OUTPATIENT
Start: 2024-08-20

## 2024-08-26 ENCOUNTER — OFFICE VISIT (OUTPATIENT)
Dept: VASCULAR SURGERY | Age: 79
End: 2024-08-26
Payer: MEDICARE

## 2024-08-26 VITALS
DIASTOLIC BLOOD PRESSURE: 76 MMHG | OXYGEN SATURATION: 92 % | BODY MASS INDEX: 23.36 KG/M2 | HEART RATE: 43 BPM | WEIGHT: 119 LBS | HEIGHT: 60 IN | SYSTOLIC BLOOD PRESSURE: 139 MMHG

## 2024-08-26 DIAGNOSIS — I73.9 PAD (PERIPHERAL ARTERY DISEASE) (HCC): Primary | ICD-10-CM

## 2024-08-26 PROCEDURE — 99213 OFFICE O/P EST LOW 20 MIN: CPT | Performed by: STUDENT IN AN ORGANIZED HEALTH CARE EDUCATION/TRAINING PROGRAM

## 2024-08-26 PROCEDURE — 1090F PRES/ABSN URINE INCON ASSESS: CPT | Performed by: STUDENT IN AN ORGANIZED HEALTH CARE EDUCATION/TRAINING PROGRAM

## 2024-08-26 PROCEDURE — 4004F PT TOBACCO SCREEN RCVD TLK: CPT | Performed by: STUDENT IN AN ORGANIZED HEALTH CARE EDUCATION/TRAINING PROGRAM

## 2024-08-26 PROCEDURE — G8427 DOCREV CUR MEDS BY ELIG CLIN: HCPCS | Performed by: STUDENT IN AN ORGANIZED HEALTH CARE EDUCATION/TRAINING PROGRAM

## 2024-08-26 PROCEDURE — 1123F ACP DISCUSS/DSCN MKR DOCD: CPT | Performed by: STUDENT IN AN ORGANIZED HEALTH CARE EDUCATION/TRAINING PROGRAM

## 2024-08-26 PROCEDURE — 3078F DIAST BP <80 MM HG: CPT | Performed by: STUDENT IN AN ORGANIZED HEALTH CARE EDUCATION/TRAINING PROGRAM

## 2024-08-26 PROCEDURE — 3075F SYST BP GE 130 - 139MM HG: CPT | Performed by: STUDENT IN AN ORGANIZED HEALTH CARE EDUCATION/TRAINING PROGRAM

## 2024-08-26 PROCEDURE — G8399 PT W/DXA RESULTS DOCUMENT: HCPCS | Performed by: STUDENT IN AN ORGANIZED HEALTH CARE EDUCATION/TRAINING PROGRAM

## 2024-08-26 PROCEDURE — G8420 CALC BMI NORM PARAMETERS: HCPCS | Performed by: STUDENT IN AN ORGANIZED HEALTH CARE EDUCATION/TRAINING PROGRAM

## 2024-08-26 RX ORDER — ASPIRIN 81 MG/1
81 TABLET ORAL DAILY
COMMUNITY

## 2024-08-26 NOTE — PROGRESS NOTES
VASCULAR SURGERY   [address] [facility] [address] [address]  [phone] FAX: [phone]        Sally Chun  : 1945    Reason for visit: Bilateral lower extremity claudication    Chief Complaint: 79 y.o. female presents with bilateral lower extremity claudication left worse than right with half a block of ambulation.  Patient reports she has not been ambulating 30 minutes, however is increased her ambulatory distance from half a block to almost 3-1/2 blocks. She has not started a statin.    Plan:   Severe peripheral vascular disease with bilateral SFA stenosis: Continue aspirin 81mg daily, start high intensity statin Lipitor 40mg qd, ambulation 30 minutes daily, follow-up in 2 months    Imaging interrupted:   None    Physical Examination:   Height: 1.524 m (5'), Weight - Scale: 54 kg (119 lb), BP: 139/76    General: No acute distress  HENT: AT  CV: Regular rhythm.    LUNG: No respiratory distress on RA  Abdominal: No distension.  Extremities: No wounds or edema, motor and sensation grossly intact  Vascular:   Radial:  L    2+     R    2+    Femoral: L    2+     R    2+  DP:   L    1+     R    2+      Past Medical History:   Diagnosis Date    Abnormal Pap smear     Anxiety     Back problem     COPD (chronic obstructive pulmonary disease) ([facility])     Depression     Hyperthyroidism     Insomnia     Irritable bowel syndrome     Osteopenia        Current Outpatient Medications   Medication Sig Dispense Refill    aspirin 81 MG EC tablet Take 1 tablet by mouth daily      temazepam (RESTORIL) 15 MG capsule Take 1 capsule by mouth at nighttime as needed for sleep.  Do not drink alcohol, drive or operate heavy machinery after use. 30 capsule 2    PREVIDENT 5000 ENAMEL PROTECT 1.1-5 % GEL PLEASE SEE ATTACHED FOR DETAILED DIRECTIONS      meloxicam (MOBIC) 7.5 MG tablet Take 1 tablet by mouth daily as needed for Pain Take with food.  Avoid other pain medications except for Tylenol while on this

## 2024-08-27 ENCOUNTER — APPOINTMENT (OUTPATIENT)
Dept: PHYSICAL THERAPY | Age: 79
End: 2024-08-27
Payer: MEDICARE

## 2024-09-04 ENCOUNTER — HOSPITAL ENCOUNTER (OUTPATIENT)
Dept: PHYSICAL THERAPY | Age: 79
Setting detail: RECURRING SERIES
Discharge: HOME | End: 2024-09-07
Payer: MEDICARE

## 2024-09-04 PROCEDURE — 97140 MANUAL THERAPY 1/> REGIONS: CPT

## 2024-09-04 ASSESSMENT — PAIN DESCRIPTION - LOCATION: LOCATION: NECK

## 2024-09-04 ASSESSMENT — PAIN SCALES - GENERAL: PAINLEVEL_OUTOF10: 4

## 2024-09-04 NOTE — PROGRESS NOTES
Sally Chun  : 1945  Primary: Medicare Part A And B  Secondary: AARP HE[employer] CARE MEDICARE SUPP [facility] @ [address]  [facility] [address]  Phone: [phone]  Fax: [phone] Plan Frequency: 1 time per week for every other week for 90 days     Plan of Care/Certification Expiration Date: 2024        PT Visit Info:    Total # of Visits to Date: 40  Progress Note Counter: 2  Progress Note Due Date: 24  No Show: 1 (2024)  Canceled Appointment: 1 (24)       OUTPATIENT PHYSICAL THERAPY:OP NOTE TYPE: Treatment Note 2024       Episode   Appt Desk       Treatment Diagnosis:  Cervicalgia (M54.2)  Medical/Referring Diagnosis:  Cervicalgia (M54.2)  Referring Physician:  David Howe DO MD Orders:  PT Eval and Treat   Date of Onset:  Onset Date: 24 (chronic with recent flare up in January)     Allergies:  Latex, Prednisone, Adhesive tape, Ibandronic acid, Iodinated contrast media, Triamcinolone, Alendronate sodium, and Triprolidine-pseudoephedrine  Restrictions/Precautions:      None  Interventions Planned (Treatment may consist of any combination of the following):    Cold  Heat  Home Exercise Program (HEP)  Manual Therapy  Neuromuscular Re-education/Strengthening  Range of Motion (ROM)  Therapeutic Activites  Therapeutic Exercise/Strengthening   Subjective Comments:    Patient reports her shoulder has been hurting more lately.    Initial:   Neck 4/10  Post Session:   Neck 4/10  Medications Last Reviewed:  2024  Updated Objective Findings:  None Today  Treatment   MANUAL THERAPY: (40 minutes): Joint mobilization and Soft tissue mobilization was utilized and necessary because of the patient's restricted joint motion, painful spasm, loss of articular motion and restricted motion of soft tissue.     Treatment/Session Summary:    Treatment Assessment:     Patient tolerated treatment well with improving overall mobility noted

## 2024-09-16 ENCOUNTER — OFFICE VISIT (OUTPATIENT)
Dept: INTERNAL MEDICINE CLINIC | Facility: CLINIC | Age: 79
End: 2024-09-16

## 2024-09-16 VITALS
BODY MASS INDEX: 23.56 KG/M2 | OXYGEN SATURATION: 96 % | WEIGHT: 120 LBS | TEMPERATURE: 97.2 F | SYSTOLIC BLOOD PRESSURE: 122 MMHG | DIASTOLIC BLOOD PRESSURE: 74 MMHG | HEART RATE: 70 BPM | HEIGHT: 60 IN

## 2024-09-16 DIAGNOSIS — R91.8 PULMONARY NODULES: ICD-10-CM

## 2024-09-16 DIAGNOSIS — M54.50 CHRONIC BILATERAL LOW BACK PAIN WITHOUT SCIATICA: ICD-10-CM

## 2024-09-16 DIAGNOSIS — F41.9 ANXIETY AND DEPRESSION: ICD-10-CM

## 2024-09-16 DIAGNOSIS — F51.04 CHRONIC INSOMNIA: ICD-10-CM

## 2024-09-16 DIAGNOSIS — E78.00 PURE HYPERCHOLESTEROLEMIA: ICD-10-CM

## 2024-09-16 DIAGNOSIS — M85.89 OSTEOPENIA OF MULTIPLE SITES: ICD-10-CM

## 2024-09-16 DIAGNOSIS — I73.9 PERIPHERAL ARTERY DISEASE (HCC): ICD-10-CM

## 2024-09-16 DIAGNOSIS — F32.A ANXIETY AND DEPRESSION: ICD-10-CM

## 2024-09-16 DIAGNOSIS — J44.9 CHRONIC OBSTRUCTIVE PULMONARY DISEASE, UNSPECIFIED COPD TYPE (HCC): Primary | ICD-10-CM

## 2024-09-16 DIAGNOSIS — G89.29 CHRONIC BILATERAL LOW BACK PAIN WITHOUT SCIATICA: ICD-10-CM

## 2024-09-16 PROBLEM — K63.5 COLON POLYP: Status: ACTIVE | Noted: 2024-09-16

## 2024-09-16 RX ORDER — ROSUVASTATIN CALCIUM 20 MG/1
20 TABLET, COATED ORAL NIGHTLY
Qty: 90 TABLET | Refills: 1 | Status: SHIPPED | OUTPATIENT
Start: 2024-09-16

## 2024-09-16 RX ORDER — PANTOPRAZOLE SODIUM 40 MG/1
TABLET, DELAYED RELEASE ORAL
COMMUNITY
Start: 2024-09-11

## 2024-09-16 RX ORDER — TEMAZEPAM 15 MG/1
CAPSULE ORAL
Qty: 30 CAPSULE | Refills: 2 | Status: SHIPPED | OUTPATIENT
Start: 2024-10-06 | End: 2025-01-27

## 2024-09-16 RX ORDER — ALENDRONATE SODIUM 70 MG/1
70 TABLET ORAL
Qty: 4 TABLET | Refills: 3 | Status: CANCELLED | OUTPATIENT
Start: 2024-09-16

## 2024-09-16 RX ORDER — DULOXETIN HYDROCHLORIDE 30 MG/1
CAPSULE, DELAYED RELEASE ORAL DAILY
Qty: 90 CAPSULE | Refills: 3 | OUTPATIENT
Start: 2024-09-16

## 2024-09-16 ASSESSMENT — ENCOUNTER SYMPTOMS
BACK PAIN: 1
ABDOMINAL PAIN: 0
TROUBLE SWALLOWING: 0
COUGH: 1

## 2024-09-17 ENCOUNTER — TELEPHONE (OUTPATIENT)
Dept: INTERNAL MEDICINE CLINIC | Facility: CLINIC | Age: 79
End: 2024-09-17

## 2024-09-17 ENCOUNTER — HOSPITAL ENCOUNTER (OUTPATIENT)
Dept: PHYSICAL THERAPY | Age: 79
Setting detail: RECURRING SERIES
Discharge: HOME | End: 2024-09-20
Payer: MEDICARE

## 2024-09-17 PROCEDURE — 97140 MANUAL THERAPY 1/> REGIONS: CPT

## 2024-09-17 ASSESSMENT — PAIN DESCRIPTION - LOCATION: LOCATION: NECK

## 2024-09-17 ASSESSMENT — PAIN SCALES - GENERAL: PAINLEVEL_OUTOF10: 4

## 2024-09-17 ASSESSMENT — PAIN DESCRIPTION - ORIENTATION: ORIENTATION: LEFT

## 2024-10-01 ENCOUNTER — HOSPITAL ENCOUNTER (OUTPATIENT)
Dept: PHYSICAL THERAPY | Age: 79
Setting detail: RECURRING SERIES
Discharge: HOME | End: 2024-10-04
Payer: MEDICARE

## 2024-10-01 PROCEDURE — 97140 MANUAL THERAPY 1/> REGIONS: CPT

## 2024-10-01 ASSESSMENT — PAIN SCALES - GENERAL: PAINLEVEL_OUTOF10: 4

## 2024-10-01 ASSESSMENT — PAIN DESCRIPTION - ORIENTATION: ORIENTATION: LEFT

## 2024-10-01 ASSESSMENT — PAIN DESCRIPTION - LOCATION: LOCATION: NECK

## 2024-10-01 NOTE — PROGRESS NOTES
Sally Chun  : 1945  Primary: Medicare Part A And B  Secondary: [employer] MEDICARE SUPP [facility] @ [address] SORIA James  [facility] [address]  Phone: [phone]  Fax: [phone] Plan Frequency: 1 time per week for every other week for 90 days     Plan of Care/Certification Expiration Date: 2024        PT Visit Info:    Total # of Visits to Date: 42  Progress Note Counter: 4  Progress Note Due Date: 24  No Show: 1 (2024)  Canceled Appointment: 1 (24)       OUTPATIENT PHYSICAL THERAPY:OP NOTE TYPE: Treatment Note 10/1/2024       Episode   Appt Desk       Treatment Diagnosis:  Cervicalgia (M54.2)  Medical/Referring Diagnosis:  Cervicalgia (M54.2)  Referring Physician:  David Howe DO MD Orders:  PT Eval and Treat   Date of Onset:  Onset Date: 24 (chronic with recent flare up in January)     Allergies:  Latex; Prednisone; Adhesive tape; Antihistamines, chlorpheniramine-type; Ibandronic acid; Iodinated contrast media; Triamcinolone; Alendronate sodium; and Triprolidine-pseudoephedrine  Restrictions/Precautions:      None  Interventions Planned (Treatment may consist of any combination of the following):    Cold  Heat  Home Exercise Program (HEP)  Manual Therapy  Neuromuscular Re-education/Strengthening  Range of Motion (ROM)  Therapeutic Activites  Therapeutic Exercise/Strengthening   Subjective Comments:    Patient reports she is doing ok today, just some tightness in her shoulders.    Initial: Left Neck 4/10  Post Session: Left Neck 4/10  Medications Last Reviewed:  10/1/2024  Updated Objective Findings:  None Today  Treatment   MANUAL THERAPY: (40 minutes): Joint mobilization and Soft tissue mobilization was utilized and necessary because of the patient's restricted joint motion, painful spasm, loss of articular motion and restricted motion of soft tissue.     Treatment/Session Summary:    Treatment Assessment:     Patient

## 2024-10-05 ENCOUNTER — HOSPITAL ENCOUNTER (OUTPATIENT)
Dept: MAMMOGRAPHY | Age: 79
Discharge: HOME | End: 2024-10-08
Attending: STUDENT IN AN ORGANIZED HEALTH CARE EDUCATION/TRAINING PROGRAM
Payer: MEDICARE

## 2024-10-05 DIAGNOSIS — Z12.31 VISIT FOR SCREENING MAMMOGRAM: ICD-10-CM

## 2024-10-05 PROCEDURE — 77063 BREAST TOMOSYNTHESIS BI: CPT

## 2024-10-09 PROBLEM — Z86.0100 HISTORY OF COLONIC POLYPS: Status: ACTIVE | Noted: 2024-10-09

## 2024-10-09 PROBLEM — K86.81 EXOCRINE PANCREATIC INSUFFICIENCY: Status: ACTIVE | Noted: 2024-10-09

## 2024-10-09 PROBLEM — N80.9 ENDOMETRIOSIS: Status: ACTIVE | Noted: 2024-10-09

## 2024-10-10 ENCOUNTER — OFFICE VISIT (OUTPATIENT)
Dept: INTERNAL MEDICINE CLINIC | Facility: CLINIC | Age: 79
End: 2024-10-10

## 2024-10-10 VITALS
BODY MASS INDEX: 23.56 KG/M2 | SYSTOLIC BLOOD PRESSURE: 158 MMHG | DIASTOLIC BLOOD PRESSURE: 70 MMHG | HEIGHT: 60 IN | TEMPERATURE: 97.3 F | HEART RATE: 82 BPM | OXYGEN SATURATION: 96 % | WEIGHT: 120 LBS

## 2024-10-10 DIAGNOSIS — Z23 NEED FOR INFLUENZA VACCINATION: ICD-10-CM

## 2024-10-10 DIAGNOSIS — M54.50 PAIN IN LEFT LUMBAR REGION OF BACK: Primary | ICD-10-CM

## 2024-10-10 DIAGNOSIS — R10.9 LEFT FLANK PAIN: ICD-10-CM

## 2024-10-10 DIAGNOSIS — R03.0 ELEVATED BP WITHOUT DIAGNOSIS OF HYPERTENSION: ICD-10-CM

## 2024-10-10 LAB
BILIRUBIN, URINE, POC: NEGATIVE
BLOOD URINE, POC: NEGATIVE
GLUCOSE URINE, POC: NEGATIVE
KETONES, URINE, POC: ABNORMAL
LEUKOCYTE ESTERASE, URINE, POC: NEGATIVE
NITRITE, URINE, POC: NEGATIVE
PH, URINE, POC: 5.5 (ref 4.6–8)
PROTEIN,URINE, POC: NEGATIVE
SPECIFIC GRAVITY, URINE, POC: 1.02 (ref 1–1.03)
URINALYSIS CLARITY, POC: CLEAR
URINALYSIS COLOR, POC: YELLOW
UROBILINOGEN, POC: ABNORMAL

## 2024-10-10 RX ORDER — TEMAZEPAM 15 MG/1
CAPSULE ORAL
Qty: 30 CAPSULE | Refills: 2 | Status: CANCELLED | OUTPATIENT
Start: 2024-10-10 | End: 2025-01-31

## 2024-10-10 RX ORDER — TIZANIDINE 2 MG/1
2 TABLET ORAL 3 TIMES DAILY PRN
Qty: 30 TABLET | Refills: 0 | Status: SHIPPED | OUTPATIENT
Start: 2024-10-10

## 2024-10-10 ASSESSMENT — ENCOUNTER SYMPTOMS
BOWEL INCONTINENCE: 0
DIARRHEA: 1
BACK PAIN: 1
NAUSEA: 0
VOMITING: 0
CONSTIPATION: 1

## 2024-10-10 NOTE — PATIENT INSTRUCTIONS
recommend therapy. [facility]'s drug information is an informational resource designed to assist licensed healthcare practitioners in caring for their patients and/or to serve consumers viewing this service as a supplement to, and not a substitute for, the expertise, skill, knowledge and judgment of healthcare practitioners. The absence of a warning for a given drug or drug combination in no way should be construed to indicate that the drug or drug combination is safe, effective or appropriate for any given patient. [facility] does not assume any responsibility for any aspect of healthcare administered with the aid of information [facility] provides. The information contained herein is not intended to cover all possible uses, directions, precautions, warnings, drug interactions, allergic reactions, or adverse effects. If you have questions about the drugs you are taking, check with your doctor, nurse or pharmacist.  Copyright 7021-8295 [facility], [facility]. Version: 9.01. Revision date: 10/7/2021.  Care instructions adapted under license by [employer]. If you have questions about a medical condition or this instruction, always ask your healthcare professional. [employer] disclaims any warranty or liability for your use of this information.

## 2024-10-10 NOTE — PROGRESS NOTES
Sally Chun (:  1945) is a 79 y.o. female,Established patient, here for evaluation of the following chief complaint(s):  Back Pain (Pt stats that she has pain in her left lower back. Pt also stats that she seems to think its worse when she is in a reclined position. ) and Flu Vaccine          Assessment/Plan  1. Pain in left lumbar region of back  -     tiZANidine (ZANAFLEX) 2 MG tablet; Take 1 tablet by mouth 3 times daily as needed (back pain), Disp-30 tablet, R-0Normal  2. Need for influenza vaccination  -     Influenza, FLUAD Trivalent, (age 65 y+), IM, Preservative Free, 0.5mL  3. Elevated BP without diagnosis of hypertension  4. Left flank pain  -     AMB POC URINALYSIS DIP STICK AUTO W/O MICRO         Patient Instructions   Suggest trying Tizanidine 2 mg this afternoon with goals of taking a nap to see how it affects her - if no adverse effects then take a minimum of twice daily with option to take 3 times/day until pain resolves  Recommend ice therapy x 20 minute intervals  Advised Extra Strength Tylenol for pain relief if/when needed  Resume daily back stretches/exercises once pain level has improved by 50% or so  Continue chronic medications as prescribed  Reminded to stay well hydrated with plenty of water    Patient Education       influenza virus vaccine (injection)  Pronunciation:  IN lynne SADLER seen  Brand:  Afluria PF Pediatric Quadrivalent [id number], Afluria PF Quadrivalent [id number], Afluria Quadrivalent [id number], Fluarix PF Quadrivalent [id number], Flublok Quadrivalent [id number], Flucelvax PF Quadrivalent [id number], Flucelvax Quadrivalent [id number], FluLaval PF Quadrivalent [id number], Fluzone High-Dose Quadrivalent PF 9601-1208, Fluzone PF Pediatric Quadrivalent [id number], Fluzone PF Quadrivalent [id number], Fluzone Quadrivalent [id number]  What is the most important information I should know about this vaccine?  Influenza virus vaccine is made from \"killed viruses\" and

## 2024-10-15 ENCOUNTER — HOSPITAL ENCOUNTER (OUTPATIENT)
Dept: PHYSICAL THERAPY | Age: 79
Setting detail: RECURRING SERIES
Discharge: HOME | End: 2024-10-18
Payer: MEDICARE

## 2024-10-15 PROCEDURE — 97140 MANUAL THERAPY 1/> REGIONS: CPT

## 2024-10-15 ASSESSMENT — PAIN DESCRIPTION - LOCATION: LOCATION: NECK;SHOULDER

## 2024-10-15 ASSESSMENT — PAIN SCALES - GENERAL: PAINLEVEL_OUTOF10: 4

## 2024-10-15 ASSESSMENT — PAIN DESCRIPTION - ORIENTATION: ORIENTATION: LEFT

## 2024-10-15 NOTE — PROGRESS NOTES
Sally Chun  : 1945  Primary: Medicare Part A And B  Secondary: [employer] HE[employer] C[employer] MEDICARE SUPP [facility] @ [address] SORIA James  [facility] [address]  Phone: [phone]  Fax: [phone] Plan Frequency: one time a week or every other week for 60-90 days    Plan of Care/Certification Expiration Date: 2024      PT Visit Info:    Total # of Visits to Date: 43  Progress Note Counter: 1  Progress Note Due Date: 24  No Show: 1 (2024)  Canceled Appointment: 1 (24)      OUTPATIENT PHYSICAL THERAPY:Progress Report 10/15/2024               Episode  Appt Desk         Treatment Diagnosis:  Cervicalgia (M54.2)  Medical/Referring Diagnosis:  Cervicalgia (M54.2)  Referring Physician:  David Howe DO MD Orders:  PT Eval and Treat   Return MD Appt:  TBD  Date of Onset:  Onset Date: 24 (chronic with recent flare up in January)     Allergies:  Latex; Prednisone; Adhesive tape; Antihistamines, chlorpheniramine-type; Ibandronic acid; Iodinated contrast media; Triamcinolone; Alendronate sodium; and Triprolidine-pseudoephedrine  Restrictions/Precautions:     None  Medications Last Reviewed:  10/15/2024     SUBJECTIVE   History of Injury/Illness (Reason for Referral):  7/3/2024 (Progress Note): Patient reports she is doing well overall, but still having pain in her neck.    2024 (Recertification): Patient reports she has been dizzy lately and still with some neck pain.    10/15/2024 (Progress Note): Patient reports her left neck and shoulder have been more painful lately.    Initial: Left Neck, Shoulder 4/10   Post Session: Left Neck, Shoulder 4/10  Past Medical History/Comorbidities:   Ms. Chun  has a past medical history of Abnormal Pap smear, Anxiety, Back problem, COPD (chronic obstructive pulmonary disease) (HCC), Depression, Hyperthyroidism, Insomnia, Irritable bowel syndrome, and Osteopenia.  Ms. Chun  has a past surgical history

## 2024-10-15 NOTE — PROGRESS NOTES
Sally Chun  : 1945  Primary: Medicare Part A And B  Secondary: [employer] HE[employer] C[employer] MEDICARE SUPP [facility] @ [address] SORIA James  [facility] [address]  Phone: [phone]  Fax: [phone] Plan Frequency: 1 time per week for every other week for 90 days     Plan of Care/Certification Expiration Date: 2024        PT Visit Info:    Total # of Visits to Date: 43  Progress Note Counter: 1  Progress Note Due Date: 24  No Show: 1 (2024)  Canceled Appointment: 1 (24)       OUTPATIENT PHYSICAL THERAPY:OP NOTE TYPE: Treatment Note 10/15/2024       Episode   Appt Desk       Treatment Diagnosis:  Cervicalgia (M54.2)  Medical/Referring Diagnosis:  Cervicalgia (M54.2)  Referring Physician:  David Howe DO MD Orders:  PT Eval and Treat   Date of Onset:  Onset Date: 24 (chronic with recent flare up in January)     Allergies:  Latex; Prednisone; Adhesive tape; Antihistamines, chlorpheniramine-type; Ibandronic acid; Iodinated contrast media; Triamcinolone; Alendronate sodium; and Triprolidine-pseudoephedrine  Restrictions/Precautions:      None  Interventions Planned (Treatment may consist of any combination of the following):    Cold  Heat  Home Exercise Program (HEP)  Manual Therapy  Neuromuscular Re-education/Strengthening  Range of Motion (ROM)  Therapeutic Activites  Therapeutic Exercise/Strengthening   Subjective Comments:    Patient reports she is hurting in her neck and shoulders today.    Initial: Left Neck, Shoulder 4/10  Post Session: Left Neck, Shoulder 4/10  Medications Last Reviewed:  10/15/2024  Updated Objective Findings:  None Today  Treatment   MANUAL THERAPY: (40 minutes): Joint mobilization and Soft tissue mobilization was utilized and necessary because of the patient's restricted joint motion, painful spasm, loss of articular motion and restricted motion of soft tissue.     Treatment/Session Summary:    Treatment Assessment:

## 2024-10-21 DIAGNOSIS — E05.90 HYPERTHYROIDISM: ICD-10-CM

## 2024-10-22 LAB
T4 FREE SERPL-MCNC: 1.1 NG/DL (ref 0.9–1.7)
TSH, 3RD GENERATION: 0.52 UIU/ML (ref 0.27–4.2)

## 2024-10-23 ENCOUNTER — OFFICE VISIT (OUTPATIENT)
Dept: ENDOCRINOLOGY | Age: 79
End: 2024-10-23
Payer: MEDICARE

## 2024-10-23 VITALS
OXYGEN SATURATION: 95 % | DIASTOLIC BLOOD PRESSURE: 68 MMHG | BODY MASS INDEX: 23.47 KG/M2 | HEART RATE: 45 BPM | WEIGHT: 120.2 LBS | SYSTOLIC BLOOD PRESSURE: 120 MMHG

## 2024-10-23 DIAGNOSIS — E05.90 HYPERTHYROIDISM: Primary | ICD-10-CM

## 2024-10-23 DIAGNOSIS — E04.2 MULTINODULAR GOITER: ICD-10-CM

## 2024-10-23 PROCEDURE — 1090F PRES/ABSN URINE INCON ASSESS: CPT | Performed by: INTERNAL MEDICINE

## 2024-10-23 PROCEDURE — 99214 OFFICE O/P EST MOD 30 MIN: CPT | Performed by: INTERNAL MEDICINE

## 2024-10-23 PROCEDURE — G8482 FLU IMMUNIZE ORDER/ADMIN: HCPCS | Performed by: INTERNAL MEDICINE

## 2024-10-23 PROCEDURE — G8399 PT W/DXA RESULTS DOCUMENT: HCPCS | Performed by: INTERNAL MEDICINE

## 2024-10-23 PROCEDURE — 1123F ACP DISCUSS/DSCN MKR DOCD: CPT | Performed by: INTERNAL MEDICINE

## 2024-10-23 PROCEDURE — G8420 CALC BMI NORM PARAMETERS: HCPCS | Performed by: INTERNAL MEDICINE

## 2024-10-23 PROCEDURE — 3074F SYST BP LT 130 MM HG: CPT | Performed by: INTERNAL MEDICINE

## 2024-10-23 PROCEDURE — 3078F DIAST BP <80 MM HG: CPT | Performed by: INTERNAL MEDICINE

## 2024-10-23 PROCEDURE — G8427 DOCREV CUR MEDS BY ELIG CLIN: HCPCS | Performed by: INTERNAL MEDICINE

## 2024-10-23 PROCEDURE — 4004F PT TOBACCO SCREEN RCVD TLK: CPT | Performed by: INTERNAL MEDICINE

## 2024-10-23 PROCEDURE — 1159F MED LIST DOCD IN RCRD: CPT | Performed by: INTERNAL MEDICINE

## 2024-10-23 PROCEDURE — G2211 COMPLEX E/M VISIT ADD ON: HCPCS | Performed by: INTERNAL MEDICINE

## 2024-10-23 ASSESSMENT — ENCOUNTER SYMPTOMS
TROUBLE SWALLOWING: 0
DIARRHEA: 1
CONSTIPATION: 1

## 2024-10-23 NOTE — PROGRESS NOTES
Exam  Constitutional:       General: She is not in acute distress.  Neck:      Comments: 2 cm right thyroid nodule.  Cardiovascular:      Rate and Rhythm: Normal rate and regular rhythm.   Lymphadenopathy:      Cervical: No cervical adenopathy.   Neurological:      Motor: No tremor.           Orders Placed This Encounter   Procedures    TSH     Standing Status:   Future     Standing Expiration Date:   10/23/2026    T4, Free     Standing Status:   Future     Standing Expiration Date:   10/23/2026    T3, Free     Standing Status:   Future     Standing Expiration Date:   10/23/2026         Current Outpatient Medications   Medication Sig Dispense Refill    pantoprazole (PROTONIX) 40 MG tablet       temazepam (RESTORIL) 15 MG capsule Take 1 capsule by mouth at nighttime as needed for sleep.  Do not drink alcohol, drive or operate heavy machinery after use. DO NOT FILL PRIOR TO 10/6/24. 30 capsule 2    aspirin 81 MG EC tablet Take 1 tablet by mouth daily      meloxicam (MOBIC) 7.5 MG tablet Take 1 tablet by mouth daily as needed for Pain Take with food.  Avoid other pain medications except for Tylenol while on this medicine 90 tablet 1    DULoxetine (CYMBALTA) 30 MG extended release capsule Take 1 capsule by mouth daily 90 capsule 3    B Complex Vitamins (B COMPLEX 1 PO) Orally      Calcium Carb-Cholecalciferol 500-10 MG-MCG TABS 1 tab Oral      Multiple Vitamins-Minerals (CENTRUM SILVER 50+WOMEN) TABS Orally      albuterol sulfate HFA (VENTOLIN HFA) 108 (90 Base) MCG/ACT inhaler Inhale 1 puff into the lungs every 4 hours as needed for Wheezing or Shortness of Breath 54 g 1    hydrocortisone 2.5 % cream Apply topically 2 times daily. 30 g 0    Hyoscyamine Sulfate SL (LEVSIN/SL) 0.125 MG SUBL Place 1 tablet under the tongue every 6 hours as needed (abdominal pain) 120 each 1    vitamin D3 (CHOLECALCIFEROL) 10 MCG (400 UNIT) TABS tablet Take 1 tablet by mouth      ascorbic acid (VITAMIN C) 500 MG tablet Take 1 tablet by

## 2024-10-25 LAB — T3FREE SERPL-MCNC: 3.4 PG/ML (ref 2–4.4)

## 2024-10-28 ENCOUNTER — OFFICE VISIT (OUTPATIENT)
Dept: VASCULAR SURGERY | Age: 79
End: 2024-10-28
Payer: MEDICARE

## 2024-10-28 VITALS
SYSTOLIC BLOOD PRESSURE: 172 MMHG | WEIGHT: 120 LBS | HEIGHT: 60 IN | DIASTOLIC BLOOD PRESSURE: 74 MMHG | OXYGEN SATURATION: 92 % | BODY MASS INDEX: 23.56 KG/M2 | HEART RATE: 79 BPM

## 2024-10-28 DIAGNOSIS — I73.9 PAD (PERIPHERAL ARTERY DISEASE) (HCC): Primary | ICD-10-CM

## 2024-10-28 PROCEDURE — G8482 FLU IMMUNIZE ORDER/ADMIN: HCPCS | Performed by: STUDENT IN AN ORGANIZED HEALTH CARE EDUCATION/TRAINING PROGRAM

## 2024-10-28 PROCEDURE — G8427 DOCREV CUR MEDS BY ELIG CLIN: HCPCS | Performed by: STUDENT IN AN ORGANIZED HEALTH CARE EDUCATION/TRAINING PROGRAM

## 2024-10-28 PROCEDURE — G8399 PT W/DXA RESULTS DOCUMENT: HCPCS | Performed by: STUDENT IN AN ORGANIZED HEALTH CARE EDUCATION/TRAINING PROGRAM

## 2024-10-28 PROCEDURE — 1159F MED LIST DOCD IN RCRD: CPT | Performed by: STUDENT IN AN ORGANIZED HEALTH CARE EDUCATION/TRAINING PROGRAM

## 2024-10-28 PROCEDURE — 3074F SYST BP LT 130 MM HG: CPT | Performed by: STUDENT IN AN ORGANIZED HEALTH CARE EDUCATION/TRAINING PROGRAM

## 2024-10-28 PROCEDURE — G8420 CALC BMI NORM PARAMETERS: HCPCS | Performed by: STUDENT IN AN ORGANIZED HEALTH CARE EDUCATION/TRAINING PROGRAM

## 2024-10-28 PROCEDURE — 3078F DIAST BP <80 MM HG: CPT | Performed by: STUDENT IN AN ORGANIZED HEALTH CARE EDUCATION/TRAINING PROGRAM

## 2024-10-28 PROCEDURE — 1090F PRES/ABSN URINE INCON ASSESS: CPT | Performed by: STUDENT IN AN ORGANIZED HEALTH CARE EDUCATION/TRAINING PROGRAM

## 2024-10-28 PROCEDURE — 1123F ACP DISCUSS/DSCN MKR DOCD: CPT | Performed by: STUDENT IN AN ORGANIZED HEALTH CARE EDUCATION/TRAINING PROGRAM

## 2024-10-28 PROCEDURE — 4004F PT TOBACCO SCREEN RCVD TLK: CPT | Performed by: STUDENT IN AN ORGANIZED HEALTH CARE EDUCATION/TRAINING PROGRAM

## 2024-10-28 PROCEDURE — 99213 OFFICE O/P EST LOW 20 MIN: CPT | Performed by: STUDENT IN AN ORGANIZED HEALTH CARE EDUCATION/TRAINING PROGRAM

## 2024-10-28 NOTE — PROGRESS NOTES
capsule 3    B Complex Vitamins (B COMPLEX 1 PO) Orally      Calcium Carb-Cholecalciferol 500-10 MG-MCG TABS 1 tab Oral      Multiple Vitamins-Minerals (CENTRUM SILVER 50+WOMEN) TABS Orally      albuterol sulfate HFA (VENTOLIN HFA) 108 (90 Base) MCG/ACT inhaler Inhale 1 puff into the lungs every 4 hours as needed for Wheezing or Shortness of Breath 54 g 1    hydrocortisone 2.5 % cream Apply topically 2 times daily. 30 g 0    Hyoscyamine Sulfate SL (LEVSIN/SL) 0.125 MG SUBL Place 1 tablet under the tongue every 6 hours as needed (abdominal pain) 120 each 1    vitamin D3 (CHOLECALCIFEROL) 10 MCG (400 UNIT) TABS tablet Take 1 tablet by mouth      ascorbic acid (VITAMIN C) 500 MG tablet Take 1 tablet by mouth      tiZANidine (ZANAFLEX) 2 MG tablet Take 1 tablet by mouth 3 times daily as needed (back pain) (Patient not taking: Reported on 10/23/2024) 30 tablet 0    rosuvastatin (CRESTOR) 20 MG tablet Take 1 tablet by mouth at bedtime (Patient not taking: Reported on 10/23/2024) 90 tablet 1    alendronate (FOSAMAX) 70 MG tablet Take 1 tablet by mouth every 7 days (Patient not taking: Reported on 10/23/2024) 4 tablet 3    ipratropium (ATROVENT HFA) 17 MCG/ACT inhaler Inhale 2 puffs into the lungs in the morning and 2 puffs at noon and 2 puffs in the evening and 2 puffs before bedtime. (Patient not taking: Reported on 10/23/2024) 1 each 3     No current facility-administered medications for this visit.       Past Surgical History:   Procedure Laterality Date    COLONOSCOPY      2017    ORTHOPEDIC SURGERY      Left 5th toe surgery x3    SALPINGO-OOPHORECTOMY Right 01/22/2014    TONSILLECTOMY         Metal implants or AICD: no    Dye allergy: Yes     Smoker:  Tobacco Use      Smoking status: Every Day        Packs/day: 1.00        Years: 1 pack/day for 50.0 years (50.0 ttl pk-yrs)        Types: Cigarettes      Smokeless tobacco: Never      Referred by: No ref. provider found    PCP:David Howe DO Jeffrey Thomas

## 2024-10-30 ENCOUNTER — TELEPHONE (OUTPATIENT)
Dept: INTERNAL MEDICINE CLINIC | Facility: CLINIC | Age: 79
End: 2024-10-30

## 2024-10-30 ENCOUNTER — HOSPITAL ENCOUNTER (OUTPATIENT)
Dept: PHYSICAL THERAPY | Age: 79
Setting detail: RECURRING SERIES
Discharge: HOME | End: 2024-11-02
Payer: MEDICARE

## 2024-10-30 PROCEDURE — 97140 MANUAL THERAPY 1/> REGIONS: CPT

## 2024-10-30 ASSESSMENT — PAIN DESCRIPTION - LOCATION: LOCATION: NECK;SHOULDER

## 2024-10-30 ASSESSMENT — PAIN SCALES - GENERAL: PAINLEVEL_OUTOF10: 4

## 2024-10-30 ASSESSMENT — PAIN DESCRIPTION - ORIENTATION: ORIENTATION: LEFT

## 2024-10-30 NOTE — TELEPHONE ENCOUNTER
Per Dr. Howe:   I believe at her last visit patient told me she had been on Fosamax for just at or over 10 years.  This medication is not approved beyond this duration.  Would recommend weightbearing exercise as well as daily calcium and vitamin D

## 2024-10-30 NOTE — TELEPHONE ENCOUNTER
Pt states that last appointment they talked about switching her to prolia injections but she decided she's not ready to start prolia at this time and would like to continue fosamax at this time.    Pt does need new rx sent in for the fosamax for 3 months as well, [facility] [place] [facility]

## 2024-10-30 NOTE — PROGRESS NOTES
Sally Chun  : 1945  Primary: Medicare Part A And B  Secondary: [employer]ARP HE[employer] C[employer] MEDICARE SUPP [facility] @ [address] SORIA James  [facility] [address]  Phone: [phone]  Fax: [phone] Plan Frequency: 1 time per week for every other week for 90 days     Plan of Care/Certification Expiration Date: 2024        PT Visit Info:    Total # of Visits to Date: 44  Progress Note Counter: 2  Progress Note Due Date: 24  No Show: 1 (2024)  Canceled Appointment: 1 (24)       OUTPATIENT PHYSICAL THERAPY:OP NOTE TYPE: Treatment Note 10/30/2024       Episode   Appt Desk       Treatment Diagnosis:  Cervicalgia (M54.2)  Medical/Referring Diagnosis:  Cervicalgia (M54.2)  Referring Physician:  David Howe DO MD Orders:  PT Eval and Treat   Date of Onset:  Onset Date: 24 (chronic with recent flare up in January)     Allergies:  Latex; Prednisone; Adhesive tape; Antihistamines, chlorpheniramine-type; Ibandronic acid; Iodinated contrast media; Triamcinolone; Alendronate sodium; and Triprolidine-pseudoephedrine  Restrictions/Precautions:      None  Interventions Planned (Treatment may consist of any combination of the following):    Cold  Heat  Home Exercise Program (HEP)  Manual Therapy  Neuromuscular Re-education/Strengthening  Range of Motion (ROM)  Therapeutic Activites  Therapeutic Exercise/Strengthening   Subjective Comments:    Patient reports both sides of her neck are painful today.    Initial: Left Neck, Shoulder 10  Post Session: Left Neck, Shoulder 410  Medications Last Reviewed:  10/30/2024  Updated Objective Findings:  None Today  Treatment   MANUAL THERAPY: (40 minutes): Joint mobilization and Soft tissue mobilization was utilized and necessary because of the patient's restricted joint motion, painful spasm, loss of articular motion and restricted motion of soft tissue.     Treatment/Session Summary:    Treatment Assessment:

## 2024-11-13 ENCOUNTER — APPOINTMENT (OUTPATIENT)
Dept: PHYSICAL THERAPY | Age: 79
End: 2024-11-13
Payer: MEDICARE

## 2024-11-20 ENCOUNTER — HOSPITAL ENCOUNTER (OUTPATIENT)
Dept: PHYSICAL THERAPY | Age: 79
Setting detail: RECURRING SERIES
Discharge: HOME | End: 2024-11-23
Payer: MEDICARE

## 2024-11-20 PROCEDURE — 97140 MANUAL THERAPY 1/> REGIONS: CPT

## 2024-11-20 ASSESSMENT — PAIN DESCRIPTION - LOCATION: LOCATION: NECK

## 2024-11-20 ASSESSMENT — PAIN DESCRIPTION - ORIENTATION: ORIENTATION: RIGHT;LEFT

## 2024-11-20 ASSESSMENT — PAIN SCALES - GENERAL: PAINLEVEL_OUTOF10: 4

## 2024-11-20 NOTE — PROGRESS NOTES
Sally Chun  : 1945  Primary: Medicare Part A And B  Secondary: [employer]ARP HE[employer] C[employer] MEDICARE SUPP [facility] @ [address] SORIA James  [facility] [address]  Phone: [phone]  Fax: [phone] Plan Frequency: 1 time per week for every other week for 90 days     Plan of Care/Certification Expiration Date: 2024        PT Visit Info:    Total # of Visits to Date: 45  Progress Note Counter: 1  Progress Note Due Date: 24  No Show: 1 (2024)  Canceled Appointment: 1 (24)       OUTPATIENT PHYSICAL THERAPY:OP NOTE TYPE: Treatment Note 2024       Episode   Appt Desk       Treatment Diagnosis:  Cervicalgia (M54.2)  Medical/Referring Diagnosis:  Cervicalgia (M54.2)  Referring Physician:  David Howe DO MD Orders:  PT Eval and Treat   Date of Onset:  Onset Date: 24 (chronic with recent flare up in January)     Allergies:  Latex; Prednisone; Adhesive tape; Antihistamines, chlorpheniramine-type; Ibandronic acid; Iodinated contrast media; Triamcinolone; Alendronate sodium; and Triprolidine-pseudoephedrine  Restrictions/Precautions:      None  Interventions Planned (Treatment may consist of any combination of the following):    Cold  Heat  Home Exercise Program (HEP)  Manual Therapy  Neuromuscular Re-education/Strengthening  Range of Motion (ROM)  Therapeutic Activites  Therapeutic Exercise/Strengthening   Subjective Comments:    Patient reports her neck is hurting more lately.    Initial: Right, Left Neck 10  Post Session: Right, Left Neck 410  Medications Last Reviewed:  2024  Updated Objective Findings:  None Today  Treatment   MANUAL THERAPY: (40 minutes): Joint mobilization and Soft tissue mobilization was utilized and necessary because of the patient's restricted joint motion, painful spasm, loss of articular motion and restricted motion of soft tissue.     Treatment/Session Summary:    Treatment Assessment:     Patient tolerated

## 2024-11-20 NOTE — THERAPY RECERTIFICATION
Sally Chun  : 1945  Primary: Medicare Part A And B  Secondary: AARP HEALTH CARE MEDICARE SUPP [facility] @ [address] SORIA James  [facility] [address]  Phone: [phone]  Fax: [phone] Plan Frequency: one time a week or every other week for 60-90 days    Plan of Care/Certification Expiration Date: 2024      PT Visit Info:    Total # of Visits to Date: 45  Progress Note Counter: 1  Progress Note Due Date: 24  No Show: 1 (2024)  Canceled Appointment: 1 (24)      OUTPATIENT PHYSICAL THERAPY:Recertification 2024               Episode  Appt Desk         Treatment Diagnosis:  Cervicalgia (M54.2)  Medical/Referring Diagnosis:  Cervicalgia (M54.2)  Referring Physician:  David Howe DO MD Orders:  PT Eval and Treat   Return MD Appt:  TBD  Date of Onset:  Onset Date: 24 (chronic with recent flare up in January)     Allergies:  Latex; Prednisone; Adhesive tape; Antihistamines, chlorpheniramine-type; Ibandronic acid; Iodinated contrast media; Triamcinolone; Alendronate sodium; and Triprolidine-pseudoephedrine  Restrictions/Precautions:     None  Medications Last Reviewed:  2024     SUBJECTIVE   History of Injury/Illness (Reason for Referral):  7/3/2024 (Progress Note): Patient reports she is doing well overall, but still having pain in her neck.    2024 (Recertification): Patient reports she has been dizzy lately and still with some neck pain.    10/15/2024 (Progress Note): Patient reports her left neck and shoulder have been more painful lately.    2024 (Recertification): Patient reports her neck has been more painful lately.  She's trying to be more active during the week.    Initial: Right, Left Neck 4/10   Post Session: Right, Left Neck 10  Past Medical History/Comorbidities:   Ms. Chun  has a past medical history of Abnormal Pap smear, Anxiety, Back problem, COPD (chronic obstructive pulmonary disease)

## 2024-12-11 ENCOUNTER — HOSPITAL ENCOUNTER (OUTPATIENT)
Dept: PHYSICAL THERAPY | Age: 79
Setting detail: RECURRING SERIES
Discharge: HOME | End: 2024-12-14
Payer: MEDICARE

## 2024-12-11 PROCEDURE — 97140 MANUAL THERAPY 1/> REGIONS: CPT

## 2024-12-11 ASSESSMENT — PAIN SCALES - GENERAL: PAINLEVEL_OUTOF10: 4

## 2024-12-11 ASSESSMENT — PAIN DESCRIPTION - LOCATION: LOCATION: NECK

## 2024-12-11 ASSESSMENT — PAIN DESCRIPTION - ORIENTATION: ORIENTATION: RIGHT;LEFT
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
## 2024-12-17 ENCOUNTER — HOSPITAL ENCOUNTER (OUTPATIENT)
Dept: PHYSICAL THERAPY | Age: 79
Setting detail: RECURRING SERIES
Discharge: HOME | End: 2024-12-20
Payer: MEDICARE

## 2024-12-17 PROCEDURE — 97140 MANUAL THERAPY 1/> REGIONS: CPT

## 2024-12-17 ASSESSMENT — PAIN DESCRIPTION - LOCATION: LOCATION: NECK

## 2024-12-17 ASSESSMENT — PAIN SCALES - GENERAL: PAINLEVEL_OUTOF10: 4

## 2024-12-17 ASSESSMENT — PAIN DESCRIPTION - ORIENTATION: ORIENTATION: RIGHT;LEFT

## 2024-12-17 NOTE — PROGRESS NOTES
Sally Chun  : 1945  Primary: Medicare Part A And B  Secondary: [employer]ARP HEALTH CARE MEDICARE SUPP [facility] @ [address] SORIA James  [facility] [address]  Phone: [phone]  Fax: [phone] Plan Frequency: 1 time per week for every other week for 90 days     Plan of Care/Certification Expiration Date: 2024        PT Visit Info:    Total # of Visits to Date: 47  Progress Note Counter: 3  Progress Note Due Date: 24  No Show: 1 (2024)  Canceled Appointment: 1 (24)       OUTPATIENT PHYSICAL THERAPY:OP NOTE TYPE: Treatment Note 2024       Episode   Appt Desk       Treatment Diagnosis:  Cervicalgia (M54.2)  Medical/Referring Diagnosis:  Cervicalgia (M54.2)  Referring Physician:  David Howe DO MD Orders:  PT Eval and Treat   Date of Onset:  Onset Date: 24 (chronic with recent flare up in January)     Allergies:  Latex; Prednisone; Adhesive tape; Antihistamines, chlorpheniramine-type; Ibandronic acid; Iodinated contrast media; Triamcinolone; Alendronate sodium; and Triprolidine-pseudoephedrine  Restrictions/Precautions:      None  Interventions Planned (Treatment may consist of any combination of the following):    Cold  Heat  Home Exercise Program (HEP)  Manual Therapy  Neuromuscular Re-education/Strengthening  Range of Motion (ROM)  Therapeutic Activites  Therapeutic Exercise/Strengthening   Subjective Comments:    Patient reports she is tight through her shoulders.    Initial: Right, Left Neck 10  Post Session: Right, Left Neck 10  Medications Last Reviewed:  2024  Updated Objective Findings:  None Today  Treatment   MANUAL THERAPY: (40 minutes): Joint mobilization and Soft tissue mobilization was utilized and necessary because of the patient's restricted joint motion, painful spasm, loss of articular motion and restricted motion of soft tissue.     Treatment/Session Summary:    Treatment Assessment:     Patient tolerated

## 2024-12-23 ENCOUNTER — TELEPHONE (OUTPATIENT)
Dept: INTERNAL MEDICINE CLINIC | Facility: CLINIC | Age: 79
End: 2024-12-23

## 2024-12-23 DIAGNOSIS — F32.A ANXIETY AND DEPRESSION: ICD-10-CM

## 2024-12-23 DIAGNOSIS — F41.9 ANXIETY AND DEPRESSION: ICD-10-CM

## 2024-12-23 NOTE — TELEPHONE ENCOUNTER
Medication Refill Request    Name of Medication : cymbalta    Strength of Medication: 30mg    Directions: 1 daily    30 day or 90 day supply: 90    Preferred Pharmacy: [facility] pharmacy in [facility] Appt. Date: 10-10-24    Next Appt. Date: 1-21-25    Additional Information For Provider: pt only has 2 days left

## 2024-12-26 RX ORDER — DULOXETIN HYDROCHLORIDE 30 MG/1
30 CAPSULE, DELAYED RELEASE ORAL DAILY
Qty: 90 CAPSULE | Refills: 0 | Status: SHIPPED | OUTPATIENT
Start: 2024-12-26

## 2025-01-07 ENCOUNTER — HOSPITAL ENCOUNTER (OUTPATIENT)
Dept: PHYSICAL THERAPY | Age: 80
Setting detail: RECURRING SERIES
Discharge: HOME | End: 2025-01-10
Payer: MEDICARE

## 2025-01-07 PROCEDURE — 97140 MANUAL THERAPY 1/> REGIONS: CPT

## 2025-01-07 ASSESSMENT — PAIN DESCRIPTION - LOCATION: LOCATION: NECK

## 2025-01-07 ASSESSMENT — PAIN DESCRIPTION - ORIENTATION: ORIENTATION: RIGHT;LEFT

## 2025-01-07 ASSESSMENT — PAIN SCALES - GENERAL: PAINLEVEL_OUTOF10: 4

## 2025-01-07 NOTE — PROGRESS NOTES
Sally Chun  : 1945  Primary: Medicare Part A And B  Secondary: [employer] MEDICARE SUPP [facility] @ [address] SORIA James  [facility] [address]  Phone: [phone]  Fax: [phone] Plan Frequency: one time a week or every other week for 60-90 days            PT Visit Info:    Total # of Visits to Date: 48  Progress Note Counter: 1  Progress Note Due Date: 25  No Show: 1 (2024)  Canceled Appointment: 1 (24)      OUTPATIENT PHYSICAL THERAPY:Progress Report 2025               Episode  Appt Desk         Treatment Diagnosis:  Cervicalgia (M54.2)  Medical/Referring Diagnosis:  Cervicalgia (M54.2)  Referring Physician:  David Howe DO MD Orders:  PT Eval and Treat   Return MD Appt:  TBD  Date of Onset:  Onset Date: 24 (chronic with recent flare up in January)     Allergies:  Latex; Prednisone; Adhesive tape; Antihistamines, chlorpheniramine-type; Ibandronic acid; Iodinated contrast media; Triamcinolone; Alendronate sodium; and Triprolidine-pseudoephedrine  Restrictions/Precautions:     None  Medications Last Reviewed:  2025     SUBJECTIVE   History of Injury/Illness (Reason for Referral):  2024 (Recertification): Patient reports her neck has been more painful lately.  She's trying to be more active during the week.    2025 (Progress Note): Patient reports her neck has been more painful lately.    Initial: Right, Left Neck 4/10   Post Session: Right, Left Neck 4/10  Past Medical History/Comorbidities:   Ms. Chun  has a past medical history of Abnormal Pap smear, Anxiety, Back problem, COPD (chronic obstructive pulmonary disease) (HCC), Depression, Hyperthyroidism, Insomnia, Irritable bowel syndrome, and Osteopenia.  Ms. Chun  has a past surgical history that includes Tonsillectomy; orthopedic surgery; Salpingo-oophorectomy (Right, 2014); and Colonoscopy.  Social History/Living Environment:   Retired

## 2025-01-07 NOTE — PROGRESS NOTES
Sally Chun  : 1945  Primary: Medicare Part A And B  Secondary: [employer] HE[employer] MEDICARE SUPP [facility] @ [address] SORIA James  [place] [address]  Phone: [phone]  Fax: [phone] Plan Frequency: 1 time per week for every other week for 90 days             PT Visit Info:    Total # of Visits to Date: 48  Progress Note Counter: 1  Progress Note Due Date: 25  No Show: 1 (2024)  Canceled Appointment: 1 (24)       OUTPATIENT PHYSICAL THERAPY:OP NOTE TYPE: Treatment Note 2025       Episode   Appt Desk       Treatment Diagnosis:  Cervicalgia (M54.2)  Medical/Referring Diagnosis:  Cervicalgia (M54.2)  Referring Physician:  David Howe DO MD Orders:  PT Eval and Treat   Date of Onset:  Onset Date: 24 (chronic with recent flare up in January)     Allergies:  Latex; Prednisone; Adhesive tape; Antihistamines, chlorpheniramine-type; Ibandronic acid; Iodinated contrast media; Triamcinolone; Alendronate sodium; and Triprolidine-pseudoephedrine  Restrictions/Precautions:      None  Interventions Planned (Treatment may consist of any combination of the following):    Cold  Heat  Home Exercise Program (HEP)  Manual Therapy  Neuromuscular Re-education/Strengthening  Range of Motion (ROM)  Therapeutic Activites  Therapeutic Exercise/Strengthening   Subjective Comments:    Patient reports her neck is hurting today.    Initial: Right, Left Neck 4/10  Post Session: Right, Left Neck 4/10  Medications Last Reviewed:  2025  Updated Objective Findings:  None Today  Treatment   MANUAL THERAPY: (40 minutes): Joint mobilization and Soft tissue mobilization was utilized and necessary because of the patient's restricted joint motion, painful spasm, loss of articular motion and restricted motion of soft tissue.     Treatment/Session Summary:    Treatment Assessment:     Patient tolerated treatment well with improving overall

## 2025-01-19 NOTE — ASSESSMENT & PLAN NOTE
Orders:    albuterol sulfate HFA (VENTOLIN HFA) 108 (90 Base) MCG/ACT inhaler; Inhale 1 puff into the lungs every 4 hours as needed for Wheezing or Shortness of Breath    AMB POC COVID-19 COV    AMB POC RAPID INFLUENZA TEST    AMB POC RSV    levoFLOXacin (LEVAQUIN) 750 MG tablet; Take 1 tablet by mouth every other day

## 2025-01-19 NOTE — ASSESSMENT & PLAN NOTE
Likely mildly toxic multinodular goiter based on intermittently mildly suppressed TSH and ultrasound findings per endocrinology  Recent endocrinology notes reviewed with no indications for FNA biopsy at this time.  Consider repeating ultrasound in 3 to 4 years    Orders:    TSH; Future    T4, Free; Future

## 2025-01-19 NOTE — ASSESSMENT & PLAN NOTE
DEXA scan from 5/30/2024 with 10-year fracture risk of 21.1%, hip fracture risk of 7.5%  Intermittently on Fosamax for almost 10+ years.  Currently taking calcium and vitamin D supplementation  Previously discussed additional therapies such as Prolia    Orders:    DEXA BONE DENSITY AXIAL SKELETON; Future

## 2025-01-19 NOTE — ASSESSMENT & PLAN NOTE
Lipid panel from 5/7/2024 with elevated total cholesterol of 211, LDL of 120  Has since been started on rosuvastatin given peripheral artery disease however does not believe she started taking.  Recommended trial of medication assuming she still has it at home after current respiratory illness has resolved.  If unable to tolerate consider atorvastatin or pravastatin or Zetia as statin alternative    Orders:    CBC with Auto Differential; Future    Comprehensive Metabolic Panel; Future    TSH; Future    T4, Free; Future    Lipid Panel; Future

## 2025-01-21 ENCOUNTER — OFFICE VISIT (OUTPATIENT)
Dept: INTERNAL MEDICINE CLINIC | Facility: CLINIC | Age: 80
End: 2025-01-21
Payer: MEDICARE

## 2025-01-21 VITALS
WEIGHT: 120 LBS | DIASTOLIC BLOOD PRESSURE: 62 MMHG | BODY MASS INDEX: 23.56 KG/M2 | HEART RATE: 75 BPM | SYSTOLIC BLOOD PRESSURE: 142 MMHG | HEIGHT: 60 IN | TEMPERATURE: 97.9 F | OXYGEN SATURATION: 96 %

## 2025-01-21 DIAGNOSIS — E78.00 PURE HYPERCHOLESTEROLEMIA: ICD-10-CM

## 2025-01-21 DIAGNOSIS — E04.2 MULTINODULAR GOITER: ICD-10-CM

## 2025-01-21 DIAGNOSIS — M85.89 OSTEOPENIA OF MULTIPLE SITES: ICD-10-CM

## 2025-01-21 DIAGNOSIS — F32.A ANXIETY AND DEPRESSION: ICD-10-CM

## 2025-01-21 DIAGNOSIS — I49.9 IRREGULAR HEART BEAT: ICD-10-CM

## 2025-01-21 DIAGNOSIS — L29.9 ITCHING: ICD-10-CM

## 2025-01-21 DIAGNOSIS — J44.1 COPD EXACERBATION (HCC): ICD-10-CM

## 2025-01-21 DIAGNOSIS — F51.04 CHRONIC INSOMNIA: ICD-10-CM

## 2025-01-21 DIAGNOSIS — R91.8 PULMONARY NODULES: ICD-10-CM

## 2025-01-21 DIAGNOSIS — F41.9 ANXIETY AND DEPRESSION: ICD-10-CM

## 2025-01-21 DIAGNOSIS — Z00.00 MEDICARE ANNUAL WELLNESS VISIT, SUBSEQUENT: Primary | ICD-10-CM

## 2025-01-21 DIAGNOSIS — I73.9 PERIPHERAL ARTERY DISEASE (HCC): ICD-10-CM

## 2025-01-21 LAB
EXP DATE SOLUTION: NORMAL
EXP DATE SWAB: NORMAL
EXPIRATION DATE: NORMAL
LOT NUMBER POC: NORMAL
LOT NUMBER SOLUTION: NORMAL
LOT NUMBER SWAB: NORMAL
QUICKVUE INFLUENZA TEST: NEGATIVE
RSV RNA, POC: NEGATIVE
SARS-COV-2 RNA, POC: NEGATIVE
VALID INTERNAL CONTROL, POC: NORMAL
VALID INTERNAL CONTROL, POC: NORMAL

## 2025-01-21 PROCEDURE — 1123F ACP DISCUSS/DSCN MKR DOCD: CPT | Performed by: STUDENT IN AN ORGANIZED HEALTH CARE EDUCATION/TRAINING PROGRAM

## 2025-01-21 PROCEDURE — G8420 CALC BMI NORM PARAMETERS: HCPCS | Performed by: STUDENT IN AN ORGANIZED HEALTH CARE EDUCATION/TRAINING PROGRAM

## 2025-01-21 PROCEDURE — 1159F MED LIST DOCD IN RCRD: CPT | Performed by: STUDENT IN AN ORGANIZED HEALTH CARE EDUCATION/TRAINING PROGRAM

## 2025-01-21 PROCEDURE — 99214 OFFICE O/P EST MOD 30 MIN: CPT | Performed by: STUDENT IN AN ORGANIZED HEALTH CARE EDUCATION/TRAINING PROGRAM

## 2025-01-21 PROCEDURE — 87634 RSV DNA/RNA AMP PROBE: CPT | Performed by: STUDENT IN AN ORGANIZED HEALTH CARE EDUCATION/TRAINING PROGRAM

## 2025-01-21 PROCEDURE — 87804 INFLUENZA ASSAY W/OPTIC: CPT | Performed by: STUDENT IN AN ORGANIZED HEALTH CARE EDUCATION/TRAINING PROGRAM

## 2025-01-21 PROCEDURE — 87635 SARS-COV-2 COVID-19 AMP PRB: CPT | Performed by: STUDENT IN AN ORGANIZED HEALTH CARE EDUCATION/TRAINING PROGRAM

## 2025-01-21 PROCEDURE — 1090F PRES/ABSN URINE INCON ASSESS: CPT | Performed by: STUDENT IN AN ORGANIZED HEALTH CARE EDUCATION/TRAINING PROGRAM

## 2025-01-21 PROCEDURE — 1126F AMNT PAIN NOTED NONE PRSNT: CPT | Performed by: STUDENT IN AN ORGANIZED HEALTH CARE EDUCATION/TRAINING PROGRAM

## 2025-01-21 PROCEDURE — G8399 PT W/DXA RESULTS DOCUMENT: HCPCS | Performed by: STUDENT IN AN ORGANIZED HEALTH CARE EDUCATION/TRAINING PROGRAM

## 2025-01-21 PROCEDURE — 4004F PT TOBACCO SCREEN RCVD TLK: CPT | Performed by: STUDENT IN AN ORGANIZED HEALTH CARE EDUCATION/TRAINING PROGRAM

## 2025-01-21 PROCEDURE — G0439 PPPS, SUBSEQ VISIT: HCPCS | Performed by: STUDENT IN AN ORGANIZED HEALTH CARE EDUCATION/TRAINING PROGRAM

## 2025-01-21 PROCEDURE — G8427 DOCREV CUR MEDS BY ELIG CLIN: HCPCS | Performed by: STUDENT IN AN ORGANIZED HEALTH CARE EDUCATION/TRAINING PROGRAM

## 2025-01-21 PROCEDURE — 3078F DIAST BP <80 MM HG: CPT | Performed by: STUDENT IN AN ORGANIZED HEALTH CARE EDUCATION/TRAINING PROGRAM

## 2025-01-21 PROCEDURE — 93000 ELECTROCARDIOGRAM COMPLETE: CPT | Performed by: STUDENT IN AN ORGANIZED HEALTH CARE EDUCATION/TRAINING PROGRAM

## 2025-01-21 PROCEDURE — 3077F SYST BP >= 140 MM HG: CPT | Performed by: STUDENT IN AN ORGANIZED HEALTH CARE EDUCATION/TRAINING PROGRAM

## 2025-01-21 PROCEDURE — 3023F SPIROM DOC REV: CPT | Performed by: STUDENT IN AN ORGANIZED HEALTH CARE EDUCATION/TRAINING PROGRAM

## 2025-01-21 RX ORDER — LEVOFLOXACIN 750 MG/1
750 TABLET, FILM COATED ORAL EVERY OTHER DAY
Qty: 5 TABLET | Refills: 0 | Status: SHIPPED | OUTPATIENT
Start: 2025-01-21

## 2025-01-21 RX ORDER — CLINDAMYCIN PHOSPHATE 10 UG/ML
LOTION TOPICAL 2 TIMES DAILY PRN
COMMUNITY
Start: 2024-11-12

## 2025-01-21 RX ORDER — DULOXETIN HYDROCHLORIDE 30 MG/1
30 CAPSULE, DELAYED RELEASE ORAL DAILY
Qty: 90 CAPSULE | Refills: 2 | Status: SHIPPED | OUTPATIENT
Start: 2025-01-21

## 2025-01-21 RX ORDER — TEMAZEPAM 15 MG/1
CAPSULE ORAL
Qty: 30 CAPSULE | Refills: 2 | Status: SHIPPED | OUTPATIENT
Start: 2025-02-10 | End: 2025-08-10

## 2025-01-21 RX ORDER — ALBUTEROL SULFATE 90 UG/1
1 INHALANT RESPIRATORY (INHALATION) EVERY 4 HOURS PRN
Qty: 54 G | Refills: 1 | Status: CANCELLED | OUTPATIENT
Start: 2025-01-21

## 2025-01-21 RX ORDER — ALBUTEROL SULFATE 90 UG/1
1 INHALANT RESPIRATORY (INHALATION) EVERY 4 HOURS PRN
Qty: 3 EACH | Refills: 2 | Status: SHIPPED | OUTPATIENT
Start: 2025-01-21

## 2025-01-21 SDOH — ECONOMIC STABILITY: FOOD INSECURITY: WITHIN THE PAST 12 MONTHS, YOU WORRIED THAT YOUR FOOD WOULD RUN OUT BEFORE YOU GOT MONEY TO BUY MORE.: NEVER TRUE

## 2025-01-21 SDOH — ECONOMIC STABILITY: FOOD INSECURITY: WITHIN THE PAST 12 MONTHS, THE FOOD YOU BOUGHT JUST DIDN'T LAST AND YOU DIDN'T HAVE MONEY TO GET MORE.: NEVER TRUE

## 2025-01-21 ASSESSMENT — PATIENT HEALTH QUESTIONNAIRE - PHQ9
10. IF YOU CHECKED OFF ANY PROBLEMS, HOW DIFFICULT HAVE THESE PROBLEMS MADE IT FOR YOU TO DO YOUR WORK, TAKE CARE OF THINGS AT HOME, OR GET ALONG WITH OTHER PEOPLE: NOT DIFFICULT AT ALL
SUM OF ALL RESPONSES TO PHQ QUESTIONS 1-9: 0
1. LITTLE INTEREST OR PLEASURE IN DOING THINGS: NOT AT ALL
5. POOR APPETITE OR OVEREATING: NOT AT ALL
SUM OF ALL RESPONSES TO PHQ QUESTIONS 1-9: 0
2. FEELING DOWN, DEPRESSED OR HOPELESS: NOT AT ALL
SUM OF ALL RESPONSES TO PHQ9 QUESTIONS 1 & 2: 0
SUM OF ALL RESPONSES TO PHQ QUESTIONS 1-9: 0
4. FEELING TIRED OR HAVING LITTLE ENERGY: NOT AT ALL
8. MOVING OR SPEAKING SO SLOWLY THAT OTHER PEOPLE COULD HAVE NOTICED. OR THE OPPOSITE, BEING SO FIGETY OR RESTLESS THAT YOU HAVE BEEN MOVING AROUND A LOT MORE THAN USUAL: NOT AT ALL
3. TROUBLE FALLING OR STAYING ASLEEP: NOT AT ALL
9. THOUGHTS THAT YOU WOULD BE BETTER OFF DEAD, OR OF HURTING YOURSELF: NOT AT ALL
SUM OF ALL RESPONSES TO PHQ QUESTIONS 1-9: 0
6. FEELING BAD ABOUT YOURSELF - OR THAT YOU ARE A FAILURE OR HAVE LET YOURSELF OR YOUR FAMILY DOWN: NOT AT ALL
7. TROUBLE CONCENTRATING ON THINGS, SUCH AS READING THE NEWSPAPER OR WATCHING TELEVISION: NOT AT ALL

## 2025-01-21 ASSESSMENT — ENCOUNTER SYMPTOMS
SHORTNESS OF BREATH: 0
WHEEZING: 1
SINUS PRESSURE: 0
SORE THROAT: 0
NAUSEA: 0
RHINORRHEA: 1
COUGH: 1
VOMITING: 0
TROUBLE SWALLOWING: 0

## 2025-01-21 ASSESSMENT — LIFESTYLE VARIABLES
HOW MANY STANDARD DRINKS CONTAINING ALCOHOL DO YOU HAVE ON A TYPICAL DAY: PATIENT DOES NOT DRINK
HOW OFTEN DO YOU HAVE A DRINK CONTAINING ALCOHOL: NEVER

## 2025-01-21 NOTE — PROGRESS NOTES
Sally Chun (:  1945) is a 79 y.o. female,Established patient, here for evaluation of the following chief complaint(s):  Medicare AWV (Pt is here for her yearly AWV. ) and Other (Pt states she has been experiencing runny nose, rattling dry cough, chills, and fatigue. Pt reports first sx first started 2 days ago. )         Assessment & Plan  Medicare annual wellness visit, subsequent  Medicare wellness responses reviewed in the office today  Mammogram within normal limits on 10/10/2024  Lung cancer screening performed on 2023  DEXA scan last performed on 3/30/2022, recheck as below  Next colonoscopy due in 2027 per review of GI notes           COPD exacerbation (HCC)       Orders:    albuterol sulfate HFA (VENTOLIN HFA) 108 (90 Base) MCG/ACT inhaler; Inhale 1 puff into the lungs every 4 hours as needed for Wheezing or Shortness of Breath    AMB POC COVID-19 COV    AMB POC RAPID INFLUENZA TEST    AMB POC RSV    levoFLOXacin (LEVAQUIN) 750 MG tablet; Take 1 tablet by mouth every other day    Pulmonary nodules  No prior improvement of respiratory symptoms with trial of Spiriva  CT lung cancer screening from 2023 with stable small nodules on the right lung the largest measuring 5 mm in the upper lobe, stable 6 mm nodule in the left lower lobe with stable subsolid nodule in the left upper lobe, stable emphysematous changes, no lymphadenopathy of the mediastinum  Currently on as needed albuterol, encouraged patient to use more regularly over the next several days  Rapid COVID, flu, RSV testing negative in the office today  Patient with intolerance to prednisone.  States Z-Arnaud's have not been effective in the past  Start renally dosed levofloxacin (calculated creatinine clearance of 49 mL/min)         Chronic insomnia  Controlled substance agreement previously signed  Urine drug screen previously performed which was positive for benzodiazepines consistent with use of temazepam  Continue 
ENAMEL PROTECT 1.1-5 % GEL PLEASE SEE ATTACHED FOR DETAILED DIRECTIONS Yes Ha Soares MD   meloxicam (MOBIC) 7.5 MG tablet Take 1 tablet by mouth daily as needed for Pain Take with food.  Avoid other pain medications except for Tylenol while on this medicine Yes David Howe DO   B Complex Vitamins (B COMPLEX 1 PO) Orally Yes aH Soares MD   Calcium Carb-Cholecalciferol 500-10 MG-MCG TABS 1 tab Oral Yes Ha Soares MD   Multiple Vitamins-Minerals (CENTRUM SILVER 50+WOMEN) TABS Orally Yes Ha Soares MD   hydrocortisone 2.5 % cream Apply topically 2 times daily. Yes David Howe DO   vitamin D3 (CHOLECALCIFEROL) 10 MCG (400 UNIT) TABS tablet Take 1 tablet by mouth Yes Ha Soares MD   ascorbic acid (VITAMIN C) 500 MG tablet Take 1 tablet by mouth Yes Automatic Reconciliation, Ar   tiZANidine (ZANAFLEX) 2 MG tablet Take 1 tablet by mouth 3 times daily as needed (back pain)  Patient not taking: Reported on 10/23/2024  Sally Kruger, PASARATH   rosuvastatin (CRESTOR) 20 MG tablet Take 1 tablet by mouth at bedtime  Patient not taking: Reported on 10/23/2024  David Howe DO   ipratropium (ATROVENT HFA) 17 MCG/ACT inhaler Inhale 2 puffs into the lungs in the morning and 2 puffs at noon and 2 puffs in the evening and 2 puffs before bedtime.  Patient not taking: Reported on 10/23/2024  David Howe DO   Hyoscyamine Sulfate SL (LEVSIN/SL) 0.125 MG SUBL Place 1 tablet under the tongue every 6 hours as needed (abdominal pain)  Patient not taking: Reported on 1/21/2025  David Howe DO       CareTeam (Including outside providers/suppliers regularly involved in providing care):   Patient Care Team:  David Howe DO as PCP - General  David Howe DO as PCP - Empaneled Provider     Recommendations for Preventive Services Due: see orders and patient instructions/AVS.  Recommended screening schedule for the next 5-10 years is provided to the patient in written

## 2025-01-22 ENCOUNTER — APPOINTMENT (OUTPATIENT)
Dept: PHYSICAL THERAPY | Age: 80
End: 2025-01-22
Payer: MEDICARE

## 2025-01-22 NOTE — ADDENDUM NOTE
Addended by: WILLIAM VÁZQUEZ on: 1/21/2025 07:17 PM     Modules accepted: Orders, Level of Service

## 2025-01-29 ENCOUNTER — HOSPITAL ENCOUNTER (OUTPATIENT)
Dept: PHYSICAL THERAPY | Age: 80
Setting detail: RECURRING SERIES
Discharge: HOME | End: 2025-02-01
Payer: MEDICARE

## 2025-01-29 PROCEDURE — 97140 MANUAL THERAPY 1/> REGIONS: CPT

## 2025-01-29 ASSESSMENT — PAIN DESCRIPTION - LOCATION: LOCATION: SHOULDER

## 2025-01-29 ASSESSMENT — PAIN SCALES - GENERAL: PAINLEVEL_OUTOF10: 4

## 2025-01-29 ASSESSMENT — PAIN DESCRIPTION - ORIENTATION: ORIENTATION: RIGHT

## 2025-01-29 NOTE — PROGRESS NOTES
Patient tolerated treatment well with improving overall mobility  Communication/Consultation:  None today  Equipment provided today:  None  Recommendations/Intent for next treatment session: Next visit will focus on manual therapy.    Total Treatment Billable Duration:  40 minutes  Time In: 1430  Time Out: 1510     NATALIE TRONCOSO PT       Post Session Pain  Charge Capture  MyChart    Future Appointments   Date Time Provider Department Center   2/12/2025 10:30 AM Natalie Troncoso PT [facility]   7/22/2025 11:20 AM David Howe DO MAT [facility]   10/27/2025 10:20 AM Ramon Riddle MD END [facility]

## 2025-02-05 ENCOUNTER — TELEPHONE (OUTPATIENT)
Dept: INTERNAL MEDICINE CLINIC | Facility: CLINIC | Age: 80
End: 2025-02-05

## 2025-02-05 DIAGNOSIS — G89.29 CHRONIC BILATERAL LOW BACK PAIN WITHOUT SCIATICA: Primary | ICD-10-CM

## 2025-02-05 DIAGNOSIS — M54.50 CHRONIC BILATERAL LOW BACK PAIN WITHOUT SCIATICA: Primary | ICD-10-CM

## 2025-02-05 NOTE — TELEPHONE ENCOUNTER
Pt called stated that the PT told her they didn't have a referral in the system. If possible to fax over the referral to [phone]

## 2025-02-06 NOTE — TELEPHONE ENCOUNTER
Pt called back and states it is for her back-got message from [facility] to contact them but said they didn't have an order for it so she didn't contact them

## 2025-02-06 NOTE — TELEPHONE ENCOUNTER
New physical therapy referral placed given patient's history of chronic bilateral low back pain without sciatica with prior imaging showing anterolisthesis of L4 on 5.    Orders Placed This Encounter    BS - Physical Therapy, [facility]     Referral Priority:   Routine     Referral Type:   Eval and Treat     Referral Reason:   Physical Therapy     Requested Specialty:   Physical Therapy     Number of Visits Requested:   1

## 2025-02-12 ENCOUNTER — APPOINTMENT (OUTPATIENT)
Dept: PHYSICAL THERAPY | Age: 80
End: 2025-02-12
Payer: MEDICARE

## 2025-02-19 ENCOUNTER — HOSPITAL ENCOUNTER (OUTPATIENT)
Dept: PHYSICAL THERAPY | Age: 80
Setting detail: RECURRING SERIES
Discharge: HOME | End: 2025-02-22
Payer: MEDICARE

## 2025-02-19 PROCEDURE — 97140 MANUAL THERAPY 1/> REGIONS: CPT

## 2025-02-19 ASSESSMENT — PAIN DESCRIPTION - ORIENTATION: ORIENTATION: RIGHT

## 2025-02-19 ASSESSMENT — PAIN SCALES - GENERAL: PAINLEVEL_OUTOF10: 4

## 2025-02-19 ASSESSMENT — PAIN DESCRIPTION - LOCATION: LOCATION: SHOULDER

## 2025-02-19 NOTE — PROGRESS NOTES
Sally Chun  : 1945  Primary: Medicare Part A And B  Secondary: AARP HE[employer] C[employer] MEDICARE SUPP [facility] @ [address]IA James  [facility] [address]  Phone: [phone]  Fax: [phone] Plan Frequency: 1 time per week for every other week for 90 days             PT Visit Info:    Total # of Visits to Date: 50  Progress Note Counter: 3  Progress Note Due Date: 25  No Show: 1 (2024)  Canceled Appointment: 1 (24)       OUTPATIENT PHYSICAL THERAPY:OP NOTE TYPE: Treatment Note 2025       Episode   Appt Desk       Treatment Diagnosis:  Cervicalgia (M54.2)  Medical/Referring Diagnosis:  Cervicalgia (M54.2)  Referring Physician:  David Howe DO MD Orders:  PT Eval and Treat   Date of Onset:  No data recorded     Allergies:  Latex; Prednisone; Adhesive tape; Antihistamines, chlorpheniramine-type; Ibandronate; Iodinated contrast media; Triamcinolone; Alendronate sodium; and Triprolidine-pseudoephedrine  Restrictions/Precautions:      None  Interventions Planned (Treatment may consist of any combination of the following):    Cold  Heat  Home Exercise Program (HEP)  Manual Therapy  Neuromuscular Re-education/Strengthening  Range of Motion (ROM)  Therapeutic Activites  Therapeutic Exercise/Strengthening   Subjective Comments:    Patient reports she is hurting in her neck and shoulders.    Initial: Right Shoulder 4/10  Post Session: Right Shoulder 4/10  Medications Last Reviewed:  2025  Updated Objective Findings:  None Today  Treatment   MANUAL THERAPY: (40 minutes): Joint mobilization and Soft tissue mobilization was utilized and necessary because of the patient's restricted joint motion, painful spasm, loss of articular motion and restricted motion of soft tissue.     Treatment/Session Summary:    Treatment Assessment:     Patient tolerated treatment well with improving overall mobility noted after treatment.  Communication/Consultation:

## 2025-02-19 NOTE — THERAPY RECERTIFICATION
Sally Chun  : 1945  Primary: Medicare Part A And B  Secondary: AARP HE[employer] C[employer] MEDICARE SUPP [facility] @ [address] SORIA James  [facility] [address]  Phone: [phone]  Fax: [phone]               PT Visit Info:    Total # of Visits to Date: 50  Progress Note Counter: 3  Progress Note Due Date: 25  No Show: 1 (2024)  Canceled Appointment: 1 (24)      OUTPATIENT PHYSICAL THERAPY:Re-evaluation 2025               Episode  Appt Desk         Treatment Diagnosis:  Cervicalgia (M54.2)  Medical/Referring Diagnosis:  Cervicalgia (M54.2)  Referring Physician:  David Howe DO MD Orders:  PT Eval and Treat   Return MD Appt:  TBD  Date of Onset:  No data recorded     Allergies:  Latex; Prednisone; Adhesive tape; Antihistamines, chlorpheniramine-type; Ibandronate; Iodinated contrast media; Triamcinolone; Alendronate sodium; and Triprolidine-pseudoephedrine  Restrictions/Precautions:     None  Medications Last Reviewed:  2025     SUBJECTIVE   History of Injury/Illness (Reason for Referral):  2024 (Recertification): Patient reports her neck has been more painful lately.  She's trying to be more active during the week.    2025 (Progress Note): Patient reports her neck has been more painful lately.    2025 (Recertification) Patient reports she has been having more pain in her neck and shoulders lately.  She reports she has been sick and the weather isn't helping.    Initial: Right Shoulder 4/10   Post Session: Right Shoulder 4/10  Past Medical History/Comorbidities:   Ms. Chun  has a past medical history of Abnormal Pap smear, Anxiety, Back problem, COPD (chronic obstructive pulmonary disease) (HCC), Depression, Hyperthyroidism, Insomnia, Irritable bowel syndrome, and Osteopenia.  Ms. Chun  has a past surgical history that includes Tonsillectomy; orthopedic surgery; Salpingo-oophorectomy (Right, 2014); and

## 2025-02-24 NOTE — PROGRESS NOTES
"Daily Note     Today's date: 2025  Patient name: Sylvain Choi  : 1978  MRN: [id number]  Referring provider: Davin Arias PA*  Dx:   Encounter Diagnosis     ICD-10-CM    1. Status post left rotator cuff repair  Z98.890       2. Traumatic complete tear of left rotator cuff, initial encounter  S46.012A       3. Acute pain of left shoulder  M25.512                      Subjective: Pt states his shoulder is achy but intensity of ache cont to improve with time. He feels stiff but has cont to try to do more and stay up to date on doing HEP.       Objective: See treatment diary below      Assessment: Tolerated treatment well. Patient  cont to be most stiff into flexion and abduction. Improves by end of session after PROM and AAROM w/ minimal cueing for form. No pain during TE for scapular and RC muscles.      Plan: Continue per plan of care.      Precautions: RC repair protocol in op notes (attached to chart)  Dx: LEFT RC repair and subacromial decompression 24    Manuals 2/24    1/27 2/3 2/7 2/10 2/17 2/21   Shoulder PROM 12 min    12 mins 12 mins 10 mins 10 mins 12 mins 12 min                                          Neuro Re-Ed             Pendulums     4x10\" 4x10\"        Pulleys 2x15 flex and abd      2x10x5\" 2x10x5\" flex  2x10x5\" flex Add abd   Cane AAROM flexion (standing) and ER (supine) 1x15 flexion      2x5x5\" ea movement 1x10x5\" ea movement 1x15x5\" ea movement 1x15x5\" ea  movement   Seated scap retraction      2x10x5\"       Standing scap ret w/ arms at side     3x10x5\" 2x10x5\"       Seated c/s retraction             Self upper trap stretch on L     2x20\" ea        Elbow AROM 3# 3x10    1# 2x10 1# 3x10 2# 2x10 2# 2x10    3# 1x10 2# 1x10    3# 2x10 3#  3x10   Gripping     G digigrip     1x10 elbow @ 90    1x10 elbow @ 0 B digigrip     2x10 elbow @ 90    2x10 elbow @ 0       Wall slides 3x5        1x10 1x10                             Ther Ex             Wrist ext and flex     1# 2x10 ea " Anai Paniagua  : 1945  Primary:   Secondary:  [address] at [place] [address] [facility], [address] [place] [address] [facility] [address] [facility]  [phone]   [phone]     OUTPATIENT PHYSICAL THERAPY: Daily Treatment Note 3/11/2021  Visit Count:  12    ICD-10: Treatment Diagnosis: Low back pain (M54.5)  Precautions/Allergies:   Adhesive, Alendronate sodium, Antihistamine [triprolidine-pseudoephedrine], and Boniva [ibandronate]   TREATMENT PLAN:  Effective Dates: 2020 TO 3/18/2021 (90 days). Frequency/Duration: 2 times a week for 90 Day(s)    Pre-treatment Symptoms/Complaints: \"It feels locked up today. I am going to get a cortisone injection in my back tomorrow\". Pain: Initial: Pain Intensity 1: 3  Pain Location 1: Back  Pain Orientation 1: Lower, Left  Post Session:  3/10   Medications Last Reviewed:  3/11/2021  Updated Objective Findings:  None Today  TREATMENT:     MANUAL THERAPY: (15 minutes): Soft tissue mobilization was utilized and necessary because of the patient's painful spasm. Patient received trigger point release along bilateral SI region/piriformis to  pain. THERAPEUTIC EXERCISE: (25 minutes):  Exercises per grid below to improve strength. Required minimal verbal cues to promote proper body alignment. Progressed repetitions as indicated.    Date:  3/5/21 Date:  3/11/21   Activity/Exercise Parameters Parameters   Pelvic tilts X X   Bridging  X X   Double knee to chest stretch 5 x 10 sec holds Bilaterally 5 x 10 sec holds Bilaterally   Supine piriformis stretch 5 x 10 sec holds Bilaterally 5 x 10 sec holds Bilaterally   Nustep Level 4 x 6 minutes X   Supine hamstring stretch with ankle pumps 3 reps with 10 ankle pumps bilateral  3 reps with 10 ankle pumps bilateral    Supine straight leg raise 2x10 reps bilateral 2# 2x10 reps bilateral 2#   Supine isometric hip flexion 5 x 10 sec holds Bilaterally 15 x 10 sec holds Bilaterally   Double knee lifts 2x10 reps "movement 1# 2x10 ea movement       Shoulder ER iso @ 0d 2x10x5\"      1x10x5\" 2x10x5\" 2x10x5\" 2x10x5\"   Shoulder IR iso @ 0d 2x10x5\"       2x10x5\" 2x10x5\" 2x10x5\"   TB neutral ER walk out 2x5 laps            TB neutral IR walk out 2x5 laps                                                   Ther Activity                                       Gait Training                                       Modalities                                            " 2x10 reps   Quadriped with leg lifts  2x10 reps bilateral  2x10 reps bilateral    Wall squats with red ball X X   Prone hip extension X X   Supine lower trunk rotation 15 reps bilateral  15 reps bilateral                       [employer] Portal  Treatment/Session Summary:    · Response to Treatment:  Patient tolerated treatment well. · Communication/Consultation:  None today  · Equipment provided today:  None today  · Recommendations/Intent for next treatment session: Next visit will focus on core strengthening, stretching exercises, and manual therapy.     Total Treatment Billable Duration:  40 minutes  PT Patient Time In/Time Out  Time In: 0805  Time Out: Hallie Howe 35, PT    Future Appointments   Date Time Provider Radha Bautista   3/19/2021  9:30 AM [facility]   3/24/2021  8:45 AM Rell Carrizales PT [facility]   3/26/2021  9:30 AM Ines Lee, PT [facility]   9/27/2021  1:40 PM Bella Aragon MD END [facility]

## 2025-03-03 ENCOUNTER — HOSPITAL ENCOUNTER (OUTPATIENT)
Dept: PHYSICAL THERAPY | Age: 80
Setting detail: RECURRING SERIES
Discharge: HOME | End: 2025-03-06
Attending: STUDENT IN AN ORGANIZED HEALTH CARE EDUCATION/TRAINING PROGRAM
Payer: MEDICARE

## 2025-03-03 DIAGNOSIS — R26.2 DIFFICULTY IN WALKING, NOT ELSEWHERE CLASSIFIED: ICD-10-CM

## 2025-03-03 DIAGNOSIS — M54.51 VERTEBROGENIC LOW BACK PAIN: Primary | ICD-10-CM

## 2025-03-03 PROCEDURE — 97140 MANUAL THERAPY 1/> REGIONS: CPT

## 2025-03-03 PROCEDURE — 97161 PT EVAL LOW COMPLEX 20 MIN: CPT

## 2025-03-03 PROCEDURE — 97110 THERAPEUTIC EXERCISES: CPT

## 2025-03-03 ASSESSMENT — PAIN DESCRIPTION - LOCATION: LOCATION: BACK;HIP;KNEE

## 2025-03-03 ASSESSMENT — PAIN SCALES - GENERAL: PAINLEVEL_OUTOF10: 3

## 2025-03-03 ASSESSMENT — PAIN DESCRIPTION - ORIENTATION: ORIENTATION: LEFT

## 2025-03-03 ASSESSMENT — PAIN DESCRIPTION - DESCRIPTORS: DESCRIPTORS: ACHING

## 2025-03-03 NOTE — THERAPY EVALUATION
Pain Questionnaire demonstrating improvement.  Patient will increase abdominal strength greater than or equal to 4/5 to improve ease with mobility.          Medical Necessity:   > Patient is expected to demonstrate progress in strength, range of motion, and pain to improve ease with activities of daily living.  Reason For Services/Other Comments:  > Patient continues to require skilled intervention due to increased back pain interfering with activities of daily living.      Regarding Sally BARNETT Paulateagan's therapy, I certify that the treatment plan above will be carried out by a therapist or under their direction.  Thank you for this referral,  DARON MONTESINOS, PT     Referring Physician Signature: David Howe,  _______________________________ Date _____________        Charge Capture  Events  Appt Desk  Attendance Report

## 2025-03-03 NOTE — PROGRESS NOTES
Sally Chun  : 1945  Primary: Medicare Part A And B (Medicare)  Secondary: [employer]ARP HE[employer] CARE MEDICARE SUPP [facility] @ [address]  [facility] [address]  Phone: [phone]  Fax: [phone] Plan Frequency: 1-2 times a week for 90 days    Plan of Care/Certification Expiration Date: 25        Plan of Care/Certification Expiration Date:  Plan of Care/Certification Expiration Date: 25    Frequency/Duration:   Plan Frequency: 1-2 times a week for 90 days      Time In/Out:   Time In: 904  Time Out: 945      PT Visit Info:    Progress Note Due Date: 25  Total # of Visits to Date: 1  Progress Note Counter: 1  No Show: 1 (2024)  Canceled Appointment: 2 (2024)      Visit Count:  67    OUTPATIENT PHYSICAL THERAPY:   Treatment Note 3/3/2025       Episode  (Low Back Pain)               Treatment Diagnosis:    Vertebrogenic low back pain  Difficulty in walking, not elsewhere classified  Medical/Referring Diagnosis:    Chronic bilateral low back pain without sciatica    Referring Physician:  David Howe DO MD Orders:  PT Eval and Treat   Return MD Appt:  Unknown    Date of Onset:  Onset Date:  (Chronic-Worsened over the past couple of months)   Allergies:   Latex; Prednisone; Adhesive tape; Antihistamines, chlorpheniramine-type; Ibandronate; Iodinated contrast media; Triamcinolone; Alendronate sodium; and Triprolidine-pseudoephedrine  Restrictions/Precautions:   None      Interventions Planned (Treatment may consist of any combination of the following):     See Assessment Note    Subjective Comments:   Patient complains of left lower back/hip/knee pain.   Initial Pain Level: Left Back, Hip, Knee 3/10  Post Session Pain Level: Left  Back, Hip, Knee 3/10  Medications Last Reviewed: 3/3/2025  Updated Objective Findings:  See Evaluation Note from today  Treatment   THERAPEUTIC EXERCISE: (10 minutes):    Exercises per grid below to improve

## 2025-03-05 ENCOUNTER — HOSPITAL ENCOUNTER (OUTPATIENT)
Dept: PHYSICAL THERAPY | Age: 80
Setting detail: RECURRING SERIES
Discharge: HOME | End: 2025-03-08
Payer: MEDICARE

## 2025-03-05 PROCEDURE — 97140 MANUAL THERAPY 1/> REGIONS: CPT

## 2025-03-05 ASSESSMENT — PAIN DESCRIPTION - LOCATION: LOCATION: SHOULDER

## 2025-03-05 ASSESSMENT — PAIN SCALES - GENERAL: PAINLEVEL_OUTOF10: 4

## 2025-03-05 ASSESSMENT — PAIN DESCRIPTION - ORIENTATION: ORIENTATION: RIGHT

## 2025-03-05 NOTE — PROGRESS NOTES
today  Equipment provided today:  None  Recommendations/Intent for next treatment session: Next visit will focus on manual therapy.    Total Treatment Billable Duration:  40 minutes  Time In: 0900  Time Out: 0940     SONNY TRONCOSO, PT       Post Session Pain  Charge Capture  [facility]    Future Appointments   Date Time Provider Department Center   3/11/2025  8:15 AM Madai Lyon, PT [facility]   3/19/2025 10:30 AM Sonny Troncoso, PT [facility]   7/22/2025 11:20 AM David Howe DO MAT [facility] DE[facility]   10/27/2025 10:20 AM Ramon Riddle MD END [facility]

## 2025-03-11 ENCOUNTER — HOSPITAL ENCOUNTER (OUTPATIENT)
Dept: PHYSICAL THERAPY | Age: 80
Setting detail: RECURRING SERIES
Discharge: HOME | End: 2025-03-14
Attending: STUDENT IN AN ORGANIZED HEALTH CARE EDUCATION/TRAINING PROGRAM
Payer: MEDICARE

## 2025-03-11 PROCEDURE — 97140 MANUAL THERAPY 1/> REGIONS: CPT

## 2025-03-11 PROCEDURE — 97110 THERAPEUTIC EXERCISES: CPT

## 2025-03-11 ASSESSMENT — PAIN DESCRIPTION - DESCRIPTORS: DESCRIPTORS: ACHING

## 2025-03-11 ASSESSMENT — PAIN DESCRIPTION - LOCATION: LOCATION: BACK;HIP;KNEE

## 2025-03-11 ASSESSMENT — PAIN DESCRIPTION - ORIENTATION: ORIENTATION: LEFT

## 2025-03-11 ASSESSMENT — PAIN SCALES - GENERAL: PAINLEVEL_OUTOF10: 3

## 2025-03-11 NOTE — PROGRESS NOTES
Sally Chun  : 1945  Primary: Medicare Part A And B (Medicare)  Secondary: [employer] HE[employer] MEDICARE SUPP [facility] @ [address]  [facility] [address]  Phone: [phone]  Fax: [phone] Plan Frequency: 1-2 times a week for 90 days    Plan of Care/Certification Expiration Date: 25        Plan of Care/Certification Expiration Date:  Plan of Care/Certification Expiration Date: 25    Frequency/Duration:   Plan Frequency: 1-2 times a week for 90 days      Time In/Out:   Time In: 0820  Time Out: 0900      PT Visit Info:    Progress Note Due Date: 25  Total # of Visits to Date: 2  Progress Note Counter: 2      Visit Count:  2    OUTPATIENT PHYSICAL THERAPY:   Treatment Note 3/11/2025       Episode  (Low Back Pain)               Treatment Diagnosis:    Vertebrogenic low back pain  Difficulty in walking, not elsewhere classified  Medical/Referring Diagnosis:    Chronic bilateral low back pain without sciatica    Referring Physician:  David Howe DO MD Orders:  PT Eval and Treat   Return MD Appt:  Unknown    Date of Onset:  Onset Date:  (Chronic-Worsened over the past couple of months)   Allergies:   Latex; Prednisone; Adhesive tape; Antihistamines, chlorpheniramine-type; Ibandronate; Iodinated contrast media; Triamcinolone; Alendronate sodium; and Triprolidine-pseudoephedrine  Restrictions/Precautions:   None      Interventions Planned (Treatment may consist of any combination of the following):     See Assessment Note    Subjective Comments:   Patient reports she notices her back pain the most at night.  Initial Pain Level: Left Back, Hip, Knee 3/10  Post Session Pain Level: Left  Back, Hip, Knee 3/10  Medications Last Reviewed: 3/11/2025  Updated Objective Findings:  None Today  Treatment   THERAPEUTIC EXERCISE: (15 minutes):    Exercises per grid below to improve mobility and strength.  Required minimal verbal cues to promote proper

## 2025-03-19 ENCOUNTER — HOSPITAL ENCOUNTER (OUTPATIENT)
Dept: PHYSICAL THERAPY | Age: 80
Setting detail: RECURRING SERIES
Discharge: HOME | End: 2025-03-22
Attending: STUDENT IN AN ORGANIZED HEALTH CARE EDUCATION/TRAINING PROGRAM
Payer: MEDICARE

## 2025-03-19 PROCEDURE — 97140 MANUAL THERAPY 1/> REGIONS: CPT

## 2025-03-19 ASSESSMENT — PAIN SCALES - GENERAL: PAINLEVEL_OUTOF10: 4

## 2025-03-19 ASSESSMENT — PAIN DESCRIPTION - ORIENTATION: ORIENTATION: RIGHT

## 2025-03-19 ASSESSMENT — PAIN DESCRIPTION - LOCATION: LOCATION: SHOULDER

## 2025-03-20 LAB — IMMUNOLOGIST REVIEW: NORMAL

## 2025-03-25 ENCOUNTER — HOSPITAL ENCOUNTER (OUTPATIENT)
Dept: PHYSICAL THERAPY | Age: 80
Setting detail: RECURRING SERIES
Discharge: HOME | End: 2025-03-28
Attending: STUDENT IN AN ORGANIZED HEALTH CARE EDUCATION/TRAINING PROGRAM
Payer: MEDICARE

## 2025-03-25 LAB — IMMUNOLOGIST REVIEW: NORMAL

## 2025-03-25 PROCEDURE — 97110 THERAPEUTIC EXERCISES: CPT

## 2025-03-25 PROCEDURE — 97140 MANUAL THERAPY 1/> REGIONS: CPT

## 2025-03-25 ASSESSMENT — PAIN SCALES - GENERAL: PAINLEVEL_OUTOF10: 3

## 2025-03-25 ASSESSMENT — PAIN DESCRIPTION - LOCATION: LOCATION: BACK;HIP

## 2025-03-25 ASSESSMENT — PAIN DESCRIPTION - DESCRIPTORS: DESCRIPTORS: ACHING

## 2025-03-25 ASSESSMENT — PAIN DESCRIPTION - ORIENTATION: ORIENTATION: LEFT

## 2025-03-25 NOTE — PROGRESS NOTES
Sally Chun  : 1945  Primary: Medicare Part A And B (Medicare)  Secondary: [employer] HE[employer] C[employer] MEDICARE SUPP [facility] @ [address]  [facility] [address]  Phone: [phone]  Fax: [phone] Plan Frequency: 1-2 times a week for 90 days    Plan of Care/Certification Expiration Date: 25        Plan of Care/Certification Expiration Date:  Plan of Care/Certification Expiration Date: 25    Frequency/Duration:   Plan Frequency: 1-2 times a week for 90 days      Time In/Out:   Time In: 0820  Time Out: 0900      PT Visit Info:    Progress Note Due Date: 25  Total # of Visits to Date: 3  Progress Note Counter: 3      Visit Count:  3    OUTPATIENT PHYSICAL THERAPY:   Treatment Note 3/25/2025       Episode  (Low Back Pain)               Treatment Diagnosis:    Vertebrogenic low back pain  Difficulty in walking, not elsewhere classified  Medical/Referring Diagnosis:    Chronic bilateral low back pain without sciatica    Referring Physician:  David Howe DO MD Orders:  PT Eval and Treat   Return MD Appt:  Unknown    Date of Onset:  Onset Date:  (Chronic-Worsened over the past couple of months)   Allergies:   Latex; Prednisone; Adhesive tape; Antihistamines, chlorpheniramine-type; Ibandronate; Iodinated contrast media; Triamcinolone; Alendronate sodium; and Triprolidine-pseudoephedrine  Restrictions/Precautions:   None      Interventions Planned (Treatment may consist of any combination of the following):     See Assessment Note    Subjective Comments:   \"I am okay\".  Initial Pain Level: Left Back, Hip 3/10  Post Session Pain Level: Left  Back, Hip 3/10  Medications Last Reviewed: 3/25/2025  Updated Objective Findings:  None Today  Treatment   THERAPEUTIC EXERCISE: (25 minutes):    Exercises per grid below to improve mobility and strength.  Required minimal verbal cues to promote proper body alignment.  Progressed repetitions as indicated.

## 2025-04-01 ENCOUNTER — HOSPITAL ENCOUNTER (OUTPATIENT)
Dept: PHYSICAL THERAPY | Age: 80
Setting detail: RECURRING SERIES
Discharge: HOME | End: 2025-04-04
Attending: STUDENT IN AN ORGANIZED HEALTH CARE EDUCATION/TRAINING PROGRAM
Payer: MEDICARE

## 2025-04-01 PROCEDURE — 97140 MANUAL THERAPY 1/> REGIONS: CPT

## 2025-04-01 ASSESSMENT — PAIN DESCRIPTION - LOCATION: LOCATION: SHOULDER

## 2025-04-01 ASSESSMENT — PAIN SCALES - GENERAL: PAINLEVEL_OUTOF10: 4

## 2025-04-01 ASSESSMENT — PAIN DESCRIPTION - ORIENTATION: ORIENTATION: RIGHT

## 2025-04-01 NOTE — PROGRESS NOTES
Sally Chun  : 1945  Primary: Medicare Part A And B  Secondary: [employer] HE[employer] C[employer] MEDICARE SUPP [facility] @ [address]  [facility] [address]  Phone: [phone]  Fax: [phone]               PT Visit Info:    Total # of Visits to Date: 53  Progress Note Counter: 1  Progress Note Due Date: 25  No Show: 1 (2024)  Canceled Appointment: 1 (24)      OUTPATIENT PHYSICAL THERAPY:Progress Report 2025               Episode  Appt Desk         Treatment Diagnosis:  Cervicalgia (M54.2)  Medical/Referring Diagnosis:  Cervicalgia (M54.2)  Referring Physician:  David Howe DO MD Orders:  PT Eval and Treat   Return MD Appt:  TBD  Date of Onset:  Onset Date:  (Chronic-Worsened over the past couple of months)       Allergies:  Latex; Prednisone; Adhesive tape; Antihistamines, chlorpheniramine-type; Ibandronate; Iodinated contrast media; Triamcinolone; Alendronate sodium; and Triprolidine-pseudoephedrine  Restrictions/Precautions:     None  Medications Last Reviewed:  2025     SUBJECTIVE   History of Injury/Illness (Reason for Referral):  2024 (Recertification): Patient reports her neck has been more painful lately.  She's trying to be more active during the week.    2025 (Progress Note): Patient reports her neck has been more painful lately.    2025 (Recertification) Patient reports she has been having more pain in her neck and shoulders lately.  She reports she has been sick and the weather isn't helping.    2025 (Progress Note): Patient reports she is having trouble sleeping at night and so she's been having more pain lately.    Initial: Right Shoulder 4/10   Post Session: Right Shoulder 4/10  Past Medical History/Comorbidities:   Ms. Chun  has a past medical history of Abnormal Pap smear, Anxiety, Back problem, COPD (chronic obstructive pulmonary disease) (HCC), Depression, Hyperthyroidism, Insomnia, Irritable

## 2025-04-01 NOTE — PROGRESS NOTES
Sally Chun  : 1945  Primary: Medicare Part A And B  Secondary: [employer] HE[employer] C[employer] MEDICARE SUPP [facility] @ [address]  [facility] [address]  Phone: [phone]  Fax: [phone]      PT Visit Info:    Total # of Visits to Date: 53  Progress Note Counter: 1  Progress Note Due Date: 25  No Show: 1 (2024)  Canceled Appointment: 1 (24)       OUTPATIENT PHYSICAL THERAPY:OP NOTE TYPE: Treatment Note 2025       Episode   Appt Desk       Treatment Diagnosis:  Cervicalgia (M54.2)  Medical/Referring Diagnosis:  Cervicalgia (M54.2)  Referring Physician:  David Howe DO MD Orders:  PT Eval and Treat   Date of Onset:  Onset Date:  (Chronic-Worsened over the past couple of months)       Allergies:  Latex; Prednisone; Adhesive tape; Antihistamines, chlorpheniramine-type; Ibandronate; Iodinated contrast media; Triamcinolone; Alendronate sodium; and Triprolidine-pseudoephedrine  Restrictions/Precautions:      None  Interventions Planned (Treatment may consist of any combination of the following):    Cold  Heat  Home Exercise Program (HEP)  Manual Therapy  Neuromuscular Re-education/Strengthening  Range of Motion (ROM)  Therapeutic Activites  Therapeutic Exercise/Strengthening   Subjective Comments:    Patient reports she is still working on getting the right mattress.    Initial: Right Shoulder 4/10  Post Session: Right Shoulder 4/10  Medications Last Reviewed:  2025  Updated Objective Findings:  None Today  Treatment   MANUAL THERAPY: (40 minutes): Joint mobilization and Soft tissue mobilization was utilized and necessary because of the patient's restricted joint motion, painful spasm, loss of articular motion and restricted motion of soft tissue.     Treatment/Session Summary:    Treatment Assessment:     Patient tolerated treatment well with improved mobility noted after treatment.  Communication/Consultation:  None today  Equipment

## 2025-04-02 ENCOUNTER — APPOINTMENT (OUTPATIENT)
Dept: PHYSICAL THERAPY | Age: 80
End: 2025-04-02
Attending: STUDENT IN AN ORGANIZED HEALTH CARE EDUCATION/TRAINING PROGRAM
Payer: MEDICARE

## 2025-04-08 ENCOUNTER — HOSPITAL ENCOUNTER (OUTPATIENT)
Dept: PHYSICAL THERAPY | Age: 80
Setting detail: RECURRING SERIES
Discharge: HOME | End: 2025-04-11
Attending: STUDENT IN AN ORGANIZED HEALTH CARE EDUCATION/TRAINING PROGRAM
Payer: MEDICARE

## 2025-04-08 PROCEDURE — 97140 MANUAL THERAPY 1/> REGIONS: CPT

## 2025-04-08 PROCEDURE — 97110 THERAPEUTIC EXERCISES: CPT

## 2025-04-08 ASSESSMENT — PAIN DESCRIPTION - LOCATION: LOCATION: BACK;HIP

## 2025-04-08 ASSESSMENT — PAIN SCALES - GENERAL: PAINLEVEL_OUTOF10: 3

## 2025-04-08 ASSESSMENT — PAIN DESCRIPTION - DESCRIPTORS: DESCRIPTORS: ACHING

## 2025-04-08 ASSESSMENT — PAIN DESCRIPTION - ORIENTATION: ORIENTATION: LEFT

## 2025-04-08 NOTE — PROGRESS NOTES
alignment.  Progressed repetitions as indicated.   Date:  4/8/2025 Date:  3/11/2025 Date:  3/25/25   Activity/Exercise Parameters Parameters Parameters   Piriformis stretch 3x30 sec bilateral 3x30 sec bilateral 3x30 sec bilateral   Supine bilateral knee lifts 2x10 reps 2x10 reps 2x10 reps   Supine hip abduction Light theraband  2x10 reps Light theraband  2x10 reps Medium theraband  2x10 reps   Supine trunk rotation 10 reps bilateral 10 reps bilateral 10 reps bilateral   Supine knee to chest stretch 3x30 sec bilateral 3x30 sec bilateral 3x30 sec bilateral   Bridging 20 reps  20 reps   Supine hip adduction squeeze with ball 2x10 reps  2x10 reps     MANUAL THERAPY: (24 minutes):   Soft tissue mobilization was utilized and necessary because of the patient's painful spasm.   In prone, patient received soft tissue mobilization/trigger point release along bilateral lumbar paraspinals/left piriformis to decrease pain. Skin intact after treatment.     Treatment/Session Summary:    Treatment Assessment:   Patient needed less rest breaks with exercises today.   Patient tolerated treatment well.    Communication/Consultation:  None today  Equipment provided today:  None  Recommendations/Intent for next treatment session: Next visit will focus on stretching exercises, strengthening exercises, manual therapy, and modalities as needed for pain relief.    >Total Treatment Billable Duration:  39 minutes   Time In: 0822  Time Out: 0901     MADAI MONTESINOS PT         Charge Capture  Events  Teach 'n Go Portal  Appt Desk  Attendance Report     Future Appointments   Date Time Provider Department Center   4/15/2025  9:45 AM Natalie Hill, PT [facility]   4/22/2025  9:00 AM Madai Montesinos, PT [facility]   7/22/2025 11:20 AM David Howe DO MAT [facility] DEP   10/27/2025 10:20 AM Ramon Riddle MD END [facility]        
days  Patient will report less back after sitting at work demonstrating improvement.  Goal ongoing.   Patient will score less than or equal to 8 on Modified Oswestry Low Back Pain Questionnaire demonstrating improvement.  Goal ongoing.   Patient will increase abdominal strength greater than or equal to 4/5 to improve ease with mobility.  Goal ongoing.          Medical Necessity:   > Patient is expected to demonstrate progress in strength, range of motion, and pain to improve ease with activities of daily living.  Reason For Services/Other Comments:  > Patient continues to require skilled intervention due to increased back pain interfering with activities of daily living.      Regarding Sally Chun's therapy, I certify that the treatment plan above will be carried out by a therapist or under their direction.  Thank you for this referral,  DARON MONTESINOS, PT     Referring Physician Signature: David Howe DO No Signature is Required for this note.        Charge Capture  Events  Appt Desk  Attendance Report

## 2025-04-14 NOTE — PROGRESS NOTES
Sally Chun (:  1945) is a 80 y.o. female,Established patient, here for evaluation of the following chief complaint(s):  Pelvic Pain (Patient c/o lower abd pain x3 days. She declines burning with urination, but does have dysuria)         Assessment & Plan  UTI symptoms  Urine dip in office today with 1+ leukocyte esterase, positive nitrites, 1+ blood  Urine culture ordered  Given abnormal urinalysis and current symptoms elected start empiric antibiotics with Bactrim  Patient to monitor for worsening symptoms including increased pain, inability to maintain oral intake, fevers, etc.    Orders:    AMB POC URINALYSIS DIP STICK AUTO W/O MICRO    Culture, Urine; Future    sulfamethoxazole-trimethoprim (BACTRIM DS;SEPTRA DS) 800-160 MG per tablet; Take 1 tablet by mouth 2 times daily for 5 days      Return for as scheduled .       Subjective   Patient is a 80-year-old  female who presents to the office today for concerns of a urinary tract infection.  For the past several days patient has had urinary urgency, pain with urination, suprapubic pressure and aching in the pubic region.  Prior to this was noticing a strong odor to her urine.  Sometimes has decreased urine output.  Staying hydrated.  Denies fevers, chills, nausea, vomiting, anorexia, vaginal bleeding or discharge, diarrhea or constipation, melena or hematochezia.  Does suffer from chronic back pain at baseline but no different than her standard.  Has been taking some ibuprofen for her shoulder issue but no other therapies tried.        Review of Systems   Constitutional:  Negative for appetite change, chills and fever.   Gastrointestinal:  Positive for abdominal pain. Negative for anal bleeding, blood in stool, constipation, diarrhea, nausea and vomiting.   Genitourinary:  Positive for dysuria, pelvic pain and urgency. Negative for vaginal bleeding and vaginal discharge.          Objective   Physical Exam  Vitals reviewed.

## 2025-04-15 ENCOUNTER — OFFICE VISIT (OUTPATIENT)
Dept: INTERNAL MEDICINE CLINIC | Facility: CLINIC | Age: 80
End: 2025-04-15
Payer: MEDICARE

## 2025-04-15 ENCOUNTER — HOSPITAL ENCOUNTER (OUTPATIENT)
Dept: PHYSICAL THERAPY | Age: 80
Setting detail: RECURRING SERIES
Discharge: HOME | End: 2025-04-18
Attending: STUDENT IN AN ORGANIZED HEALTH CARE EDUCATION/TRAINING PROGRAM
Payer: MEDICARE

## 2025-04-15 VITALS
DIASTOLIC BLOOD PRESSURE: 74 MMHG | WEIGHT: 112 LBS | TEMPERATURE: 97.7 F | BODY MASS INDEX: 21.99 KG/M2 | HEIGHT: 60 IN | HEART RATE: 93 BPM | OXYGEN SATURATION: 96 % | SYSTOLIC BLOOD PRESSURE: 122 MMHG

## 2025-04-15 DIAGNOSIS — R39.9 UTI SYMPTOMS: Primary | ICD-10-CM

## 2025-04-15 LAB
BILIRUBIN, URINE, POC: NEGATIVE
BLOOD URINE, POC: ABNORMAL
GLUCOSE URINE, POC: NEGATIVE
KETONES, URINE, POC: NEGATIVE
LEUKOCYTE ESTERASE, URINE, POC: ABNORMAL
NITRITE, URINE, POC: POSITIVE
PH, URINE, POC: 6 (ref 4.6–8)
PROTEIN,URINE, POC: ABNORMAL
SPECIFIC GRAVITY, URINE, POC: 1.02 (ref 1–1.03)
URINALYSIS CLARITY, POC: ABNORMAL
URINALYSIS COLOR, POC: YELLOW
UROBILINOGEN, POC: ABNORMAL

## 2025-04-15 PROCEDURE — 1125F AMNT PAIN NOTED PAIN PRSNT: CPT | Performed by: STUDENT IN AN ORGANIZED HEALTH CARE EDUCATION/TRAINING PROGRAM

## 2025-04-15 PROCEDURE — 4004F PT TOBACCO SCREEN RCVD TLK: CPT | Performed by: STUDENT IN AN ORGANIZED HEALTH CARE EDUCATION/TRAINING PROGRAM

## 2025-04-15 PROCEDURE — 1090F PRES/ABSN URINE INCON ASSESS: CPT | Performed by: STUDENT IN AN ORGANIZED HEALTH CARE EDUCATION/TRAINING PROGRAM

## 2025-04-15 PROCEDURE — G8420 CALC BMI NORM PARAMETERS: HCPCS | Performed by: STUDENT IN AN ORGANIZED HEALTH CARE EDUCATION/TRAINING PROGRAM

## 2025-04-15 PROCEDURE — 1159F MED LIST DOCD IN RCRD: CPT | Performed by: STUDENT IN AN ORGANIZED HEALTH CARE EDUCATION/TRAINING PROGRAM

## 2025-04-15 PROCEDURE — 3078F DIAST BP <80 MM HG: CPT | Performed by: STUDENT IN AN ORGANIZED HEALTH CARE EDUCATION/TRAINING PROGRAM

## 2025-04-15 PROCEDURE — 81003 URINALYSIS AUTO W/O SCOPE: CPT | Performed by: STUDENT IN AN ORGANIZED HEALTH CARE EDUCATION/TRAINING PROGRAM

## 2025-04-15 PROCEDURE — 3074F SYST BP LT 130 MM HG: CPT | Performed by: STUDENT IN AN ORGANIZED HEALTH CARE EDUCATION/TRAINING PROGRAM

## 2025-04-15 PROCEDURE — 99213 OFFICE O/P EST LOW 20 MIN: CPT | Performed by: STUDENT IN AN ORGANIZED HEALTH CARE EDUCATION/TRAINING PROGRAM

## 2025-04-15 PROCEDURE — G8399 PT W/DXA RESULTS DOCUMENT: HCPCS | Performed by: STUDENT IN AN ORGANIZED HEALTH CARE EDUCATION/TRAINING PROGRAM

## 2025-04-15 PROCEDURE — 97140 MANUAL THERAPY 1/> REGIONS: CPT

## 2025-04-15 PROCEDURE — 1123F ACP DISCUSS/DSCN MKR DOCD: CPT | Performed by: STUDENT IN AN ORGANIZED HEALTH CARE EDUCATION/TRAINING PROGRAM

## 2025-04-15 PROCEDURE — G8427 DOCREV CUR MEDS BY ELIG CLIN: HCPCS | Performed by: STUDENT IN AN ORGANIZED HEALTH CARE EDUCATION/TRAINING PROGRAM

## 2025-04-15 RX ORDER — SULFAMETHOXAZOLE AND TRIMETHOPRIM 800; 160 MG/1; MG/1
1 TABLET ORAL 2 TIMES DAILY
Qty: 10 TABLET | Refills: 0 | Status: SHIPPED | OUTPATIENT
Start: 2025-04-15 | End: 2025-04-20

## 2025-04-15 ASSESSMENT — PATIENT HEALTH QUESTIONNAIRE - PHQ9
SUM OF ALL RESPONSES TO PHQ QUESTIONS 1-9: 0
2. FEELING DOWN, DEPRESSED OR HOPELESS: NOT AT ALL
SUM OF ALL RESPONSES TO PHQ QUESTIONS 1-9: 0
1. LITTLE INTEREST OR PLEASURE IN DOING THINGS: NOT AT ALL

## 2025-04-15 ASSESSMENT — ENCOUNTER SYMPTOMS
NAUSEA: 0
VOMITING: 0
ABDOMINAL PAIN: 1
ANAL BLEEDING: 0
CONSTIPATION: 0
BLOOD IN STOOL: 0
DIARRHEA: 0

## 2025-04-15 ASSESSMENT — PAIN SCALES - GENERAL: PAINLEVEL_OUTOF10: 4

## 2025-04-15 ASSESSMENT — PAIN DESCRIPTION - ORIENTATION: ORIENTATION: RIGHT

## 2025-04-15 ASSESSMENT — PAIN DESCRIPTION - LOCATION: LOCATION: SHOULDER

## 2025-04-15 NOTE — PROGRESS NOTES
Sally Chun  : 1945  Primary: Medicare Part A And B  Secondary: [employer]ARP HE[employer] CARE MEDICARE SUPP [facility] @ [address]  [facility] [address]  Phone: [phone]  Fax: [phone]      PT Visit Info:    Total # of Visits to Date: 54  Progress Note Counter: 2  Progress Note Due Date: 25  No Show: 1 (2024)  Canceled Appointment: 1 (24)       OUTPATIENT PHYSICAL THERAPY:OP NOTE TYPE: Treatment Note 4/15/2025       Episode   Appt Desk       Treatment Diagnosis:  Cervicalgia (M54.2)  Medical/Referring Diagnosis:  Cervicalgia (M54.2)  Referring Physician:  David Howe DO MD Orders:  PT Eval and Treat   Date of Onset:  Onset Date:  (Chronic-Worsened over the past couple of months)       Allergies:  Latex; Prednisone; Adhesive tape; Antihistamines, chlorpheniramine-type; Ibandronate; Iodinated contrast media; Triamcinolone; Alendronate sodium; and Triprolidine-pseudoephedrine  Restrictions/Precautions:      None  Interventions Planned (Treatment may consist of any combination of the following):    Cold  Heat  Home Exercise Program (HEP)  Manual Therapy  Neuromuscular Re-education/Strengthening  Range of Motion (ROM)  Therapeutic Activites  Therapeutic Exercise/Strengthening   Subjective Comments:    Patient reports her shoulder hurts more than anything else.    Initial: Right Shoulder 4/10  Post Session: Right Shoulder 4/10  Medications Last Reviewed:  4/15/2025  Updated Objective Findings:  None Today  Treatment   MANUAL THERAPY: (40 minutes): Joint mobilization and Soft tissue mobilization was utilized and necessary because of the patient's restricted joint motion, painful spasm, loss of articular motion and restricted motion of soft tissue.     Treatment/Session Summary:    Treatment Assessment:     Patient tolerated treatment well with improving overall mobility.  Communication/Consultation:  None today  Equipment provided today:

## 2025-04-17 ENCOUNTER — RESULTS FOLLOW-UP (OUTPATIENT)
Dept: INTERNAL MEDICINE CLINIC | Facility: CLINIC | Age: 80
End: 2025-04-17

## 2025-04-17 LAB
BACTERIA SPEC CULT: ABNORMAL
SERVICE CMNT-IMP: ABNORMAL

## 2025-04-22 ENCOUNTER — HOSPITAL ENCOUNTER (OUTPATIENT)
Dept: PHYSICAL THERAPY | Age: 80
Setting detail: RECURRING SERIES
Discharge: HOME | End: 2025-04-25
Attending: STUDENT IN AN ORGANIZED HEALTH CARE EDUCATION/TRAINING PROGRAM
Payer: MEDICARE

## 2025-04-22 LAB
ALBUMIN SERPL-MCNC: 3.5 G/DL (ref 3.2–4.6)
ALBUMIN/GLOB SERPL: 1.1 (ref 1–1.9)
ALP SERPL-CCNC: 98 U/L (ref 35–104)
ALT SERPL-CCNC: 29 U/L (ref 8–45)
ANION GAP SERPL CALC-SCNC: 9 MMOL/L (ref 7–16)
AST SERPL-CCNC: 30 U/L (ref 15–37)
BASOPHILS # BLD: 0.04 K/UL (ref 0–0.2)
BASOPHILS NFR BLD: 0.5 % (ref 0–2)
BILIRUB SERPL-MCNC: 0.3 MG/DL (ref 0–1.2)
BUN SERPL-MCNC: 21 MG/DL (ref 8–23)
CALCIUM SERPL-MCNC: 9.9 MG/DL (ref 8.8–10.2)
CHLORIDE SERPL-SCNC: 104 MMOL/L (ref 98–107)
CO2 SERPL-SCNC: 26 MMOL/L (ref 20–29)
CREAT SERPL-MCNC: 0.73 MG/DL (ref 0.6–1.1)
DIFFERENTIAL METHOD BLD: NORMAL
EOSINOPHIL # BLD: 0.21 K/UL (ref 0–0.8)
EOSINOPHIL NFR BLD: 2.6 % (ref 0.5–7.8)
ERYTHROCYTE [DISTWIDTH] IN BLOOD BY AUTOMATED COUNT: 13.8 % (ref 11.9–14.6)
GLOBULIN SER CALC-MCNC: 3.1 G/DL (ref 2.3–3.5)
GLUCOSE SERPL-MCNC: 83 MG/DL (ref 70–99)
HCT VFR BLD AUTO: 44.4 % (ref 35.8–46.3)
HGB BLD-MCNC: 14.5 G/DL (ref 11.7–15.4)
IMM GRANULOCYTES # BLD AUTO: 0.04 K/UL (ref 0–0.5)
IMM GRANULOCYTES NFR BLD AUTO: 0.5 % (ref 0–5)
LYMPHOCYTES # BLD: 1.98 K/UL (ref 0.5–4.6)
LYMPHOCYTES NFR BLD: 24.1 % (ref 13–44)
MCH RBC QN AUTO: 29.7 PG (ref 26.1–32.9)
MCHC RBC AUTO-ENTMCNC: 32.7 G/DL (ref 31.4–35)
MCV RBC AUTO: 90.8 FL (ref 82–102)
MONOCYTES # BLD: 0.75 K/UL (ref 0.1–1.3)
MONOCYTES NFR BLD: 9.1 % (ref 4–12)
NEUTS SEG # BLD: 5.21 K/UL (ref 1.7–8.2)
NEUTS SEG NFR BLD: 63.2 % (ref 43–78)
NRBC # BLD: 0 K/UL (ref 0–0.2)
PLATELET # BLD AUTO: 324 K/UL (ref 150–450)
PMV BLD AUTO: 11.7 FL (ref 9.4–12.3)
POTASSIUM SERPL-SCNC: 4.7 MMOL/L (ref 3.5–5.1)
PROT SERPL-MCNC: 6.6 G/DL (ref 6.3–8.2)
RBC # BLD AUTO: 4.89 M/UL (ref 4.05–5.2)
SODIUM SERPL-SCNC: 139 MMOL/L (ref 136–145)
WBC # BLD AUTO: 8.2 K/UL (ref 4.3–11.1)

## 2025-04-22 PROCEDURE — 97140 MANUAL THERAPY 1/> REGIONS: CPT

## 2025-04-22 PROCEDURE — 97110 THERAPEUTIC EXERCISES: CPT

## 2025-04-22 ASSESSMENT — PAIN DESCRIPTION - DESCRIPTORS: DESCRIPTORS: ACHING

## 2025-04-22 ASSESSMENT — PAIN DESCRIPTION - LOCATION: LOCATION: BACK;HIP

## 2025-04-22 ASSESSMENT — PAIN DESCRIPTION - ORIENTATION: ORIENTATION: LEFT

## 2025-04-22 ASSESSMENT — PAIN SCALES - GENERAL: PAINLEVEL_OUTOF10: 4

## 2025-04-22 NOTE — PROGRESS NOTES
Sally Chun  : 1945  Primary: Medicare Part A And B (Medicare)  Secondary: [employer]ARP HE[employer] C[employer] MEDICARE SUPP [facility] @ [address]  [facility] [address]  Phone: [phone]  Fax: [phone] Plan Frequency: 1-2 times a week for 90 days    Plan of Care/Certification Expiration Date: 25        Plan of Care/Certification Expiration Date:  Plan of Care/Certification Expiration Date: 25    Frequency/Duration:   Plan Frequency: 1-2 times a week for 90 days      Time In/Out:   Time In: 905  Time Out: 945      PT Visit Info:    Progress Note Due Date: 25  Total # of Visits to Date: 5  Progress Note Counter: 2      Visit Count:  5    OUTPATIENT PHYSICAL THERAPY:   Treatment Note 2025       Episode  (Low Back Pain)               Treatment Diagnosis:    Vertebrogenic low back pain  Difficulty in walking, not elsewhere classified  Medical/Referring Diagnosis:    Chronic bilateral low back pain without sciatica    Referring Physician:  David Howe DO MD Orders:  PT Eval and Treat   Return MD Appt:  Unknown    Date of Onset:  Onset Date:  (Chronic-Worsened over the past couple of months)   Allergies:   Latex; Prednisone; Adhesive tape; Antihistamines, chlorpheniramine-type; Ibandronate; Iodinated contrast media; Triamcinolone; Alendronate sodium; and Triprolidine-pseudoephedrine  Restrictions/Precautions:   None      Interventions Planned (Treatment may consist of any combination of the following):     See Assessment Note    Subjective Comments:   \"My sciatic pain is rough today\".  Initial Pain Level: Left Back, Hip 4/10  Post Session Pain Level: Left  Back, Hip 3/10  Medications Last Reviewed: 2025  Updated Objective Findings:  None Today  Treatment   THERAPEUTIC EXERCISE: (15 minutes):    Exercises per grid below to improve mobility and strength.  Required minimal verbal cues to promote proper body alignment.  Progressed repetitions

## 2025-04-23 LAB — TSH W FREE THYROID IF ABNORMAL: 0.52 UIU/ML (ref 0.27–4.2)

## 2025-04-24 LAB
ALBUMIN SERPL ELPH-MCNC: 3.6 G/DL (ref 2.9–4.4)
ALBUMIN/GLOB SERPL: 1.3 (ref 0.7–1.7)
ALPHA1 GLOB SERPL ELPH-MCNC: 0.3 G/DL (ref 0–0.4)
ALPHA2 GLOB SERPL ELPH-MCNC: 0.9 G/DL (ref 0.4–1)
B-GLOBULIN SERPL ELPH-MCNC: 1.1 G/DL (ref 0.7–1.3)
GAMMA GLOB SERPL ELPH-MCNC: 0.5 G/DL (ref 0.4–1.8)
GLOBULIN SER CALC-MCNC: 2.7 G/DL (ref 2.2–3.9)
M PROTEIN SERPL ELPH-MCNC: NORMAL G/DL
PROT PATTERN SERPL ELPH-IMP: NORMAL
PROT SERPL-MCNC: 6.3 G/DL (ref 6–8.5)

## 2025-04-26 LAB — IGE SERPL-ACNC: 38 IU/ML (ref 6–495)

## 2025-04-29 ENCOUNTER — HOSPITAL ENCOUNTER (OUTPATIENT)
Dept: PHYSICAL THERAPY | Age: 80
Setting detail: RECURRING SERIES
Discharge: HOME | End: 2025-05-02
Attending: STUDENT IN AN ORGANIZED HEALTH CARE EDUCATION/TRAINING PROGRAM
Payer: MEDICARE

## 2025-04-29 PROCEDURE — 97140 MANUAL THERAPY 1/> REGIONS: CPT

## 2025-04-29 ASSESSMENT — PAIN SCALES - GENERAL: PAINLEVEL_OUTOF10: 4

## 2025-04-29 ASSESSMENT — PAIN DESCRIPTION - ORIENTATION: ORIENTATION: RIGHT

## 2025-04-29 ASSESSMENT — PAIN DESCRIPTION - LOCATION: LOCATION: SHOULDER

## 2025-04-29 NOTE — PROGRESS NOTES
Sally Chun  : 1945  Primary: Medicare Part A And B  Secondary: AARP HEALTH CARE MEDICARE SUPP [facility] @ [address]  [facility] [address]  Phone: [phone]  Fax: [phone]      PT Visit Info:    Total # of Visits to Date: 55  Progress Note Counter: 3  Progress Note Due Date: 25  No Show: 1 (2024)  Canceled Appointment: 1 (24)       OUTPATIENT PHYSICAL THERAPY:OP NOTE TYPE: Treatment Note 2025       Episode   Appt Desk       Treatment Diagnosis:  Cervicalgia (M54.2)  Medical/Referring Diagnosis:  Cervicalgia (M54.2)  Referring Physician:  David Howe DO MD Orders:  PT Eval and Treat   Date of Onset:  Onset Date:  (Chronic-Worsened over the past couple of months)       Allergies:  Latex; Prednisone; Adhesive tape; Antihistamines, chlorpheniramine-type; Ibandronate; Iodinated contrast media; Triamcinolone; Alendronate sodium; and Triprolidine-pseudoephedrine  Restrictions/Precautions:      None  Interventions Planned (Treatment may consist of any combination of the following):    Cold  Heat  Home Exercise Program (HEP)  Manual Therapy  Neuromuscular Re-education/Strengthening  Range of Motion (ROM)  Therapeutic Activites  Therapeutic Exercise/Strengthening   Subjective Comments:    Patient reports she is tight in both shoulders today.    Initial: Right Shoulder 4/10  Post Session: Right Shoulder 4/10  Medications Last Reviewed:  2025  Updated Objective Findings:  None Today  Treatment   MANUAL THERAPY: (40 minutes): Joint mobilization and Soft tissue mobilization was utilized and necessary because of the patient's restricted joint motion, painful spasm, loss of articular motion and restricted motion of soft tissue.     Treatment/Session Summary:    Treatment Assessment:     Patient tolerated treatment well with improving overall mobility.  Communication/Consultation:  None today  Equipment provided today:

## 2025-05-06 ENCOUNTER — APPOINTMENT (OUTPATIENT)
Dept: PHYSICAL THERAPY | Age: 80
End: 2025-05-06
Attending: STUDENT IN AN ORGANIZED HEALTH CARE EDUCATION/TRAINING PROGRAM
Payer: MEDICARE

## 2025-05-12 ENCOUNTER — HOSPITAL ENCOUNTER (OUTPATIENT)
Dept: PHYSICAL THERAPY | Age: 80
Setting detail: RECURRING SERIES
Discharge: HOME | End: 2025-05-15
Attending: STUDENT IN AN ORGANIZED HEALTH CARE EDUCATION/TRAINING PROGRAM
Payer: MEDICARE

## 2025-05-12 PROCEDURE — 97110 THERAPEUTIC EXERCISES: CPT

## 2025-05-12 PROCEDURE — 97140 MANUAL THERAPY 1/> REGIONS: CPT

## 2025-05-12 ASSESSMENT — PAIN SCALES - GENERAL: PAINLEVEL_OUTOF10: 3

## 2025-05-12 ASSESSMENT — PAIN DESCRIPTION - DESCRIPTORS: DESCRIPTORS: ACHING

## 2025-05-12 ASSESSMENT — PAIN DESCRIPTION - LOCATION: LOCATION: BACK;HIP

## 2025-05-12 ASSESSMENT — PAIN DESCRIPTION - ORIENTATION: ORIENTATION: LEFT

## 2025-05-12 NOTE — PROGRESS NOTES
Sally Chun  : 1945  Primary: Medicare Part A And B (Medicare)  Secondary: [employer]ARP HEALTH CARE MEDICARE SUPP [facility] @ [address]  [facility] [address]  Phone: [phone]  Fax: [phone] Plan Frequency: 1-2 times a week for 90 days    Plan of Care/Certification Expiration Date: 25        Plan of Care/Certification Expiration Date:  Plan of Care/Certification Expiration Date: 25    Frequency/Duration:   Plan Frequency: 1-2 times a week for 90 days      Time In/Out:   Time In: 45  Time Out: 1025      PT Visit Info:    Progress Note Due Date: 25  Total # of Visits to Date: 6  Progress Note Counter: 1      Visit Count:  6    OUTPATIENT PHYSICAL THERAPY:   Treatment Note 2025       Episode  (Low Back Pain)               Treatment Diagnosis:    Vertebrogenic low back pain  Difficulty in walking, not elsewhere classified  Medical/Referring Diagnosis:    Chronic bilateral low back pain without sciatica    Referring Physician:  David Howe DO MD Orders:  PT Eval and Treat   Return MD Appt:  Unknown    Date of Onset:  Onset Date:  (Chronic-Worsened over the past couple of months)   Allergies:   Latex; Prednisone; Adhesive tape; Antihistamines, chlorpheniramine-type; Ibandronate; Iodinated contrast media; Triamcinolone; Alendronate sodium; and Triprolidine-pseudoephedrine  Restrictions/Precautions:   None      Interventions Planned (Treatment may consist of any combination of the following):     See Assessment Note    Subjective Comments:   Patient report her left Achilles tendon has been irritated over the past week and a half.      Initial Pain Level: Left Back, Hip 3/10  Post Session Pain Level: Left  Back, Hip 2/10  Medications Last Reviewed: 2025  Updated Objective Findings:  See Progress Note from today  Treatment   THERAPEUTIC EXERCISE: (15 minutes):    Exercises per grid below to improve mobility and strength.  Required

## 2025-05-12 NOTE — PROGRESS NOTES
Sally Chun  : 1945  Primary: Medicare Part A And B (Medicare)  Secondary: [employer]ARP HEALTH CARE MEDICARE SUPP [facility] @ [address]  [facility] [address]  Phone: [phone]  Fax: [phone] Plan Frequency: 1-2 times a week for 90 days  Plan of Care/Certification Expiration Date: 25        Plan of Care/Certification Expiration Date:  Plan of Care/Certification Expiration Date: 25    Frequency/Duration: Plan Frequency: 1-2 times a week for 90 days      Time In/Out:   Time In: 45  Time Out: 1025      PT Visit Info:    Progress Note Due Date: 25  Total # of Visits to Date: 6  Progress Note Counter: 1      Visit Count:  6                OUTPATIENT PHYSICAL THERAPY:             Progress Report 2025               Episode (Low Back Pain)         Treatment Diagnosis:     Vertebrogenic low back pain  Difficulty in walking, not elsewhere classified  Medical/Referring Diagnosis:    Chronic bilateral low back pain without sciatica    Referring Physician:  David Howe DO MD Orders:  PT Eval and Treat   Return MD Appt:  Unknown   Date of Onset:  Onset Date:  (Chronic-Worsened over the past couple of months)   Allergies:  Latex; Prednisone; Adhesive tape; Antihistamines, chlorpheniramine-type; Ibandronate; Iodinated contrast media; Triamcinolone; Alendronate sodium; and Triprolidine-pseudoephedrine  Restrictions/Precautions:    None      Medications Last Reviewed: 2025     SUBJECTIVE   History of Injury/Illness (Reason for Referral):  Patient reports chronic low back pain which has worsened over the past couple of months.  Patient describes pain as a dull ache.  Patient reports pain goes from left hip into left knee.   Aggravating factors are sitting for long periods of time and getting up and down from a chair.  Patient rates current pain level as 3/10; worst pain level as 6/10.  Patient has difficulty sleeping at night.

## 2025-05-14 ENCOUNTER — HOSPITAL ENCOUNTER (OUTPATIENT)
Dept: PHYSICAL THERAPY | Age: 80
Setting detail: RECURRING SERIES
Discharge: HOME | End: 2025-05-17
Attending: STUDENT IN AN ORGANIZED HEALTH CARE EDUCATION/TRAINING PROGRAM
Payer: MEDICARE

## 2025-05-14 PROCEDURE — 97140 MANUAL THERAPY 1/> REGIONS: CPT

## 2025-05-14 ASSESSMENT — PAIN DESCRIPTION - ORIENTATION: ORIENTATION: RIGHT

## 2025-05-14 ASSESSMENT — PAIN SCALES - GENERAL: PAINLEVEL_OUTOF10: 4

## 2025-05-14 ASSESSMENT — PAIN DESCRIPTION - LOCATION: LOCATION: SHOULDER

## 2025-05-14 NOTE — PROGRESS NOTES
treatment session: Next visit will focus on manual therapy.    Total Treatment Billable Duration:  40 minutes  Time In: 1030  Time Out: 1110     SONNY TRONCOSO, PT       Post Session Pain  Charge Capture  [facility]    Future Appointments   Date Time Provider Department Center   7/22/2025 11:20 AM David Howe DO [facility] E[facility] DEP   10/27/2025 10:20 AM Ramon Riddle MD END [facility] AMB

## 2025-05-14 NOTE — THERAPY RECERTIFICATION
Sally Chun  : 1945  Primary: Medicare Part A And B  Secondary: [employer] MEDICARE SUPP [facility] @ [address]  [facility] [address]  Phone: [phone]  Fax: [phone]               PT Visit Info:    Total # of Visits to Date: 56  Progress Note Counter: 1  Progress Note Due Date: 25  Canceled Appointment: 1 (24)      OUTPATIENT PHYSICAL THERAPY:Recertification 2025               Episode  Appt Desk         Treatment Diagnosis:  Cervicalgia (M54.2)  Medical/Referring Diagnosis:  Cervicalgia (M54.2)  Referring Physician:  David Howe DO MD Orders:  PT Eval and Treat   Return MD Appt:  TBD  Date of Onset:  Onset Date:  (Chronic-Worsened over the past couple of months)       Allergies:  Latex; Prednisone; Adhesive tape; Antihistamines, chlorpheniramine-type; Ibandronate; Iodinated contrast media; Triamcinolone; Alendronate sodium; and Triprolidine-pseudoephedrine  Restrictions/Precautions:     None  Medications Last Reviewed:  2025     SUBJECTIVE   History of Injury/Illness (Reason for Referral):  2024 (Recertification): Patient reports her neck has been more painful lately.  She's trying to be more active during the week.    2025 (Progress Note): Patient reports her neck has been more painful lately.    2025 (Recertification) Patient reports she has been having more pain in her neck and shoulders lately.  She reports she has been sick and the weather isn't helping.    2025 (Progress Note): Patient reports she is having trouble sleeping at night and so she's been having more pain lately.    2025 (Recertification Note): Patient reports she is having more stress in her life lately so her shoulders have been tight and tired.    Initial: Right Shoulder 4/10   Post Session: Right Shoulder 4/10  Past Medical History/Comorbidities:   Ms. Chun  has a past medical history of Abnormal Pap smear,

## 2025-06-02 ENCOUNTER — APPOINTMENT (OUTPATIENT)
Dept: PHYSICAL THERAPY | Age: 80
End: 2025-06-02
Attending: STUDENT IN AN ORGANIZED HEALTH CARE EDUCATION/TRAINING PROGRAM
Payer: MEDICARE

## 2025-06-09 DIAGNOSIS — F51.04 CHRONIC INSOMNIA: ICD-10-CM

## 2025-06-09 NOTE — TELEPHONE ENCOUNTER
Medication Refill Request    Name of Medication : temazepam    Strength of Medication: 15 mg    Directions: once nightly    30 day or 90 day supply: 90    Preferred [facility]acy: [facility] on [address]    Last Appt. Date: 4/15/25    Next Appt. Date: 7/22/25    Additional Information For Provider: 2 left

## 2025-06-10 RX ORDER — TEMAZEPAM 15 MG/1
CAPSULE ORAL
Qty: 30 CAPSULE | Refills: 2 | Status: SHIPPED | OUTPATIENT
Start: 2025-06-10 | End: 2025-12-08

## 2025-06-10 NOTE — TELEPHONE ENCOUNTER
Refill request received for temazepam.  Last seen in office for unrelated reason on/15/25.  Follow-up visit scheduled for 7/22/2025.  Controlled substance agreement and urine drug screen previously completed. PDMP reviewed showing temazepam 15 mg capsules, quantity #30, 30-day supply last filled on 5/2/2025.  New prescription sent to pharmacy    Orders Placed This Encounter    temazepam (RESTORIL) 15 MG capsule     Sig: Take 1 capsule by mouth at nighttime as needed for sleep.  Do not drink alcohol, drive or operate heavy machinery after use.     Dispense:  30 capsule     Refill:  2

## 2025-06-11 ENCOUNTER — HOSPITAL ENCOUNTER (OUTPATIENT)
Dept: PHYSICAL THERAPY | Age: 80
Setting detail: RECURRING SERIES
Discharge: HOME | End: 2025-06-14
Attending: STUDENT IN AN ORGANIZED HEALTH CARE EDUCATION/TRAINING PROGRAM
Payer: MEDICARE

## 2025-06-11 PROCEDURE — 97140 MANUAL THERAPY 1/> REGIONS: CPT

## 2025-06-11 ASSESSMENT — PAIN DESCRIPTION - ORIENTATION: ORIENTATION: RIGHT

## 2025-06-11 ASSESSMENT — PAIN SCALES - GENERAL: PAINLEVEL_OUTOF10: 4

## 2025-06-11 ASSESSMENT — PAIN DESCRIPTION - LOCATION: LOCATION: SHOULDER

## 2025-06-11 NOTE — PROGRESS NOTES
Sally Chun  : 1945  Primary: Medicare Part A And B  Secondary: [employer] HE[employer] C[employer] MEDICARE SUPP [facility] @ [address]  [facility] [address]  Phone: [phone]  Fax: [phone]      PT Visit Info:    Total # of Visits to Date: 57  Progress Note Counter: 2  Progress Note Due Date: 25       OUTPATIENT PHYSICAL THERAPY:OP NOTE TYPE: Treatment Note 2025       Episode   Appt Desk       Treatment Diagnosis:  Cervicalgia (M54.2)  Medical/Referring Diagnosis:  Cervicalgia (M54.2)  Referring Physician:  David Howe DO MD Orders:  PT Eval and Treat   Date of Onset:  Onset Date:  (Chronic-Worsened over the past couple of months)       Allergies:  Latex; Prednisone; Adhesive tape; Antihistamines, chlorpheniramine-type; Ibandronate; Iodinated contrast media; Triamcinolone; Alendronate sodium; and Triprolidine-pseudoephedrine  Restrictions/Precautions:      None  Interventions Planned (Treatment may consist of any combination of the following):    Cold  Heat  Home Exercise Program (HEP)  Manual Therapy  Neuromuscular Re-education/Strengthening  Range of Motion (ROM)  Therapeutic Activites  Therapeutic Exercise/Strengthening     Subjective Comments:    Patient reports she has pain in her right shoulder more than her left.    Initial: Right Shoulder 4/10  Post Session: Right Shoulder 4/10  Medications Last Reviewed:  2025  Updated Objective Findings:  None Today  Treatment   MANUAL THERAPY: (40 minutes): Joint mobilization and Soft tissue mobilization was utilized and necessary because of the patient's restricted joint motion, painful spasm, loss of articular motion and restricted motion of soft tissue.     Treatment/Session Summary:    Treatment Assessment:     Patient tolerated treatment well with improving overall mobility.  Communication/Consultation:  None today  Equipment provided today:  None  Recommendations/Intent for next treatment

## 2025-06-16 ENCOUNTER — HOSPITAL ENCOUNTER (OUTPATIENT)
Dept: PHYSICAL THERAPY | Age: 80
Setting detail: RECURRING SERIES
Discharge: HOME | End: 2025-06-19
Attending: STUDENT IN AN ORGANIZED HEALTH CARE EDUCATION/TRAINING PROGRAM
Payer: MEDICARE

## 2025-06-16 PROCEDURE — 97140 MANUAL THERAPY 1/> REGIONS: CPT

## 2025-06-16 PROCEDURE — 97110 THERAPEUTIC EXERCISES: CPT

## 2025-06-16 ASSESSMENT — PAIN DESCRIPTION - ORIENTATION: ORIENTATION: LEFT

## 2025-06-16 ASSESSMENT — PAIN DESCRIPTION - DESCRIPTORS: DESCRIPTORS: ACHING

## 2025-06-16 ASSESSMENT — PAIN SCALES - GENERAL: PAINLEVEL_OUTOF10: 4

## 2025-06-16 ASSESSMENT — PAIN DESCRIPTION - LOCATION: LOCATION: BACK;HIP

## 2025-06-16 NOTE — THERAPY RECERTIFICATION
Sally ERICKA Chun  : 1945  Primary: Medicare Part A And B (Medicare)  Secondary: [employer]ARP HE[employer] CARE MEDICARE SUPP [facility] @ [address]  [facility] [address]  Phone: [phone]  Fax: [phone] Plan Frequency: 1-2 times a week for 90 days  Plan of Care/Certification Expiration Date: 25        Plan of Care/Certification Expiration Date:  Plan of Care/Certification Expiration Date: 25    Frequency/Duration: Plan Frequency: 1-2 times a week for 90 days      Time In/Out:   Time In: 943  Time Out: 1028      PT Visit Info:    Progress Note Due Date: 25  Total # of Visits to Date: 7  Progress Note Counter: 1      Visit Count:  7                OUTPATIENT PHYSICAL THERAPY:             Recertification 2025               Episode (Low Back Pain)         Treatment Diagnosis:     Vertebrogenic low back pain  Difficulty in walking, not elsewhere classified  Medical/Referring Diagnosis:    Chronic bilateral low back pain without sciatica    Referring Physician:  David Howe DO MD Orders:  PT Eval and Treat   Return MD Appt:  Unknown   Date of Onset:  Onset Date:  (Chronic-Worsened over the past couple of months)   Allergies:  Latex; Prednisone; Adhesive tape; Antihistamines, chlorpheniramine-type; Ibandronate; Iodinated contrast media; Triamcinolone; Alendronate sodium; and Triprolidine-pseudoephedrine  Restrictions/Precautions:    None      Medications Last Reviewed: 2025     SUBJECTIVE   History of Injury/Illness (Reason for Referral):  Patient reports chronic low back pain which has worsened over the past couple of months.  Patient describes pain as a dull ache.  Patient reports pain goes from left hip into left knee.   Aggravating factors are sitting for long periods of time and getting up and down from a chair.  Patient rates current pain level as 3/10; worst pain level as 6/10.  Patient has difficulty sleeping at night.

## 2025-06-16 NOTE — PROGRESS NOTES
Sally Chun  : 1945  Primary: Medicare Part A And B (Medicare)  Secondary: [employer]ARP HEALTH CARE MEDICARE SUPP [facility] @ [address]  [facility] [address]  Phone: [phone]  Fax: [phone] Plan Frequency: 1-2 times a week for 90 days    Plan of Care/Certification Expiration Date: 25        Plan of Care/Certification Expiration Date:  Plan of Care/Certification Expiration Date: 25    Frequency/Duration:   Plan Frequency: 1-2 times a week for 90 days      Time In/Out:   Time In: 943  Time Out: 1028      PT Visit Info:    Progress Note Due Date: 25  Total # of Visits to Date: 7  Progress Note Counter: 1      Visit Count:  7    OUTPATIENT PHYSICAL THERAPY:   Treatment Note 2025       Episode  (Low Back Pain)               Treatment Diagnosis:    Vertebrogenic low back pain  Difficulty in walking, not elsewhere classified  Medical/Referring Diagnosis:    Chronic bilateral low back pain without sciatica    Referring Physician:  David Howe DO MD Orders:  PT Eval and Treat   Return MD Appt:  Unknown    Date of Onset:  Onset Date:  (Chronic-Worsened over the past couple of months)   Allergies:   Latex; Prednisone; Adhesive tape; Antihistamines, chlorpheniramine-type; Ibandronate; Iodinated contrast media; Triamcinolone; Alendronate sodium; and Triprolidine-pseudoephedrine  Restrictions/Precautions:   None      Interventions Planned (Treatment may consist of any combination of the following):     See Assessment Note    Subjective Comments:   Patient reports she switched offices and had a new chair.- around two weeks ago and the new chair irritated her lower back pain.   \"I couldn't stand and I couldn't sit.  I worked on it with my exercises\".      Initial Pain Level: Left Back, Hip 4/10  Post Session Pain Level: Left  Back, Hip 2/10  Medications Last Reviewed: 2025  Updated Objective Findings:  See Recertification Note from

## 2025-06-30 ENCOUNTER — HOSPITAL ENCOUNTER (OUTPATIENT)
Dept: PHYSICAL THERAPY | Age: 80
Setting detail: RECURRING SERIES
Discharge: HOME | End: 2025-07-03
Attending: STUDENT IN AN ORGANIZED HEALTH CARE EDUCATION/TRAINING PROGRAM
Payer: MEDICARE

## 2025-06-30 PROCEDURE — 97110 THERAPEUTIC EXERCISES: CPT

## 2025-06-30 PROCEDURE — 97140 MANUAL THERAPY 1/> REGIONS: CPT

## 2025-06-30 ASSESSMENT — PAIN SCALES - GENERAL: PAINLEVEL_OUTOF10: 6

## 2025-06-30 ASSESSMENT — PAIN DESCRIPTION - LOCATION: LOCATION: BACK;HIP

## 2025-06-30 ASSESSMENT — PAIN DESCRIPTION - DESCRIPTORS: DESCRIPTORS: ACHING

## 2025-06-30 ASSESSMENT — PAIN DESCRIPTION - ORIENTATION: ORIENTATION: LEFT

## 2025-06-30 NOTE — PROGRESS NOTES
Sally Chun  : 1945  Primary: Medicare Part A And B (Medicare)  Secondary: AARP HEALTH CARE MEDICARE SUPP [facility] @ [address]  [facility] [address]  Phone: [phone]  Fax: [phone] Plan Frequency: 1-2 times a week for 90 days    Plan of Care/Certification Expiration Date: 25        Plan of Care/Certification Expiration Date:  Plan of Care/Certification Expiration Date: 25    Frequency/Duration:   Plan Frequency: 1-2 times a week for 90 days      Time In/Out:   Time In: 0815  Time Out: 0900      PT Visit Info:    Progress Note Due Date: 25  Total # of Visits to Date: 8  Progress Note Counter: 2      Visit Count:  8    OUTPATIENT PHYSICAL THERAPY:   Treatment Note 2025       Episode  (Low Back Pain)               Treatment Diagnosis:    Vertebrogenic low back pain  Difficulty in walking, not elsewhere classified  Medical/Referring Diagnosis:    Chronic bilateral low back pain without sciatica    Referring Physician:  David Howe DO MD Orders:  PT Eval and Treat   Return MD Appt:  Unknown    Date of Onset:  Onset Date:  (Chronic-Worsened over the past couple of months)   Allergies:   Latex; Prednisone; Adhesive tape; Antihistamines, chlorpheniramine-type; Ibandronate; Iodinated contrast media; Triamcinolone; Alendronate sodium; and Triprolidine-pseudoephedrine  Restrictions/Precautions:   None      Interventions Planned (Treatment may consist of any combination of the following):     See Assessment Note    Subjective Comments:   Patient reports she is having a bad day.  \"Standing and sitting is hard.  It was a 7/10 yesterday\".      Initial Pain Level: Left Back, Hip 6/10  Post Session Pain Level: Left  Back, Hip 5/10  Medications Last Reviewed: 2025  Updated Objective Findings:  None Today  Treatment   THERAPEUTIC EXERCISE: (20 minutes):    Exercises per grid below to improve mobility and strength.  Required minimal

## 2025-07-01 ENCOUNTER — APPOINTMENT (OUTPATIENT)
Dept: PHYSICAL THERAPY | Age: 80
End: 2025-07-01
Attending: STUDENT IN AN ORGANIZED HEALTH CARE EDUCATION/TRAINING PROGRAM
Payer: MEDICARE

## 2025-07-07 ENCOUNTER — HOSPITAL ENCOUNTER (OUTPATIENT)
Dept: PHYSICAL THERAPY | Age: 80
Setting detail: RECURRING SERIES
Discharge: HOME | End: 2025-07-10
Attending: STUDENT IN AN ORGANIZED HEALTH CARE EDUCATION/TRAINING PROGRAM
Payer: MEDICARE

## 2025-07-07 PROCEDURE — 97110 THERAPEUTIC EXERCISES: CPT

## 2025-07-07 PROCEDURE — 97140 MANUAL THERAPY 1/> REGIONS: CPT

## 2025-07-07 ASSESSMENT — PAIN DESCRIPTION - ORIENTATION: ORIENTATION: LEFT;RIGHT

## 2025-07-07 ASSESSMENT — PAIN DESCRIPTION - DESCRIPTORS: DESCRIPTORS: ACHING

## 2025-07-07 ASSESSMENT — PAIN SCALES - GENERAL: PAINLEVEL_OUTOF10: 5

## 2025-07-07 ASSESSMENT — PAIN DESCRIPTION - LOCATION: LOCATION: BACK;HIP

## 2025-07-07 NOTE — PROGRESS NOTES
Sally Chun  : 1945  Primary: Medicare Part A And B (Medicare)  Secondary: [employer]ARP HEALTH CARE MEDICARE SUPP [facility] @ [address]  [facility] [address]  Phone: [phone]  Fax: [phone] Plan Frequency: 1-2 times a week for 90 days    Plan of Care/Certification Expiration Date: 25        Plan of Care/Certification Expiration Date:  Plan of Care/Certification Expiration Date: 25    Frequency/Duration:   Plan Frequency: 1-2 times a week for 90 days      Time In/Out:   Time In: 0820  Time Out: 0900      PT Visit Info:    Progress Note Due Date: 25  Total # of Visits to Date: 9  Progress Note Counter: 3      Visit Count:  9    OUTPATIENT PHYSICAL THERAPY:   Treatment Note 2025       Episode  (Low Back Pain)               Treatment Diagnosis:    Vertebrogenic low back pain  Difficulty in walking, not elsewhere classified  Medical/Referring Diagnosis:    Chronic bilateral low back pain without sciatica    Referring Physician:  David Howe DO MD Orders:  PT Eval and Treat   Return MD Appt:  Unknown    Date of Onset:  Onset Date:  (Chronic-Worsened over the past couple of months)   Allergies:   Latex; Prednisone; Adhesive tape; Antihistamines, chlorpheniramine-type; Ibandronate; Iodinated contrast media; Triamcinolone; Alendronate sodium; and Triprolidine-pseudoephedrine  Restrictions/Precautions:   None      Interventions Planned (Treatment may consist of any combination of the following):     See Assessment Note    Subjective Comments:   Patient reports she was hurting bad over the weekend.  Patient reports pain down both legs.  \"Saturday was wicked with pain\".    Initial Pain Level: Left, Right Back, Hip 5/10  Post Session Pain Level: Left, Right  Back, Hip 4/10  Medications Last Reviewed: 2025  Updated Objective Findings:  None Today  Treatment   THERAPEUTIC EXERCISE: (15 minutes):    Exercises per grid below to improve

## 2025-07-09 ENCOUNTER — APPOINTMENT (OUTPATIENT)
Dept: PHYSICAL THERAPY | Age: 80
End: 2025-07-09
Attending: STUDENT IN AN ORGANIZED HEALTH CARE EDUCATION/TRAINING PROGRAM
Payer: MEDICARE

## 2025-07-14 ENCOUNTER — HOSPITAL ENCOUNTER (OUTPATIENT)
Dept: PHYSICAL THERAPY | Age: 80
Setting detail: RECURRING SERIES
Discharge: HOME | End: 2025-07-17
Attending: STUDENT IN AN ORGANIZED HEALTH CARE EDUCATION/TRAINING PROGRAM
Payer: MEDICARE

## 2025-07-14 PROCEDURE — 97110 THERAPEUTIC EXERCISES: CPT

## 2025-07-14 PROCEDURE — 97140 MANUAL THERAPY 1/> REGIONS: CPT

## 2025-07-14 ASSESSMENT — PAIN DESCRIPTION - ORIENTATION: ORIENTATION: LEFT;RIGHT

## 2025-07-14 ASSESSMENT — PAIN SCALES - GENERAL: PAINLEVEL_OUTOF10: 5

## 2025-07-14 ASSESSMENT — PAIN DESCRIPTION - LOCATION: LOCATION: BACK;HIP

## 2025-07-14 ASSESSMENT — PAIN DESCRIPTION - DESCRIPTORS: DESCRIPTORS: ACHING

## 2025-07-14 NOTE — PROGRESS NOTES
Patient Stated Goal(s):  \"To not hurt\"  Initial Pain Level:  Left, Right Back, Hip 5/10   Post Session Pain Level: Left, Right Back, Hip 2/10  Past Medical History/Comorbidities:   Ms. Chun  has a past medical history of Abnormal Pap smear, Anxiety, Back problem, COPD (chronic obstructive pulmonary disease) (HCC), Depression, Hyperthyroidism, Insomnia, Irritable bowel syndrome, and Osteopenia.  Ms. Chun  has a past surgical history that includes Tonsillectomy; orthopedic surgery; Salpingo-oophorectomy (Right, 01/22/2014); and Colonoscopy.  Social History/Living Environment:   Patient lives alone    Prior Level of Function/Work/Activity:   Prior Level of Function: Independent      Learning:   Does the patient/guardian have any barriers to learning?: No barriers  Will there be a co-learner?: No  What is the preferred language of the patient/guardian?: English  Is an  required?: No  How does the patient/guardian prefer to learn new concepts?: Listening; Reading; Demonstration    Fall Risk Scale:   Hernandez Total Score: 0      Dominant Side:  right handed  Personal Factors:        Sex:  female        Age:  80 y.o.     OBJECTIVE   Palpation:     Increased tenderness to palpation along left lumbar paraspinals/left piriformis, and left SI region.   Strength:     Hip flexion right 4-/5 and left 4/5, hip abduction right 4-/5 and left 4/5, hip adduction 5/5, quadriceps right 4+/5 and left 5/5, hamstrings right 4/5 and left 5/5, ankle dorsiflexion 5/5, ankle plantarflexion 5/5, abdominals 4-/5.   Functional Mobility:    Patient ambulates with normal gait pattern.   Quality of Movement:   Lumbar range of motion is as follows: flexion within normal limits, extension 50% of available range with increased back pain, bilateral rotation within normal limits, right lateral flexion 50% of available range with increased left lower back pain, left lateral flexion within normal limits.   Sensation:   Bilateral lower

## 2025-07-14 NOTE — PROGRESS NOTES
Exercises per grid below to improve mobility and strength.  Required minimal verbal cues to promote proper body alignment.  Progressed repetitions as indicated.   Date:  7/14/2025 Date:  6/30/2025 Date:  7/7/2025   Activity/Exercise Parameters Parameters Parameters   Piriformis stretch 3x30 sec bilateral 3x30 sec bilateral 3x30 sec bilateral   Supine bilateral knee lifts 2x10 reps-not today 2x10 reps-not today 2x10 reps- not today   Supine hip abduction Light theraband  2x10 reps- not today Light theraband  2x10 reps- not today Medium theraband  2x10 reps- not today   Supine trunk rotation 10 reps bilateral 10 reps bilateral 10 reps bilateral   Supine knee to chest stretch 3x30 sec bilateral 3x30 sec bilateral 3x30 sec bilateral   Bridging 20 reps 20 reps 20 reps   Supine hip adduction squeeze with ball 2x10 reps- not today 2x10 reps- not today 2x10 reps- not today   Supine pelvic tilts  20 reps 20 reps   Inversion table  2 minutes on, 1 minute off x 4  2 minutes on, 1 minute off x 4  2 minutes on, 1 minute off x 4      MANUAL THERAPY: (25 minutes):   Soft tissue mobilization was utilized and necessary because of the patient's painful spasm.   In prone, patient received soft tissue mobilization/trigger point release along bilateral lumbar paraspinals/left piriformis to decrease pain. Skin intact after treatment.     Treatment/Session Summary:    Treatment Assessment:   Patient tolerated treatment well.   Patient reports decreased back after treatment.   Patient continues to have increased back pain with prolonged sitting at work.  Communication/Consultation:  None today  Equipment provided today:  None  Recommendations/Intent for next treatment session: Next visit will focus on stretching exercises, strengthening exercises, manual therapy, and modalities as needed for pain relief.    >Total Treatment Billable Duration:  40 minutes   Time In: 0820  Time Out: 0900     DARON MONTESINOS, DEANNA         Charge Capture

## 2025-07-22 ENCOUNTER — OFFICE VISIT (OUTPATIENT)
Dept: INTERNAL MEDICINE CLINIC | Facility: CLINIC | Age: 80
End: 2025-07-22
Payer: MEDICARE

## 2025-07-22 ENCOUNTER — APPOINTMENT (OUTPATIENT)
Dept: PHYSICAL THERAPY | Age: 80
End: 2025-07-22
Attending: STUDENT IN AN ORGANIZED HEALTH CARE EDUCATION/TRAINING PROGRAM
Payer: MEDICARE

## 2025-07-22 VITALS
WEIGHT: 117 LBS | SYSTOLIC BLOOD PRESSURE: 126 MMHG | HEIGHT: 60 IN | DIASTOLIC BLOOD PRESSURE: 72 MMHG | HEART RATE: 76 BPM | BODY MASS INDEX: 22.97 KG/M2 | TEMPERATURE: 97.9 F | OXYGEN SATURATION: 96 %

## 2025-07-22 DIAGNOSIS — M54.50 CHRONIC BILATERAL LOW BACK PAIN WITHOUT SCIATICA: ICD-10-CM

## 2025-07-22 DIAGNOSIS — F41.9 ANXIETY AND DEPRESSION: ICD-10-CM

## 2025-07-22 DIAGNOSIS — F32.A ANXIETY AND DEPRESSION: ICD-10-CM

## 2025-07-22 DIAGNOSIS — E55.9 VITAMIN D DEFICIENCY: ICD-10-CM

## 2025-07-22 DIAGNOSIS — E04.2 MULTINODULAR GOITER: ICD-10-CM

## 2025-07-22 DIAGNOSIS — E78.00 PURE HYPERCHOLESTEROLEMIA: Primary | ICD-10-CM

## 2025-07-22 DIAGNOSIS — F51.04 CHRONIC INSOMNIA: ICD-10-CM

## 2025-07-22 DIAGNOSIS — J44.9 CHRONIC OBSTRUCTIVE PULMONARY DISEASE, UNSPECIFIED COPD TYPE (HCC): ICD-10-CM

## 2025-07-22 DIAGNOSIS — I73.9 PERIPHERAL ARTERY DISEASE: ICD-10-CM

## 2025-07-22 DIAGNOSIS — R91.8 PULMONARY NODULES: ICD-10-CM

## 2025-07-22 DIAGNOSIS — M85.89 OSTEOPENIA OF MULTIPLE SITES: ICD-10-CM

## 2025-07-22 DIAGNOSIS — E05.90 SUBCLINICAL HYPERTHYROIDISM: ICD-10-CM

## 2025-07-22 DIAGNOSIS — Z87.891 PERSONAL HISTORY OF TOBACCO USE: ICD-10-CM

## 2025-07-22 DIAGNOSIS — G89.29 CHRONIC BILATERAL LOW BACK PAIN WITHOUT SCIATICA: ICD-10-CM

## 2025-07-22 PROCEDURE — G8427 DOCREV CUR MEDS BY ELIG CLIN: HCPCS | Performed by: NURSE PRACTITIONER

## 2025-07-22 PROCEDURE — 1123F ACP DISCUSS/DSCN MKR DOCD: CPT | Performed by: NURSE PRACTITIONER

## 2025-07-22 PROCEDURE — 1090F PRES/ABSN URINE INCON ASSESS: CPT | Performed by: NURSE PRACTITIONER

## 2025-07-22 PROCEDURE — G0296 VISIT TO DETERM LDCT ELIG: HCPCS | Performed by: NURSE PRACTITIONER

## 2025-07-22 PROCEDURE — G2211 COMPLEX E/M VISIT ADD ON: HCPCS | Performed by: NURSE PRACTITIONER

## 2025-07-22 PROCEDURE — 3023F SPIROM DOC REV: CPT | Performed by: NURSE PRACTITIONER

## 2025-07-22 PROCEDURE — 4004F PT TOBACCO SCREEN RCVD TLK: CPT | Performed by: NURSE PRACTITIONER

## 2025-07-22 PROCEDURE — G8399 PT W/DXA RESULTS DOCUMENT: HCPCS | Performed by: NURSE PRACTITIONER

## 2025-07-22 PROCEDURE — 99215 OFFICE O/P EST HI 40 MIN: CPT | Performed by: NURSE PRACTITIONER

## 2025-07-22 PROCEDURE — 3074F SYST BP LT 130 MM HG: CPT | Performed by: NURSE PRACTITIONER

## 2025-07-22 PROCEDURE — 3078F DIAST BP <80 MM HG: CPT | Performed by: NURSE PRACTITIONER

## 2025-07-22 PROCEDURE — G8420 CALC BMI NORM PARAMETERS: HCPCS | Performed by: NURSE PRACTITIONER

## 2025-07-22 RX ORDER — DULOXETIN HYDROCHLORIDE 30 MG/1
30 CAPSULE, DELAYED RELEASE ORAL DAILY
Qty: 90 CAPSULE | Refills: 2 | Status: CANCELLED | OUTPATIENT
Start: 2025-07-22

## 2025-07-22 NOTE — PROGRESS NOTES
[facility]      2025    Patient Name: Sally Chun  :  1945      Chief Complaint:  Chief Complaint   Patient presents with    3 Month Follow-Up         HPI  Patient presents today for follow-up on chronic conditions.     The patient is a 80 y.o. female who is seen for evaluation of  hyperlipidemia.  She was tested because history of HLD.  Her last labs (24) showed Total cholesterol of 211, HDL 65, ,  Triglycerides 134. Cardiac symptoms: none.     Followed by vascular for PAD.  Lower extremity arterial ultrasound on 2024 with right lower extremity EUSEBIO 0.85 at rest, dropped to 0.51 following exercise. Left lower extremity EUSEBIO 0.74 at rest dropping to 0.61 following exercise Unfortunately continues to smoke.  She is exercising about 30 minutes most days of the week.  Continues aspirin and statin.     History of COPD and lung nodules.  CT lung cancer screening from 2023 with stable small nodules on the right lung the largest measuring 5 mm in the upper lobe, stable 6 mm nodule in the left lower lobe with stable subsolid nodule in the left upper lobe, stable emphysematous changes, no lymphadenopathy of the mediastinum  Currently on as needed albuterol    Followed annually by endocrinology for multinodular goiter and subclinical hyperthyroidism.  Recent notes reviewed.  No indication for biopsy at this time.  Scheduled to follow-up in October of this year.    Osteopenia. DEXA scan from 2024 with 10-year fracture risk of 21.1%, hip fracture risk of 7.5%. Intermittently on Fosamax for almost 10+ years.  Currently taking calcium and vitamin D supplementation.  Not interested in additional treatment at this time.    Anxiety/depression well-controlled on current dose of Cymbalta.  Resting well at night on temazepam.    Chronic low back pain.  Currently participating in physical therapy.  Seen  Left-sided low back pain radiating down left lateral leg.  Seen at urgent care

## 2025-07-23 ASSESSMENT — ENCOUNTER SYMPTOMS
SORE THROAT: 0
COUGH: 0
NAUSEA: 0
BACK PAIN: 1
VOMITING: 0
DIARRHEA: 0
WHEEZING: 0
SHORTNESS OF BREATH: 0
ABDOMINAL PAIN: 0

## 2025-07-29 ENCOUNTER — HOSPITAL ENCOUNTER (OUTPATIENT)
Dept: PHYSICAL THERAPY | Age: 80
Setting detail: RECURRING SERIES
Discharge: HOME | End: 2025-08-01
Attending: STUDENT IN AN ORGANIZED HEALTH CARE EDUCATION/TRAINING PROGRAM
Payer: MEDICARE

## 2025-07-29 PROCEDURE — 97140 MANUAL THERAPY 1/> REGIONS: CPT

## 2025-07-29 ASSESSMENT — PAIN SCALES - GENERAL: PAINLEVEL_OUTOF10: 4

## 2025-07-29 ASSESSMENT — PAIN DESCRIPTION - LOCATION: LOCATION: SHOULDER

## 2025-07-29 ASSESSMENT — PAIN DESCRIPTION - ORIENTATION: ORIENTATION: RIGHT

## 2025-07-29 NOTE — PROGRESS NOTES
Sally Chun  : 1945  Primary: Medicare Part A And B  Secondary: AARP HEALTH CARE MEDICARE SUPP [facility] @ [address]  [facility] [address]  Phone: [phone]  Fax: [phone]      PT Visit Info:    Total # of Visits to Date: 58  Progress Note Counter: 1  Progress Note Due Date: 25       OUTPATIENT PHYSICAL THERAPY:OP NOTE TYPE: Treatment Note 2025       Episode   Appt Desk       Treatment Diagnosis:  Cervicalgia (M54.2)  Medical/Referring Diagnosis:  Cervicalgia (M54.2)  Referring Physician:  David Howe DO MD Orders:  PT Eval and Treat   Date of Onset:  Onset Date:  (Chronic-Worsened over the past couple of months)       Allergies:  Latex; Prednisone; Adhesive tape; Antihistamines, chlorpheniramine-type; Ibandronate; Iodinated contrast media; Triamcinolone; Alendronate sodium; and Triprolidine-pseudoephedrine  Restrictions/Precautions:      None  Interventions Planned (Treatment may consist of any combination of the following):    Cold  Heat  Home Exercise Program (HEP)  Manual Therapy  Neuromuscular Re-education/Strengthening  Range of Motion (ROM)  Therapeutic Activites  Therapeutic Exercise/Strengthening     Subjective Comments:    Patient reports she is having pain in her right shoulder more today.    Initial: Right Shoulder 4/10  Post Session: Right Shoulder 4/10  Medications Last Reviewed:  2025  Updated Objective Findings:  None Today  Treatment   MANUAL THERAPY: (40 minutes): Joint mobilization and Soft tissue mobilization was utilized and necessary because of the patient's restricted joint motion, painful spasm, loss of articular motion and restricted motion of soft tissue.     Treatment/Session Summary:    Treatment Assessment:     Patient completed all activities with improved mobility and pain after treatment.  Communication/Consultation:  None today  Equipment provided today:  None  Recommendations/Intent for next

## 2025-07-29 NOTE — THERAPY RECERTIFICATION
Sally Chun  : 1945  Primary: Medicare Part A And B  Secondary: AARP HEALTH CARE MEDICARE SUPP [facility] @ [address]  [facility] [address]  Phone: [phone]  Fax: [phone]               PT Visit Info:    Total # of Visits to Date: 58  Progress Note Counter: 1  Progress Note Due Date: 25      OUTPATIENT PHYSICAL THERAPY:Recertification 2025               Episode  Appt Desk         Treatment Diagnosis:  Cervicalgia (M54.2)  Medical/Referring Diagnosis:  Cervicalgia (M54.2)  Referring Physician:  David Howe DO MD Orders:  PT Eval and Treat   Return MD Appt:  TBD  Date of Onset:  Onset Date:  (Chronic-Worsened over the past couple of months)       Allergies:  Latex; Prednisone; Adhesive tape; Antihistamines, chlorpheniramine-type; Ibandronate; Iodinated contrast media; Triamcinolone; Alendronate sodium; and Triprolidine-pseudoephedrine  Restrictions/Precautions:     None  Medications Last Reviewed:  2025     SUBJECTIVE   History of Injury/Illness (Reason for Referral):  2025 (Recertification Note): Patient reports she is having more stress in her life lately so her shoulders have been tight and tired.    2025 (Recertification): Patient reports the right shoulder and neck have been a little tight lately, but she's been doing a lot more lately.    Initial: Right Shoulder 4/10   Post Session: Right Shoulder 4/10  Past Medical History/Comorbidities:   Ms. Chun  has a past medical history of Abnormal Pap smear, Anxiety, Back problem, COPD (chronic obstructive pulmonary disease) (HCC), Depression, Hyperthyroidism, Insomnia, Irritable bowel syndrome, and Osteopenia.  Ms. Chun  has a past surgical history that includes Tonsillectomy; orthopedic surgery; Salpingo-oophorectomy (Right, 2014); and Colonoscopy.  Social History/Living Environment:   Retired     Prior Level of Function/Work/Activity:    Independent

## 2025-08-04 ENCOUNTER — HOSPITAL ENCOUNTER (OUTPATIENT)
Dept: PHYSICAL THERAPY | Age: 80
Setting detail: RECURRING SERIES
Discharge: HOME | End: 2025-08-07
Attending: STUDENT IN AN ORGANIZED HEALTH CARE EDUCATION/TRAINING PROGRAM
Payer: MEDICARE

## 2025-08-04 PROCEDURE — 97140 MANUAL THERAPY 1/> REGIONS: CPT

## 2025-08-04 PROCEDURE — 97110 THERAPEUTIC EXERCISES: CPT

## 2025-08-04 ASSESSMENT — PAIN DESCRIPTION - DESCRIPTORS: DESCRIPTORS: ACHING

## 2025-08-04 ASSESSMENT — PAIN SCALES - GENERAL: PAINLEVEL_OUTOF10: 3

## 2025-08-04 ASSESSMENT — PAIN DESCRIPTION - ORIENTATION: ORIENTATION: LEFT;RIGHT

## 2025-08-04 ASSESSMENT — PAIN DESCRIPTION - LOCATION: LOCATION: BACK;HIP

## 2025-08-11 ENCOUNTER — HOSPITAL ENCOUNTER (OUTPATIENT)
Dept: PHYSICAL THERAPY | Age: 80
Setting detail: RECURRING SERIES
Discharge: HOME | End: 2025-08-14
Attending: STUDENT IN AN ORGANIZED HEALTH CARE EDUCATION/TRAINING PROGRAM
Payer: MEDICARE

## 2025-08-11 ENCOUNTER — HOSPITAL ENCOUNTER (OUTPATIENT)
Dept: CT IMAGING | Age: 80
Discharge: HOME | End: 2025-08-14
Payer: MEDICARE

## 2025-08-11 DIAGNOSIS — Z87.891 PERSONAL HISTORY OF TOBACCO USE: ICD-10-CM

## 2025-08-11 PROCEDURE — 97110 THERAPEUTIC EXERCISES: CPT

## 2025-08-11 PROCEDURE — 97140 MANUAL THERAPY 1/> REGIONS: CPT

## 2025-08-11 PROCEDURE — 71271 CT THORAX LUNG CANCER SCR C-: CPT

## 2025-08-11 ASSESSMENT — PAIN DESCRIPTION - ORIENTATION: ORIENTATION: LEFT;RIGHT

## 2025-08-11 ASSESSMENT — PAIN DESCRIPTION - LOCATION: LOCATION: BACK;HIP

## 2025-08-11 ASSESSMENT — PAIN DESCRIPTION - DESCRIPTORS: DESCRIPTORS: ACHING

## 2025-08-11 ASSESSMENT — PAIN SCALES - GENERAL: PAINLEVEL_OUTOF10: 3

## 2025-08-18 ENCOUNTER — HOSPITAL ENCOUNTER (OUTPATIENT)
Dept: PHYSICAL THERAPY | Age: 80
Setting detail: RECURRING SERIES
Discharge: HOME | End: 2025-08-21
Attending: STUDENT IN AN ORGANIZED HEALTH CARE EDUCATION/TRAINING PROGRAM
Payer: MEDICARE

## 2025-08-18 PROCEDURE — 97110 THERAPEUTIC EXERCISES: CPT

## 2025-08-18 PROCEDURE — 97140 MANUAL THERAPY 1/> REGIONS: CPT

## 2025-08-18 ASSESSMENT — PAIN DESCRIPTION - DESCRIPTORS: DESCRIPTORS: ACHING

## 2025-08-18 ASSESSMENT — PAIN DESCRIPTION - ORIENTATION: ORIENTATION: LEFT;RIGHT

## 2025-08-18 ASSESSMENT — PAIN DESCRIPTION - LOCATION: LOCATION: BACK;HIP

## 2025-08-18 ASSESSMENT — PAIN SCALES - GENERAL: PAINLEVEL_OUTOF10: 3

## 2025-08-20 ENCOUNTER — HOSPITAL ENCOUNTER (OUTPATIENT)
Dept: PHYSICAL THERAPY | Age: 80
Setting detail: RECURRING SERIES
Discharge: HOME | End: 2025-08-23
Attending: STUDENT IN AN ORGANIZED HEALTH CARE EDUCATION/TRAINING PROGRAM
Payer: MEDICARE

## 2025-08-20 PROCEDURE — 97140 MANUAL THERAPY 1/> REGIONS: CPT

## 2025-08-20 ASSESSMENT — PAIN DESCRIPTION - LOCATION: LOCATION: SHOULDER

## 2025-08-20 ASSESSMENT — PAIN DESCRIPTION - ORIENTATION: ORIENTATION: RIGHT

## 2025-08-20 ASSESSMENT — PAIN SCALES - GENERAL: PAINLEVEL_OUTOF10: 4

## 2025-08-24 ENCOUNTER — TELEPHONE (OUTPATIENT)
Dept: PULMONOLOGY | Age: 80
End: 2025-08-24

## 2025-08-24 DIAGNOSIS — R91.8 OTHER NONSPECIFIC ABNORMAL FINDING OF LUNG FIELD: ICD-10-CM

## 2025-08-24 DIAGNOSIS — R93.89 ABNORMAL CT OF THE CHEST: Primary | ICD-10-CM

## 2025-08-25 ENCOUNTER — TELEPHONE (OUTPATIENT)
Dept: INTERNAL MEDICINE CLINIC | Facility: CLINIC | Age: 80
End: 2025-08-25

## 2025-08-25 ENCOUNTER — HOSPITAL ENCOUNTER (OUTPATIENT)
Dept: PHYSICAL THERAPY | Age: 80
Setting detail: RECURRING SERIES
Discharge: HOME | End: 2025-08-28
Attending: STUDENT IN AN ORGANIZED HEALTH CARE EDUCATION/TRAINING PROGRAM
Payer: MEDICARE

## 2025-08-25 PROCEDURE — 97110 THERAPEUTIC EXERCISES: CPT

## 2025-08-25 ASSESSMENT — PAIN DESCRIPTION - DESCRIPTORS: DESCRIPTORS: ACHING

## 2025-08-25 ASSESSMENT — PAIN DESCRIPTION - ORIENTATION: ORIENTATION: LEFT;RIGHT

## 2025-08-25 ASSESSMENT — PAIN SCALES - GENERAL: PAINLEVEL_OUTOF10: 6

## 2025-08-25 ASSESSMENT — PAIN DESCRIPTION - LOCATION: LOCATION: BACK;HIP

## 2025-09-02 ENCOUNTER — HOSPITAL ENCOUNTER (OUTPATIENT)
Dept: PHYSICAL THERAPY | Age: 80
Setting detail: RECURRING SERIES
Discharge: HOME | End: 2025-09-05
Attending: STUDENT IN AN ORGANIZED HEALTH CARE EDUCATION/TRAINING PROGRAM
Payer: MEDICARE

## 2025-09-02 ENCOUNTER — OFFICE VISIT (OUTPATIENT)
Dept: INTERNAL MEDICINE CLINIC | Facility: CLINIC | Age: 80
End: 2025-09-02
Payer: MEDICARE

## 2025-09-02 VITALS
HEIGHT: 60 IN | SYSTOLIC BLOOD PRESSURE: 122 MMHG | DIASTOLIC BLOOD PRESSURE: 78 MMHG | WEIGHT: 116 LBS | BODY MASS INDEX: 22.78 KG/M2 | OXYGEN SATURATION: 97 % | TEMPERATURE: 97 F | HEART RATE: 54 BPM

## 2025-09-02 DIAGNOSIS — M54.42 CHRONIC LEFT-SIDED LOW BACK PAIN WITH LEFT-SIDED SCIATICA: ICD-10-CM

## 2025-09-02 DIAGNOSIS — R91.1 PULMONARY NODULE 1 CM OR GREATER IN DIAMETER: Primary | ICD-10-CM

## 2025-09-02 DIAGNOSIS — G89.29 CHRONIC LEFT-SIDED LOW BACK PAIN WITH LEFT-SIDED SCIATICA: ICD-10-CM

## 2025-09-02 PROCEDURE — 3074F SYST BP LT 130 MM HG: CPT | Performed by: NURSE PRACTITIONER

## 2025-09-02 PROCEDURE — G8399 PT W/DXA RESULTS DOCUMENT: HCPCS | Performed by: NURSE PRACTITIONER

## 2025-09-02 PROCEDURE — 1160F RVW MEDS BY RX/DR IN RCRD: CPT | Performed by: NURSE PRACTITIONER

## 2025-09-02 PROCEDURE — G2211 COMPLEX E/M VISIT ADD ON: HCPCS | Performed by: NURSE PRACTITIONER

## 2025-09-02 PROCEDURE — 99214 OFFICE O/P EST MOD 30 MIN: CPT | Performed by: NURSE PRACTITIONER

## 2025-09-02 PROCEDURE — G8420 CALC BMI NORM PARAMETERS: HCPCS | Performed by: NURSE PRACTITIONER

## 2025-09-02 PROCEDURE — 3078F DIAST BP <80 MM HG: CPT | Performed by: NURSE PRACTITIONER

## 2025-09-02 PROCEDURE — 97110 THERAPEUTIC EXERCISES: CPT

## 2025-09-02 PROCEDURE — 1159F MED LIST DOCD IN RCRD: CPT | Performed by: NURSE PRACTITIONER

## 2025-09-02 PROCEDURE — 1090F PRES/ABSN URINE INCON ASSESS: CPT | Performed by: NURSE PRACTITIONER

## 2025-09-02 PROCEDURE — 4004F PT TOBACCO SCREEN RCVD TLK: CPT | Performed by: NURSE PRACTITIONER

## 2025-09-02 PROCEDURE — 1123F ACP DISCUSS/DSCN MKR DOCD: CPT | Performed by: NURSE PRACTITIONER

## 2025-09-02 PROCEDURE — 97140 MANUAL THERAPY 1/> REGIONS: CPT

## 2025-09-02 PROCEDURE — G8427 DOCREV CUR MEDS BY ELIG CLIN: HCPCS | Performed by: NURSE PRACTITIONER

## 2025-09-02 ASSESSMENT — ENCOUNTER SYMPTOMS
WHEEZING: 0
NAUSEA: 0
COUGH: 0
BACK PAIN: 1
ABDOMINAL PAIN: 0
VOMITING: 0
SHORTNESS OF BREATH: 1

## 2025-09-02 ASSESSMENT — PAIN DESCRIPTION - ORIENTATION: ORIENTATION: LEFT;RIGHT

## 2025-09-02 ASSESSMENT — PAIN SCALES - GENERAL: PAINLEVEL_OUTOF10: 9

## 2025-09-02 ASSESSMENT — PAIN DESCRIPTION - LOCATION: LOCATION: BACK;HIP

## 2025-09-02 ASSESSMENT — PAIN DESCRIPTION - DESCRIPTORS: DESCRIPTORS: ACHING

## 2025-09-03 ENCOUNTER — HOSPITAL ENCOUNTER (OUTPATIENT)
Dept: PHYSICAL THERAPY | Age: 80
Setting detail: RECURRING SERIES
Discharge: HOME | End: 2025-09-06
Attending: STUDENT IN AN ORGANIZED HEALTH CARE EDUCATION/TRAINING PROGRAM
Payer: MEDICARE

## 2025-09-03 PROCEDURE — 97140 MANUAL THERAPY 1/> REGIONS: CPT

## 2025-09-03 ASSESSMENT — PAIN DESCRIPTION - ORIENTATION: ORIENTATION: RIGHT

## 2025-09-03 ASSESSMENT — PAIN SCALES - GENERAL: PAINLEVEL_OUTOF10: 5

## 2025-09-03 ASSESSMENT — PAIN DESCRIPTION - LOCATION: LOCATION: SHOULDER
